# Patient Record
Sex: MALE | Race: WHITE | Employment: OTHER | URBAN - METROPOLITAN AREA
[De-identification: names, ages, dates, MRNs, and addresses within clinical notes are randomized per-mention and may not be internally consistent; named-entity substitution may affect disease eponyms.]

---

## 2017-01-18 ENCOUNTER — OFFICE VISIT (OUTPATIENT)
Dept: SURGERY | Age: 72
End: 2017-01-18

## 2017-01-18 VITALS — OXYGEN SATURATION: 98 % | WEIGHT: 229 LBS | HEIGHT: 73 IN | BODY MASS INDEX: 30.35 KG/M2 | RESPIRATION RATE: 18 BRPM

## 2017-01-18 DIAGNOSIS — K42.9 RECURRENT UMBILICAL HERNIA: Primary | ICD-10-CM

## 2017-01-18 NOTE — PROGRESS NOTES
Progress Note    Patient: Delia Hayden  MRN: [id number]  SSN: [id number]   YOB: 1945  Age: 70 y.o. Sex: male     Chief Complaint   Patient presents with    Umbilical Hernia       HPI    Mr. Julee Swenson is a 77-year-old gentleman who I fixed and ventral hernia on several years ago. He is back today with a recurrence of this hernia and would like it repaired. Is getting bigger over the past 6-8 months and started to become symptomatic. These had no health issues changes and is not on anticoagulation. We discussed in detail the physiology of recurrence as well as laparoscopic repairs. I discussed the risks and benefits with him he is good with that and is anxious to proceed.     Past Medical History   Diagnosis Date    Adenoma of left adrenal gland      2009  1 cm, no change 1/15, 2/16    Asbestosis     Atrial fibrillation      CHA2DS2-VAsc=3(age+, htn+, vasc dx+; estimated yearly stroke risk according to Lip et al. is 3,2%), Hasbled=2(estimated yearly bleeding risk according to pisters et al. is 1,88%); not anticoagulated due to h/o gi bleed    Atrial fibrillation ablation      10/08    Basal cell cancer      Dr Cachorro Seo; he's had >350 lesions removed    Carotid occlusion      left     Cervical radiculopathy      MRI 9/11 showed C3-6 severe foraminal stenosis    Chronic pain     Colon polyp      Dr Jono Griggs 9/15    Coronary artery disease      RCA - 3.0 x 16mm TAXUS 9/04, 3.0 x 16mm TAXUS 12/06    Dyslipidemia     Erectile dysfunction     GERD     GI bleed     Hearing loss      2014  bilateral Dr Ortiz Cutting    Hernia, umbilical     Hypertension     Hypogonadism male     Lumbar radiculopathy      Dr Sharyle Lown;  MRI 9/11 L4-5 disc bulging, annular tear, disc dessication    Myofascial pain dysfunction syndrome      pain clinic     Osteoarthritis      right knee Dr Jessica Lowe Peripheral vascular disease      50-60% R iliac; s/p R iliac stent and L femoral artery stent in past; R OLIVIA 0.76 (1/16)    Plantar fasciitis      bilateral     PPD positive     Prediabetes     Prostate cancer      T1c Holt 7(3+4), 70% in 1 core, GS 7 (3+4) in 4 cores, GS 6 (3+3) in 1 core; psa 5.28, TRUS 18 gm;  Dr Preethi Tenorio; s/p cryoablation 10/16    Recurrent umbilical hernia     Subclinical hypothyroidism      denies    Tinnitus      Dr Farzana Tello Ulcerative colitis     Venous insufficiency      Past Surgical History   Procedure Laterality Date    Hx refractive surgery       OD (hemoplasty to right eye on retina to avoid blindness)    Hx hemorrhoidectomy       1979    Hx tonsillectomy       1955    Pr repair ing hernia,5+y/o,reducibl       2/16  left  Dr. Julito Valentin Pr repair umbilical YEOG,2+O/N,TNMLL       2/16  Dr. Julito Valentin Hx colonoscopy       Dr Preethi Tenorio 9/15 polyps    Hx hernia repair       1970, 1975    Hx orthopaedic       right knee surgery    Hx carotid endarterectomy       1/14  right    Vascular surgery procedure unlist       1/16  R OLIVIA 0.76, L OLIVIA 1.02    Vascular surgery procedure unlist       10/15   left fem art and left iliac stent    Hx cryoablation of the prostate       10/16     Allergies   Allergen Reactions    Altace [Ramipril] Unknown (comments)    Lipitor [Atorvastatin] Other (comments)     Muscle pain    Lisinopril Shortness of Breath and Other (comments)     Turns bright red    Other Medication Other (comments)     Vicryl suture on skin tends to be rejected with poor wound healing does better with monocryl    Procardia [Nifedipine] Other (comments)     Caused afib     Current Outpatient Prescriptions   Medication Sig Dispense Refill    HYDROcodone-acetaminophen (NORCO) 5-325 mg per tablet Take 1 Tab by mouth every four (4) hours as needed for Pain. 180 Tab 0    furosemide (LASIX) 20 mg tablet Take 1 Tab by mouth as needed. One tab as needed For leg swelling 30 Tab 6    potassium chloride (K-DUR, KLOR-CON) 20 mEq tablet Take 1 Tab by mouth as needed.  When you take lasix 30 Tab 6    LORazepam (ATIVAN) 1 mg tablet Take 1 Tab by mouth every eight (8) hours as needed. 50 Tab 0    triamterene-hydrochlorothiazide (MAXZIDE) 37.5-25 mg per tablet TAKE ONE TABLET BY MOUTH DAILY 90 Tab 3    atenolol (TENORMIN) 25 mg tablet Take 1 Tab by mouth two (2) times a day. 180 Tab 3    losartan (COZAAR) 25 mg tablet Take 1 Tab by mouth two (2) times a day. 180 Tab 3    ezetimibe-simvastatin (VYTORIN 10/40) 10-40 mg per tablet Take 1 Tab by mouth nightly. 90 Tab 3    Cholecalciferol, Vitamin D3, 1,000 unit cap Take 1,000 Units by mouth daily.  hyoscyamine SL (LEVSIN/SL) 0.125 mg SL tablet 0.125 mg by SubLINGual route every four (4) hours as needed for Cramping.  VOLTAREN 1 % topical gel as needed.  nitroglycerin (NITROSTAT) 0.4 mg SL tablet 1 Tab by SubLINGual route every five (5) minutes as needed for Chest Pain. 1 Bottle 3    OMEGA-3 FATTY ACIDS/FISH OIL (OMEGA 3 FISH OIL PO) Take 900 mg by mouth daily.  gabapentin (NEURONTIN) 300 mg capsule Take 300 mg by mouth nightly.  BIOFLAV,LEMON/VIT BCOMP&C (LIPOFLAVONOID PO) Take  by mouth daily.  MULTIVITAMIN PO Take  by mouth daily.  pantoprazole (PROTONIX) 40 mg tablet Take 40 mg by mouth daily.  coenzyme q10 200 mg Cap Take  by mouth. Social History     Social History    Marital status:      Spouse name: N/A    Number of children: N/A    Years of education: N/A     Occupational History    Not on file.      Social History Main Topics    Smoking status: Former Smoker     Packs/day: 1.50     Years: 25.00     Types: Cigarettes     Quit date: 1/1/2003    Smokeless tobacco: Never Used    Alcohol use 2.5 oz/week     5 Cans of beer per week      Comment: 5 beers a week    Drug use: No    Sexual activity: Yes     Partners: Female     Birth control/ protection: None     Other Topics Concern    Not on file     Social History Narrative     Family History   Problem Relation Age of Onset    Heart Disease Mother     Hypertension Mother     Diabetes Mother     Arthritis-osteo Mother     Heart Disease Father     Stroke Father     Hypertension Father     Heart Disease Sister     Hypertension Sister          Review of systems:  Patient denies any reflux, emesis, abdominal pain, change in bowel habits, hematochezia, melena, fever, weight loss, fatigue chills, dermatitis, abnormal moles, change in vision, vertigo, epistaxis, dysphagia, hoarseness, chest pain, palpitations, hypertension, edema, cough, shortness of breath, wheezing, hemoptysis, snoring, hematuria, diabetes, thyroid disease, anemia, bruising, history of blood transfusion, dizziness, headache, or fainting. Physical Examination    Well developed well nourished male in no apparent distress  Visit Vitals    Resp 18    Ht 6' 1\" (1.854 m)    Wt 103.9 kg (229 lb)    SpO2 98%    BMI 30.21 kg/m2      Head: normocephalic, atraumatic  Mouth: Clear, no overt lesions, oral mucosa pink and moist  Neck: supple, no masses, no adenopathy or carotid bruits, trachea midline  Resp: clear to auscultation bilaterally, no wheeze, rhonchi or rales, excursions normal and symmetrical  Cardio: Regular rate and rhythm, no murmurs, clicks, gallops or rubs, no edema or varicosities  Abdomen: soft, nontender, nondistended, normoactive bowel sounds, easily reducible 1.5 cm ventral hernia, no hepatosplenomegaly,   Back: Deferred  Extremeties: warm, well-perfused, no tenderness or swelling, normal gait/station  Neuro: sensation and strength grossly intact and symmetrical  Psych: alert and oriented to person, place and time  Breast exam deferred    IMPRESSION  Recurrent ventral hernia    PLAN  No orders of the defined types were placed in this encounter.     Laparoscopic repair  Cee Perez MD

## 2017-01-18 NOTE — MR AVS SNAPSHOT
Visit Information Date & Time Provider Department Dept. Phone Encounter #  
 1/18/2017  3:15 PM Fabio Jules [facility] [phone] [id number] Your Appointments 1/26/2017  8:00 AM  
PROCEDURE with [facility] 1  
[facility] ([facility]) Appt Note: Iliac duplex/Moon [address] [address]  
[phone] [address] [facility] [address]  
  
    
 1/26/2017  9:00 AM  
PROCEDURE with [facility] IMAGING 1  
[facility] ([facility]) Appt Note: CV/Moon [address] [address]  
[phone]  
  
    
 1/26/2017 10:00 AM  
PROCEDURE with [facility] NONIMAGING  
[facility] ([facility]ED[facility]) Appt Note: Leg art/Moon [address] [address]  
[phone] [address] [facility] [address]  
  
    
 2/1/2017  9:00 AM  
Follow Up with Gideon Carter MD  
[facility] ([facility]EDUL[facility]) [address] [place] [address]  
[phone]  
  
   
 [facility] [address]  
  
    
 2/24/2017  9:00 AM  
HOSPITAL DISCHARGE with Terzea Tinsley MD  
[address] [facility] ([address] [place] [address]) Appt Note: po lap ventral hernia; po  
 [address] [facility]  
[phone]  
  
   
 [facility] [address]  
  
    
 3/20/2017  9:10 AM  
Nurse Visit with [facility] ([address] [place] [address]) Appt Note: PSA  
 [address] 
[address] [facility] [address]  
[address] [facility] [address] [place] [address]  
  
    
 6/22/2017  7:55 AM  
LAB with [facility] NURSE VISIT [facility] ([facility]) Appt Note: labs [address]  
 [address], [address] [place] [address]  
[phone] [address], [address]  
  
    
 6/29/2017  8:00 AM  
Office Visit with Emeli Simons MD  
[facility] of [address] [place] [address]) Appt Note: ov [address]  
 [address], [address] [address] [address]  
  
   
 [address], [address] 4/14/2017  9:45 AM  
Any with Coy Talbot MD  
[facility] ([facility]) [address] [place] [address]  
[address] [facility] [address] [place] [address] Upcoming Health Maintenance Date Due  
 GLAUCOMA SCREENING Q2Y 10/15/2011 MEDICARE YEARLY EXAM 6/24/2017 DTaP/Tdap/Td series (2 - Td) 4/3/2023 COLONOSCOPY 9/22/2025 Allergies as of 1/18/2017  Review Complete On: 1/18/2017 By: Saranya Muñoz LPN Severity Noted Reaction Type Reactions Altace [Ramipril]    Unknown (comments) Lipitor [Atorvastatin]    Other (comments) Muscle pain Lisinopril    Shortness of Breath, Other (comments) Turns bright red Other Medication  03/05/2014    Other (comments) Vicryl suture on skin tends to be rejected with poor wound healing does better with monocryl Procardia [Nifedipine]    Other (comments) Caused afib Current Immunizations  Reviewed on 12/22/2015 Name Date Influenza High Dose Vaccine PF 9/16/2016, 10/2/2015 12:30 PM  
 Influenza Vaccine 10/10/2014, 10/4/2013 Influenza Vaccine Split 10/10/2012  2:28 PM, 9/28/2011 Influenza Vaccine Whole 10/8/2010 Pneumococcal Conjugate (PCV-13) 12/15/2014  8:16 AM  
 Pneumococcal Polysaccharide (PPSV-23) 1/24/2014 Pneumococcal Vaccine (Unspecified Type) 1/1/2006 Td 1/1/2006 Tdap 4/3/2013  8:23 PM  
 Zoster Vaccine, Live 1/1/2007 Not reviewed this visit You Were Diagnosed With   
  
 Codes Comments Recurrent umbilical hernia    -  Primary ICD-10-CM: K42.9 ICD-9-CM: 553.1 Vitals Resp Height(growth percentile) Weight(growth percentile) SpO2 BMI Smoking Status 18 6' 1\" (1.854 m) 229 lb (103.9 kg) 98% 30.21 kg/m2 Former Smoker BMI and BSA Data Body Mass Index Body Surface Area  
 30.21 kg/m 2 2.31 m 2 Preferred Pharmacy Pharmacy Name Phone [facility], [address] [phone] Your Updated Medication List  
  
   
This list is accurate as of: 1/18/17  3:34 PM.  Always use your most recent med list.  
  
  
  
  
 atenolol 25 mg tablet Commonly known as:  TENORMIN Take 1 Tab by mouth two (2) times a day. cholecalciferol 1,000 unit Cap Commonly known as:  VITAMIN D3 Take 1,000 Units by mouth daily. coenzyme Q-10 200 mg capsule Commonly known as:  CO Q-10 Take  by mouth.  
  
 ezetimibe-simvastatin 10-40 mg per tablet Commonly known as:  Vytorin 10/40 Take 1 Tab by mouth nightly. furosemide 20 mg tablet Commonly known as:  LASIX Take 1 Tab by mouth as needed. One tab as needed For leg swelling  
  
 gabapentin 300 mg capsule Commonly known as:  NEURONTIN Take 300 mg by mouth nightly. HYDROcodone-acetaminophen 5-325 mg per tablet Commonly known as:  Chris Punt Take 1 Tab by mouth every four (4) hours as needed for Pain.  
  
 hyoscyamine SL 0.125 mg SL tablet Commonly known as:  LEVSIN/SL  
0.125 mg by SubLINGual route every four (4) hours as needed for Cramping. LIPOFLAVONOID PO Take  by mouth daily. LORazepam 1 mg tablet Commonly known as:  ATIVAN Take 1 Tab by mouth every eight (8) hours as needed. losartan 25 mg tablet Commonly known as:  COZAAR Take 1 Tab by mouth two (2) times a day. MULTIVITAMIN PO Take  by mouth daily. nitroglycerin 0.4 mg SL tablet Commonly known as:  NITROSTAT  
1 Tab by SubLINGual route every five (5) minutes as needed for Chest Pain. OMEGA 3 FISH OIL PO Take 900 mg by mouth daily. potassium chloride 20 mEq tablet Commonly known as:  K-DUR, KLOR-CON Take 1 Tab by mouth as needed. When you take lasix PROTONIX 40 mg tablet Generic drug:  pantoprazole Take 40 mg by mouth daily. triamterene-hydroCHLOROthiazide 37.5-25 mg per tablet Commonly known as:  Levie Minors TAKE ONE TABLET BY MOUTH DAILY  
  
 VOLTAREN 1 % Gel Generic drug:  diclofenac  
as needed. To-Do List   
 Around 02/17/2017 Lab:  CBC WITH AUTOMATED DIFF Around 02/17/2017 Lab:  METABOLIC PANEL, BASIC Patient Instructions Call the office at [phone] if you have any questions or concerns. Introducing [facility]! Dear Rafy Mckinnon: Thank you for requesting a [employer] account. Our records indicate that you already have an active [employer] account. You can access your account anytime at [email or web address] Did you know that you can access your hospital and ER discharge instructions at any time in [employer]? You can also review all of your test results from your hospital stay or ER visit. Additional Information If you have questions, please visit the Frequently Asked Questions section of the [employer] website at [email or web address] Remember, [employer] is NOT to be used for urgent needs. For medical emergencies, dial 911. Now available from your iPhone and Android! Please provide this summary of care documentation to your next provider. Your primary care clinician is listed as Nemiah Snellen. If you have any questions after today's visit, please call [phone].

## 2017-01-26 ENCOUNTER — OFFICE VISIT (OUTPATIENT)
Dept: VASCULAR SURGERY | Age: 72
End: 2017-01-26

## 2017-01-26 ENCOUNTER — HOSPITAL ENCOUNTER (OUTPATIENT)
Dept: LAB | Age: 72
Discharge: HOME | End: 2017-01-26
Payer: MEDICARE

## 2017-01-26 ENCOUNTER — HOSPITAL ENCOUNTER (OUTPATIENT)
Dept: LAB | Age: 72
Discharge: HOME | End: 2017-01-26

## 2017-01-26 DIAGNOSIS — I77.9 BILATERAL CAROTID ARTERY DISEASE (HCC): ICD-10-CM

## 2017-01-26 DIAGNOSIS — K42.9 RECURRENT UMBILICAL HERNIA: ICD-10-CM

## 2017-01-26 DIAGNOSIS — Z95.828 HISTORY OF INTRAVASCULAR STENT PLACEMENT: Primary | ICD-10-CM

## 2017-01-26 DIAGNOSIS — Z01.818 PRE-OP EVALUATION: ICD-10-CM

## 2017-01-26 DIAGNOSIS — I73.9 PERIPHERAL VASCULAR DISEASE (HCC): ICD-10-CM

## 2017-01-26 DIAGNOSIS — I70.219 ATHEROSCLEROSIS OF ARTERY OF EXTREMITY WITH INTERMITTENT CLAUDICATION (HCC): ICD-10-CM

## 2017-01-26 DIAGNOSIS — I65.22 LEFT CAROTID ARTERY OCCLUSION: ICD-10-CM

## 2017-01-26 DIAGNOSIS — I73.9 PVD (PERIPHERAL VASCULAR DISEASE) (HCC): ICD-10-CM

## 2017-01-26 LAB
ANION GAP BLD CALC-SCNC: 8 MMOL/L (ref 3–18)
BASOPHILS # BLD AUTO: 0.1 K/UL (ref 0–0.06)
BASOPHILS # BLD: 1 % (ref 0–2)
BUN SERPL-MCNC: 11 MG/DL (ref 7–18)
BUN/CREAT SERPL: 15 (ref 12–20)
CALCIUM SERPL-MCNC: 9.2 MG/DL (ref 8.5–10.1)
CHLORIDE SERPL-SCNC: 97 MMOL/L (ref 100–108)
CO2 SERPL-SCNC: 33 MMOL/L (ref 21–32)
CREAT SERPL-MCNC: 0.75 MG/DL (ref 0.6–1.3)
DIFFERENTIAL METHOD BLD: ABNORMAL
EOSINOPHIL # BLD: 0.2 K/UL (ref 0–0.4)
EOSINOPHIL NFR BLD: 3 % (ref 0–5)
ERYTHROCYTE [DISTWIDTH] IN BLOOD BY AUTOMATED COUNT: 12.5 % (ref 11.6–14.5)
GLUCOSE SERPL-MCNC: 115 MG/DL (ref 74–99)
HCT VFR BLD AUTO: 44 % (ref 36–48)
HGB BLD-MCNC: 15.1 G/DL (ref 13–16)
LYMPHOCYTES # BLD AUTO: 25 % (ref 21–52)
LYMPHOCYTES # BLD: 2.1 K/UL (ref 0.9–3.6)
MCH RBC QN AUTO: 32.3 PG (ref 24–34)
MCHC RBC AUTO-ENTMCNC: 34.3 G/DL (ref 31–37)
MCV RBC AUTO: 94.2 FL (ref 74–97)
MONOCYTES # BLD: 0.6 K/UL (ref 0.05–1.2)
MONOCYTES NFR BLD AUTO: 7 % (ref 3–10)
NEUTS SEG # BLD: 5.7 K/UL (ref 1.8–8)
NEUTS SEG NFR BLD AUTO: 64 % (ref 40–73)
PLATELET # BLD AUTO: 170 K/UL (ref 135–420)
PMV BLD AUTO: 10 FL (ref 9.2–11.8)
POTASSIUM SERPL-SCNC: 3.9 MMOL/L (ref 3.5–5.5)
RBC # BLD AUTO: 4.67 M/UL (ref 4.7–5.5)
SODIUM SERPL-SCNC: 138 MMOL/L (ref 136–145)
WBC # BLD AUTO: 8.7 K/UL (ref 4.6–13.2)

## 2017-01-26 PROCEDURE — 93005 ELECTROCARDIOGRAM TRACING: CPT

## 2017-01-26 RX ORDER — ASPIRIN 81 MG/1
81 TABLET ORAL DAILY
COMMUNITY

## 2017-01-26 NOTE — PROCEDURES
[facility]   *** FINAL REPORT ***    Name: Yasmeen Farfan  MRN: [id number]       Outpatient  : 18 Aug 1945  HIS Order #: [id number]  [address] [facility] Visit #: [id number]  Date: 2017    TYPE OF TEST: Aorto-Iliac Duplex    REASON FOR TEST  Peripheral Arterial Disease    B-Mode:-                 (cm)   1     2     3  Aortic diameter:         AP:                           TV:  Common iliac diameter:   Right:                           Left:    Duplex:-                           PSV  Stenosis                           ----- --------------------  Aorta: (1)                67.0 Normal         (2)                71.0         (3)                70.0    Right common iliac:      106.0 Normal  Right external iliac:    175.0 Normal    Left common iliac:       229.0 > 50% stenosis  Left external iliac:     130.0 Normal    INTERPRETATION/FINDINGS  Duplex images were obtained using 2-D gray scale, color flow and  spectral doppler analysis. Techinically difficult due to overlying bowel gas. 1. Patent right common iliac artery stent without significant  stenosis. 2. Left common iliac artery patent with elevated velocities noted  suggesting moderate stenosis. 3. Bilateral external iliac arteries patent without significant  stenosis. 4. Biphasic signals noted throughout. 5. ABIs suggest moderate arterial insufficiency on the right at rest  and normal perfusion on the left at rest.  The OLIVIA on the right is  0.84 and on the left is 0.99.     ADDITIONAL COMMENTS  SR Ao:  67 cm/sec    Rocky Ao:  71 cm/sec    Infra Ao: 70 cm/sec  RCIA Stent:  Prx 106 cm/sec         Mid 91 cm/sec          Dst 101  cm/sec   RIIA Prx 128 cm/sec      REIA:  Prx 175 cm/sec           Mid 99 cm/sec           Dst 106  cm/sec  LCIA:  Prx 213 cm/sec             Mid 229 cm/sec           Dst 158  cm/sec      IRIS:  Prx 110 cm/sec         Mid 119 cm/sec          Dst 130  cm/sec    I have personally reviewed the data relevant to the interpretation of  this  study. TECHNOLOGIST: Molina Kirby RVT, BS  Signed: 01/26/2017 09:41 AM    PHYSICIAN: Lee Whitney D.O.   Signed: 01/27/2017 09:11 AM

## 2017-01-26 NOTE — PROCEDURES
[facility]   *** FINAL REPORT ***    Name: Bouchra Escalante  MRN: [id number]       Outpatient  : 18 Aug 1945  HIS Order #: [id number]  [address] [facility] Visit #: [id number]  Date: 2017    TYPE OF TEST: Peripheral Arterial Testing    REASON FOR TEST  Peripheral vascular dz NOS    Right Leg  Segmentals: Abnormal                     mmHg  Brachial         134  High thigh  Low thigh  Calf             155  Posterior tibial 113  Dorsalis pedis   102  Peroneal  Metatarsal  Toe pressure      64  Doppler:    Abnormal  Ankle/Brachial: 0.84    Site of occlusive disease:-  femoral, popliteal and tibioperoneal segments and the digits    Left Leg  Segmentals: Normal                     mmHg  Brachial         124  High thigh  Low thigh  Calf             130  Posterior tibial 132  Dorsalis pedis   125  Peroneal  Metatarsal  Toe pressure  Doppler:    Normal  Ankle/Brachial: 0.99    INTERPRETATION/FINDINGS  Physiologic testing was performed using continuous wave doppler and  segmental pressures. 1. Moderate arterial insufficiency on the right at rest due to  fermoral artery disease with mutlilevel involvements. 2. Perfusion within normal limits on the left at rest.  3. The right ankle/brachial index is 0.84 and the left ankle/brachial  index is 0.99.  4. The DBI on the right is 0.48. Left digital wave flatline. .  Essentially no significant changes as compared with previous study. ADDITIONAL COMMENTS    I have personally reviewed the data relevant to the interpretation of  this  study. TECHNOLOGIST: Marianela Kirby RVT, BS  Signed: 2017 09:21 AM    PHYSICIAN: JOSÉ MIGUEL Sauceda   Signed: 2017 09:10 AM

## 2017-01-26 NOTE — PROCEDURES
[facility]   *** FINAL REPORT ***    Name: Marlyn Cooney  MRN: [id number]       Outpatient  : 18 Aug 1945  HIS Order #: [id number]  [address] [facility] Visit #: [id number]  Date: 2017    TYPE OF TEST: Cerebrovascular Duplex    REASON FOR TEST  F/U CEA 6 mo, 12 mo,     Right Carotid:-             Proximal               Mid                 Distal  cm/s  Systolic  Diastolic  Systolic  Diastolic  Systolic  Diastolic  CCA:     35.3      20.0                            97.0      29.0  Bulb:    87.0      19.0  ECA:     87.0       6.0  ICA:     76.0      32.0       64.0      27.0       71.0      29.0  ICA/CCA:  0.8       1.1    ICA Stenosis: <50%    Right Vertebral:-  Finding: Antegrade  Sys:       44.0  Joanie:       18.0    Right Subclavian:    Left Carotid:-            Proximal                Mid                 Distal  cm/s  Systolic  Diastolic  Systolic  Diastolic  Systolic  Diastolic  CCA:     18.0      10.0                            90.0      12.0  Bulb:  ECA:     81.0       6.0  ICA:      0.0       0.0        0.0       0.0  ICA/CCA:  0.0       0.0    ICA Stenosis: Occluded    Left Vertebral:-  Finding: Antegrade  Sys:       35.0  Joanie:       14.0    Left Subclavian:    INTERPRETATION/FINDINGS  Duplex images were obtained using 2-D gray scale, color flow and  spectral doppler analysis. 1. Mild plaquing of the right internal carotid artery in the less than   50% range. 2. Chronic occlusion of the left internal carotid artery, cannot  exclude extremely low flow. 3. No significant stenosis in the external carotid arteries  bilaterally. 4. Antegrade flow in both vertebral arteries. Plaque Morphology:     Heterogeneous plaque in the bulb and right ICA. No significant changes when compared to previous study. ADDITIONAL COMMENTS    I have personally reviewed the data relevant to the interpretation of  this  study. TECHNOLOGIST: Beronica Kirby RVT, BS  Signed: 2017 09:26 AM    PHYSICIAN: Sohail JOSÉ MIGUEL  Signed: 01/27/2017 09:10 AM

## 2017-01-27 LAB
ATRIAL RATE: 67 BPM
CALCULATED P AXIS, ECG09: 39 DEGREES
CALCULATED R AXIS, ECG10: -52 DEGREES
CALCULATED T AXIS, ECG11: 48 DEGREES
DIAGNOSIS, 93000: NORMAL
P-R INTERVAL, ECG05: 178 MS
Q-T INTERVAL, ECG07: 422 MS
QRS DURATION, ECG06: 94 MS
QTC CALCULATION (BEZET), ECG08: 445 MS
VENTRICULAR RATE, ECG03: 67 BPM

## 2017-01-31 NOTE — TELEPHONE ENCOUNTER
Last date seen:12/23/16  Last date filled:12/30/16     report was pulled on 70 y.o. Dineen Boy Collette, No discrepancies were found.

## 2017-02-01 ENCOUNTER — OFFICE VISIT (OUTPATIENT)
Dept: VASCULAR SURGERY | Age: 72
End: 2017-02-01

## 2017-02-01 VITALS
HEART RATE: 60 BPM | DIASTOLIC BLOOD PRESSURE: 70 MMHG | HEIGHT: 72 IN | WEIGHT: 224 LBS | BODY MASS INDEX: 30.34 KG/M2 | RESPIRATION RATE: 20 BRPM | SYSTOLIC BLOOD PRESSURE: 122 MMHG

## 2017-02-01 DIAGNOSIS — I73.9 PERIPHERAL VASCULAR DISEASE (HCC): ICD-10-CM

## 2017-02-01 DIAGNOSIS — I65.22 LEFT CAROTID ARTERY OCCLUSION: ICD-10-CM

## 2017-02-01 DIAGNOSIS — I70.219 ATHEROSCLEROSIS OF ARTERY OF EXTREMITY WITH INTERMITTENT CLAUDICATION (HCC): Primary | ICD-10-CM

## 2017-02-01 RX ORDER — HYDROCODONE BITARTRATE AND ACETAMINOPHEN 5; 325 MG/1; MG/1
1 TABLET ORAL
Qty: 180 TAB | Refills: 0 | Status: SHIPPED | OUTPATIENT
Start: 2017-02-01 | End: 2017-03-13 | Stop reason: SDUPTHER

## 2017-02-01 NOTE — MR AVS SNAPSHOT
Visit Information Date & Time Provider Department Dept. Phone Encounter #  
 2/1/2017  9:00 AM Kelley Petty, [address] [facility] [phone] [id number] Follow-up Instructions Return in about 1 year (around 2/1/2018). Follow-up and Disposition History Your Appointments 2/24/2017  9:00 AM  
HOSPITAL DISCHARGE with MD Sylvie Hahn [facility] ([facility]) Appt Note: po lap ventral hernia; po  
 [address] [place]  
[phone]  
  
   
 [address] [address]  
  
    
 3/20/2017  9:10 AM  
Nurse Visit with [facility] NURSE [facility] ([facility]) Appt Note: PSA  
 [address] 
[address] [place] [address]  
[address] [facility] [address] [facility] [address]  
  
    
 6/22/2017  7:55 AM  
LAB with [place] [facility] NURSE VISIT Internist of [facility] ([facility]) Appt Note: labs 6mos. rd  
 [address], [address] [facility] [address] [place]  
  
   
 [address], [address]  
  
    
 6/29/2017  8:00 AM  
Office Visit with Roshan Harden MD  
Internist of [address] [place] [address] Appt Note: ov 6mos. rd  
 [address], [address] [id number] [address] [place]  
  
   
 [address], [address]  
  
    
 2/19/2018  9:00 AM  
Follow Up with Kelley Petty MD  
[facility] ([facility] SCHEDULING) Appt Note: 1 year fu after studies [address] [facility] [address] [address]  
[phone]  
  
   
 [address] [facility] [address] [address] 4/14/2017  9:45 AM  
Any with Ivis Dewey MD  
[facility] ([facility]) [address] [place] [address]  
[address] [facility] [address] [facility] [address] Upcoming Health Maintenance Date Due  
 GLAUCOMA SCREENING Q2Y 10/15/2011 MEDICARE YEARLY EXAM 6/24/2017 DTaP/Tdap/Td series (2 - Td) 4/3/2023 COLONOSCOPY 9/22/2025 Allergies as of 2/1/2017  Review Complete On: 2/1/2017 By: Gideon Carter MD  
  
 Severity Noted Reaction Type Reactions Altace [Ramipril]    Unknown (comments) Lipitor [Atorvastatin]    Other (comments) Muscle pain Lisinopril    Shortness of Breath, Other (comments) Turns bright red Other Medication  03/05/2014    Other (comments) Vicryl suture on skin tends to be rejected with poor wound healing does better with monocryl Procardia [Nifedipine]    Other (comments) Caused afib Current Immunizations  Reviewed on 12/22/2015 Name Date Influenza High Dose Vaccine PF 9/16/2016, 10/2/2015 12:30 PM  
 Influenza Vaccine 10/10/2014, 10/4/2013 Influenza Vaccine Split 10/10/2012  2:28 PM, 9/28/2011 Influenza Vaccine Whole 10/8/2010 Pneumococcal Conjugate (PCV-13) 12/15/2014  8:16 AM  
 Pneumococcal Polysaccharide (PPSV-23) 1/24/2014 Pneumococcal Vaccine (Unspecified Type) 1/1/2006 Td 1/1/2006 Tdap 4/3/2013  8:23 PM  
 Zoster Vaccine, Live 1/1/2007 Not reviewed this visit You Were Diagnosed With   
  
 Codes Comments Atherosclerosis of artery of extremity with intermittent claudication ([facility]    -  Primary ICD-10-CM: N27.511 ICD-9-CM: 440.21 Left carotid artery occlusion     ICD-10-CM: D80.64 
ICD-9-CM: 433.10 Peripheral vascular disease ([facility]     ICD-10-CM: I73.9 ICD-9-CM: 443. 9 Vitals BP Pulse Resp Height(growth percentile) Weight(growth percentile) BMI  
 122/70 (BP 1 Location: Left arm, BP Patient Position: Sitting) 60 20 6' (1.829 m) 224 lb (101.6 kg) 30.38 kg/m2 Smoking Status Former Smoker Vitals History BMI and BSA Data Body Mass Index Body Surface Area  
 30.38 kg/m 2 2.27 m 2 Preferred Pharmacy Pharmacy Name Phone Jessica Suárez E Kya Neal, [address] [phone] Your Updated Medication List  
  
   
This list is accurate as of: 2/1/17  9:26 AM.  Always use your most recent med list.  
  
  
  
  
 aspirin delayed-release 81 mg tablet Take 81 mg by mouth daily. atenolol 25 mg tablet Commonly known as:  TENORMIN Take 1 Tab by mouth two (2) times a day. cholecalciferol 1,000 unit Cap Commonly known as:  VITAMIN D3 Take 1,000 Units by mouth daily. coenzyme Q-10 200 mg capsule Commonly known as:  CO Q-10 Take  by mouth.  
  
 ezetimibe-simvastatin 10-40 mg per tablet Commonly known as:  Vytorin 10/40 Take 1 Tab by mouth nightly.  
  
 gabapentin 300 mg capsule Commonly known as:  NEURONTIN Take 300 mg by mouth nightly. HYDROcodone-acetaminophen 5-325 mg per tablet Commonly known as:  Shahana Ji Take 1 Tab by mouth every four (4) hours as needed for Pain. LIPOFLAVONOID PO Take  by mouth daily. LORazepam 1 mg tablet Commonly known as:  ATIVAN Take 1 Tab by mouth every eight (8) hours as needed. losartan 25 mg tablet Commonly known as:  COZAAR Take 1 Tab by mouth two (2) times a day. MULTIVITAMIN PO Take  by mouth daily. OMEGA 3 FISH OIL PO Take 900 mg by mouth daily. PROTONIX 40 mg tablet Generic drug:  pantoprazole Take 40 mg by mouth daily. triamterene-hydroCHLOROthiazide 37.5-25 mg per tablet Commonly known as:  Marek Haver TAKE ONE TABLET BY MOUTH DAILY  
  
 VOLTAREN 1 % Gel Generic drug:  diclofenac  
as needed. Follow-up Instructions Return in about 1 year (around 2/1/2018). To-Do List   
 02/01/2018 Imaging:  DUPLEX AORTA ILIAC GRAFT COMPLETE AMB   
  
 02/01/2018 Imaging:  DUPLEX CAROTID BILATERAL AMB   
  
 02/01/2018 Imaging:  LOWER EXT ART PVR MULT LEVEL SEG PRESSURES [facility]! Dear Sherly Ricketts: Thank you for requesting a [employer] account. Our records indicate that you already have an active [employer] account. You can access your account anytime at [email or web address] Did you know that you can access your hospital and ER discharge instructions at any time in [employer]? You can also review all of your test results from your hospital stay or ER visit. Additional Information If you have questions, please visit the Frequently Asked Questions section of the [employer] website at [email or web address] Remember, [employer] is NOT to be used for urgent needs. For medical emergencies, dial 911. Now available from your iPhone and Android! Please provide this summary of care documentation to your next provider. Your primary care clinician is listed as Joshua Braun. If you have any questions after today's visit, please call [phone].

## 2017-02-01 NOTE — PROGRESS NOTES
Sharmaine Marshall    Chief Complaint   Patient presents with    Carotid Artery Stenosis       History and Physical    Sharmaine Marshall is a 70 y.o. male with known carotid artery disease. He also has known arterial claudication currently right greater than left. We have already performed a carotid endarterectomy in the right side and he also has a right common iliac artery stent as well as some atherectomy of the left lower extremity. Overall patient does have some lifestyle limiting claudication of the right lower extremity but currently his major issue is an umbilical hernia. He also has the need for a left cataract surgery. Given the fact that he would require Plavix he has decided to hold off on any procedure for his lower extremity at this current time. He has no rest pain or fevers or chills no TIA or strokelike symptoms.     Past Medical History   Diagnosis Date    Adenoma of left adrenal gland      2009  1 cm, no change 1/15, 2/16    Asbestosis     Atrial fibrillation      CHA2DS2-VAsc=3(age+, htn+, vasc dx+; estimated yearly stroke risk according to Lip et al. is 3,2%), Hasbled=2(estimated yearly bleeding risk according to pisters et al. is 1,88%); not anticoagulated due to h/o gi bleed    Atrial fibrillation ablation      10/08    Basal cell cancer      Dr Bhaskar Bedoya; he's had >350 lesions removed    Carotid occlusion      left     Cervical radiculopathy      MRI 9/11 showed C3-6 severe foraminal stenosis    Chronic pain     Colon polyp      Dr Moser Records 9/15    Coronary artery disease      RCA - 3.0 x 16mm TAXUS 9/04, 3.0 x 16mm TAXUS 12/06    Dyslipidemia     Erectile dysfunction     GERD     GI bleed     Hearing loss      2014  bilateral Dr Amadou Andrews    Hernia, umbilical     Hypertension     Hypogonadism male     Lumbar radiculopathy      Dr Mariela Meeks;  MRI 9/11 L4-5 disc bulging, annular tear, disc dessication    Myofascial pain dysfunction syndrome      pain clinic     Osteoarthritis      right knee Dr Christa Skelton Peripheral vascular disease      50-60% R iliac; s/p R iliac stent and L femoral artery stent in past; R OLIVIA 0.76 (1/16)    Plantar fasciitis      bilateral     PPD positive     Prediabetes     Prostate cancer      T1c Imelda 7(3+4), 70% in 1 core, GS 7 (3+4) in 4 cores, GS 6 (3+3) in 1 core; psa 5.28, TRUS 18 gm;  Dr Rosemary Victoria; s/p cryoablation 10/16    Recurrent umbilical hernia     Subclinical hypothyroidism      denies    Tinnitus      Dr Joselo Allison Ulcerative colitis     Venous insufficiency      Past Surgical History   Procedure Laterality Date    Hx refractive surgery       OD (hemoplasty to right eye on retina to avoid blindness)    Hx hemorrhoidectomy       1979    Hx tonsillectomy       1955    Pr repair ing hernia,5+y/o,reducibl       2/16  left  Dr. Yancy Villagomez Pr repair umbilical ERKO,7+B/R,PQPEW       2/16  Dr. Yancy Villagomez Hx colonoscopy       Dr Rosemary Victoria 9/15 polyps    Hx hernia repair       1970, 1975    Hx orthopaedic       right knee surgery    Hx carotid endarterectomy       1/14  right    Vascular surgery procedure unlist       1/16  R OLIVIA 0.76, L OLIVIA 1.02    Vascular surgery procedure unlist       10/15   left fem art and left iliac stent    Hx cryoablation of the prostate       10/16     Patient Active Problem List   Diagnosis Code    Degeneration of cervical and limbar spine Dr Rasta Ash M50.30    Colitis, ulcerative ([address] K51.90    Colon polyps K63.5    Cervical disc disease M50.90    Peripheral vascular disease ([address] Dr Shannon Alvarez I73.9    Left carotid artery occlusion I65.22    Atherosclerosis of artery of extremity with intermittent claudication ([address] I70.219    Hypovitaminosis D E55.9    Advance directive in chart Z78.9    Prediabetes R73.03    Hypertension I11.9    Dyslipidemia E78.5    Coronary artery disease I25.10    Atrial fibrillation I48.91    Recurrent umbilical hernia K04.6    Prostate carcinoma ([address] s/p cryoablation [id number]     Current Outpatient Prescriptions   Medication Sig Dispense Refill    aspirin delayed-release 81 mg tablet Take 81 mg by mouth daily.  HYDROcodone-acetaminophen (NORCO) 5-325 mg per tablet Take 1 Tab by mouth every four (4) hours as needed for Pain. 180 Tab 0    LORazepam (ATIVAN) 1 mg tablet Take 1 Tab by mouth every eight (8) hours as needed. 50 Tab 0    triamterene-hydrochlorothiazide (MAXZIDE) 37.5-25 mg per tablet TAKE ONE TABLET BY MOUTH DAILY 90 Tab 3    atenolol (TENORMIN) 25 mg tablet Take 1 Tab by mouth two (2) times a day. 180 Tab 3    losartan (COZAAR) 25 mg tablet Take 1 Tab by mouth two (2) times a day. 180 Tab 3    ezetimibe-simvastatin (VYTORIN 10/40) 10-40 mg per tablet Take 1 Tab by mouth nightly. 90 Tab 3    Cholecalciferol, Vitamin D3, 1,000 unit cap Take 1,000 Units by mouth daily.  VOLTAREN 1 % topical gel as needed.  OMEGA-3 FATTY ACIDS/FISH OIL (OMEGA 3 FISH OIL PO) Take 900 mg by mouth daily.  gabapentin (NEURONTIN) 300 mg capsule Take 300 mg by mouth nightly.  BIOFLAV,LEMON/VIT BCOMP&C (LIPOFLAVONOID PO) Take  by mouth daily.  MULTIVITAMIN PO Take  by mouth daily.  pantoprazole (PROTONIX) 40 mg tablet Take 40 mg by mouth daily.  coenzyme q10 200 mg Cap Take  by mouth. Allergies   Allergen Reactions    Altace [Ramipril] Unknown (comments)    Lipitor [Atorvastatin] Other (comments)     Muscle pain    Lisinopril Shortness of Breath and Other (comments)     Turns bright red    Other Medication Other (comments)     Vicryl suture on skin tends to be rejected with poor wound healing does better with monocryl    Procardia [Nifedipine] Other (comments)     Caused afib     Social History     Social History    Marital status:      Spouse name: N/A    Number of children: N/A    Years of education: N/A     Occupational History    Not on file.      Social History Main Topics    Smoking status: Former Smoker Packs/day: 1.50     Years: 25.00     Types: Cigarettes     Quit date: 1/1/2003    Smokeless tobacco: Never Used    Alcohol use 2.5 oz/week     5 Cans of beer per week      Comment: 5 beers a week    Drug use: No    Sexual activity: Yes     Partners: Female     Birth control/ protection: None     Other Topics Concern    Not on file     Social History Narrative      Family History   Problem Relation Age of Onset    Heart Disease Mother     Hypertension Mother     Diabetes Mother     Arthritis-osteo Mother     Heart Disease Father     Stroke Father     Hypertension Father     Heart Disease Sister     Hypertension Sister        Physical Exam:    Visit Vitals    /70 (BP 1 Location: Left arm, BP Patient Position: Sitting)    Pulse 60    Resp 20    Ht 6' (1.829 m)    Wt 224 lb (101.6 kg)    BMI 30.38 kg/m2      General: Well-appearing male in no acute distress  HEENT: EOMI no scleral icterus is noted patient is wearing eyeglasses  Pulmonary: No increased work of breathing is noted  Extremities: Warm and well-perfused bilaterally  Neuro: Cranial nerves II through XII are grossly intact    Impression and Plan:  Jenny Mccray is a 70 y.o. male with known arterial claudication of bilateral lower extremities as well as carotid artery occlusion of the left side and carotid endarterectomy in the right. I have reviewed his ultrasounds in clinic today showing mild to moderate disease on the right lower extremity mild disease in the left lower extremity with a greater than 50% narrowing of what appears to be in the left common iliac artery which was left untreated previously. His right common iliac artery stent is patent without any evidence of stenosis. He already had his prostate procedure. He is still scheduled for his umbilical hernia procedure. He is thinking about getting a left cataract surgery.   I told him that any surgical procedure for his lower extremities would require him Plavix for 3 months. And that if this is not his major issue at this current time then I would highly recommend getting all of those procedures performed first and then we would move forward with an angiogram.  Patient is in agreement and will call me if he gets to that point. Otherwise we will see him on a yearly basis with repeat ultrasounds. We reviewed the plan with the patient and the patient understands. Follow-up Disposition:  Return in about 1 year (around 2/1/2018). Radha Samuel MD    PLEASE NOTE:  This document has been produced using voice recognition software. Unrecognized errors in transcription may be present.

## 2017-02-02 ENCOUNTER — TELEPHONE (OUTPATIENT)
Dept: INTERNAL MEDICINE CLINIC | Age: 72
End: 2017-02-02

## 2017-02-08 ENCOUNTER — ANESTHESIA EVENT (OUTPATIENT)
Dept: SURGERY | Age: 72
End: 2017-02-08
Payer: MEDICARE

## 2017-02-09 ENCOUNTER — ANESTHESIA (OUTPATIENT)
Dept: SURGERY | Age: 72
End: 2017-02-09
Payer: MEDICARE

## 2017-02-09 ENCOUNTER — HOSPITAL ENCOUNTER (OUTPATIENT)
Age: 72
Setting detail: OUTPATIENT SURGERY
Discharge: HOME | End: 2017-02-09
Payer: MEDICARE

## 2017-02-09 VITALS
TEMPERATURE: 96.8 F | WEIGHT: 227 LBS | OXYGEN SATURATION: 93 % | SYSTOLIC BLOOD PRESSURE: 117 MMHG | HEIGHT: 72 IN | BODY MASS INDEX: 30.75 KG/M2 | RESPIRATION RATE: 15 BRPM | HEART RATE: 68 BPM | DIASTOLIC BLOOD PRESSURE: 61 MMHG

## 2017-02-09 DIAGNOSIS — K42.9 RECURRENT UMBILICAL HERNIA: Primary | ICD-10-CM

## 2017-02-09 PROCEDURE — 74011250636 HC RX REV CODE- 250/636

## 2017-02-09 PROCEDURE — 77030026438 HC STYL ET INTUB CARD -A: Performed by: ANESTHESIOLOGY

## 2017-02-09 PROCEDURE — 77030031139 HC SUT VCRL2 J&J -A

## 2017-02-09 PROCEDURE — 77030010030

## 2017-02-09 PROCEDURE — 77030002933 HC SUT MCRYL J&J -A

## 2017-02-09 PROCEDURE — 77030008606 HC TRCR ENDOSC KII AMR -B

## 2017-02-09 PROCEDURE — 77030033639 HC SHR ENDO COAG HARM 36 J&J -E

## 2017-02-09 PROCEDURE — 76060000034 HC ANESTHESIA 1.5 TO 2 HR

## 2017-02-09 PROCEDURE — 77030010351 HC TRCR ENDOSC VSTP COVD -B

## 2017-02-09 PROCEDURE — 74011250637 HC RX REV CODE- 250/637

## 2017-02-09 PROCEDURE — 77030020782 HC GWN BAIR PAWS FLX 3M -B

## 2017-02-09 PROCEDURE — 77030012012 HC AIRWY OP SFT TELE -A: Performed by: ANESTHESIOLOGY

## 2017-02-09 PROCEDURE — 77030035035 HC TRCR ENDOSC VRSPRT V2 COVD -B

## 2017-02-09 PROCEDURE — 77030018836 HC SOL IRR NACL ICUM -A

## 2017-02-09 PROCEDURE — 77030020255 HC SOL INJ LR 1000ML BG

## 2017-02-09 PROCEDURE — 74011000250 HC RX REV CODE- 250

## 2017-02-09 PROCEDURE — 76210000017 HC OR PH I REC 1.5 TO 2 HR

## 2017-02-09 PROCEDURE — 74011250636 HC RX REV CODE- 250/636: Performed by: NURSE ANESTHETIST, CERTIFIED REGISTERED

## 2017-02-09 PROCEDURE — 77030011640 HC PAD GRND REM COVD -A

## 2017-02-09 PROCEDURE — 77030008683 HC TU ET CUF COVD -A: Performed by: ANESTHESIOLOGY

## 2017-02-09 PROCEDURE — 77030018548 HC SUT ETHBND2 J&J -B

## 2017-02-09 PROCEDURE — 74011250636 HC RX REV CODE- 250/636: Performed by: ANESTHESIOLOGY

## 2017-02-09 PROCEDURE — 77030032490 HC SLV COMPR SCD KNE COVD -B

## 2017-02-09 PROCEDURE — 76010000153 HC OR TIME 1.5 TO 2 HR

## 2017-02-09 PROCEDURE — 74011250637 HC RX REV CODE- 250/637: Performed by: NURSE ANESTHETIST, CERTIFIED REGISTERED

## 2017-02-09 PROCEDURE — 77030008756 HC TU IRR SUC STRY -B

## 2017-02-09 PROCEDURE — 76210000026 HC REC RM PH II 1 TO 1.5 HR

## 2017-02-09 PROCEDURE — 77030018706 HC CORD MPLR COVD -A

## 2017-02-09 PROCEDURE — 77030025927 HC STPLR FIX SECURSTRP J&J -F

## 2017-02-09 PROCEDURE — C1781 MESH (IMPLANTABLE): HCPCS

## 2017-02-09 PROCEDURE — 77030018846 HC SOL IRR STRL H20 ICUM -A

## 2017-02-09 DEVICE — MESH HERN DIA8IN CIR W/ PRE ATTCH LO PROF BLLN AND ECHO PS: Type: IMPLANTABLE DEVICE | Site: ABDOMEN | Status: FUNCTIONAL

## 2017-02-09 RX ORDER — MIDAZOLAM HYDROCHLORIDE 1 MG/ML
INJECTION, SOLUTION INTRAMUSCULAR; INTRAVENOUS AS NEEDED
Status: DISCONTINUED | OUTPATIENT
Start: 2017-02-09 | End: 2017-02-09 | Stop reason: HOSPADM

## 2017-02-09 RX ORDER — GLYCOPYRROLATE 0.2 MG/ML
INJECTION INTRAMUSCULAR; INTRAVENOUS AS NEEDED
Status: DISCONTINUED | OUTPATIENT
Start: 2017-02-09 | End: 2017-02-09 | Stop reason: HOSPADM

## 2017-02-09 RX ORDER — BUPIVACAINE HYDROCHLORIDE AND EPINEPHRINE 2.5; 5 MG/ML; UG/ML
INJECTION, SOLUTION EPIDURAL; INFILTRATION; INTRACAUDAL; PERINEURAL AS NEEDED
Status: DISCONTINUED | OUTPATIENT
Start: 2017-02-09 | End: 2017-02-09 | Stop reason: HOSPADM

## 2017-02-09 RX ORDER — HYDROMORPHONE HYDROCHLORIDE 1 MG/ML
INJECTION, SOLUTION INTRAMUSCULAR; INTRAVENOUS; SUBCUTANEOUS AS NEEDED
Status: DISCONTINUED | OUTPATIENT
Start: 2017-02-09 | End: 2017-02-09 | Stop reason: HOSPADM

## 2017-02-09 RX ORDER — SODIUM CHLORIDE 0.9 % (FLUSH) 0.9 %
5-10 SYRINGE (ML) INJECTION EVERY 8 HOURS
Status: DISCONTINUED | OUTPATIENT
Start: 2017-02-09 | End: 2017-02-09 | Stop reason: HOSPADM

## 2017-02-09 RX ORDER — HYDROMORPHONE HYDROCHLORIDE 2 MG/ML
0.5 INJECTION, SOLUTION INTRAMUSCULAR; INTRAVENOUS; SUBCUTANEOUS
Status: DISCONTINUED | OUTPATIENT
Start: 2017-02-09 | End: 2017-02-09 | Stop reason: HOSPADM

## 2017-02-09 RX ORDER — PROPOFOL 10 MG/ML
INJECTION, EMULSION INTRAVENOUS AS NEEDED
Status: DISCONTINUED | OUTPATIENT
Start: 2017-02-09 | End: 2017-02-09 | Stop reason: HOSPADM

## 2017-02-09 RX ORDER — SUCCINYLCHOLINE CHLORIDE 20 MG/ML
INJECTION INTRAMUSCULAR; INTRAVENOUS AS NEEDED
Status: DISCONTINUED | OUTPATIENT
Start: 2017-02-09 | End: 2017-02-09 | Stop reason: HOSPADM

## 2017-02-09 RX ORDER — ROCURONIUM BROMIDE 10 MG/ML
INJECTION, SOLUTION INTRAVENOUS AS NEEDED
Status: DISCONTINUED | OUTPATIENT
Start: 2017-02-09 | End: 2017-02-09 | Stop reason: HOSPADM

## 2017-02-09 RX ORDER — INSULIN LISPRO 100 [IU]/ML
INJECTION, SOLUTION INTRAVENOUS; SUBCUTANEOUS ONCE
Status: DISCONTINUED | OUTPATIENT
Start: 2017-02-09 | End: 2017-02-09 | Stop reason: HOSPADM

## 2017-02-09 RX ORDER — CEFAZOLIN SODIUM 2 G/50ML
2 SOLUTION INTRAVENOUS ONCE
Status: COMPLETED | OUTPATIENT
Start: 2017-02-09 | End: 2017-02-09

## 2017-02-09 RX ORDER — SODIUM CHLORIDE 0.9 % (FLUSH) 0.9 %
5-10 SYRINGE (ML) INJECTION AS NEEDED
Status: DISCONTINUED | OUTPATIENT
Start: 2017-02-09 | End: 2017-02-09 | Stop reason: HOSPADM

## 2017-02-09 RX ORDER — HYDROMORPHONE HYDROCHLORIDE 2 MG/ML
INJECTION, SOLUTION INTRAMUSCULAR; INTRAVENOUS; SUBCUTANEOUS
Status: COMPLETED
Start: 2017-02-09 | End: 2017-02-09

## 2017-02-09 RX ORDER — DEXTROSE 50 % IN WATER (D50W) INTRAVENOUS SYRINGE
25-50 AS NEEDED
Status: DISCONTINUED | OUTPATIENT
Start: 2017-02-09 | End: 2017-02-09 | Stop reason: HOSPADM

## 2017-02-09 RX ORDER — FENTANYL CITRATE 50 UG/ML
INJECTION, SOLUTION INTRAMUSCULAR; INTRAVENOUS AS NEEDED
Status: DISCONTINUED | OUTPATIENT
Start: 2017-02-09 | End: 2017-02-09 | Stop reason: HOSPADM

## 2017-02-09 RX ORDER — LIDOCAINE HYDROCHLORIDE 20 MG/ML
INJECTION, SOLUTION EPIDURAL; INFILTRATION; INTRACAUDAL; PERINEURAL AS NEEDED
Status: DISCONTINUED | OUTPATIENT
Start: 2017-02-09 | End: 2017-02-09 | Stop reason: HOSPADM

## 2017-02-09 RX ORDER — OXYCODONE AND ACETAMINOPHEN 5; 325 MG/1; MG/1
1 TABLET ORAL
Status: COMPLETED | OUTPATIENT
Start: 2017-02-09 | End: 2017-02-09

## 2017-02-09 RX ORDER — MAGNESIUM SULFATE 100 %
4 CRYSTALS MISCELLANEOUS AS NEEDED
Status: DISCONTINUED | OUTPATIENT
Start: 2017-02-09 | End: 2017-02-09 | Stop reason: HOSPADM

## 2017-02-09 RX ORDER — FAMOTIDINE 20 MG/1
20 TABLET, FILM COATED ORAL ONCE
Status: COMPLETED | OUTPATIENT
Start: 2017-02-09 | End: 2017-02-09

## 2017-02-09 RX ORDER — OXYCODONE AND ACETAMINOPHEN 5; 325 MG/1; MG/1
1 TABLET ORAL
Qty: 30 TAB | Refills: 0 | Status: SHIPPED | OUTPATIENT
Start: 2017-02-09 | End: 2017-02-28

## 2017-02-09 RX ORDER — NEOSTIGMINE METHYLSULFATE 5 MG/5 ML
SYRINGE (ML) INTRAVENOUS AS NEEDED
Status: DISCONTINUED | OUTPATIENT
Start: 2017-02-09 | End: 2017-02-09 | Stop reason: HOSPADM

## 2017-02-09 RX ORDER — KETOROLAC TROMETHAMINE 15 MG/ML
15 INJECTION, SOLUTION INTRAMUSCULAR; INTRAVENOUS
Status: COMPLETED | OUTPATIENT
Start: 2017-02-09 | End: 2017-02-09

## 2017-02-09 RX ORDER — ONDANSETRON 2 MG/ML
INJECTION INTRAMUSCULAR; INTRAVENOUS AS NEEDED
Status: DISCONTINUED | OUTPATIENT
Start: 2017-02-09 | End: 2017-02-09 | Stop reason: HOSPADM

## 2017-02-09 RX ORDER — SODIUM CHLORIDE, SODIUM LACTATE, POTASSIUM CHLORIDE, CALCIUM CHLORIDE 600; 310; 30; 20 MG/100ML; MG/100ML; MG/100ML; MG/100ML
75 INJECTION, SOLUTION INTRAVENOUS CONTINUOUS
Status: DISCONTINUED | OUTPATIENT
Start: 2017-02-09 | End: 2017-02-09 | Stop reason: HOSPADM

## 2017-02-09 RX ADMIN — HYDROMORPHONE HYDROCHLORIDE 0.5 MG: 1 INJECTION, SOLUTION INTRAMUSCULAR; INTRAVENOUS; SUBCUTANEOUS at 09:14

## 2017-02-09 RX ADMIN — ROCURONIUM BROMIDE 5 MG: 10 INJECTION, SOLUTION INTRAVENOUS at 08:35

## 2017-02-09 RX ADMIN — FAMOTIDINE 20 MG: 20 TABLET ORAL at 06:31

## 2017-02-09 RX ADMIN — Medication 4 MG: at 08:57

## 2017-02-09 RX ADMIN — FENTANYL CITRATE 50 MCG: 50 INJECTION, SOLUTION INTRAMUSCULAR; INTRAVENOUS at 08:35

## 2017-02-09 RX ADMIN — ROCURONIUM BROMIDE 20 MG: 10 INJECTION, SOLUTION INTRAVENOUS at 08:00

## 2017-02-09 RX ADMIN — KETOROLAC TROMETHAMINE 15 MG: 15 INJECTION, SOLUTION INTRAMUSCULAR; INTRAVENOUS at 12:16

## 2017-02-09 RX ADMIN — FENTANYL CITRATE 50 MCG: 50 INJECTION, SOLUTION INTRAMUSCULAR; INTRAVENOUS at 08:12

## 2017-02-09 RX ADMIN — SODIUM CHLORIDE, SODIUM LACTATE, POTASSIUM CHLORIDE, AND CALCIUM CHLORIDE 75 ML/HR: 600; 310; 30; 20 INJECTION, SOLUTION INTRAVENOUS at 09:46

## 2017-02-09 RX ADMIN — GLYCOPYRROLATE 0.8 MG: 0.2 INJECTION INTRAMUSCULAR; INTRAVENOUS at 08:57

## 2017-02-09 RX ADMIN — HYDROMORPHONE HYDROCHLORIDE 0.5 MG: 2 INJECTION, SOLUTION INTRAMUSCULAR; INTRAVENOUS; SUBCUTANEOUS at 09:51

## 2017-02-09 RX ADMIN — SODIUM CHLORIDE, SODIUM LACTATE, POTASSIUM CHLORIDE, AND CALCIUM CHLORIDE 75 ML/HR: 600; 310; 30; 20 INJECTION, SOLUTION INTRAVENOUS at 06:32

## 2017-02-09 RX ADMIN — HYDROMORPHONE HYDROCHLORIDE 0.5 MG: 2 INJECTION, SOLUTION INTRAMUSCULAR; INTRAVENOUS; SUBCUTANEOUS at 09:36

## 2017-02-09 RX ADMIN — LIDOCAINE HYDROCHLORIDE 40 MG: 20 INJECTION, SOLUTION EPIDURAL; INFILTRATION; INTRACAUDAL; PERINEURAL at 07:47

## 2017-02-09 RX ADMIN — PROPOFOL 170 MG: 10 INJECTION, EMULSION INTRAVENOUS at 07:47

## 2017-02-09 RX ADMIN — HYDROMORPHONE HYDROCHLORIDE 0.5 MG: 2 INJECTION, SOLUTION INTRAMUSCULAR; INTRAVENOUS; SUBCUTANEOUS at 10:10

## 2017-02-09 RX ADMIN — FENTANYL CITRATE 50 MCG: 50 INJECTION, SOLUTION INTRAMUSCULAR; INTRAVENOUS at 07:47

## 2017-02-09 RX ADMIN — OXYCODONE HYDROCHLORIDE AND ACETAMINOPHEN 1 TABLET: 5; 325 TABLET ORAL at 12:16

## 2017-02-09 RX ADMIN — SUCCINYLCHOLINE CHLORIDE 120 MG: 20 INJECTION INTRAMUSCULAR; INTRAVENOUS at 07:47

## 2017-02-09 RX ADMIN — ONDANSETRON 4 MG: 2 INJECTION INTRAMUSCULAR; INTRAVENOUS at 08:54

## 2017-02-09 RX ADMIN — MIDAZOLAM HYDROCHLORIDE 2 MG: 1 INJECTION, SOLUTION INTRAMUSCULAR; INTRAVENOUS at 07:30

## 2017-02-09 RX ADMIN — ROCURONIUM BROMIDE 5 MG: 10 INJECTION, SOLUTION INTRAVENOUS at 07:47

## 2017-02-09 RX ADMIN — HYDROMORPHONE HYDROCHLORIDE 0.5 MG: 1 INJECTION, SOLUTION INTRAMUSCULAR; INTRAVENOUS; SUBCUTANEOUS at 09:22

## 2017-02-09 RX ADMIN — FENTANYL CITRATE 50 MCG: 50 INJECTION, SOLUTION INTRAMUSCULAR; INTRAVENOUS at 09:11

## 2017-02-09 RX ADMIN — CEFAZOLIN SODIUM 2 G: 2 SOLUTION INTRAVENOUS at 07:30

## 2017-02-09 NOTE — DISCHARGE INSTRUCTIONS
Abdominal Hernia Repair: What to Expect at Home  Your Recovery  After surgery to repair your hernia, you are likely to have pain for a few days. You may also feel like you have the flu, and you may have a low fever and feel tired and nauseated. This is common. You should feel better after a few days and will probably feel much better in 7 days. For several weeks you may feel twinges or pulling in the hernia repair when you move. You may have some bruising around the area of your hernia repair. This is normal.  This care sheet gives you a general idea about how long it will take for you to recover. But each person recovers at a different pace. Follow the steps below to get better as quickly as possible. How can you care for yourself at home? Activity  · Rest when you feel tired. Getting enough sleep will help you recover. · Try to walk each day. Start by walking a little more than you did the day before. Bit by bit, increase the amount you walk. Walking boosts blood flow and helps prevent pneumonia and constipation. · If your doctor gives you an abdominal binder to wear, use it as directed. This is an elastic bandage that wraps around your belly and upper hips. It helps support your belly muscles after surgery. · Avoid strenuous activities, such as biking, jogging, weight lifting, or aerobic exercise, until your doctor says it is okay. · Avoid lifting anything that would make you strain. This may include heavy grocery bags and milk containers, a heavy briefcase or backpack, cat litter or dog food bags, a vacuum , or a child. · Ask your doctor when you can drive again. · Most people are able to return to work within 1 to 2 weeks after surgery. But if your job requires that you to do heavy lifting or strenuous activity, you may need to take 4 to 6 weeks off from work. · You may shower 24 to 48 hours after surgery, if your doctor okays it. Pat the cut (incision) dry.  Do not take a bath for the first 2 weeks, or until your doctor tells you it is okay. · Ask your doctor when it is okay for you to have sex. Diet  · You can eat your normal diet. If your stomach is upset, try bland, low-fat foods like plain rice, broiled chicken, toast, and yogurt. · Drink plenty of fluids (unless your doctor tells you not to). · You may notice that your bowel movements are not regular right after your surgery. This is common. Avoid constipation and straining with bowel movements. You may want to take a fiber supplement every day. If you have not had a bowel movement after a couple of days, ask your doctor about taking a mild laxative. Medicines  · Your doctor will tell you if and when you can restart your medicines. He or she will also give you instructions about taking any new medicines. · If you take blood thinners, such as warfarin (Coumadin), clopidogrel (Plavix), or aspirin, be sure to talk to your doctor. He or she will tell you if and when to start taking those medicines again. Make sure that you understand exactly what your doctor wants you to do. · Be safe with medicines. Take pain medicines exactly as directed. ¨ If the doctor gave you a prescription medicine for pain, take it as prescribed. ¨ If you are not taking a prescription pain medicine, ask your doctor if you can take an over-the-counter medicine. · If your doctor prescribed antibiotics, take them as directed. Do not stop taking them just because you feel better. You need to take the full course of antibiotics. · If you think your pain medicine is making you sick to your stomach:  ¨ Take your medicine after meals (unless your doctor has told you not to). ¨ Ask your doctor for a different pain medicine. Incision care  · If you have strips of tape on the cut (incision) the doctor made, leave the tape on for a week or until it falls off. Or follow your doctor's instructions for removing the tape.   · If you have staples closing the cut, you will need to visit your doctor in 1 to 2 weeks to have them removed. · Wash the area daily with warm, soapy water, and pat it dry. Don't use hydrogen peroxide or alcohol, which can slow healing. You may cover the area with a gauze bandage if it weeps or rubs against clothing. Change the bandage every day. Other instructions  · Hold a pillow over your incision when you cough or take deep breaths. This will support your belly and decrease your pain. · Do breathing exercises at home as instructed by your doctor. This will help prevent pneumonia. · If you had laparoscopic surgery, you may also have pain in your left shoulder. The pain usually lasts about a day or two. Follow-up care is a key part of your treatment and safety. Be sure to make and go to all appointments, and call your doctor if you are having problems. It's also a good idea to know your test results and keep a list of the medicines you take. When should you call for help? Call 911 anytime you think you may need emergency care. For example, call if:  · You passed out (lost consciousness). · You have sudden chest pain and shortness of breath, or you cough up blood. · You have severe pain in your belly. Call your doctor now or seek immediate medical care if:  · You are sick to your stomach and cannot keep fluids down. · You have signs of a blood clot, such as:  ¨ Pain in your calf, back of the knee, thigh, or groin. ¨ Redness and swelling in your leg or groin. · You have signs of infection, such as:  ¨ Increased pain, swelling, warmth, or redness. ¨ Red streaks leading from the incision. ¨ Pus draining from the incision. ¨ A fever. · You have trouble passing urine or stool, especially if you have mild pain or swelling in your lower belly. · Bright red blood has soaked through the bandage over your incision. Watch closely for changes in your health, and be sure to contact your doctor if:  · Your swelling is getting worse.   · Your swelling is not going down.  · You still don't have a bowel movement after taking a laxative. Where can you learn more? Go to http://nneka-mindi.info/. Enter B577 in the search box to learn more about \"Abdominal Hernia Repair: What to Expect at Home. \"  Current as of: August 9, 2016  Content Version: 11.1  © 2006-2016 [employer]. Care instructions adapted under license by [employer] (which disclaims liability or warranty for this information). If you have questions about a medical condition or this instruction, always ask your healthcare professional. [place] [address] any warranty or liability for your use of this information. Oxycodone/Acetaminophen (By mouth)   Acetaminophen (e-imna-t-MIN-oh-fen), Oxycodone Hydrochloride (ox-x-TTR-done rudy-droe-KLOR-liz)  Treats moderate to moderately severe pain. This medicine is a narcotic pain reliever. Brand Name(s):Endocet, Percocet, Primlev, Roxicet, Xartemis XR   There may be other brand names for this medicine. When This Medicine Should Not Be Used: This medicine is not right for everyone. Do not use it if you had an allergic reaction to acetaminophen or oxycodone, or if you have serious breathing problems (such as severe asthma, hypercarbia, respiratory depression) or paralytic ileus. How to Use This Medicine:   Capsule, Liquid, Tablet, Long Acting Tablet  · Your doctor will tell you how much medicine to use. Do not use more than directed. · Measure the oral liquid medicine with a marked measuring spoon, oral syringe, or medicine cup. · Swallow the extended-release tablet whole. Do not crush, break, or chew it. Do not lick or wet the tablet before placing it in your mouth. Do not give this medicine through a feeding tube. · This medicine should come with a Medication Guide. Ask your pharmacist for a copy if you do not have one.   · Store the medicine in a closed container at room temperature, away from heat, moisture, and direct light. Ask your pharmacist about the best way to dispose of medicine you do not use. Drugs and Foods to Avoid:   Ask your doctor or pharmacist before using any other medicine, including over-the-counter medicines, vitamins, and herbal products. · Do not use Xartemis XR if you have used an MAO inhibitor in the past 14 days. · Some medicines and foods can affect how this medicine works. Tell your doctor if you are taking any of the following:   ¨ Butorphanol, lamotrigine, nalbuphine, naltrexone, pentazocine, propranolol, zidovudine  ¨ Birth control pills, a diuretic (water pill), muscle relaxants, a phenothiazine medicine (chlorpromazine, perphenazine, promethazine, prochlorperazine, thioridazine)  · Do not drink alcohol while you are using this medicine. Acetaminophen can damage your liver, and alcohol can increase this risk. Do not take acetaminophen without asking your doctor if you have 3 or more drinks of alcohol every day. · Tell your doctor if you are using any medicine that makes you sleepy, such as allergy medicine or other narcotic pain medicine. Warnings While Using This Medicine:   · Tell your doctor if you are pregnant, or if you have kidney disease, liver disease, heart disease, low blood pressure, breathing problems or lung disease, especially if you have asthma, COPD, cor pulmonale, or kyphoscoliosis (curved spine that causes breathing trouble). Tell your doctor if you have urination problems, an underactive thyroid, [place] disease, pancreas or prostate problems, or a stomach disorder. Tell your doctor if you have a history of head injury or brain damage, seizures, or alcohol or drug abuse. Tell your doctor if you are allergic to codeine. · Do not breastfeed while you are using this medicine, unless otherwise directed by your doctor.   · This medicine may cause the following problems:  ¨ Liver problems  ¨ Serious skin reactions  ¨ Low blood pressure  · If you take this medicine for more than a few weeks, do not stop taking it suddenly. Your doctor will need to slowly decrease your dose before you stop it completely. · Get emergency help immediately if you think you may have taken too much of this medicine. Signs of an overdose include shallow breathing, fainting, confusion, nausea, vomiting, pinpoint pupils of the eyes, or pale or blue lips, fingernails, or skin. · This medicine may make you dizzy or drowsy. Do not drive or do anything that could be dangerous until you know how this medicine affects you. · This medicine contains acetaminophen. Read the labels of all other medicines you are using to see if they also contain acetaminophen, or ask your doctor or pharmacist. Xavi Hebert not use more than 4 grams (4,000 milligrams) total of acetaminophen in one day. · This medicine can be habit-forming. Do not use more than your prescribed dose. Call your doctor if you think your medicine is not working. · This medicine may cause constipation, especially with long-term use. Ask your doctor if you should use a laxative to prevent and treat constipation. · Keep all medicine out of the reach of children. Never share your medicine with anyone.   Possible Side Effects While Using This Medicine:   Call your doctor right away if you notice any of these side effects:  · Allergic reaction: Itching or hives, swelling in your face or hands, swelling or tingling in your mouth or throat, chest tightness, trouble breathing  · Dark urine or pale stools, nausea, vomiting, loss of appetite, stomach pain, yellow skin or eyes  · Extreme weakness, shallow breathing, uneven heartbeat, seizures, sweating, or cold or clammy skin  · Lightheadedness, dizziness, or fainting  · Trouble breathing  If you notice these less serious side effects, talk with your doctor:   · Constipation  · Headache  · Mild lightheadedness, sleepiness, or drowsiness  · Mild nausea or vomiting  If you notice other side effects that you think are caused by this medicine, tell your doctor. Call your doctor for medical advice about side effects. You may report side effects to FDA at 6-125-PCO-6444  © 2016 [address] is for End User's use only and may not be sold, redistributed or otherwise used for commercial purposes. The above information is an  only. It is not intended as medical advice for individual conditions or treatments. Talk to your doctor, nurse or pharmacist before following any medical regimen to see if it is safe and effective for you. DISCHARGE SUMMARY from Nurse    The following personal items are in your possession at time of discharge:    Dental Appliances: None  Visual Aid: Glasses     Home Medications: None  Jewelry: None  Clothing: Pants, Shirt, Footwear, Jacket/Coat, Socks, Undergarments  Other Valuables: Eyeglasses   PATIENT INSTRUCTIONS:    After general anesthesia or intravenous sedation, for 24 hours or while taking prescription Narcotics:  · Limit your activities  · Do not drive and operate hazardous machinery  · Do not make important personal or business decisions  · Do  not drink alcoholic beverages  · If you have not urinated within 8 hours after discharge, please contact your surgeon on call. Report the following to your surgeon:  · Excessive pain, swelling, redness or odor of or around the surgical area  · Temperature over 100.5  · Nausea and vomiting lasting longer than 4 hours or if unable to take medications  · Any signs of decreased circulation or nerve impairment to extremity: change in color, persistent  numbness, tingling, coldness or increase pain  · Any questions      *  Please give a list of your current medications to your Primary Care Provider. *  Please update this list whenever your medications are discontinued, doses are      changed, or new medications (including over-the-counter products) are added.     *  Please carry medication information at all times in case of emergency situations. These are general instructions for a healthy lifestyle:    No smoking/ No tobacco products/ Avoid exposure to second hand smoke    Surgeon General's Warning:  Quitting smoking now greatly reduces serious risk to your health. Obesity, smoking, and sedentary lifestyle greatly increases your risk for illness    A healthy diet, regular physical exercise & weight monitoring are important for maintaining a healthy lifestyle    You may be retaining fluid if you have a history of heart failure or if you experience any of the following symptoms:  Weight gain of 3 pounds or more overnight or 5 pounds in a week, increased swelling in our hands or feet or shortness of breath while lying flat in bed. Please call your doctor as soon as you notice any of these symptoms; do not wait until your next office visit. Recognize signs and symptoms of STROKE:    F-face looks uneven    A-arms unable to move or move unevenly    S-speech slurred or non-existent    T-time-call 911 as soon as signs and symptoms begin-DO NOT go       Back to bed or wait to see if you get better-TIME IS BRAIN. Warning Signs of HEART ATTACK     Call 911 if you have these symptoms:   Chest discomfort. Most heart attacks involve discomfort in the center of the chest that lasts more than a few minutes, or that goes away and comes back. It can feel like uncomfortable pressure, squeezing, fullness, or pain.  Discomfort in other areas of the upper body. Symptoms can include pain or discomfort in one or both arms, the back, neck, jaw, or stomach.  Shortness of breath with or without chest discomfort.  Other signs may include breaking out in a cold sweat, nausea, or lightheadedness. Don't wait more than five minutes to call 911 - MINUTES MATTER! Fast action can save your life. Calling 911 is almost always the fastest way to get lifesaving treatment.  Emergency Medical Services staff can begin treatment when they arrive -- up to an hour sooner than if someone gets to the hospital by car. The discharge information has been reviewed with the patient and spouse. The patient and spouse verbalized understanding. Discharge medications reviewed with the patient and spouse and appropriate educational materials and side effects teaching were provided.

## 2017-02-09 NOTE — IP AVS SNAPSHOT
Merlin Laroche 
 
 
 [address] Patient: Moises Salas MRN: [id number] Sherolyn Idol You are allergic to the following Allergen Reactions Altace (Ramipril) Unknown (comments) Lipitor (Atorvastatin) Other (comments) Muscle pain Lisinopril Shortness of Breath Other (comments) Turns bright red Other Medication Other (comments) Vicryl suture on skin tends to be rejected with poor wound healing does better with monocryl Procardia (Nifedipine) Other (comments) Caused afib Recent Documentation Height Weight BMI Smoking Status 1.829 m 103 kg 30.79 kg/m2 Former Smoker Emergency Contacts Name Discharge Info Relation Home Work Mobile Collette,Teresa DISCHARGE CAREGIVER [3] Spouse [3] [phone] About your hospitalization You were admitted on:  February 9, 2017 You last received care in the:  [facility] PACU You were discharged on:  February 9, 2017 Unit phone number:  [phone] Why you were hospitalized Your primary diagnosis was:  Not on File Providers Seen During Your Hospitalizations Provider Role Specialty Primary office phone Garcia Peoples MD Attending Provider Surgery [phone] Your Primary Care Physician (PCP) Primary Care Physician Office Phone Office Fax Patricia Yue [phone] Follow-up Information Follow up With Details Comments Contact Info Katarzyna Gaxiola MD   [address] [place] [address] 
[phone] Garcia Peoples MD  Go to follow up as scheduled [address] [place] [address] Dr [place] [address] 
[phone] Your Appointments Friday February 24, 2017  9:00 AM EST HOSPITAL DISCHARGE with Garcia Peoples MD  
 [address] [facility] (SHERAMN Kim) [address] [facility] [address] [place] [address]  
[phone] Monday March 20, 2017  9:10 AM EDT Nurse Visit with [facility] NURSE [facility] (Janis Lacy) Amaya Moulton [address]  
[phone] Current Discharge Medication List  
  
START taking these medications Dose & Instructions Dispensing Information Comments Morning Noon Evening Bedtime  
 oxyCODONE-acetaminophen 5-325 mg per tablet Commonly known as:  PERCOCET Your next dose is: Today, Tomorrow Other:  _________ Dose:  1 Tab Take 1 Tab by mouth every six (6) hours as needed for Pain. Max Daily Amount: 4 Tabs. Quantity:  30 Tab Refills:  0 CONTINUE these medications which have NOT CHANGED Dose & Instructions Dispensing Information Comments Morning Noon Evening Bedtime  
 aspirin delayed-release 81 mg tablet Your next dose is: Today, Tomorrow Other:  _________ Dose:  81 mg Take 81 mg by mouth daily. Refills:  0  
     
   
   
   
  
 atenolol 25 mg tablet Commonly known as:  TENORMIN Your next dose is: Today, Tomorrow Other:  _________ Dose:  25 mg Take 1 Tab by mouth two (2) times a day. Quantity:  180 Tab Refills:  3 cholecalciferol 1,000 unit Cap Commonly known as:  VITAMIN D3 Your next dose is: Today, Tomorrow Other:  _________ Dose:  1000 Units Take 1,000 Units by mouth daily. Refills:  0  
     
   
   
   
  
 coenzyme Q-10 200 mg capsule Commonly known as:  CO Q-10 Your next dose is: Today, Tomorrow Other:  _________ Take  by mouth. Refills:  0  
     
   
   
   
  
 ezetimibe-simvastatin 10-40 mg per tablet Commonly known as:  Vytorin 10/40 Your next dose is: Today, Tomorrow Other:  _________ Dose:  1 Tab Take 1 Tab by mouth nightly. Quantity:  90 Tab Refills:  3  
     
   
   
   
  
 gabapentin 300 mg capsule Commonly known as:  NEURONTIN Your next dose is: Today, Tomorrow Other:  _________ Dose:  300 mg Take 300 mg by mouth nightly. Refills:  0 HYDROcodone-acetaminophen 5-325 mg per tablet Commonly known as:  Maria Guadalupe Avalos Your next dose is: Today, Tomorrow Other:  _________ Dose:  1 Tab Take 1 Tab by mouth every four (4) hours as needed for Pain. Quantity:  180 Tab Refills:  0 LIPOFLAVONOID PO Your next dose is: Today, Tomorrow Other:  _________ Take  by mouth daily. Refills:  0 LORazepam 1 mg tablet Commonly known as:  ATIVAN Your next dose is: Today, Tomorrow Other:  _________ Dose:  1 mg Take 1 Tab by mouth every eight (8) hours as needed. Quantity:  50 Tab Refills:  0  
     
   
   
   
  
 losartan 25 mg tablet Commonly known as:  COZAAR Your next dose is: Today, Tomorrow Other:  _________ Dose:  25 mg Take 1 Tab by mouth two (2) times a day. Quantity:  180 Tab Refills:  3 MULTIVITAMIN PO Your next dose is: Today, Tomorrow Other:  _________ Take  by mouth daily. Refills:  0 OMEGA 3 FISH OIL PO Your next dose is: Today, Tomorrow Other:  _________ Dose:  900 mg Take 900 mg by mouth daily. Refills:  0 PROTONIX 40 mg tablet Generic drug:  pantoprazole Your next dose is: Today, Tomorrow Other:  _________ Dose:  40 mg Take 40 mg by mouth daily. Refills:  0  
     
   
   
   
  
 triamterene-hydroCHLOROthiazide 37.5-25 mg per tablet Commonly known as:  Christine Do Your next dose is: Today, Tomorrow Other:  _________ TAKE ONE TABLET BY MOUTH DAILY Quantity:  90 Tab Refills:  3 VOLTAREN 1 % Gel Generic drug:  diclofenac Your next dose is: Today, Tomorrow Other:  _________  
   
   
 as needed. Refills:  0 Where to Get Your Medications Information on where to get these meds will be given to you by the nurse or doctor. ! Ask your nurse or doctor about these medications  
  oxyCODONE-acetaminophen 5-325 mg per tablet Discharge Instructions Abdominal Hernia Repair: What to Expect at Home Your Recovery After surgery to repair your hernia, you are likely to have pain for a few days. You may also feel like you have the flu, and you may have a low fever and feel tired and nauseated. This is common. You should feel better after a few days and will probably feel much better in 7 days. For several weeks you may feel twinges or pulling in the hernia repair when you move. You may have some bruising around the area of your hernia repair. This is normal. 
This care sheet gives you a general idea about how long it will take for you to recover. But each person recovers at a different pace. Follow the steps below to get better as quickly as possible. How can you care for yourself at home? Activity · Rest when you feel tired. Getting enough sleep will help you recover. · Try to walk each day. Start by walking a little more than you did the day before. Bit by bit, increase the amount you walk. Walking boosts blood flow and helps prevent pneumonia and constipation. · If your doctor gives you an abdominal binder to wear, use it as directed. This is an elastic bandage that wraps around your belly and upper hips. It helps support your belly muscles after surgery.  
· Avoid strenuous activities, such as biking, jogging, weight lifting, or aerobic exercise, until your doctor says it is okay. · Avoid lifting anything that would make you strain. This may include heavy grocery bags and milk containers, a heavy briefcase or backpack, cat litter or dog food bags, a vacuum , or a child. · Ask your doctor when you can drive again. · Most people are able to return to work within 1 to 2 weeks after surgery. But if your job requires that you to do heavy lifting or strenuous activity, you may need to take 4 to 6 weeks off from work. · You may shower 24 to 48 hours after surgery, if your doctor okays it. Pat the cut (incision) dry. Do not take a bath for the first 2 weeks, or until your doctor tells you it is okay. · Ask your doctor when it is okay for you to have sex. Diet · You can eat your normal diet. If your stomach is upset, try bland, low-fat foods like plain rice, broiled chicken, toast, and yogurt. · Drink plenty of fluids (unless your doctor tells you not to). · You may notice that your bowel movements are not regular right after your surgery. This is common. Avoid constipation and straining with bowel movements. You may want to take a fiber supplement every day. If you have not had a bowel movement after a couple of days, ask your doctor about taking a mild laxative. Medicines · Your doctor will tell you if and when you can restart your medicines. He or she will also give you instructions about taking any new medicines. · If you take blood thinners, such as warfarin (Coumadin), clopidogrel (Plavix), or aspirin, be sure to talk to your doctor. He or she will tell you if and when to start taking those medicines again. Make sure that you understand exactly what your doctor wants you to do. · Be safe with medicines. Take pain medicines exactly as directed. ¨ If the doctor gave you a prescription medicine for pain, take it as prescribed.  
¨ If you are not taking a prescription pain medicine, ask your doctor if you can take an over-the-counter medicine. · If your doctor prescribed antibiotics, take them as directed. Do not stop taking them just because you feel better. You need to take the full course of antibiotics. · If you think your pain medicine is making you sick to your stomach: 
¨ Take your medicine after meals (unless your doctor has told you not to). ¨ Ask your doctor for a different pain medicine. Incision care · If you have strips of tape on the cut (incision) the doctor made, leave the tape on for a week or until it falls off. Or follow your doctor's instructions for removing the tape. · If you have staples closing the cut, you will need to visit your doctor in 1 to 2 weeks to have them removed. · Wash the area daily with warm, soapy water, and pat it dry. Don't use hydrogen peroxide or alcohol, which can slow healing. You may cover the area with a gauze bandage if it weeps or rubs against clothing. Change the bandage every day. Other instructions · Hold a pillow over your incision when you cough or take deep breaths. This will support your belly and decrease your pain. · Do breathing exercises at home as instructed by your doctor. This will help prevent pneumonia. · If you had laparoscopic surgery, you may also have pain in your left shoulder. The pain usually lasts about a day or two. Follow-up care is a key part of your treatment and safety. Be sure to make and go to all appointments, and call your doctor if you are having problems. It's also a good idea to know your test results and keep a list of the medicines you take. When should you call for help? Call 911 anytime you think you may need emergency care. For example, call if: 
· You passed out (lost consciousness). · You have sudden chest pain and shortness of breath, or you cough up blood. · You have severe pain in your belly. Call your doctor now or seek immediate medical care if: · You are sick to your stomach and cannot keep fluids down. · You have signs of a blood clot, such as: 
¨ Pain in your calf, back of the knee, thigh, or groin. ¨ Redness and swelling in your leg or groin. · You have signs of infection, such as: 
¨ Increased pain, swelling, warmth, or redness. ¨ Red streaks leading from the incision. ¨ Pus draining from the incision. ¨ A fever. · You have trouble passing urine or stool, especially if you have mild pain or swelling in your lower belly. · Bright red blood has soaked through the bandage over your incision. Watch closely for changes in your health, and be sure to contact your doctor if: 
· Your swelling is getting worse. · Your swelling is not going down. · You still don't have a bowel movement after taking a laxative. Where can you learn more? Go to http://nneka-mindi.info/. Enter B577 in the search box to learn more about \"Abdominal Hernia Repair: What to Expect at Home. \" Current as of: August 9, 2016 Content Version: 11.1 © 8506-1422 [employer]. Care instructions adapted under license by [employer] (which disclaims liability or warranty for this information). If you have questions about a medical condition or this instruction, always ask your healthcare professional. [address] any warranty or liability for your use of this information. Oxycodone/Acetaminophen (By mouth) Acetaminophen (m-mmko-t-MIN-oh-fen), Oxycodone Hydrochloride (je-e-CXQ-done rudy-droe-KLOR-liz) Treats moderate to moderately severe pain. This medicine is a narcotic pain reliever. Brand Name(s):Endocet, Percocet, Primlev, Roxicet, Xartemis XR There may be other brand names for this medicine. When This Medicine Should Not Be Used: This medicine is not right for everyone.  Do not use it if you had an allergic reaction to acetaminophen or oxycodone, or if you have serious breathing problems (such as severe asthma, hypercarbia, respiratory depression) or paralytic ileus. How to Use This Medicine:  
Capsule, Liquid, Tablet, Long Acting Tablet · Your doctor will tell you how much medicine to use. Do not use more than directed. · Measure the oral liquid medicine with a marked measuring spoon, oral syringe, or medicine cup. · Swallow the extended-release tablet whole. Do not crush, break, or chew it. Do not lick or wet the tablet before placing it in your mouth. Do not give this medicine through a feeding tube. · This medicine should come with a Medication Guide. Ask your pharmacist for a copy if you do not have one. · Store the medicine in a closed container at room temperature, away from heat, moisture, and direct light. Ask your pharmacist about the best way to dispose of medicine you do not use. Drugs and Foods to Avoid: Ask your doctor or pharmacist before using any other medicine, including over-the-counter medicines, vitamins, and herbal products. · Do not use Xartemis XR if you have used an MAO inhibitor in the past 14 days. · Some medicines and foods can affect how this medicine works. Tell your doctor if you are taking any of the following: ¨ Butorphanol, lamotrigine, nalbuphine, naltrexone, pentazocine, propranolol, zidovudine ¨ Birth control pills, a diuretic (water pill), muscle relaxants, a phenothiazine medicine (chlorpromazine, perphenazine, promethazine, prochlorperazine, thioridazine) · Do not drink alcohol while you are using this medicine. Acetaminophen can damage your liver, and alcohol can increase this risk. Do not take acetaminophen without asking your doctor if you have 3 or more drinks of alcohol every day. · Tell your doctor if you are using any medicine that makes you sleepy, such as allergy medicine or other narcotic pain medicine. Warnings While Using This Medicine: · Tell your doctor if you are pregnant, or if you have kidney disease, liver disease, heart disease, low blood pressure, breathing problems or lung disease, especially if you have asthma, COPD, cor pulmonale, or kyphoscoliosis (curved spine that causes breathing trouble). Tell your doctor if you have urination problems, an underactive thyroid, Mead disease, pancreas or prostate problems, or a stomach disorder. Tell your doctor if you have a history of head injury or brain damage, seizures, or alcohol or drug abuse. Tell your doctor if you are allergic to codeine. · Do not breastfeed while you are using this medicine, unless otherwise directed by your doctor. · This medicine may cause the following problems: 
¨ Liver problems ¨ Serious skin reactions ¨ Low blood pressure · If you take this medicine for more than a few weeks, do not stop taking it suddenly. Your doctor will need to slowly decrease your dose before you stop it completely. · Get emergency help immediately if you think you may have taken too much of this medicine. Signs of an overdose include shallow breathing, fainting, confusion, nausea, vomiting, pinpoint pupils of the eyes, or pale or blue lips, fingernails, or skin. · This medicine may make you dizzy or drowsy. Do not drive or do anything that could be dangerous until you know how this medicine affects you. · This medicine contains acetaminophen. Read the labels of all other medicines you are using to see if they also contain acetaminophen, or ask your doctor or pharmacist. Marquis Tiburcio not use more than 4 grams (4,000 milligrams) total of acetaminophen in one day. · This medicine can be habit-forming. Do not use more than your prescribed dose. Call your doctor if you think your medicine is not working. · This medicine may cause constipation, especially with long-term use. Ask your doctor if you should use a laxative to prevent and treat constipation. · Keep all medicine out of the reach of children. Never share your medicine with anyone. Possible Side Effects While Using This Medicine:  
Call your doctor right away if you notice any of these side effects: · Allergic reaction: Itching or hives, swelling in your face or hands, swelling or tingling in your mouth or throat, chest tightness, trouble breathing · Dark urine or pale stools, nausea, vomiting, loss of appetite, stomach pain, yellow skin or eyes · Extreme weakness, shallow breathing, uneven heartbeat, seizures, sweating, or cold or clammy skin · Lightheadedness, dizziness, or fainting · Trouble breathing If you notice these less serious side effects, talk with your doctor: · Constipation · Headache · Mild lightheadedness, sleepiness, or drowsiness · Mild nausea or vomiting If you notice other side effects that you think are caused by this medicine, tell your doctor. Call your doctor for medical advice about side effects. You may report side effects to FDA at 9-423-FDA-6628 © 2016 [address] is for End User's use only and may not be sold, redistributed or otherwise used for commercial purposes. The above information is an  only. It is not intended as medical advice for individual conditions or treatments. Talk to your doctor, nurse or pharmacist before following any medical regimen to see if it is safe and effective for you. DISCHARGE SUMMARY from Nurse The following personal items are in your possession at time of discharge: 
 
Dental Appliances: None Visual Aid: Glasses Home Medications: None Jewelry: None Clothing: Pants, Shirt, Footwear, Jacket/Coat, Socks, Undergarments Other Valuables: Aleida Dione PATIENT INSTRUCTIONS: 
 
 
F-face looks uneven A-arms unable to move or move unevenly S-speech slurred or non-existent T-time-call 911 as soon as signs and symptoms begin-DO NOT go Back to bed or wait to see if you get better-TIME IS BRAIN. Warning Signs of HEART ATTACK Call 911 if you have these symptoms: 
? Chest discomfort. Most heart attacks involve discomfort in the center of the chest that lasts more than a few minutes, or that goes away and comes back. It can feel like uncomfortable pressure, squeezing, fullness, or pain. ? Discomfort in other areas of the upper body. Symptoms can include pain or discomfort in one or both arms, the back, neck, jaw, or stomach. ? Shortness of breath with or without chest discomfort. ? Other signs may include breaking out in a cold sweat, nausea, or lightheadedness. Don't wait more than five minutes to call [address]! Fast action can save your life. Calling 911 is almost always the fastest way to get lifesaving treatment. Emergency Medical Services staff can begin treatment when they arrive  up to an hour sooner than if someone gets to the hospital by car. The discharge information has been reviewed with the patient and spouse. The patient and spouse verbalized understanding. Discharge medications reviewed with the patient and spouse and appropriate educational materials and side effects teaching were provided. Discharge Orders Procedure Order Date Status Priority Quantity Spec Type Associated Dx DIET REGULAR 02/09/17 0907 Normal Routine 1  Recurrent umbilical hernia [[id number]] NURSING-MISCELLANEOUS: discharge after voiding 02/09/17 0907 Normal Routine 1  Recurrent umbilical hernia [[id number]] Questions: Description of Order:  discharge after voiding Introducing [facility]! Dear Jia Parra: Thank you for requesting a [email or web address] account. Our records indicate that you already have an active [email or web address] account. You can access your account anytime at [email or web address] Did you know that you can access your hospital and ER discharge instructions at any time in [email or web address]?   You can also review all of your test results from your hospital stay or ER visit. Additional Information If you have questions, please visit the Frequently Asked Questions section of the [employer] website at [email or web address] Remember, MyChart is NOT to be used for urgent needs. For medical emergencies, dial 911. Now available from your iPhone and Android! General Information Please provide this summary of care documentation to your next provider. Patient Signature:  ____________________________________________________________ Date:  ____________________________________________________________  
  
Jacy Bermudez Provider Signature:  ____________________________________________________________ Date:  ____________________________________________________________

## 2017-02-09 NOTE — ANESTHESIA PREPROCEDURE EVALUATION
Anesthetic History   No history of anesthetic complications            Review of Systems / Medical History  Patient summary reviewed and pertinent labs reviewed    Pulmonary  Within defined limits            Pertinent negatives: No smoker     Neuro/Psych   Within defined limits           Cardiovascular    Hypertension        Dysrhythmias   CAD         GI/Hepatic/Renal     GERD           Endo/Other      Hypothyroidism  Arthritis     Other Findings   Comments: Current Smoker? NO       Elective Surgery? Yes       Abstained from smoking 24 hours prior to anesthesia? N/A    Risk Factors for Postoperative nausea/vomiting:       History of postoperative nausea/vomiting? NO       Female? NO       Motion sickness? NO       Intended opioid administration for postoperative analgesia?   NO           Physical Exam    Airway  Mallampati: II  TM Distance: 4 - 6 cm  Neck ROM: normal range of motion   Mouth opening: Normal     Cardiovascular  Regular rate and rhythm,  S1 and S2 normal,  no murmur, click, rub, or gallop             Dental  No notable dental hx       Pulmonary  Breath sounds clear to auscultation               Abdominal  Abdominal exam normal       Other Findings            Anesthetic Plan    ASA: 3  Anesthesia type: general          Induction: Intravenous  Anesthetic plan and risks discussed with: Patient

## 2017-02-09 NOTE — PROGRESS NOTES
conducted a pre-surgery visit with Ami Cunningham, who is a 70 y.o.,male. The  provided the following Interventions:  Initiated a relationship of care and support. Plan:  Chaplains will continue to follow and will provide pastoral care on an as needed/requested basis.  recommends bedside caregivers page  on duty if patient shows signs of acute spiritual or emotional distress.     [facility] Certified [address] [place] [address]   [phone]

## 2017-02-09 NOTE — PERIOP NOTES
Patient armband removed and shredded  Patient confirmed by two identifiers with discharge instructions prior to being provided to patient and spouse.

## 2017-02-09 NOTE — BRIEF OP NOTE
BRIEF OPERATIVE NOTE    Date of Procedure: 2/9/2017   Preoperative Diagnosis: Recurrent umbilical hernia [I27.5]  Postoperative Diagnosis: Recurrent umbilical hernia [R93.3]    Procedure(s):  LAPAROSCOPIC VENTRAL HERNIA REPAIR WITH MESH  Surgeon(s) and Role:     * Carrie French MD - Primary            Surgical Staff:  Circ-1: Harleen Heller RN  Scrub Tech-1: Loan Cotto  Surg Asst-1: Jhonathan Champion  Event Time In   Incision Start 2131   Incision Close      Anesthesia: General   Estimated Blood Loss: 0  Specimens: * No specimens in log *   Findings: 0   Complications: 0  Implants:   Implant Name Type Inv.  Item Serial No.  Lot No. LRB No. Used Action   MESH VENTRALIGHT ECHO 8IN CIR --  - [id number]   MESH VENTRALIGHT ECHO 8IN CIR --    BARD DAVOL [id number] N/A 1 Implanted

## 2017-02-09 NOTE — H&P (VIEW-ONLY)
Progress Note    Patient: Tashi Cristina  MRN: [id number]  SSN: [id number]   YOB: 1945  Age: 70 y.o. Sex: male     Chief Complaint   Patient presents with    Umbilical Hernia       HPI    Mr. Yogesh Alfaro is a 60-year-old gentleman who I fixed and ventral hernia on several years ago. He is back today with a recurrence of this hernia and would like it repaired. Is getting bigger over the past 6-8 months and started to become symptomatic. These had no health issues changes and is not on anticoagulation. We discussed in detail the physiology of recurrence as well as laparoscopic repairs. I discussed the risks and benefits with him he is good with that and is anxious to proceed.     Past Medical History   Diagnosis Date    Adenoma of left adrenal gland      2009  1 cm, no change 1/15, 2/16    Asbestosis     Atrial fibrillation      CHA2DS2-VAsc=3(age+, htn+, vasc dx+; estimated yearly stroke risk according to Lip et al. is 3,2%), Hasbled=2(estimated yearly bleeding risk according to pisters et al. is 1,88%); not anticoagulated due to h/o gi bleed    Atrial fibrillation ablation      10/08    Basal cell cancer      Dr Oz Byrd; he's had >350 lesions removed    Carotid occlusion      left     Cervical radiculopathy      MRI 9/11 showed C3-6 severe foraminal stenosis    Chronic pain     Colon polyp      Dr Diandra Topete 9/15    Coronary artery disease      RCA - 3.0 x 16mm TAXUS 9/04, 3.0 x 16mm TAXUS 12/06    Dyslipidemia     Erectile dysfunction     GERD     GI bleed     Hearing loss      2014  bilateral Dr Jasso Buggy    Hernia, umbilical     Hypertension     Hypogonadism male     Lumbar radiculopathy      Dr Miya Brian;  MRI 9/11 L4-5 disc bulging, annular tear, disc dessication    Myofascial pain dysfunction syndrome      pain clinic     Osteoarthritis      right knee Dr Kaya Jaramillo Peripheral vascular disease      50-60% R iliac; s/p R iliac stent and L femoral artery stent in past; R OLIVIA 0.76 (1/16)    Plantar fasciitis      bilateral     PPD positive     Prediabetes     Prostate cancer      T1c [place] 7(3+4), 70% in 1 core, GS 7 (3+4) in 4 cores, GS 6 (3+3) in 1 core; psa 5.28, TRUS 18 gm;  Dr José Miguel Johnson; s/p cryoablation 10/16    Recurrent umbilical hernia     Subclinical hypothyroidism      denies    Tinnitus      Dr Denise Nuñez Ulcerative colitis     Venous insufficiency      Past Surgical History   Procedure Laterality Date    Hx refractive surgery       OD (hemoplasty to right eye on retina to avoid blindness)    Hx hemorrhoidectomy       1979    Hx tonsillectomy       1955    Pr repair ing hernia,5+y/o,reducibl       2/16  left  Dr. Hailey Espinosa Pr repair umbilical LFZH,1+M/Y,SLLHE       2/16  Dr. Hailey Espinosa Hx colonoscopy       Dr José Miguel Johnson 9/15 polyps    Hx hernia repair       1970, 1975    Hx orthopaedic       right knee surgery    Hx carotid endarterectomy       1/14  right    Vascular surgery procedure unlist       1/16  R OLIVIA 0.76, L OLIVIA 1.02    Vascular surgery procedure unlist       10/15   left fem art and left iliac stent    Hx cryoablation of the prostate       10/16     Allergies   Allergen Reactions    Altace [Ramipril] Unknown (comments)    Lipitor [Atorvastatin] Other (comments)     Muscle pain    Lisinopril Shortness of Breath and Other (comments)     Turns bright red    Other Medication Other (comments)     Vicryl suture on skin tends to be rejected with poor wound healing does better with monocryl    Procardia [Nifedipine] Other (comments)     Caused afib     Current Outpatient Prescriptions   Medication Sig Dispense Refill    HYDROcodone-acetaminophen (NORCO) 5-325 mg per tablet Take 1 Tab by mouth every four (4) hours as needed for Pain. 180 Tab 0    furosemide (LASIX) 20 mg tablet Take 1 Tab by mouth as needed. One tab as needed For leg swelling 30 Tab 6    potassium chloride (K-DUR, KLOR-CON) 20 mEq tablet Take 1 Tab by mouth as needed.  When you take lasix 30 Tab 6    LORazepam (ATIVAN) 1 mg tablet Take 1 Tab by mouth every eight (8) hours as needed. 50 Tab 0    triamterene-hydrochlorothiazide (MAXZIDE) 37.5-25 mg per tablet TAKE ONE TABLET BY MOUTH DAILY 90 Tab 3    atenolol (TENORMIN) 25 mg tablet Take 1 Tab by mouth two (2) times a day. 180 Tab 3    losartan (COZAAR) 25 mg tablet Take 1 Tab by mouth two (2) times a day. 180 Tab 3    ezetimibe-simvastatin (VYTORIN 10/40) 10-40 mg per tablet Take 1 Tab by mouth nightly. 90 Tab 3    Cholecalciferol, Vitamin D3, 1,000 unit cap Take 1,000 Units by mouth daily.  hyoscyamine SL (LEVSIN/SL) 0.125 mg SL tablet 0.125 mg by SubLINGual route every four (4) hours as needed for Cramping.  VOLTAREN 1 % topical gel as needed.  nitroglycerin (NITROSTAT) 0.4 mg SL tablet 1 Tab by SubLINGual route every five (5) minutes as needed for Chest Pain. 1 Bottle 3    OMEGA-3 FATTY ACIDS/FISH OIL (OMEGA 3 FISH OIL PO) Take 900 mg by mouth daily.  gabapentin (NEURONTIN) 300 mg capsule Take 300 mg by mouth nightly.  BIOFLAV,LEMON/VIT BCOMP&C (LIPOFLAVONOID PO) Take  by mouth daily.  MULTIVITAMIN PO Take  by mouth daily.  pantoprazole (PROTONIX) 40 mg tablet Take 40 mg by mouth daily.  coenzyme q10 200 mg Cap Take  by mouth. Social History     Social History    Marital status:      Spouse name: N/A    Number of children: N/A    Years of education: N/A     Occupational History    Not on file.      Social History Main Topics    Smoking status: Former Smoker     Packs/day: 1.50     Years: 25.00     Types: Cigarettes     Quit date: 1/1/2003    Smokeless tobacco: Never Used    Alcohol use 2.5 oz/week     5 Cans of beer per week      Comment: 5 beers a week    Drug use: No    Sexual activity: Yes     Partners: Female     Birth control/ protection: None     Other Topics Concern    Not on file     Social History Narrative     Family History   Problem Relation Age of Onset    Heart Disease Mother     Hypertension Mother     Diabetes Mother     Arthritis-osteo Mother     Heart Disease Father     Stroke Father     Hypertension Father     Heart Disease Sister     Hypertension Sister          Review of systems:  Patient denies any reflux, emesis, abdominal pain, change in bowel habits, hematochezia, melena, fever, weight loss, fatigue chills, dermatitis, abnormal moles, change in vision, vertigo, epistaxis, dysphagia, hoarseness, chest pain, palpitations, hypertension, edema, cough, shortness of breath, wheezing, hemoptysis, snoring, hematuria, diabetes, thyroid disease, anemia, bruising, history of blood transfusion, dizziness, headache, or fainting. Physical Examination    Well developed well nourished male in no apparent distress  Visit Vitals    Resp 18    Ht 6' 1\" (1.854 m)    Wt 103.9 kg (229 lb)    SpO2 98%    BMI 30.21 kg/m2      Head: normocephalic, atraumatic  Mouth: Clear, no overt lesions, oral mucosa pink and moist  Neck: supple, no masses, no adenopathy or carotid bruits, trachea midline  Resp: clear to auscultation bilaterally, no wheeze, rhonchi or rales, excursions normal and symmetrical  Cardio: Regular rate and rhythm, no murmurs, clicks, gallops or rubs, no edema or varicosities  Abdomen: soft, nontender, nondistended, normoactive bowel sounds, easily reducible 1.5 cm ventral hernia, no hepatosplenomegaly,   Back: Deferred  Extremeties: warm, well-perfused, no tenderness or swelling, normal gait/station  Neuro: sensation and strength grossly intact and symmetrical  Psych: alert and oriented to person, place and time  Breast exam deferred    IMPRESSION  Recurrent ventral hernia    PLAN  No orders of the defined types were placed in this encounter.     Laparoscopic repair  Christiano Trujillo MD

## 2017-02-09 NOTE — PERIOP NOTES
0945  Patient rhythm changed from NSR to [place] rhythm. VSS. Pt a-symptomatic. Dr. Hattie Castillo notified. No new orders. 1003  Patient back in a NSR resting with no distress noted.

## 2017-02-09 NOTE — ANESTHESIA POSTPROCEDURE EVALUATION
Post-Anesthesia Evaluation and Assessment    Patient: Tashi Cristina MRN: [id number]  SSN: [id number]    YOB: 1945  Age: 70 y.o. Sex: male       Cardiovascular Function/Vital Signs  Visit Vitals    /61 (BP Patient Position: At rest)    Pulse 68    Temp 36 °C (96.8 °F)    Resp 15    Ht 6' (1.829 m)    Wt 103 kg (227 lb)    SpO2 93%    BMI 30.79 kg/m2       Patient is status post general anesthesia for Procedure(s):  LAPAROSCOPIC VENTRAL HERNIA REPAIR WITH MESH. Nausea/Vomiting: None    Postoperative hydration reviewed and adequate. Pain:  Pain Scale 1: Numeric (0 - 10) (02/09/17 1114)  Pain Intensity 1: 8 (02/09/17 1114)   Managed    Neurological Status:   Neuro (WDL): Within Defined Limits (02/09/17 0920)   At baseline    Mental Status and Level of Consciousness: Arousable    Pulmonary Status:   O2 Device: Room air (02/09/17 1114)   Adequate oxygenation and airway patent    Complications related to anesthesia: None    Post-anesthesia assessment completed.  No concerns    Signed By: Baylee Ahmadi MD     February 9, 2017

## 2017-02-09 NOTE — INTERVAL H&P NOTE
H&P Update:  Ami Cunningham was seen and examined. History and physical has been reviewed. The patient has been examined.  There have been no significant clinical changes since the completion of the originally dated History and Physical.    Signed By: Christiano Trujillo MD     February 9, 2017 7:16 AM

## 2017-02-10 ENCOUNTER — PATIENT OUTREACH (OUTPATIENT)
Dept: INTERNAL MEDICINE CLINIC | Age: 72
End: 2017-02-10

## 2017-02-10 NOTE — PROGRESS NOTES
NNTOCIP Contact made: within 2 business days post discharge    Patient admitted to [address] [place] [address] on 2/9/2017 to 2/9/2017, for scheduled laparoscopic ventral hernia repair with mesh. Patient verified two patient identifiers. Introduced self, role and reason for call. Patient presenting symptoms:   Recurrent ventral hernia    Patient denies:  SOB  Fever  Chills  Nausea  Vomiting  Bleeding  Swelling  Lightheadedness  Dizziness  Numbness  Tingling    Patient reports:  Pain 7/10 on a scale of 0 to 10, just took 1/2 tab  Patient do not like taking pain medication because he don't like how it makes him feel  Positive for flatus  5 lap sites with dermabond and dry dressing in place    Barriers:   Financial: None identified at this time   Patients comprehension of disease: Patient verbalizes understanding of discharge plan and special follow up. Transportation: Self    Resources:  Spouse    DME:  None    ADL's:  Patient is independent of all ADL's. Plan of care:  1. Take medications as prescribed  2. Attend all follow up appointments    Adherence to previous treatment and likelihood for follow up:  Patient verbalizes understanding of discharge plan and special follow up. Appointments:  2/24/2017 at 0900 with Dr. Ted Melton    Patient verbalizes understanding self-management of medications, and when to seek medical attention from PCP. Patient instructed to bring all medications with them to their next appointment. Patient was given the opportunity to ask questions. Contact information was provided for future reference or further questions.

## 2017-02-11 NOTE — OP NOTES
[address]    Name:  Eric Edwards  MR#:  [id number]  :  1945  Account #:  [de-identified]  Date of Adm:  2017  Date of Surgery:  2017      PREOPERATIVE DIAGNOSIS: Recurrent ventral hernia. POSTOPERATIVE DIAGNOSIS: Recurrent ventral hernia. PROCEDURES PERFORMED: Laparoscopic repair of recurrent  ventral hernia. SURGEON: Scott Almaraz MD    ASSISTANT: .    ESTIMATED BLOOD LOSS: Minimal.    COMPLICATIONS: None. ANESTHESIA: General.    SPECIMENS REMOVED: None. INDICATIONS: The patient is a 66-year-old gentleman who has had an  umbilical hernia fixed in the past and he is here for umbilical and  periumbilical hernia recurrence. He is being repaired laparoscopically. DESCRIPTION OF PROCEDURE: After appropriate antibiotics and  sequential compression stockings, the patient was taken to the  operating room, placed in supine position on the OR table. After  adequate general anesthesia, his abdomen was prepped and draped  to CDC standard. Using an Optiview 12 mm port, access to the  abdomen was obtained in the left upper quadrant by making a small  incision and, using the Optiview and a straight 12 mm laparoscope to  place the port. Pneumoperitoneum then was induced and a  generalized exploration revealed no trauma around the port placement. His hernia was not incarcerated and he had no other obvious  pathologic issues. A second port was then placed. This was a 5 mm  port placed in the left lower quadrant. Two 5 mm ports were placed in  the right lower and right upper quadrants, respectively. He had a  relatively large 5 x 5 defect in the fascia as seen by the laparoscope. At that point, a small vertical incision over this large defect was formed  and the pneumoperitoneum released. The defect was then closed with  interrupted 0 Ethibond suture, closing the periumbilical defect.  He had  reinstallation of his pneumoperitoneum and a 20 x 20 cm round  Ventralight patch was rolled and placed into his abdomen. Using a GraNee  needle, the center of the patch was pulled into the center of the hernia. The inflatable portion of the patch was then inflated holding the patch  to the anterior abdominal wall. From the lower left port site, an Endo  Tacker was used to tack the right side of the patch in place in multiple  layers. The camera and Tacker were withdrawn and replaced through  the right lower port site and the left side of the patch was tacked into  position. Once this was performed, the inflatable portion of the mesh  was withdrawn through the 12 mm port site and the mesh was  inspected. It was noted to be well placed and without wrinkles on the  anterior abdominal wall. The ports were then withdrawn under direct  vision, and the large port site was closed with 0 Vicryl. The skin of all  port sites and the large port site was closed with 4-0 Monocryl. Steri-  Strips and sterile dressings were applied. Marcaine 0.25% with  epinephrine was used around the wound. He tolerated the procedure  well and was taken to recovery in stable condition.         Glen King MD JR / RAMIRO  D:  02/10/2017   18:08  T:  02/10/2017   19:05  Job #:  [id number]

## 2017-02-22 ENCOUNTER — OFFICE VISIT (OUTPATIENT)
Dept: ORTHOPEDIC SURGERY | Facility: CLINIC | Age: 72
End: 2017-02-22

## 2017-02-22 VITALS
DIASTOLIC BLOOD PRESSURE: 66 MMHG | WEIGHT: 227 LBS | TEMPERATURE: 98 F | BODY MASS INDEX: 30.75 KG/M2 | SYSTOLIC BLOOD PRESSURE: 115 MMHG | HEIGHT: 72 IN | HEART RATE: 71 BPM

## 2017-02-22 DIAGNOSIS — M65.332 TRIGGER MIDDLE FINGER OF LEFT HAND: Primary | ICD-10-CM

## 2017-02-22 DIAGNOSIS — G56.21 CUBITAL TUNNEL SYNDROME, RIGHT: ICD-10-CM

## 2017-02-22 RX ORDER — BETAMETHASONE SODIUM PHOSPHATE AND BETAMETHASONE ACETATE 3; 3 MG/ML; MG/ML
4.5 INJECTION, SUSPENSION INTRA-ARTICULAR; INTRALESIONAL; INTRAMUSCULAR; SOFT TISSUE ONCE
Qty: 1 VIAL | Refills: 0
Start: 2017-02-22 | End: 2017-02-22

## 2017-02-22 RX ORDER — BETAMETHASONE SODIUM PHOSPHATE AND BETAMETHASONE ACETATE 3; 3 MG/ML; MG/ML
3 INJECTION, SUSPENSION INTRA-ARTICULAR; INTRALESIONAL; INTRAMUSCULAR; SOFT TISSUE ONCE
Qty: 1 VIAL | Refills: 0
Start: 2017-02-22 | End: 2017-02-22

## 2017-02-22 RX ORDER — LIDOCAINE HYDROCHLORIDE 10 MG/ML
1 INJECTION INFILTRATION; PERINEURAL ONCE
Qty: 0.5 ML | Refills: 0
Start: 2017-02-22 | End: 2017-02-22

## 2017-02-22 RX ORDER — LIDOCAINE HYDROCHLORIDE 10 MG/ML
0.25 INJECTION INFILTRATION; PERINEURAL ONCE
Qty: 0.5 ML | Refills: 0
Start: 2017-02-22 | End: 2017-02-22

## 2017-02-22 NOTE — PROGRESS NOTES
Patient: Pan Raya                MRN: [id number]       SSN: [id number]  YOB: 1945        AGE: 70 y.o. SEX: male  Body mass index is 30.79 kg/(m^2). PCP: Mirian Mccarthy MD  02/22/17      Chief Complaint   Patient presents with    Hand Pain     [id number]       HISTORY OF PRESENT ILLNESS: Pan Raya is a 70 y.o. male who presents to the office with two problems today. One is for a recurrence of his left long finger trigger finger symptoms and requesting a cortisone injection. He is also having numbness and tingling in his right ring and little finger for the last 2-3 months. This problem began after some surgery a few months ago and exacerbated after a hernia surgery recently. Review of Systems   Constitutional: Negative. HENT: Negative. Eyes: Negative. Respiratory: Negative. Cardiovascular: Negative. Gastrointestinal: Negative. Genitourinary: Negative. Musculoskeletal: Positive for myalgias (bilateral hands). Skin: Negative. Neurological: Negative. Endo/Heme/Allergies: Negative. Psychiatric/Behavioral: Negative. Social History     Social History    Marital status:      Spouse name: N/A    Number of children: N/A    Years of education: N/A     Occupational History    Not on file.      Social History Main Topics    Smoking status: Former Smoker     Packs/day: 1.50     Years: 25.00     Types: Cigarettes     Quit date: 1/1/2003    Smokeless tobacco: Never Used    Alcohol use 2.5 oz/week     5 Cans of beer per week      Comment: 5 beers a week    Drug use: No    Sexual activity: Yes     Partners: Female     Birth control/ protection: None     Other Topics Concern    Not on file     Social History Narrative        Past Medical History:   Diagnosis Date    Adenoma of left adrenal gland     2009  1 cm, no change 1/15, 2/16    Asbestosis     Atrial fibrillation     CHA2DS2-VAsc=3(age+, htn+, vasc dx+; estimated yearly stroke risk according to Lip et al. is 3,2%), Hasbled=2(estimated yearly bleeding risk according to pisters et al. is 1,88%); not anticoagulated due to h/o gi bleed    Atrial fibrillation ablation     10/08    Basal cell cancer     Dr Aneudy Sousa; he's had >350 lesions removed    Carotid occlusion     left     Cervical radiculopathy     MRI 9/11 showed C3-6 severe foraminal stenosis    Chronic pain     Colon polyp     Dr Yazmin Paulino 9/15    Coronary artery disease     RCA - 3.0 x 16mm TAXUS 9/04, 3.0 x 16mm TAXUS 12/06    Dyslipidemia     Erectile dysfunction     GERD     GI bleed     Hearing loss     2014  bilateral Dr Earline Vaztt    Hernia, umbilical     Hypertension     Hypogonadism male     Lumbar radiculopathy     Dr Dean Arenas;  MRI 9/11 L4-5 disc bulging, annular tear, disc dessication    Myofascial pain dysfunction syndrome     pain clinic     Osteoarthritis     right knee Dr Rajiv Blair Peripheral vascular disease     50-60% R iliac; s/p R iliac stent and L femoral artery stent in past; R OLIVIA 0.76 (1/16)    Plantar fasciitis     bilateral     PPD positive     Prediabetes     Prostate cancer     T1c [place] 7(3+4), 70% in 1 core, GS 7 (3+4) in 4 cores, GS 6 (3+3) in 1 core; psa 5.28, TRUS 18 gm;  Dr Yazmin Paulino; s/p cryoablation 10/16    Recurrent umbilical hernia     Subclinical hypothyroidism     denies    Tinnitus     Dr Annalisa Herrmann Ulcerative colitis     Venous insufficiency         Allergies   Allergen Reactions    Altace [Ramipril] Unknown (comments)    Lipitor [Atorvastatin] Other (comments)     Muscle pain    Lisinopril Shortness of Breath and Other (comments)     Turns bright red    Other Medication Other (comments)     Vicryl suture on skin tends to be rejected with poor wound healing does better with monocryl    Procardia [Nifedipine] Other (comments)     Caused afib         Current Outpatient Prescriptions   Medication Sig    oxyCODONE-acetaminophen (PERCOCET) 5-325 mg per tablet Take 1 Tab by mouth every six (6) hours as needed for Pain. Max Daily Amount: 4 Tabs.  HYDROcodone-acetaminophen (NORCO) 5-325 mg per tablet Take 1 Tab by mouth every four (4) hours as needed for Pain.  aspirin delayed-release 81 mg tablet Take 81 mg by mouth daily.  LORazepam (ATIVAN) 1 mg tablet Take 1 Tab by mouth every eight (8) hours as needed.  triamterene-hydrochlorothiazide (MAXZIDE) 37.5-25 mg per tablet TAKE ONE TABLET BY MOUTH DAILY    atenolol (TENORMIN) 25 mg tablet Take 1 Tab by mouth two (2) times a day.  losartan (COZAAR) 25 mg tablet Take 1 Tab by mouth two (2) times a day.  ezetimibe-simvastatin (VYTORIN 10/40) 10-40 mg per tablet Take 1 Tab by mouth nightly.  Cholecalciferol, Vitamin D3, 1,000 unit cap Take 1,000 Units by mouth daily.  VOLTAREN 1 % topical gel as needed.  OMEGA-3 FATTY ACIDS/FISH OIL (OMEGA 3 FISH OIL PO) Take 900 mg by mouth daily.  gabapentin (NEURONTIN) 300 mg capsule Take 300 mg by mouth nightly.  BIOFLAV,LEMON/VIT BCOMP&C (LIPOFLAVONOID PO) Take  by mouth daily.  MULTIVITAMIN PO Take  by mouth daily.  pantoprazole (PROTONIX) 40 mg tablet Take 40 mg by mouth daily.  coenzyme q10 200 mg Cap Take  by mouth. No current facility-administered medications for this visit. Physical Exam  Constitutional: he is oriented to person, place, and time and well-developed, well-nourished, and in no distress. No distress. HENT:   Head: Normocephalic and atraumatic. Right Ear: Hearing normal.   Left Ear: Hearing normal.   Nose: Nose normal.   Eyes: Conjunctivae, EOM and lids are normal. Pupils are equal, round, and reactive to light. Neck: Trachea normal.   Pulmonary/Chest: Effort normal and breath sounds normal. No respiratory distress. Abdominal: Soft. Neurological: he is alert and oriented to person, place, and time. Skin: Skin is warm, dry and intact. he is not diaphoretic.    Psychiatric: Affect normal.   Nursing note and vitals reviewed. Ortho Exam   Left long finger tender at the A1 pulley and pain with flexion of the left long finger. The right upper extremity shows numbness of the ring and left finger. Neck ROM was normal and ddi not increase his sx in his right hand. He had increased sx of tingling of finger when tapping in the ulnar nerve in the cubital tunnel. Procedure: After timeout and under sterile conditions, patient's left middle finger was injected with 0.25 cc of Xylocaine and 0.75 cc of Celestone. [address] [place] [address]  OFFICE PROCEDURE PROGRESS NOTE        Chart reviewed for the following:   Jerod Louie MD, have reviewed the History, Physical and updated the Allergic reactions for [address] performed immediately prior to start of procedure:   Jerod Louie MD, have performed the following reviews on [facility] prior to the start of the procedure:            * Patient was identified by name and date of birth   * Agreement on procedure being performed was verified  * Risks and Benefits explained to the patient  * Procedure site verified and marked as necessary  * Patient was positioned for comfort  * Consent was signed and verified     Time: 8:59 AM        Date of procedure: 2/22/2017    Procedure performed by: Andrew Figueroa MD    Provider assisted by: None     Patient assisted by: self    How tolerated by patient: tolerated the procedure well with no complications    Comments: none    Procedure: After timeout and under sterile conditions, patient's right elbow was injected with 1.0 cc of Xylocaine and 0.5 cc of Celestone.     [facility]  OFFICE PROCEDURE PROGRESS NOTE        Chart reviewed for the following:   Jerod Louie MD, have reviewed the History, Physical and updated the Allergic reactions for [address] performed immediately prior to start of procedure:   Beulah Mendez MD, have performed the following reviews on [facility] prior to the start of the procedure:            * Patient was identified by name and date of birth   * Agreement on procedure being performed was verified  * Risks and Benefits explained to the patient  * Procedure site verified and marked as necessary  * Patient was positioned for comfort  * Consent was signed and verified     Time: 8:59 AM        Date of procedure: 2/22/2017    Procedure performed by: Maria Guadalupe Lowry MD    Provider assisted by: None     Patient assisted by: self    How tolerated by patient: tolerated the procedure well with no complications    Comments: none        RADIOGRAPHS:   No new x-rays taken today. IMPRESSION & PLAN: Trigger finger left long finger and cubital tunnel syndrome left elbow. Injection of A1 pulley and left long finger and injection of cubital tunnel right elbow. With no improvement in sx, will proceed with EMG and nerve conduction study. I will see him back prn.       Scribed by Flaca Denson ([facility]) as dictated by Mayank Guzman MD.

## 2017-02-24 ENCOUNTER — OFFICE VISIT (OUTPATIENT)
Dept: SURGERY | Age: 72
End: 2017-02-24

## 2017-02-24 VITALS
HEIGHT: 72 IN | HEART RATE: 70 BPM | DIASTOLIC BLOOD PRESSURE: 68 MMHG | SYSTOLIC BLOOD PRESSURE: 144 MMHG | RESPIRATION RATE: 18 BRPM | OXYGEN SATURATION: 98 % | TEMPERATURE: 96.9 F

## 2017-02-24 DIAGNOSIS — K42.9 RECURRENT UMBILICAL HERNIA: Primary | ICD-10-CM

## 2017-02-24 NOTE — MR AVS SNAPSHOT
Visit Information Date & Time Provider Department Dept. Phone Encounter #  
 2/24/2017  9:00 AM Fabio Shaw [facility] [phone] [id number] Your Appointments 2/24/2017  9:00 AM  
HOSPITAL DISCHARGE with Marina Juárez MD  
[facility] ([address]) Appt Note: po lap ventral hernia; po  
 [address] [place] [phone]  
  
   
 [address] [address]  
  
    
 3/20/2017  9:10 AM  
Nurse Visit with [facility] NURSE [facility] of [facility] ([address]) Appt Note: PSA  
 [address] 
[address] [place] [address]  
1400 [facility] 3b [facility] [address]  
  
    
 6/22/2017  7:55 AM  
LAB with [facility] NURSE VISIT Internist of [facility] ([address]) Appt Note: labs [address] rd  
 [address], [address] [facility] [address] [address]  
  
   
 [address], [address]  
  
    
 6/29/2017  8:00 AM  
Office Visit with Jerrod Velázquez MD  
Internist of [address] [place] [address] 3651 [facility]) Appt Note: ov 6mos. rd  
 [address], [address] [address] [address]  
  
   
 5445 [facility], [address]  
  
    
 2/2/2018  8:00 AM  
PROCEDURE with BSVVS IMAGING 1  
[facility] (SHERMAN SCHEDULING) Appt Note: iliac 1 yr alfredo [address] [address]  
[phone] [address] [address]  
  
    
 2/2/2018  9:00 AM  
PROCEDURE with BSVVS IMAGING 1  
[facility] (SHERMAN SCHEDULING) Appt Note: cv 1 yr alfredo [address]  
[phone]  
  
    
 2/2/2018 10:00 AM  
PROCEDURE with BSVVS NONIMAGING  
[facility] (SHERMAN SCHEDULING) Appt Note: leg art 1 yr alfredo [address] [address]  
[phone] [address] [address]  
  
    
 2/19/2018  9:30 AM  
Follow Up with Binh Londono MD  
[facility] (SHERMAN SCHEDULING) Appt Note: 1 year fu after studies; FAXED OVER INFORMATION FOR MICHI TO CALL PATIENT DUE TO STUDIES WERE NOT IN CC AT TIME OF CHECK OUT; [address]; 1 year fu after studies FAXED OVER INFORMATION  [place] TO CALL PATIENT DUE TO STUDIES WERE NOT IN CC AT TIME OF CHECK  [address]  
 303 [facility] [address] [address]  
[phone]  
  
   
 [address] [facility] [address] [address] 4/14/2017  9:45 AM  
Any with Louise Bustamante MD  
Urology of [facility] Amaya [address] [place] [address]  
[address] [facility] 301 [facility] [address] [place] [address] Upcoming Health Maintenance Date Due  
 GLAUCOMA SCREENING Q2Y 10/15/2011 MEDICARE YEARLY EXAM 6/24/2017 DTaP/Tdap/Td series (2 - Td) 4/3/2023 COLONOSCOPY 9/22/2025 Allergies as of 2/24/2017  Review Complete On: 2/24/2017 By: Soha Mauricio LPN Severity Noted Reaction Type Reactions Altace [Ramipril]    Unknown (comments) Lipitor [Atorvastatin]    Other (comments) Muscle pain Lisinopril    Shortness of Breath, Other (comments) Turns bright red Other Medication  03/05/2014    Other (comments) Vicryl suture on skin tends to be rejected with poor wound healing does better with monocryl Procardia [Nifedipine]    Other (comments) Caused afib Current Immunizations  Reviewed on 12/22/2015 Name Date Influenza High Dose Vaccine PF 9/16/2016, 10/2/2015 12:30 PM  
 Influenza Vaccine 10/10/2014, 10/4/2013 Influenza Vaccine Split 10/10/2012  2:28 PM, 9/28/2011 Influenza Vaccine Whole 10/8/2010  Pneumococcal Conjugate (PCV-13) 12/15/2014  8:16 AM  
 Pneumococcal Polysaccharide (PPSV-23) 1/24/2014 Pneumococcal Vaccine (Unspecified Type) 1/1/2006 Td 1/1/2006 Tdap 4/3/2013  8:23 PM  
 Zoster Vaccine, Live 1/1/2007 Not reviewed this visit Vitals BP  
  
  
  
  
  
 144[address]68 Preferred Pharmacy Pharmacy Name Phone Christiana Suárez [address], [address] [phone] Your Updated Medication List  
  
   
This list is accurate as of: 2/24/17  8:55 AM.  Always use your most recent med list.  
  
  
  
  
 aspirin delayed-release 81 mg tablet Take 81 mg by mouth daily. atenolol 25 mg tablet Commonly known as:  TENORMIN Take 1 Tab by mouth two (2) times a day. cholecalciferol 1,000 unit Cap Commonly known as:  VITAMIN D3 Take 1,000 Units by mouth daily. coenzyme Q-10 200 mg capsule Commonly known as:  CO Q-10 Take  by mouth.  
  
 ezetimibe-simvastatin 10-40 mg per tablet Commonly known as:  Vytorin 10/40 Take 1 Tab by mouth nightly.  
  
 gabapentin 300 mg capsule Commonly known as:  NEURONTIN Take 300 mg by mouth nightly. HYDROcodone-acetaminophen 5-325 mg per tablet Commonly known as:  Rolinda Doles Take 1 Tab by mouth every four (4) hours as needed for Pain. LIPOFLAVONOID PO Take  by mouth daily. LORazepam 1 mg tablet Commonly known as:  ATIVAN Take 1 Tab by mouth every eight (8) hours as needed. losartan 25 mg tablet Commonly known as:  COZAAR Take 1 Tab by mouth two (2) times a day. MULTIVITAMIN PO Take  by mouth daily. OMEGA 3 FISH OIL PO Take 900 mg by mouth daily. oxyCODONE-acetaminophen 5-325 mg per tablet Commonly known as:  PERCOCET Take 1 Tab by mouth every six (6) hours as needed for Pain. Max Daily Amount: 4 Tabs. PROTONIX 40 mg tablet Generic drug:  pantoprazole Take 40 mg by mouth daily. triamterene-hydroCHLOROthiazide 37.5-25 mg per tablet Commonly known as:  Gemma Sheri  
 TAKE ONE TABLET BY MOUTH DAILY  
  
 VOLTAREN 1 % Gel Generic drug:  diclofenac  
as needed. Introducing [facility] [place] [facility]! Dear Wayne Reed: Thank you for requesting a [employer] account. Our records indicate that you already have an active [employer] account. You can access your account anytime at [email or web address] Did you know that you can access your hospital and ER discharge instructions at any time in [employer]? You can also review all of your test results from your hospital stay or ER visit. Additional Information If you have questions, please visit the Frequently Asked Questions section of the [employer] website at [email or web address] Remember, [employer] is NOT to be used for urgent needs. For medical emergencies, dial 911. Now available from your iPhone and Android! Please provide this summary of care documentation to your next provider. Your primary care clinician is listed as Drinda Epley. If you have any questions after today's visit, please call [phone].

## 2017-02-24 NOTE — PROGRESS NOTES
Progress Note    Patient: Yuliya Alcala  MRN: [id number]  SSN: [id number]   YOB: 1945  Age: 70 y.o. Sex: male     Chief Complaint   Patient presents with    Post OP Follow Up       HPI    Mr. Shamar Ahmadi returns after his laparoscopic ventral herniorrhaphy with very lux patch. He looks great, his wounds are well-healed and he is essentially back to baseline.     Past Medical History:   Diagnosis Date    Adenoma of left adrenal gland     2009  1 cm, no change 1/15, 2/16    Asbestosis     Atrial fibrillation     CHA2DS2-VAsc=3(age+, htn+, vasc dx+; estimated yearly stroke risk according to Lip et al. is 3,2%), Hasbled=2(estimated yearly bleeding risk according to pisters et al. is 1,88%); not anticoagulated due to h/o gi bleed    Atrial fibrillation ablation     10/08    Basal cell cancer     Dr Avinash Mckeon; he's had >350 lesions removed    Carotid occlusion     left     Cervical radiculopathy     MRI 9/11 showed C3-6 severe foraminal stenosis    Chronic pain     Colon polyp     Dr Elle Thrasher 9/15    Coronary artery disease     RCA - 3.0 x 16mm TAXUS 9/04, 3.0 x 16mm TAXUS 12/06    Dyslipidemia     Erectile dysfunction     GERD     GI bleed     Hearing loss     2014  bilateral Dr Hsieh Mealing    Hernia, umbilical     Hypertension     Hypogonadism male     Lumbar radiculopathy     Dr Millie Rayo;  MRI 9/11 L4-5 disc bulging, annular tear, disc dessication    Myofascial pain dysfunction syndrome     pain clinic     Osteoarthritis     right knee Dr Jeanmarie Harvey Peripheral vascular disease     50-60% R iliac; s/p R iliac stent and L femoral artery stent in past; R OLIVIA 0.76 (1/16)    Plantar fasciitis     bilateral     PPD positive     Prediabetes     Prostate cancer     T1c Somis 7(3+4), 70% in 1 core, GS 7 (3+4) in 4 cores, GS 6 (3+3) in 1 core; psa 5.28, TRUS 18 gm;  Dr Elle Thrasher; s/p cryoablation 10/16    Recurrent umbilical hernia     Subclinical hypothyroidism     denies    Tinnitus      [place]    Ulcerative colitis     Venous insufficiency      Past Surgical History:   Procedure Laterality Date    HX CAROTID ENDARTERECTOMY      1/14  right    HX COLONOSCOPY      Dr Damaso Lafleur 9/15 polyps    HX CRYOABLATION OF THE PROSTATE      10/16    HX [address]    [facility]    HX ORTHOPAEDIC      right knee surgery    HX REFRACTIVE SURGERY      OD (hemoplasty to right eye on retina to avoid blindness)    HX TONSILLECTOMY      1955    MO LAP, RECURRENT INCISIONAL HERNIA REPAIR,REDUCIBLE N/A 02/09/2017    Dr. Yadiel Mays HERNIA,5+Y/O,REDUCIBL      2/16  left  Dr. Zavaleta Sos OVMI,0+H/G,RKTSF      2/16  Dr. Shannan Winslow      1/16  R OLIVIA 0.76, L OLIVIA 1.02    VASCULAR SURGERY PROCEDURE UNLIST      10/15   left fem art and left iliac stent     Allergies   Allergen Reactions    Altace [Ramipril] Unknown (comments)    Lipitor [Atorvastatin] Other (comments)     Muscle pain    Lisinopril Shortness of Breath and Other (comments)     Turns bright red    Other Medication Other (comments)     Vicryl suture on skin tends to be rejected with poor wound healing does better with monocryl    Procardia [Nifedipine] Other (comments)     Caused afib     Current Outpatient Prescriptions   Medication Sig Dispense Refill    HYDROcodone-acetaminophen (NORCO) 5-325 mg per tablet Take 1 Tab by mouth every four (4) hours as needed for Pain. 180 Tab 0    LORazepam (ATIVAN) 1 mg tablet Take 1 Tab by mouth every eight (8) hours as needed. 50 Tab 0    triamterene-hydrochlorothiazide (MAXZIDE) 37.5-25 mg per tablet TAKE ONE TABLET BY MOUTH DAILY 90 Tab 3    atenolol (TENORMIN) 25 mg tablet Take 1 Tab by mouth two (2) times a day. 180 Tab 3    losartan (COZAAR) 25 mg tablet Take 1 Tab by mouth two (2) times a day. 180 Tab 3    ezetimibe-simvastatin (VYTORIN 10/40) 10-40 mg per tablet Take 1 Tab by mouth nightly.  90 Tab 3    Cholecalciferol, Vitamin D3, 1,000 unit cap Take 1,000 Units by mouth daily.  VOLTAREN 1 % topical gel as needed.  OMEGA-3 FATTY ACIDS/FISH OIL (OMEGA 3 FISH OIL PO) Take 900 mg by mouth daily.  gabapentin (NEURONTIN) 300 mg capsule Take 300 mg by mouth nightly.  BIOFLAV,LEMON/VIT BCOMP&C (LIPOFLAVONOID PO) Take  by mouth daily.  MULTIVITAMIN PO Take  by mouth daily.  pantoprazole (PROTONIX) 40 mg tablet Take 40 mg by mouth daily.  coenzyme q10 200 mg Cap Take  by mouth.  oxyCODONE-acetaminophen (PERCOCET) 5-325 mg per tablet Take 1 Tab by mouth every six (6) hours as needed for Pain. Max Daily Amount: 4 Tabs. 30 Tab 0    aspirin delayed-release 81 mg tablet Take 81 mg by mouth daily. Social History     Social History    Marital status:      Spouse name: N/A    Number of children: N/A    Years of education: N/A     Occupational History    Not on file.      Social History Main Topics    Smoking status: Former Smoker     Packs/day: 1.50     Years: 25.00     Types: Cigarettes     Quit date: 1/1/2003    Smokeless tobacco: Never Used    Alcohol use 2.5 oz/week     5 Cans of beer per week      Comment: 5 beers a week    Drug use: No    Sexual activity: Yes     Partners: Female     Birth control/ protection: None     Other Topics Concern    Not on file     Social History Narrative     Family History   Problem Relation Age of Onset    Heart Disease Mother     Hypertension Mother     Diabetes Mother     Arthritis-osteo Mother     Heart Disease Father     Stroke Father     Hypertension Father     Heart Disease Sister     Hypertension Sister          Review of systems:  Patient denies any reflux, emesis, abdominal pain, change in bowel habits, hematochezia, melena, fever, weight loss, fatigue chills, dermatitis, abnormal moles, change in vision, vertigo, epistaxis, dysphagia, hoarseness, chest pain, palpitations, hypertension, edema, cough, shortness of breath, wheezing, hemoptysis, snoring, hematuria, diabetes, thyroid disease, anemia, bruising, history of blood transfusion, dizziness, headache, or fainting. Physical Examination    Well developed well nourished male in no apparent distress  Visit Vitals    /68    Pulse 70    Temp 96.9 °F (36.1 °C) (Oral)    Resp 18    Ht 6' (1.829 m)    SpO2 98%      Head: normocephalic, atraumatic  Mouth: Clear, no overt lesions, oral mucosa pink and moist  Neck: supple, no masses, no adenopathy or carotid bruits, trachea midline  Resp: clear to auscultation bilaterally, no wheeze, rhonchi or rales, excursions normal and symmetrical  Cardio: Regular rate and rhythm, no murmurs, clicks, gallops or rubs, no edema or varicosities  Abdomen: soft, nontender, nondistended, normoactive bowel sounds, no hernias, no hepatosplenomegaly, healing laparoscopic wounds  Back: Deferred  Extremeties: warm, well-perfused, no tenderness or swelling, normal gait/station  Neuro: sensation and strength grossly intact and symmetrical  Psych: alert and oriented to person, place and time  Breast exam deferred    IMPRESSION  Status post laparoscopic ventral herniorrhaphy doing well, and back to baseline    PLAN  No orders of the defined types were placed in this encounter.     Follow-up as needed  Marcela Rider MD

## 2017-02-24 NOTE — PROGRESS NOTES
1. Have you been to the ER, urgent care clinic since your last visit? Hospitalized since your last visit? No    2. Have you seen or consulted any other health care providers outside of the [address] [place] [address] since your last visit? Include any pap smears or colon screening. No     Patient presents for post op hernia repair 2/8/17.

## 2017-02-27 ENCOUNTER — TELEPHONE (OUTPATIENT)
Dept: INTERNAL MEDICINE CLINIC | Age: 72
End: 2017-02-27

## 2017-02-27 ENCOUNTER — PATIENT OUTREACH (OUTPATIENT)
Dept: INTERNAL MEDICINE CLINIC | Age: 72
End: 2017-02-27

## 2017-02-27 NOTE — TELEPHONE ENCOUNTER
Pt calling asking if RD can see him today. Has had diarrhea since last Tuesday. Takes Imodium and it stops for a day then back the next day. Can you fit him in? Bm full.

## 2017-02-28 ENCOUNTER — OFFICE VISIT (OUTPATIENT)
Dept: INTERNAL MEDICINE CLINIC | Age: 72
End: 2017-02-28

## 2017-02-28 ENCOUNTER — HOSPITAL ENCOUNTER (OUTPATIENT)
Dept: LAB | Age: 72
Discharge: HOME | End: 2017-02-28
Payer: MEDICARE

## 2017-02-28 VITALS
SYSTOLIC BLOOD PRESSURE: 106 MMHG | DIASTOLIC BLOOD PRESSURE: 80 MMHG | BODY MASS INDEX: 29.16 KG/M2 | WEIGHT: 215 LBS | OXYGEN SATURATION: 99 % | TEMPERATURE: 97.7 F | HEART RATE: 71 BPM

## 2017-02-28 DIAGNOSIS — K63.5 COLON POLYPS: ICD-10-CM

## 2017-02-28 DIAGNOSIS — K57.90 DIVERTICULOSIS OF INTESTINE WITHOUT BLEEDING, UNSPECIFIED INTESTINAL TRACT LOCATION: ICD-10-CM

## 2017-02-28 DIAGNOSIS — K51.919 ULCERATIVE COLITIS WITH COMPLICATION, UNSPECIFIED LOCATION (HCC): ICD-10-CM

## 2017-02-28 DIAGNOSIS — K42.9 RECURRENT UMBILICAL HERNIA: ICD-10-CM

## 2017-02-28 DIAGNOSIS — R19.5 LOOSE STOOLS: Primary | ICD-10-CM

## 2017-02-28 DIAGNOSIS — R19.5 LOOSE STOOLS: ICD-10-CM

## 2017-02-28 LAB
ALBUMIN SERPL BCP-MCNC: 4 G/DL (ref 3.4–5)
ALBUMIN/GLOB SERPL: 1.3 {RATIO} (ref 0.8–1.7)
ALP SERPL-CCNC: 83 U/L (ref 45–117)
ALT SERPL-CCNC: 28 U/L (ref 16–61)
ANION GAP BLD CALC-SCNC: 7 MMOL/L (ref 3–18)
AST SERPL W P-5'-P-CCNC: 18 U/L (ref 15–37)
BACTERIA SPEC CULT: NORMAL
BASOPHILS # BLD AUTO: 0 K/UL (ref 0–0.06)
BASOPHILS # BLD: 0 % (ref 0–2)
BILIRUB SERPL-MCNC: 0.7 MG/DL (ref 0.2–1)
BUN SERPL-MCNC: 11 MG/DL (ref 7–18)
BUN/CREAT SERPL: 16 (ref 12–20)
CALCIUM SERPL-MCNC: 9.9 MG/DL (ref 8.5–10.1)
CHLORIDE SERPL-SCNC: 95 MMOL/L (ref 100–108)
CO2 SERPL-SCNC: 32 MMOL/L (ref 21–32)
CREAT SERPL-MCNC: 0.7 MG/DL (ref 0.6–1.3)
DIFFERENTIAL METHOD BLD: NORMAL
EOSINOPHIL # BLD: 0.4 K/UL (ref 0–0.4)
EOSINOPHIL NFR BLD: 4 % (ref 0–5)
ERYTHROCYTE [DISTWIDTH] IN BLOOD BY AUTOMATED COUNT: 12.4 % (ref 11.6–14.5)
GLOBULIN SER CALC-MCNC: 3.2 G/DL (ref 2–4)
GLUCOSE SERPL-MCNC: 92 MG/DL (ref 74–99)
HCT VFR BLD AUTO: 47 % (ref 36–48)
HEMOCCULT STL QL: NEGATIVE
HGB BLD-MCNC: 15.5 G/DL (ref 13–16)
LYMPHOCYTES # BLD AUTO: 23 % (ref 21–52)
LYMPHOCYTES # BLD: 2.3 K/UL (ref 0.9–3.6)
MCH RBC QN AUTO: 31.9 PG (ref 24–34)
MCHC RBC AUTO-ENTMCNC: 33 G/DL (ref 31–37)
MCV RBC AUTO: 96.7 FL (ref 74–97)
MONOCYTES # BLD: 0.5 K/UL (ref 0.05–1.2)
MONOCYTES NFR BLD AUTO: 5 % (ref 3–10)
NEUTS SEG # BLD: 6.6 K/UL (ref 1.8–8)
NEUTS SEG NFR BLD AUTO: 68 % (ref 40–73)
PLATELET # BLD AUTO: 285 K/UL (ref 135–420)
PMV BLD AUTO: 9.4 FL (ref 9.2–11.8)
POTASSIUM SERPL-SCNC: 4.3 MMOL/L (ref 3.5–5.5)
PROT SERPL-MCNC: 7.2 G/DL (ref 6.4–8.2)
RBC # BLD AUTO: 4.86 M/UL (ref 4.7–5.5)
SERVICE CMNT-IMP: NORMAL
SODIUM SERPL-SCNC: 134 MMOL/L (ref 136–145)
WBC # BLD AUTO: 9.9 K/UL (ref 4.6–13.2)
WBC #/AREA STL HPF: NORMAL /HPF

## 2017-02-28 PROCEDURE — 82272 OCCULT BLD FECES 1-3 TESTS: CPT | Performed by: INTERNAL MEDICINE

## 2017-02-28 PROCEDURE — 85025 COMPLETE CBC W/AUTO DIFF WBC: CPT | Performed by: PHYSICIAN ASSISTANT

## 2017-02-28 PROCEDURE — 89055 LEUKOCYTE ASSESSMENT FECAL: CPT | Performed by: INTERNAL MEDICINE

## 2017-02-28 PROCEDURE — 87045 FECES CULTURE AEROBIC BACT: CPT | Performed by: INTERNAL MEDICINE

## 2017-02-28 PROCEDURE — 87493 C DIFF AMPLIFIED PROBE: CPT | Performed by: INTERNAL MEDICINE

## 2017-02-28 PROCEDURE — 80053 COMPREHEN METABOLIC PANEL: CPT | Performed by: PHYSICIAN ASSISTANT

## 2017-02-28 NOTE — MR AVS SNAPSHOT
Visit Information Date & Time Provider Department Dept. Phone Encounter #  
 2/28/2017 10:00 AM Dee Vogel Internist of [address] [id number] Your Appointments 3/20/2017  9:10 AM  
Nurse Visit with [facility] NURSE [facility] [facility] Appt Note: PSA  
 [address] 
[address] [facility] [address]  
1400 [facility] 3b [facility] [address]  
  
    
 6/22/2017  7:55 AM  
LAB with [facility] NURSE VISIT [facility]ist of [facility] [facility] Appt Note: labs [address] rd  
 [address], [address] [facility] [address] [place]  
  
   
 [address], [address]  
  
    
 6/29/2017  8:00 AM  
Office Visit with Dwain Holland MD  
Internist of [address] [place] [address]) Appt Note: ov [address] rd  
 [address] [address] [address] [place]  
  
   
 5445 [facility], [address]  
  
    
 2/2/2018  8:00 AM  
PROCEDURE with BSVVS IMAGING 1  
[facility] (SHERMAN SCHEDULING) Appt Note: iliac 1 yr alfredo [address] [address]  
[phone] [address] 500 [facility] [place] [address]  
  
    
 2/2/2018  9:00 AM  
PROCEDURE with BSVVS IMAGING 1  
[facility] (SHERMAN SCHEDULING) Appt Note: cv 1 yr alfredo [address]  
[phone]  
  
    
 2/2/2018 10:00 AM  
PROCEDURE with BSVVS NONIMAGING  
[facility]in/[facility] Spec[facility] (SHERMAN SCHEDULING) Appt Note: leg art 1 yr alfredo [address] [address]  
[phone] [address] 500 [facility] [place] [address]  
  
    
 2/19/2018  9:30 AM  
Follow Up with Angeles Little MD  
[facility] (SHERMAN SCHEDULING) Appt Note: 1 year fu after studies; FAXED OVER INFORMATION FOR MICHI TO CALL PATIENT DUE TO STUDIES WERE NOT IN CC AT TIME OF CHECK OUT; [address]; 1 year fu after studies FAXED OVER INFORMATION FOR Turner Bee TO CALL PATIENT DUE TO STUDIES WERE NOT IN CC AT TIME OF CHECK  [address]  
 303 [facility] [address] [address]  
[phone]  
  
   
 333 [facility] [address] [address] 4/14/2017  9:45 AM  
Any with Frances Thomason MD  
Urology of [facility] ([address]) [address] [place] [address]  
39 Angela Chin [address] [facility] [address] [place] [address] Upcoming Health Maintenance Date Due  
 GLAUCOMA SCREENING Q2Y 10/15/2011 MEDICARE YEARLY EXAM 6/24/2017 DTaP/Tdap/Td series (2 - Td) 4/3/2023 COLONOSCOPY 9/22/2025 Allergies as of 2/28/2017  Review Complete On: 2/28/2017 By: Ba Del Real LPN Severity Noted Reaction Type Reactions Altace [Ramipril]    Unknown (comments) Lipitor [Atorvastatin]    Other (comments) Muscle pain Lisinopril    Shortness of Breath, Other (comments) Turns bright red Other Medication  03/05/2014    Other (comments) Vicryl suture on skin tends to be rejected with poor wound healing does better with monocryl Procardia [Nifedipine]    Other (comments) Caused afib Current Immunizations  Reviewed on 12/22/2015 Name Date Influenza High Dose Vaccine PF 9/16/2016, 10/2/2015 12:30 PM  
 Influenza Vaccine 10/10/2014, 10/4/2013 Influenza Vaccine Split 10/10/2012  2:28 PM, 9/28/2011 Influenza Vaccine Whole 10/8/2010 Pneumococcal Conjugate (PCV-13) 12/15/2014  8:16 AM  
 Pneumococcal Polysaccharide (PPSV-23) 1/24/2014 Pneumococcal Vaccine (Unspecified Type) 1/1/2006 Td 1/1/2006 Tdap 4/3/2013  8:23 PM  
 Zoster Vaccine, Live 1/1/2007 Not reviewed this visit You Were Diagnosed With   
  
 Codes Comments Diarrhea, unspecified type    -  Primary ICD-10-CM: R19.7 ICD-9-CM: 787.91 Ulcerative colitis with complication, unspecified location [facility])     ICD-10-CM: Q13.840 ICD-9-CM: 556.9 Recurrent umbilical hernia     ZAJ-79-WV: K42.9 ICD-9-CM: 553.1 Vitals BP  
  
  
  
  
  
 106/80 Vitals History BMI and BSA Data Body Mass Index Body Surface Area  
 29.16 kg/m 2 2.23 m 2 Preferred Pharmacy Pharmacy Name Phone Perry Ritchie [address], [facility] [phone] Your Updated Medication List  
  
   
This list is accurate as of: 2/28/17 10:32 AM.  Always use your most recent med list.  
  
  
  
  
 aspirin delayed-release 81 mg tablet Take 81 mg by mouth daily. atenolol 25 mg tablet Commonly known as:  TENORMIN Take 1 Tab by mouth two (2) times a day. cholecalciferol 1,000 unit Cap Commonly known as:  VITAMIN D3 Take 1,000 Units by mouth daily. coenzyme Q-10 200 mg capsule Commonly known as:  CO Q-10 Take  by mouth.  
  
 ezetimibe-simvastatin 10-40 mg per tablet Commonly known as:  Vytorin 10/40 Take 1 Tab by mouth nightly.  
  
 gabapentin 300 mg capsule Commonly known as:  NEURONTIN Take 300 mg by mouth nightly. HYDROcodone-acetaminophen 5-325 mg per tablet Commonly known as:  Kendra Soto Take 1 Tab by mouth every four (4) hours as needed for Pain. LIPOFLAVONOID PO Take  by mouth daily. LORazepam 1 mg tablet Commonly known as:  ATIVAN Take 1 Tab by mouth every eight (8) hours as needed. losartan 25 mg tablet Commonly known as:  COZAAR Take 1 Tab by mouth two (2) times a day. MULTIVITAMIN PO Take  by mouth daily. OMEGA 3 FISH OIL PO Take 900 mg by mouth daily. oxyCODONE-acetaminophen 5-325 mg per tablet Commonly known as:  PERCOCET Take 1 Tab by mouth every six (6) hours as needed for Pain. Max Daily Amount: 4 Tabs. PROTONIX 40 mg tablet Generic drug:  pantoprazole Take 40 mg by mouth daily. triamterene-hydroCHLOROthiazide 37.5-25 mg per tablet Commonly known as:  Anaid Lucero TAKE ONE TABLET BY MOUTH DAILY  
  
 VOLTAREN 1 % Gel Generic drug:  diclofenac  
as needed. To-Do List   
 02/28/2017 Microbiology:  C. DIFFICILE/EPI PCR   
  
 02/28/2017 Lab:  CBC WITH AUTOMATED DIFF   
  
 02/28/2017 Microbiology:  CULTURE, STOOL   
  
 02/28/2017 Lab:  METABOLIC PANEL, COMPREHENSIVE   
  
 02/28/2017 Lab:  OCCULT BLOOD, STOOL   
  
 02/28/2017 Lab:  WBC, STOOL Introducing [facility] SERVICES! Dear Venancio Cooley: Thank you for requesting a [employer] account. Our records indicate that you already have an active [employer] account. You can access your account anytime at [email or web address] Did you know that you can access your hospital and ER discharge instructions at any time in [employer]? You can also review all of your test results from your hospital stay or ER visit. Additional Information If you have questions, please visit the Frequently Asked Questions section of the [employer] website at [email or web address] Remember, [employer] is NOT to be used for urgent needs. For medical emergencies, dial 911. Now available from your iPhone and Android! Please provide this summary of care documentation to your next provider. Your primary care clinician is listed as Kaley Jane. If you have any questions after today's visit, please call [phone].

## 2017-02-28 NOTE — PROGRESS NOTES
HPI/History  Melvia Fabry is a 70 y.o.  male who presents for evaluation. Pt with hx noted below including hx of prostate cancer and GI hx including ulcerative colitis, polyps, diverticulosis (CT 7/2016), and recent umbilical hernia repair ~8/2. After umbilical hernia repair, pt was taking milk of magnesia and prn miralax to avoid straining and constipation. However, ~2/21 he had a few episodes of watery stools and stopped the milk of magnesia and miralax. Almost every day since then he will have a near normal BM in the first part of the day and a loose stool at some point following. Abnormal stools initially watery but now mostly semi-formed. Usually having only 2 BMs a day but max has been ~3 during this time. No dark or bloody stools. No noticeable abdominal pain outside of what is expected from recent procedure. Past UC flares presented with lower abdominal cramping and bloody stools but no apparent similarities with current. He has used imodium on occasion which may be making his first stool of the day more normal but is trying to avoid excessive use since on norco. He denies any other sxs or complaints. He has been followed by Dr. Yazmin Paulino in the past but has seen other providers at his group as well.      Patient Active Problem List   Diagnosis Code    Degeneration of cervical and limbar spine Dr Dean Arenas M50.30    Colitis, ulcerative ([address] K51.90    Colon polyps K63.5    Cervical disc disease M50.90    Peripheral vascular disease ([address] Dr Gold Alt I73.9    Left carotid artery occlusion I65.22    Atherosclerosis of artery of extremity with intermittent claudication ([address] I70.219    Hypovitaminosis D E55.9    Advance directive in chart Z78.9    Prediabetes R73.03    Hypertension I11.9    Dyslipidemia E78.5    Coronary artery disease I25.10    Atrial fibrillation I48.91    Recurrent umbilical hernia B05.4    Prostate carcinoma ([address] s/p cryoablation C61     Past Medical History:   Diagnosis Date    Adenoma of left adrenal gland     2009  1 cm, no change 1/15, 2/16    Asbestosis     Atrial fibrillation     CHA2DS2-VAsc=3(age+, htn+, vasc dx+; estimated yearly stroke risk according to Lip et al. is 3,2%), Hasbled=2(estimated yearly bleeding risk according to pisters et al. is 1,88%); not anticoagulated due to h/o gi bleed    Atrial fibrillation ablation     10/08    Basal cell cancer     Dr Bhaskar Bedoya; he's had >350 lesions removed    Carotid occlusion     left     Cervical radiculopathy     MRI 9/11 showed C3-6 severe foraminal stenosis    Chronic pain     Colon polyp     Dr Moser Records 9/15    Coronary artery disease     RCA - 3.0 x 16mm TAXUS 9/04, 3.0 x 16mm TAXUS 12/06    Dyslipidemia     Erectile dysfunction     GERD     GI bleed     Hearing loss     2014  bilateral Dr Amadou Andrews    Hernia, umbilical     Hypertension     Hypogonadism male     Lumbar radiculopathy     Dr Mariela Meeks;  MRI 9/11 L4-5 disc bulging, annular tear, disc dessication    Myofascial pain dysfunction syndrome     pain clinic     Osteoarthritis     right knee Dr Jose Carlos Conklin Peripheral vascular disease     50-60% R iliac; s/p R iliac stent and L femoral artery stent in past; R OLIVIA 0.76 (1/16)    Plantar fasciitis     bilateral     PPD positive     Prediabetes     Prostate cancer     T1c Imelda 7(3+4), 70% in 1 core, GS 7 (3+4) in 4 cores, GS 6 (3+3) in 1 core; psa 5.28, TRUS 18 gm;  Dr Moser Records; s/p cryoablation 10/16    Recurrent umbilical hernia     Subclinical hypothyroidism     denies    Tinnitus     Dr Gilda Arriaza Ulcerative colitis     Venous insufficiency      Past Surgical History:   Procedure Laterality Date    HX CAROTID ENDARTERECTOMY      1/14  right    HX COLONOSCOPY      Dr Moser Records 9/15 polyps    HX CRYOABLATION OF THE PROSTATE      10/16    HX HEMORRHOIDECTOMY      1979    [facility]    HX ORTHOPAEDIC      right knee surgery    HX REFRACTIVE SURGERY      OD (hemoplasty to right eye on retina to avoid blindness)    HX TONSILLECTOMY      1955    MA LAP, RECURRENT INCISIONAL HERNIA REPAIR,REDUCIBLE N/A 02/09/2017    Dr. Mauri Flower HERNIA,5+Y/O,REDUCIBL      2/16  left  Dr. Sammi Patten IYIS,0+R/Y,BNUVK      2/16  Dr. More Class      1/16  R OLIVIA 0.76, L OLIVIA 1.02    VASCULAR SURGERY PROCEDURE UNLIST      10/15   left fem art and left iliac stent     Social History     Social History    Marital status:      Spouse name: N/A    Number of children: N/A    Years of education: N/A     Occupational History    Not on file. Social History Main Topics    Smoking status: Former Smoker     Packs/day: 1.50     Years: 25.00     Types: Cigarettes     Quit date: 1/1/2003    Smokeless tobacco: Never Used    Alcohol use 2.5 oz/week     5 Cans of beer per week      Comment: 5 beers a week    Drug use: No    Sexual activity: Yes     Partners: Female     Birth control/ protection: None     Other Topics Concern    Not on file     Social History Narrative     Family History   Problem Relation Age of Onset    Heart Disease Mother     Hypertension Mother     Diabetes Mother     Arthritis-osteo Mother     Heart Disease Father     Stroke Father     Hypertension Father     Heart Disease Sister     Hypertension Sister      Current Outpatient Prescriptions   Medication Sig    HYDROcodone-acetaminophen (NORCO) 5-325 mg per tablet Take 1 Tab by mouth every four (4) hours as needed for Pain.  aspirin delayed-release 81 mg tablet Take 81 mg by mouth daily.  LORazepam (ATIVAN) 1 mg tablet Take 1 Tab by mouth every eight (8) hours as needed.  triamterene-hydrochlorothiazide (MAXZIDE) 37.5-25 mg per tablet TAKE ONE TABLET BY MOUTH DAILY    atenolol (TENORMIN) 25 mg tablet Take 1 Tab by mouth two (2) times a day.  losartan (COZAAR) 25 mg tablet Take 1 Tab by mouth two (2) times a day.     ezetimibe-simvastatin (VYTORIN 10/40) 10-40 mg per tablet Take 1 Tab by mouth nightly.  Cholecalciferol, Vitamin D3, 1,000 unit cap Take 1,000 Units by mouth daily.  VOLTAREN 1 % topical gel as needed.  OMEGA-3 FATTY ACIDS/FISH OIL (OMEGA 3 FISH OIL PO) Take 900 mg by mouth daily.  gabapentin (NEURONTIN) 300 mg capsule Take 300 mg by mouth nightly.  BIOFLAV,LEMON/VIT BCOMP&C (LIPOFLAVONOID PO) Take  by mouth daily.  MULTIVITAMIN PO Take  by mouth daily.  pantoprazole (PROTONIX) 40 mg tablet Take 40 mg by mouth daily.  coenzyme q10 200 mg Cap Take  by mouth. No current facility-administered medications for this visit. Allergies   Allergen Reactions    Altace [Ramipril] Unknown (comments)    Lipitor [Atorvastatin] Other (comments)     Muscle pain    Lisinopril Shortness of Breath and Other (comments)     Turns bright red    Other Medication Other (comments)     Vicryl suture on skin tends to be rejected with poor wound healing does better with monocryl    Procardia [Nifedipine] Other (comments)     Caused afib       Review of Systems  Rest of complete ROS negative. Significant findings included in HPI. Physical Examination  Visit Vitals    /80    Pulse 71    Temp 97.7 °F (36.5 °C) (Oral)    Wt 215 lb (97.5 kg)    SpO2 99%    BMI 29.16 kg/m2     Most recent colo reviewed along with recent labs and 7/2016 CT. General - Alert and in no acute distress. Pt appears well, comfortable, and in good spirits. Very pleasant and engaging. Nontoxic. Not anxious, non-diaphoretic. Mental status - Appropriate mood, behavior, speech content, dress, and thought processes. Pulm - No tachypnea, retractions, or cyanosis. Good respiratory effort. Clear to auscultation bilat. Cardiovascular - Normal rate, regular rhythm today. Abdomen - Laparoscopic sites look to be healing well with no concerning findings or signs of infection/cellulitis. Abdomen nondistended.  Normoactive bowel sounds. Soft. No significant tenderness. No grimace, guarding, rigidity, or rebound. No appreciable masses. Rectal deferred. Assessment and Plan  1. Pt with hx of ulcerative colitis, polyps, diverticulosis (CT 7/2016), and recent umbilical hernia repair ~1/9. Reports passing loose to watery stools since ~2/21; no excess frequency or other sxs. Discussed differentials and in light of his hx and recent events will check CBC, CMP, and stool labs (stool rbc, wbc, cx, cdiff). He will continue fluid/electrolyte maintenance and small bland meals. He can use prn imodium sparingly but discussed need to avoid constipation and its associated risk and also since he is taking norco. He will continue avoiding MOM and miralax or similar. He will visit ED if worsening, pain, fevers, or other ominous developments. Further planning pending results and progression but we may be sending back to Dr. Kush Iniguez if warranted. Pt happily agrees with plan. More than 40 min spent during visit with more than 50% discussing above issues, potential causes/contributing factors, eval/tx, record review, plan, and questions. PLEASE NOTE:   This document has been produced using voice recognition software. Unrecognized errors in transcription may be present.     [employer] [address] [place] [address]  [phone]  2/28/2017

## 2017-03-01 ENCOUNTER — TELEPHONE (OUTPATIENT)
Dept: INTERNAL MEDICINE CLINIC | Age: 72
End: 2017-03-01

## 2017-03-01 DIAGNOSIS — E87.1 HYPONATREMIA: Primary | ICD-10-CM

## 2017-03-01 NOTE — TELEPHONE ENCOUNTER
CMP normal aside from sodium being minimally low. CBC normal.    Await stool labs and go from there with further planning pending results and his progression. Let's check BMP due to sodium in about 2 wks.

## 2017-03-03 LAB
BACTERIA SPEC CULT: NORMAL
SERVICE CMNT-IMP: NORMAL

## 2017-03-13 ENCOUNTER — PATIENT OUTREACH (OUTPATIENT)
Dept: INTERNAL MEDICINE CLINIC | Age: 72
End: 2017-03-13

## 2017-03-13 DIAGNOSIS — I11.9 BENIGN HYPERTENSIVE HEART DISEASE WITHOUT HEART FAILURE: ICD-10-CM

## 2017-03-13 RX ORDER — HYDROCODONE BITARTRATE AND ACETAMINOPHEN 5; 325 MG/1; MG/1
1 TABLET ORAL
Qty: 180 TAB | Refills: 0 | Status: SHIPPED | OUTPATIENT
Start: 2017-03-13 | End: 2017-04-19 | Stop reason: SDUPTHER

## 2017-03-13 NOTE — PROGRESS NOTES
Episode closed: no hospitalization or ED admission post 30 days from discharge of 2/9/2017. Patient attending appointment on 2/24/2017.

## 2017-03-13 NOTE — TELEPHONE ENCOUNTER
Last date seen:2/28/17  Last date filled:2/2/17     report was pulled on 70 y.o. Jeanella Canal Collette, No discrepancies were found.

## 2017-03-14 ENCOUNTER — HOSPITAL ENCOUNTER (OUTPATIENT)
Dept: LAB | Age: 72
Discharge: HOME | End: 2017-03-14
Payer: MEDICARE

## 2017-03-14 DIAGNOSIS — R73.03 PREDIABETES: ICD-10-CM

## 2017-03-14 DIAGNOSIS — E78.5 DYSLIPIDEMIA: ICD-10-CM

## 2017-03-14 DIAGNOSIS — E87.1 HYPONATREMIA: ICD-10-CM

## 2017-03-14 LAB
ANION GAP BLD CALC-SCNC: 5 MMOL/L (ref 3–18)
BUN SERPL-MCNC: 11 MG/DL (ref 7–18)
BUN/CREAT SERPL: 15 (ref 12–20)
CALCIUM SERPL-MCNC: 9.2 MG/DL (ref 8.5–10.1)
CHLORIDE SERPL-SCNC: 99 MMOL/L (ref 100–108)
CO2 SERPL-SCNC: 35 MMOL/L (ref 21–32)
CREAT SERPL-MCNC: 0.75 MG/DL (ref 0.6–1.3)
GLUCOSE SERPL-MCNC: 100 MG/DL (ref 74–99)
HBA1C MFR BLD: 5.9 % (ref 4.2–5.6)
POTASSIUM SERPL-SCNC: 4 MMOL/L (ref 3.5–5.5)
SODIUM SERPL-SCNC: 139 MMOL/L (ref 136–145)
T4 FREE SERPL-MCNC: 1.3 NG/DL (ref 0.7–1.5)
TSH SERPL DL<=0.05 MIU/L-ACNC: 0.78 UIU/ML (ref 0.36–3.74)

## 2017-03-14 PROCEDURE — 80048 BASIC METABOLIC PNL TOTAL CA: CPT | Performed by: PHYSICIAN ASSISTANT

## 2017-03-14 PROCEDURE — 84443 ASSAY THYROID STIM HORMONE: CPT | Performed by: INTERNAL MEDICINE

## 2017-03-14 PROCEDURE — 84439 ASSAY OF FREE THYROXINE: CPT | Performed by: INTERNAL MEDICINE

## 2017-03-14 PROCEDURE — 36415 COLL VENOUS BLD VENIPUNCTURE: CPT | Performed by: PHYSICIAN ASSISTANT

## 2017-03-14 PROCEDURE — 83036 HEMOGLOBIN GLYCOSYLATED A1C: CPT | Performed by: INTERNAL MEDICINE

## 2017-03-14 RX ORDER — LOSARTAN POTASSIUM 25 MG/1
TABLET ORAL
Qty: 180 TAB | Refills: 2 | Status: SHIPPED | OUTPATIENT
Start: 2017-03-14 | End: 2020-10-23 | Stop reason: SDUPTHER

## 2017-03-15 NOTE — TELEPHONE ENCOUNTER
Stool labs were all normal/negative (wbc/rbc stool, cx, cdiff). The rechecked BMP on 3/14 was normal; Sodium back wnl.

## 2017-03-20 ENCOUNTER — OFFICE VISIT (OUTPATIENT)
Dept: ORTHOPEDIC SURGERY | Age: 72
End: 2017-03-20

## 2017-03-20 VITALS
HEIGHT: 72 IN | SYSTOLIC BLOOD PRESSURE: 116 MMHG | BODY MASS INDEX: 29.66 KG/M2 | TEMPERATURE: 97.5 F | WEIGHT: 219 LBS | HEART RATE: 71 BPM | RESPIRATION RATE: 15 BRPM | DIASTOLIC BLOOD PRESSURE: 70 MMHG

## 2017-03-20 DIAGNOSIS — M65.332 TRIGGER MIDDLE FINGER OF LEFT HAND: ICD-10-CM

## 2017-03-20 DIAGNOSIS — M17.11 PRIMARY OSTEOARTHRITIS OF RIGHT KNEE: Primary | ICD-10-CM

## 2017-03-20 DIAGNOSIS — G56.21 CUBITAL TUNNEL SYNDROME, RIGHT: ICD-10-CM

## 2017-03-20 RX ORDER — TRIAMCINOLONE ACETONIDE 40 MG/ML
40 INJECTION, SUSPENSION INTRA-ARTICULAR; INTRAMUSCULAR ONCE
Qty: 2 ML | Refills: 0
Start: 2017-03-20 | End: 2017-03-20

## 2017-03-20 RX ORDER — BETAMETHASONE SODIUM PHOSPHATE AND BETAMETHASONE ACETATE 3; 3 MG/ML; MG/ML
6 INJECTION, SUSPENSION INTRA-ARTICULAR; INTRALESIONAL; INTRAMUSCULAR; SOFT TISSUE ONCE
Qty: 1 VIAL | Refills: 0
Start: 2017-03-20 | End: 2017-03-20

## 2017-03-20 RX ORDER — LIDOCAINE HYDROCHLORIDE 10 MG/ML
6 INJECTION INFILTRATION; PERINEURAL ONCE
Qty: 6 ML | Refills: 0
Start: 2017-03-20 | End: 2017-03-20

## 2017-03-20 NOTE — PROGRESS NOTES
Patient: Ulises Encinas                MRN: [id number]       SSN: [id number]  YOB: 1945        AGE: 70 y.o. SEX: male  There is no height or weight on file to calculate BMI. PCP: Main Abbasi MD  03/20/17      No chief complaint on file. HISTORY OF PRESENT ILLNESS: Ulises Encinas is a 70 y.o. male who presents to the office for left trigger finger causes mild discomfort and has no limitations. He states he has a 50% reduction in little and ring finger numbness as well as right knee arthritis. He is requesting a cortisone injection for his right knee which has flared up. Review of Systems   Constitutional: Negative. HENT: Negative. Eyes: Negative. Respiratory: Negative. Cardiovascular: Negative. Gastrointestinal: Negative. Genitourinary: Negative. Musculoskeletal: Positive for joint pain (bilateral hands). Skin: Negative. Neurological: Negative. Endo/Heme/Allergies: Negative. Psychiatric/Behavioral: Negative. Social History     Social History    Marital status:      Spouse name: N/A    Number of children: N/A    Years of education: N/A     Occupational History    Not on file.      Social History Main Topics    Smoking status: Former Smoker     Packs/day: 1.50     Years: 25.00     Types: Cigarettes     Quit date: 1/1/2003    Smokeless tobacco: Never Used    Alcohol use 2.5 oz/week     5 Cans of beer per week      Comment: 5 beers a week    Drug use: No    Sexual activity: Yes     Partners: Female     Birth control/ protection: None     Other Topics Concern    Not on file     Social History Narrative        Past Medical History:   Diagnosis Date    Adenoma of left adrenal gland     2009  1 cm, no change 1/15, 2/16    Asbestosis     Atrial fibrillation     CHA2DS2-VAsc=3(age+, htn+, vasc dx+; estimated yearly stroke risk according to Lip et al. is 3,2%), Hasbled=2(estimated yearly bleeding risk according to conner et al. is 1,88%); not anticoagulated due to h/o gi bleed    Atrial fibrillation ablation     10/08    Basal cell cancer     Dr Aneudy Sousa; he's had >350 lesions removed    Carotid occlusion     left     Cervical radiculopathy     MRI 9/11 showed C3-6 severe foraminal stenosis    Chronic pain     Colon polyp     Dr Yazmin Paulino 9/15    Coronary artery disease     RCA - 3.0 x 16mm TAXUS 9/04, 3.0 x 16mm TAXUS 12/06    Dyslipidemia     Erectile dysfunction     GERD     GI bleed     Hearing loss     2014  bilateral Dr Earline Cerna    Hernia, umbilical     Hypertension     Hypogonadism male     Lumbar radiculopathy     Dr Dean Arenas;  MRI 9/11 L4-5 disc bulging, annular tear, disc dessication    Myofascial pain dysfunction syndrome     pain clinic     Osteoarthritis     right knee Dr Rajiv Blair Peripheral vascular disease     50-60% R iliac; s/p R iliac stent and L femoral artery stent in past; R OLIVIA 0.76 (1/16)    Plantar fasciitis     bilateral     PPD positive     Prediabetes     Prostate cancer     T1c Imelda 7(3+4), 70% in 1 core, GS 7 (3+4) in 4 cores, GS 6 (3+3) in 1 core; psa 5.28, TRUS 18 gm;  Dr Yazmin Paulino; s/p cryoablation 10/16    Recurrent umbilical hernia     Subclinical hypothyroidism     denies    Tinnitus     Dr Annalisa Herrmann Ulcerative colitis     Venous insufficiency         Allergies   Allergen Reactions    Altace [Ramipril] Unknown (comments)    Lipitor [Atorvastatin] Other (comments)     Muscle pain    Lisinopril Shortness of Breath and Other (comments)     Turns bright red    Other Medication Other (comments)     Vicryl suture on skin tends to be rejected with poor wound healing does better with monocryl    Procardia [Nifedipine] Other (comments)     Caused afib         Current Outpatient Prescriptions   Medication Sig    losartan (COZAAR) 25 mg tablet TAKE ONE TABLET BY MOUTH TWICE A DAY    HYDROcodone-acetaminophen (NORCO) 5-325 mg per tablet Take 1 Tab by mouth every four (4) hours as needed for Pain.  aspirin delayed-release 81 mg tablet Take 81 mg by mouth daily.  LORazepam (ATIVAN) 1 mg tablet Take 1 Tab by mouth every eight (8) hours as needed.  triamterene-hydrochlorothiazide (MAXZIDE) 37.5-25 mg per tablet TAKE ONE TABLET BY MOUTH DAILY    atenolol (TENORMIN) 25 mg tablet Take 1 Tab by mouth two (2) times a day.  ezetimibe-simvastatin (VYTORIN 10/40) 10-40 mg per tablet Take 1 Tab by mouth nightly.  Cholecalciferol, Vitamin D3, 1,000 unit cap Take 1,000 Units by mouth daily.  VOLTAREN 1 % topical gel as needed.  OMEGA-3 FATTY ACIDS/FISH OIL (OMEGA 3 FISH OIL PO) Take 900 mg by mouth daily.  gabapentin (NEURONTIN) 300 mg capsule Take 300 mg by mouth nightly.  BIOFLAV,LEMON/VIT BCOMP&C (LIPOFLAVONOID PO) Take  by mouth daily.  MULTIVITAMIN PO Take  by mouth daily.  pantoprazole (PROTONIX) 40 mg tablet Take 40 mg by mouth daily.  coenzyme q10 200 mg Cap Take  by mouth. No current facility-administered medications for this visit. Physical Exam  Constitutional: he is oriented to person, place, and time and well-developed, well-nourished, and in no distress. No distress. HENT:   Head: Normocephalic and atraumatic. Right Ear: Hearing normal.   Left Ear: Hearing normal.   Nose: Nose normal.   Eyes: Conjunctivae, EOM and lids are normal. Pupils are equal, round, and reactive to light. Neck: Trachea normal.   Pulmonary/Chest: Effort normal and breath sounds normal. No respiratory distress. Abdominal: Soft. Neurological: he is alert and oriented to person, place, and time. Skin: Skin is warm, dry and intact. he is not diaphoretic. Psychiatric: Affect normal.   Nursing note and vitals reviewed. Ortho Exam   Left hand shows full ROM of all digits with no swelling or pain. Still has slightly decreased sensation in the little and ring finger to light touch.  Right knee shows soft tissue swelling and as before the ligaments are stable. Procedure: After timeout and under sterile conditions, patient's right knee was injected with 6 cc of Xylocaine, 1 cc of Kenalog and 1 cc of Celestone. [facility] - [address] [place] [address]  OFFICE PROCEDURE PROGRESS NOTE        Chart reviewed for the following:   Michell Ellison MD, have reviewed the History, Physical and updated the Allergic reactions for [address] performed immediately prior to start of procedure:   Michell Ellison MD, have performed the following reviews on [facility] prior to the start of the procedure:            * Patient was identified by name and date of birth   * Agreement on procedure being performed was verified  * Risks and Benefits explained to the patient  * Procedure site verified and marked as necessary  * Patient was positioned for comfort  * Consent was signed and verified     Time: 3:36 PM        Date of procedure: 3/20/2017    Procedure performed by: Guillermo Prince MD    Provider assisted by: None     Patient assisted by: self    How tolerated by patient: tolerated the procedure well with no complications    Comments: none      RADIOGRAPHS:   No new x-rays taken today. IMPRESSION & PLAN: I feel his trigger finger resolved well enough for no further treatment. I would give it more time before giving hi another injection for them. As requested, I injected his right knee. I will see him back prn.       Scribed by Slime Noel ([address]) as dictated by Adam Smith MD.

## 2017-04-14 PROBLEM — N52.9 ED (ERECTILE DYSFUNCTION) OF ORGANIC ORIGIN: Status: ACTIVE | Noted: 2017-04-14

## 2017-04-19 RX ORDER — HYDROCODONE BITARTRATE AND ACETAMINOPHEN 5; 325 MG/1; MG/1
1 TABLET ORAL
Qty: 180 TAB | Refills: 0 | Status: SHIPPED | OUTPATIENT
Start: 2017-04-19 | End: 2017-06-01 | Stop reason: SDUPTHER

## 2017-04-19 NOTE — TELEPHONE ENCOUNTER
Last filled 3/14/2017  Reviewed report generated by the [facility]. Does not demonstrate aberrancies or inconsistencies with regard to the prescribing of controlled medications to this patient by other providers.

## 2017-05-02 ENCOUNTER — OFFICE VISIT (OUTPATIENT)
Dept: ORTHOPEDIC SURGERY | Age: 72
End: 2017-05-02

## 2017-05-02 VITALS
DIASTOLIC BLOOD PRESSURE: 69 MMHG | SYSTOLIC BLOOD PRESSURE: 122 MMHG | HEART RATE: 65 BPM | BODY MASS INDEX: 29.39 KG/M2 | HEIGHT: 72 IN | TEMPERATURE: 97.8 F | WEIGHT: 217 LBS

## 2017-05-02 DIAGNOSIS — M17.11 PRIMARY OSTEOARTHRITIS OF RIGHT KNEE: Primary | ICD-10-CM

## 2017-05-02 DIAGNOSIS — G56.21 CUBITAL TUNNEL SYNDROME, RIGHT: ICD-10-CM

## 2017-05-02 DIAGNOSIS — M25.521 RIGHT ELBOW PAIN: ICD-10-CM

## 2017-05-02 RX ORDER — BETAMETHASONE SODIUM PHOSPHATE AND BETAMETHASONE ACETATE 3; 3 MG/ML; MG/ML
6 INJECTION, SUSPENSION INTRA-ARTICULAR; INTRALESIONAL; INTRAMUSCULAR; SOFT TISSUE ONCE
Qty: 1 VIAL | Refills: 0
Start: 2017-05-02 | End: 2017-05-02 | Stop reason: CLARIF

## 2017-05-02 RX ORDER — TRIAMCINOLONE ACETONIDE 40 MG/ML
40 INJECTION, SUSPENSION INTRA-ARTICULAR; INTRAMUSCULAR ONCE
Qty: 2 ML | Refills: 0
Start: 2017-05-02 | End: 2017-05-02

## 2017-05-02 RX ORDER — LIDOCAINE HYDROCHLORIDE 10 MG/ML
0.5 INJECTION INFILTRATION; PERINEURAL ONCE
Qty: 0.5 ML | Refills: 0
Start: 2017-05-02 | End: 2017-05-02

## 2017-05-02 RX ORDER — LIDOCAINE HYDROCHLORIDE 10 MG/ML
6 INJECTION INFILTRATION; PERINEURAL ONCE
Qty: 6 ML | Refills: 0
Start: 2017-05-02 | End: 2017-05-02

## 2017-05-02 RX ORDER — BETAMETHASONE SODIUM PHOSPHATE AND BETAMETHASONE ACETATE 3; 3 MG/ML; MG/ML
6 INJECTION, SUSPENSION INTRA-ARTICULAR; INTRALESIONAL; INTRAMUSCULAR; SOFT TISSUE ONCE
Qty: 1 VIAL | Refills: 0
Start: 2017-05-02 | End: 2017-05-02

## 2017-05-02 NOTE — PROGRESS NOTES
Patient: Flor Palmer                MRN: [id number]       SSN: [id number]  YOB: 1945        AGE: 70 y.o. SEX: male  There is no height or weight on file to calculate BMI. PCP: Nancy Garcia MD  05/02/17      No chief complaint on file. HISTORY OF PRESENT ILLNESS: Flor Palmer is a 70 y.o. male who presents to the office for numbness in the ring and little finger of the right hand. In proved with the first injection at the cubital tunnel, but the progress has plateau and he still has some numbness in those digits. He has had a flare up of his OA knee pain when he slipped in the shower. He is requesting a cortisone injection in the cubital tunnel and the right knee. Review of Systems   Constitutional: Negative. HENT: Negative. Eyes: Negative. Respiratory: Negative. Cardiovascular: Negative. Gastrointestinal: Negative. Genitourinary: Negative. Musculoskeletal: Positive for joint pain (right shoulder and knee) and myalgias (right hand). Skin: Negative. Neurological: Negative. Endo/Heme/Allergies: Negative. Psychiatric/Behavioral: Negative. Social History     Social History    Marital status:      Spouse name: N/A    Number of children: N/A    Years of education: N/A     Occupational History    Not on file.      Social History Main Topics    Smoking status: Former Smoker     Packs/day: 1.50     Years: 25.00     Types: Cigarettes     Quit date: 1/1/2003    Smokeless tobacco: Never Used    Alcohol use 2.5 oz/week     5 Cans of beer per week      Comment: 5 beers a week    Drug use: No    Sexual activity: Yes     Partners: Female     Birth control/ protection: None     Other Topics Concern    Not on file     Social History Narrative        Past Medical History:   Diagnosis Date    Adenoma of left adrenal gland     2009  1 cm, no change 1/15, 2/16    Asbestosis     Atrial fibrillation CHA2DS2-VAsc=3(age+, htn+, vasc dx+; estimated yearly stroke risk according to Lip et al. is 3,2%), Hasbled=2(estimated yearly bleeding risk according to pisters et al. is 1,88%); not anticoagulated due to h/o gi bleed    Atrial fibrillation ablation     10/08    Basal cell cancer     Dr Israel Lim; he's had >350 lesions removed    Carotid occlusion     left     Cervical radiculopathy     MRI 9/11 showed C3-6 severe foraminal stenosis    Chronic pain     Colon polyp     Dr Ivelisse Kwan 9/15    Coronary artery disease     RCA - 3.0 x 16mm TAXUS 9/04, 3.0 x 16mm TAXUS 12/06    Dyslipidemia     Erectile dysfunction     GERD     GI bleed     Hearing loss     2014  bilateral Dr Hidalgo Hopper    Hernia, umbilical     Hypertension     Hypogonadism male     Lumbar radiculopathy     Dr Ruthie Bates;  MRI 9/11 L4-5 disc bulging, annular tear, disc dessication    Myofascial pain dysfunction syndrome     pain clinic     Osteoarthritis     right knee Dr Vanessa Jarrett Peripheral vascular disease     50-60% R iliac; s/p R iliac stent and L femoral artery stent in past; R OLIVIA 0.76 (1/16)    Plantar fasciitis     bilateral     PPD positive     Prediabetes     Prostate cancer     T1c Imelda 7(3+4), 70% in 1 core, GS 7 (3+4) in 4 cores, GS 6 (3+3) in 1 core; psa 5.28, TRUS 18 gm;  Dr Ivelisse Kwan; s/p cryoablation 10/16    Recurrent umbilical hernia     Subclinical hypothyroidism     denies    Tinnitus     Dr Janis Dutta Ulcerative colitis     Venous insufficiency         Allergies   Allergen Reactions    Altace [Ramipril] Unknown (comments)    Lipitor [Atorvastatin] Other (comments)     Muscle pain    Lisinopril Shortness of Breath and Other (comments)     Turns bright red    Other Medication Other (comments)     Vicryl suture on skin tends to be rejected with poor wound healing does better with monocryl    Procardia [Nifedipine] Other (comments)     Caused afib         Current Outpatient Prescriptions   Medication Sig    HYDROcodone-acetaminophen (NORCO) 5-325 mg per tablet Take 1 Tab by mouth every four (4) hours as needed for Pain.  sildenafil citrate (VIAGRA) 100 mg tablet Take 1 Tab by mouth daily as needed. Indications: Erectile Dysfunction    losartan (COZAAR) 25 mg tablet TAKE ONE TABLET BY MOUTH TWICE A DAY    aspirin delayed-release 81 mg tablet Take 81 mg by mouth daily.  LORazepam (ATIVAN) 1 mg tablet Take 1 Tab by mouth every eight (8) hours as needed.  triamterene-hydrochlorothiazide (MAXZIDE) 37.5-25 mg per tablet TAKE ONE TABLET BY MOUTH DAILY    atenolol (TENORMIN) 25 mg tablet Take 1 Tab by mouth two (2) times a day.  ezetimibe-simvastatin (VYTORIN 10/40) 10-40 mg per tablet Take 1 Tab by mouth nightly.  Cholecalciferol, Vitamin D3, 1,000 unit cap Take 1,000 Units by mouth daily.  VOLTAREN 1 % topical gel as needed.  OMEGA-3 FATTY ACIDS/FISH OIL (OMEGA 3 FISH OIL PO) Take 900 mg by mouth daily.  gabapentin (NEURONTIN) 300 mg capsule Take 300 mg by mouth nightly.  BIOFLAV,LEMON/VIT BCOMP&C (LIPOFLAVONOID PO) Take  by mouth daily.  MULTIVITAMIN PO Take  by mouth daily.  pantoprazole (PROTONIX) 40 mg tablet Take 40 mg by mouth daily.  coenzyme q10 200 mg Cap Take  by mouth. No current facility-administered medications for this visit. Physical Exam  Constitutional: he is oriented to person, place, and time and well-developed, well-nourished, and in no distress. No distress. HENT:   Head: Normocephalic and atraumatic. Right Ear: Hearing normal.   Left Ear: Hearing normal.   Nose: Nose normal.   Eyes: Conjunctivae, EOM and lids are normal. Pupils are equal, round, and reactive to light. Neck: Trachea normal.   Pulmonary/Chest: Effort normal and breath sounds normal. No respiratory distress. Abdominal: Soft. Neurological: he is alert and oriented to person, place, and time. Skin: Skin is warm, dry and intact. he is not diaphoretic.    Psychiatric: Affect normal. Nursing note and vitals reviewed. Ortho Exam   Right little and ring finger showed decrease to sensation with light touch but interosseous muscle function is normal.   His knees shows soft tissues swelling but no effusion or increased warmth. His ligaments are stable. Procedure: After timeout and under sterile conditions, patient's right elbow was injected with 0.5 cc of Xylocaine and 1.0 cc of Celestone. [facility]  OFFICE PROCEDURE PROGRESS NOTE        Chart reviewed for the following:   Yaritza Casiano MD, have reviewed the History, Physical and updated the Allergic reactions for [address] performed immediately prior to start of procedure:   Yaritza Casiano MD, have performed the following reviews on [facility] prior to the start of the procedure:            * Patient was identified by name and date of birth   * Agreement on procedure being performed was verified  * Risks and Benefits explained to the patient  * Procedure site verified and marked as necessary  * Patient was positioned for comfort  * Consent was signed and verified     Time: 3:28 PM        Date of procedure: 5/2/2017    Procedure performed by: Leann Flynn MD    Provider assisted by: None     Patient assisted by: self    How tolerated by patient: tolerated the procedure well with no complications    Comments: none    Procedure: After timeout and under sterile conditions, patient's right knee was injected with 6 cc of Xylocaine, 1 cc of Kenalog and 1 cc of Celestone.     [facility]  OFFICE PROCEDURE PROGRESS NOTE        Chart reviewed for the following:   Yaritza Casiano MD, have reviewed the History, Physical and updated the Allergic reactions for [address] performed immediately prior to start of procedure:   Yaritza Casiano MD, have performed the following reviews on Dory March prior to the start of the procedure:            * Patient was identified by name and date of birth   * Agreement on procedure being performed was verified  * Risks and Benefits explained to the patient  * Procedure site verified and marked as necessary  * Patient was positioned for comfort  * Consent was signed and verified     Time: 3:29 PM        Date of procedure: 5/2/2017    Procedure performed by: Daniel Evans MD    Provider assisted by: None     Patient assisted by: self    How tolerated by patient: tolerated the procedure well with no complications    Comments: none        RADIOGRAPHS:   No new XR's taken today     IMPRESSION & PLAN: I feel it would be worth it to have a second injection in the cubital tunnel to get rid of the remaining numbness. If not, we will proceed with an EMG and nerve conduction study or a ulnar anterior transposition. Written by Castro Salazar, as dictated by Dr. Victoria Saenz, Dr. Daniel Evans, confirm that all documentation is accurate.

## 2017-05-09 RX ORDER — MECLIZINE HYDROCHLORIDE 25 MG/1
25 TABLET ORAL
Qty: 30 TAB | Refills: 1 | Status: SHIPPED | OUTPATIENT
Start: 2017-05-09 | End: 2017-05-19

## 2017-05-09 NOTE — TELEPHONE ENCOUNTER
Pt calling asking for refill of Neclizine 25 mg 1 tablet every 6 hours as needed for inner ear. Says he got back on 11/3/2016 from ER. Saw RD for follow up from er on 11/7/16. Only uses every so often but getting ready to go on vacation and is almost out. Wants to have to take with him.     Juan Carlos Postin on [facility]

## 2017-06-01 RX ORDER — HYDROCODONE BITARTRATE AND ACETAMINOPHEN 5; 325 MG/1; MG/1
1 TABLET ORAL
Qty: 180 TAB | Refills: 0 | Status: SHIPPED | OUTPATIENT
Start: 2017-06-01 | End: 2017-07-12 | Stop reason: SDUPTHER

## 2017-06-21 ENCOUNTER — OFFICE VISIT (OUTPATIENT)
Dept: INTERNAL MEDICINE CLINIC | Age: 72
End: 2017-06-21

## 2017-06-21 VITALS
DIASTOLIC BLOOD PRESSURE: 82 MMHG | HEIGHT: 72 IN | RESPIRATION RATE: 14 BRPM | WEIGHT: 224 LBS | BODY MASS INDEX: 30.34 KG/M2 | SYSTOLIC BLOOD PRESSURE: 140 MMHG | OXYGEN SATURATION: 94 % | TEMPERATURE: 97.6 F | HEART RATE: 64 BPM

## 2017-06-21 DIAGNOSIS — C61 PROSTATE CARCINOMA (HCC): ICD-10-CM

## 2017-06-21 DIAGNOSIS — K42.9 RECURRENT UMBILICAL HERNIA: ICD-10-CM

## 2017-06-21 DIAGNOSIS — I11.9 BENIGN HYPERTENSIVE HEART DISEASE WITHOUT HEART FAILURE: ICD-10-CM

## 2017-06-21 DIAGNOSIS — I48.91 ATRIAL FIBRILLATION, UNSPECIFIED TYPE (HCC): Primary | ICD-10-CM

## 2017-06-21 DIAGNOSIS — E78.5 DYSLIPIDEMIA: ICD-10-CM

## 2017-06-21 DIAGNOSIS — I73.9 PERIPHERAL VASCULAR DISEASE (HCC): ICD-10-CM

## 2017-06-21 DIAGNOSIS — K63.5 POLYP OF COLON, UNSPECIFIED PART OF COLON, UNSPECIFIED TYPE: ICD-10-CM

## 2017-06-21 DIAGNOSIS — F41.9 ANXIETY: ICD-10-CM

## 2017-06-21 DIAGNOSIS — K51.919 ULCERATIVE COLITIS WITH COMPLICATION, UNSPECIFIED LOCATION (HCC): ICD-10-CM

## 2017-06-21 DIAGNOSIS — Z01.818 PREOPERATIVE CLEARANCE: ICD-10-CM

## 2017-06-21 DIAGNOSIS — K21.9 GASTROESOPHAGEAL REFLUX DISEASE, ESOPHAGITIS PRESENCE NOT SPECIFIED: ICD-10-CM

## 2017-06-21 DIAGNOSIS — I65.22 LEFT CAROTID ARTERY OCCLUSION: ICD-10-CM

## 2017-06-21 RX ORDER — LORAZEPAM 1 MG/1
1 TABLET ORAL
Qty: 50 TAB | Refills: 0 | Status: SHIPPED | OUTPATIENT
Start: 2017-06-21 | End: 2018-08-30 | Stop reason: SDUPTHER

## 2017-06-21 NOTE — PROGRESS NOTES
1. Have you been to the ER, urgent care clinic or hospitalized since your last visit? NO.     2. Have you seen or consulted any other health care providers outside of the [address] [place] [address] since your last visit (Include any pap smears or colon screening)?  NO

## 2017-06-21 NOTE — MR AVS SNAPSHOT
Visit Information Date & Time Provider Department Dept. Phone Encounter #  
 6/21/2017  9:00 AM Dee Ferreira Internist of [address] [id number] Your Appointments 6/22/2017  7:55 AM  
LAB with [facility] NURSE VISIT Internist of [facility] (May Shook) Appt Note: labs 6mos. rd  
 [address], [address] [facility] [address] [place]  
  
   
 [address], [facility]  
  
    
 6/29/2017  8:00 AM  
Office Visit with Kiesha Shearer MD  
Internist of [facility] May Shook) Appt Note: ov 6mos. rd  
 [address], [address] [place] [address] [address] [place]  
  
   
 [address], [facility]  
  
    
 10/3/2017  8:50 AM  
Nurse Visit with [facility] NURSE [facility] (May Shook) Appt Note: PSA  
 [address] 
[address] [facility] [address]  
[phone]  
  
   
 [address] [facility] [address] [place] [address]  
  
    
 2/2/2018  8:00 AM  
PROCEDURE with [facility] (May Shook) Appt Note: iliac 1 yr alfredo; .  
 [phone] [facility] [address]  
[phone] [address] [place] [address]  
  
    
 2/2/2018 10:00 AM  
PROCEDURE with [facility] NONIMAGING [facility] (May Shook) Appt Note: leg art 1 yr alfredo; .  
 [address] [phone] [facility] [address]  
[phone] [address] [place] [address]  
  
    
 2/19/2018  9:30 AM  
Follow Up with Max Farr MD  
[address] [facility] May Shook) Appt Note: 1 year fu after studies; FAXED OVER INFORMATION FOR MICHI TO CALL PATIENT DUE TO STUDIES WERE NOT IN CC AT TIME OF CHECK OUT; SCHED ERROR; 1 year fu after studies FAXED OVER INFORMATION FOR [address] PATIENT DUE TO STUDIES WERE NOT IN CC AT TIME OF CHECK  [address]; .  
 [address], [place] [address] [address] [facility]  
[phone] [address] [facility] [address] [place] [address]  
  
    
  
 10/11/2017  8:45 AM  
Any with David Vanessa MD  
[facility] ([address]) Appt Note: 6 mo fu  
 [address] 
[address] [place] [address]  
[address] [facility] [facility] [address] Upcoming Health Maintenance Date Due  
 GLAUCOMA SCREENING Q2Y 10/15/2011 MEDICARE YEARLY EXAM 6/24/2017 INFLUENZA AGE 9 TO ADULT 8/1/2017 DTaP/Tdap/Td series (2 - Td) 4/3/2023 COLONOSCOPY 9/22/2025 Allergies as of 6/21/2017  Review Complete On: 6/21/2017 By: Elfrieda Runner Severity Noted Reaction Type Reactions Altace [Ramipril]    Unknown (comments) Lipitor [Atorvastatin]    Other (comments) Muscle pain Lisinopril    Shortness of Breath, Other (comments) Turns bright red Other Medication  03/05/2014    Other (comments) Vicryl suture on skin tends to be rejected with poor wound healing does better with monocryl Procardia [Nifedipine]    Other (comments) Caused afib Current Immunizations  Reviewed on 12/22/2015 Name Date Influenza High Dose Vaccine PF 9/16/2016, 10/2/2015 12:30 PM  
 Influenza Vaccine 10/10/2014, 10/4/2013 Influenza Vaccine Split 10/10/2012  2:28 PM, 9/28/2011 Influenza Vaccine Whole 10/8/2010 Pneumococcal Conjugate (PCV-13) 12/15/2014  8:16 AM  
 Pneumococcal Polysaccharide (PPSV-23) 1/24/2014 Pneumococcal Vaccine (Unspecified Type) 1/1/2006 Td 1/1/2006 Tdap 4/3/2013  8:23 PM  
 Zoster Vaccine, Live 1/1/2007 Not reviewed this visit You Were Diagnosed With   
  
 Codes Comments Special screening for malignant neoplasm of prostate     ICD-10-CM: Z12.5 ICD-9-CM: V76.44   
 Benign hypertensive heart disease without heart failure     ICD-10-CM: I11.9 ICD-9-CM: 402.10 Hypogonadism male     ICD-10-CM: E29.1 ICD-9-CM: 257.2 Dyslipidemia     ICD-10-CM: E78.5 ICD-9-CM: 272.4 Vitals BP Pulse Temp Resp Height(growth percentile) Weight(growth percentile) 140/82 (BP 1 Location: Right arm, BP Patient Position: Sitting) 64 97.6 °F (36.4 °C) (Oral) 14 6' (1.829 m) 224 lb (101.6 kg) SpO2 BMI Smoking Status 94% 30.38 kg/m2 Former Smoker Vitals History BMI and BSA Data Body Mass Index Body Surface Area  
 30.38 kg/m 2 2.27 m 2 Preferred Pharmacy Pharmacy Name Phone [facility] [address], [address] [phone] Your Updated Medication List  
  
   
This list is accurate as of: 6/21/17  9:08 AM.  Always use your most recent med list.  
  
  
  
  
 aspirin delayed-release 81 mg tablet Take 81 mg by mouth daily. atenolol 25 mg tablet Commonly known as:  TENORMIN Take 1 Tab by mouth two (2) times a day. cholecalciferol 1,000 unit Cap Commonly known as:  VITAMIN D3 Take 1,000 Units by mouth daily. coenzyme Q-10 200 mg capsule Commonly known as:  CO Q-10 Take  by mouth.  
  
 ezetimibe-simvastatin 10-40 mg per tablet Commonly known as:  Vytorin 10/40 Take 1 Tab by mouth nightly.  
  
 gabapentin 300 mg capsule Commonly known as:  NEURONTIN Take 300 mg by mouth nightly. HYDROcodone-acetaminophen 5-325 mg per tablet Commonly known as:  Alisson Dus Take 1 Tab by mouth every four (4) hours as needed for Pain. LORazepam 1 mg tablet Commonly known as:  ATIVAN Take 1 Tab by mouth every eight (8) hours as needed. losartan 25 mg tablet Commonly known as:  COZAAR  
TAKE ONE TABLET BY MOUTH TWICE A DAY MULTIVITAMIN PO Take  by mouth daily. OMEGA 3 FISH OIL PO Take 900 mg by mouth daily. PROTONIX 40 mg tablet Generic drug:  pantoprazole Take 40 mg by mouth daily. sildenafil citrate 100 mg tablet Commonly known as:  VIAGRA Take 1 Tab by mouth daily as needed. Indications: Erectile Dysfunction  
  
 triamterene-hydroCHLOROthiazide 37.5-25 mg per tablet Commonly known as:  Layman Pointer TAKE ONE TABLET BY MOUTH DAILY  
  
 VOLTAREN 1 % Gel Generic drug:  diclofenac  
as needed. Introducing [facility]! Dear Anabella Barbosa: Thank you for requesting a [employer] account. Our records indicate that you already have an active [employer] account. You can access your account anytime at [email or web address] Did you know that you can access your hospital and ER discharge instructions at any time in [employer]? You can also review all of your test results from your hospital stay or ER visit. Additional Information If you have questions, please visit the Frequently Asked Questions section of the [employer] website at [email or web address] Remember, [employer] is NOT to be used for urgent needs. For medical emergencies, dial 911. Now available from your iPhone and Android! Please provide this summary of care documentation to your next provider. Your primary care clinician is listed as Ellis Renteria. If you have any questions after today's visit, please call [phone].

## 2017-06-21 NOTE — PROGRESS NOTES
HPI/History  Maribel Simmons is a 70 y.o.  male who presents for med clearance for left cataract removal with Dr. Earnestine Kan on 6/26. History of Afib status post ablation and has been regular since. Followed by Dr. Jeni Tony. On 81 mg aspirin. No cardiopulmonary sxs. Hypertension treated with losartan, Maxide, and atenolol. Home pressures 120s/60s. Borderline elevated here today. Hyperlipidemia and on Vytorin and fish oils along with aspirin. No adverse effects with medications. History of carotid artery disease and PVD status post procedures. Followed by Dr. Summer Mckeon with last visit this year and with good reports per pt. Meds as noted. History of colitis and colon polyps. Followed by Dr. Kyle Kim. Has been doing well with no flares or issues recently. Denies any bowel changes or evidence of hemorrhage. History of prostate cancer status post cryoablation has been doing well and is followed by Dr. Dulce Maria Garcia with next appointment 10/2017. No issues with reflux with Protonix. Has had no issues since most recent repair of umbilical hernia. Otherwise, patient states he is doing well with no complaints.     Patient Active Problem List   Diagnosis Code    Degeneration of cervical and limbar spine Dr Haleigh Fulton M50.30    Colitis, ulcerative ([address] K51.90    Colon polyps K63.5    Cervical disc disease M50.90    Peripheral vascular disease ([facility] [address] Dr Summer Mckeon I73.9    Left carotid artery occlusion I65.22    Atherosclerosis of artery of extremity with intermittent claudication ([facility] [address] I70.219    Hypovitaminosis D E55.9    Advance directive in chart Z78.9    Prediabetes R73.03    Hypertension I11.9    Dyslipidemia E78.5    Coronary artery disease I25.10    Atrial fibrillation I48.91    Recurrent umbilical hernia L87.5    Prostate carcinoma (HCC) s/p cryoablation Tesla.Closs    ED (erectile dysfunction) of organic origin N52.9     Past Medical History:   Diagnosis Date    Adenoma of left adrenal gland     2009  1 cm, no change 1/15, 2/16    Asbestosis     Atrial fibrillation     CHA2DS2-VAsc=3(age+, htn+, vasc dx+; estimated yearly stroke risk according to Lip et al. is 3,2%), Hasbled=2(estimated yearly bleeding risk according to pisters et al. is 1,88%); not anticoagulated due to h/o gi bleed    Atrial fibrillation ablation     10/08    Basal cell cancer     Dr Judy Savage; he's had >350 lesions removed    Carotid occlusion     left     Cervical radiculopathy     MRI 9/11 showed C3-6 severe foraminal stenosis    Chronic pain     Colon polyp     Dr Yobani Arnold 9/15    Coronary artery disease     RCA - 3.0 x 16mm TAXUS 9/04, 3.0 x 16mm TAXUS 12/06    Dyslipidemia     Erectile dysfunction     GERD     GI bleed     Hearing loss     2014  bilateral Dr Shukla Maciel    Hernia, umbilical     Hypertension     Hypogonadism male     Lumbar radiculopathy     Dr Caroline Guevara;  MRI 9/11 L4-5 disc bulging, annular tear, disc dessication    Myofascial pain dysfunction syndrome     pain clinic     Osteoarthritis     right knee Dr Regina Soto Peripheral vascular disease     50-60% R iliac; s/p R iliac stent and L femoral artery stent in past; R OLIVIA 0.76 (1/16)    Plantar fasciitis     bilateral     PPD positive     Prediabetes     Prostate cancer     T1c Imelda 7(3+4), 70% in 1 core, GS 7 (3+4) in 4 cores, GS 6 (3+3) in 1 core; psa 5.28, TRUS 18 gm;  Dr Yobani Arnold; s/p cryoablation 10/16    Recurrent umbilical hernia     Subclinical hypothyroidism     denies    Tinnitus     Dr Stuart Sarmiento Ulcerative colitis     Venous insufficiency      Past Surgical History:   Procedure Laterality Date    HX CAROTID ENDARTERECTOMY      1/14  right    HX COLONOSCOPY      Dr Yobani Arnold 9/15 polyps    HX CRYOABLATION OF THE PROSTATE      10/16    HX HEMORRHOIDECTOMY      1979    [facility]    HX ORTHOPAEDIC      right knee surgery    HX REFRACTIVE SURGERY      OD (hemoplasty to right eye on retina to avoid blindness)    HX TONSILLECTOMY      1955    AR LAP, RECURRENT INCISIONAL HERNIA REPAIR,REDUCIBLE N/A 02/09/2017    Dr. Eve Harrington HERNIA,5+Y/O,REDUCIBL      2/16  left  Dr. Josesito George BQOH,0+R/H,VNBUQ      2/16  Dr. Sydni Bazan      1/16  R OLIVIA 0.76, L OLIVIA 1.02    VASCULAR SURGERY PROCEDURE UNLIST      10/15   left fem art and left iliac stent     Social History     Social History    Marital status:      Spouse name: N/A    Number of children: N/A    Years of education: N/A     Occupational History    Not on file. Social History Main Topics    Smoking status: Former Smoker     Packs/day: 1.50     Years: 25.00     Types: Cigarettes     Quit date: 1/1/2003    Smokeless tobacco: Never Used    Alcohol use 2.5 oz/week     5 Cans of beer per week      Comment: 5 beers a week    Drug use: No    Sexual activity: Yes     Partners: Female     Birth control/ protection: None     Other Topics Concern    Not on file     Social History Narrative     Family History   Problem Relation Age of Onset    Heart Disease Mother     Hypertension Mother     Diabetes Mother     Arthritis-osteo Mother     Heart Disease Father     Stroke Father     Hypertension Father     Heart Disease Sister     Hypertension Sister      Current Outpatient Prescriptions   Medication Sig    LORazepam (ATIVAN) 1 mg tablet Take 1 Tab by mouth every eight (8) hours as needed.  HYDROcodone-acetaminophen (NORCO) 5-325 mg per tablet Take 1 Tab by mouth every four (4) hours as needed for Pain.  losartan (COZAAR) 25 mg tablet TAKE ONE TABLET BY MOUTH TWICE A DAY    aspirin delayed-release 81 mg tablet Take 81 mg by mouth daily.  triamterene-hydrochlorothiazide (MAXZIDE) 37.5-25 mg per tablet TAKE ONE TABLET BY MOUTH DAILY    atenolol (TENORMIN) 25 mg tablet Take 1 Tab by mouth two (2) times a day.     ezetimibe-simvastatin (VYTORIN 10/40) 10-40 mg per tablet Take 1 Tab by mouth nightly.  Cholecalciferol, Vitamin D3, 1,000 unit cap Take 1,000 Units by mouth daily.  VOLTAREN 1 % topical gel as needed.  OMEGA-3 FATTY ACIDS/FISH OIL (OMEGA 3 FISH OIL PO) Take 900 mg by mouth daily.  gabapentin (NEURONTIN) 300 mg capsule Take 300 mg by mouth nightly.  MULTIVITAMIN PO Take  by mouth daily.  pantoprazole (PROTONIX) 40 mg tablet Take 40 mg by mouth daily.  coenzyme q10 200 mg Cap Take  by mouth.  sildenafil citrate (VIAGRA) 100 mg tablet Take 1 Tab by mouth daily as needed. Indications: Erectile Dysfunction     No current facility-administered medications for this visit. Allergies   Allergen Reactions    Altace [Ramipril] Unknown (comments)    Lipitor [Atorvastatin] Other (comments)     Muscle pain    Lisinopril Shortness of Breath and Other (comments)     Turns bright red    Other Medication Other (comments)     Vicryl suture on skin tends to be rejected with poor wound healing does better with monocryl    Procardia [Nifedipine] Other (comments)     Caused afib       Review of Systems  Aside from those included in HPI, remainder of complete ROS negative. Physical Examination  Visit Vitals    /82 (BP 1 Location: Right arm, BP Patient Position: Sitting)    Pulse 64    Temp 97.6 °F (36.4 °C) (Oral)    Resp 14    Ht 6' (1.829 m)    Wt 224 lb (101.6 kg)    SpO2 94%    BMI 30.38 kg/m2       General - Alert and in no acute distress. Pt appears well, comfortable, and in good spirits. Pleasant, engaging. Nontoxic. Not anxious, non-diaphoretic. Mental status - Appropriate mood, behavior, speech content, dress, and thought processes. Eyes - Pupils equal and reactive, extraocular movements intact. No erythema or discharge. Ears - Auditory canals and TMs unremarkable. Nose - Evidence of past skin cancer removal.  No mucosal erythema. No rhinorrhea. Mouth - Mucous membranes moist.  Oropharynx unremarkable.   Neck - Supple without rigidity. Pulm - No tachypnea, retractions, or cyanosis. Good respiratory effort. Mild rales mostly at right base but otherwise clear with no appreciable wheeze or rhonchi. Cardiovascular - Normal rate, regular rhythm. No appreciable murmurs or gallops today. Abdomen - Numerous healed scars noted. Nondistended. Active bowel sounds. Soft, nontender. No guarding, rigidity, or rebound. Extremities -no peripheral edema. Extremities appear well-perfused. Assessment and Plan  1. Afib - status post ablation and seems to be doing well. Continue follow-up with Dr. Stacey Holcomb. 2. HTN - borderline today but sounds  acceptably controlled otherwise. Continue current and monitor. 3. HLD - continue current and TLC. Monitor. 4. Carotid art dz; PVD - symptomatically stable. Continue follow-up with Dr. Justyna Cannon. 5. Colitis; colon polyp - symptomatically stable. Continue follow-up with Dr. Yvette Marcial. 6. Prostate cancer - status post cryoablation and seems to be doing well. Continue follow-up with Dr. Yvette Marcial. 7. Reflux - controlled with Protonix which he will continue along with preventative measures. 8.  Umbilical hernia - status post repair with no recent issues. Observation. 9.  Medical clearance for cataract removal - patient appears medically stable to undergo procedure as planned. Pt happily agrees with plan. PLEASE NOTE:   This document has been produced using voice recognition software. Unrecognized errors in transcription may be present.     [employer]rena [employer] of [address] [place] [address]  [phone]  6/21/2017

## 2017-06-22 ENCOUNTER — LAB ONLY (OUTPATIENT)
Dept: INTERNAL MEDICINE CLINIC | Age: 72
End: 2017-06-22

## 2017-06-22 ENCOUNTER — HOSPITAL ENCOUNTER (OUTPATIENT)
Dept: LAB | Age: 72
Discharge: HOME | End: 2017-06-22
Payer: MEDICARE

## 2017-06-22 DIAGNOSIS — I11.9 BENIGN HYPERTENSIVE HEART DISEASE WITHOUT HEART FAILURE: ICD-10-CM

## 2017-06-22 DIAGNOSIS — E78.5 DYSLIPIDEMIA: ICD-10-CM

## 2017-06-22 DIAGNOSIS — R73.09 ELEVATED HEMOGLOBIN A1C: ICD-10-CM

## 2017-06-22 DIAGNOSIS — R73.03 PREDIABETES: ICD-10-CM

## 2017-06-22 DIAGNOSIS — R73.03 PREDIABETES: Primary | ICD-10-CM

## 2017-06-22 LAB
ALBUMIN SERPL BCP-MCNC: 3.6 G/DL (ref 3.4–5)
ALBUMIN/GLOB SERPL: 1.3 {RATIO} (ref 0.8–1.7)
ALP SERPL-CCNC: 56 U/L (ref 45–117)
ALT SERPL-CCNC: 34 U/L (ref 16–61)
ANION GAP BLD CALC-SCNC: 7 MMOL/L (ref 3–18)
AST SERPL W P-5'-P-CCNC: 20 U/L (ref 15–37)
BILIRUB SERPL-MCNC: 0.7 MG/DL (ref 0.2–1)
BUN SERPL-MCNC: 9 MG/DL (ref 7–18)
BUN/CREAT SERPL: 12 (ref 12–20)
CALCIUM SERPL-MCNC: 8.9 MG/DL (ref 8.5–10.1)
CHLORIDE SERPL-SCNC: 100 MMOL/L (ref 100–108)
CHOLEST SERPL-MCNC: 135 MG/DL
CO2 SERPL-SCNC: 33 MMOL/L (ref 21–32)
CREAT SERPL-MCNC: 0.74 MG/DL (ref 0.6–1.3)
GLOBULIN SER CALC-MCNC: 2.8 G/DL (ref 2–4)
GLUCOSE SERPL-MCNC: 96 MG/DL (ref 74–99)
HBA1C MFR BLD: 5.7 % (ref 4.2–5.6)
HDLC SERPL-MCNC: 53 MG/DL (ref 40–60)
HDLC SERPL: 2.5 {RATIO} (ref 0–5)
LDLC SERPL CALC-MCNC: 67.8 MG/DL (ref 0–100)
LIPID PROFILE,FLP: NORMAL
POTASSIUM SERPL-SCNC: 4.1 MMOL/L (ref 3.5–5.5)
PROT SERPL-MCNC: 6.4 G/DL (ref 6.4–8.2)
SODIUM SERPL-SCNC: 140 MMOL/L (ref 136–145)
TRIGL SERPL-MCNC: 71 MG/DL (ref ?–150)
VLDLC SERPL CALC-MCNC: 14.2 MG/DL

## 2017-06-22 PROCEDURE — 36415 COLL VENOUS BLD VENIPUNCTURE: CPT | Performed by: INTERNAL MEDICINE

## 2017-06-22 PROCEDURE — 80053 COMPREHEN METABOLIC PANEL: CPT | Performed by: INTERNAL MEDICINE

## 2017-06-22 PROCEDURE — 83036 HEMOGLOBIN GLYCOSYLATED A1C: CPT | Performed by: INTERNAL MEDICINE

## 2017-06-22 PROCEDURE — 80061 LIPID PANEL: CPT | Performed by: INTERNAL MEDICINE

## 2017-06-26 NOTE — PROGRESS NOTES
This is a subsequent Medicare Annual Wellness Exam     I have reviewed the patient's medical history in detail and updated the computerized patient record.      History     Past Medical History   Diagnosis Date    Hypertension     Dyslipidemia     Coronary artery disease      RCA - 3.0 x 16mm TAXUS 9/04, 3.0 x 16mm TAXUS 12/06    Atrial fibrillation ablation      10/08    Peripheral vascular disease      50-60% R iliac; s/p R iliac stent and L femoral artery stent in past; R OLIVIA 0.76 (1/16)    Carotid occlusion      left     Cervical radiculopathy      MRI 9/11 showed C3-6 severe foraminal stenosis    Lumbar radiculopathy      Dr Bernard Harvey;  MRI 9/11 L4-5 disc bulging, annular tear, disc dessication    Chronic pain     Myofascial pain dysfunction syndrome      pain clinic     Erectile dysfunction     Hypogonadism male     GERD     GI bleed     Colon polyp      Dr Marylen Cheney 9/15    Ulcerative colitis     Asbestosis     PPD positive     Subclinical hypothyroidism     Tinnitus      Dr Andrade Amen Hearing loss 2014     bilateral Dr Naima Cisneros    Plantar fasciitis      bilateral     Adenoma of left adrenal gland 2009     1 cm, no change 1/15, 2/16    Prediabetes     Osteoarthritis      right knee Dr Kirstin Escalera Venous insufficiency     Basal cell cancer      Dr López Falling; he's had >350 lesions removed    Prostate cancer ([facility] [address] 6/16     [place] 3+4 Dr Enio Hernadez Atrial fibrillation      cHa2ds2-VAsc=3(estimated yearly stroke risk according to Lip et al. is 3,2%), Hasbled=2(estimated yearly bleeding risk according to pisters et al. is 1,88%)    H/O echocardiogram 11/14     nl lv, ef 60%, no wma    H/O cardiovascular stress test 2/16     thallium neg, ef 65%      Past Surgical History   Procedure Laterality Date    Hx refractive surgery       OD (hemoplasty to right eye on retina to avoid blindness)    Hx hemorrhoidectomy       1979    Hx tonsillectomy       1955    Hx orthopaedic       right knee surgery    Pr repair ing hernia,5+y/o,reducibl Left      2/16  Dr. Marita Peace Pr repair umbilical TZBH,1+P/N,NDZMA       2/16  Dr. Marita Peace Hx carotid endarterectomy  1/14     R CEA    Vascular surgery procedure unlist  1/16     R OLIVIA 0.76, L OLIVIA 1.02    Hx colonoscopy       Dr Azul Carrasco 9/15 polyps    Hx hernia repair       1970, 1975     Current Outpatient Prescriptions   Medication Sig Dispense Refill    HYDROcodone-acetaminophen (NORCO) 5-325 mg per tablet Take 1 Tab by mouth every four (4) hours as needed for Pain. 180 Tab 0    LORazepam (ATIVAN) 1 mg tablet Take 1 Tab by mouth every eight (8) hours as needed. 50 Tab 0    losartan (COZAAR) 25 mg tablet Take 1 Tab by mouth two (2) times a day. 180 Tab 3    ezetimibe-simvastatin (VYTORIN 10/40) 10-40 mg per tablet Take 1 Tab by mouth nightly. 90 Tab 3    clopidogrel (PLAVIX) 75 mg tablet TAKE ONE TABLET BY MOUTH DAILY 31 Tab 5    triamterene-hydrochlorothiazide (MAXZIDE) 37.5-25 mg per tablet Take 1 Tab by mouth daily. 90 Tab 2    sildenafil citrate (VIAGRA) 100 mg tablet Take 1 Tab by mouth daily as needed. 5 Tab 5    Cholecalciferol, Vitamin D3, 1,000 unit cap Take 1,000 Units by mouth daily.  atenolol (TENORMIN) 25 mg tablet Take 1 Tab by mouth two (2) times a day. 180 Tab 3    hyoscyamine SL (LEVSIN/SL) 0.125 mg SL tablet 0.125 mg by SubLINGual route every four (4) hours as needed for Cramping.  VOLTAREN 1 % topical gel as needed.  nitroglycerin (NITROSTAT) 0.4 mg SL tablet 1 Tab by SubLINGual route every five (5) minutes as needed for Chest Pain. 1 Bottle 3    OMEGA-3 FATTY ACIDS/FISH OIL (OMEGA 3 FISH OIL PO) Take 900 mg by mouth daily.  gabapentin (NEURONTIN) 300 mg capsule Take 300 mg by mouth nightly.  BIOFLAV,LEMON/VIT BCOMP&C (LIPOFLAVONOID PO) Take  by mouth daily.  MULTIVITAMIN PO Take  by mouth daily.  pantoprazole (PROTONIX) 40 mg tablet Take 40 mg by mouth daily.  coenzyme q10 200 mg Cap Take  by mouth. Allergies   Allergen Reactions    Altace [Ramipril] Unknown (comments)    Lipitor [Atorvastatin] Other (comments)     Muscle pain    Lisinopril Shortness of Breath and Other (comments)     Turns bright red    Other Medication Other (comments)     Vicryl suture on skin tends to be rejected with poor wound healing does better with monocryl    Procardia [Nifedipine] Other (comments)     Caused afib     Family History   Problem Relation Age of Onset    Heart Disease Mother     Hypertension Mother     Heart Disease Father     Stroke Father     Heart Disease Sister     Hypertension Sister     Diabetes Mother     Hypertension Father     Arthritis-osteo Mother      History   Substance Use Topics    Smoking status: Former Smoker -- 1.50 packs/day for 25 years     Types: Cigarettes     Quit date: 01/01/2003    Smokeless tobacco: Never Used    Alcohol Use: 2.5 oz/week     5 Cans of beer per week      Comment: 5 beers a week       Depression Risk Factor Screening:     PHQ 2 / 9, over the last two weeks 6/23/2016   Little interest or pleasure in doing things Not at all   Feeling down, depressed or hopeless Not at all   Total Score PHQ 2 0     SCREENINGS  Colonoscopy last done 9/15 Dr Kizzy Ludwig  Prostate RENE done  PSA done 6/16 Dr Edna Zhao Screening: On any occasion during the past 3 months, have you had more than 4 drinks containing alcohol? No    Do you average more than 14 drinks per week? No    Functional Ability and Level of Safety:     Hearing Loss   normal-to-mild    Sees ophth    Activities of Daily Living   Self-care. Requires assistance with: no ADLs    Fall Risk     Fall Risk Assessment, last 12 mths 6/23/2016   Able to walk? Yes   Fall in past 12 months? Yes   Fall with injury?  No   Number of falls in past 12 months 1   Fall Risk Score 1     Abuse Screen   Patient is not abused    Review of Systems   A comprehensive review of systems was negative except for that written in the HPI. Physical Examination     No exam data present    Evaluation of Cognitive Function:  Mood/affect:  neutral  Appearance: age appropriate, overweight, well dressed and within normal Limits  Family member/caregiver input: na    Visit Vitals    /78 (BP 1 Location: Right arm, BP Patient Position: Sitting)    Pulse 66    Temp 97.9 °F (36.6 °C) (Oral)    Resp 14    Ht 6' (1.829 m)    Wt 222 lb (100.7 kg)    SpO2 95%    BMI 30.11 kg/m2       Patient Care Team:  Jose Miguel Busby MD as PCP - General (Internal Medicine)  Luis Alberto Orr MD as Physician (Cardiology)  Moniuqe Duran, [address] (Vascular Surgery)  Angella Duggan MD as Physician (Vascular Surgery)  Karli Lee, RN as Ambulatory Care Navigator (Internal Medicine)  Katya Marshall MD as Consulting Provider (Gastroenterology)  Evelyne Merida, LOY as Ambulatory Care Navigator (Internal Medicine)  Abram Davenport, [address] (Physician Assistant)  Nicolle Jones MD (Orthopedic Surgery)    Advice/Referrals/Counseling   Education and counseling provided:  Are appropriate based on today's review and evaluation  End-of-Life planning (with patient's consent)  Colorectal cancer screening tests  Cardiovascular screening blood test  Diabetes screening test    Assessment/Plan       ICD-10-CM ICD-9-CM    1. Routine general medical examination at a health care facility Z00.00 V70.0    2. Screening for alcoholism Z13.89 V79.1    3. Advanced directives, counseling/discussion Z71.89 V65.49 ADVANCE CARE PLANNING FIRST 30 MINS   4. Screening for depression Z13.89 V79.0 DEPRESSION SCREEN ANNUAL   5. Screen for colon cancer Z12.11 V76.51    6. Screening for diabetes mellitus Z13.1 V77.1    7. Screening for ischemic heart disease Z13.6 V81.0    8. Body mass index 30.0-30.9, adult Z68.30 V85.30    9. Ulcerative colitis with complication, unspecified location ([facility] K51.919 556.9    10.  Peripheral vascular disease ([facility] Dr Davin Mckeon I73.9 443.9 11. Atrial fibrillation, unspecified type ([facility] [address] I48.91 427.31    12. Prostate carcinoma [facility]) s/p cryoablation C61 185    13. Degeneration of cervical and limbar spine Dr Caroline Guevara M50.30 722.4 methylPREDNISolone (MEDROL DOSEPACK) 4 mg tablet   14. Cervical disc disease M50.90 722.91 methylPREDNISolone (MEDROL DOSEPACK) 4 mg tablet   15. Hypertension I11.9 402.10 CBC W/O DIFF   16. Dyslipidemia E78.5 272.4    17. Atherosclerosis of native coronary artery of native heart without angina pectoris I25.10 414.01    18. IFG (impaired fasting glucose) R73.01 790.21 HEMOGLOBIN A1C W/O EAG     current treatment plan is effective, no change in therapy  lab results and schedule of future lab studies reviewed with patient  reviewed diet, exercise and weight control  cardiovascular risk and specific lipid/LDL goals reviewed.

## 2017-06-26 NOTE — PROGRESS NOTES
70 y.o. WHITE OR  male who presents for evaluation. He reports no cardiovascular complaints and f/u w Dr Hoff Degree regularly. Pressures runnig in the 120/60 range for the most part. Tries to be active although no set exercise program     The vascular issues are stable and he sees Dr Marvin Randolph. No reports of claudication     Denies any recent gi sx. Remains on ppi therapy and no alarm complaints. The UC has not been flaring     Denies polyuria, polydipsia, nocturia, vision change. Not checking sugars at this time. Continues to see Dr Adeola Dumont for the prostate ca    He's having some neckpain radiating to the shoulder blade and occasionally down the left arm. No weakness, numbness, paresthesias.  Known to have significant c spine disease    Past Medical History:   Diagnosis Date    Adenoma of left adrenal gland     2009  1 cm, no change 1/15, 2/16    Asbestosis     Atrial fibrillation     CHA2DS2-VAsc=3(age+, htn+, vasc dx+; estimated yearly stroke risk according to Lip et al. is 3,2%), Hasbled=2(estimated yearly bleeding risk according to pisters et al. is 1,88%); not anticoagulated due to h/o gi bleed    Atrial fibrillation ablation     10/08    Basal cell cancer     Dr Jan Anne; he's had >350 lesions removed    Carotid occlusion     left     Cervical radiculopathy     MRI 9/11 showed C3-6 severe foraminal stenosis    Chronic pain     Colon polyp     Dr Adeola Dumont 9/15    Coronary artery disease     RCA - 3.0 x 16mm TAXUS 9/04, 3.0 x 16mm TAXUS 12/06    Dyslipidemia     Erectile dysfunction     GERD     GI bleed     Hearing loss     2014  bilateral Dr Doris Durant    Hernia, umbilical     Hypertension     Hypogonadism male     Lumbar radiculopathy     Dr Doris Li;  MRI 9/11 L4-5 disc bulging, annular tear, disc dessication    Myofascial pain dysfunction syndrome     pain clinic     Osteoarthritis     right knee Dr Jaswant Toribio Peripheral vascular disease     50-60% R iliac; s/p R iliac stent and L femoral artery stent in past; R OLIVIA 0.76 (1/16)    Plantar fasciitis     bilateral     PPD positive     Prediabetes     Prostate cancer     T1c Imelda 7(3+4), 70% in 1 core, GS 7 (3+4) in 4 cores, GS 6 (3+3) in 1 core; psa 5.28, TRUS 18 gm;  Dr Marylen Cheney; s/p cryoablation 10/16    Recurrent umbilical hernia     Subclinical hypothyroidism     denies    Tinnitus     Dr Andrade Amen Ulcerative colitis     Venous insufficiency      Past Surgical History:   Procedure Laterality Date    HX CAROTID ENDARTERECTOMY      1/14  right    HX COLONOSCOPY      Dr Marylen Cheney 9/15 polyps    HX CRYOABLATION OF THE PROSTATE      10/16    HX HEMORRHOIDECTOMY      1979    [facility], 1975    HX ORTHOPAEDIC      right knee surgery    HX REFRACTIVE SURGERY      OD (hemoplasty to right eye on retina to avoid blindness)    HX TONSILLECTOMY      1955    MN LAP, RECURRENT INCISIONAL HERNIA REPAIR,REDUCIBLE N/A 02/09/2017    Dr. Bedolla Collvalentín HERNIA,5+Y/O,REDUCIBL      2/16  left  Dr. Lowell Morales FDLO,5+T/Z,SYXOO      2/16  Dr. Linsey Jung      1/16  R OLIVIA 0.76, L OLIVIA 1.02    VASCULAR SURGERY PROCEDURE UNLIST      10/15   left fem art and left iliac stent     Social History     Social History    Marital status:      Spouse name: N/A    Number of children: N/A    Years of education: N/A     Occupational History    Not on file.      Social History Main Topics    Smoking status: Former Smoker     Packs/day: 1.50     Years: 25.00     Types: Cigarettes     Quit date: 1/1/2003    Smokeless tobacco: Never Used    Alcohol use 2.5 oz/week     5 Cans of beer per week      Comment: 5 beers a week    Drug use: No    Sexual activity: Yes     Partners: Female     Birth control/ protection: None     Other Topics Concern    Not on file     Social History Narrative     Current Outpatient Prescriptions   Medication Sig    besifloxacin (BESIVANCE) 0.6 % howard ophthalmic suspension 1 Drop two (2) times a day.  Difluprednate (DUREZOL) 0.05 % ophthalmic emulsion Administer 1 Drop to both eyes four (4) times daily.  nepafenac (ILEVRO) 0.3 % drps Apply 1 Drop to eye.  methylPREDNISolone (MEDROL DOSEPACK) 4 mg tablet Take as directed    LORazepam (ATIVAN) 1 mg tablet Take 1 Tab by mouth every eight (8) hours as needed.  HYDROcodone-acetaminophen (NORCO) 5-325 mg per tablet Take 1 Tab by mouth every four (4) hours as needed for Pain.  sildenafil citrate (VIAGRA) 100 mg tablet Take 1 Tab by mouth daily as needed. Indications: Erectile Dysfunction    losartan (COZAAR) 25 mg tablet TAKE ONE TABLET BY MOUTH TWICE A DAY    aspirin delayed-release 81 mg tablet Take 81 mg by mouth daily.  triamterene-hydrochlorothiazide (MAXZIDE) 37.5-25 mg per tablet TAKE ONE TABLET BY MOUTH DAILY    atenolol (TENORMIN) 25 mg tablet Take 1 Tab by mouth two (2) times a day.  ezetimibe-simvastatin (VYTORIN 10/40) 10-40 mg per tablet Take 1 Tab by mouth nightly.  Cholecalciferol, Vitamin D3, 1,000 unit cap Take 1,000 Units by mouth daily.  VOLTAREN 1 % topical gel as needed.  OMEGA-3 FATTY ACIDS/FISH OIL (OMEGA 3 FISH OIL PO) Take 900 mg by mouth daily.  gabapentin (NEURONTIN) 300 mg capsule Take 300 mg by mouth nightly.  MULTIVITAMIN PO Take  by mouth daily.  pantoprazole (PROTONIX) 40 mg tablet Take 40 mg by mouth daily.  coenzyme q10 200 mg Cap Take  by mouth. No current facility-administered medications for this visit.       Allergies   Allergen Reactions    Altace [Ramipril] Unknown (comments)    Lipitor [Atorvastatin] Other (comments)     Muscle pain    Lisinopril Shortness of Breath and Other (comments)     Turns bright red    Other Medication Other (comments)     Vicryl suture on skin tends to be rejected with poor wound healing does better with monocryl    Procardia [Nifedipine] Other (comments)     Caused afib     LAST MEDICARE WELLNESS EXAM: 6/23/16, 6/29/17    ACP 6/23/16, 6/29/17    REVIEW OF SYSTEMS: colo 9/15 Dr Megan Carlton, sees Dr Jing Dempsey no vision change or eye pain  Oral  no mouth pain, tongue or tooth problems  Ears  no hearing loss, ear pain, fullness, no swallowing problems  Cardiac  no CP, PND, orthopnea, edema, palpitations or syncope  Chest  no breast masses  Resp  no wheezing, chronic coughing, dyspnea  GI  no heartburn, nausea, vomiting, change in bowel habits, bleeding, hemorrhoids  Urinary  no dysuria, hematuria, flank pain, urgency, frequency  Genitals  no genital lesions, discharge, masses, ulceration, warts  Ortho  no swelling, dec ROM, myalgias  Derm  no nail abnormalities, rashes, lesions of note, hair loss  Psych  denies any anxiety or depression symptoms, no hallucinations or violent ideation  Constitutional  no wt loss, night sweats, unexplained fevers  Neuro  no focal weakness, numbness, paresthesias, incoordination, ataxia, involuntary movements  Endo - no polyuria, polydipsia, nocturia, hot flashes    Visit Vitals    /78 (BP 1 Location: Right arm, BP Patient Position: Sitting)    Pulse 66    Temp 97.9 °F (36.6 °C) (Oral)    Resp 14    Ht 6' (1.829 m)    Wt 222 lb (100.7 kg)    SpO2 95%    BMI 30.11 kg/m2      A&O x3  Affect is appropriate. Mood stable  No apparent distress  Anicteric, no JVD, adenopathy or thyromegaly. B TMs look ok exept mild afl on left no erythema  No carotid bruits or radiated murmur  Lungs clear to auscultation, no wheezes or rales  Heart showed regular rhythm. No rubs, gallops, 1-2/6 karli rsb  Abdomen soft nontender, no hepatosplenomegaly or masses. Extremities without edema.   Pulses 0-1 on right, 1-2 on left    LABS  From 12/12 showed gluc 101, cr 0.77, gfr 94,   alt 26,           chol 182, tg 159, hdl 52, ldl-c 98, wbc 7.6, hb 15.4, plt 171, tsh 0.96, psa 3.20  From 7/13 showed                          test 263  From 1/14 showed   gluc 112, cr 0.78, gfr 107, alt 17,           chol 130, tg 129, hdl 37, ldl-c 67, wbc 6.8, hb 15.1, plt 186, tsh 0.64, psa 3.60, vit d 26.8  From 6/14 showed   gluc 101, cr 0.57, gfr>60,     hba1c 5.7, vit d 34.3  From 12/14 showed         hba1c 5.9, chol 141, tg 104, hdl 42, ldl-c 78, wbc 8.5, hb 14.8, plt 147, tsh 0.75, psa 5.60, vit d 31.8, ua neg  From 6/15 showed   gluc 104, cr 0.75, gfr>60,     hba1c 5.9  From 8/15 showed                      tsh 0.44, psa 5.28,         test 215, lh 6.1, prl 11.1, ft4 1.61  From 12/15 showed gluc 95,   cr 0.71, gfr>60,  alt 36, hba1c 5.9, chol 135, tg 105, hdl 44, ldl-c 70,           tsh 0.51,     vit d 29.1   From 1/16 showed   gluc 112, cr 0.72, gfr>60,          wbc 7.8, hb 15.1, plt 163,            ua neg  From 11/16 showed gluc 103, cr 0.70,           wbc 8.6, hb 14.5, plt 189, ck/trop neg  From 12/16 showed gluc 89,   cr 0.70, gfr>60,  alt 31, hba1c 5.9, chol 159, tg 124, hdl 58, ldl-c 76, wbc 9.0, hb 15.1, plt 185,      vit d 27.8  From 3/17 showed   gluc 100, cr 0.75, gfr>60,     hba1c 5.9,               tsh 0.78, psa 0.18, ft4 1.30    Results for orders placed or performed during the hospital encounter of 69/47/47   METABOLIC PANEL, COMPREHENSIVE   Result Value Ref Range    Sodium 140 136 - 145 mmol/L    Potassium 4.1 3.5 - 5.5 mmol/L    Chloride 100 100 - 108 mmol/L    CO2 33 (H) 21 - 32 mmol/L    Anion gap 7 3.0 - 18 mmol/L    Glucose 96 74 - 99 mg/dL    BUN 9 7.0 - 18 MG/DL    Creatinine 0.74 0.6 - 1.3 MG/DL    BUN/Creatinine ratio 12 12 - 20      GFR est AA >60 >60 ml/min/1.73m2    GFR est non-AA >60 >60 ml/min/1.73m2    Calcium 8.9 8.5 - 10.1 MG/DL    Bilirubin, total 0.7 0.2 - 1.0 MG/DL    ALT (SGPT) 34 16 - 61 U/L    AST (SGOT) 20 15 - 37 U/L    Alk.  phosphatase 56 45 - 117 U/L    Protein, total 6.4 6.4 - 8.2 g/dL    Albumin 3.6 3.4 - 5.0 g/dL    Globulin 2.8 2.0 - 4.0 g/dL    A-G Ratio 1.3 0.8 - 1.7     LIPID PANEL   Result Value Ref Range    LIPID PROFILE          Cholesterol, total 135 <200 MG/DL    Triglyceride 71 <150 MG/DL    HDL Cholesterol 53 40 - 60 MG/DL    LDL, calculated 67.8 0 - 100 MG/DL    VLDL, calculated 14.2 MG/DL    CHOL/HDL Ratio 2.5 0 - 5.0     HEMOGLOBIN A1C W/O EAG   Result Value Ref Range    Hemoglobin A1c 5.7 (H) 4.2 - 5.6 %     Patient Active Problem List   Diagnosis Code    Degeneration of cervical and limbar spine Dr Ruthie Bates M50.30    Colitis, ulcerative ([facility] [address] K51.90    Colon polyps K63.5    Cervical disc disease M50.90    [facility] Dr Quentin Easley I73.9    Left carotid artery occlusion I65.22    Atherosclerosis of artery of extremity with intermittent claudication ([facility] I70.219    Hypovitaminosis D E55.9    Advance directive in chart Z78.9    Hypertension I11.9    Dyslipidemia E78.5    Coronary artery disease I25.10    Atrial fibrillation I48.91    Recurrent umbilical hernia S45.6    Prostate carcinoma (HCC) s/p cryoablation C61    ED (erectile dysfunction) of organic origin N52.9    IFG (impaired fasting glucose) R73.01     Assessment and plan:  1. Cardiac. Continue current regimen. F/U Dr Vasile Prescott  2. Vascular. Continue current regimen. F/U Moon  3. Dyslipidemia. At target  4. PreDM. Lifestyle and dietary measures, wt loss would be ideal  5. Hypovit d. Supp  6. Colon polyp. Fiber, colo 2020  7. Prostate ca. Per Dr Ivelisse Kwan  8. Afib. Per Dr Vasile Prescott  9. Ortho. F/U Dr Elena San  10. Spine. F/U Dr Ruthie Bates. 11.  C spine radiculopathy. Medrol dose pack, consider mri if no better, f/u Dr Ruthie Bates also         RTC 12/16    Above conditions discussed at length and patient vocalized understanding.   All questions answered to patient satifaction

## 2017-06-26 NOTE — PATIENT INSTRUCTIONS
Medicare Part B Preventive Services Limitations Recommendation Scheduled   Bone Mass Measurement  (age 72 & older, biennial) Requires diagnosis related to osteoporosis or estrogen deficiency. Biennial benefit unless patient has history of long-term glucocorticoid tx or baseline is needed because initial test was by other method     Cardiovascular Screening Blood Tests (every 5 years)  Total cholesterol, HDL, Triglycerides Order as a panel if possible     Colorectal Cancer Screening  -Fecal occult blood test (annual)  -Flexible sigmoidoscopy (5y)  -Screening colonoscopy (10y)  -Barium Enema      Counseling to Prevent Tobacco Use (up to 8 sessions per year)  - Counseling greater than 3 and up to 10 minutes  - Counseling greater than 10 minutes Patients must be asymptomatic of tobacco-related conditions to receive as preventive service     Diabetes Screening Tests (at least every 3 years, Medicare covers annually or at 6-month intervals for prediabetic patients)    Fasting blood sugar (FBS) or glucose tolerance test (GTT) Patient must be diagnosed with one of the following:  -Hypertension, Dyslipidemia, obesity, previous impaired FBS or GTT  Or any two of the following: overweight, FH of diabetes, age ? 72, history of gestational diabetes, birth of baby weighing more than 9 pounds     Diabetes Self-Management Training (DSMT) (no USPSTF recommendation) Requires referral by treating physician for patient with diabetes or renal disease. 10 hours of initial DSMT session of no less than 30 minutes each in a continuous 12-month period. 2 hours of follow-up DSMT in subsequent years.      Glaucoma Screening (no USPSTF recommendation) Diabetes mellitus, family history, , age 48 or over,  American, age 72 or over     Human Immunodeficiency Virus (HIV) Screening (annually for increased risk patients)  HIV-1 and HIV-2 by EIA, L[place]IS, rapid antibody test, or oral mucosa transudate Patient must be at increased risk for HIV infection per USPSTF guidelines or pregnant. Tests covered annually for patients at increased risk. Pregnant patients may receive up to 3 test during pregnancy. Medical Nutrition Therapy (MNT) (for diabetes or renal disease not recommended schedule) Requires referral by treating physician for patient with diabetes or renal disease. Can be provided in same year as diabetes self-management training (DSMT), and CMS recommends medical nutrition therapy take place after DSMT. Up to 3 hours for initial year and 2 hours in subsequent years. Prostate Cancer Screening (annually up to age 76)  - Digital rectal exam (RENE)  - Prostate specific antigen (PSA) Annually (age 48 or over), RENE not paid separately when covered E/M service is provided on same date     Seasonal Influenza Vaccination (annually)      Pneumococcal Vaccination (once after 72)      Hepatitis B Vaccinations (if medium/high risk) Medium/high risk factors:  End-stage renal disease,  Hemophiliacs who received Factor VIII or IX concentrates, Clients of institutions for the mentally retarded, Persons who live in the same house as a HepB virus carrier, Homosexual men, Illicit injectable drug abusers. Screening Mammography (biennial age 54-69)? Annually (age 36 or over)     Screening Pap Tests and Pelvic Examination (up to age 79 and after 79 if unknown history or abnormal study last 10 years) Every 25 months except high risk     Ultrasound Screening for Abdominal Aortic Aneurysm (AAA) (once) Patient must be referred through IPPE and not have had a screening for abdominal aortic aneurysm before under Medicare.   Limited to patients who meet one of the following criteria:  - Men who are 73-68 years old and have smoked more than 100 cigarettes in their lifetime.  -Anyone with a FH of AAA  -Anyone recommended for screening by USPSTF

## 2017-06-26 NOTE — ACP (ADVANCE CARE PLANNING)
Advance Care Planning    Advance Care Planning (ACP) Provider Note - Comprehensive     Date of ACP Conversation: 06/29/17  Persons included in Conversation:  patient  Length of ACP Conversation in minutes:  16 minutes    Authorized Decision Maker (if patient is incapable of making informed decisions): This person is: wife  Healthcare Agent/Medical Power of  under Advance Directive          General ACP for ALL Patients with Decision Making Capacity:   Importance of advance care planning, including choosing a healthcare agent to communicate patient's healthcare decisions if patient lost the ability to make decisions, such as after a sudden illness or accident  Understanding of the healthcare agent role was assessed and information provided  Exploration of values, goals, and preferences if recovery is not expected, even with continued medical treatment in the event of: Imminent death  Severe, permanent brain injury    Review of Existing Advance Directive:  Does this advance directive still reflect your preferences? Yes (Provide new form/Refer for assistance in updating)    For Serious or Chronic Illness:  Understanding of medical condition    Understanding of CPR, goals and expected outcomes, benefits and burdens discussed.     Interventions Provided:  Recommended communicating the plan and making copies for the healthcare agent, personal physician, and others as appropriate (e.g., health system)  Recommended review of completed ACP document annually or upon change in health status

## 2017-06-29 ENCOUNTER — DOCUMENTATION ONLY (OUTPATIENT)
Dept: INTERNAL MEDICINE CLINIC | Age: 72
End: 2017-06-29

## 2017-06-29 ENCOUNTER — OFFICE VISIT (OUTPATIENT)
Dept: INTERNAL MEDICINE CLINIC | Age: 72
End: 2017-06-29

## 2017-06-29 VITALS
HEIGHT: 72 IN | TEMPERATURE: 97.9 F | HEART RATE: 66 BPM | WEIGHT: 222 LBS | OXYGEN SATURATION: 95 % | DIASTOLIC BLOOD PRESSURE: 78 MMHG | SYSTOLIC BLOOD PRESSURE: 130 MMHG | BODY MASS INDEX: 30.07 KG/M2 | RESPIRATION RATE: 14 BRPM

## 2017-06-29 DIAGNOSIS — M50.90 CERVICAL DISC DISEASE: ICD-10-CM

## 2017-06-29 DIAGNOSIS — K51.919 ULCERATIVE COLITIS WITH COMPLICATION, UNSPECIFIED LOCATION (HCC): ICD-10-CM

## 2017-06-29 DIAGNOSIS — Z12.11 SCREEN FOR COLON CANCER: ICD-10-CM

## 2017-06-29 DIAGNOSIS — Z00.00 ROUTINE GENERAL MEDICAL EXAMINATION AT A HEALTH CARE FACILITY: Primary | ICD-10-CM

## 2017-06-29 DIAGNOSIS — I11.9 BENIGN HYPERTENSIVE HEART DISEASE WITHOUT HEART FAILURE: ICD-10-CM

## 2017-06-29 DIAGNOSIS — Z13.39 SCREENING FOR ALCOHOLISM: ICD-10-CM

## 2017-06-29 DIAGNOSIS — Z13.1 SCREENING FOR DIABETES MELLITUS: ICD-10-CM

## 2017-06-29 DIAGNOSIS — I73.9 PERIPHERAL VASCULAR DISEASE (HCC): ICD-10-CM

## 2017-06-29 DIAGNOSIS — Z13.6 SCREENING FOR ISCHEMIC HEART DISEASE: ICD-10-CM

## 2017-06-29 DIAGNOSIS — C61 PROSTATE CARCINOMA (HCC): ICD-10-CM

## 2017-06-29 DIAGNOSIS — M50.30 DEGENERATION OF CERVICAL INTERVERTEBRAL DISC: ICD-10-CM

## 2017-06-29 DIAGNOSIS — R73.01 IFG (IMPAIRED FASTING GLUCOSE): ICD-10-CM

## 2017-06-29 DIAGNOSIS — I25.10 ATHEROSCLEROSIS OF NATIVE CORONARY ARTERY OF NATIVE HEART WITHOUT ANGINA PECTORIS: ICD-10-CM

## 2017-06-29 DIAGNOSIS — Z71.89 ADVANCED DIRECTIVES, COUNSELING/DISCUSSION: ICD-10-CM

## 2017-06-29 DIAGNOSIS — E78.5 DYSLIPIDEMIA: ICD-10-CM

## 2017-06-29 DIAGNOSIS — I48.91 ATRIAL FIBRILLATION, UNSPECIFIED TYPE (HCC): ICD-10-CM

## 2017-06-29 DIAGNOSIS — Z13.31 SCREENING FOR DEPRESSION: ICD-10-CM

## 2017-06-29 RX ORDER — DIFLUPREDNATE 0.5 MG/ML
1 EMULSION OPHTHALMIC 4 TIMES DAILY
COMMUNITY
End: 2018-04-12

## 2017-06-29 RX ORDER — METHYLPREDNISOLONE 4 MG/1
TABLET ORAL
Qty: 1 DOSE PACK | Refills: 0 | Status: SHIPPED | OUTPATIENT
Start: 2017-06-29 | End: 2017-10-04 | Stop reason: ALTCHOICE

## 2017-06-29 NOTE — MR AVS SNAPSHOT
Visit Information Date & Time Provider Department Dept. Phone Encounter #  
 6/29/2017  8:00 AM Kenney Ruelas MD Internist of [address] [place] [address] [id number] Follow-up Instructions Return in about 6 months (around 12/29/2017). Your Appointments 10/3/2017  8:50 AM  
Nurse Visit with [facility] WB NURSE [facility] of [facility] ([address]) Appt Note: PSA  
 [address] 
[address] [facility] [address]  
[address] [facility] [address] [facility] [address]  
  
    
 1/3/2018  8:35 AM  
LAB with [facility] NURSE VISIT Internist of [facility] ([address]) Appt Note: lab  
 [address], [address] [facility] [address]  
  
   
 [address], [address]  
  
    
 1/9/2018  8:00 AM  
Office Visit with Kenney Ruelas MD  
Internist of [facility] [address] [facility]) Appt Note: 6 months [address], Suite [facility] [address]  
  
   
 [address] [facility], [address]  
  
    
 2/2/2018  8:00 AM  
PROCEDURE with [facility] 1 [facility] ([address]) Appt Note: iliac 1 yr alfredo; .  
 [phone] [facility]  
[phone] [address] [place] [address]  
  
    
 2/2/2018 10:00 AM  
PROCEDURE with [facility] NONIMAGING [facility] ([address]) Appt Note: leg art 1 yr alfredo; .  
 [address] [phone] [facility]  
[phone] [address] [place] [address]  
  
    
 2/19/2018  9:30 AM  
Follow Up with Mame Lima MD  
[address] [place] [address] and [facility] [facility]) Appt Note: 1 year fu after studies; FAXED OVER INFORMATION  [address] TO CALL PATIENT DUE TO STUDIES WERE NOT IN CC AT TIME OF CHECK OUT; [address] [place] [address] ERROR; 1 year fu after studies FAXED OVER INFORMATION FOR MICHI TO CALL PATIENT DUE TO STUDIES WERE NOT IN CC AT TIME OF CHECK  [address]; .  
 [address]  
[phone] [address] [facility] [address] [address] [address] [place] [address]  
  
    
  
 10/11/2017  8:45 AM  
Any with David Vanessa MD  
Urology of [facility] ([address]) Appt Note: 6 mo fu  
 [address] 
[address] [place] [address]  
[address] [facility] [address] [place] [address] Upcoming Health Maintenance Date Due INFLUENZA AGE 9 TO ADULT 8/1/2017 MEDICARE YEARLY EXAM 6/30/2018 GLAUCOMA SCREENING Q2Y 6/25/2019 DTaP/Tdap/Td series (2 - Td) 4/3/2023 COLONOSCOPY 9/22/2025 Allergies as of 6/29/2017  Review Complete On: 6/29/2017 By: Elfrieda Runner Severity Noted Reaction Type Reactions Altace [Ramipril]    Unknown (comments) Lipitor [Atorvastatin]    Other (comments) Muscle pain Lisinopril    Shortness of Breath, Other (comments) Turns bright red Other Medication  03/05/2014    Other (comments) Vicryl suture on skin tends to be rejected with poor wound healing does better with monocryl Procardia [Nifedipine]    Other (comments) Caused afib Current Immunizations  Reviewed on 12/22/2015 Name Date Influenza High Dose Vaccine PF 9/16/2016, 10/2/2015 12:30 PM  
 Influenza Vaccine 10/10/2014, 10/4/2013 Influenza Vaccine Split 10/10/2012  2:28 PM, 9/28/2011 Influenza Vaccine Whole 10/8/2010 Pneumococcal Conjugate (PCV-13) 12/15/2014  8:16 AM  
 Pneumococcal Polysaccharide (PPSV-23) 1/24/2014 Pneumococcal Vaccine (Unspecified Type) 1/1/2006 Td 1/1/2006 Tdap 4/3/2013  8:23 PM  
 Zoster Vaccine, Live 1/1/2007 Not reviewed this visit You Were Diagnosed With   
  
 Codes Comments  Routine general medical examination at a health care facility    - Primary ICD-10-CM: Z00.00 ICD-9-CM: V70.0 Screening for alcoholism     ICD-10-CM: Z13.89 ICD-9-CM: V79.1 Advanced directives, counseling/discussion     ICD-10-CM: Z71.89 ICD-9-CM: V65.49 Screening for depression     ICD-10-CM: Z13.89 ICD-9-CM: V79.0 Screen for colon cancer     ICD-10-CM: Z12.11 ICD-9-CM: V76.51 Screening for diabetes mellitus     ICD-10-CM: Z13.1 ICD-9-CM: V77.1 Screening for ischemic heart disease     ICD-10-CM: Z13.6 ICD-9-CM: V81.0 Body mass index 30.0-30.9, adult     ICD-10-CM: Z68.30 ICD-9-CM: V85.30 Ulcerative colitis with complication, unspecified location [facility])     ICD-10-CM: V51.904 ICD-9-CM: 556.9 Peripheral vascular disease (RUST 75.)     ICD-10-CM: I73.9 ICD-9-CM: 443. 9 Atrial fibrillation, unspecified type (RUST 75.)     ICD-10-CM: I48.91 
ICD-9-CM: 427.31 Prostate carcinoma [facility])     ICD-10-CM: G64 ICD-9-CM: 729 Degeneration of cervical intervertebral disc     ICD-10-CM: M50.30 ICD-9-CM: 722.4 Cervical disc disease     ICD-10-CM: M50.90 ICD-9-CM: 722.91 Benign hypertensive heart disease without heart failure     ICD-10-CM: I11.9 ICD-9-CM: 402.10 Dyslipidemia     ICD-10-CM: E78.5 ICD-9-CM: 272.4 Atherosclerosis of native coronary artery of native heart without angina pectoris     ICD-10-CM: I25.10 ICD-9-CM: 414.01   
 IFG (impaired fasting glucose)     ICD-10-CM: R73.01 
ICD-9-CM: 790.21 Vitals BP Pulse Temp Resp Height(growth percentile) Weight(growth percentile) 130/78 (BP 1 Location: Right arm, BP Patient Position: Sitting) 66 97.9 °F (36.6 °C) (Oral) 14 6' (1.829 m) 222 lb (100.7 kg) SpO2 BMI Smoking Status 95% 30.11 kg/m2 Former Smoker Vitals History BMI and BSA Data Body Mass Index Body Surface Area  
 30.11 kg/m 2 2.26 m 2 Preferred Pharmacy Pharmacy Name Phone [facility] [address] [facility], [address] [place] [address] Road [phone] Your Updated Medication List  
  
   
This list is accurate as of: 6/29/17  8:27 AM.  Always use your most recent med list.  
  
  
  
  
 aspirin delayed-release 81 mg tablet Take 81 mg by mouth daily. atenolol 25 mg tablet Commonly known as:  TENORMIN Take 1 Tab by mouth two (2) times a day. BESIVANCE 0.6 % Drps ophthalmic suspension Generic drug:  besifloxacin 1 Drop two (2) times a day. cholecalciferol 1,000 unit Cap Commonly known as:  VITAMIN D3 Take 1,000 Units by mouth daily. coenzyme Q-10 200 mg capsule Commonly known as:  CO Q-10 Take  by mouth. DUREZOL 0.05 % ophthalmic emulsion Generic drug:  Difluprednate Administer 1 Drop to both eyes four (4) times daily. ezetimibe-simvastatin 10-40 mg per tablet Commonly known as:  Vytorin 10/40 Take 1 Tab by mouth nightly.  
  
 gabapentin 300 mg capsule Commonly known as:  NEURONTIN Take 300 mg by mouth nightly. HYDROcodone-acetaminophen 5-325 mg per tablet Commonly known as:  Simmie Blonder Take 1 Tab by mouth every four (4) hours as needed for Pain. ILEVRO 0.3 % Drps Generic drug:  nepafenac Apply 1 Drop to eye. LORazepam 1 mg tablet Commonly known as:  ATIVAN Take 1 Tab by mouth every eight (8) hours as needed. losartan 25 mg tablet Commonly known as:  COZAAR  
TAKE ONE TABLET BY MOUTH TWICE A DAY  
  
 methylPREDNISolone 4 mg tablet Commonly known as:  Jennifer Holiday Take as directed MULTIVITAMIN PO Take  by mouth daily. OMEGA 3 FISH OIL PO Take 900 mg by mouth daily. PROTONIX 40 mg tablet Generic drug:  pantoprazole Take 40 mg by mouth daily. sildenafil citrate 100 mg tablet Commonly known as:  VIAGRA Take 1 Tab by mouth daily as needed. Indications: Erectile Dysfunction  
  
 triamterene-hydroCHLOROthiazide 37.5-25 mg per tablet Commonly known as:  Jia Donahue TAKE ONE TABLET BY MOUTH DAILY  
  
 VOLTAREN 1 % Gel Generic drug:  diclofenac  
as needed. Prescriptions Sent to Pharmacy Refills  
 methylPREDNISolone (MEDROL DOSEPACK) 4 mg tablet 0 Sig: Take as directed Class: Normal  
 Pharmacy: [facility], [address] [place] [address] Ph #: [phone] We Performed the Following ADVANCE CARE PLANNING FIRST 30 MINS [64136 CPT(R)] Katlyn Burnett [[id number] [facility]] Follow-up Instructions Return in about 6 months (around 12/29/2017). To-Do List   
 12/27/2017 Lab:  CBC W/O DIFF   
  
 12/29/2017 Lab:  HEMOGLOBIN A1C W/O EAG Patient Instructions Medicare Part B Preventive Services Limitations Recommendation Scheduled Bone Mass Measurement 
(age 72 & older, biennial) Requires diagnosis related to osteoporosis or estrogen deficiency. Biennial benefit unless patient has history of long-term glucocorticoid tx or baseline is needed because initial test was by other method Cardiovascular Screening Blood Tests (every 5 years) Total cholesterol, HDL, Triglycerides Order as a panel if possible Colorectal Cancer Screening 
-Fecal occult blood test (annual) -Flexible sigmoidoscopy (5y) 
-Screening colonoscopy (10y) -Barium Enema Counseling to Prevent Tobacco Use (up to 8 sessions per year) - Counseling greater than 3 and up to 10 minutes - Counseling greater than 10 minutes Patients must be asymptomatic of tobacco-related conditions to receive as preventive service Diabetes Screening Tests (at least every 3 years, Medicare covers annually or at 6-month intervals for prediabetic patients) Fasting blood sugar (FBS) or glucose tolerance test (GTT) Patient must be diagnosed with one of the following: 
-Hypertension, Dyslipidemia, obesity, previous impaired FBS or GTT 
Or any two of the following: overweight, FH of diabetes, age ? 72, history of gestational diabetes, birth of baby weighing more than 9 pounds Diabetes Self-Management Training (DSMT) (no USPSTF recommendation) Requires referral by treating physician for patient with diabetes or renal disease. 10 hours of initial DSMT session of no less than 30 minutes each in a continuous 12-month period. 2 hours of follow-up DSMT in subsequent years. Glaucoma Screening (no USPSTF recommendation) Diabetes mellitus, family history, , age 48 or over,  American, age 72 or over Human Immunodeficiency Virus (HIV) Screening (annually for increased risk patients) HIV-1 and HIV-2 by EIA, LOUIS, rapid antibody test, or oral mucosa transudate Patient must be at increased risk for HIV infection per USPSTF guidelines or pregnant. Tests covered annually for patients at increased risk. Pregnant patients may receive up to 3 test during pregnancy. Medical Nutrition Therapy (MNT) (for diabetes or renal disease not recommended schedule) Requires referral by treating physician for patient with diabetes or renal disease. Can be provided in same year as diabetes self-management training (DSMT), and CMS recommends medical nutrition therapy take place after DSMT. Up to 3 hours for initial year and 2 hours in subsequent years. Prostate Cancer Screening (annually up to age 76) - Digital rectal exam (RENE) - Prostate specific antigen (PSA) Annually (age 48 or over), RENE not paid separately when covered E/M service is provided on same date Seasonal Influenza Vaccination (annually) Pneumococcal Vaccination (once after 65) Hepatitis B Vaccinations (if medium/high risk) Medium/high risk factors:  End-stage renal disease, Hemophiliacs who received Factor VIII or IX concentrates, Clients of institutions for the mentally retarded, Persons who live in the same house as a HepB virus carrier, Homosexual men, Illicit injectable drug abusers. Screening Mammography (biennial age 54-69)? Annually (age 36 or over) Screening Pap Tests and Pelvic Examination (up to age 79 and after 79 if unknown history or abnormal study last 10 years) Every 24 months except high risk Ultrasound Screening for Abdominal Aortic Aneurysm (AAA) (once) Patient must be referred through IPPE and not have had a screening for abdominal aortic aneurysm before under Medicare. Limited to patients who meet one of the following criteria: 
- Men who are 73-68 years old and have smoked more than 100 cigarettes in their lifetime. 
-Anyone with a FH of AAA 
-Anyone recommended for screening by USPSTF Introducing [facility] [place] & [facility] SERVICES! Dear Diallo Swenson: Thank you for requesting a [employer] account. Our records indicate that you already have an active [employer] account. You can access your account anytime at [email or web address] Did you know that you can access your hospital and ER discharge instructions at any time in [employer]? You can also review all of your test results from your hospital stay or ER visit. Additional Information If you have questions, please visit the Frequently Asked Questions section of the [employer] website at [email or web address] Remember, [employer] is NOT to be used for urgent needs. For medical emergencies, dial 911. Now available from your iPhone and Android! Please provide this summary of care documentation to your next provider. Your primary care clinician is listed as Kelle Sheth. If you have any questions after today's visit, please call [phone].

## 2017-07-13 RX ORDER — HYDROCODONE BITARTRATE AND ACETAMINOPHEN 5; 325 MG/1; MG/1
1 TABLET ORAL
Qty: 180 TAB | Refills: 0 | Status: SHIPPED | OUTPATIENT
Start: 2017-07-13 | End: 2017-07-14 | Stop reason: SDUPTHER

## 2017-07-14 RX ORDER — HYDROCODONE BITARTRATE AND ACETAMINOPHEN 5; 325 MG/1; MG/1
1 TABLET ORAL
Qty: 180 TAB | Refills: 0 | Status: SHIPPED | OUTPATIENT
Start: 2017-07-14 | End: 2017-08-21 | Stop reason: SDUPTHER

## 2017-07-14 NOTE — PROGRESS NOTES
Pt's pharmacy needs \"for chronic pain\" on script. Reprinted yesterday's script by Dr. Adán Quiñonez with the revision.

## 2017-08-03 ENCOUNTER — OFFICE VISIT (OUTPATIENT)
Dept: ORTHOPEDIC SURGERY | Age: 72
End: 2017-08-03

## 2017-08-03 VITALS
RESPIRATION RATE: 18 BRPM | SYSTOLIC BLOOD PRESSURE: 129 MMHG | WEIGHT: 225 LBS | BODY MASS INDEX: 30.48 KG/M2 | HEART RATE: 63 BPM | DIASTOLIC BLOOD PRESSURE: 71 MMHG | HEIGHT: 72 IN | OXYGEN SATURATION: 96 % | TEMPERATURE: 97.9 F

## 2017-08-03 DIAGNOSIS — M17.11 PRIMARY OSTEOARTHRITIS OF RIGHT KNEE: ICD-10-CM

## 2017-08-03 DIAGNOSIS — M17.10 ARTHRITIS OF KNEE: ICD-10-CM

## 2017-08-03 DIAGNOSIS — M79.642 LEFT HAND PAIN: Primary | ICD-10-CM

## 2017-08-03 DIAGNOSIS — M65.332 TRIGGER MIDDLE FINGER OF LEFT HAND: ICD-10-CM

## 2017-08-03 RX ORDER — TRIAMCINOLONE ACETONIDE 40 MG/ML
40 INJECTION, SUSPENSION INTRA-ARTICULAR; INTRAMUSCULAR ONCE
Qty: 1 ML | Refills: 0
Start: 2017-08-03 | End: 2017-08-03

## 2017-08-03 RX ORDER — DICLOFENAC SODIUM 10 MG/G
2-4 GEL TOPICAL 4 TIMES DAILY
Qty: 100 G | Refills: 5 | Status: SHIPPED | OUTPATIENT
Start: 2017-08-03 | End: 2018-08-07 | Stop reason: SDUPTHER

## 2017-08-03 NOTE — PROGRESS NOTES
HISTORY:  Mr. Julius Olivier returns today for followup regarding his left hand. He is a patient of Dr. Oziel Llamas, longstanding, and has been recently seen under the care of Dr. Cheryl Paniagua with Dr. Manjeet nelson. He returned to their office within the past month requesting an injection to his left hand secondary to long finger triggering, and unfortunately he became a bit frustrated because his last visit was over one year previous at their practice, and he was required to fill upwards of 10 pages of paperwork out to be seen. He declined, and said that \"the heck with you guys, I am going to go back to Dr. Oziel Llamas practice\". He subsequently presents today to discover that Dr. Clarice Enriquez has retired. He has been plagued with longstanding right knee pain and has had knee surgery on December 21, 1964. 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 He was at one time a candidate for a right total knee replacement. However, because of his carotid stenosis, complete on the left, his candidacy has been canceled. Dr. Todd Connors had seen the patient for possible scheduling of a right knee replacement. Mr. Julius Olivier reports that he is doing fair with his right knee. He tolerates the pain, understanding that he would rather be alive than dead since a surgical procedure would involve a drop in his blood pressure, and he was advised by the vascular surgeon that that may lead to a massive stroke, since his left carotid vein is completely occluded. Mr. Julius Olivier has had Cortisone injections to the right knee and various courses of viscosupplementation in the form of Euflexxa. He has done fair with them and wants to continue the process. Today, he would like a Cortisone injection to his left long finger. He does have pending cataract surgery on August 14, 2017 for the left eye. REVIEW OF SYSTEMS:  No chest pain. No shortness of breath. No fevers, chills, or night sweats. No rash or itching. Pain and locking to the left long finger is evident with motion both active and passive with pain over the palmar aspect of the hand which is a 6 to 7 on a 10-point scale today. No nausea or vomiting. No allergies to Betadine. He is not a diabetic. EXAMINATION:  Physical examination today reveals a healthy appearing, well-developed, well-nourished, 45-year-old,  male, atraumatic and normocephalic. He is alert and oriented x 3. He is found sitting comfortably on the table. Examination to the left hand reveals no fracture deformities, lesions, masses or step-offs. He has full extension of the long finger at the metacarpophalangeal joint as well as the IP joint. He lacks full extension at the DIP joint by 10°. He has full flexion of all digits of the left hand with no palm-to-pulp deficit. There is a painful nodule associated 1.5 cm proximal to the long finger left metacarpophalangeal joint. He has noted passive locking with pain reproduced to the point of maximal tenderness over the volar aspect of the left hand just proximal to the metacarpophalangeal joint of the long finger. TREATMENT:  Today, using sterile technique after verbal and written consent was obtained, risks and benefits discussed and all questions were answered to his satisfaction with appropriate time-out performed, 0.5 cc of Marcaine mixed with 0.5 cc of Kenalog at 40 mg/mL injected in the space associated with the A1 pulley at the base of the left hand long finger. There were no complications. The patient tolerated the procedure well. IMPRESSION:   1. Trigger finger, recurrent, left hand long finger. 2. Left hand pain. 3. Decreased range of motion, left hand long finger secondary to triggering. PLAN:  I am currently recommending a low-dose Cortisone injection to the triggering left long finger. We will plan on seeing Mr. Collette back in about three weeks following his cataract surgery for consideration for using [employer] to his right knee for his arthritic condition. All of the patient's questions were answered to their satisfaction. He was given a prescription for Voltaren Gel to use 2 to 4 grams to associated area up to three to four times per day. Dispensing 100 grams x 5 refills.

## 2017-08-03 NOTE — MR AVS SNAPSHOT
Visit Information Date & Time Provider Department Dept. Phone Encounter #  
 8/3/2017  9:15 AM Jamilapo Worthyir, 20 [facility] [phone] Your Appointments 10/3/2017  8:50 AM  
Nurse Visit with [facility] WB NURSE [facility] of [facility] ([address] [facility]) Appt Note: PSA  
 [address] 
[address] [place] [address]  
[address] [facility] [address] [place] [address]  
  
    
 1/3/2018  8:35 AM  
LAB with [facility] NURSE VISIT Internist of [facility] ([address]) Appt Note: lab  
 [address], [address] [facility] [address] [place]  
  
   
 [address], [address]  
  
    
 1/9/2018  8:00 AM  
Office Visit with Merced Sampson MD  
Internist of [address] [place] [address]) Appt Note: 6 months [address], Suite [facility] [address]  
  
   
 [address] [facility], [address]  
  
    
 2/2/2018  8:00 AM  
PROCEDURE with [facility] 1 [facility] ([address] [facility]) Appt Note: iliac 1 yr alfredo; .  
 [address] [place] [address] [facility] [address]  
[phone] [address] [place] [address]  
  
    
 2/2/2018 10:00 AM  
PROCEDURE with [facility] NONIMAGING [facility] ([address] [place] [address]) Appt Note: leg art 1 yr juni; .  
 [address] [place] [address] [address] [facility]  
[phone] [place] [address]  
  
    
 2/19/2018  9:30 AM  
Follow Up with Carolynn Zamora MD  
[address] [place] [address] and [facility]ascular Specialists [address] [place] [address]) Appt Note: 1 year fu after studies; FAXED OVER INFORMATION FOR MICHI TO CALL PATIENT DUE TO STUDIES WERE NOT IN CC AT TIME OF CHECK OUT; SCHED ERROR; 1 year fu after studies FAXED OVER INFORMATION FOR [address] [place] [address] PATIENT DUE TO STUDIES WERE NOT IN CC AT TIME OF CHECK  [address]; .  
 [address] [place] [address] [facility]  
[phone] [address] [facility] Anne-Marie Mckeon [address] [place] [address]  
  
    
  
 10/11/2017  8:45 AM  
Any with Dean Dimas MD  
Urology of [facility] ([facility]) Appt Note: 6 mo fu  
 [address] 
[address] [facility] [address]  
[address] [facility] [address] Upcoming Health Maintenance Date Due INFLUENZA AGE 9 TO ADULT 8/1/2017 MEDICARE YEARLY EXAM 6/30/2018 GLAUCOMA SCREENING Q2Y 6/25/2019 DTaP/Tdap/Td series (2 - Td) 4/3/2023 COLONOSCOPY 9/22/2025 Allergies as of 8/3/2017  Review Complete On: 8/3/2017 By: Grant Martin PA-C Severity Noted Reaction Type Reactions Altace [Ramipril]    Unknown (comments) Lipitor [Atorvastatin]    Other (comments) Muscle pain Lisinopril    Shortness of Breath, Other (comments) Turns bright red Other Medication  03/05/2014    Other (comments) Vicryl suture on skin tends to be rejected with poor wound healing does better with monocryl Procardia [Nifedipine]    Other (comments) Caused afib Current Immunizations  Reviewed on 12/22/2015 Name Date Influenza High Dose Vaccine PF 9/16/2016, 10/2/2015 12:30 PM  
 Influenza Vaccine 10/10/2014, 10/4/2013 Influenza Vaccine Split 10/10/2012  2:28 PM, 9/28/2011 Influenza Vaccine Whole 10/8/2010 Pneumococcal Conjugate (PCV-13) 12/15/2014  8:16 AM  
 Pneumococcal Polysaccharide (PPSV-23) 1/24/2014 Pneumococcal Vaccine (Unspecified Type) 1/1/2006 Td 1/1/2006 Tdap 4/3/2013  8:23 PM  
 Zoster Vaccine, Live 1/1/2007 Not reviewed this visit Vitals BP Pulse Temp Resp Height(growth percentile) Weight(growth percentile) 129/71 63 97.9 °F (36.6 °C) (Oral) 18 6' (1.829 m) 225 lb (102.1 kg) SpO2 BMI Smoking Status 96% 30.52 kg/m2 Former Smoker BMI and BSA Data Body Mass Index Body Surface Area 30.52 kg/m 2 2.28 m 2 Preferred Pharmacy Pharmacy Name Phone [facility] [address], [address] [phone] Your Updated Medication List  
  
   
This list is accurate as of: 8/3/17 10:11 AM.  Always use your most recent med list.  
  
  
  
  
 aspirin delayed-release 81 mg tablet Take 81 mg by mouth daily. atenolol 25 mg tablet Commonly known as:  TENORMIN Take 1 Tab by mouth two (2) times a day. BESIVANCE 0.6 % Drps ophthalmic suspension Generic drug:  besifloxacin 1 Drop two (2) times a day. cholecalciferol 1,000 unit Cap Commonly known as:  VITAMIN D3 Take 1,000 Units by mouth daily. coenzyme Q-10 200 mg capsule Commonly known as:  CO Q-10 Take  by mouth. DUREZOL 0.05 % ophthalmic emulsion Generic drug:  Difluprednate Administer 1 Drop to both eyes four (4) times daily. ezetimibe-simvastatin 10-40 mg per tablet Commonly known as:  Vytorin 10/40 Take 1 Tab by mouth nightly.  
  
 gabapentin 300 mg capsule Commonly known as:  NEURONTIN Take 300 mg by mouth nightly. HYDROcodone-acetaminophen 5-325 mg per tablet Commonly known as:  Wallace Plum Take 1 Tab by mouth every four (4) hours as needed (for chronic pain). ILEVRO 0.3 % Drps Generic drug:  nepafenac Apply 1 Drop to eye. LORazepam 1 mg tablet Commonly known as:  ATIVAN Take 1 Tab by mouth every eight (8) hours as needed. losartan 25 mg tablet Commonly known as:  COZAAR  
TAKE ONE TABLET BY MOUTH TWICE A DAY  
  
 methylPREDNISolone 4 mg tablet Commonly known as:  Henderson Sinks Take as directed MULTIVITAMIN PO Take  by mouth daily. OMEGA 3 FISH OIL PO Take 900 mg by mouth daily. PROTONIX 40 mg tablet Generic drug:  pantoprazole Take 40 mg by mouth daily. sildenafil citrate 100 mg tablet Commonly known as:  VIAGRA Take 1 Tab by mouth daily as needed. Indications: Erectile Dysfunction  
  
 triamterene-hydroCHLOROthiazide 37.5-25 mg per tablet Commonly known as:  Gosia Marus TAKE ONE TABLET BY MOUTH DAILY  
  
 VOLTAREN 1 % Gel Generic drug:  diclofenac  
as needed. Introducing [facility] SERVICES! Dear Henry Tobias: Thank you for requesting a [employer] account. Our records indicate that you already have an active [employer] account. You can access your account anytime at [email or web address] Did you know that you can access your hospital and ER discharge instructions at any time in [employer]? You can also review all of your test results from your hospital stay or ER visit. Additional Information If you have questions, please visit the Frequently Asked Questions section of the [employer] website at [email or web address] Remember, [employer] is NOT to be used for urgent needs. For medical emergencies, dial 911. Now available from your iPhone and Android! Please provide this summary of care documentation to your next provider. Your primary care clinician is listed as Addie Canales. If you have any questions after today's visit, please call [phone].

## 2017-08-21 RX ORDER — HYDROCODONE BITARTRATE AND ACETAMINOPHEN 5; 325 MG/1; MG/1
1 TABLET ORAL
Qty: 180 TAB | Refills: 0 | Status: SHIPPED | OUTPATIENT
Start: 2017-08-21 | End: 2017-10-03 | Stop reason: SDUPTHER

## 2017-08-21 NOTE — TELEPHONE ENCOUNTER
Last office visit 6/29/2017  Last refill 7/14/2017     Patient states to please write on the prescription that it is for chronic pain, or it will not be filled by the pharmacy.

## 2017-08-22 RX ORDER — ATENOLOL 25 MG/1
TABLET ORAL
Qty: 180 TAB | Refills: 2 | Status: SHIPPED | OUTPATIENT
Start: 2017-08-22 | End: 2018-03-08 | Stop reason: SDUPTHER

## 2017-08-30 ENCOUNTER — OFFICE VISIT (OUTPATIENT)
Dept: ORTHOPEDIC SURGERY | Age: 72
End: 2017-08-30

## 2017-08-30 VITALS
HEIGHT: 72 IN | TEMPERATURE: 98.6 F | WEIGHT: 222.2 LBS | SYSTOLIC BLOOD PRESSURE: 130 MMHG | OXYGEN SATURATION: 97 % | BODY MASS INDEX: 30.1 KG/M2 | HEART RATE: 70 BPM | RESPIRATION RATE: 18 BRPM | DIASTOLIC BLOOD PRESSURE: 71 MMHG

## 2017-08-30 DIAGNOSIS — M54.12 CERVICAL RADICULOPATHY: Primary | ICD-10-CM

## 2017-08-30 DIAGNOSIS — M54.16 LUMBAR RADICULOPATHY: ICD-10-CM

## 2017-08-30 DIAGNOSIS — G56.21 ULNAR NEUROPATHY AT ELBOW, RIGHT: ICD-10-CM

## 2017-08-30 RX ORDER — GABAPENTIN 400 MG/1
CAPSULE ORAL
Qty: 90 CAP | Refills: 1 | Status: SHIPPED | OUTPATIENT
Start: 2017-08-30 | End: 2018-04-13

## 2017-08-30 NOTE — PATIENT INSTRUCTIONS
Ulnar Neuropathy (Handlebar Palsy): Exercises  Your Care Instructions  Here are some examples of typical rehabilitation exercises for your condition. Start each exercise slowly. Ease off the exercise if you start to have pain. Your doctor or your physical or occupational therapist will tell you when you can start these exercises and which ones will work best for you. How to do the exercises  Neck rotation    1. Sit in a firm chair, or stand up straight. 2. Keeping your chin level, turn your head to the right, and hold for 15 to 30 seconds. 3. Turn your head to the left, and hold for 15 to 30 seconds. 4. Repeat 2 to 4 times to each side. Shoulder blade squeeze    1. While standing with your arms at your sides, squeeze your shoulder blades together. Do not raise your shoulders as you are squeezing. 2. Hold for 6 seconds. 3. Repeat 8 to 12 times. Neck stretches    1. Look straight ahead, and tip your right ear to your right shoulder. Do not let your left shoulder rise as you tip your head to the right. 2. Hold for 15 to 30 seconds. 3. Tilt your head to the left. Do not let your right shoulder rise as you tip your head to the left. 4. Hold for 15 to 30 seconds. 5. Repeat 2 to 4 times to each side. Elbow flexion and extension    Note: If this exercise causes numbness, tingling, or pain in your hand, ease off of the stretch. You should not have symptoms as you stretch. If you cannot back off enough so that you can do the exercise without symptoms, stop doing the exercise right away. 1. Stand with your arms relaxed at your sides. 2. With your affected arm, gently bend your elbow up toward you as far as possible. 3. Then straighten your arm as much as you can. 4. Repeat 2 to 4 times. Wrist flexor stretch    Note: If this exercise causes numbness, tingling, or pain in your hand, ease off of the stretch. You should not have symptoms as you stretch.  If you cannot back off enough so that you can do the exercise without symptoms, stop doing the exercise right away. 1. Extend your affected arm in front of you with your palm facing away from your body. 2. Bend back your wrist on your affected arm, pointing your hand up toward the ceiling. 3. With your other hand, gently bend your wrist farther until you feel a mild to moderate stretch in your forearm. 4. Hold for at least 15 to 30 seconds. 5. Repeat 2 to 4 times. 6. Repeat steps 1 through 5, but this time extend your affected arm in front of you with your palm facing up. Then bend back your wrist, pointing your hand toward the floor. Wrist flexion and extension    Note: If this exercise causes numbness, tingling, or pain in your hand, ease off of the stretch. You should not have symptoms as you stretch. If you cannot back off enough so that you can do the exercise without symptoms, stop doing the exercise right away. 1. Place your forearm on a table, with your affected hand and wrist extended beyond the table, palm down. 2. Slowly bend your wrist to move your hand upward and allow your hand to close into a fist. Hold for about 6 seconds. 3. Then lower your hand and allow your fingers to relax. Hold this position for about 6 seconds. You should feel a gentle stretch. 4. Repeat 8 to 12 times. Follow-up care is a key part of your treatment and safety. Be sure to make and go to all appointments, and call your doctor if you are having problems. It's also a good idea to know your test results and keep a list of the medicines you take. Where can you learn more? Go to http://nneka-mindi.info/. Enter H867 in the search box to learn more about \"Ulnar Neuropathy (Handlebar Palsy): Exercises. \"  Current as of: March 21, 2017  Content Version: 11.3  © 2496-5664 [employer], [employer]. Care instructions adapted under license by [employer] (which disclaims liability or warranty for this information).  If you have questions about a medical condition or this instruction, always ask your healthcare professional. [place] [address] any warranty or liability for your use of this information.

## 2017-08-30 NOTE — PROGRESS NOTES
[address]  [place], [facility] [address]  Phone: [phone]  Fax: [phone]        Imani Damon  : [birth year]  PCP: Cheryl Rosenthal MD      NEW PATIENT      ASSESSMENT AND PLAN     Diagnoses and all orders for this visit:    1. Cervical radiculopathy  -     [10059] C Spine 2-3V  -     MRI CERV SPINE WO CONT; Future    2. Lumbar radiculopathy  -     [62329] LS Spine 4V  -     MRI LUMB SPINE WO CONT; Future    3. Ulnar neuropathy at elbow, right    Other orders  -     gabapentin (NEURONTIN) 400 mg capsule; 1 tab PO QHS  Indications: pain    1. Advised to avoid any overhead activity. 2. Cervical and Lumbar spine MRI. 3. Increase Gabapentin to 400 mg QHS. 4. Given home exercises for ulnar neuropathy. Follow-up Disposition:  Return for MRI f/u. CHIEF COMPLAINT  Justus Holloway is seen today in consultation as self referral for complaints of neck and low back pain for over 5 years. HISTORY OF PRESENT ILLNESS  Justus Holloway is a 67 y.o. male. Pt denies any specific incident or injury that caused their pain. Pt was consulting with Dr. Geovany Santos for his pain, had PT, has had xray guided caudal injections in the past W/Dr. Lulu Perez that gave him several months of benefit. Since Dr. Lulu Perez  left, and he has been having US guided injections which has not been as effective for him. Pt notes that his neck pain will radiate into his LT shoulder and shoulder blade. He has experienced LUE and chest pain. His back pain radiates into his RT lateral thigh and calf. He does have some LT buttock pain, does not radiate into his leg. His radiating pain is aggravated by standing and walking, tolerance varies per day. He has a Hx of ulnar neuropathy and has had an injection with benefit but he continues to have some paresthesias in his RT hand. He also has CTS, does not routinely wear a brace at night.  Pt also has knee issues which causes him some balance issues. No saddle paresthesias. He is on Gabapentin 300 mg QHS- he believes for neuropathy. He is taking Norco PRN #180 that he receives from Dr. Ananda Elizondo. Denies persistent fevers, chills, weight changes, neurogenic bowel or bladder symptoms. Pt denies recent ED visits or hospitalizations. He has had cervical and lumbar MRIs in 2007 ordered by Dr. Edward Rosa which are the last imaging of his back he has had which showed borderline cervical canal stenosis and mild degenerative findings in the lumbar spine. Previously in PM, did not like being on too much medication, Keeley Cho has been managed through PCP for years    Extensive vascular disease, s/p coronary stenting, LT femoral and RT iliac stenting. Hx of CEA on the RT, LT carotid is 100% blocked. He sees Dr. Em Braxton. PMHx of prostate cancer. His last stent was placed in 2015, has not had an MRI since his placement.      Pain Assessment  8/30/2017   Location of Pain Back   Location Modifiers -   Severity of Pain 4   Quality of Pain Aching   Duration of Pain -   Frequency of Pain Constant   Aggravating Factors -   Limiting Behavior -   Relieving Factors -   Relieving Factors Comment -   Result of Injury No         PAST MEDICAL HISTORY   Past Medical History:   Diagnosis Date    Adenoma of left adrenal gland     2009  1 cm, no change 1/15, 2/16    Asbestosis     Atrial fibrillation     CHA2DS2-VAsc=3(age+, htn+, vasc dx+; estimated yearly stroke risk according to Lip et al. is 3,2%), Hasbled=2(estimated yearly bleeding risk according to pisters et al. is 1,88%); not anticoagulated due to h/o gi bleed    Atrial fibrillation ablation     10/08    Basal cell cancer     Dr Barb Zarate; he's had >350 lesions removed    Carotid occlusion     left     Cervical radiculopathy     MRI 9/11 showed C3-6 severe foraminal stenosis    Chronic pain     Colon polyp     Dr Quintin Castillo 9/15    Coronary artery disease     RCA - 3.0 x 16mm TAXUS 9/04, 3.0 x 16mm TAXUS 12/06    Dyslipidemia     Erectile dysfunction     GERD     GI bleed     Hearing loss     2014  bilateral Dr Luis Falter    Hernia, umbilical     Hypertension     Hypogonadism male     Lumbar radiculopathy     Dr Maria Luisa Wall;  MRI 9/11 L4-5 disc bulging, annular tear, disc dessication    Myofascial pain dysfunction syndrome     pain clinic     Osteoarthritis     right knee Dr Maryanne Merritt Peripheral vascular disease     50-60% R iliac; s/p R iliac stent and L femoral artery stent in past; R OLIVIA 0.76 (1/16)    Plantar fasciitis     bilateral     PPD positive     Prediabetes     Prostate cancer     T1c Carlisle 7(3+4), 70% in 1 core, GS 7 (3+4) in 4 cores, GS 6 (3+3) in 1 core; psa 5.28, TRUS 18 gm;  Dr Stacey Purcell; s/p cryoablation 10/16    Recurrent umbilical hernia     Subclinical hypothyroidism     denies    Tinnitus     Dr Yue Sierra Ulcerative colitis     Venous insufficiency        Past Surgical History:   Procedure Laterality Date    HX CAROTID ENDARTERECTOMY      1/14  right    HX Carmela Gottron HX COLONOSCOPY      Dr Stacey Purcell 9/15 polyps    HX CRYOABLATION OF THE PROSTATE      10/16    HX [address], [address]    HX ORTHOPAEDIC      right knee surgery    HX REFRACTIVE SURGERY      OD (hemoplasty to right eye on retina to avoid blindness)    HX TONSILLECTOMY      1955    [place] LAP, RECURRENT INCISIONAL HERNIA REPAIR,REDUCIBLE N/A 02/09/2017    Dr. Garcia Cons HERNIA,5+Y/O,REDUCIBL      2/16  left  Dr. Iman CARLTON,6+P/O,AJIYU      2/16  Dr. Jina Santos      1/16  R OLIVIA 0.76, L OLIVIA 1.02    VASCULAR SURGERY PROCEDURE UNLIST      10/15   left fem art and left iliac stent       MEDICATIONS      Current Outpatient Prescriptions   Medication Sig Dispense Refill    NITROGLYCERIN (NITROQUICK SL) by SubLINGual route.       atenolol (TENORMIN) 25 mg tablet TAKE ONE TABLET BY MOUTH TWICE A  Tab 2    HYDROcodone-acetaminophen (NORCO) 5-325 mg per tablet Take 1 Tab by mouth every four (4) hours as needed (for chronic pain). 180 Tab 0    diclofenac (VOLTAREN) 1 % gel Apply 2-4 g to affected area four (4) times daily. To affected area (topically) 100 g 5    besifloxacin (BESIVANCE) 0.6 % drps ophthalmic suspension 1 Drop two (2) times a day.  Difluprednate (DUREZOL) 0.05 % ophthalmic emulsion Administer 1 Drop to both eyes four (4) times daily.  nepafenac (ILEVRO) 0.3 % drps Apply 1 Drop to eye.  LORazepam (ATIVAN) 1 mg tablet Take 1 Tab by mouth every eight (8) hours as needed. 50 Tab 0    sildenafil citrate (VIAGRA) 100 mg tablet Take 1 Tab by mouth daily as needed. Indications: Erectile Dysfunction 12 Tab 6    losartan (COZAAR) 25 mg tablet TAKE ONE TABLET BY MOUTH TWICE A  Tab 2    aspirin delayed-release 81 mg tablet Take 81 mg by mouth daily.  triamterene-hydrochlorothiazide (MAXZIDE) 37.5-25 mg per tablet TAKE ONE TABLET BY MOUTH DAILY 90 Tab 3    ezetimibe-simvastatin (VYTORIN 10/40) 10-40 mg per tablet Take 1 Tab by mouth nightly. 90 Tab 3    Cholecalciferol, Vitamin D3, 1,000 unit cap Take 1,000 Units by mouth daily.  VOLTAREN 1 % topical gel as needed.  OMEGA-3 FATTY ACIDS/FISH OIL (OMEGA 3 FISH OIL PO) Take 900 mg by mouth daily.  gabapentin (NEURONTIN) 300 mg capsule Take 300 mg by mouth nightly.  MULTIVITAMIN PO Take  by mouth daily.  pantoprazole (PROTONIX) 40 mg tablet Take 40 mg by mouth daily.  coenzyme q10 200 mg Cap Take  by mouth.       methylPREDNISolone (MEDROL DOSEPACK) 4 mg tablet Take as directed (Patient not taking: Reported on 8/3/2017) 1 Dose Pack 0       ALLERGIES    Allergies   Allergen Reactions    Altace [Ramipril] Unknown (comments)    Lipitor [Atorvastatin] Other (comments)     Muscle pain    Lisinopril Shortness of Breath and Other (comments)     Turns bright red    Other Medication Other (comments)     Vicryl suture on skin tends to be rejected with poor wound healing does better with monocryl    Procardia [Nifedipine] Other (comments)     Caused afib          SOCIAL HISTORY    Social History     Social History    Marital status:      Spouse name: N/A    Number of children: N/A    Years of education: N/A     Occupational History    Not on file. Social History Main Topics    Smoking status: Former Smoker     Packs/day: 1.50     Years: 25.00     Types: Cigarettes     Quit date: 1/1/2003    Smokeless tobacco: Never Used    Alcohol use 2.5 oz/week     5 Cans of beer per week      Comment: 5 beers a week    Drug use: No    Sexual activity: Yes     Partners: Female     Birth control/ protection: None     Other Topics Concern    Not on file     Social History Narrative       FAMILY HISTORY    Family History   Problem Relation Age of Onset    Heart Disease Mother     Hypertension Mother     Diabetes Mother     Arthritis-osteo Mother     Heart Disease Father     Stroke Father     Hypertension Father     Heart Disease Sister     Hypertension Sister          REVIEW OF SYSTEMS  Review of Systems   Constitutional: Negative for chills, fever and weight loss. Respiratory: Negative for shortness of breath. Cardiovascular: Negative for chest pain. Gastrointestinal: Negative for constipation. Negative for fecal incontinence   Genitourinary: Negative for dysuria. Negative for urinary incontinence   Musculoskeletal:        Per HPI   Skin: Negative for rash. Neurological: Positive for tingling. Negative for dizziness, tremors, focal weakness and headaches. Endo/Heme/Allergies: Does not bruise/bleed easily. Psychiatric/Behavioral: The patient does not have insomnia.           PHYSICAL EXAMINATION  Visit Vitals    /71    Pulse 70    Temp 98.6 °F (37 °C) (Oral)    Resp 18    Ht 6' (1.829 m)    Wt 222 lb 3.2 oz (100.8 kg)    SpO2 97%    BMI 30.14 kg/m2 Accompanied by self. Constitutional:  Well developed, well nourished, in no acute distress. Psychiatric: Affect and mood are appropriate. Integumentary: No rashes or abrasions noted on exposed areas. Cardiovascular/Peripheral Vascular: Intact l pulses. No peripheral edema is noted. Lymphatic:  No evidence of lymphedema. No cervical lymphadenopathy. SPINE/MUSCULOSKELETAL EXAM    Cervical spine:  Neck is forward flexed. Normal muscle tone. No focal atrophy is noted. Shoulder ROM intact. Tenderness to palpation splenius capitus. Negative Spurling's sign. Positive Tinel's sign RT wrist and elbow. Negative Cortes's sign. Sensation to light touch decreased left 5th digit. Lumbar spine:  No rash, ecchymosis, or gross obliquity. No fasciculations. No focal atrophy is noted. Tenderness to palpation bilateral SI joints. Mild tenderness trochanteric bursa. No tenderness to palpation at the sciatic notch. Sensation grossly intact to light touch. MOTOR:      Biceps  Triceps Deltoids Wrist Ext Wrist Flex Hand Intrin   Right +4/5 +4/5 +4/5 +4/5 +4/5 4/5   Left +4/5 +4/5 +4/5 +4/5 +4/5 4/5        Hip Flex  Quads Hamstrings Ankle DF EHL Ankle PF   Right +4/5 +4/5 +4/5 +4/5 +4/5 +4/5   Left +4/5 +4/5 +4/5 +4/5 +4/5 +4/5     DTRs are hypoactive biceps, triceps, brachioradialis, patella, and Achilles. Straight Leg raise negative. Hamstring tightness bilaterally. Mild difficulty with tandem gait. Heel walk intact. Toe rise intact    Ambulation without assistive device. FWB. RADIOGRAPHS  Cervical spine xray films reviewed:  1) hyperlordosis   2) C5-6 and C6-7 degenerative changes and anterior osteophytes. Lumbar spine xray films reviewed:  1) Mild rotation of spine  2) facet changes from L4 to the sacrum    Written by Yesi Joel, as dictated by Jose Luis Miranda MD.    I, Dr. Jose Luis Miranda MD, confirm that all documentation is accurate.       Mr. Nadia Lobato may have a reminder for a \"due or due soon\" health maintenance. I have asked that he contact his primary care provider for follow-up on this health maintenance.

## 2017-08-30 NOTE — PROGRESS NOTES
Verbal order entered per Dr. Sofía Rodriguez as documented on blue sheet:   -MRI cervical radic  -MRI lumbar radic  -Gabapentin 400 mg, 1 tab OP QHS, disp 90, 1RF

## 2017-08-30 NOTE — MR AVS SNAPSHOT
Visit Information Date & Time Provider Department Dept. Phone Encounter #  
 8/30/2017 10:00  [address],  [address], [address] and [facility] [phone] [id number] Follow-up Instructions Return for MRI f/u. Routing History Your Appointments 9/1/2017  8:30 AM  
Follow Up with Grant Martin PA-C  
[facility] [place] [address] [address]) Appt Note: 4wk fu  
 [address] [facility], [address]  
[phone]  
  
   
 [address], [address] [address]  
  
    
 10/3/2017  8:50 AM  
Nurse Visit with [facility] NURSE [facility] ([facility]) Appt Note: PSA  
 [address] 
[address] [place] [address]  
[address] [facility] [address] [place] [address]  
  
    
 1/3/2018  8:35 AM  
LAB with [facility] NURSE VISIT Internist of [facility] ([address]) Appt Note: lab  
 [address], Suite [facility] [place] [address] [place]  
  
   
 [address], [address]  
  
    
 1/9/2018  8:00 AM  
Office Visit with Shyanne Barrett MD  
Internist of [address] [place] [address] [address] [facility]) Appt Note: 6 months [address], [address] [place] [address] [place]  
  
   
 [address] [facility], [address]  
  
    
 2/2/2018  8:00 AM  
PROCEDURE with [facility] IMAGING 1 [facility] ([address]) Appt Note: iliac 1 yr juni; .  
 [address] [address] [address] [facility] [address]  
[phone] [address] [place], [place] [id number]  
  
    
 2/2/2018 10:00 AM  
PROCEDURE with [facility] NONIMAGING [facility] ([facility]) Appt Note: leg art 1 yr juni; .  
 [address] [address] [facility] [address]  
[phone]  [address] [place] [address] [address] [place] [address]  
  
    
 2/19/2018  9:30 AM  
 Follow Up with MD Sybil Hua [facility] [address] [place] [address]) Appt Note: 1 year fu after studies; FAXED OVER INFORMATION FOR MICHI TO CALL PATIENT DUE TO STUDIES WERE NOT IN CC AT TIME OF CHECK OUT; [address]; 1 year fu after studies FAXED OVER INFORMATION  [place]pin Naselle TO CALL PATIENT DUE TO STUDIES WERE NOT IN CC AT TIME OF CHECK  [address]; .  
 [address]  
[phone] [address]  
  
    
  
 10/11/2017  8:45 AM  
Any with Dahlia Mayer MD  
[facility] ([address] [facility]) Appt Note: 6 mo fu  
 [address] 
[address] [facility] [address]  
[address] [facility] [address] [facility] [address] Upcoming Health Maintenance Date Due INFLUENZA AGE 9 TO ADULT 8/1/2017 MEDICARE YEARLY EXAM 6/30/2018 GLAUCOMA SCREENING Q2Y 6/25/2019 DTaP/Tdap/Td series (2 - Td) 4/3/2023 COLONOSCOPY 9/22/2025 Allergies as of 8/30/2017  Review Complete On: 8/30/2017 By: Dagoberto Martinez MD  
  
 Severity Noted Reaction Type Reactions Altace [Ramipril]    Unknown (comments) Lipitor [Atorvastatin]    Other (comments) Muscle pain Lisinopril    Shortness of Breath, Other (comments) Turns bright red Other Medication  03/05/2014    Other (comments) Vicryl suture on skin tends to be rejected with poor wound healing does better with monocryl Procardia [Nifedipine]    Other (comments) Caused afib Current Immunizations  Reviewed on 12/22/2015 Name Date Influenza High Dose Vaccine PF 9/16/2016, 10/2/2015 12:30 PM  
 Influenza Vaccine 10/10/2014, 10/4/2013 Influenza Vaccine Split 10/10/2012  2:28 PM, 9/28/2011 Influenza Vaccine Whole 10/8/2010 Pneumococcal Conjugate (PCV-13) 12/15/2014  8:16 AM  
 Pneumococcal Polysaccharide (PPSV-23) 1/24/2014 Pneumococcal Vaccine (Unspecified Type) 1/1/2006 Td 1/1/2006 Tdap 4/3/2013  8:23 PM  
 Zoster Vaccine, Live 1/1/2007 Not reviewed this visit You Were Diagnosed With   
  
 Codes Comments Cervical radiculopathy    -  Primary ICD-10-CM: M54.12 
ICD-9-CM: 723.4 Lumbar radiculopathy     ICD-10-CM: M54.16 
ICD-9-CM: 724.4 Ulnar neuropathy at elbow, right     ICD-10-CM: G56.21 ICD-9-CM: 159. 2 Vitals BP Pulse Temp Resp Height(growth percentile) Weight(growth percentile) 130/71 70 98.6 °F (37 °C) (Oral) 18 6' (1.829 m) 222 lb 3.2 oz (100.8 kg) SpO2 BMI Smoking Status 97% 30.14 kg/m2 Former Smoker BMI and BSA Data Body Mass Index Body Surface Area  
 30.14 kg/m 2 2.26 m 2 Preferred Pharmacy Pharmacy Name Phone Chapis Fu [address] [facility], [address] [place] [address] [phone] Your Updated Medication List  
  
   
This list is accurate as of: 8/30/17 10:39 AM.  Always use your most recent med list.  
  
  
  
  
 aspirin delayed-release 81 mg tablet Take 81 mg by mouth daily. atenolol 25 mg tablet Commonly known as:  TENORMIN  
TAKE ONE TABLET BY MOUTH TWICE A DAY BESIVANCE 0.6 % Drps ophthalmic suspension Generic drug:  besifloxacin 1 Drop two (2) times a day. cholecalciferol 1,000 unit Cap Commonly known as:  VITAMIN D3 Take 1,000 Units by mouth daily. coenzyme Q-10 200 mg capsule Commonly known as:  CO Q-10 Take  by mouth. DUREZOL 0.05 % ophthalmic emulsion Generic drug:  Difluprednate Administer 1 Drop to both eyes four (4) times daily. ezetimibe-simvastatin 10-40 mg per tablet Commonly known as:  Vytorin 10/40 Take 1 Tab by mouth nightly. * gabapentin 300 mg capsule Commonly known as:  NEURONTIN Take 300 mg by mouth nightly. * gabapentin 400 mg capsule Commonly known as:  NEURONTIN  
1 tab PO QHS  Indications: pain HYDROcodone-acetaminophen 5-325 mg per tablet Commonly known as:  Tessa Bryant  
 Take 1 Tab by mouth every four (4) hours as needed (for chronic pain). ILEVRO 0.3 % Drps Generic drug:  nepafenac Apply 1 Drop to eye. LORazepam 1 mg tablet Commonly known as:  ATIVAN Take 1 Tab by mouth every eight (8) hours as needed. losartan 25 mg tablet Commonly known as:  COZAAR  
TAKE ONE TABLET BY MOUTH TWICE A DAY  
  
 methylPREDNISolone 4 mg tablet Commonly known as:  Colan Necessary Take as directed MULTIVITAMIN PO Take  by mouth daily. NITROQUICK SL  
by SubLINGual route. OMEGA 3 FISH OIL PO Take 900 mg by mouth daily. PROTONIX 40 mg tablet Generic drug:  pantoprazole Take 40 mg by mouth daily. sildenafil citrate 100 mg tablet Commonly known as:  VIAGRA Take 1 Tab by mouth daily as needed. Indications: Erectile Dysfunction  
  
 triamterene-hydroCHLOROthiazide 37.5-25 mg per tablet Commonly known as:  Pink Millie TAKE ONE TABLET BY MOUTH DAILY * VOLTAREN 1 % Gel Generic drug:  diclofenac  
as needed. * diclofenac 1 % Gel Commonly known as:  VOLTAREN Apply 2-4 g to affected area four (4) times daily. To affected area (topically) * Notice: This list has 4 medication(s) that are the same as other medications prescribed for you. Read the directions carefully, and ask your doctor or other care provider to review them with you. Prescriptions Sent to Pharmacy Refills  
 gabapentin (NEURONTIN) 400 mg capsule 1 Si tab PO QHS  Indications: pain  
 Class: Normal  
 Pharmacy: [address] Ph #: [phone] We Performed the Following AMB POC XRAY, SPINE, CERVICAL; 2 OR 3 [63476 CPT(R)] AMB POC XRAY, SPINE, LUMBOSACRAL; 4+ [id number] CPT(R)] Follow-up Instructions Return for MRI f/u. To-Do List   
 2017 Imaging:  MRI CERV SPINE WO CONT   
  
 2017 Imaging:  MRI LUMB SPINE WO CONT Patient Instructions Ulnar Neuropathy (Handlebar Palsy): Exercises Your Care Instructions Here are some examples of typical rehabilitation exercises for your condition. Start each exercise slowly. Ease off the exercise if you start to have pain. Your doctor or your physical or occupational therapist will tell you when you can start these exercises and which ones will work best for you. How to do the exercises Neck rotation 1. Sit in a firm chair, or stand up straight. 2. Keeping your chin level, turn your head to the right, and hold for 15 to 30 seconds. 3. Turn your head to the left, and hold for 15 to 30 seconds. 4. Repeat 2 to 4 times to each side. Shoulder blade squeeze 1. While standing with your arms at your sides, squeeze your shoulder blades together. Do not raise your shoulders as you are squeezing. 2. Hold for 6 seconds. 3. Repeat 8 to 12 times. Neck stretches 1. Look straight ahead, and tip your right ear to your right shoulder. Do not let your left shoulder rise as you tip your head to the right. 2. Hold for 15 to 30 seconds. 3. Tilt your head to the left. Do not let your right shoulder rise as you tip your head to the left. 4. Hold for 15 to 30 seconds. 5. Repeat 2 to 4 times to each side. Elbow flexion and extension Note: If this exercise causes numbness, tingling, or pain in your hand, ease off of the stretch. You should not have symptoms as you stretch. If you cannot back off enough so that you can do the exercise without symptoms, stop doing the exercise right away. 1. Stand with your arms relaxed at your sides. 2. With your affected arm, gently bend your elbow up toward you as far as possible. 3. Then straighten your arm as much as you can. 4. Repeat 2 to 4 times. Wrist flexor stretch Note: If this exercise causes numbness, tingling, or pain in your hand, ease off of the stretch. You should not have symptoms as you stretch.  If you cannot back off enough so that you can do the exercise without symptoms, stop doing the exercise right away. 1. Extend your affected arm in front of you with your palm facing away from your body. 2. Bend back your wrist on your affected arm, pointing your hand up toward the ceiling. 3. With your other hand, gently bend your wrist farther until you feel a mild to moderate stretch in your forearm. 4. Hold for at least 15 to 30 seconds. 5. Repeat 2 to 4 times. 6. Repeat steps 1 through 5, but this time extend your affected arm in front of you with your palm facing up. Then bend back your wrist, pointing your hand toward the floor. Wrist flexion and extension Note: If this exercise causes numbness, tingling, or pain in your hand, ease off of the stretch. You should not have symptoms as you stretch. If you cannot back off enough so that you can do the exercise without symptoms, stop doing the exercise right away. 1. Place your forearm on a table, with your affected hand and wrist extended beyond the table, palm down. 2. Slowly bend your wrist to move your hand upward and allow your hand to close into a fist. Hold for about 6 seconds. 3. Then lower your hand and allow your fingers to relax. Hold this position for about 6 seconds. You should feel a gentle stretch. 4. Repeat 8 to 12 times. Follow-up care is a key part of your treatment and safety. Be sure to make and go to all appointments, and call your doctor if you are having problems. It's also a good idea to know your test results and keep a list of the medicines you take. Where can you learn more? Go to http://nneka-mindi.info/. Enter Q635 in the search box to learn more about \"Ulnar Neuropathy (Handlebar Palsy): Exercises. \" Current as of: March 21, 2017 Content Version: 11.3 © 7958-6482 [employer].  Care instructions adapted under license by [employer] (which disclaims liability or warranty for this information). If you have questions about a medical condition or this instruction, always ask your healthcare professional. [address] any warranty or liability for your use of this information. Introducing [facility]! Dear Scar Rosario: Thank you for requesting a JoinTV account. Our records indicate that you already have an active JoinTV account. You can access your account anytime at [email or web address] Did you know that you can access your hospital and ER discharge instructions at any time in [employer]? You can also review all of your test results from your hospital stay or ER visit. Additional Information If you have questions, please visit the Frequently Asked Questions section of the [employer] website at [email or web address] Remember, Join[employer] is NOT to be used for urgent needs. For medical emergencies, dial 911. Now available from your iPhone and Android! Please provide this summary of care documentation to your next provider. Your primary care clinician is listed as Raul Sampson. If you have any questions after today's visit, please call [phone].

## 2017-09-01 ENCOUNTER — OFFICE VISIT (OUTPATIENT)
Dept: ORTHOPEDIC SURGERY | Facility: CLINIC | Age: 72
End: 2017-09-01

## 2017-09-01 VITALS
WEIGHT: 222.2 LBS | OXYGEN SATURATION: 96 % | HEIGHT: 72 IN | DIASTOLIC BLOOD PRESSURE: 75 MMHG | HEART RATE: 64 BPM | SYSTOLIC BLOOD PRESSURE: 143 MMHG | BODY MASS INDEX: 30.1 KG/M2 | TEMPERATURE: 97.3 F | RESPIRATION RATE: 18 BRPM

## 2017-09-01 DIAGNOSIS — M17.11 PRIMARY OSTEOARTHRITIS OF RIGHT KNEE: Primary | ICD-10-CM

## 2017-09-01 NOTE — MR AVS SNAPSHOT
Visit Information Date & Time Provider Department Dept. Phone Encounter #  
 9/1/2017  8:30 AM lGoria Germain, [address] [phone] Your Appointments 10/3/2017  8:50 AM  
Nurse Visit with [facility]ENO NURSE [facility] ([facility]) Appt Note: PSA  
 [address] 
[address] [facility] [address]  
[phone]  
  
   
 [address] [facility] [address] [facility] [address]  
  
    
 10/4/2017  9:15 AM  
DIAG TEST F/U with MD LOVE Gambino [facility] MAST ONE [facility]) Appt Note: MRI F/U [facility] DISC  
 [address] [facility] [address]  
[phone]  
  
   
 [address] [facility],[address]  
  
    
 1/3/2018  8:35 AM  
LAB with [facility] NURSE VISIT Internist of [facility] ([facility]) Appt Note: lab  
 [address], [address] [facility] [address] [place]  
  
   
 [address], [address]  
  
    
 1/9/2018  8:00 AM  
Office Visit with Tino Calixto MD  
Internist of [address] [place] [address]) Appt Note: 6 months [address], [address] [facility] [facility] [address] [place]  
  
   
 5445 [facility], [address]  
  
    
 2/2/2018  8:00 AM  
PROCEDURE with [facility] IMAGING 1 [facility] ([facility]) Appt Note: iliac 1 yr alfredo; .  
 [address] 200 [facility]  
[phone] [address] [place], [place] [address]  
  
    
 2/2/2018 10:00 AM  
PROCEDURE with [facility] NONIMAGING [facility] ([facility]) Appt Note: leg art 1 yr alfredo; .  
 [address] 200 [facility]  
[phone] [address]  
  
    
 2/19/2018  9:30 AM  
Follow Up with Tran Rodriguez MD  
 [facility] ([address] [facility]) Appt Note: 1 year fu after studies; FAXED OVER INFORMATION FOR MICHI TO CALL PATIENT DUE TO STUDIES WERE NOT IN CC AT TIME OF CHECK OUT; [address]; 1 year fu after studies FAXED OVER INFORMATION FOR Turner Ramirez Ione TO CALL PATIENT DUE TO STUDIES WERE NOT IN CC AT TIME OF CHECK  [address]; .  
 [address] [address] 200 [facility]  
[phone] [address]  
  
    
  
 10/11/2017  8:45 AM  
Any with Alejandra Aals MD  
[facility]rology of [facility] ([facility]) Appt Note: 6 mo fu  
 [address] 
[address] [facility] [address]  
[address] [facility] [address] 3b [facility] [address] Upcoming Health Maintenance Date Due INFLUENZA AGE 9 TO ADULT 8/1/2017 MEDICARE YEARLY EXAM 6/30/2018 GLAUCOMA SCREENING Q2Y 6/25/2019 DTaP/Tdap/Td series (2 - Td) 4/3/2023 COLONOSCOPY 9/22/2025 Allergies as of 9/1/2017  Review Complete On: 9/1/2017 By: Patricia Henderson Severity Noted Reaction Type Reactions Altace [Ramipril]    Unknown (comments) Lipitor [Atorvastatin]    Other (comments) Muscle pain Lisinopril    Shortness of Breath, Other (comments) Turns bright red Other Medication  03/05/2014    Other (comments) Vicryl suture on skin tends to be rejected with poor wound healing does better with monocryl Procardia [Nifedipine]    Other (comments) Caused afib Current Immunizations  Reviewed on 12/22/2015 Name Date Influenza High Dose Vaccine PF 9/16/2016, 10/2/2015 12:30 PM  
 Influenza Vaccine 10/10/2014, 10/4/2013 Influenza Vaccine Split 10/10/2012  2:28 PM, 9/28/2011 Influenza Vaccine Whole 10/8/2010 Pneumococcal Conjugate (PCV-13) 12/15/2014  8:16 AM  
 Pneumococcal Polysaccharide (PPSV-23) 1/24/2014 Pneumococcal Vaccine (Unspecified Type) 1/1/2006 Td 1/1/2006 Tdap 4/3/2013  8:23 PM  
 Zoster Vaccine, Live 1/1/2007 Not reviewed this visit You Were Diagnosed With   
  
 Codes Comments Primary osteoarthritis of right knee    -  Primary ICD-10-CM: M17.11 ICD-9-CM: 715.16 Vitals BP Pulse Temp Resp Height(growth percentile) Weight(growth percentile) 143/75 64 97.3 °F (36.3 °C) (Oral) 18 6' (1.829 m) 222 lb 3.2 oz (100.8 kg) SpO2 BMI Smoking Status 96% 30.14 kg/m2 Former Smoker BMI and BSA Data Body Mass Index Body Surface Area  
 30.14 kg/m 2 2.26 m 2 Preferred Pharmacy Pharmacy Name Phone Christen Neal, [address] [place] [address] Road [phone] Your Updated Medication List  
  
   
This list is accurate as of: 9/1/17  8:52 AM.  Always use your most recent med list.  
  
  
  
  
 aspirin delayed-release 81 mg tablet Take 81 mg by mouth daily. atenolol 25 mg tablet Commonly known as:  TENORMIN  
TAKE ONE TABLET BY MOUTH TWICE A DAY BESIVANCE 0.6 % Drps ophthalmic suspension Generic drug:  besifloxacin 1 Drop two (2) times a day. cholecalciferol 1,000 unit Cap Commonly known as:  VITAMIN D3 Take 1,000 Units by mouth daily. coenzyme Q-10 200 mg capsule Commonly known as:  CO Q-10 Take  by mouth. DUREZOL 0.05 % ophthalmic emulsion Generic drug:  Difluprednate Administer 1 Drop to both eyes four (4) times daily. ezetimibe-simvastatin 10-40 mg per tablet Commonly known as:  Vytorin 10/40 Take 1 Tab by mouth nightly. * gabapentin 300 mg capsule Commonly known as:  NEURONTIN Take 300 mg by mouth nightly. * gabapentin 400 mg capsule Commonly known as:  NEURONTIN  
1 tab PO QHS  Indications: pain HYDROcodone-acetaminophen 5-325 mg per tablet Commonly known as:  Rekha Harvinder Take 1 Tab by mouth every four (4) hours as needed (for chronic pain). ILEVRO 0.3 % Drps Generic drug:  nepafenac Apply 1 Drop to eye. LORazepam 1 mg tablet Commonly known as:  ATIVAN Take 1 Tab by mouth every eight (8) hours as needed. losartan 25 mg tablet Commonly known as:  COZAAR  
TAKE ONE TABLET BY MOUTH TWICE A DAY  
  
 methylPREDNISolone 4 mg tablet Commonly known as:  Vernestine Bonds Take as directed MULTIVITAMIN PO Take  by mouth daily. NITROQUICK SL  
by SubLINGual route. OMEGA 3 FISH OIL PO Take 900 mg by mouth daily. PROTONIX 40 mg tablet Generic drug:  pantoprazole Take 40 mg by mouth daily. sildenafil citrate 100 mg tablet Commonly known as:  VIAGRA Take 1 Tab by mouth daily as needed. Indications: Erectile Dysfunction  
  
 triamterene-hydroCHLOROthiazide 37.5-25 mg per tablet Commonly known as:  Lori Pluck TAKE ONE TABLET BY MOUTH DAILY * VOLTAREN 1 % Gel Generic drug:  diclofenac  
as needed. * diclofenac 1 % Gel Commonly known as:  VOLTAREN Apply 2-4 g to affected area four (4) times daily. To affected area (topically) * Notice: This list has 4 medication(s) that are the same as other medications prescribed for you. Read the directions carefully, and ask your doctor or other care provider to review them with you. To-Do List   
 09/02/2017 8:00 AM  
  Appointment with [facility] MRI RM 2 at [address] [facility] ([phone]) GENERAL INSTRUCTIONS  Bring information (ID card) if you have any medically implanted devices. You will be required to lie still while the procedure is being performed. Remove any jewelry (including body piercing, hairpins) prior to MRI. If you have had a creatinine level drawn within the past 30 days, please bring most recent results to your appt. Bring any films, CD's, and reports related to your study with you on the day of your exam.  This only includes studies done outside of [address] [place] [address], [facility], Jake Mora , and [place].   Bring a complete list of all medications you are currently taking to include prescriptions, over-the-counter meds, herbals, vitamins & any dietary supplements. If you were given medications for claustrophobia or anxiety, please arrange to have someone drive you to your appointment. QUESTIONS  Notify the MRI Department if you have any questions concerning your study. Naseem Reeves [phone]  
  
 09/02/2017 9:00 AM  
  Appointment with [facility] MRI RM 2 at [facility] ([phone]) GENERAL INSTRUCTIONS  Bring information (ID card) if you have any medically implanted devices. You will be required to lie still while the procedure is being performed. Remove any jewelry (including body piercing, hairpins) prior to MRI. If you have had a creatinine level drawn within the past 30 days, please bring most recent results to your appt. Bring any films, CD's, and reports related to your study with you on the day of your exam.  This only includes studies done outside of [address] [place] [address], [facility], [place], and [facility]. Bring a complete list of all medications you are currently taking to include prescriptions, over-the-counter meds, herbals, vitamins & any dietary supplements. If you were given medications for claustrophobia or anxiety, please arrange to have someone drive you to your appointment. QUESTIONS  Notify the MRI Department if you have any questions concerning your study. [place] - [phone] [facility] - [address] [facility] - [phone] [facility] SERVICES! Dear Jenny Rothman: Thank you for requesting a uMentioned account. Our records indicate that you already have an active uMentioned account. You can access your account anytime at [email or web address] Did you know that you can access your hospital and ER discharge instructions at any time in [employer]? You can also review all of your test results from your hospital stay or ER visit. Additional Information If you have questions, please visit the Frequently Asked Questions section of the [employer] website at [email or web address] Remember, [employer] is NOT to be used for urgent needs. For medical emergencies, dial 911. Now available from your iPhone and Android! Please provide this summary of care documentation to your next provider. Your primary care clinician is listed as Raul Sampson. If you have any questions after today's visit, please call [phone].

## 2017-09-01 NOTE — PROGRESS NOTES
HISTORY OF PRESENT ILLNESS:  Mr. Froy Phipps returns for initiation of viscosupplementation in the form of GELSYN 3. He previously received a cortisone injection and did well from it. He has severe osteoarthritis of the right knee. He does not wish to proceed with any surgical interventions at this time. REVIEW OF SYSTEMS:  No chest pain or shortness of breath, no fevers, chills, or night sweats. No rash and no itching. No nausea and no vomiting. Pain at rest to the right knee since receiving the cortisone is a 2/10 and with activity, the pain only increases to 4-5/10. Generally, prior to the cortisone injection, his pain with activity increased to upwards of 8-9/10. PHYSICAL EXAM:  He is a healthy-appearing, well-developed, well-nourished, pleasant, 51-year-old,  male, atraumatic, normocephalic, alert and oriented times three sitting on the table comfortably. The right knee reveals no warmth, erythema, edema, ecchymosis, or effusion. He has pain along the medial joint line, worse with varus stressing. Active motion is 105-5° with crepitation noted through ranging. The patella tracks midline. There is no evidence of DVT or calf tenderness. Distal sensation is intact fully to the right lower extremity. IMPRESSION:      1. Right knee pain. 2. Right knee range of motion decreased. 3. Right knee osteoarthritis, severe. PROCEDURE:  Today, using sterile technique, after verbal and written consent obtained and appropriate time out performed, 2 cc of GELSYN, lot number [id number] expiration date April 30, 2019, was administered to the right knee using the anterolateral intra-articular approach. There were no complications. The patient tolerated the procedure well. PLAN:   I am currently recommending initiation of GELSYN 3 viscosupplementation series. The patient is going to return to the office in one week.

## 2017-09-02 ENCOUNTER — HOSPITAL ENCOUNTER (OUTPATIENT)
Age: 72
Discharge: HOME | End: 2017-09-02
Attending: PHYSICAL MEDICINE & REHABILITATION
Payer: MEDICARE

## 2017-09-02 DIAGNOSIS — M54.12 CERVICAL RADICULOPATHY: ICD-10-CM

## 2017-09-02 DIAGNOSIS — M54.16 LUMBAR RADICULOPATHY: ICD-10-CM

## 2017-09-02 PROCEDURE — 72141 MRI NECK SPINE W/O DYE: CPT

## 2017-09-02 PROCEDURE — 72148 MRI LUMBAR SPINE W/O DYE: CPT

## 2017-09-08 ENCOUNTER — TELEPHONE (OUTPATIENT)
Dept: INTERNAL MEDICINE CLINIC | Age: 72
End: 2017-09-08

## 2017-09-08 ENCOUNTER — OFFICE VISIT (OUTPATIENT)
Dept: ORTHOPEDIC SURGERY | Facility: CLINIC | Age: 72
End: 2017-09-08

## 2017-09-08 VITALS
SYSTOLIC BLOOD PRESSURE: 123 MMHG | TEMPERATURE: 96.8 F | WEIGHT: 221.2 LBS | BODY MASS INDEX: 29.96 KG/M2 | RESPIRATION RATE: 18 BRPM | DIASTOLIC BLOOD PRESSURE: 68 MMHG | HEART RATE: 63 BPM | HEIGHT: 72 IN | OXYGEN SATURATION: 97 %

## 2017-09-08 DIAGNOSIS — M25.461 KNEE EFFUSION, RIGHT: ICD-10-CM

## 2017-09-08 DIAGNOSIS — M17.11 PRIMARY OSTEOARTHRITIS OF RIGHT KNEE: Primary | ICD-10-CM

## 2017-09-08 DIAGNOSIS — G89.29 CHRONIC PAIN OF RIGHT KNEE: ICD-10-CM

## 2017-09-08 DIAGNOSIS — M25.561 CHRONIC PAIN OF RIGHT KNEE: ICD-10-CM

## 2017-09-08 RX ORDER — BUPIVACAINE HYDROCHLORIDE 2.5 MG/ML
10 INJECTION, SOLUTION EPIDURAL; INFILTRATION; INTRACAUDAL ONCE
Qty: 10 ML | Refills: 0
Start: 2017-09-08 | End: 2017-09-08

## 2017-09-08 NOTE — TELEPHONE ENCOUNTER
Marlin with [employer] is calling in regards to [facility] 6/29/17. He has a bill of $520. The Dx of  is holding it up. It is a very vague code that is not covered. They are wondering if it may be a mistake or if there is another code we can submit to be able to get it paid for the patient.

## 2017-09-08 NOTE — PATIENT INSTRUCTIONS
Knee Arthritis: Exercises  Your Care Instructions  Here are some examples of exercises for knee arthritis. Start each exercise slowly. Ease off the exercise if you start to have pain. Your doctor or physical therapist will tell you when you can start these exercises and which ones will work best for you. How to do the exercises  Knee flexion with heel slide    1. Lie on your back with your knees bent. 2. Slide your heel back by bending your affected knee as far as you can. Then hook your other foot around your ankle to help pull your heel even farther back. 3. Hold for about 6 seconds, then rest for up to 10 seconds. 4. Repeat 8 to 12 times. 5. Switch legs and repeat steps 1 through 4, even if only one knee is sore. Quad sets    1. Sit with your affected leg straight and supported on the floor or a firm bed. Place a small, rolled-up towel under your knee. Your other leg should be bent, with that foot flat on the floor. 2. Tighten the thigh muscles of your affected leg by pressing the back of your knee down into the towel. 3. Hold for about 6 seconds, then rest for up to 10 seconds. 4. Repeat 8 to 12 times. 5. Switch legs and repeat steps 1 through 4, even if only one knee is sore. Straight-leg raises to the front    1. Lie on your back with your good knee bent so that your foot rests flat on the floor. Your affected leg should be straight. Make sure that your low back has a normal curve. You should be able to slip your hand in between the floor and the small of your back, with your palm touching the floor and your back touching the back of your hand. 2. Tighten the thigh muscles in your affected leg by pressing the back of your knee flat down to the floor. Hold your knee straight. 3. Keeping the thigh muscles tight and your leg straight, lift your affected leg up so that your heel is about 12 inches off the floor. Hold for about 6 seconds, then lower slowly.   4. Relax for up to 10 seconds between repetitions. 5. Repeat 8 to 12 times. 6. Switch legs and repeat steps 1 through 5, even if only one knee is sore. Active knee flexion    1. Lie on your stomach with your knees straight. If your kneecap is uncomfortable, roll up a washcloth and put it under your leg just above your kneecap. 2. Lift the foot of your affected leg by bending the knee so that you bring the foot up toward your buttock. If this motion hurts, try it without bending your knee quite as far. This may help you avoid any painful motion. 3. Slowly move your leg up and down. 4. Repeat 8 to 12 times. 5. Switch legs and repeat steps 1 through 4, even if only one knee is sore. Quadriceps stretch (facedown)    1. Lie flat on your stomach, and rest your face on the floor. 2. Wrap a towel or belt strap around the lower part of your affected leg. Then use the towel or belt strap to slowly pull your heel toward your buttock until you feel a stretch. 3. Hold for about 15 to 30 seconds, then relax your leg against the towel or belt strap. 4. Repeat 2 to 4 times. 5. Switch legs and repeat steps 1 through 4, even if only one knee is sore. Stationary exercise bike    If you do not have a stationary exercise bike at home, you can find one to ride at your local health club or community center. 1. Adjust the height of the bike seat so that your knee is slightly bent when your leg is extended downward. If your knee hurts when the pedal reaches the top, you can raise the seat so that your knee does not bend as much. 2. Start slowly. At first, try to do 5 to 10 minutes of cycling with little to no resistance. Then increase your time and the resistance bit by bit until you can do 20 to 30 minutes without pain. 3. If you start to have pain, rest your knee until your pain gets back to the level that is normal for you. Or cycle for less time or with less effort. Follow-up care is a key part of your treatment and safety.  Be sure to make and go to all appointments, and call your doctor if you are having problems. It's also a good idea to know your test results and keep a list of the medicines you take. Where can you learn more? Go to http://nneka-mindi.info/. Enter C159 in the search box to learn more about \"Knee Arthritis: Exercises. \"  Current as of: March 21, 2017  Content Version: 11.3  © 2006-2017 [employer]. Care instructions adapted under license by [employer] (which disclaims liability or warranty for this information). If you have questions about a medical condition or this instruction, always ask your healthcare professional. [address] any warranty or liability for your use of this information. Hyaluronic Acid (By injection)   Hyaluronic Acid (ldd-ek-byp-ON-ate AS-id)  Treats severe pain in your knee due to osteoarthritis. Brand Name(s): Euflexxa, Gel-One, GelSyn-3, GenVisc 850, Hyalgan, Hymovis, Monovisc, Orthovisc, Supartz FX   There may be other brand names for this medicine. When This Medicine Should Not Be Used: This medicine is not right for everyone. You should not receive it if you had an allergic reaction to hyaluronic acid or if you have a bleeding disorder. How to Use This Medicine:   Injectable  · Your doctor will tell you how many injections you will need. This medicine is injected into your knee joint. · A nurse or other health provider will give you this medicine. Drugs and Foods to Avoid:      Ask your doctor or pharmacist before using any other medicine, including over-the-counter medicines, vitamins, and herbal products. Warnings While Using This Medicine:   · Tell your doctor if you are pregnant or breastfeeding, or if you have any allergies, including to birds, feathers, or eggs. · Rest your knee for 48 hours after you receive an injection. Do not do strenuous, weightbearing activities, such as jogging or tennis.  Avoid activities that keep you standing for longer than 1 hour. Possible Side Effects While Using This Medicine:   Call your doctor right away if you notice any of these side effects:  · Allergic reaction: Itching or hives, swelling in your face or hands, swelling or tingling in your mouth or throat, chest tightness, trouble breathing  If you notice these less serious side effects, talk with your doctor:   · Mild increase in pain or swelling in your knee  · Pain, redness, or swelling where the medicine is injected  If you notice other side effects that you think are caused by this medicine, tell your doctor. Call your doctor for medical advice about side effects. You may report side effects to FDA at 5-346-FDA-7222  © 2017 [facility] Information is for End User's use only and may not be sold, redistributed or otherwise used for commercial purposes. The above information is an  only. It is not intended as medical advice for individual conditions or treatments. Talk to your doctor, nurse or pharmacist before following any medical regimen to see if it is safe and effective for you.

## 2017-09-08 NOTE — PROGRESS NOTES
Patient: Justus Holloway                MRN: [id number]       SSN: [id number]  YOB: 1945        AGE: 67 y.o. SEX: male  Body mass index is 30 kg/(m^2). PCP: Cheryl Rosenthal MD  09/08/17    Chief Complaint   Patient presents with    Knee Pain     Right     HISTORY:  Justus Holloway is a 67 y.o. male who is seen for right knee pain. ICD-10-CM ICD-9-CM    1. Primary osteoarthritis of right knee M17.11 715.16 sodium hyaluronate, viscosup, (GELSYN-3) 16.8 mg/2 mL syrg      DRAIN/INJECT LARGE JOINT/BURSA   2. Chronic pain of right knee M25.561 719.46 sodium hyaluronate, viscosup, (GELSYN-3) 16.8 mg/2 mL syrg    G89.29 338.29 DRAIN/INJECT LARGE JOINT/BURSA   3. Knee effusion, right M25.461 719.06 bupivacaine, PF, (MARCAINE, PF,) 0.25 % (2.5 mg/mL) injection     PROCEDURE:  After timeout and under sterile conditions, right knee aspirated 65 cc of clear fluid. The fluid was discarded. After discussing treatment options, Mr. Collette's right knee was injected with 2 cc of Gelsyn. Chart reviewed for the following:   Ely Estrada MD, have reviewed the History, Physical and updated the All[facility] [address] performed immediately prior to start of procedure:  Ely Estrada MD, have performed the following reviews on Justus Holloway prior to the start of the procedure:            * Patient was identified by name and date of birth   * Agreement on procedure being performed was verified  * Risks and Benefits explained to the patient  * Procedure site verified and marked as necessary  * Patient was positioned for comfort  * Consent was obtained     Time: 10:30 AM     Date of procedure: 9/8/2017    Procedure performed by:  Maury Welch MD    Mr. Rachell Stephenson tolerated the procedure well with no complications. PLAN:  After timeout and under sterile conditions, right knee aspirated 65 cc of light yellow fluid. The fluid was discarded. After discussing treatment options, patient's right knee was injected with 2 cc of Gelsyn. Mr. Froy Phipps will follow up in one week to complete his Gelsyn injection series.       Scribed by Raj Vallejo ([facility]) as dictated by Tello Nj MD

## 2017-09-11 ENCOUNTER — TELEPHONE (OUTPATIENT)
Dept: ORTHOPEDIC SURGERY | Age: 72
End: 2017-09-11

## 2017-09-11 NOTE — TELEPHONE ENCOUNTER
PATIENT WOULD LIKE FOR SOMEONE FROM OUR OFFICE TO CALL HIM BECAUSE HE STATES HE GOT A CALL FROM [employer] SAYING THAT HIS INSURANCE WILL NOT COVER THE [address] [place] [address] INJECTION FOR Friday 9/15/17.     PLEASE ADVISE    [employer] [phone]

## 2017-09-11 NOTE — TELEPHONE ENCOUNTER
He was seen for regular ov and AWV that day  Z71.89 is attached for advance care planning  should be going through medicare    Re pls fix if you can  Looks like I coded everything correctly  Tell pt to hold off on paying anything

## 2017-09-11 NOTE — TELEPHONE ENCOUNTER
Spoke with pt--he already has gotten 2 injections--checked chart--no pre-auth was needed--informed him I will check with Roscoe Martinez in AM also

## 2017-09-15 ENCOUNTER — CLINICAL SUPPORT (OUTPATIENT)
Dept: INTERNAL MEDICINE CLINIC | Age: 72
End: 2017-09-15

## 2017-09-15 DIAGNOSIS — Z23 ENCOUNTER FOR IMMUNIZATION: Primary | ICD-10-CM

## 2017-09-18 ENCOUNTER — OFFICE VISIT (OUTPATIENT)
Dept: ORTHOPEDIC SURGERY | Facility: CLINIC | Age: 72
End: 2017-09-18

## 2017-09-18 VITALS
HEIGHT: 72 IN | SYSTOLIC BLOOD PRESSURE: 147 MMHG | BODY MASS INDEX: 30.4 KG/M2 | TEMPERATURE: 94.2 F | WEIGHT: 224.4 LBS | DIASTOLIC BLOOD PRESSURE: 75 MMHG | HEART RATE: 66 BPM | OXYGEN SATURATION: 98 % | RESPIRATION RATE: 18 BRPM

## 2017-09-18 DIAGNOSIS — M17.11 PRIMARY OSTEOARTHRITIS OF RIGHT KNEE: Primary | ICD-10-CM

## 2017-09-18 NOTE — MR AVS SNAPSHOT
Visit Information Date & Time Provider Department Dept. Phone Encounter #  
 9/18/2017  8:30 AM Rafael Willson, [address] [facility] [phone] [id number] Your Appointments 10/3/2017  8:50 AM  
Nurse Visit with [facility] GENO NURSE [facility] ([facility]) Appt Note: PSA  
 [address] 
[address] [facility] [address]  
[phone]  
  
   
 [address] [facility] [address] [facility] [address]  
  
    
 10/4/2017  9:15 AM  
DIAG TEST F/U with Dagoberto Martinez MD  
[facility]) Appt Note: MRI F/U [facility]  
 [address] [place] [address]  
[phone]  
  
   
 [address] [facility],[address] [facility]  
  
    
 1/3/2018  8:35 AM  
LAB with [facility] NURSE VISIT [facility] ([facility]) Appt Note: lab  
 [address] [facility] [address] [facility] [address]  
  
   
 [address], [address]  
  
    
 1/9/2018  8:00 AM  
Office Visit with Venessa Ely MD  
Internist of [address] [place] [address]) Appt Note: 6 months [address], Suite [facility] [address]  
  
   
 [address] [facility], [address]  
  
    
 2/2/2018  8:00 AM  
PROCEDURE with [facility] ([facility]) Appt Note: iliac 1 yr alfredo; .  
 [address] [facility] [address]  
[phone] [address] [place], [place] [address] [address]  
  
    
 2/2/2018 10:00 AM  
PROCEDURE with [facility] NONIMAGING [facility] ([facility]) Appt Note: leg art 1 yr alfredo; .  
 [address] [facility]  
[phone]  [address]  
  
    
 2/19/2018  9:30 AM  
Follow Up with Besty Oseguera MD  
 [facility] ([facility]) Appt Note: 1 year fu after studies; FAXED OVER INFORMATION FOR MICHI TO CALL PATIENT DUE TO STUDIES WERE NOT IN CC AT TIME OF CHECK OUT; [address]; 1 year fu after studies FAXED OVER INFORMATION  [place] TO CALL PATIENT DUE TO STUDIES WERE NOT IN CC AT TIME OF CHECK  [address]; .  
 [address] [facility] [place] [address] [address] [facility]  
[phone] [address] [facility] [address]  
  
    
  
 10/11/2017  8:45 AM  
Any with Lowell Lloyd MD  
[facility] ([facility]) Appt Note: 6 mo fu  
 [address] 
[address] [facility] [address]  
[address] [facility] [address] [facility] [address] Upcoming Health Maintenance Date Due  
 MEDICARE YEARLY EXAM 6/30/2018 GLAUCOMA SCREENING Q2Y 6/25/2019 DTaP/Tdap/Td series (2 - Td) 4/3/2023 COLONOSCOPY 9/22/2025 Allergies as of 9/18/2017  Review Complete On: 9/18/2017 By: Charmaine García Severity Noted Reaction Type Reactions Altace [Ramipril]    Unknown (comments) Lipitor [Atorvastatin]    Other (comments) Muscle pain Lisinopril    Shortness of Breath, Other (comments) Turns bright red Other Medication  03/05/2014    Other (comments) Vicryl suture on skin tends to be rejected with poor wound healing does better with monocryl Procardia [Nifedipine]    Other (comments) Caused afib Current Immunizations  Reviewed on 9/15/2017 Name Date Influenza High Dose Vaccine PF 9/15/2017 11:09 AM, 9/16/2016, 10/2/2015 12:30 PM  
 Influenza Vaccine 10/10/2014, 10/4/2013 Influenza Vaccine Split 10/10/2012  2:28 PM, 9/28/2011 Influenza Vaccine Whole 10/8/2010 Pneumococcal Conjugate (PCV-13) 12/15/2014  8:16 AM  
 Pneumococcal Polysaccharide (PPSV-23) 1/24/2014 Pneumococcal Vaccine (Unspecified Type) 1/1/2006 Td 1/1/2006  Tdap 4/3/2013  8:23 PM  
 Zoster Vaccine, Live 1/1/2007 Not reviewed this visit You Were Diagnosed With   
  
 Codes Comments Primary osteoarthritis of right knee    -  Primary ICD-10-CM: M17.11 ICD-9-CM: 715.16 Vitals BP Pulse Temp Resp Height(growth percentile) Weight(growth percentile) 147/75 66 (!) 94.2 °F (34.6 °C) (Oral) 18 6' (1.829 m) 224 lb 6.4 oz (101.8 kg) SpO2 BMI Smoking Status 98% 30.43 kg/m2 Former Smoker BMI and BSA Data Body Mass Index Body Surface Area  
 30.43 kg/m 2 2.27 m 2 Preferred Pharmacy Pharmacy Name Phone [facility] [address], [address] [phone] Your Updated Medication List  
  
   
This list is accurate as of: 9/18/17  8:48 AM.  Always use your most recent med list.  
  
  
  
  
 aspirin delayed-release 81 mg tablet Take 81 mg by mouth daily. atenolol 25 mg tablet Commonly known as:  TENORMIN  
TAKE ONE TABLET BY MOUTH TWICE A DAY BESIVANCE 0.6 % Drps ophthalmic suspension Generic drug:  besifloxacin 1 Drop two (2) times a day. cholecalciferol 1,000 unit Cap Commonly known as:  VITAMIN D3 Take 1,000 Units by mouth daily. coenzyme Q-10 200 mg capsule Commonly known as:  CO Q-10 Take  by mouth. DUREZOL 0.05 % ophthalmic emulsion Generic drug:  Difluprednate Administer 1 Drop to both eyes four (4) times daily. ezetimibe-simvastatin 10-40 mg per tablet Commonly known as:  Vytorin 10/40 Take 1 Tab by mouth nightly. * gabapentin 300 mg capsule Commonly known as:  NEURONTIN Take 300 mg by mouth nightly. * gabapentin 400 mg capsule Commonly known as:  NEURONTIN  
1 tab PO QHS  Indications: pain HYDROcodone-acetaminophen 5-325 mg per tablet Commonly known as:  Davin Keyona Take 1 Tab by mouth every four (4) hours as needed (for chronic pain). ILEVRO 0.3 % Drps Generic drug:  nepafenac Apply 1 Drop to eye. LORazepam 1 mg tablet Commonly known as:  ATIVAN Take 1 Tab by mouth every eight (8) hours as needed. losartan 25 mg tablet Commonly known as:  COZAAR  
TAKE ONE TABLET BY MOUTH TWICE A DAY  
  
 methylPREDNISolone 4 mg tablet Commonly known as:  [place] Sample Take as directed MULTIVITAMIN PO Take  by mouth daily. NITROQUICK SL  
by SubLINGual route. OMEGA 3 FISH OIL PO Take 900 mg by mouth daily. PROTONIX 40 mg tablet Generic drug:  pantoprazole Take 40 mg by mouth daily. sildenafil citrate 100 mg tablet Commonly known as:  VIAGRA Take 1 Tab by mouth daily as needed. Indications: Erectile Dysfunction  
  
 triamterene-hydroCHLOROthiazide 37.5-25 mg per tablet Commonly known as:  J Luis Sieve TAKE ONE TABLET BY MOUTH DAILY * VOLTAREN 1 % Gel Generic drug:  diclofenac  
as needed. * diclofenac 1 % Gel Commonly known as:  VOLTAREN Apply 2-4 g to affected area four (4) times daily. To affected area (topically) * Notice: This list has 4 medication(s) that are the same as other medications prescribed for you. Read the directions carefully, and ask your doctor or other care provider to review them with you. Introducing [facility]! Dear Magy Andrew: Thank you for requesting a [employer] account. Our records indicate that you already have an active [employer] account. You can access your account anytime at [email or web address] Did you know that you can access your hospital and ER discharge instructions at any time in [employer]? You can also review all of your test results from your hospital stay or ER visit. Additional Information If you have questions, please visit the Frequently Asked Questions section of the [email or web address] website at [email or web address] Remember, [employer]media is NOT to be used for urgent needs. For medical emergencies, dial 911. Now available from your iPhone and Android! Please provide this summary of care documentation to your next provider. Your primary care clinician is listed as Tesfaye Hunter. If you have any questions after today's visit, please call [phone].

## 2017-09-18 NOTE — PROGRESS NOTES
HISTORY OF PRESENT ILLNESS:  Beronica Curtis returns for continuation/completion of GELSYN 3 to his right knee. He was seen under the care of Dr. Breanna Monae last week and had 60 cc of fluid removed from the right knee, as well as received the second GELSYN injection. Generally, he has done well from his, to date, two injections of GELSYN. He did a lot of work over the last weekend and feels like he may have overdid his knee. He moved several pieces of furniture. Today, he denies any chest pain or shortness of breath. No fevers, chills, or night sweats. His pain to the right knee at rest is 4-5/10 and with activity, it is 6/10. PHYSICAL EXAM:  He is a healthy-appearing, well-developed, well-nourished, pleasant, 55-year-old, obese,  male, atraumatic, normocephalic, alert and oriented times three sitting on the table comfortably. Examination of the right knee reveals a medial anterior portal bruise measuring 1 cm. There is no evidence for infection or skin breakdown. He has a trace effusion. His active motion is 105-5° with crepitation. The patella is tracking midline. There is no calf tenderness or evidence of DVT. Distal sensation is intact fully to the right lower extremity. IMPRESSION:      1. Right knee pain. 2. Right knee osteoarthritis, severe. 3. Decreased range of motion of the right knee secondary to above. PROCEDURE:  Today, using sterile technique, after verbal and written consent obtained and appropriate time out performed, 2 cc of GELSYN 3, lot number [id number], expiration date April 30, 2019, was injected to the right knee using the anterolateral intra-articular approach. There were no complications. The patient tolerated the procedure well. PLAN:   I am currently recommending completion of GELSYN. We will plan on seeing the patient back in six weeks or p.r.n. based on his needs. Today, all his questions were answered to his satisfaction.

## 2017-09-20 NOTE — TELEPHONE ENCOUNTER
Josephine Carpio,    Medicare paid this claim at 100% (like they are supposed to) so Im not sure why [employer] called. Im just sending this email as a reference in case it comes up again. Dr. Bright Frazier coded everything correctly.     Thanks,    [employer] notified of message from the billing office

## 2017-09-21 ENCOUNTER — APPOINTMENT (OUTPATIENT)
Dept: CT IMAGING | Age: 72
End: 2017-09-21
Attending: PHYSICIAN ASSISTANT
Payer: MEDICARE

## 2017-09-21 ENCOUNTER — APPOINTMENT (OUTPATIENT)
Dept: GENERAL RADIOLOGY | Age: 72
End: 2017-09-21
Attending: PHYSICIAN ASSISTANT
Payer: MEDICARE

## 2017-09-21 ENCOUNTER — HOSPITAL ENCOUNTER (EMERGENCY)
Age: 72
Discharge: HOME | End: 2017-09-21
Attending: EMERGENCY MEDICINE
Payer: MEDICARE

## 2017-09-21 VITALS
RESPIRATION RATE: 10 BRPM | OXYGEN SATURATION: 95 % | TEMPERATURE: 98.4 F | HEART RATE: 61 BPM | WEIGHT: 220 LBS | BODY MASS INDEX: 29.8 KG/M2 | DIASTOLIC BLOOD PRESSURE: 67 MMHG | SYSTOLIC BLOOD PRESSURE: 144 MMHG | HEIGHT: 72 IN

## 2017-09-21 DIAGNOSIS — R51.9 NONINTRACTABLE HEADACHE, UNSPECIFIED CHRONICITY PATTERN, UNSPECIFIED HEADACHE TYPE: ICD-10-CM

## 2017-09-21 DIAGNOSIS — M54.2 NECK PAIN: Primary | ICD-10-CM

## 2017-09-21 DIAGNOSIS — M79.602 LEFT ARM PAIN: ICD-10-CM

## 2017-09-21 LAB
ANION GAP SERPL CALC-SCNC: 6 MMOL/L (ref 3–18)
ATRIAL RATE: 65 BPM
BASOPHILS # BLD: 0 K/UL (ref 0–0.06)
BASOPHILS NFR BLD: 0 % (ref 0–2)
BUN SERPL-MCNC: 11 MG/DL (ref 7–18)
BUN/CREAT SERPL: 16 (ref 12–20)
CALCIUM SERPL-MCNC: 9.6 MG/DL (ref 8.5–10.1)
CALCULATED P AXIS, ECG09: 0 DEGREES
CALCULATED R AXIS, ECG10: -64 DEGREES
CALCULATED T AXIS, ECG11: 30 DEGREES
CHLORIDE SERPL-SCNC: 100 MMOL/L (ref 100–108)
CK MB CFR SERPL CALC: 3.4 % (ref 0–4)
CK MB SERPL-MCNC: 5.4 NG/ML (ref 5–25)
CK SERPL-CCNC: 157 U/L (ref 39–308)
CO2 SERPL-SCNC: 34 MMOL/L (ref 21–32)
CREAT SERPL-MCNC: 0.68 MG/DL (ref 0.6–1.3)
DIAGNOSIS, 93000: NORMAL
DIFFERENTIAL METHOD BLD: ABNORMAL
EOSINOPHIL # BLD: 0.2 K/UL (ref 0–0.4)
EOSINOPHIL NFR BLD: 2 % (ref 0–5)
ERYTHROCYTE [DISTWIDTH] IN BLOOD BY AUTOMATED COUNT: 12.3 % (ref 11.6–14.5)
GLUCOSE SERPL-MCNC: 99 MG/DL (ref 74–99)
HCT VFR BLD AUTO: 44.4 % (ref 36–48)
HGB BLD-MCNC: 15.1 G/DL (ref 13–16)
LYMPHOCYTES # BLD: 1.6 K/UL (ref 0.9–3.6)
LYMPHOCYTES NFR BLD: 19 % (ref 21–52)
MAGNESIUM SERPL-MCNC: 2 MG/DL (ref 1.6–2.6)
MCH RBC QN AUTO: 32.1 PG (ref 24–34)
MCHC RBC AUTO-ENTMCNC: 34 G/DL (ref 31–37)
MCV RBC AUTO: 94.3 FL (ref 74–97)
MONOCYTES # BLD: 0.6 K/UL (ref 0.05–1.2)
MONOCYTES NFR BLD: 7 % (ref 3–10)
NEUTS SEG # BLD: 5.9 K/UL (ref 1.8–8)
NEUTS SEG NFR BLD: 72 % (ref 40–73)
P-R INTERVAL, ECG05: 160 MS
PLATELET # BLD AUTO: 154 K/UL (ref 135–420)
PMV BLD AUTO: 9.4 FL (ref 9.2–11.8)
POTASSIUM SERPL-SCNC: 3.7 MMOL/L (ref 3.5–5.5)
Q-T INTERVAL, ECG07: 420 MS
QRS DURATION, ECG06: 84 MS
QTC CALCULATION (BEZET), ECG08: 436 MS
RBC # BLD AUTO: 4.71 M/UL (ref 4.7–5.5)
SODIUM SERPL-SCNC: 140 MMOL/L (ref 136–145)
TROPONIN I SERPL-MCNC: <0.02 NG/ML (ref 0–0.06)
VENTRICULAR RATE, ECG03: 65 BPM
WBC # BLD AUTO: 8.3 K/UL (ref 4.6–13.2)

## 2017-09-21 PROCEDURE — 99283 EMERGENCY DEPT VISIT LOW MDM: CPT

## 2017-09-21 PROCEDURE — 83735 ASSAY OF MAGNESIUM: CPT | Performed by: PHYSICIAN ASSISTANT

## 2017-09-21 PROCEDURE — 93005 ELECTROCARDIOGRAM TRACING: CPT

## 2017-09-21 PROCEDURE — 70450 CT HEAD/BRAIN W/O DYE: CPT

## 2017-09-21 PROCEDURE — 80048 BASIC METABOLIC PNL TOTAL CA: CPT | Performed by: PHYSICIAN ASSISTANT

## 2017-09-21 PROCEDURE — 71020 XR CHEST PA LAT: CPT

## 2017-09-21 PROCEDURE — 85025 COMPLETE CBC W/AUTO DIFF WBC: CPT | Performed by: PHYSICIAN ASSISTANT

## 2017-09-21 PROCEDURE — 82550 ASSAY OF CK (CPK): CPT | Performed by: PHYSICIAN ASSISTANT

## 2017-09-21 NOTE — ED TRIAGE NOTES
Patient states onset of headache and neck pain yesterday. He states spinal stenosis, disc issues and severe arthritis to neck. He states: \"The doctor told me that my neck could be causing my pain\". Patient states that he came today to ER to make sure it wasn't his heart. He states recent cardiology visit in which he states he received good report. C/o left shoulder pain. States left upper chest wall pain. He states: 'I get chest pain from my neck at times\". Patient states recent heavy lifting on Saturday while moving furniture.

## 2017-09-21 NOTE — DISCHARGE INSTRUCTIONS
Neck Pain: Care Instructions  Your Care Instructions  You can have neck pain anywhere from the bottom of your head to the top of your shoulders. It can spread to the upper back or arms. Injuries, painting a ceiling, sleeping with your neck twisted, staying in one position for too long, and many other activities can cause neck pain. Most neck pain gets better with home care. Your doctor may recommend medicine to relieve pain or relax your muscles. He or she may suggest exercise and physical therapy to increase flexibility and relieve stress. You may need to wear a special (cervical) collar to support your neck for a day or two. Follow-up care is a key part of your treatment and safety. Be sure to make and go to all appointments, and call your doctor if you are having problems. It's also a good idea to know your test results and keep a list of the medicines you take. How can you care for yourself at home? · Try using a heating pad on a low or medium setting for 15 to 20 minutes every 2 or 3 hours. Try a warm shower in place of one session with the heating pad. · You can also try an ice pack for 10 to 15 minutes every 2 to 3 hours. Put a thin cloth between the ice and your skin. · Take pain medicines exactly as directed. ¨ If the doctor gave you a prescription medicine for pain, take it as prescribed. ¨ If you are not taking a prescription pain medicine, ask your doctor if you can take an over-the-counter medicine. · If your doctor recommends a cervical collar, wear it exactly as directed. When should you call for help? Call your doctor now or seek immediate medical care if:  · You have new or worsening numbness in your arms, buttocks or legs. · You have new or worsening weakness in your arms or legs. (This could make it hard to stand up.)  · You lose control of your bladder or bowels.   Watch closely for changes in your health, and be sure to contact your doctor if:  · Your neck pain is getting worse.  · You are not getting better after 1 week. · You do not get better as expected. Where can you learn more? Go to http://nneka-mindi.info/. Enter [id number] in the search box to learn more about \"Neck Pain: Care Instructions. \"  Current as of: March 21, 2017  Content Version: 11.3  © 4294-4587 [employer]. Care instructions adapted under license by [employer] (which disclaims liability or warranty for this information). If you have questions about a medical condition or this instruction, always ask your healthcare professional. [place] [address] any warranty or liability for your use of this information. Headache: Care Instructions  Your Care Instructions    Headaches have many possible causes. Most headaches aren't a sign of a more serious problem, and they will get better on their own. Home treatment may help you feel better faster. The doctor has checked you carefully, but problems can develop later. If you notice any problems or new symptoms, get medical treatment right away. Follow-up care is a key part of your treatment and safety. Be sure to make and go to all appointments, and call your doctor if you are having problems. It's also a good idea to know your test results and keep a list of the medicines you take. How can you care for yourself at home? · Do not drive if you have taken a prescription pain medicine. · Rest in a quiet, dark room until your headache is gone. Close your eyes and try to relax or go to sleep. Don't watch TV or read. · Put a cold, moist cloth or cold pack on the painful area for 10 to 20 minutes at a time. Put a thin cloth between the cold pack and your skin. · Use a warm, moist towel or a heating pad set on low to relax tight shoulder and neck muscles. · Have someone gently massage your neck and shoulders. · Take pain medicines exactly as directed.   ¨ If the doctor gave you a prescription medicine for pain, take it as prescribed. ¨ If you are not taking a prescription pain medicine, ask your doctor if you can take an over-the-counter medicine. · Be careful not to take pain medicine more often than the instructions allow, because you may get worse or more frequent headaches when the medicine wears off. · Do not ignore new symptoms that occur with a headache, such as a fever, weakness or numbness, vision changes, or confusion. These may be signs of a more serious problem. To prevent headaches  · Keep a headache diary so you can figure out what triggers your headaches. Avoiding triggers may help you prevent headaches. Record when each headache began, how long it lasted, and what the pain was like (throbbing, aching, stabbing, or dull). Write down any other symptoms you had with the headache, such as nausea, flashing lights or dark spots, or sensitivity to bright light or loud noise. Note if the headache occurred near your period. List anything that might have triggered the headache, such as certain foods (chocolate, cheese, wine) or odors, smoke, bright light, stress, or lack of sleep. · Find healthy ways to deal with stress. Headaches are most common during or right after stressful times. Take time to relax before and after you do something that has caused a headache in the past.  · Try to keep your muscles relaxed by keeping good posture. Check your jaw, face, neck, and shoulder muscles for tension, and try relaxing them. When sitting at a desk, change positions often, and stretch for 30 seconds each hour. · Get plenty of sleep and exercise. · Eat regularly and well. Long periods without food can trigger a headache. · Treat yourself to a massage. Some people find that regular massages are very helpful in relieving tension. · Limit caffeine by not drinking too much coffee, tea, or soda. But don't quit caffeine suddenly, because that can also give you headaches.   · Reduce eyestrain from computers by blinking frequently and looking away from the computer screen every so often. Make sure you have proper eyewear and that your monitor is set up properly, about an arm's length away. · Seek help if you have depression or anxiety. Your headaches may be linked to these conditions. Treatment can both prevent headaches and help with symptoms of anxiety or depression. When should you call for help? Call 911 anytime you think you may need emergency care. For example, call if:  · You have signs of a stroke. These may include:  ¨ Sudden numbness, paralysis, or weakness in your face, arm, or leg, especially on only one side of your body. ¨ Sudden vision changes. ¨ Sudden trouble speaking. ¨ Sudden confusion or trouble understanding simple statements. ¨ Sudden problems with walking or balance. ¨ A sudden, severe headache that is different from past headaches. Call your doctor now or seek immediate medical care if:  · You have a new or worse headache. · Your headache gets much worse. Where can you learn more? Go to http://nneka-mindi.info/. Enter M271 in the search box to learn more about \"Headache: Care Instructions. \"  Current as of: October 14, 2016  Content Version: 11.3  © 1193-6751 [employer]. Care instructions adapted under license by [employer] (which disclaims liability or warranty for this information). If you have questions about a medical condition or this instruction, always ask your healthcare professional. [place] [address] any warranty or liability for your use of this information.

## 2017-09-21 NOTE — ED PROVIDER NOTES
[facility] DE[facility]      67 y.o. male with a PMH as noted including Cervical spinal stenosis and chronic neck pain presents to the ED c/o neck pain, headache, and left arm pain for the past 5 days. Pt notes helping his son move furniture the day or 2 prior to developing symptoms. Notes the pain starts in his left posterior neck, radiates up his left head, and goes down into his left arm and left superior chest.  Says that his doctor typically advises him to come get his heart checked since heart attack symptoms can mimic his neck pain. He does state that current pain feels similar to his prior cervical stenosis pain. He is scheduled to see Dr. Dennise Liang next week and has Vicodin at home that he takes for his chronic pain. He says he typically does not get headaches but has a 100% occlusion of his left carotid artery. Notes current headache has been present for several days now . Denies any fever, chills, numbness, weakness, SOB, or other symptoms at this time. No current facility-administered medications for this encounter. Current Outpatient Prescriptions   Medication Sig    NITROGLYCERIN (NITROQUICK SL) by SubLINGual route.  gabapentin (NEURONTIN) 400 mg capsule 1 tab PO QHS  Indications: pain    atenolol (TENORMIN) 25 mg tablet TAKE ONE TABLET BY MOUTH TWICE A DAY    HYDROcodone-acetaminophen (NORCO) 5-325 mg per tablet Take 1 Tab by mouth every four (4) hours as needed (for chronic pain).  diclofenac (VOLTAREN) 1 % gel Apply 2-4 g to affected area four (4) times daily. To affected area (topically)    besifloxacin (BESIVANCE) 0.6 % drps ophthalmic suspension 1 Drop two (2) times a day.  Difluprednate (DUREZOL) 0.05 % ophthalmic emulsion Administer 1 Drop to both eyes four (4) times daily.  nepafenac (ILEVRO) 0.3 % drps Apply 1 Drop to eye.     methylPREDNISolone (MEDROL DOSEPACK) 4 mg tablet Take as directed (Patient not taking: Reported on 8/3/2017)    LORazepam (ATIVAN) 1 mg tablet Take 1 Tab by mouth every eight (8) hours as needed.  sildenafil citrate (VIAGRA) 100 mg tablet Take 1 Tab by mouth daily as needed. Indications: Erectile Dysfunction    losartan (COZAAR) 25 mg tablet TAKE ONE TABLET BY MOUTH TWICE A DAY    aspirin delayed-release 81 mg tablet Take 81 mg by mouth daily.  triamterene-hydrochlorothiazide (MAXZIDE) 37.5-25 mg per tablet TAKE ONE TABLET BY MOUTH DAILY    ezetimibe-simvastatin (VYTORIN 10/40) 10-40 mg per tablet Take 1 Tab by mouth nightly.  Cholecalciferol, Vitamin D3, 1,000 unit cap Take 1,000 Units by mouth daily.  VOLTAREN 1 % topical gel as needed.  OMEGA-3 FATTY ACIDS/FISH OIL (OMEGA 3 FISH OIL PO) Take 900 mg by mouth daily.  gabapentin (NEURONTIN) 300 mg capsule Take 300 mg by mouth nightly.  MULTIVITAMIN PO Take  by mouth daily.  pantoprazole (PROTONIX) 40 mg tablet Take 40 mg by mouth daily.  coenzyme q10 200 mg Cap Take  by mouth.        Past Medical History:   Diagnosis Date    Adenoma of left adrenal gland     2009  1 cm, no change 1/15, 2/16    Asbestosis     Atrial fibrillation     CHA2DS2-VAsc=3(age+, htn+, vasc dx+; estimated yearly stroke risk according to Lip et al. is 3,2%), Hasbled=2(estimated yearly bleeding risk according to pisters et al. is 1,88%); not anticoagulated due to h/o gi bleed    Atrial fibrillation ablation     10/08    Basal cell cancer     Dr Landeros; he's had >350 lesions removed    Carotid occlusion     left     Cervical radiculopathy     MRI 9/11 showed C3-6 severe foraminal stenosis    Chronic pain     Colon polyp     Dr Brenton Hernandez 9/15    Coronary artery disease     RCA - 3.0 x 16mm TAXUS 9/04, 3.0 x 16mm TAXUS 12/06    Dyslipidemia     Erectile dysfunction     GERD     GI bleed     Hearing loss     2014  bilateral Dr Lau Ajay    Hernia, umbilical     Hypertension     Hypogonadism male     Lumbar radiculopathy     Dr Geovany Santos;  MRI 9/11 L4-5 disc bulging, annular tear, disc dessication    Myofascial pain dysfunction syndrome     pain clinic     Osteoarthritis     right knee Dr Mega Carvajal Peripheral vascular disease     50-60% R iliac; s/p R iliac stent and L femoral artery stent in past; R OLIVIA 0.76 (1/16)    Plantar fasciitis     bilateral     PPD positive     Prediabetes     Prostate cancer     T1c [place] 7(3+4), 70% in 1 core, GS 7 (3+4) in 4 cores, GS 6 (3+3) in 1 core; psa 5.28, TRUS 18 gm;  Dr Arnoldo Fung; s/p cryoablation 10/16    Recurrent umbilical hernia     Subclinical hypothyroidism     denies    Tinnitus     Dr Prince Singh Ulcerative colitis     Venous insufficiency        Past Surgical History:   Procedure Laterality Date    HX CAROTID ENDARTERECTOMY      1/14  right    HX Binta Short HX COLONOSCOPY      Dr Arnoldo Fung 9/15 polyps    HX CRYOABLATION OF THE PROSTATE      10/16    HX [address], 1975    HX ORTHOPAEDIC      right knee surgery    HX REFRACTIVE SURGERY      OD (hemoplasty to right eye on retina to avoid blindness)    HX TONSILLECTOMY      1955    MD LAP, RECURRENT INCISIONAL HERNIA REPAIR,REDUCIBLE N/A 02/09/2017    Dr. Keven Johnson HERNIA,5+Y/O,REDUCIBL      2/16  left  Dr. Jennifer Escalante XWCR,7+B/K,COZYP      2/16  Dr. Marsha Fitch      1/16  R OLIVIA 0.76, L OLIVIA 1.02    VASCULAR SURGERY PROCEDURE UNLIST      10/15   left fem art and left iliac stent       Family History   Problem Relation Age of Onset    Heart Disease Mother     Hypertension Mother     Diabetes Mother     Arthritis-osteo Mother     Heart Disease Father     Stroke Father     Hypertension Father     Heart Disease Sister     Hypertension Sister        Social History     Social History    Marital status:      Spouse name: N/A    Number of children: N/A    Years of education: N/A     Occupational History    Not on file.      Social History Main Topics    Smoking status: Former Smoker     Packs/day: 1.50     Years: 25.00     Types: Cigarettes     Quit date: 1/1/2003    Smokeless tobacco: Never Used    Alcohol use 2.5 oz/week     5 Cans of beer per week      Comment: 5 beers a week    Drug use: No    Sexual activity: Yes     Partners: Female     Birth control/ protection: None     Other Topics Concern    Not on file     Social History Narrative       Allergies   Allergen Reactions    Altace [Ramipril] Unknown (comments)    Lipitor [Atorvastatin] Other (comments)     Muscle pain    Lisinopril Shortness of Breath and Other (comments)     Turns bright red    Other Medication Other (comments)     Vicryl suture on skin tends to be rejected with poor wound healing does better with monocryl    Procardia [Nifedipine] Other (comments)     Caused afib       Patient's primary care provider (as noted in EPIC):  Lex Koenig MD    Constitutional:  Denies malaise, fever, chills. Neck:  + neck pain. Chest:  + left superior chest pain. Respiratory:  Denies cough, wheezing, difficulty breathing, shortness of breath. GI/ABD:  Denies injury, pain, distention, nausea, vomiting, diarrhea. :  Denies injury, pain, dysuria or urgency. Back:  + left superior trapezius pain. Pelvis:  Denies injury or pain. Extremity/MS:  Denies injury or pain. Neuro:  + headache. Denies LOC, dizziness, neurologic symptoms/deficits/paresthesias. Skin: Denies injury, rash, itching or skin changes. All other systems negative as reviewed. Visit Vitals    /67    Pulse 61    Temp 98.4 °F (36.9 °C)    Resp 10    Ht 6' (1.829 m)    Wt 99.8 kg (220 lb)    SpO2 95%    BMI 29.84 kg/m2       PHYSICAL EXAM:    CONSTITUTIONAL:  Alert, in no apparent distress;  well developed;  well nourished. HEAD:  Normocephalic, atraumatic. EYES:  EOMI. Non-icteric sclera. Normal conjunctiva. ENTM:  Nose:  no rhinorrhea.   Throat:  no erythema or exudate, mucous membranes moist.  NECK:   Left lateral neck and adjacent medial trapezius focal mild reproducible tenderness to palpation. No rash, lesions, bruising. Supple. RESPIRATORY:  Chest clear, equal breath sounds, good air movement. Without wheezes, rhonchi or rales. CARDIOVASCULAR:  Regular rate and rhythm. No murmurs, rubs, or gallops. CHEST: No TTP. BACK:  Grossly normal exam.   UPPER EXT: Left shoulder with mild TTP to overlying soft tissue, no bony TTP, full ROM with 5/5 strength and NVI distally. Normal inspection. NEURO:  Moves all four extremities, and grossly normal motor exam.  SKIN:  No rashes;  Normal for age. PSYCH:  Alert and normal affect. DIFFERENTIAL DIAGNOSES/ MEDICAL DECISION MAKING:  Neck myofascial strain, spasm, neuropathy pain to include nerve impingement, cervical spine pathology such as spinal stenosis and/or a combination of the aforementioned. ED COURSE:      68 yo with cardiac hx c/o left neck pain radiating into his left superior trapezius and into his left arm. He also has a left posterior occipital headache. Pt notes wanting to get his heart checked out due to this current left superior chest pain and left upper arm pain. However, he did recently do heavy lifting and states current pain feels typical of his spinal stenosis pain.      Recent Results (from the past 12 hour(s))   EKG, 12 LEAD, INITIAL    Collection Time: 09/21/17 12:41 PM   Result Value Ref Range    Ventricular Rate 65 BPM    Atrial Rate 65 BPM    P-R Interval 160 ms    QRS Duration 84 ms    Q-T Interval 420 ms    QTC Calculation (Bezet) 436 ms    Calculated P Axis 0 degrees    Calculated R Axis -64 degrees    Calculated T Axis 30 degrees    Diagnosis       Normal sinus rhythm  Left anterior fascicular block  Abnormal ECG  When compared with ECG of 26-JAN-2017 14:13,  No significant change was found  Confirmed by Carl Wheatley ([id number]) on 9/21/2017 6:30:32 PM     CBC WITH AUTOMATED DIFF    Collection Time: 09/21/17 12:56 PM   Result Value Ref Range    WBC 8.3 4.6 - 13.2 K/uL    RBC 4.71 4.70 - 5.50 M/uL    HGB 15.1 13.0 - 16.0 g/dL    HCT 44.4 36.0 - 48.0 %    MCV 94.3 74.0 - 97.0 FL    MCH 32.1 24.0 - 34.0 PG    MCHC 34.0 31.0 - 37.0 g/dL    RDW 12.3 11.6 - 14.5 %    PLATELET 019 607 - 749 K/uL    MPV 9.4 9.2 - 11.8 FL    NEUTROPHILS 72 40 - 73 %    LYMPHOCYTES 19 (L) 21 - 52 %    MONOCYTES 7 3 - 10 %    EOSINOPHILS 2 0 - 5 %    BASOPHILS 0 0 - 2 %    ABS. NEUTROPHILS 5.9 1.8 - 8.0 K/UL    ABS. LYMPHOCYTES 1.6 0.9 - 3.6 K/UL    ABS. MONOCYTES 0.6 0.05 - 1.2 K/UL    ABS. EOSINOPHILS 0.2 0.0 - 0.4 K/UL    ABS. BASOPHILS 0.0 0.0 - 0.06 K/UL    DF AUTOMATED     METABOLIC PANEL, BASIC    Collection Time: 09/21/17 12:56 PM   Result Value Ref Range    Sodium 140 136 - 145 mmol/L    Potassium 3.7 3.5 - 5.5 mmol/L    Chloride 100 100 - 108 mmol/L    CO2 34 (H) 21 - 32 mmol/L    Anion gap 6 3.0 - 18 mmol/L    Glucose 99 74 - 99 mg/dL    BUN 11 7.0 - 18 MG/DL    Creatinine 0.68 0.6 - 1.3 MG/DL    BUN/Creatinine ratio 16 12 - 20      GFR est AA >60 >60 ml/min/1.73m2    GFR est non-AA >60 >60 ml/min/1.73m2    Calcium 9.6 8.5 - 10.1 MG/DL   CARDIAC PANEL,(CK, CKMB & TROPONIN)    Collection Time: 09/21/17 12:56 PM   Result Value Ref Range     39 - 308 U/L    CK - MB 5.4 (H) <3.6 ng/ml    CK-MB Index 3.4 0.0 - 4.0 %    Troponin-I, Qt. <0.02 0.00 - 0.06 NG/ML   MAGNESIUM    Collection Time: 09/21/17 12:56 PM   Result Value Ref Range    Magnesium 2.0 1.6 - 2.6 mg/dL      EKG: NSR rate of 65bpm, No STEMI. CXR: NAD    CT head: NAD    IMPRESSION AND MEDICAL DECISION MAKING:  Based upon the patients presentation with noted HPI and PE, along with the work up done in the emergency department, I believe that the patient is having his chronic neck pain likely from cervical stenosis. He has an appt to see Dr. Sofía Rodriguez next week for this. He agrees to take Vicodin at home for pain.  Notes just seeing his cardiologist last month and everything checked out well. Diagnosis:   1. Neck pain    2. Left arm pain    3. Nonintractable headache, unspecified chronicity pattern, unspecified headache type      Disposition: Discharge    Follow-up Information     Follow up With Details Comments [address] [facility] MD Rosalio In 3 days  [address] [facility] [address] [facility]      Travis Esteban MD In 3 days  [address] [facility]      [address] [facility] EMERGENCY DEPT  If symptoms worsen [address] [place] [address]  [phone]          Discharge Medication List as of 9/21/2017  2:50 PM      CONTINUE these medications which have NOT CHANGED    Details   NITROGLYCERIN (NITROQUICK SL) by SubLINGual route., Historical Med      !! gabapentin (NEURONTIN) 400 mg capsule 1 tab PO QHS  Indications: pain, Normal, Disp-90 Cap, R-1      atenolol (TENORMIN) 25 mg tablet TAKE ONE TABLET BY MOUTH TWICE A DAY, Normal, Disp-180 Tab, R-2      HYDROcodone-acetaminophen (NORCO) 5-325 mg per tablet Take 1 Tab by mouth every four (4) hours as needed (for chronic pain). , Print, Disp-180 Tab, R-0      !! diclofenac (VOLTAREN) 1 % gel Apply 2-4 g to affected area four (4) times daily. To affected area (topically), Print, Disp-100 g, R-5      besifloxacin (BESIVANCE) 0.6 % drps ophthalmic suspension 1 Drop two (2) times a day., Historical Med      Difluprednate (DUREZOL) 0.05 % ophthalmic emulsion Administer 1 Drop to both eyes four (4) times daily. , Historical Med      nepafenac (ILEVRO) 0.3 % drps Apply 1 Drop to eye., Historical Med      methylPREDNISolone (MEDROL DOSEPACK) 4 mg tablet Take as directed, Normal, Disp-1 Dose Pack, R-0      LORazepam (ATIVAN) 1 mg tablet Take 1 Tab by mouth every eight (8) hours as needed. , Print, Disp-50 Tab, R-0      sildenafil citrate (VIAGRA) 100 mg tablet Take 1 Tab by mouth daily as needed.  Indications: Erectile Dysfunction, Print, Disp-12 Tab, R-6, NATHALIE losartan (COZAAR) 25 mg tablet TAKE ONE TABLET BY MOUTH TWICE A DAY, Normal, Disp-180 Tab, R-2      aspirin delayed-release 81 mg tablet Take 81 mg by mouth daily. , Historical Med      triamterene-hydrochlorothiazide (MAXZIDE) 37.5-25 mg per tablet TAKE ONE TABLET BY MOUTH DAILY, Normal, Disp-90 Tab, R-3      ezetimibe-simvastatin (VYTORIN 10/40) 10-40 mg per tablet Take 1 Tab by mouth nightly., Mail Order, Disp-90 Tab, R-3      Cholecalciferol, Vitamin D3, 1,000 unit cap Take 1,000 Units by mouth daily. , Historical Med      !! VOLTAREN 1 % topical gel as needed., Historical Med      OMEGA-3 FATTY ACIDS/FISH OIL (OMEGA 3 FISH OIL PO) Take 900 mg by mouth daily. , Historical Med      !! gabapentin (NEURONTIN) 300 mg capsule Take 300 mg by mouth nightly., Historical Med      MULTIVITAMIN PO Take  by mouth daily. , Historical Med      pantoprazole (PROTONIX) 40 mg tablet Take 40 mg by mouth daily. , Historical Med      coenzyme q10 200 mg Cap Take  by mouth., Historical Med       !! - Potential duplicate medications found. Please discuss with provider.         JAYRO Regalado

## 2017-09-22 ENCOUNTER — PATIENT OUTREACH (OUTPATIENT)
Dept: INTERNAL MEDICINE CLINIC | Age: 72
End: 2017-09-22

## 2017-09-22 NOTE — PROGRESS NOTES
Transition of care coordination/ED Admission    Patient admitted to [facility], 9/21/17 to 9/21/17 for c/o neck pain, left arm pain and headache. Presenting symptoms:   Neck pain  Headache  Left shoulder pain     New medications/changes to current medications:  None     ED utilization in past 6 months: 0    Contacted patient for ED follow up. Verified 2 patient identifiers. Introduced self, role and reason for call. Patient reports:  Pain better    Patient denies:  N/V  CP/pressure  SOB  Fever/chills  Weakness  Fatigue  Radiating pain    Patient verbalizes whenever he gets this kind pain he always has to rule out that it's not heart related because neck pain mimicks it. States he has appt with Dr. Cale Sauer on 10/4/17. Encouraged patient to contact office if needed.       Appointment(s):  Dr. Cale Sauer on 10/4/17 at 9:15 AM     Plan:  Attend follow up with ortho

## 2017-10-03 RX ORDER — HYDROCODONE BITARTRATE AND ACETAMINOPHEN 5; 325 MG/1; MG/1
1 TABLET ORAL
Qty: 180 TAB | Refills: 0 | Status: SHIPPED | OUTPATIENT
Start: 2017-10-03 | End: 2017-11-27 | Stop reason: SDUPTHER

## 2017-10-03 RX ORDER — HYDROCODONE BITARTRATE AND ACETAMINOPHEN 5; 325 MG/1; MG/1
1 TABLET ORAL
Qty: 180 TAB | Refills: 0 | Status: CANCELLED | OUTPATIENT
Start: 2017-10-03

## 2017-10-04 ENCOUNTER — OFFICE VISIT (OUTPATIENT)
Dept: ORTHOPEDIC SURGERY | Age: 72
End: 2017-10-04

## 2017-10-04 VITALS
TEMPERATURE: 97.6 F | HEART RATE: 69 BPM | SYSTOLIC BLOOD PRESSURE: 131 MMHG | WEIGHT: 221.6 LBS | OXYGEN SATURATION: 96 % | BODY MASS INDEX: 30.02 KG/M2 | HEIGHT: 72 IN | RESPIRATION RATE: 18 BRPM | DIASTOLIC BLOOD PRESSURE: 70 MMHG

## 2017-10-04 DIAGNOSIS — M54.16 LUMBAR RADICULOPATHY: ICD-10-CM

## 2017-10-04 DIAGNOSIS — M54.12 CERVICAL RADICULOPATHY: Primary | ICD-10-CM

## 2017-10-04 DIAGNOSIS — M50.30 DEGENERATION OF CERVICAL INTERVERTEBRAL DISC: Chronic | ICD-10-CM

## 2017-10-04 PROBLEM — M48.02 CERVICAL SPINAL STENOSIS: Status: ACTIVE | Noted: 2017-10-04

## 2017-10-04 NOTE — MR AVS SNAPSHOT
Visit Information Date & Time Provider Department Dept. Phone Encounter #  
 10/4/2017  9:15 AM Giorgio Lynch MD [facility] [phone] [id number] Follow-up Instructions Return in about 6 weeks (around 11/15/2017). Your Appointments 1/3/2018  8:35 AM  
LAB with [facility] NURSE VISIT Internist of [facility] ([facility]) Appt Note: lab  
 [address], [address] [place] [address] [address]  
  
   
 [address], [address]  
  
    
 1/9/2018  8:00 AM  
Office Visit with Sosa Coronado MD  
Internist of [address] [place] [address]) Appt Note: 6 months [address], Suite [facility] [address]  
  
   
 [address] [facility], [address]  
  
    
 2/2/2018  8:00 AM  
PROCEDURE with [facility] IMAGING 1 [facility] ([facility]) Appt Note: iliac 1 yr alfredo; .  
 [address] [place] [address] [address] [facility]  
[phone] [address] [place] [address]  
  
    
 2/2/2018 10:00 AM  
PROCEDURE with [facility] NONIMAGING [facility] ([facility]) Appt Note: leg art 1 yr alfredo; .  
 [address] [facility]  
[phone] [address] [place] [address]  
  
    
 2/19/2018  9:30 AM  
Follow Up with Dima Larkin MD  
[address] [place] [address] and [facility]) Appt Note: 1 year fu after studies; FAXED OVER INFORMATION FOR MICHI TO CALL PATIENT DUE TO STUDIES WERE NOT IN CC AT TIME OF CHECK OUT; [address] [place] [address]; 1 year fu after studies FAXED OVER INFORMATION  [address] [place] TO CALL PATIENT DUE TO STUDIES WERE NOT IN CC AT TIME OF CHECK  [address] [place] [address]; .  
 [address], [place] [address] [facility]  
[phone] [facility] [address] [facility] 10/10/2017  8:45 AM  
Any with Kae Hendricks MD  
[facility] of [facility] ([facility]) Appt Note: 6 mo fu; Pt aware of appts [address] [place] [address]  
[address] [place] [address] [place] [address] Upcoming Health Maintenance Date Due  
 MEDICARE YEARLY EXAM 2018 GLAUCOMA SCREENING Q2Y 2019 DTaP/Tdap/Td series (2 - Td) 4/3/2023 COLONOSCOPY 2025 Allergies as of 10/4/2017  Review Complete On: 10/4/2017 By: Jennifer Lowery MD  
  
 Severity Noted Reaction Type Reactions Altace [Ramipril]    Unknown (comments) Lipitor [Atorvastatin]    Other (comments) Muscle pain Lisinopril    Shortness of Breath, Other (comments) Turns bright red Other Medication  2014    Other (comments) Vicryl suture on skin tends to be rejected with poor wound healing does better with monocryl Procardia [Nifedipine]    Other (comments) Caused afib Current Immunizations  Reviewed on 9/15/2017 Name Date Influenza High Dose Vaccine PF 9/15/2017 11:09 AM, 2016, 10/2/2015 12:30 PM  
 Influenza Vaccine 10/10/2014, 10/4/2013 Influenza Vaccine Split 10/10/2012  2:28 PM, 2011 Influenza Vaccine Whole 10/8/2010 Pneumococcal Conjugate (PCV-13) 12/15/2014  8:16 AM  
 Pneumococcal Polysaccharide (PPSV-23) 2014 Pneumococcal Vaccine (Unspecified Type) 2006 Td 2006 Tdap 4/3/2013  8:23 PM  
 Zoster Vaccine, Live 2007 Not reviewed this visit You Were Diagnosed With   
  
 Codes Comments Cervical radiculopathy    -  Primary ICD-10-CM: M54.12 
ICD-9-CM: 723.4 Lumbar radiculopathy     ICD-10-CM: M54.16 
ICD-9-CM: 724.4 Degeneration of cervical intervertebral disc     ICD-10-CM: M50.30 ICD-9-CM: 722.4 Vitals BP Pulse Temp Resp Height(growth percentile) Weight(growth percentile) 131/70 69 97.6 °F (36.4 °C) (Oral) 18 6' (1.829 m) 221 lb 9.6 oz (100.5 kg) SpO2 BMI Smoking Status 96% 30.05 kg/m2 Former Smoker BMI and BSA Data Body Mass Index Body Surface Area 30.05 kg/m 2 2.26 m 2 Preferred Pharmacy Pharmacy Name Phone [facility], [address] [place] [address] Road [phone] Your Updated Medication List  
  
   
This list is accurate as of: 10/4/17  9:56 AM.  Always use your most recent med list.  
  
  
  
  
 aspirin delayed-release 81 mg tablet Take 81 mg by mouth daily. atenolol 25 mg tablet Commonly known as:  TENORMIN  
TAKE ONE TABLET BY MOUTH TWICE A DAY BESIVANCE 0.6 % Drps ophthalmic suspension Generic drug:  besifloxacin 1 Drop two (2) times a day. cholecalciferol 1,000 unit Cap Commonly known as:  VITAMIN D3 Take 1,000 Units by mouth daily. coenzyme Q-10 200 mg capsule Commonly known as:  CO Q-10 Take  by mouth. DUREZOL 0.05 % ophthalmic emulsion Generic drug:  Difluprednate Administer 1 Drop to both eyes four (4) times daily. ezetimibe-simvastatin 10-40 mg per tablet Commonly known as:  Vytorin 10/40 Take 1 Tab by mouth nightly. * gabapentin 300 mg capsule Commonly known as:  NEURONTIN Take 300 mg by mouth nightly. * gabapentin 400 mg capsule Commonly known as:  NEURONTIN  
1 tab PO QHS  Indications: pain HYDROcodone-acetaminophen 5-325 mg per tablet Commonly known as:  Anais Adriel Take 1 Tab by mouth every four (4) hours as needed (for chronic pain). ILEVRO 0.3 % Drps Generic drug:  nepafenac Apply 1 Drop to eye. LORazepam 1 mg tablet Commonly known as:  ATIVAN Take 1 Tab by mouth every eight (8) hours as needed. losartan 25 mg tablet Commonly known as:  COZAAR  
TAKE ONE TABLET BY MOUTH TWICE A DAY MULTIVITAMIN PO Take  by mouth daily. NITROQUICK SL  
by SubLINGual route. OMEGA 3 FISH OIL PO Take 900 mg by mouth daily. PROTONIX 40 mg tablet Generic drug:  pantoprazole Take 40 mg by mouth daily. sildenafil citrate 100 mg tablet Commonly known as:  VIAGRA Take 1 Tab by mouth daily as needed. Indications: Erectile Dysfunction  
  
 triamterene-hydroCHLOROthiazide 37.5-25 mg per tablet Commonly known as:  Jeffrijomar Sportsman TAKE ONE TABLET BY MOUTH DAILY * VOLTAREN 1 % Gel Generic drug:  diclofenac  
as needed. * diclofenac 1 % Gel Commonly known as:  VOLTAREN Apply 2-4 g to affected area four (4) times daily. To affected area (topically) * Notice: This list has 4 medication(s) that are the same as other medications prescribed for you. Read the directions carefully, and ask your doctor or other care provider to review them with you. We Performed the Following REFERRAL TO PHYSICAL THERAPY [CUI30 Custom] Comments:  
 Right sciatica, left UE parethesis, trial of HTU Follow-up Instructions Return in about 6 weeks (around 11/15/2017). Referral Information Referral ID Referred By Referred To  
  
 [id number] Melvin Chou Not Available Visits Status Start Date End Date 1 New Request 10/4/17 10/4/18 If your referral has a status of pending review or denied, additional information will be sent to support the outcome of this decision. Patient Instructions Pinched Nerve in the Neck: Care Instructions Your Care Instructions A pinched nerve in the neck happens when a vertebra or disc in the upper part of your spine is damaged. This damage can happen because of an injury. Or it can just happen with age. The changes caused by the damage may put pressure on a nearby nerve root, pinching it. This causes symptoms such as sharp pain in your neck, shoulder, arm, or back. You may also have tingling or numbness. Sometimes it makes your arm weaker.  The symptoms are usually worse when you turn your head or strain your neck. For many people, the symptoms get better over time and finally go away. Early treatment usually includes medicines for pain and swelling. Sometimes physical therapy and special exercises may help. Follow-up care is a key part of your treatment and safety. Be sure to make and go to all appointments, and call your doctor if you are having problems. It's also a good idea to know your test results and keep a list of the medicines you take. How can you care for yourself at home? · Be safe with medicines. Read and follow all instructions on the label. ¨ If the doctor gave you a prescription medicine for pain, take it as prescribed. ¨ If you are not taking a prescription pain medicine, ask your doctor if you can take an over-the-counter medicine. · Try using a heating pad on a low or medium setting for 15 to 20 minutes every 2 or 3 hours. Try a warm shower in place of one session with the heating pad. You can also buy single-use heat wraps that last up to 8 hours. · You can also try an ice pack for 10 to 15 minutes every 2 to 3 hours. There isn't strong evidence that either heat or ice will help. But you can try them to see if they help you. · Don't spend too long in one position. Take short breaks to move around and change positions. · Wear a seat belt and shoulder harness when you are in a car. · Sleep with a pillow under your head and neck that keeps your neck straight. · If you were given a neck brace (cervical collar) to limit neck motion, wear it as instructed for as many days as your doctor tells you to. Do not wear it longer than you were told to. Wearing a brace for too long can lead to neck stiffness and can weaken the neck muscles. · Follow your doctor's instructions for gentle neck-stretching exercises. · Do not smoke. Smoking can slow healing of your discs. If you need help quitting, talk to your doctor about stop-smoking programs and medicines. These can increase your chances of quitting for good. · Avoid strenuous work or exercise until your doctor says it is okay. When should you call for help? Call 911 anytime you think you may need emergency care. For example, call if: 
· You are unable to move an arm or a leg at all. Call your doctor now or seek immediate medical care if: 
· You have new or worse symptoms in your arms, legs, chest, belly, or buttocks. Symptoms may include: ¨ Numbness or tingling. ¨ Weakness. ¨ Pain. · You lose bladder or bowel control. Watch closely for changes in your health, and be sure to contact your doctor if: 
· You are not getting better as expected. Where can you learn more? Go to http://nneka-mindi.info/. Enter Q286 in the search box to learn more about \"Pinched Nerve in the Neck: Care Instructions. \" Current as of: March 21, 2017 Content Version: 11.3 © 5899-1497 [employer]. Care instructions adapted under license by [employer] (which disclaims liability or warranty for this information). If you have questions about a medical condition or this instruction, always ask your healthcare professional. [place] [address] any warranty or liability for your use of this information. Introducing [facility]! Dear Jarrod Branham: Thank you for requesting a VoÃ¶lks account. Our records indicate that you already have an active VoÃ[employer]lks account. You can access your account anytime at [email or web address] Did you know that you can access your hospital and ER discharge instructions at any time in VoÃ[employer]lks? You can also review all of your test results from your hospital stay or ER visit. Additional Information If you have questions, please visit the Frequently Asked Questions section of the VoÃ¶lks website at [email or web address] Remember, VoÃ[employer]lks is NOT to be used for urgent needs.  For medical emergencies, dial 911. Now available from your iPhone and Android! Please provide this summary of care documentation to your next provider. Your primary care clinician is listed as Madisyn Worrell. If you have any questions after today's visit, please call [phone].

## 2017-10-04 NOTE — PATIENT INSTRUCTIONS
Pinched Nerve in the Neck: Care Instructions  Your Care Instructions  A pinched nerve in the neck happens when a vertebra or disc in the upper part of your spine is damaged. This damage can happen because of an injury. Or it can just happen with age. The changes caused by the damage may put pressure on a nearby nerve root, pinching it. This causes symptoms such as sharp pain in your neck, shoulder, arm, or back. You may also have tingling or numbness. Sometimes it makes your arm weaker. The symptoms are usually worse when you turn your head or strain your neck. For many people, the symptoms get better over time and finally go away. Early treatment usually includes medicines for pain and swelling. Sometimes physical therapy and special exercises may help. Follow-up care is a key part of your treatment and safety. Be sure to make and go to all appointments, and call your doctor if you are having problems. It's also a good idea to know your test results and keep a list of the medicines you take. How can you care for yourself at home? · Be safe with medicines. Read and follow all instructions on the label. ¨ If the doctor gave you a prescription medicine for pain, take it as prescribed. ¨ If you are not taking a prescription pain medicine, ask your doctor if you can take an over-the-counter medicine. · Try using a heating pad on a low or medium setting for 15 to 20 minutes every 2 or 3 hours. Try a warm shower in place of one session with the heating pad. You can also buy single-use heat wraps that last up to 8 hours. · You can also try an ice pack for 10 to 15 minutes every 2 to 3 hours. There isn't strong evidence that either heat or ice will help. But you can try them to see if they help you. · Don't spend too long in one position. Take short breaks to move around and change positions. · Wear a seat belt and shoulder harness when you are in a car.   · Sleep with a pillow under your head and neck that keeps your neck straight. · If you were given a neck brace (cervical collar) to limit neck motion, wear it as instructed for as many days as your doctor tells you to. Do not wear it longer than you were told to. Wearing a brace for too long can lead to neck stiffness and can weaken the neck muscles. · Follow your doctor's instructions for gentle neck-stretching exercises. · Do not smoke. Smoking can slow healing of your discs. If you need help quitting, talk to your doctor about stop-smoking programs and medicines. These can increase your chances of quitting for good. · Avoid strenuous work or exercise until your doctor says it is okay. When should you call for help? Call 911 anytime you think you may need emergency care. For example, call if:  · You are unable to move an arm or a leg at all. Call your doctor now or seek immediate medical care if:  · You have new or worse symptoms in your arms, legs, chest, belly, or buttocks. Symptoms may include:  ¨ Numbness or tingling. ¨ Weakness. ¨ Pain. · You lose bladder or bowel control. Watch closely for changes in your health, and be sure to contact your doctor if:  · You are not getting better as expected. Where can you learn more? Go to http://nneka-mindi.info/. Enter U824 in the search box to learn more about \"Pinched Nerve in the Neck: Care Instructions. \"  Current as of: March 21, 2017  Content Version: 11.3  © 2881-3221 [employer]. Care instructions adapted under license by [employer] (which disclaims liability or warranty for this information). If you have questions about a medical condition or this instruction, always ask your healthcare professional. [place] [address] any warranty or liability for your use of this information.

## 2017-10-04 NOTE — PROGRESS NOTES
Verbal order entered per Dr. Chris Bernabe as documented on blue sheet:Referral to PT due to cervical and lumbar spondylosis, right sciatica, left UE paresthesia, trial of HTU

## 2017-10-04 NOTE — PROGRESS NOTES
Isael Dumont [facility] [address].  [address], [address]  [place] [address]  Phone: [phone]  Fax: [phone]        Ammon Cordero  :   PCP: Sangeetha Brock MD    PROGRESS NOTE      ASSESSMENT AND PLAN    Diagnoses and all orders for this visit:    1. Cervical radiculopathy  -     REFERRAL TO PHYSICAL THERAPY    2. Lumbar radiculopathy  -     REFERRAL TO PHYSICAL THERAPY    3. Degeneration of cervical and lumbar spine, relative cervical stenosis    1. Advised to continue HEP. 2. Referral to physical therapy and trial of HTU. 3. Take Gabapentin 400 mg qdinner. If unable to tolerate, will switch to another medication. 4. Pt may call and be set up for RT L5 SNRB. 5. Given information on cervical radiculopathy. Follow-up Disposition:  Return in about 6 weeks (around 11/15/2017). HISTORY OF PRESENT ILLNESS  Delia Hayden is a 67 y.o. male. Pt presents to the office for a f/u visit for neck and back pain. Last visit pt was sent to have a cervical and lumbar spine MRI. Images reviewed with the pt. Last visit his Gabapentin was increased to 400 mg QHS. Has asymmetric cervical L facet hypertrophy and relative stenosis. Lumbar- degenerative changes,     Pt was unable to tolerate his increased dose of Gabapentin as this caused him morning somnolence with dizziness. He has gone back to taking his previous 300 mg QHS dose with some relief. Pt continues to have neck pain. His neck pain radiates into his LUE. 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 Pt states that his pain usually radiates from his neck into his LT shoulder and shoulder blade and when his pain becomes severe, it will shoot into his LUE. He states that the first episode of shooting pain, he went to the ED as he believed he was having a heart attack. He was told that this pain was coming from his neck.   Pt admits to a poor posture due to pain. He has not noticed weakness in his LUE. He has not had physical therapy for his neck. His neck pain varies on his activity level. His pain is flared with yard work and any heavy labor. Had old water over the door traction unit many years ago. His back pain will radiate into his RLE. He reports a short standing and walking tolerance. Pt states that he was moving furniture which has caused his pain to be flared. Pt notes that he has been having flared sciatic pain which he notices more with sitting and driving. Pt denies saddle paresthesias. Pt states he has been using Norco PRN #[id number] with relief through Dr. Wagner Spicer. Denies persistent fevers, chills, weight changes, neurogenic bowel or bladder symptoms. Pt denies recent ED visits or hospitalizations. Had benefit with injections by Dr. Clara Mo under fluouro, but not recent caudals under U/S through Dr. Charley Rincon office. Extensive vascular disease, s/p coronary stenting, LT femoral and RT iliac stenting. Hx of CEA on the RT, LT carotid is 100% blocked. He sees Dr. Evelia Osorio. PMHx of prostate cancer. His last stent was placed in 2015. He has a home traction unit at home that he has not used in awhile. No TMJ. Pain Assessment  10/4/2017   Location of Pain Back;Hand   Location Modifiers Right   Severity of Pain 5   Quality of Pain Aching   Quality of Pain Comment numbness, tingling   Duration of Pain -   Frequency of Pain Constant   Aggravating Factors Walking;Standing;Bending   Limiting Behavior -   Relieving Factors (No Data)   Relieving Factors Comment pain med   Result of Injury -               MRI Results (maximum last 3): Results from [place] encounter on 09/02/17   MRI CERV SPINE WO CONT   Narrative MR CERVICAL SPINE WITHOUT CONTRAST    CPT CODE: 30486    HISTORY: Cervicalgia with symptoms radiating to left shoulder and down the arm. History of arthritis. No reported injury.     COMPARISON: Prior MRI 2007; CT myelogram 2010.    TECHNIQUE: The following sequences were performed through the cervical spine:    1. Sagittal and axial T2 weighted images without fat saturation. 2.  Sagittal T1 weighted images without fat saturation. 3.  Sagittal STIR sequence. FINDINGS:     Mild right rotatory scoliotic curvature. Subtle anterior listhesis and trace  kyphosis at C6-7. Normal vertebral body heights. Unremarkable marrow signal.  Disc desiccation. Mild to moderate posterior disc space narrowing C2-5. Moderate  disc space narrowing in the upper thoracic spine. Patent cervical medullary  junction. Unremarkable cervical and upper thoracic cord morphology. On axial imaging, findings at each level are as follows:    C2/C3: Mild facet arthropathy. Patent canal and foramina. C3/C4: Right-sided uncinate hypertrophy. Bilateral facet arthropathy. Minor  abutment and flattening right anterior cord. No significant spinal stenosis. Severe right foraminal stenosis. C4/C5: More broadly based disc osteophyte complex with eccentric prominence to  the left. Bilateral facet arthropathy with trace facet inflammation. Mild  buckling of ligamentum flavum. Mild concentric spinal stenosis. Severe left and  mild right foraminal stenosis. C5/C6: Subtle anterior listhesis of C5. Pronounced bilateral facet arthropathy. Some buckling of ligamentum flavum. No significant spinal stenosis. Severe left  and moderate right foraminal stenosis. C6/C7: Listhesis with mild disc bulge. Bilateral facet arthropathy left more  than right. No significant spinal stenosis. Severe left and moderate to severe  right foraminal stenosis. C7/T1: Patent canal and foramina. Incidental imaging of the adjacent soft tissues is unremarkable. Impression IMPRESSION:    1. Multilevel degenerative findings of cervical spine.  -Facet arthropathy more severe than disc pathology.   -Multilevel severe foraminal stenoses from facet arthropathy as delineated  above. Similar distribution previously.  -No high-grade spinal stenosis. Thank you for this referral.      MRI LUMB SPINE WO CONT   Narrative MR Lumbar Spine Without Contrast    HISTORY: Low back pain and right leg pain to the level of the foot. Also with  left hip pain. History of arthritis. COMPARISON: MRI October 2007    Technique: Multi-sequence multiplanar T1, T2, STIR imaging with and without fat  saturation obtained centered on the lumbar spine. FINDINGS: Study presumes presence of five lumbar vertebra. Mild right convex and  rotatory lumbar scoliosis. Slight anterior listhesis of L5 on S1. No pars  defect. Normal vertebral body heights. Unremarkable marrow signal. Multilevel  disc desiccation. Moderate disc space narrowing most pronounced at L3-4 followed  by L2-3. Normal conus at L1-2. Axial imaging correlation:    T12-L1: Mild facet arthropathy. Patent canal and foramina. L1-2: Mild facet arthropathy. Patent canal and foramina. L2-3: Bilobed disc osteophyte complex. Bilateral facet arthropathy. Minor  concentric spinal stenosis. Crossing left L3 nerve abutment without impingement. Adequately patent foramina. L3-4: Broad-based disc osteophyte complex. Bilateral facet arthropathy. Minimal  concentric spinal stenosis. Patent foramina. L4-5: Small central annular tear. Bilateral facet arthropathy with trace  inflammation. No significant spinal stenosis. Adequately patent foramina. L5-S1: Mild uncovering of the disc. Bilateral facet arthropathy with low-grade  inflammation. Patent canal. Adequately patent foramina. Incidental imaging of regional soft tissues is unremarkable. Impression IMPRESSION:    1. Multilevel degenerative findings of lumbar spine, with overall mild  progression from 2007. No high-grade spinal or foraminal stenosis at any level.   Some further narrowing of left L2-3 lateral recess with crossing left L3 nerve  contact, but no overt impingement. Details as above.     Thank you for this referral.                  PAST MEDICAL HISTORY   Past Medical History:   Diagnosis Date    Adenoma of left adrenal gland     2009  1 cm, no change 1/15, 2/16    Asbestosis     Atrial fibrillation     CHA2DS2-VAsc=3(age+, htn+, vasc dx+; estimated yearly stroke risk according to Lip et al. is 3,2%), Hasbled=2(estimated yearly bleeding risk according to pisters et al. is 1,88%); not anticoagulated due to h/o gi bleed    Atrial fibrillation ablation     10/08    Basal cell cancer     Dr Lissette Reyes; he's had >350 lesions removed    Carotid occlusion     left     Cervical radiculopathy     MRI 9/11 showed C3-6 severe foraminal stenosis    Chronic pain     Colon polyp     Dr Mitchel Pena 9/15    Coronary artery disease     RCA - 3.0 x 16mm TAXUS 9/04, 3.0 x 16mm TAXUS 12/06    Dyslipidemia     Erectile dysfunction     GERD     GI bleed     Hearing loss     2014  bilateral Dr Romel Gallego    Hernia, umbilical     Hypertension     Hypogonadism male     Lumbar radiculopathy     Dr Osiris Aaron;  MRI 9/11 L4-5 disc bulging, annular tear, disc dessication    Myofascial pain dysfunction syndrome     pain clinic     Osteoarthritis     right knee Dr Muriel Liu Peripheral vascular disease     50-60% R iliac; s/p R iliac stent and L femoral artery stent in past; R OLIVIA 0.76 (1/16)    Plantar fasciitis     bilateral     PPD positive     Prediabetes     Prostate cancer     T1c Imelda 7(3+4), 70% in 1 core, GS 7 (3+4) in 4 cores, GS 6 (3+3) in 1 core; psa 5.28, TRUS 18 gm;  Dr Mitchel Pena; s/p cryoablation 10/16    Recurrent umbilical hernia     Subclinical hypothyroidism     denies    Tinnitus     Dr Bijal Barrow Ulcerative colitis     Venous insufficiency        Past Surgical History:   Procedure Laterality Date    HX CAROTID ENDARTERECTOMY      1/14  right    HX CATARACT REMOVAL      HX COLONOSCOPY      Dr Mitchel Pena 9/15 polyps    HX CRYOABLATION OF THE PROSTATE      10/16    HX HEMORRHOIDECTOMY      1979    HX HERNIA REPAIR      1970, 1975    HX ORTHOPAEDIC      right knee surgery    HX REFRACTIVE SURGERY      OD (hemoplasty to right eye on retina to avoid blindness)    HX TONSILLECTOMY      1955    [place] LAP, RECURRENT INCISIONAL HERNIA REPAIR,REDUCIBLE N/A 02/09/2017    Dr. Funk Borne HERNIA,5+Y/O,REDUCIBL      2/16  left  Dr. Kaylee Redd GNFS,1+E/G,EZRXA      2/16  Dr. Starr Reas      1/16  R OLIVIA 0.76, L OLIVIA 1.02    VASCULAR SURGERY PROCEDURE UNLIST      10/15   left fem art and left iliac stent   . MEDICATIONS      Current Outpatient Prescriptions   Medication Sig Dispense Refill    HYDROcodone-acetaminophen (NORCO) 5-325 mg per tablet Take 1 Tab by mouth every four (4) hours as needed (for chronic pain). 180 Tab 0    NITROGLYCERIN (NITROQUICK SL) by SubLINGual route.  atenolol (TENORMIN) 25 mg tablet TAKE ONE TABLET BY MOUTH TWICE A  Tab 2    diclofenac (VOLTAREN) 1 % gel Apply 2-4 g to affected area four (4) times daily. To affected area (topically) 100 g 5    besifloxacin (BESIVANCE) 0.6 % drps ophthalmic suspension 1 Drop two (2) times a day.  Difluprednate (DUREZOL) 0.05 % ophthalmic emulsion Administer 1 Drop to both eyes four (4) times daily.  nepafenac (ILEVRO) 0.3 % drps Apply 1 Drop to eye.  LORazepam (ATIVAN) 1 mg tablet Take 1 Tab by mouth every eight (8) hours as needed. 50 Tab 0    sildenafil citrate (VIAGRA) 100 mg tablet Take 1 Tab by mouth daily as needed. Indications: Erectile Dysfunction 12 Tab 6    losartan (COZAAR) 25 mg tablet TAKE ONE TABLET BY MOUTH TWICE A  Tab 2    aspirin delayed-release 81 mg tablet Take 81 mg by mouth daily.  triamterene-hydrochlorothiazide (MAXZIDE) 37.5-25 mg per tablet TAKE ONE TABLET BY MOUTH DAILY 90 Tab 3    ezetimibe-simvastatin (VYTORIN 10/40) 10-40 mg per tablet Take 1 Tab by mouth nightly.  90 Tab 3    Cholecalciferol, Vitamin D3, 1,000 unit cap Take 1,000 Units by mouth daily.  VOLTAREN 1 % topical gel as needed.  OMEGA-3 FATTY ACIDS/FISH OIL (OMEGA 3 FISH OIL PO) Take 900 mg by mouth daily.  gabapentin (NEURONTIN) 300 mg capsule Take 300 mg by mouth nightly.  MULTIVITAMIN PO Take  by mouth daily.  pantoprazole (PROTONIX) 40 mg tablet Take 40 mg by mouth daily.  coenzyme q10 200 mg Cap Take  by mouth.  gabapentin (NEURONTIN) 400 mg capsule 1 tab PO QHS  Indications: pain 90 Cap 1        ALLERGIES    Allergies   Allergen Reactions    Altace [Ramipril] Unknown (comments)    Lipitor [Atorvastatin] Other (comments)     Muscle pain    Lisinopril Shortness of Breath and Other (comments)     Turns bright red    Other Medication Other (comments)     Vicryl suture on skin tends to be rejected with poor wound healing does better with monocryl    Procardia [Nifedipine] Other (comments)     Caused afib          SOCIAL HISTORY    Social History     Social History    Marital status:      Spouse name: N/A    Number of children: N/A    Years of education: N/A     Occupational History    Not on file.      Social History Main Topics    Smoking status: Former Smoker     Packs/day: 1.50     Years: 25.00     Types: Cigarettes     Quit date: 1/1/2003    Smokeless tobacco: Never Used    Alcohol use 2.5 oz/week     5 Cans of beer per week      Comment: 5 beers a week    Drug use: No    Sexual activity: Yes     Partners: Female     Birth control/ protection: None     Other Topics Concern    Not on file     Social History Narrative       FAMILY HISTORY    Family History   Problem Relation Age of Onset    Heart Disease Mother     Hypertension Mother     Diabetes Mother     Arthritis-osteo Mother     Heart Disease Father     Stroke Father     Hypertension Father     Heart Disease Sister     Hypertension Sister        REVIEW OF SYSTEMS  Review of Systems   Constitutional: Negative for chills, fever and weight loss. Respiratory: Negative for shortness of breath. Cardiovascular: Negative for chest pain. Gastrointestinal: Negative for constipation. Negative for fecal incontinence   Genitourinary: Negative for dysuria. Negative for urinary incontinence   Musculoskeletal:        Per HPI   Skin: Negative for rash. Neurological: Negative for dizziness, tingling, tremors, focal weakness and headaches. Endo/Heme/Allergies: Does not bruise/bleed easily. Psychiatric/Behavioral: The patient does not have insomnia. PHYSICAL EXAMINATION  Visit Vitals    /70    Pulse 69    Temp 97.6 °F (36.4 °C) (Oral)    Resp 18    Ht 6' (1.829 m)    Wt 221 lb 9.6 oz (100.5 kg)    SpO2 96%    BMI 30.05 kg/m2         Accompanied by self. Constitutional:  Well developed, well nourished, in no acute distress. Psychiatric: Affect and mood are appropriate. Integumentary: No rashes or abrasions noted on exposed areas. Cardiovascular/Peripheral Vascular: Intact l pulses. No peripheral edema is noted. Lymphatic:  No evidence of lymphedema. No cervical lymphadenopathy. SPINE/MUSCULOSKELETAL EXAM    Cervical spine:  Neck is forward flexed. Increased muscle tone bilateral upper trapezii. No focal atrophy is noted. Tenderness to palpation bilateral cervical paraspinals. Tenderness to palpation of LT medial scapular border. Negative Spurling's sign. Negative Tinel's sign. Negative Cortes's sign. Sensation grossly intact to light touch. Lumbar spine:  No rash, ecchymosis, or gross obliquity. No fasciculations. No focal atrophy is noted. Tenderness to palpation RT L5-S1. Tenderness to palpation at the RT sciatic notch. SI joints non-tender. Trochanters non tender. Sensation grossly intact to light touch.       MOTOR:      Biceps  Triceps Deltoids Wrist Ext Wrist Flex Hand Intrin   Right +4/5 +4/5 +4/5 +4/5 +4/5 +4/5   Left +4/5 +4/5 +4/5 +4/5 +4/5 +4/5        Hip Flex  Quads Hamstrings Ankle DF EHL Ankle PF   Right +4/5 +4/5 +4/5 +4/5 +4/5 +4/5   Left +4/5 +4/5 +4/5 +4/5 +4/5 +4/5       Straight Leg raise + RT at 90 degrees. Hamstring tightness on the RT    Ambulation without assistive device. FWB. Written by Elyssa Puri, as dictated by Nai Mishra MD.    I, Dr. Nai Mishra MD, confirm that all documentation is accurate. Mr. Kelle Escobar may have a reminder for a \"due or due soon\" health maintenance. I have asked that he contact his primary care provider for follow-up on this health maintenance.

## 2017-10-11 ENCOUNTER — HOSPITAL ENCOUNTER (OUTPATIENT)
Dept: PHYSICAL THERAPY | Age: 72
Discharge: HOME | End: 2017-10-11
Payer: MEDICARE

## 2017-10-11 PROCEDURE — 97110 THERAPEUTIC EXERCISES: CPT

## 2017-10-11 PROCEDURE — G8978 MOBILITY CURRENT STATUS: HCPCS

## 2017-10-11 PROCEDURE — G8979 MOBILITY GOAL STATUS: HCPCS

## 2017-10-11 PROCEDURE — 97162 PT EVAL MOD COMPLEX 30 MIN: CPT

## 2017-10-11 NOTE — PROGRESS NOTES
PT DAILY TREATMENT NOTE - [facility]     Patient Name: Ulises Encinas  Date:10/11/2017  : 1945  [x]  Patient  Verified  Payor: Reshma Wadsworth / Plan: [place] & [place] / Product Type: Medicare /    In time:1140  Out time:1230  Total Treatment Time (min): 50  Total Timed Codes (min): 25  1:1 Treatment Time ([address] only): 50   Visit #: 1 of 8    Treatment Area: Cervicalgia [M54.2]  Radiculopathy, cervical region [M54.12]  Radiculopathy, lumbar region [M54.16]    SUBJECTIVE  Pain Level (0-10 scale): 4  Any medication changes, allergies to medications, adverse drug reactions, diagnosis change, or new procedure performed?: [x] No    [] Yes (see summary sheet for update)  Subjective functional status/changes:   [] No changes reported  See eval    OBJECTIVE      25 min [x]Eval                  []Re-Eval       25 min Therapeutic Exercise:  [] See flow sheet :   Rationale: increase ROM and increase strength to improve the patients ability to perform daily activities       With   [] TE   [] TA   [] neuro   [] other: Patient Education: [x] Review HEP    [] Progressed/Changed HEP based on:   [] positioning   [] body mechanics   [] transfers   [] heat/ice application    [] other:      Other Objective/Functional Measures:      Pain Level (0-10 scale) post treatment: 4    ASSESSMENT/Changes in Function: see POC    Patient will continue to benefit from skilled PT services to modify and progress therapeutic interventions, address functional mobility deficits, address ROM deficits, address strength deficits, analyze and address soft tissue restrictions, analyze and cue movement patterns and assess and modify postural abnormalities to attain remaining goals. [x]  See Plan of Care  []  See progress note/recertification  []  See Discharge Summary         Progress towards goals / Updated goals:  Short Term Goals:  To be accomplished in 2 weeks:                        1. I and compliant with HEP for self management of symptoms. 2. Transition to land therapy. Long Term Goals: To be accomplished in 4 weeks:                        1. Improve FOTO to 60 to indicate improved function with daily activities. 2. Increase R HS strength to grossly 4+/5 to improve stability for ambulation. 3. Increase B scap strength to grossly 3+/5 to improve overall postural awareness. 4. Report >50% improvement to increase ease with performing yard work.      PLAN  []  Upgrade activities as tolerated     [x]  Continue plan of care  []  Update interventions per flow sheet       []  Discharge due to:_  []  Other:_      Tho Mom, PT 10/11/2017  1:05 PM    Future Appointments  Date Time Provider Mary Amy   10/12/2017 12:45 PM [facility] PT PHYSICAL THERAPY 3 [facility]   10/23/2017 7:30 AM Dayanara Alcantara, PTA [facility]   10/25/2017 10:30 AM Chucho Castellanos [facility]   10/31/2017 7:30 AM Donovan Fernández, PT [facility]   11/2/2017 8:30 AM Dayanara Alcantara PTA [facility]   11/7/2017 8:00 AM Zion Barkley, PTA [facility]   11/9/2017 7:30 AM Dayanara Alcantara, PTA [facility]   11/13/2017 9:15 AM Reeta Arlice [place],  [address]    1/3/2018 8:35 AM [facility] NURSE VISIT [facility] SCHED   1/9/2018 8:00 AM Konstantin Singh MD [facility] SCHED   2/2/2018 8:00 AM [facility] SCHED   2/2/2018 10:00 AM [facility] NONIMAGING [facility] SCHED   2/26/2018 9:15 AM Lexy Clement MD [facility] SCH[facility]   3/30/2018 8:30 AM [facility] NURSE [facility] SCHED   4/12/2018 8:45 AM Dom Thorne MD [facility]

## 2017-10-11 NOTE — PROGRESS NOTES
[facility]  [address] [facility] [address]  [place], [address]  [phone] fax    Plan of Care/ Statement of Necessity for Physical Therapy Services    Patient name: Sarah Kim Start of Care: 10/11/2017   Referral source: Garo Veras MD : 1945    Medical Diagnosis: Cervicalgia [M54.2]  Radiculopathy, cervical region [M54.12]  Radiculopathy, lumbar region [M54.16]   Onset Date:chronic    Treatment Diagnosis: C/S and L/S pain   Prior Hospitalization: see medical history Provider#: [id number]   Medications: Verified on Patient summary List    Comorbidities: heart disease; OA; HTN; hx of prostate CA   Prior Level of Function: Able to perform yard work without pain      The Plan of Care and following information is based on the information from the initial evaluation. Assessment/ key information: 67y.o. year old male presents with CC of C/S and L/S pain. Deficits include: severe R knee DJD with limited TKE. , which affects his gait; decreased B hip mobility and flexibility; poor C/S mobility and strength; poor scapular stability. Patient will benefit from physical therapy to address deficits, and ultimately to return patient to prior level of function. Evaluation Complexity History MEDIUM  Complexity : 1-2 comorbidities / personal factors will impact the outcome/ POC ; Examination HIGH Complexity : 4+ Standardized tests and measures addressing body structure, function, activity limitation and / or participation in recreation  ;Presentation MEDIUM Complexity : Evolving with changing characteristics  ; Clinical Decision Making MEDIUM Complexity : FOTO score of 26-74  Overall Complexity Rating: MEDIUM  Problem List: pain affecting function, decrease ROM, decrease strength, impaired gait/ balance, decrease ADL/ functional abilitiies, decrease activity tolerance and decrease flexibility/ joint mobility   Treatment Plan may include any combination of the following: Therapeutic exercise, Neuromuscular re-education, Physical agent/modality, Manual therapy, Aquatic therapy and Patient education  Patient / Family readiness to learn indicated by: asking questions, trying to perform skills and interest  Persons(s) to be included in education: patient (P)  Barriers to Learning/Limitations: None  Patient Goal (s): ease pain and increase mobility  Patient Self Reported Health Status: fair  Rehabilitation Potential: fair    Short Term Goals: To be accomplished in 2 weeks:   1. I and compliant with HEP for self management of symptoms. 2. Transition to land therapy. Long Term Goals: To be accomplished in 4 weeks:   1. Improve FOTO to 60 to indicate improved function with daily activities. 2. Increase R HS strength to grossly 4+/5 to improve stability for ambulation. 3. Increase B scap strength to grossly 3+/5 to improve overall postural awareness. 4. Report >50% improvement to increase ease with performing yard work. Frequency / Duration: Patient to be seen 2 times per week for 4 weeks. Patient/ Caregiver education and instruction: Diagnosis, prognosis, exercises   [x]  Plan of care has been reviewed with PTA    G-Codes (GP)  Mobility   Current  CK= 40-59%   Goal  CK= 40-59%  The severity rating is based on clinical judgment and the FOTO score. Certification Period: 10/11/17-/12/11/17  Neal Sarkar, PT 10/11/2017 12:57 PM    ________________________________________________________________________    I certify that the above Therapy Services are being furnished while the patient is under my care. I agree with the treatment plan and certify that this therapy is necessary.     [de-identified] Signature:____________________  Date:____________Time: _________    Please sign and return to [facility]  [address]  [place], [address]  [phone] fax

## 2017-10-12 ENCOUNTER — HOSPITAL ENCOUNTER (OUTPATIENT)
Dept: PHYSICAL THERAPY | Age: 72
Discharge: HOME | End: 2017-10-12
Payer: MEDICARE

## 2017-10-12 PROCEDURE — 97113 AQUATIC THERAPY/EXERCISES: CPT

## 2017-10-23 ENCOUNTER — HOSPITAL ENCOUNTER (OUTPATIENT)
Dept: PHYSICAL THERAPY | Age: 72
Discharge: HOME | End: 2017-10-23
Payer: MEDICARE

## 2017-10-23 PROCEDURE — 97113 AQUATIC THERAPY/EXERCISES: CPT

## 2017-10-23 NOTE — PROGRESS NOTES
PT DAILY TREATMENT NOTE - [facility]     Patient Name: Gwen Lacy  Date:10/23/2017  : 1945  [x]  Patient  Verified  Payor: Susan Hazel / Plan: [place] MED[place] & B / Product Type: Medicare /    In time:7:30  Out time:7:58  Total Treatment Time (min): 28  Total Timed Codes (min): 28  1:1 Treatment Time ( [address] only): 28   Visit #: 3 of 8    Treatment Area: Cervicalgia [M54.2]  Radiculopathy, cervical region [M54.12]  Radiculopathy, lumbar region [M54.16]    SUBJECTIVE  Pain Level (0-10 scale): 5/10  Any medication changes, allergies to medications, adverse drug reactions, diagnosis change, or new procedure performed?: [x] No    [] Yes (see summary sheet for update)  Subjective functional status/changes:   [] No changes reported  The neck exercises at home cause some additional pain but it's okay. \"    OBJECTIVE    28 min Therapeutic Exercise:  [x] See flow sheet : Aquatic therapy   Rationale: increase ROM, increase strength and increase proprioception to improve the patients ability to perform ADL's. With   [x] TE   [] TA   [] neuro   [] other: Patient Education: [x] Review HEP    [] Progressed/Changed HEP based on:   [] positioning   [] body mechanics   [] transfers   [] heat/ice application    [] other:      Other Objective/Functional Measures: Cueing to correct mini-squat mechanics. Pain Level (0-10 scale) post treatment: 3/10    ASSESSMENT/Changes in Function: Participates fully in treatment. Pt reported a decrease in pain post-treatment. Patient will continue to benefit from skilled PT services to modify and progress therapeutic interventions, address functional mobility deficits, address ROM deficits, address strength deficits, analyze and cue movement patterns and analyze and modify body mechanics/ergonomics to attain remaining goals.      [x]  See Plan of Care  []  See progress note/recertification  []  See Discharge Summary         Progress towards goals / Updated goals:  Short Term Goals: To be accomplished in 2 weeks:                        5. I and compliant with HEP for self management of symptoms.  - Pt reports HEP compliance. 10/23/2017  2. Transition to land therapy. Long Term Goals: To be accomplished in 4 weeks:                        2. Improve FOTO to 60 to indicate improved function with daily activities. 2. Increase R HS strength to grossly 4+/5 to improve stability for ambulation. 3. Increase B scap strength to grossly 3+/5 to improve overall postural awareness. 4. Report >50% improvement to increase ease with performing yard work.      PLAN  []  Upgrade activities as tolerated     [x]  Continue plan of care  []  Update interventions per flow sheet       []  Discharge due to:_  []  Other:_      Donis Swann, PTA 10/23/2017  7:35 AM    Future Appointments  Date Time Provider Mary Reyes   10/25/2017 10:30 AM Madalyn Sims [place] [address] [facility]   10/31/2017 7:30 AM Eugenia Galvez, PT [facility]   11/2/2017 8:30 AM Donis Swann, PTA [facility]   11/7/2017 8:00 AM Riky Thorne, PTA [facility]   11/9/2017 7:30 AM Donis Swann, PTA [facility]   11/13/2017 9:15 AM Lorri Fernández  [address]    1/3/2018 8:35 AM [facility] NURSE VISIT [facility]   1/9/2018 8:00 AM Lary Vaca MD [facility]   2/2/2018 8:00 AM [facility]   2/2/2018 10:00 AM [facility] NONIMAGING [facility]   2/26/2018 9:15 AM Mercy Swanson MD [facility]   3/30/2018 8:30 AM [facility] NURSE [facility]   4/12/2018 8:45 AM Mary Kahn MD [address] [facility]

## 2017-10-24 ENCOUNTER — PATIENT OUTREACH (OUTPATIENT)
Dept: INTERNAL MEDICINE CLINIC | Age: 72
End: 2017-10-24

## 2017-10-24 NOTE — PROGRESS NOTES
Patient admitted to [facility], 9/21/17 to 9/21/17 for c/o neck pain, left arm pain and headache. Attended follow up appointment with orhto on 10/4/17 for cervical radiculopathy. No hospitalization or ED admission post 30 days from discharge of 9/21/17. Episode closed.

## 2017-10-25 ENCOUNTER — HOSPITAL ENCOUNTER (OUTPATIENT)
Dept: PHYSICAL THERAPY | Age: 72
Discharge: HOME | End: 2017-10-25
Payer: MEDICARE

## 2017-10-25 PROCEDURE — 97113 AQUATIC THERAPY/EXERCISES: CPT

## 2017-10-31 ENCOUNTER — HOSPITAL ENCOUNTER (OUTPATIENT)
Dept: PHYSICAL THERAPY | Age: 72
Discharge: HOME | End: 2017-10-31
Payer: MEDICARE

## 2017-10-31 PROCEDURE — 97112 NEUROMUSCULAR REEDUCATION: CPT

## 2017-10-31 PROCEDURE — 97110 THERAPEUTIC EXERCISES: CPT

## 2017-10-31 NOTE — PROGRESS NOTES
PT DAILY TREATMENT NOTE     Patient Name: Alondra Isaacs  Date:10/31/2017  : 1945  [x]  Patient  Verified  Payor: Abeba Baugh / Plan: [place] MEDICARE PART A & B / Product Type: Medicare /    In time:7:30am  Out time:8:29am  Total Treatment Time (min): 59  1:1 treatment time: 30  Visit #: 5 of 8    Treatment Area: Cervicalgia [M54.2]  Radiculopathy, cervical region [M54.12]  Radiculopathy, lumbar region [M54.16]    SUBJECTIVE  Pain Level (0-10 scale): 3-4  Any medication changes, allergies to medications, adverse drug reactions, diagnosis change, or new procedure performed?: [x] No    [] Yes (see summary sheet for update)  Subjective functional status/changes:   [] No changes reported  Pt reports back pain 2/2 climbing up and down a ladder \"about 25 times\" yesterday. OBJECTIVE  39 min Therapeutic Exercise:  [x] See flow sheet :   Rationale: increase ROM and increase strength to improve the patients ability to improve ease of ADLs. 10 min Neuromuscular Re-education:  [x]  See flow sheet :   Rationale: increase ROM, increase strength and improve coordination  to improve the patients ability to improve ease of ADLs and upright posture. Modality rationale: decrease pain and increase tissue extensibility to improve the patients ability to improve ADL ease.    Min Type Additional Details    [] Estim:  []Unatt       []IFC  []Premod                        []Other:  []w/ice   []w/heat  Position:  Location:    [] Estim: []Att    []TENS instruct  []NMES                    []Other:  []w/US   []w/ice   []w/heat  Position:  Location:    []  Traction: [] Cervical       []Lumbar                       [] Prone          []Supine                       []Intermittent   []Continuous Lbs:  [] before manual  [] after manual    []  Ultrasound: []Continuous   [] Pulsed                           []1MHz   []3MHz Location:  W/cm2:    []  Iontophoresis with dexamethasone         Location: [] Take home patch   [] In clinic   10 []  Ice     [x]  heat  []  Ice massage  []  Laser   []  Anodyne Position: seated  Location: C/S    []  Laser with stim  []  Other: Position:  Location:    []  Vasopneumatic Device Pressure:       [] lo [] med [] hi   Temperature: [] lo [] med [] hi   [] Skin assessment post-treatment:  []intact []redness- no adverse reaction    []redness - adverse reaction:             With   [] TE   [] TA   [] neuro   [] other: Patient Education: [x] Review HEP    [] Progressed/Changed HEP based on:   [] positioning   [] body mechanics   [] transfers   [] heat/ice application    [] other:      Other Objective/Functional Measures:      Pain Level (0-10 scale) post treatment: 2-3    ASSESSMENT/Changes in Function: Continues to demonstrate kyphotic forward head posture. Limited L trunk rotation as compared contralaterally. Requires frequent cues to improve upright posture and facilitate postural awareness. Reports diminished pain post treatment, however, also reports \"tingling\" into his L shoulder blade upon completion of treatment, though he is unable to verbalize any specific provocating event/incident. Patient will continue to benefit from skilled PT services to modify and progress therapeutic interventions, address functional mobility deficits, address ROM deficits, address strength deficits, analyze and address soft tissue restrictions, analyze and cue movement patterns, analyze and modify body mechanics/ergonomics and assess and modify postural abnormalities to attain remaining goals. []  See Plan of Care  []  See progress note/recertification  []  See Discharge Summary         Progress towards goals / Updated goals:  Short Term Goals: To be accomplished in 2 weeks:                        5. I and compliant with HEP for self management of symptoms.  - Pt reports HEP compliance. 10/23/2017  2. Transition to land therapy.  Met 10/31/2017  Long Term Goals: To be accomplished in 4 weeks:                        3. Improve FOTO to 60 to indicate improved function with daily activities. 2. Increase R HS strength to grossly 4+/5 to improve stability for ambulation. 3. Increase B scap strength to grossly 3+/5 to improve overall postural awareness.   4. Report >50% improvement to increase ease with performing yard work.        PLAN  [x]  Upgrade activities as tolerated     [x]  Continue plan of care  []  Update interventions per flow sheet       []  Discharge due to:_  []  Other:_      Juliet Arce, PT, DPT, ATC, CSCS 10/31/2017  7:27 AM    Future Appointments  Date Time Provider Mary Reyes   10/31/2017 7:30 AM Matthieugillian Veronica [facility]   11/2/2017 8:30 AM Pema Robertson [facility]   11/7/2017 8:00 AM Abhishek Casarez [facility]   11/9/2017 7:30 [facility]   11/13/2017 9:15 AM [facility] Fantasma Delacruz  [address] [place] [address]   1/3/2018 8:35 AM [facility] NURSE VISIT [facility]   1/9/2018 8:00 AM Mohini Colorado MD [facility]   2/2/2018 8:00 AM [facility]   2/2/2018 10:00 AM [facility] NONIMAGING [facility] [place] [facility]   2/26/2018 9:15 AM Leonard Hagen MD [facility]   3/30/2018 8:30 AM [facility] NURSE [facility]   4/12/2018 8:45 AM Carlos Vazquez MD [address]

## 2017-11-02 ENCOUNTER — HOSPITAL ENCOUNTER (OUTPATIENT)
Dept: PHYSICAL THERAPY | Age: 72
Discharge: HOME | End: 2017-11-02
Payer: MEDICARE

## 2017-11-02 PROCEDURE — 97110 THERAPEUTIC EXERCISES: CPT

## 2017-11-02 NOTE — PROGRESS NOTES
PT DAILY TREATMENT NOTE - [facility]     Patient Name: Daniela Lee  Date:2017  : 1945  [x]  Patient  Verified  Payor: [place] MEDICARE / Plan: [place] MEDICARE PART A & B / Product Type: Medicare /    In time:8:35  Out time:9:17  Total Treatment Time (min): 42  Total Timed Codes (min): 42  1:1 Treatment Time ([facility] only): 23   Visit #: 6 of 8    Treatment Area: Cervicalgia [M54.2]  Radiculopathy, cervical region [M54.12]  Radiculopathy, lumbar region [M54.16]    SUBJECTIVE  Pain Level (0-10 scale): 5/10  Any medication changes, allergies to medications, adverse drug reactions, diagnosis change, or new procedure performed?: [x] No    [] Yes (see summary sheet for update)  Subjective functional status/changes:   [] No changes reported  \"I've had more numbness/tingling in my last 2 (R) fingers and in the back of my (L) shoulder blade. OBJECTIVE      42 min Therapeutic Exercise:  [x] See flow sheet :   Rationale: increase ROM, increase strength and increase proprioception to improve the patients ability to perform ADL's. With   [x] TE   [] TA   [] neuro   [] other: Patient Education: [x] Review HEP    [] Progressed/Changed HEP based on:   [] positioning   [] body mechanics   [] transfers   [] heat/ice application    [] other:      Other Objective/Functional Measures: Pt reports (L) medial scapular popping while performing t-band rows and ext. Pain Level (0-10 scale) post treatment: 5/10    ASSESSMENT/Changes in Function: No change in symptoms per pt post-treatment. CC C/S tightness, numbness/tingling in (R) 4th and 5th digits and medial (L) scapula region. Patient will continue to benefit from skilled PT services to modify and progress therapeutic interventions, address functional mobility deficits, address ROM deficits, address strength deficits, analyze and cue movement patterns and analyze and modify body mechanics/ergonomics to attain remaining goals.      [x]  See Plan of Care  []  See progress note/recertification  []  See Discharge Summary         Progress towards goals / Updated goals:  Short Term Goals: To be accomplished in 2 weeks:                        8. I and compliant with HEP for self management of symptoms.  - Pt reports HEP compliance. 10/23/2017  2. Transition to land therapy. Met 10/31/2017  Long Term Goals: To be accomplished in 4 weeks:                        4. Improve FOTO to 60 to indicate improved function with daily activities. 2. Increase R HS strength to grossly 4+/5 to improve stability for ambulation. 3. Increase B scap strength to grossly 3+/5 to improve overall postural awareness. 4. Report >50% improvement to increase ease with performing yard work.      PLAN  []  Upgrade activities as tolerated     [x]  Continue plan of care  []  Update interventions per flow sheet       []  Discharge due to:_  []  Other:_      Ori Meng PTA 11/2/2017  8:33 AM    Future Appointments  Date Time Provider Mary Zhangi   11/7/2017 8:00 AM [facility]   11/9/2017 7:30 [facility]   11/13/2017 9:15 AM Fabiana Escobedo  [address]   1/3/2018 8:35 AM [facility] NURSE VISIT [facility]   1/9/2018 8:00 AM aErl Abdalla MD [facility]   2/2/2018 8:00 AM [facility]   2/2/2018 10:00 AM [facility] NONIMAGING [facility]   2/26/2018 9:15 AM Igor Torres MD [facility]   3/30/2018 8:30 AM [facility] NURSE [facility]   4/12/2018 8:45 AM Chely Vasquez MD [facility]

## 2017-11-07 ENCOUNTER — HOSPITAL ENCOUNTER (OUTPATIENT)
Dept: PHYSICAL THERAPY | Age: 72
Discharge: HOME | End: 2017-11-07
Payer: MEDICARE

## 2017-11-07 PROCEDURE — 97110 THERAPEUTIC EXERCISES: CPT

## 2017-11-07 PROCEDURE — 97112 NEUROMUSCULAR REEDUCATION: CPT

## 2017-11-07 NOTE — PROGRESS NOTES
PT DAILY TREATMENT NOTE - [facility]     Patient Name: Neymar Hernandez  Date:2017  : 1945  [x]  Patient  Verified  Payor: [place] MEDICARE / Plan: [place] MEDICARE PART A & B / Product Type: Medicare /    In time:8:00  Out time:8:34  Total Treatment Time (min): 34  Total Timed Codes (min): 34  1:1 Treatment Time ( [address] only): 25   Visit #: 7 of 8    Treatment Area: Cervicalgia [M54.2]  Radiculopathy, cervical region [M54.12]  Radiculopathy, lumbar region [M54.16]    SUBJECTIVE  Pain Level (0-10 scale): 2-3/10  Any medication changes, allergies to medications, adverse drug reactions, diagnosis change, or new procedure performed?: [x] No    [] Yes (see summary sheet for update)  Subjective functional status/changes:   [] No changes reported  Pt reports no new complaints. OBJECTIVE    14 min Therapeutic Exercise:  [x] See flow sheet :   Rationale: increase ROM and increase strength to improve the patients ability to tolerate ADLs. 10 min Neuromuscular Re-education:  [x]  See flow sheet :   Rationale: increase strength, improve coordination and increase proprioception  to improve the patients ability to perform functional activities. With   [] TE   [] TA   [] neuro   [] other: Patient Education: [x] Review HEP    [] Progressed/Changed HEP based on:   [] positioning   [] body mechanics   [] transfers   [] heat/ice application    [] other:      Other Objective/Functional Measures: minimal vc's to perform scapular retraction with prone exercises. Pain Level (0-10 scale) post treatment: 2-3/10    ASSESSMENT/Changes in Function: Pt demonstrates good carry over with vc's for form. Pt has had little change in reported symptoms since IE.      Patient will continue to benefit from skilled PT services to modify and progress therapeutic interventions, address functional mobility deficits, address ROM deficits, address strength deficits, analyze and address soft tissue restrictions, analyze and cue

## 2017-11-09 ENCOUNTER — HOSPITAL ENCOUNTER (OUTPATIENT)
Dept: PHYSICAL THERAPY | Age: 72
Discharge: HOME | End: 2017-11-09
Payer: MEDICARE

## 2017-11-09 PROCEDURE — 97140 MANUAL THERAPY 1/> REGIONS: CPT

## 2017-11-09 PROCEDURE — 97112 NEUROMUSCULAR REEDUCATION: CPT

## 2017-11-09 NOTE — PROGRESS NOTES
PT DAILY TREATMENT NOTE - [facility]     Patient Name: Sarah Kim  Date:2017  : 1945  [x]  Patient  Verified  Payor: VA MEDICARE / Plan: VA MEDICARE PART A & B / Product Type: Medicare /    In time:7:31  Out time:8:02  Total Treatment Time (min): 31  Total Timed Codes (min): 31  1:1 Treatment Time ( [address] only): 31   Visit #: 8 of 8    Treatment Area: Cervicalgia [M54.2]  Radiculopathy, cervical region [M54.12]  Radiculopathy, lumbar region [M54.16]    SUBJECTIVE  Pain Level (0-10 scale): Neck 4/10  Any medication changes, allergies to medications, adverse drug reactions, diagnosis change, or new procedure performed?: [x] No    [] Yes (see summary sheet for update)  Subjective functional status/changes:   [] No changes reported  \"Not too bad right now, I take a Vicodin every morning for the pain and the pain in my knee which is a 7/10 right now. \"    OBJECTIVE     15 min Therapeutic Exercise:  [x] See flow sheet :   Rationale: increase ROM and increase strength to improve the patients ability to perform ADL's.    8 min Neuromuscular Re-education:  [x]  See flow sheet :   Rationale: increase strength and increase proprioception  to improve the patients ability to perform functional activities. 8 min Manual Therapy:  T/S PA and UPA mobs, STM/DTM (L) UT, lev scap and rhomboid. Rationale: decrease pain, increase ROM, increase tissue extensibility and decrease trigger points to improve activity tolerance. With   [x] TE   [] TA   [] neuro   [] other: Patient Education: [x] Review HEP    [] Progressed/Changed HEP based on:   [] positioning   [] body mechanics   [] transfers   [] heat/ice application    [] other:      Other Objective/Functional Measures: Added seated extension with 1/2 foam roll 10x5\". Trial manual to improve thoracic mobility to reduce strain on C/S and L/S.     FOTO C/S 55% and L/S 53%. MMT (R) HS 4+/5. MMT (B) scap 3+/5.     Pt reported a decrease in pain level and improve mobility post treatment. Pt stated \"the neck has it's good days and it's bad days. \" Noted improved (B) shoulder and (R) HS strength since IE. Pain Level (0-10 scale) post treatment: 2-3/10    ASSESSMENT/Changes in Function:    []  See Plan of Care  []  See progress note/recertification  []  See Discharge Summary         Progress towards goals / Updated goals:  Short Term Goals: To be accomplished in 2 weeks:                        3. I and compliant with HEP for self management of symptoms.  - Pt reports HEP compliance. 10/23/2017  2. Transition to land therapy. Met 10/31/2017  Long Term Goals: To be accomplished in 4 weeks:                        8. Improve FOTO to 60 to indicate improved function with daily activities. - Small improvement, FOTO C/S 55% and L/S 53%. 11/9/2017  2. Increase R HS strength to grossly 4+/5 to improve stability for ambulation. - Met, 4+/5. 11/9/2017  3. Increase B scap strength to grossly 3+/5 to improve overall postural awareness. - MMT (B) scap 3+/5. 11/9/2017  4. Report >50% improvement to increase ease with performing yard work. - Not met per patient report. 11/07/17    PLAN  []  Upgrade activities as tolerated     [x]  Continue plan of care  []  Update interventions per flow sheet       []  Discharge due to:_  [x]  Other:_   Recommend continue PT for additional 2x a week for 2-3 weeks. Pt will not be able to return for 2-3 weeks secondary to moving and holidays.     Eder Shook PTA 11/9/2017  7:36 AM    Future Appointments  Date Time Provider Mary Reyes   11/13/2017 9:15 AM Scot Delacruz  [address]   1/3/2018 8:35 AM [facility]URSE [facility]ISIT [facility]   1/9/2018 8:00 AM Tasha Coon MD [address]   2/2/2018 8:00 AM [facility]   2/2/2018 10:00 AM [facility] NONIMAGING [facility] [facility]   2/26/2018 9:15 AM José Miguel Duenas MD [facility]   3/30/2018 8:30 AM [facility] NURSE [place] [place]   4/12/2018 8:45 AM Misty James Diandra Topete, [address] [facility]

## 2017-11-13 ENCOUNTER — OFFICE VISIT (OUTPATIENT)
Dept: ORTHOPEDIC SURGERY | Age: 72
End: 2017-11-13

## 2017-11-13 VITALS
WEIGHT: 223.6 LBS | HEIGHT: 72 IN | RESPIRATION RATE: 18 BRPM | DIASTOLIC BLOOD PRESSURE: 66 MMHG | OXYGEN SATURATION: 95 % | BODY MASS INDEX: 30.28 KG/M2 | HEART RATE: 72 BPM | SYSTOLIC BLOOD PRESSURE: 132 MMHG | TEMPERATURE: 98.2 F

## 2017-11-13 DIAGNOSIS — M50.30 DEGENERATION OF CERVICAL INTERVERTEBRAL DISC: Chronic | ICD-10-CM

## 2017-11-13 DIAGNOSIS — M54.12 CERVICAL RADICULOPATHY: Primary | ICD-10-CM

## 2017-11-13 DIAGNOSIS — G56.21 ULNAR NEURITIS, RIGHT: ICD-10-CM

## 2017-11-13 RX ORDER — GABAPENTIN 300 MG/1
300 CAPSULE ORAL
Qty: 90 CAP | Refills: 1 | Status: SHIPPED | OUTPATIENT
Start: 2017-11-13 | End: 2018-05-14 | Stop reason: SDUPTHER

## 2017-11-13 NOTE — PATIENT INSTRUCTIONS
Ulnar Neuropathy (Handlebar Palsy): Exercises  Your Care Instructions  Here are some examples of typical rehabilitation exercises for your condition. Start each exercise slowly. Ease off the exercise if you start to have pain. Your doctor or your physical or occupational therapist will tell you when you can start these exercises and which ones will work best for you. How to do the exercises  Neck rotation    1. Sit in a firm chair, or stand up straight. 2. Keeping your chin level, turn your head to the right, and hold for 15 to 30 seconds. 3. Turn your head to the left, and hold for 15 to 30 seconds. 4. Repeat 2 to 4 times to each side. Shoulder blade squeeze    1. While standing with your arms at your sides, squeeze your shoulder blades together. Do not raise your shoulders as you are squeezing. 2. Hold for 6 seconds. 3. Repeat 8 to 12 times. Neck stretches    1. Look straight ahead, and tip your right ear to your right shoulder. Do not let your left shoulder rise as you tip your head to the right. 2. Hold for 15 to 30 seconds. 3. Tilt your head to the left. Do not let your right shoulder rise as you tip your head to the left. 4. Hold for 15 to 30 seconds. 5. Repeat 2 to 4 times to each side. Elbow flexion and extension    If this exercise causes numbness, tingling, or pain in your hand, ease off of the stretch. You should not have symptoms as you stretch. If you cannot back off enough so that you can do the exercise without symptoms, stop doing the exercise right away. 1. Stand with your arms relaxed at your sides. 2. With your affected arm, gently bend your elbow up toward you as far as possible. 3. Then straighten your arm as much as you can. 4. Repeat 2 to 4 times. Wrist flexor stretch    If this exercise causes numbness, tingling, or pain in your hand, ease off of the stretch. You should not have symptoms as you stretch.  If you cannot back off enough so that you can do the exercise without symptoms, stop doing the exercise right away. 1. Extend your affected arm in front of you with your palm facing away from your body. 2. Bend back your wrist on your affected arm, pointing your hand up toward the ceiling. 3. With your other hand, gently bend your wrist farther until you feel a mild to moderate stretch in your forearm. 4. Hold for at least 15 to 30 seconds. 5. Repeat 2 to 4 times. 6. Repeat steps 1 through 5, but this time extend your affected arm in front of you with your palm facing up. Then bend back your wrist, pointing your hand toward the floor. Wrist flexion and extension    If this exercise causes numbness, tingling, or pain in your hand, ease off of the stretch. You should not have symptoms as you stretch. If you cannot back off enough so that you can do the exercise without symptoms, stop doing the exercise right away. 1. Place your forearm on a table, with your affected hand and wrist extended beyond the table, palm down. 2. Slowly bend your wrist to move your hand upward and allow your hand to close into a fist. Hold for about 6 seconds. 3. Then lower your hand and allow your fingers to relax. Hold this position for about 6 seconds. You should feel a gentle stretch. 4. Repeat 8 to 12 times. Follow-up care is a key part of your treatment and safety. Be sure to make and go to all appointments, and call your doctor if you are having problems. It's also a good idea to know your test results and keep a list of the medicines you take. Where can you learn more? Go to http://nneka-mindi.info/. Enter Z680 in the search box to learn more about \"Ulnar Neuropathy (Handlebar Palsy): Exercises. \"  Current as of: March 21, 2017  Content Version: 11.4  © 9531-7191 [employer]. Care instructions adapted under license by [employer] (which disclaims liability or warranty for this information).  If you have questions about a medical condition or this instruction, always ask your healthcare professional. [place] [address] any warranty or liability for your use of this information.

## 2017-11-13 NOTE — PROGRESS NOTES
Isael Dumnot [facility] 2.  [address], [address]  [place], [facility] [address]  Phone: [phone]  Fax: [phone]        Madisyn Soni  : [id number]  PCP: Lary Vaca MD    PROGRESS NOTE      ASSESSMENT AND PLAN    Diagnoses and all orders for this visit:    1. Cervical radiculopathy    2. Degeneration of cervical and lumbar spine, relative cervical stenosis    3. Ulnar neuritis, right    Other orders  -     gabapentin (NEURONTIN) 300 mg capsule; Take 1 Cap by mouth nightly. Indications: NEUROPATHIC PAIN    1. Advised to continue HEP. 2. Continue Gabapentin. 3. May use Voltaren gel on neck and scapula. 4. Given exercises for ulnar neuropathy. 5.  Making gains with PT. Will hold off on spinal injections. Follow-up Disposition:  Return in about 3 months (around 2018). HISTORY OF PRESENT ILLNESS  Yuliya Alcala is a 67 y.o. male. Pt presents to the office for a f/u visit for neck and back pain. Last visit pt was referred to PT for cervical and lumbar radiculopathy, his Gabapentin was switched to dinner time. He feels that PT has been helping his low back pain more than his neck pain. He continues to have stiffness and crepitus in his neck, but states that with sitting today in the office, he is not bothered much by his neck pain. He has had some pain and crepitus in his L shoulder blade. He does get some pain into his L shoulder-chronic. Pt reports pain does radiate into his LLE, will radiate into his chest if pain increases. He has had cardiac work-up which was fine. 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 He states that he has some paresthesias in his R 4th and 5th digit, has ulnar neuropathy, requires surgery. No other symptoms in his RUE. Has had injection for CTS, sees Dr. Alejandro Partida for his knee. Was previously seeing Dr. Mike Franco. He has noticed R knee pain.  His pain does radiate into his RLE, intermittent. He has a short standing and walking tolerance. Pt was doing squats in PT which aggravated his knee pain. Pt denies weakness in his BLE. Pt denies saddle paresthesias. Pt states he has been using Gabapentin 300 mg qdinner with some relief. Has Voltaren gel at home. Denies persistent fevers, chills, weight changes, neurogenic bowel or bladder symptoms. Pt denies recent ED visits or hospitalizations. Extensive vascular disease, s/p coronary stenting, LT femoral and RT iliac stenting. Hx of CEA on the RT, LT carotid is 100% blocked. He sees Dr. Jackelin Worrell. PMHx of prostate cancer. His last stent was placed in 2015. He has a home traction unit at home that he has not used in awhile. No TMJ. Had benefit with injections by Dr. Radha Mae under fluouro, but not recent caudals under U/S through Dr. Kris Moulton office.     Pain Assessment  11/13/2017   Location of Pain Neck;Back;Finger   Location Modifiers Left;Right   Severity of Pain 4   Quality of Pain Aching   Quality of Pain Comment numbness, tingling   Duration of Pain -   Frequency of Pain Constant   Aggravating Factors Bending   Aggravating Factors Comment overworking   Limiting Behavior -   Relieving Factors Rest   Relieving Factors Comment pain med   Result of Injury -       PAST MEDICAL HISTORY   Past Medical History:   Diagnosis Date    Adenoma of left adrenal gland     2009  1 cm, no change 1/15, 2/16    Asbestosis     Atrial fibrillation     CHA2DS2-VAsc=3(age+, htn+, vasc dx+; estimated yearly stroke risk according to Lip et al. is 3,2%), Hasbled=2(estimated yearly bleeding risk according to pisters et al. is 1,88%); not anticoagulated due to h/o gi bleed    Atrial fibrillation ablation     10/08    Basal cell cancer     Dr Reji Guzman; he's had >350 lesions removed    Carotid occlusion     left     Cervical radiculopathy     MRI 9/11 showed C3-6 severe foraminal stenosis    Chronic pain     Colon polyp     Dr Ding Kim 9/15    Coronary artery disease     RCA - 3.0 x 16mm TAXUS 9/04, 3.0 x 16mm TAXUS 12/06    Dyslipidemia     Erectile dysfunction     GERD     GI bleed     Hearing loss     2014  bilateral Dr Amadou Andrews    Hernia, umbilical     Hypertension     Hypogonadism male     Lumbar radiculopathy     Dr Mariela Meeks;  MRI 9/11 L4-5 disc bulging, annular tear, disc dessication    Myofascial pain dysfunction syndrome     pain clinic     Osteoarthritis     right knee Dr Jose Carlos Conklin Peripheral vascular disease     50-60% R iliac; s/p R iliac stent and L femoral artery stent in past; R OLIVIA 0.76 (1/16)    Plantar fasciitis     bilateral     PPD positive     Prediabetes     Prostate cancer     T1c [employer] 7(3+4), 70% in 1 core, GS 7 (3+4) in 4 cores, GS 6 (3+3) in 1 core; psa 5.28, TRUS 18 gm;  Dr Moser Records; s/p cryoablation 10/16    Recurrent umbilical hernia     Subclinical hypothyroidism     denies    Tinnitus     Dr Gilda Arriaza Ulcerative colitis     Venous insufficiency        Past Surgical History:   Procedure Laterality Date    HX CAROTID ENDARTERECTOMY      1/14  right    HX Rosellen Rhein HX COLONOSCOPY      Dr Moser Records 9/15 polyps    HX CRYOABLATION OF THE PROSTATE      10/16    HX [address], 1975    HX ORTHOPAEDIC      right knee surgery    HX REFRACTIVE SURGERY      OD (hemoplasty to right eye on retina to avoid blindness)    HX TONSILLECTOMY      1955    MI LAP, RECURRENT INCISIONAL HERNIA REPAIR,REDUCIBLE N/A 02/09/2017    Dr. Kina Anthony HERNIA,5+Y/O,REDUCIBL      2/16  left  Dr. Aris Hare [facility],3+Z/A,KOUTO      2/16  Dr. Jerry Demark      1/16  R OLIVIA 0.76, L OLIVIA 1.02    VASCULAR SURGERY PROCEDURE UNLIST      10/15   left fem art and left iliac stent   .       MEDICATIONS      Current Outpatient Prescriptions   Medication Sig Dispense Refill    gabapentin (NEURONTIN) 300 mg capsule Take 1 Cap by mouth nightly. Indications: NEUROPATHIC PAIN 90 Cap 1    aspirin-calcium carbonate 81 mg-300 mg calcium(777 mg) tab 81 mg.      ACANYA 1.2-2.5 % glwp       HYDROcodone-acetaminophen (NORCO) 5-325 mg per tablet Take 1 Tab by mouth every four (4) hours as needed (for chronic pain). 180 Tab 0    NITROGLYCERIN (NITROQUICK SL) by SubLINGual route.  gabapentin (NEURONTIN) 400 mg capsule 1 tab PO QHS  Indications: pain (Patient taking differently: Take 300 mg by mouth. 1 tab PO QHS  Indications: pain) 90 Cap 1    atenolol (TENORMIN) 25 mg tablet TAKE ONE TABLET BY MOUTH TWICE A  Tab 2    diclofenac (VOLTAREN) 1 % gel Apply 2-4 g to affected area four (4) times daily. To affected area (topically) 100 g 5    besifloxacin (BESIVANCE) 0.6 % drps ophthalmic suspension 1 Drop two (2) times a day.  Difluprednate (DUREZOL) 0.05 % ophthalmic emulsion Administer 1 Drop to both eyes four (4) times daily.  LORazepam (ATIVAN) 1 mg tablet Take 1 Tab by mouth every eight (8) hours as needed. 50 Tab 0    sildenafil citrate (VIAGRA) 100 mg tablet Take 1 Tab by mouth daily as needed. Indications: Erectile Dysfunction 12 Tab 6    losartan (COZAAR) 25 mg tablet TAKE ONE TABLET BY MOUTH TWICE A  Tab 2    aspirin delayed-release 81 mg tablet Take 81 mg by mouth daily.  triamterene-hydrochlorothiazide (MAXZIDE) 37.5-25 mg per tablet TAKE ONE TABLET BY MOUTH DAILY 90 Tab 3    ezetimibe-simvastatin (VYTORIN 10/40) 10-40 mg per tablet Take 1 Tab by mouth nightly. 90 Tab 3    Cholecalciferol, Vitamin D3, 1,000 unit cap Take 1,000 Units by mouth daily.  VOLTAREN 1 % topical gel as needed.  OMEGA-3 FATTY ACIDS/FISH OIL (OMEGA 3 FISH OIL PO) Take 900 mg by mouth daily.  MULTIVITAMIN PO Take  by mouth daily.  pantoprazole (PROTONIX) 40 mg tablet Take 40 mg by mouth daily.  coenzyme q10 200 mg Cap Take  by mouth.       prednisoLONE acetate (PRED FORTE) 1 % ophthalmic suspension  nepafenac (ILEVRO) 0.3 % drps Apply 1 Drop to eye. ALLERGIES    Allergies   Allergen Reactions    Altace [Ramipril] Unknown (comments)    Lipitor [Atorvastatin] Other (comments)     Muscle pain    Lisinopril Shortness of Breath and Other (comments)     Turns bright red    Other Medication Other (comments)     Vicryl suture on skin tends to be rejected with poor wound healing does better with monocryl    Procardia [Nifedipine] Other (comments)     Caused afib          SOCIAL HISTORY    Social History     Social History    Marital status:      Spouse name: N/A    Number of children: N/A    Years of education: N/A     Occupational History    Not on file. Social History Main Topics    Smoking status: Former Smoker     Packs/day: 1.50     Years: 25.00     Types: Cigarettes     Quit date: 1/1/2003    Smokeless tobacco: Never Used    Alcohol use 2.5 oz/week     5 Cans of beer per week      Comment: 5 beers a week    Drug use: No    Sexual activity: Yes     Partners: Female     Birth control/ protection: None     Other Topics Concern    Not on file     Social History Narrative       FAMILY HISTORY    Family History   Problem Relation Age of Onset    Heart Disease Mother     Hypertension Mother     Diabetes Mother     Arthritis-osteo Mother     Heart Disease Father     Stroke Father     Hypertension Father     Heart Disease Sister     Hypertension Sister        REVIEW OF SYSTEMS  Review of Systems   Constitutional: Negative for chills, fever and weight loss. Respiratory: Negative for shortness of breath. Cardiovascular: Negative for chest pain. Gastrointestinal: Negative for constipation. Negative for fecal incontinence   Genitourinary: Negative for dysuria. Negative for urinary incontinence   Musculoskeletal: Positive for joint pain. Per HPI   Skin: Negative for rash. Neurological: Positive for tingling.  Negative for dizziness, tremors, focal weakness and headaches. Endo/Heme/Allergies: Does not bruise/bleed easily. Psychiatric/Behavioral: The patient does not have insomnia. PHYSICAL EXAMINATION  Visit Vitals    /66    Pulse 72    Temp 98.2 °F (36.8 °C) (Oral)    Resp 18    Ht 6' (1.829 m)    Wt 223 lb 9.6 oz (101.4 kg)    SpO2 95%    BMI 30.33 kg/m2         Accompanied by self. Constitutional:  Well developed, well nourished, in no acute distress. Psychiatric: Affect and mood are appropriate. Integumentary: No rashes or abrasions noted on exposed areas. Cardiovascular/Peripheral Vascular: Intact l pulses. No peripheral edema is noted. Lymphatic:  No evidence of lymphedema. No cervical lymphadenopathy. SPINE/MUSCULOSKELETAL EXAM    Cervical spine:  Neck is midline. Normal muscle tone. No focal atrophy is noted. Shoulder ROM intact. Crepitance w/L scapulothoracic motion . No winging. Negative Spurling's sign. Positive Tinel's sign R wrist and elbow. Negative Cortse's sign. Lumbar spine:  No rash, ecchymosis, or gross obliquity. No fasciculations. No focal atrophy is noted. Tenderness to palpation R L3-4. No tenderness to palpation at the sciatic notch. SI joints non-tender. Trochanters non tender. MOTOR:      Biceps  Triceps Deltoids Wrist Ext Wrist Flex Hand Intrin   Right +4/5 +4/5 +4/5 +4/5 +4/5 +4/5   Left +4/5 +4/5 +4/5 +4/5 +4/5 +4/5        Hip Flex  Quads Hamstrings Ankle DF EHL Ankle PF   Right +4/5 +4/5 +4/5 +4/5 +4/5 +4/5   Left +4/5 +4/5 +4/5 +4/5 +4/5 +4/5     DTRs are 1+ biceps, triceps, brachioradialis, patella, and Achilles. Straight Leg raise negative. Ambulation without assistive device. FWB. Written by Katelyn Dias, as dictated by Devon Becerril MD.    I, Dr. Devon Becerril MD, confirm that all documentation is accurate.  April Her may have a reminder for a \"due or due soon\" health maintenance.  I have asked that he contact his primary care provider for follow-up on this health maintenance.

## 2017-11-13 NOTE — MR AVS SNAPSHOT
Visit Information Date & Time Provider Department Dept. Phone Encounter #  
 11/13/2017  9:15 AM Scot Delacruz MD [facility] [phone] [id number] Follow-up Instructions Return in about 3 months (around 2/13/2018). Your Appointments 1/3/2018  8:35 AM  
LAB with [facility] NURSE VISIT [facility] ([facility]) Appt Note: lab  
 [address], [address] [place] [address] [address]  
  
   
 [address], [address]  
  
    
 1/9/2018  8:00 AM  
Office Visit with Dwain Holland MD  
[facility]) Appt Note: 6 months [address], Suite [facility] [address]  
  
   
 [address] [facility], [address]  
  
    
 2/2/2018  8:00 AM  
PROCEDURE with [facility] IMAGING 1 [facility] ([facility]) Appt Note: iliac 1 yr alfredo; .  
 [address] [facility] [address] [facility] [address]  
[phone] [address] [place] [address]  
  
    
 2/2/2018 10:00 AM  
PROCEDURE with [facility] NONIMAGING [facility] ([facility]) Appt Note: leg art 1 yr alfredo; .  
 [address] [facility] 305 [address] [facility] [address]  
[phone] [address] [place] [address]  
  
    
 2/26/2018  9:15 AM  
Follow Up with Angeles Little MD  
[address] [facility]) Appt Note: 1 year fu after studies; FAXED OVER INFORMATION FOR MICHI TO CALL PATIENT DUE TO STUDIES WERE NOT IN CC AT TIME OF CHECK OUT; [address]; 1 year fu after studies FAXED OVER INFORMATION  [address] TO CALL PATIENT DUE TO STUDIES WERE NOT IN CC AT TIME OF CHECK  [address]; .; cld pt to reschedule appt 02/19/2018 due to dr out of the office, no answer left a message for pt to call us to reschedule; Patient called back and confirmed new appt [address] [facility] [address] [facility]  
[phone] [address] [facility] [address] [facility]  
  
    
 3/30/2018  8:30 AM  
Nurse Visit with [facility] NURSE [facility] ([address]) Appt Note: PSA  
 [address] 
[address] [place] [address]  
[address] [place] [address] 4/12/2018  8:45 AM  
Any with Rey Akbar MD  
[facility] ([address]) Appt Note: Return in about 6 months (around 4/10/2018) for follow up, PSA 1 month prior [address] [place] [address]  
[address] [place] [address] Upcoming Health Maintenance Date Due  
 MEDICARE YEARLY EXAM 6/30/2018 GLAUCOMA SCREENING Q2Y 6/25/2019 DTaP/Tdap/Td series (2 - Td) 4/3/2023 COLONOSCOPY 9/22/2025 Allergies as of 11/13/2017  Review Complete On: 11/13/2017 By: Charline Flood Severity Noted Reaction Type Reactions Altace [Ramipril]    Unknown (comments) Lipitor [Atorvastatin]    Other (comments) Muscle pain Lisinopril    Shortness of Breath, Other (comments) Turns bright red Other Medication  03/05/2014    Other (comments) Vicryl suture on skin tends to be rejected with poor wound healing does better with monocryl Procardia [Nifedipine]    Other (comments) Caused afib Current Immunizations  Reviewed on 9/15/2017 Name Date Influenza High Dose Vaccine PF 9/15/2017 11:09 AM, 9/16/2016, 10/2/2015 12:30 PM  
 Influenza Vaccine 10/10/2014, 10/4/2013 Influenza Vaccine Split 10/10/2012  2:28 PM, 9/28/2011 Influenza Vaccine Whole 10/8/2010 Pneumococcal Conjugate (PCV-13) 12/15/2014  8:16 AM  
 Pneumococcal Polysaccharide (PPSV-23) 1/24/2014 Pneumococcal Vaccine (Unspecified Type) 1/1/2006 Td 1/1/2006 Tdap 4/3/2013  8:23 PM  
 Zoster Vaccine, Live 1/1/2007 Not reviewed this visit You Were Diagnosed With   
  
 Codes Comments Cervical radiculopathy    -  Primary ICD-10-CM: M54.12 
ICD-9-CM: 723.4 Degeneration of cervical intervertebral disc     ICD-10-CM: M50.30 ICD-9-CM: 722.4 Ulnar neuritis, right     ICD-10-CM: G56.21 ICD-9-CM: 723.4 Vitals BP Pulse Temp Resp Height(growth percentile) Weight(growth percentile) 132/66 72 98.2 °F (36.8 °C) (Oral) 18 6' (1.829 m) 223 lb 9.6 oz (101.4 kg) SpO2 BMI Smoking Status 95% 30.33 kg/m2 Former Smoker BMI and BSA Data Body Mass Index Body Surface Area  
 30.33 kg/m 2 2.27 m 2 Preferred Pharmacy Pharmacy Name Phone [facility] [address] [phone] Your Updated Medication List  
  
   
This list is accurate as of: 11/13/17 10:34 AM.  Always use your most recent med list.  
  
  
  
  
 ACANYA 1.2-2.5 % Glwp Generic drug:  clindamycin-benzoyl peroxide  
  
 aspirin delayed-release 81 mg tablet Take 81 mg by mouth daily. aspirin-calcium carbonate 81 mg-300 mg calcium(777 mg) Tab 81 mg.  
  
 atenolol 25 mg tablet Commonly known as:  TENORMIN  
TAKE ONE TABLET BY MOUTH TWICE A DAY BESIVANCE 0.6 % Drps ophthalmic suspension Generic drug:  besifloxacin 1 Drop two (2) times a day. cholecalciferol 1,000 unit Cap Commonly known as:  VITAMIN D3 Take 1,000 Units by mouth daily. coenzyme Q-10 200 mg capsule Commonly known as:  CO Q-10 Take  by mouth. DUREZOL 0.05 % ophthalmic emulsion Generic drug:  Difluprednate Administer 1 Drop to both eyes four (4) times daily. ezetimibe-simvastatin 10-40 mg per tablet Commonly known as:  Vytorin 10/40 Take 1 Tab by mouth nightly.  
  
 gabapentin 400 mg capsule Commonly known as:  NEURONTIN  
 1 tab PO QHS  Indications: pain HYDROcodone-acetaminophen 5-325 mg per tablet Commonly known as:  Julaine Kava Take 1 Tab by mouth every four (4) hours as needed (for chronic pain). ILEVRO 0.3 % Drps Generic drug:  nepafenac Apply 1 Drop to eye. LORazepam 1 mg tablet Commonly known as:  ATIVAN Take 1 Tab by mouth every eight (8) hours as needed. losartan 25 mg tablet Commonly known as:  COZAAR  
TAKE ONE TABLET BY MOUTH TWICE A DAY MULTIVITAMIN PO Take  by mouth daily. NITROQUICK SL  
by SubLINGual route. OMEGA 3 FISH OIL PO Take 900 mg by mouth daily. prednisoLONE acetate 1 % ophthalmic suspension Commonly known as:  PRED FORTE  
  
 PROTONIX 40 mg tablet Generic drug:  pantoprazole Take 40 mg by mouth daily. sildenafil citrate 100 mg tablet Commonly known as:  VIAGRA Take 1 Tab by mouth daily as needed. Indications: Erectile Dysfunction  
  
 triamterene-hydroCHLOROthiazide 37.5-25 mg per tablet Commonly known as:  Debby [place] TAKE ONE TABLET BY MOUTH DAILY * VOLTAREN 1 % Gel Generic drug:  diclofenac  
as needed. * diclofenac 1 % Gel Commonly known as:  VOLTAREN Apply 2-4 g to affected area four (4) times daily. To affected area (topically) * Notice: This list has 2 medication(s) that are the same as other medications prescribed for you. Read the directions carefully, and ask your doctor or other care provider to review them with you. Follow-up Instructions Return in about 3 months (around 2/13/2018). Patient Instructions Ulnar Neuropathy (Handlebar Palsy): Exercises Your Care Instructions Here are some examples of typical rehabilitation exercises for your condition. Start each exercise slowly. Ease off the exercise if you start to have pain.  
Your doctor or your physical or occupational therapist will tell you when you can start these exercises and which ones will work best for you. How to do the exercises Neck rotation 1. Sit in a firm chair, or stand up straight. 2. Keeping your chin level, turn your head to the right, and hold for 15 to 30 seconds. 3. Turn your head to the left, and hold for 15 to 30 seconds. 4. Repeat 2 to 4 times to each side. Shoulder blade squeeze 1. While standing with your arms at your sides, squeeze your shoulder blades together. Do not raise your shoulders as you are squeezing. 2. Hold for 6 seconds. 3. Repeat 8 to 12 times. Neck stretches 1. Look straight ahead, and tip your right ear to your right shoulder. Do not let your left shoulder rise as you tip your head to the right. 2. Hold for 15 to 30 seconds. 3. Tilt your head to the left. Do not let your right shoulder rise as you tip your head to the left. 4. Hold for 15 to 30 seconds. 5. Repeat 2 to 4 times to each side. Elbow flexion and extension If this exercise causes numbness, tingling, or pain in your hand, ease off of the stretch. You should not have symptoms as you stretch. If you cannot back off enough so that you can do the exercise without symptoms, stop doing the exercise right away. 1. Stand with your arms relaxed at your sides. 2. With your affected arm, gently bend your elbow up toward you as far as possible. 3. Then straighten your arm as much as you can. 4. Repeat 2 to 4 times. Wrist flexor stretch If this exercise causes numbness, tingling, or pain in your hand, ease off of the stretch. You should not have symptoms as you stretch. If you cannot back off enough so that you can do the exercise without symptoms, stop doing the exercise right away. 1. Extend your affected arm in front of you with your palm facing away from your body. 2. Bend back your wrist on your affected arm, pointing your hand up toward the ceiling.  
3. With your other hand, gently bend your wrist farther until you feel a mild to moderate stretch in your forearm. 4. Hold for at least 15 to 30 seconds. 5. Repeat 2 to 4 times. 6. Repeat steps 1 through 5, but this time extend your affected arm in front of you with your palm facing up. Then bend back your wrist, pointing your hand toward the floor. Wrist flexion and extension If this exercise causes numbness, tingling, or pain in your hand, ease off of the stretch. You should not have symptoms as you stretch. If you cannot back off enough so that you can do the exercise without symptoms, stop doing the exercise right away. 1. Place your forearm on a table, with your affected hand and wrist extended beyond the table, palm down. 2. Slowly bend your wrist to move your hand upward and allow your hand to close into a fist. Hold for about 6 seconds. 3. Then lower your hand and allow your fingers to relax. Hold this position for about 6 seconds. You should feel a gentle stretch. 4. Repeat 8 to 12 times. Follow-up care is a key part of your treatment and safety. Be sure to make and go to all appointments, and call your doctor if you are having problems. It's also a good idea to know your test results and keep a list of the medicines you take. Where can you learn more? Go to http://nneka-mindi.info/. Enter C788 in the search box to learn more about \"Ulnar Neuropathy (Handlebar Palsy): Exercises. \" Current as of: March 21, 2017 Content Version: 11.4 © 6631-7524 [employer]. Care instructions adapted under license by [employer] (which disclaims liability or warranty for this information). If you have questions about a medical condition or this instruction, always ask your healthcare professional. [place] [address] any warranty or liability for your use of this information. Introducing [facility]! Dear Nilam Gonzalez: Thank you for requesting a [employer] account.   Our records indicate that you already have an active [employer] account. You can access your account anytime at [email or web address] Did you know that you can access your hospital and ER discharge instructions at any time in [employer]? You can also review all of your test results from your hospital stay or ER visit. Additional Information If you have questions, please visit the Frequently Asked Questions section of the [employer] website at [email or web address] Remember, [employer] is NOT to be used for urgent needs. For medical emergencies, dial 911. Now available from your iPhone and Android! Please provide this summary of care documentation to your next provider. Your primary care clinician is listed as Eve Hartley. If you have any questions after today's visit, please call [phone].

## 2017-11-24 NOTE — PROGRESS NOTES
[facility]   Katelyn Whitney [address]  [place], [address]  [phone] fax    Physical Therapy Discharge Summary  Patient name: Madelyn Crowell Start of Care: 10/11/17   Referral source: Manav Dutta MD : 1945   Medical/Treatment Diagnosis: Cervicalgia [M54.2]  Radiculopathy, cervical region [M54.12]  Radiculopathy, lumbar region [M54.16] Onset Date:chronic     Prior Hospitalization: see medical history Provider#: [id number]   Medications: Verified on Patient Summary List    Comorbidities: heart disease; OA; HTN; hx of prostate CA   Prior Level of Function: Able to perform yard work without pain      Visits from [place] of Care: 9    Missed Visits: 0  Reporting Period : 10/11/17 to 17      Summary of Care:  Short Term Goals: To be accomplished in 2 weeks:                        7. I and compliant with HEP for self management of symptoms.  - Pt reports HEP compliance. 10/23/2017  2. Transition to land therapy. Met 10/31/2017  Long Term Goals: To be accomplished in 4 weeks:                        7. Improve FOTO to 60 to indicate improved function with daily activities. - Small improvement, FOTO C/S 55% and L/S 53%. 2017  2. Increase R HS strength to grossly 4+/5 to improve stability for ambulation. - Met, 4+/5. 2017  3. Increase B scap strength to grossly 3+/5 to improve overall postural awareness. - MMT (B) scap 3+/5. 2017  4. Report >50% improvement to increase ease with performing yard work. - Not met per patient report. 17  Patient has not returned to PT since 17; unplanned D/C.        ASSESSMENT/RECOMMENDATIONS:  [x]Discontinue therapy: []Patient has reached or is progressing toward set goals      [x]Patient is non-compliant or has abdicated      []Due to lack of appreciable progress towards set Ul. Giancarlo Hand, PT 2017 11:06 AM

## 2017-11-27 RX ORDER — HYDROCODONE BITARTRATE AND ACETAMINOPHEN 5; 325 MG/1; MG/1
1 TABLET ORAL
Qty: 180 TAB | Refills: 0 | Status: SHIPPED | OUTPATIENT
Start: 2017-11-27 | End: 2018-01-08 | Stop reason: SDUPTHER

## 2017-11-27 NOTE — TELEPHONE ENCOUNTER
[facility] date for Norco as 10/04/2017 for a 30 d/s. There appears to be no inconsistencies in regards to the prescribing of this medication. Last Visit: 06/29/2017 with MD Elsa Huertas    Next Appointment: 01/09/2018 with MD Elsa Huertas   Previous Refill Encounters: 10/03/2017 per MD Elsa Huertas #[id number]     Requested Prescriptions     Pending Prescriptions Disp Refills    HYDROcodone-acetaminophen (NORCO) 5-325 mg per tablet 180 Tab 0     Sig: Take 1 Tab by mouth every four (4) hours as needed (for chronic pain).

## 2017-12-01 ENCOUNTER — OFFICE VISIT (OUTPATIENT)
Dept: ORTHOPEDIC SURGERY | Facility: CLINIC | Age: 72
End: 2017-12-01

## 2017-12-01 VITALS
WEIGHT: 217.2 LBS | HEIGHT: 72 IN | SYSTOLIC BLOOD PRESSURE: 115 MMHG | TEMPERATURE: 97.8 F | RESPIRATION RATE: 18 BRPM | HEART RATE: 67 BPM | OXYGEN SATURATION: 97 % | DIASTOLIC BLOOD PRESSURE: 71 MMHG | BODY MASS INDEX: 29.42 KG/M2

## 2017-12-01 DIAGNOSIS — G56.01 CARPAL TUNNEL SYNDROME ON RIGHT: ICD-10-CM

## 2017-12-01 DIAGNOSIS — G89.29 CHRONIC PAIN OF RIGHT KNEE: Primary | ICD-10-CM

## 2017-12-01 DIAGNOSIS — M25.561 CHRONIC PAIN OF RIGHT KNEE: Primary | ICD-10-CM

## 2017-12-01 DIAGNOSIS — G56.21 ULNAR NEUROPATHY AT ELBOW, RIGHT: ICD-10-CM

## 2017-12-01 DIAGNOSIS — G56.21 CUBITAL TUNNEL SYNDROME, RIGHT: ICD-10-CM

## 2017-12-01 RX ORDER — TRIAMCINOLONE ACETONIDE 40 MG/ML
40 INJECTION, SUSPENSION INTRA-ARTICULAR; INTRAMUSCULAR ONCE
Qty: 1 ML | Refills: 0
Start: 2017-12-01 | End: 2017-12-01

## 2017-12-01 NOTE — PROGRESS NOTES
HISTORY OF PRESENT ILLNESS:  Mr. Jeannie Choi returns following for right knee pain. He received a complete course of GELSYN 3 within the past several months and did well following the injections. He had improved right knee pain. Unfortunately, he, for the past two weeks, has been moving and has resulted in increased right knee discomfort. He wishes for a cortisone injection today. REVIEW OF SYSTEMS:  He is not a diabetic. He has no chest pain. No shortness of breath. No fever, chills, or night sweats. No rash or itching. No nausea or vomiting. He is not allergic to Betadine. PHYSICAL EXAM:  He is a 41-year-old, obese,  male, atraumatic, normocephalic, alert and oriented times three, sitting on the table comfortably. Examination to the right knee reveals multiple incisions, well healed. He has pronounced deformity, medial, of the knee. He stands with a varus deformity. There is crepitation through ranging with the patella tracking midline and a range noted at 105-5° today. There is no instability on varus and valgus stressing. Distal sensation is intact fully to the right lower extremity. Capillary refill is brisk and less than 2 seconds to the right lower extremity. There is no evidence of DVT or calf tenderness to the right lower extremity. IMPRESSION:      1. Osteoarthritis of the right knee. 2. Decreased range of motion of the right knee secondary to above. 3. Right knee pain secondary to above. PROCEDURE:  Using sterile technique, after informed verbal and written consent were obtained and time out performed, 2 cc of Kenalog at 40 mg per ml mixed with 8 mL of Marcaine 0.25% was injected to the right knee using the anterolateral, intraarticular approach. There were no complications. The patient tolerated the procedure well. PLAN:   I am currently recommending a low-dose cortisone injection to the right knee.   Mr. Kelle Escobar does have a history of an EMG nerve conduction study of the right upper extremity noting severe carpal tunnel syndrome. He also had symptoms of ulnar neuropathy and Dr. Otilio Wright, in the past, has injected his right elbow. He requests an additional injection today to the right wrist and right elbow. However, noting the cortisone injection to the knee, I asked him respectively if he would put a week between the additional injections. We did discuss the importance of surgical intervention based on his severe condition as measured by EMG nerve conduction study. He said he understood, and because of things that are going on in his life right now, he could not take time to schedule.

## 2017-12-01 NOTE — MR AVS SNAPSHOT
Visit Information Date & Time Provider Department Dept. Phone Encounter #  
 12/1/2017 10:45 AM Maria T Phillips, [address] [facility] - [facility] [phone] Your Appointments 12/7/2017  9:00 AM  
Follow Up with Maria T Phillips PA-C  
[facility] ([facility]) Appt Note: RT [address] [facility], [address] [facility] [address]  
[phone] 2300 [facility]  
  
    
 1/3/2018  8:35 AM  
LAB with [facility] NURSE VISIT Internists of [address] [place] [address] ([facility]) Appt Note: lab  
 [address] [facility], [address] [facility] [address]  
  
   
 [address] [facility]  
  
    
 1/9/2018  8:00 AM  
Office Visit with Peter Casarez MD  
Internists of [address] [place] [address]) Appt Note: 6 months [address], Suite [facility] [address]  
  
   
 [address] [facility]  
  
    
 2/2/2018  8:00 AM  
PROCEDURE with [facility] IMAGING 1 [facility] ([facility]) Appt Note: iliac 1 yr alfredo; .  
 [address] [facility], [place] [address] [address] [facility] [address]  
[phone] [address] [place] [address]  
  
    
 2/2/2018 10:00 AM  
PROCEDURE with [facility] NONIMAGING [facility] ([facility]) Appt Note: leg art 1 yr alfredo; .  
 [address], [place] [address] [facility] [address]  
[phone] [address] [place] [address] [address]  
  
    
 2/12/2018  9:50 AM  
Follow Up with Chikis Martinez NP  
[facility] ([facility]) Appt Note: 3 mo f/u  
 [address] [facility] [address]  
[address] [facility] [address]  
  
    
 2/26/2018  9:15 AM  
 Follow Up with Ladonna Campos MD  
[address] [facility] and [facility]ists [facility]) Appt Note: 1 year fu after studies; FAXED OVER INFORMATION FOR MICHI TO CALL PATIENT DUE TO STUDIES WERE NOT IN CC AT TIME OF CHECK OUT; [address]; 1 year fu after studies FAXED OVER INFORMATION  [place] TO CALL PATIENT DUE TO STUDIES WERE NOT IN CC AT TIME OF CHECK  [address]; .; cld pt to reschedule appt 02/19/2018 due to dr out of the office, no answer left a message for pt to call us to reschedule; Patient called back and confirmed new appt [facility] [place] [phone] [address] [place] [address]  
  
    
 3/30/2018  8:30 AM  
[facility] with [facility] N[facility]SE [facility] ([facility]) Appt Note: PSA  
 [address] 
[address] [place] [address]  
[address] [place] [address] 4/12/2018  8:45 AM  
Any with Po Odell MD  
[facility] ([facility]) Appt Note: Return in about 6 months (around 4/10/2018) for follow up, PSA 1 month prior [address] [place] [address]  
[address] [place] [address] Upcoming Health Maintenance Date Due  
 MEDICARE YEARLY EXAM 6/30/2018 GLAUCOMA SCREENING Q2Y 6/25/2019 DTaP/Tdap/Td series (2 - Td) 4/3/2023 COLONOSCOPY 9/22/2025 Allergies as of 12/1/2017  Review Complete On: 12/1/2017 By: Maria T Phillips PA-C Severity Noted Reaction Type Reactions Altace [Ramipril]    Unknown (comments) Lipitor [Atorvastatin]    Other (comments) Muscle pain Lisinopril    Shortness of Breath, Other (comments) Turns bright red Other Medication  03/05/2014    Other (comments) Vicryl suture on skin tends to be rejected with poor wound healing does better with monocryl Procardia [Nifedipine]    Other (comments) Caused afib Current Immunizations  Reviewed on 9/15/2017 Name Date Influenza High Dose Vaccine PF 9/15/2017 11:09 AM, 9/16/2016, 10/2/2015 12:30 PM  
 Influenza Vaccine 10/10/2014, 10/4/2013 Influenza Vaccine Split 10/10/2012  2:28 PM, 9/28/2011 Influenza Vaccine Whole 10/8/2010 Pneumococcal Conjugate (PCV-13) 12/15/2014  8:16 AM  
 Pneumococcal Polysaccharide (PPSV-23) 1/24/2014 Pneumococcal Vaccine (Unspecified Type) 1/1/2006 Td 1/1/2006 Tdap 4/3/2013  8:23 PM  
 Zoster Vaccine, Live 1/1/2007 Not reviewed this visit You Were Diagnosed With   
  
 Codes Comments Chronic pain of right knee    -  Primary ICD-10-CM: M25.561, G34.50 ICD-9-CM: 719.46, 338.29 Left hand pain     ICD-10-CM: L58.741 ICD-9-CM: 729.5 Trigger middle finger of left hand     ICD-10-CM: S56.084 ICD-9-CM: 727.03 Vitals BP Pulse Temp Resp Height(growth percentile) Weight(growth percentile) 115/71 67 97.8 °F (36.6 °C) (Oral) 18 6' (1.829 m) 217 lb 3.2 oz (98.5 kg) SpO2 BMI Smoking Status 97% 29.46 kg/m2 Former Smoker BMI and BSA Data Body Mass Index Body Surface Area  
 29.46 kg/m 2 2.24 m 2 Preferred Pharmacy Pharmacy Name Phone Bhavin Su[facility]rez [facility], [address] [place] [address] [phone] Your Updated Medication List  
  
   
This list is accurate as of: 12/1/17 11:12 AM.  Always use your most recent med list.  
  
  
  
  
 Go Shine 1.2-2.5 % Glwp Generic drug:  clindamycin-benzoyl peroxide  
  
 aspirin delayed-release 81 mg tablet Take 81 mg by mouth daily. aspirin-calcium carbonate 81 mg-300 mg calcium(777 mg) Tab 81 mg.  
  
 atenolol 25 mg tablet Commonly known as:  TENORMIN  
TAKE ONE TABLET BY MOUTH TWICE A DAY BESIVANCE 0.6 % Drps ophthalmic suspension Generic drug:  besifloxacin 1 Drop two (2) times a day. cholecalciferol 1,000 unit Cap Commonly known as:  VITAMIN D3 Take 1,000 Units by mouth daily. coenzyme Q-10 200 mg capsule Commonly known as:  CO Q-10 Take  by mouth. DUREZOL 0.05 % ophthalmic emulsion Generic drug:  Difluprednate Administer 1 Drop to both eyes four (4) times daily. ezetimibe-simvastatin 10-40 mg per tablet Commonly known as:  Vytorin 10/40 Take 1 Tab by mouth nightly. * gabapentin 400 mg capsule Commonly known as:  NEURONTIN  
1 tab PO QHS  Indications: pain * gabapentin 300 mg capsule Commonly known as:  NEURONTIN Take 1 Cap by mouth nightly. Indications: NEUROPATHIC PAIN  
  
 HYDROcodone-acetaminophen 5-325 mg per tablet Commonly known as:  Dena Parisian Take 1 Tab by mouth every four (4) hours as needed (for chronic pain). ILEVRO 0.3 % Drps Generic drug:  nepafenac Apply 1 Drop to eye. LORazepam 1 mg tablet Commonly known as:  ATIVAN Take 1 Tab by mouth every eight (8) hours as needed. losartan 25 mg tablet Commonly known as:  COZAAR  
TAKE ONE TABLET BY MOUTH TWICE A DAY MULTIVITAMIN PO Take  by mouth daily. NITROQUICK SL  
by SubLINGual route. OMEGA 3 FISH OIL PO Take 900 mg by mouth daily. prednisoLONE acetate 1 % ophthalmic suspension Commonly known as:  PRED FORTE  
  
 PROTONIX 40 mg tablet Generic drug:  pantoprazole Take 40 mg by mouth daily. sildenafil citrate 100 mg tablet Commonly known as:  VIAGRA Take 1 Tab by mouth daily as needed. Indications: Erectile Dysfunction  
  
 triamcinolone acetonide 40 mg/mL injection Commonly known as:  KENALOG  
1 mL by IntraMUSCular route once for 1 dose. triamterene-hydroCHLOROthiazide 37.5-25 mg per tablet Commonly known as:  Diana Granda TAKE ONE TABLET BY MOUTH DAILY * VOLTAREN 1 % Gel Generic drug:  diclofenac  
as needed. * diclofenac 1 % Gel Commonly known as:  VOLTAREN  
 Apply 2-4 g to affected area four (4) times daily. To affected area (topically) * Notice: This list has 4 medication(s) that are the same as other medications prescribed for you. Read the directions carefully, and ask your doctor or other care provider to review them with you. We Performed the Following DRAIN/INJECT LARGE JOINT/BURSA [id number] CPT(R)] TRIAMCINOLONE ACETONIDE INJ [ Lists of hospitals in the [place]] Introducing [facility]! Dear Mary Richard: Thank you for requesting a [employer] account. Our records indicate that you already have an active [employer] account. You can access your account anytime at [email or web address]. [email or web address] Did you know that you can access your hospital and ER discharge instructions at any time in [employer]? You can also review all of your test results from your hospital stay or ER visit. Additional Information If you have questions, please visit the Frequently Asked Questions section of the [employer] website at [email or web address] Remember, [employer] is NOT to be used for urgent needs. For medical emergencies, dial 911. Now available from your iPhone and Android! Please provide this summary of care documentation to your next provider. Your primary care clinician is listed as Andrey Self. If you have any questions after today's visit, please call [phone].

## 2017-12-07 ENCOUNTER — OFFICE VISIT (OUTPATIENT)
Dept: ORTHOPEDIC SURGERY | Age: 72
End: 2017-12-07

## 2017-12-07 VITALS
HEART RATE: 62 BPM | OXYGEN SATURATION: 99 % | DIASTOLIC BLOOD PRESSURE: 73 MMHG | HEIGHT: 72 IN | WEIGHT: 213 LBS | BODY MASS INDEX: 28.85 KG/M2 | TEMPERATURE: 96.4 F | SYSTOLIC BLOOD PRESSURE: 124 MMHG

## 2017-12-07 DIAGNOSIS — G56.01 CARPAL TUNNEL SYNDROME ON RIGHT: Primary | ICD-10-CM

## 2017-12-07 RX ORDER — BETAMETHASONE SODIUM PHOSPHATE AND BETAMETHASONE ACETATE 3; 3 MG/ML; MG/ML
6 INJECTION, SUSPENSION INTRA-ARTICULAR; INTRALESIONAL; INTRAMUSCULAR; SOFT TISSUE ONCE
Qty: 1 ML | Refills: 0
Start: 2017-12-07 | End: 2017-12-07

## 2017-12-07 NOTE — MR AVS SNAPSHOT
Visit Information Date & Time Provider Department Dept. Phone Encounter #  
 12/7/2017  9:00 AM Lei Nageotte, [address] [facility] [phone] [id number] Your Appointments 1/3/2018  8:35 AM  
LAB with [facility] NURSE VISIT [facility] ([facility] Appt Note: lab  
 [address] [place] [address] [address]  
  
   
 [address] [facility]  
  
    
 1/9/2018  8:00 AM  
Office Visit with Mindi Tomas MD  
[facility]) Appt Note: 6 months [address], Suite [facility] [address]  
  
   
 [address] [facility]  
  
    
 2/2/2018  8:00 AM  
PROCEDURE with [facility] IMAGING 1 [facility] ([facility]) Appt Note: iliac 1 yr alfredo; .  
 [address] [address] [address] [facility]  
[phone] [address] [place] [address]  
  
    
 2/2/2018 10:00 AM  
PROCEDURE with [facility] NONIMAGING [facility] ([facility]) Appt Note: leg art 1 yr alfredo; .  
 [address] [address] [address] [facility]  
[phone] [address] [place] [address]  
  
    
 2/12/2018  9:50 AM  
Follow Up with Ele García NP  
[facility] ([facility]) Appt Note: 3 mo f/u  
 [address] [facility] [address]  
[phone]  
  
   
 [address]  
  
    
 2/26/2018  9:15 AM  
Follow Up with Tevin Mcfarlane MD  
[address] [place] [address] and [facility]ascular Specialists [facility]) Appt Note: 1 year fu after studies; FAXED OVER INFORMATION  [address] [place] TO CALL PATIENT DUE TO STUDIES WERE NOT IN CC AT TIME OF CHECK OUT; [facility] ERROR; 1 year fu after studies FAXED OVER INFORMATION  [address] TO CALL PATIENT DUE TO STUDIES WERE NOT IN CC AT TIME OF CHECK  [address]; .; cld pt to reschedule appt 02/19/2018 due to dr out of the office, no answer left a message for pt to call us to reschedule; Patient called back and confirmed new appt [address] [facility] 992 706 [facility]  
[phone] [address]  
  
    
 3/30/2018  8:30 AM  
Nurse Visit with [facility] [place] NURSE [facility] ([facility]) Appt Note: PSA  
 [address] 
[address] [place] [address]  
[address] [place] [address] 4/12/2018  8:45 AM  
Any with Lucian Figueredo MD  
[facility] ([facility]) Appt Note: Return in about 6 months (around 4/10/2018) for follow up, PSA 1 month prior [address] [place] [address]  
[address] [place] [address] Upcoming Health Maintenance Date Due  
 MEDICARE YEARLY EXAM 6/30/2018 GLAUCOMA SCREENING Q2Y 6/25/2019 DTaP/Tdap/Td series (2 - Td) 4/3/2023 COLONOSCOPY 9/22/2025 Allergies as of 12/7/2017  Review Complete On: 12/7/2017 By: Rob Moise PA-C Severity Noted Reaction Type Reactions Altace [Ramipril]    Unknown (comments) Lipitor [Atorvastatin]    Other (comments) Muscle pain Lisinopril    Shortness of Breath, Other (comments) Turns bright red Other Medication  03/05/2014    Other (comments) Vicryl suture on skin tends to be rejected with poor wound healing does better with monocryl Procardia [Nifedipine]    Other (comments) Caused afib Current Immunizations  Reviewed on 9/15/2017 Name Date Influenza High Dose Vaccine PF 9/15/2017 11:09 AM, 9/16/2016, 10/2/2015 12:30 PM  
 Influenza Vaccine 10/10/2014, 10/4/2013 Influenza Vaccine Split 10/10/2012  2:28 PM, 9/28/2011 Influenza Vaccine Whole 10/8/2010 Pneumococcal Conjugate (PCV-13) 12/15/2014  8:16 AM  
 Pneumococcal Polysaccharide (PPSV-23) 1/24/2014 Pneumococcal Vaccine (Unspecified Type) 1/1/2006 Td 1/1/2006 Tdap 4/3/2013  8:23 PM  
 Zoster Vaccine, Live 1/1/2007 Not reviewed this visit You Were Diagnosed With   
  
 Codes Comments Carpal tunnel syndrome on right    -  Primary ICD-10-CM: G56.01 
ICD-9-CM: 354.0 Vitals BP Pulse Temp Height(growth percentile) Weight(growth percentile) SpO2  
 124/73 62 96.4 °F (35.8 °C) 6' (1.829 m) 213 lb (96.6 kg) 99% BMI Smoking Status 28.89 kg/m2 Former Smoker BMI and BSA Data Body Mass Index Body Surface Area  
 28.89 kg/m 2 2.22 m 2 Preferred Pharmacy Pharmacy Name Phone Memo Randolph [address] [facility], [address] [place] [address] [phone] Your Updated Medication List  
  
   
This list is accurate as of: 12/7/17  9:45 AM.  Always use your most recent med list.  
  
  
  
  
 Augie Stands 1.2-2.5 % Glwp Generic drug:  clindamycin-benzoyl peroxide  
  
 aspirin delayed-release 81 mg tablet Take 81 mg by mouth daily. aspirin-calcium carbonate 81 mg-300 mg calcium(777 mg) Tab 81 mg.  
  
 atenolol 25 mg tablet Commonly known as:  TENORMIN  
TAKE ONE TABLET BY MOUTH TWICE A DAY BESIVANCE 0.6 % Drps ophthalmic suspension Generic drug:  besifloxacin 1 Drop two (2) times a day. cholecalciferol 1,000 unit Cap Commonly known as:  VITAMIN D3 Take 1,000 Units by mouth daily. coenzyme Q-10 200 mg capsule Commonly known as:  CO Q-10 Take  by mouth. DUREZOL 0.05 % ophthalmic emulsion Generic drug:  Difluprednate Administer 1 Drop to both eyes four (4) times daily. ezetimibe-simvastatin 10-40 mg per tablet Commonly known as:  Vytorin 10/40 Take 1 Tab by mouth nightly. * gabapentin 400 mg capsule Commonly known as:  NEURONTIN  
1 tab PO QHS  Indications: pain * gabapentin 300 mg capsule Commonly known as:  NEURONTIN Take 1 Cap by mouth nightly. Indications: NEUROPATHIC PAIN  
  
 HYDROcodone-acetaminophen 5-325 mg per tablet Commonly known as:  Rolinda Doles Take 1 Tab by mouth every four (4) hours as needed (for chronic pain). ILEVRO 0.3 % Drps Generic drug:  nepafenac Apply 1 Drop to eye. LORazepam 1 mg tablet Commonly known as:  ATIVAN Take 1 Tab by mouth every eight (8) hours as needed. losartan 25 mg tablet Commonly known as:  COZAAR  
TAKE ONE TABLET BY MOUTH TWICE A DAY MULTIVITAMIN PO Take  by mouth daily. NITROQUICK SL  
by SubLINGual route. OMEGA 3 FISH OIL PO Take 900 mg by mouth daily. prednisoLONE acetate 1 % ophthalmic suspension Commonly known as:  PRED FORTE  
  
 PROTONIX 40 mg tablet Generic drug:  pantoprazole Take 40 mg by mouth daily. sildenafil citrate 100 mg tablet Commonly known as:  VIAGRA Take 1 Tab by mouth daily as needed. Indications: Erectile Dysfunction  
  
 triamterene-hydroCHLOROthiazide 37.5-25 mg per tablet Commonly known as:  Gemma St. Jacob TAKE ONE TABLET BY MOUTH DAILY * VOLTAREN 1 % Gel Generic drug:  diclofenac  
as needed. * diclofenac 1 % Gel Commonly known as:  VOLTAREN Apply 2-4 g to affected area four (4) times daily. To affected area (topically) * Notice: This list has 4 medication(s) that are the same as other medications prescribed for you. Read the directions carefully, and ask your doctor or other care provider to review them with you. Introducing [facility]! Dear Mary Vargass: Thank you for requesting a [employer] account. Our records indicate that you already have an active [employer] account. You can access your account anytime at [email or web address] Did you know that you can access your hospital and ER discharge instructions at any time in [facility]? You can also review all of your test results from your hospital stay or ER visit. Additional Information If you have questions, please visit the Frequently Asked Questions section of the [facility] website at [email or web address] Remember, [facility] is NOT to be used for urgent needs. For medical emergencies, dial 911. Now available from your iPhone and Android! Please provide this summary of care documentation to your next provider. Your primary care clinician is listed as David Left. If you have any questions after today's visit, please call [phone].

## 2017-12-07 NOTE — PROGRESS NOTES
HISTORY OF PRESENT ILLNESS:  Madelyn Crowell presents to the office for treatment, conservative, advanced, to his right upper extremity, i.e. his right wrist.  He has a long-standing history of carpal tunnel syndrome. He previously was a patient of Dr. Amado Merida at [facility]. He became displeased with the  and subsequently transferred his care to our practice. He has received cortisone injections to the carpal tunnel region before. He has considered having a carpal tunnel release but does have a carotid 100% blockage. We talked about, today, the twilight with anesthesia, therefore, minimizing to mitigating his cardiothoracic/ischemia risks versus if the case was done with deep anesthesia. He will consider again the option for a carpal tunnel release. He has just moved his residence. His right hand effectively is week, particularly at the base of the thumb. He has numbness and tingling associated with the median nerve tract of the right hand, worse with the thumb and index finger volar aspects. PHYSICAL EXAM:  There is modest thenar eminence wasting today.  strength is significantly weaker in the right hand when compared to the left. Two point discrimination is noted at 12 mm today of the median nerve track for the right hand. IMPRESSION:      1. Carpal tunnel syndrome. 2. Weakness of the right hand secondary to above. 3. Paresthesia associated with carpal tunnel syndrome of the right hand effecting the median nerve tract. PROCEDURE:  Using sterile technique, after informed verbal and written consent were obtained and time out performed, 0.5 cc of Celestone at 40 mg per ml mixed with 0.5 mL of Marcaine 0.25% was injected to the right carpal tunnel space with no complications. There were no complications. The patient tolerated the procedure well. PLAN:   I am currently recommending initiation of a low-dose cortisone injection.   The patient is going to followup with one of our providers within the next couple of weeks. His upcoming appointment will be scheduled with the first available orthopedic surgeon to address his carpal tunnel syndrome.

## 2017-12-11 ENCOUNTER — OFFICE VISIT (OUTPATIENT)
Dept: ORTHOPEDIC SURGERY | Facility: CLINIC | Age: 72
End: 2017-12-11

## 2017-12-11 VITALS
DIASTOLIC BLOOD PRESSURE: 74 MMHG | BODY MASS INDEX: 28.99 KG/M2 | HEART RATE: 65 BPM | SYSTOLIC BLOOD PRESSURE: 140 MMHG | WEIGHT: 214 LBS | HEIGHT: 72 IN

## 2017-12-11 DIAGNOSIS — G56.01 CARPAL TUNNEL SYNDROME ON RIGHT: Primary | ICD-10-CM

## 2017-12-11 NOTE — PATIENT INSTRUCTIONS
Carpal Tunnel Release: Before Your Surgery  What is carpal tunnel release? Carpal tunnel release is surgery that reduces the pressure on a nerve in the wrist. Your doctor will cut a ligament that presses on the nerve. This lets the nerve pass freely through the tunnel without being squeezed. The surgery can be open or endoscopic. In open surgery, your doctor makes a small cut in the palm of your hand. This cut is called an incision. In endoscopic surgery, your doctor makes one small incision in the wrist, or one small incision in the wrist and one in the palm. Your doctor puts a thin tube with a camera attached (endoscope) into the incision. Surgical tools are put in along with the endoscope. In both types of surgeries, the incisions are closed with stitches. The incisions leave scars that usually fade in time. You may be asleep during the surgery. Or you may be awake and have medicine to numb your hand and arm so you will not feel pain. After surgery, your wrist and hand pain should begin to go away. It usually takes 3 to 4 months to recover and 1 year before your hand strength returns. How much hand strength returns is different for each person. You will go home the same day as the surgery. When you can return to work depends on the type of work you do. Follow-up care is a key part of your treatment and safety. Be sure to make and go to all appointments, and call your doctor if you are having problems. It's also a good idea to know your test results and keep a list of the medicines you take. What happens before surgery? ?Surgery can be stressful. This information will help you understand what you can expect. And it will help you safely prepare for surgery. ? Preparing for surgery  ? · Understand exactly what surgery is planned, along with the risks, benefits, and other options. · Tell your doctors ALL the medicines, vitamins, supplements, and herbal remedies you take.  Some of these can increase the risk of bleeding or interact with anesthesia. ? · If you take blood thinners, such as warfarin (Coumadin), clopidogrel (Plavix), or aspirin, be sure to talk to your doctor. He or she will tell you if you should stop taking these medicines before your surgery. Make sure that you understand exactly what your doctor wants you to do.   ? · Your doctor will tell you which medicines to take or stop before your surgery. You may need to stop taking certain medicines a week or more before surgery. So talk to your doctor as soon as you can.   ? · If you have an advance directive, let your doctor know. It may include a living will and a durable power of  for health care. Bring a copy to the hospital. If you don't have one, you may want to prepare one. It lets your doctor and loved ones know your health care wishes. Doctors advise that everyone prepare these papers before any type of surgery or procedure. What happens on the day of surgery? · Follow the instructions exactly about when to stop eating and drinking. If you don't, your surgery may be canceled. If your doctor told you to take your medicines on the day of surgery, take them with only a sip of water. ? · Take a bath or shower before you come in for your surgery. Do not apply lotions, perfumes, deodorants, or nail polish. ? · Do not shave the surgical site yourself. ? · Take off all jewelry and piercings. And take out contact lenses, if you wear them. ? At the hospital or surgery center   · Bring a picture ID. ? · The area for surgery is often marked to make sure there are no errors. ? · You will be kept comfortable and safe by your anesthesia provider. The anesthesia may make you sleep. Or it may just numb the area being worked on. ? · The surgery will take about 15 to 60 minutes. Going home   · Be sure you have someone to drive you home. Anesthesia and pain medicine make it unsafe for you to drive.    ? · You will be given more specific instructions about recovering from your surgery. They will cover things like diet, wound care, follow-up care, driving, and getting back to your normal routine. When should you call your doctor? · You have questions or concerns. ? · You don't understand how to prepare for your surgery. ? · You become ill before the surgery (such as fever, flu, or a cold). ? · You need to reschedule or have changed your mind about having the surgery. Where can you learn more? Go to http://nneka-mindi.info/. Enter V795 in the search box to learn more about \"Carpal Tunnel Release: Before Your Surgery. \"  Current as of: March 21, 2017  Content Version: 11.4  © 0363-5789 [employer], [employer]. Care instructions adapted under license by [employer] (which disclaims liability or warranty for this information). If you have questions about a medical condition or this instruction, always ask your healthcare professional. [place] [address] any warranty or liability for your use of this information.

## 2017-12-14 ENCOUNTER — OFFICE VISIT (OUTPATIENT)
Dept: INTERNAL MEDICINE CLINIC | Age: 72
End: 2017-12-14

## 2017-12-14 VITALS
HEART RATE: 74 BPM | DIASTOLIC BLOOD PRESSURE: 62 MMHG | RESPIRATION RATE: 14 BRPM | SYSTOLIC BLOOD PRESSURE: 98 MMHG | HEIGHT: 72 IN | TEMPERATURE: 99.4 F | BODY MASS INDEX: 28.85 KG/M2 | WEIGHT: 213 LBS | OXYGEN SATURATION: 97 %

## 2017-12-14 DIAGNOSIS — G56.11 NEUROPATHY, MEDIAN NERVE, RIGHT: Primary | ICD-10-CM

## 2017-12-14 DIAGNOSIS — J40 BRONCHITIS: Primary | ICD-10-CM

## 2017-12-14 DIAGNOSIS — R06.2 WHEEZING: ICD-10-CM

## 2017-12-14 DIAGNOSIS — R05.9 COUGH: ICD-10-CM

## 2017-12-14 RX ORDER — AZITHROMYCIN 250 MG/1
TABLET, FILM COATED ORAL
Qty: 6 TAB | Refills: 0 | Status: SHIPPED | OUTPATIENT
Start: 2017-12-14 | End: 2018-01-09 | Stop reason: ALTCHOICE

## 2017-12-14 RX ORDER — CODEINE PHOSPHATE AND GUAIFENESIN 10; 100 MG/5ML; MG/5ML
5 SOLUTION ORAL
Qty: 120 ML | Refills: 0 | Status: SHIPPED | OUTPATIENT
Start: 2017-12-14 | End: 2018-04-12

## 2017-12-14 NOTE — MR AVS SNAPSHOT
Visit Information Date & Time Provider Department Dept. Phone Encounter #  
 12/14/2017  1:45 PM Katarzyna Gaxiola MD [facility] [phone] [id number] Your Appointments 1/3/2018  8:35 AM  
LAB with [facility] NURSE VISIT [facility] ([address]) Appt Note: lab  
 [address], [address] [facility] [address] [place]  
  
   
 [address] [facility], [address]  
  
    
 1/5/2018  1:00 PM  
POST OP with Neymar Reed MD  
[facility] [address]) Appt Note: S/P RT [facility] [id number]  
 [address] [facility] [address] [facility] [address]  
[phone]  
  
   
 [address] [facility], [address] [place] [address] Road [address]  
  
    
 1/9/2018  8:00 AM  
Office Visit with Katarzyna Gaxiola MD  
[facility] [address]) Appt Note: 6 months [address] [facility], Suite [facility] [address] [place]  
  
   
 [address] [facility], [address]  
  
    
 2/2/2018  8:00 AM  
PROCEDURE with [facility] IMAGING 1 [facility] ([address]) Appt Note: iliac 1 yr alfredo; .  
 [address] [place], [place] [address] [address] [facility]  
[phone] [address] [place] [address]  
  
    
 2/2/2018 10:00 AM  
PROCEDURE with [facility] NONIMAGING [facility] ([address]) Appt Note: leg art 1 yr alfredo; .  
 [address] [place], [place] [address] [address] [facility]  
[phone] [address]  
  
    
 2/12/2018  9:50 AM  
Follow Up with Klever Shannon NP  
[facility] ([address]) Appt Note: 3 mo f/u  
 [address] [facility] [address]  
[address] [facility] [address]  
  
    
 2/26/2018  9:15 AM  
Follow Up with Jose Castro MD  
 [facility] ([facility]) Appt Note: 1 year fu after studies; FAXED OVER INFORMATION FOR MICHI TO CALL PATIENT DUE TO STUDIES WERE NOT IN CC AT TIME OF CHECK OUT; [address]; 1 year fu after studies FAXED OVER INFORMATION  [place] TO CALL PATIENT DUE TO STUDIES WERE NOT IN CC AT TIME OF CHECK  [address]; .; cld pt to reschedule appt 02/19/2018 due to dr out of the office, no answer left a message for pt to call us to reschedule; Patient called back and confirmed new appt [address] [place] [facility] [address] [place] [address]  
[phone] [address]  
  
    
 3/30/2018  8:30 AM  
Nurse Visit with [facility] NURSE [facility] ([facility]) Appt Note: PSA  
 [address] 
[address] [place] [address]  
[address] [place] [address] 4/12/2018  8:45 AM  
Any with Jessenia Estrada MD  
[facility] ([facility]) Appt Note: Return in about 6 months (around 4/10/2018) for follow up, PSA 1 month prior [address] [place] [address]  
[address] [place] [address] Upcoming Health Maintenance Date Due  
 MEDICARE YEARLY EXAM 6/30/2018 GLAUCOMA SCREENING Q2Y 6/25/2019 DTaP/Tdap/Td series (2 - Td) 4/3/2023 COLONOSCOPY 9/22/2025 Allergies as of 12/14/2017  Review Complete On: 12/14/2017 By: Nir Olson Severity Noted Reaction Type Reactions Altace [Ramipril]    Unknown (comments) Lipitor [Atorvastatin]    Other (comments) Muscle pain Lisinopril    Shortness of Breath, Other (comments) Turns bright red Other Medication  03/05/2014    Other (comments) Vicryl suture on skin tends to be rejected with poor wound healing does better with monocryl Procardia [Nifedipine]    Other (comments) Caused afib Current Immunizations  Reviewed on 9/15/2017 Name Date Influenza High Dose Vaccine PF 9/15/2017 11:09 AM, 9/16/2016, 10/2/2015 12:30 PM  
 Influenza Vaccine 10/10/2014, 10/4/2013 Influenza Vaccine Split 10/10/2012  2:28 PM, 9/28/2011 Influenza Vaccine Whole 10/8/2010 Pneumococcal Conjugate (PCV-13) 12/15/2014  8:16 AM  
 Pneumococcal Polysaccharide (PPSV-23) 1/24/2014 Pneumococcal Vaccine (Unspecified Type) 1/1/2006 Td 1/1/2006 Tdap 4/3/2013  8:23 PM  
 Zoster Vaccine, Live 1/1/2007 Not reviewed this visit You Were Diagnosed With   
  
 Codes Comments Bronchitis    -  Primary ICD-10-CM: C26 ICD-9-CM: 896 Cough     ICD-10-CM: R05 ICD-9-CM: 786.2 Wheezing     ICD-10-CM: R06.2 ICD-9-CM: 786.07 Vitals BP Pulse Temp Resp Height(growth percentile) Weight(growth percentile) 98/62 (BP 1 Location: Left arm, BP Patient Position: Sitting) 74 99.4 °F (37.4 °C) (Oral) 14 6' (1.829 m) 213 lb (96.6 kg) SpO2 BMI Smoking Status 97% 28.89 kg/m2 Former Smoker Vitals History BMI and BSA Data Body Mass Index Body Surface Area  
 28.89 kg/m 2 2.22 m 2 Preferred Pharmacy Pharmacy Name Phone [facility] [address] [phone] Your Updated Medication List  
  
   
This list is accurate as of: 12/14/17  2:00 PM.  Always use your most recent med list.  
  
  
  
  
 ACANYA 1.2-2.5 % Glwp Generic drug:  clindamycin-benzoyl peroxide  
  
 aspirin delayed-release 81 mg tablet Take 81 mg by mouth daily. aspirin-calcium carbonate 81 mg-300 mg calcium(777 mg) Tab 81 mg.  
  
 atenolol 25 mg tablet Commonly known as:  TENORMIN  
TAKE ONE TABLET BY MOUTH TWICE A DAY  
  
 azithromycin 250 mg tablet Commonly known as:  Bonita Fine Take 2 tablets today, then take 1 tablet daily BESIVANCE 0.6 % Drps ophthalmic suspension Generic drug:  besifloxacin 1 Drop two (2) times a day. cholecalciferol 1,000 unit Cap Commonly known as:  VITAMIN D3 Take 1,000 Units by mouth daily. coenzyme Q-10 200 mg capsule Commonly known as:  CO Q-10 Take  by mouth. DUREZOL 0.05 % ophthalmic emulsion Generic drug:  Difluprednate Administer 1 Drop to both eyes four (4) times daily. ezetimibe-simvastatin 10-40 mg per tablet Commonly known as:  Vytorin 10/40 Take 1 Tab by mouth nightly. * gabapentin 400 mg capsule Commonly known as:  NEURONTIN  
1 tab PO QHS  Indications: pain * gabapentin 300 mg capsule Commonly known as:  NEURONTIN Take 1 Cap by mouth nightly. Indications: NEUROPATHIC PAIN  
  
 guaiFENesin-codeine 100-10 mg/5 mL solution Commonly known as:  ROBITUSSIN AC Take 5 mL by mouth three (3) times daily as needed for Cough. Max Daily Amount: 15 mL. HYDROcodone-acetaminophen 5-325 mg per tablet Commonly known as:  Jordan Dolphin Take 1 Tab by mouth every four (4) hours as needed (for chronic pain). ILEVRO 0.3 % Drps Generic drug:  nepafenac Apply 1 Drop to eye. LORazepam 1 mg tablet Commonly known as:  ATIVAN Take 1 Tab by mouth every eight (8) hours as needed. losartan 25 mg tablet Commonly known as:  COZAAR  
TAKE ONE TABLET BY MOUTH TWICE A DAY MULTIVITAMIN PO Take  by mouth daily. NITROQUICK SL  
by SubLINGual route. OMEGA 3 FISH OIL PO Take 900 mg by mouth daily. prednisoLONE acetate 1 % ophthalmic suspension Commonly known as:  PRED FORTE  
  
 PROTONIX 40 mg tablet Generic drug:  pantoprazole Take 40 mg by mouth daily. sildenafil citrate 100 mg tablet Commonly known as:  VIAGRA Take 1 Tab by mouth daily as needed. Indications: Erectile Dysfunction  
  
 triamterene-hydroCHLOROthiazide 37.5-25 mg per tablet Commonly known as:  Debborah American TAKE ONE TABLET BY MOUTH DAILY * VOLTAREN 1 % Gel Generic drug:  diclofenac  
as needed. * diclofenac 1 % Gel Commonly known as:  VOLTAREN Apply 2-4 g to affected area four (4) times daily. To affected area (topically) * Notice: This list has 4 medication(s) that are the same as other medications prescribed for you. Read the directions carefully, and ask your doctor or other care provider to review them with you. Prescriptions Printed Refills  
 guaiFENesin-codeine (ROBITUSSIN AC) 100-10 mg/5 mL solution 0 Sig: Take 5 mL by mouth three (3) times daily as needed for Cough. Max Daily Amount: 15 mL. Class: Print Route: Oral  
 azithromycin (ZITHROMAX) 250 mg tablet 0 Sig: Take 2 tablets today, then take 1 tablet daily Class: Print Introducing [facility] SERVICES! Dear Renee Wood: Thank you for requesting a [employer] account. Our records indicate that you already have an active [employer] account. You can access your account anytime at [email or web address] Did you know that you can access your hospital and ER discharge instructions at any time in [employer]? You can also review all of your test results from your hospital stay or ER visit. Additional Information If you have questions, please visit the Frequently Asked Questions section of the [employer] website at [email or web address] Remember, [employer]pe is NOT to be used for urgent needs. For medical emergencies, dial 911. Now available from your iPhone and Android! Please provide this summary of care documentation to your next provider. Your primary care clinician is listed as Madisyn Worrell. If you have any questions after today's visit, please call [phone].

## 2017-12-14 NOTE — PROGRESS NOTES
67 y.o. WHITE OR  male who presents for evaluation. He has had URI symptoms for about a week now. Does not recall his exposures. It started out in the sinuses but now settling down his chest.  He has had mild sore throat although that is better, left greater than right ear pressure without overt pain. Cough is nonproductive, he has rare wheezing episodes. Highest temperature was 99. No GI complaints. He has been using OTC meds with mild relief in symptoms    Current Outpatient Prescriptions on File Prior to Visit   Medication Sig Dispense Refill    HYDROcodone-acetaminophen (NORCO) 5-325 mg per tablet Take 1 Tab by mouth every four (4) hours as needed (for chronic pain). 180 Tab 0    gabapentin (NEURONTIN) 300 mg capsule Take 1 Cap by mouth nightly. Indications: NEUROPATHIC PAIN 90 Cap 1    aspirin-calcium carbonate 81 mg-300 mg calcium(777 mg) tab 81 mg.      ACANYA 1.2-2.5 % glwp       prednisoLONE acetate (PRED FORTE) 1 % ophthalmic suspension       NITROGLYCERIN (NITROQUICK SL) by SubLINGual route.  gabapentin (NEURONTIN) 400 mg capsule 1 tab PO QHS  Indications: pain (Patient taking differently: Take 300 mg by mouth. 1 tab PO QHS  Indications: pain) 90 Cap 1    atenolol (TENORMIN) 25 mg tablet TAKE ONE TABLET BY MOUTH TWICE A  Tab 2    diclofenac (VOLTAREN) 1 % gel Apply 2-4 g to affected area four (4) times daily. To affected area (topically) 100 g 5    besifloxacin (BESIVANCE) 0.6 % drps ophthalmic suspension 1 Drop two (2) times a day.  Difluprednate (DUREZOL) 0.05 % ophthalmic emulsion Administer 1 Drop to both eyes four (4) times daily.  nepafenac (ILEVRO) 0.3 % drps Apply 1 Drop to eye.  LORazepam (ATIVAN) 1 mg tablet Take 1 Tab by mouth every eight (8) hours as needed. 50 Tab 0    sildenafil citrate (VIAGRA) 100 mg tablet Take 1 Tab by mouth daily as needed.  Indications: Erectile Dysfunction 12 Tab 6    losartan (COZAAR) 25 mg tablet TAKE ONE TABLET BY MOUTH TWICE A  Tab 2    aspirin delayed-release 81 mg tablet Take 81 mg by mouth daily.  triamterene-hydrochlorothiazide (MAXZIDE) 37.5-25 mg per tablet TAKE ONE TABLET BY MOUTH DAILY 90 Tab 3    ezetimibe-simvastatin (VYTORIN 10/40) 10-40 mg per tablet Take 1 Tab by mouth nightly. 90 Tab 3    Cholecalciferol, Vitamin D3, 1,000 unit cap Take 1,000 Units by mouth daily.  VOLTAREN 1 % topical gel as needed.  OMEGA-3 FATTY ACIDS/FISH OIL (OMEGA 3 FISH OIL PO) Take 900 mg by mouth daily.  MULTIVITAMIN PO Take  by mouth daily.  pantoprazole (PROTONIX) 40 mg tablet Take 40 mg by mouth daily.  coenzyme q10 200 mg Cap Take  by mouth. No current facility-administered medications on file prior to visit. Current Outpatient Prescriptions   Medication Sig    guaiFENesin-codeine (ROBITUSSIN AC) 100-10 mg/5 mL solution Take 5 mL by mouth three (3) times daily as needed for Cough. Max Daily Amount: 15 mL.  azithromycin (ZITHROMAX) 250 mg tablet Take 2 tablets today, then take 1 tablet daily    HYDROcodone-acetaminophen (NORCO) 5-325 mg per tablet Take 1 Tab by mouth every four (4) hours as needed (for chronic pain).  gabapentin (NEURONTIN) 300 mg capsule Take 1 Cap by mouth nightly. Indications: NEUROPATHIC PAIN    aspirin-calcium carbonate 81 mg-300 mg calcium(777 mg) tab 81 mg.    ACANYA 1.2-2.5 % glwp     prednisoLONE acetate (PRED FORTE) 1 % ophthalmic suspension     NITROGLYCERIN (NITROQUICK SL) by SubLINGual route.  gabapentin (NEURONTIN) 400 mg capsule 1 tab PO QHS  Indications: pain (Patient taking differently: Take 300 mg by mouth. 1 tab PO QHS  Indications: pain)    atenolol (TENORMIN) 25 mg tablet TAKE ONE TABLET BY MOUTH TWICE A DAY    diclofenac (VOLTAREN) 1 % gel Apply 2-4 g to affected area four (4) times daily. To affected area (topically)    besifloxacin (BESIVANCE) 0.6 % drps ophthalmic suspension 1 Drop two (2) times a day.     Difluprednate (DUREZOL) 0.05 % ophthalmic emulsion Administer 1 Drop to both eyes four (4) times daily.  nepafenac (ILEVRO) 0.3 % drps Apply 1 Drop to eye.  LORazepam (ATIVAN) 1 mg tablet Take 1 Tab by mouth every eight (8) hours as needed.  sildenafil citrate (VIAGRA) 100 mg tablet Take 1 Tab by mouth daily as needed. Indications: Erectile Dysfunction    losartan (COZAAR) 25 mg tablet TAKE ONE TABLET BY MOUTH TWICE A DAY    aspirin delayed-release 81 mg tablet Take 81 mg by mouth daily.  triamterene-hydrochlorothiazide (MAXZIDE) 37.5-25 mg per tablet TAKE ONE TABLET BY MOUTH DAILY    ezetimibe-simvastatin (VYTORIN 10/40) 10-40 mg per tablet Take 1 Tab by mouth nightly.  Cholecalciferol, Vitamin D3, 1,000 unit cap Take 1,000 Units by mouth daily.  VOLTAREN 1 % topical gel as needed.  OMEGA-3 FATTY ACIDS/FISH OIL (OMEGA 3 FISH OIL PO) Take 900 mg by mouth daily.  MULTIVITAMIN PO Take  by mouth daily.  pantoprazole (PROTONIX) 40 mg tablet Take 40 mg by mouth daily.  coenzyme q10 200 mg Cap Take  by mouth. No current facility-administered medications for this visit. Allergies   Allergen Reactions    Altace [Ramipril] Unknown (comments)    Lipitor [Atorvastatin] Other (comments)     Muscle pain    Lisinopril Shortness of Breath and Other (comments)     Turns bright red    Other Medication Other (comments)     Vicryl suture on skin tends to be rejected with poor wound healing does better with monocryl    Procardia [Nifedipine] Other (comments)     Caused afib     Visit Vitals    BP 98/62 (BP 1 Location: Left arm, BP Patient Position: Sitting)    Pulse 74    Temp 99.4 °F (37.4 °C) (Oral)    Resp 14    Ht 6' (1.829 m)    Wt 213 lb (96.6 kg)    SpO2 97%    BMI 28.89 kg/m2   tm normal outside mild afl on left, sinuses nontender with mildly boggy mucosa, oropharynx otherwise benign, postnasal drip is noted. Lungs are clear, heart showed irreg rhythm.  abd soft and nt    Assessment and plan:  1.  URI. I gave him Z-Ephraim and Robitussin-AC, he declined albuterol. Continue OTC meds, call if no improvement        Above conditions discussed at length and patient vocalized understanding.   All questions answered to patient satisfaction

## 2017-12-14 NOTE — PROGRESS NOTES
1. Have you been to the ER, urgent care clinic or hospitalized since your last visit? NO.     2. Have you seen or consulted any other health care providers outside of the [address] [place] [address] since your last visit (Include any pap smears or colon screening)?  NO

## 2017-12-17 ENCOUNTER — TELEPHONE (OUTPATIENT)
Dept: INTERNAL MEDICINE CLINIC | Age: 72
End: 2017-12-17

## 2017-12-17 NOTE — TELEPHONE ENCOUNTER
Received call from patient. Reports seen in office on Friday for URI symptoms and given script for Z-Ephraim and Robitussin AC. Reports today that he took three doses of the 5 day Z-ephraim and does not feel it is helping. Concerned since noting some palpitations (skipped beats) today, and would like to stop taking it. States not experiencing any fever, chills or shortness of breath. Cough persists but is not productive. Does not feel that Robitussin AC is helping either. Discussed that may discontinue Z-ephraim and continue to treat symptomatically. Discussed that may take time to improve if viral in etiology. Instructed to call office in morning if still concerned.

## 2017-12-19 ENCOUNTER — HOSPITAL ENCOUNTER (OUTPATIENT)
Dept: LAB | Age: 72
Discharge: HOME | End: 2017-12-19
Payer: MEDICARE

## 2017-12-19 DIAGNOSIS — G56.11 NEUROPATHY, MEDIAN NERVE, RIGHT: ICD-10-CM

## 2017-12-19 LAB
ANION GAP SERPL CALC-SCNC: 4 MMOL/L (ref 3–18)
BASOPHILS # BLD: 0 K/UL (ref 0–0.06)
BASOPHILS NFR BLD: 0 % (ref 0–2)
BUN SERPL-MCNC: 11 MG/DL (ref 7–18)
BUN/CREAT SERPL: 17 (ref 12–20)
CALCIUM SERPL-MCNC: 9 MG/DL (ref 8.5–10.1)
CHLORIDE SERPL-SCNC: 95 MMOL/L (ref 100–108)
CO2 SERPL-SCNC: 37 MMOL/L (ref 21–32)
CREAT SERPL-MCNC: 0.63 MG/DL (ref 0.6–1.3)
DIFFERENTIAL METHOD BLD: ABNORMAL
EOSINOPHIL # BLD: 0.3 K/UL (ref 0–0.4)
EOSINOPHIL NFR BLD: 3 % (ref 0–5)
ERYTHROCYTE [DISTWIDTH] IN BLOOD BY AUTOMATED COUNT: 12.1 % (ref 11.6–14.5)
GLUCOSE SERPL-MCNC: 125 MG/DL (ref 74–99)
HCT VFR BLD AUTO: 43.3 % (ref 36–48)
HGB BLD-MCNC: 14.7 G/DL (ref 13–16)
LYMPHOCYTES # BLD: 2.1 K/UL (ref 0.9–3.6)
LYMPHOCYTES NFR BLD: 23 % (ref 21–52)
MCH RBC QN AUTO: 32.7 PG (ref 24–34)
MCHC RBC AUTO-ENTMCNC: 33.9 G/DL (ref 31–37)
MCV RBC AUTO: 96.2 FL (ref 74–97)
MONOCYTES # BLD: 0.6 K/UL (ref 0.05–1.2)
MONOCYTES NFR BLD: 6 % (ref 3–10)
NEUTS SEG # BLD: 6.2 K/UL (ref 1.8–8)
NEUTS SEG NFR BLD: 68 % (ref 40–73)
PLATELET # BLD AUTO: 190 K/UL (ref 135–420)
PMV BLD AUTO: 9.5 FL (ref 9.2–11.8)
POTASSIUM SERPL-SCNC: 3.7 MMOL/L (ref 3.5–5.5)
RBC # BLD AUTO: 4.5 M/UL (ref 4.7–5.5)
SODIUM SERPL-SCNC: 136 MMOL/L (ref 136–145)
WBC # BLD AUTO: 9.2 K/UL (ref 4.6–13.2)

## 2017-12-19 PROCEDURE — 85025 COMPLETE CBC W/AUTO DIFF WBC: CPT | Performed by: ORTHOPAEDIC SURGERY

## 2017-12-19 PROCEDURE — 80048 BASIC METABOLIC PNL TOTAL CA: CPT | Performed by: ORTHOPAEDIC SURGERY

## 2017-12-19 PROCEDURE — 36415 COLL VENOUS BLD VENIPUNCTURE: CPT | Performed by: ORTHOPAEDIC SURGERY

## 2017-12-27 ENCOUNTER — ANESTHESIA EVENT (OUTPATIENT)
Dept: SURGERY | Age: 72
End: 2017-12-27
Payer: MEDICARE

## 2017-12-28 ENCOUNTER — HOSPITAL ENCOUNTER (OUTPATIENT)
Age: 72
Setting detail: OUTPATIENT SURGERY
Discharge: HOME | End: 2017-12-28
Attending: ORTHOPAEDIC SURGERY | Admitting: ORTHOPAEDIC SURGERY
Payer: MEDICARE

## 2017-12-28 ENCOUNTER — ANESTHESIA (OUTPATIENT)
Dept: SURGERY | Age: 72
End: 2017-12-28
Payer: MEDICARE

## 2017-12-28 VITALS
HEIGHT: 72 IN | OXYGEN SATURATION: 95 % | TEMPERATURE: 96.4 F | SYSTOLIC BLOOD PRESSURE: 127 MMHG | DIASTOLIC BLOOD PRESSURE: 75 MMHG | WEIGHT: 210.2 LBS | BODY MASS INDEX: 28.47 KG/M2 | HEART RATE: 60 BPM | RESPIRATION RATE: 15 BRPM

## 2017-12-28 PROCEDURE — 76210000006 HC OR PH I REC 0.5 TO 1 HR: Performed by: ORTHOPAEDIC SURGERY

## 2017-12-28 PROCEDURE — 77030002916 HC SUT ETHLN J&J -A: Performed by: ORTHOPAEDIC SURGERY

## 2017-12-28 PROCEDURE — 74011250636 HC RX REV CODE- 250/636: Performed by: NURSE ANESTHETIST, CERTIFIED REGISTERED

## 2017-12-28 PROCEDURE — 74011000250 HC RX REV CODE- 250: Performed by: ORTHOPAEDIC SURGERY

## 2017-12-28 PROCEDURE — 77030020782 HC GWN BAIR PAWS FLX 3M -B: Performed by: ORTHOPAEDIC SURGERY

## 2017-12-28 PROCEDURE — 76210000020 HC REC RM PH II FIRST 0.5 HR: Performed by: ORTHOPAEDIC SURGERY

## 2017-12-28 PROCEDURE — 74011000250 HC RX REV CODE- 250

## 2017-12-28 PROCEDURE — 74011250636 HC RX REV CODE- 250/636

## 2017-12-28 PROCEDURE — 74011250636 HC RX REV CODE- 250/636: Performed by: ORTHOPAEDIC SURGERY

## 2017-12-28 PROCEDURE — 74011000250 HC RX REV CODE- 250: Performed by: NURSE ANESTHETIST, CERTIFIED REGISTERED

## 2017-12-28 PROCEDURE — 76060000032 HC ANESTHESIA 0.5 TO 1 HR: Performed by: ORTHOPAEDIC SURGERY

## 2017-12-28 PROCEDURE — 76010000138 HC OR TIME 0.5 TO 1 HR: Performed by: ORTHOPAEDIC SURGERY

## 2017-12-28 PROCEDURE — 77030020754 HC CUF TRNQT 2BLA STRY -B: Performed by: ORTHOPAEDIC SURGERY

## 2017-12-28 RX ORDER — SODIUM CHLORIDE 0.9 % (FLUSH) 0.9 %
5-10 SYRINGE (ML) INJECTION AS NEEDED
Status: DISCONTINUED | OUTPATIENT
Start: 2017-12-28 | End: 2017-12-28 | Stop reason: HOSPADM

## 2017-12-28 RX ORDER — MIDAZOLAM HYDROCHLORIDE 1 MG/ML
INJECTION, SOLUTION INTRAMUSCULAR; INTRAVENOUS AS NEEDED
Status: DISCONTINUED | OUTPATIENT
Start: 2017-12-28 | End: 2017-12-28 | Stop reason: HOSPADM

## 2017-12-28 RX ORDER — BUPIVACAINE HYDROCHLORIDE 5 MG/ML
INJECTION, SOLUTION EPIDURAL; INTRACAUDAL AS NEEDED
Status: DISCONTINUED | OUTPATIENT
Start: 2017-12-28 | End: 2017-12-28 | Stop reason: HOSPADM

## 2017-12-28 RX ORDER — PROPOFOL 10 MG/ML
INJECTION, EMULSION INTRAVENOUS AS NEEDED
Status: DISCONTINUED | OUTPATIENT
Start: 2017-12-28 | End: 2017-12-28 | Stop reason: HOSPADM

## 2017-12-28 RX ORDER — LIDOCAINE HYDROCHLORIDE 20 MG/ML
INJECTION, SOLUTION EPIDURAL; INFILTRATION; INTRACAUDAL; PERINEURAL AS NEEDED
Status: DISCONTINUED | OUTPATIENT
Start: 2017-12-28 | End: 2017-12-28 | Stop reason: HOSPADM

## 2017-12-28 RX ORDER — CEFAZOLIN SODIUM 2 G/50ML
2 SOLUTION INTRAVENOUS ONCE
Status: COMPLETED | OUTPATIENT
Start: 2017-12-28 | End: 2017-12-28

## 2017-12-28 RX ORDER — SODIUM CHLORIDE, SODIUM LACTATE, POTASSIUM CHLORIDE, CALCIUM CHLORIDE 600; 310; 30; 20 MG/100ML; MG/100ML; MG/100ML; MG/100ML
75 INJECTION, SOLUTION INTRAVENOUS CONTINUOUS
Status: DISCONTINUED | OUTPATIENT
Start: 2017-12-28 | End: 2017-12-28 | Stop reason: HOSPADM

## 2017-12-28 RX ORDER — NALOXONE HYDROCHLORIDE 0.4 MG/ML
0.1 INJECTION, SOLUTION INTRAMUSCULAR; INTRAVENOUS; SUBCUTANEOUS AS NEEDED
Status: DISCONTINUED | OUTPATIENT
Start: 2017-12-28 | End: 2017-12-28 | Stop reason: HOSPADM

## 2017-12-28 RX ORDER — FENTANYL CITRATE 50 UG/ML
INJECTION, SOLUTION INTRAMUSCULAR; INTRAVENOUS AS NEEDED
Status: DISCONTINUED | OUTPATIENT
Start: 2017-12-28 | End: 2017-12-28 | Stop reason: HOSPADM

## 2017-12-28 RX ORDER — PROPOFOL 10 MG/ML
INJECTION, EMULSION INTRAVENOUS
Status: DISCONTINUED | OUTPATIENT
Start: 2017-12-28 | End: 2017-12-28 | Stop reason: HOSPADM

## 2017-12-28 RX ORDER — SODIUM CHLORIDE 0.9 % (FLUSH) 0.9 %
5-10 SYRINGE (ML) INJECTION EVERY 8 HOURS
Status: DISCONTINUED | OUTPATIENT
Start: 2017-12-28 | End: 2017-12-28 | Stop reason: HOSPADM

## 2017-12-28 RX ADMIN — LIDOCAINE HYDROCHLORIDE 40 MG: 20 INJECTION, SOLUTION EPIDURAL; INFILTRATION; INTRACAUDAL; PERINEURAL at 07:32

## 2017-12-28 RX ADMIN — PROPOFOL 20 MG: 10 INJECTION, EMULSION INTRAVENOUS at 07:47

## 2017-12-28 RX ADMIN — MIDAZOLAM HYDROCHLORIDE 1 MG: 1 INJECTION, SOLUTION INTRAMUSCULAR; INTRAVENOUS at 07:25

## 2017-12-28 RX ADMIN — SODIUM CHLORIDE, SODIUM LACTATE, POTASSIUM CHLORIDE, AND CALCIUM CHLORIDE: 600; 310; 30; 20 INJECTION, SOLUTION INTRAVENOUS at 07:18

## 2017-12-28 RX ADMIN — MIDAZOLAM HYDROCHLORIDE 1 MG: 1 INJECTION, SOLUTION INTRAMUSCULAR; INTRAVENOUS at 07:28

## 2017-12-28 RX ADMIN — FENTANYL CITRATE 50 MCG: 50 INJECTION, SOLUTION INTRAMUSCULAR; INTRAVENOUS at 07:35

## 2017-12-28 RX ADMIN — FAMOTIDINE 20 MG: 10 INJECTION, SOLUTION INTRAVENOUS at 06:36

## 2017-12-28 RX ADMIN — FENTANYL CITRATE 25 MCG: 50 INJECTION, SOLUTION INTRAMUSCULAR; INTRAVENOUS at 07:46

## 2017-12-28 RX ADMIN — PROPOFOL 50 MCG/KG/MIN: 10 INJECTION, EMULSION INTRAVENOUS at 07:34

## 2017-12-28 RX ADMIN — PROPOFOL 20 MG: 10 INJECTION, EMULSION INTRAVENOUS at 07:36

## 2017-12-28 RX ADMIN — CEFAZOLIN SODIUM 2 G: 2 SOLUTION INTRAVENOUS at 07:25

## 2017-12-28 RX ADMIN — SODIUM CHLORIDE, SODIUM LACTATE, POTASSIUM CHLORIDE, AND CALCIUM CHLORIDE 75 ML/HR: 600; 310; 30; 20 INJECTION, SOLUTION INTRAVENOUS at 06:36

## 2017-12-28 RX ADMIN — FENTANYL CITRATE 25 MCG: 50 INJECTION, SOLUTION INTRAMUSCULAR; INTRAVENOUS at 07:40

## 2017-12-28 NOTE — ANESTHESIA POSTPROCEDURE EVALUATION
Post-Anesthesia Evaluation & Assessment    Visit Vitals    /75 (BP 1 Location: Left arm, BP Patient Position: Sitting)    Pulse 60    Temp 35.8 °C (96.4 °F)    Resp 15    Ht 6' (1.829 m)    Wt 95.3 kg (210 lb 3.2 oz)    SpO2 95%    BMI 28.51 kg/m2       Post-operative hydration adequate. Pain score (VAS): 0 Pain Scale 1: Numeric (0 - 10) (12/28/17 0854)  Pain Intensity 1: 0 (12/28/17 0854)   Managed. Mental status & Level of consciousness: returned to baseline    Neurological status: returned to baseline    Pulmonary status: airway patent, oxygen given as needed. Complications related to anesthesia: none    Patient has met all discharge requirements.     Additional comments:        Randi Acuna MD  December 28, 2017

## 2017-12-28 NOTE — PROGRESS NOTES
conducted a pre-surgery visit with Dane Lemus, who is a 67 y. o.,male. The  provided the following Interventions:  Initiated a relationship of care and support. Plan:  Chaplains will continue to follow and will provide pastoral care on an as needed/requested basis.  recommends bedside caregivers page  on duty if patient shows signs of acute spiritual or emotional distress.     [address] [facility] Certified [facility]   [phone]

## 2017-12-28 NOTE — INTERVAL H&P NOTE
H&P Update:  Sarah Kim was seen and examined. History and physical has been reviewed. The patient has been examined.  There have been no significant clinical changes since the completion of the originally dated History and Physical.    Signed By: [place] MD     December 28, 2017 7:10 AM

## 2017-12-28 NOTE — OP NOTES
[address] [place] [address]   OPERATIVE REPORT    Marialuisa Langley  MR#: [id number]  : 1945  ACCOUNT #: [de-identified]   DATE OF SERVICE: 2017    PREOPERATIVE DIAGNOSIS:  Right carpal tunnel syndrome. POSTOPERATIVE DIAGNOSIS:  Right carpal tunnel syndrome. PROCEDURES PERFORMED:  Right carpal tunnel release, CPT code 61493. SURGEON:  Tee Freedman. Danny Hearn MD    ASSISTANT:  Surgical assistant. ANESTHESIA:  Local with light sedation. IV FLUIDS:  200 mL of LR.    ESTIMATED BLOOD LOSS:  Minimal.    SPECIMENS REMOVED:  None. COMPLICATIONS:  None apparent. INDICATION FOR PROCEDURE The patient is a 77-year-old male with a right-handed carpal tunnel syndrome. He had tried a trial of conservative treatment including bracing and injections, which were unable to resolve his symptoms and therefore elected to undergo the procedures described. DESCRIPTION OF PROCEDURE:  The patient was identified in the preoperative holding area. The right wrist was marked as the operative extremity after confirmation with the patient. After discussing risks and benefits of the procedure, informed consent was confirmed. The patient was then taken to the operating room. He was moved to the OR table. Hand table setup was used. Light sedation was done and he was placed under MAC anesthesia. A well-padded tourniquet was placed about the right arm. The right arm was prepped and draped in normal sterile fashion. A timeout was performed verifying correct patient, procedure, and operative extremity with all in agreement. Ancef was given 2 grams through the IV prior to skin incision and insufflation of the tourniquet. The right arm was elevated and exsanguinated with use of an Esmarch tourniquet at 250 mmHg. Total tourniquet time was 15 minutes. The procedure began prior to insufflation of the tourniquet with an injection of 0.5% plain Marcaine around the surgical site.   An approximately 1-inch incision was then made in line with the fourth ray just distal to the wrist crease extending distally. This was carried down through skin and subcutaneous tissue. The palmar fascia was encountered and was incised in line with the incision. Deep to the palmar fascia, the transverse carpal ligament was identified. Using a 15 blade, a nick was made into the transverse carpal ligament. A S[place] elevator was then used to elevate the transverse carpal ligament between the transverse carpal ligament and the contents of the carpal tunnel deep to the ligament. A combination of scissors and knife was then used to completely release the transverse carpal ligament from proximal to the wrist crease deep up to the level of the distal aspect of the transverse carpal ligament. The median nerve was then inspected. The wound was copiously irrigated. The tourniquet was let down. Bleeders were coagulated with the use of Bovie cautery. The skin was closed with mattress nylon sutures. A sterile dressing was placed over the incision. The drapes were taken down. The fingers were noted to have good capillary refill at the end of the procedure and he was able to move his fingers and thumb at the end of the procedure. The patient was then taken to the PACU in stable condition. POSTOPERATIVE PLAN:  The patient is to be discharged home. He will keep his hand elevated. He was given instructions and already has pain medicine.   I will see him back in the office in 7 days for a wound check and suture removal.      MD AFUA Chopra / RN  D: 12/28/2017 08:34     T: 12/28/2017 11:05  JOB #: [id number]

## 2017-12-28 NOTE — H&P (VIEW-ONLY)
Patient: Ami Cunningham                MRN: [id number]       SSN: [id number]  YOB: 1945        AGE: 67 y.o. SEX: male  Body mass index is 29.02 kg/(m^2). PCP: Roshan Harden MD   12/11/17    CC: Right hand numbness and tingling     HPI: Brian Connors is a 67year old right handed male who presents with several years of right hand numbness, tingling and weakness. He has had several cortisone injections into the right carpal tunnel, most recently earlier this month, which have helped his symptoms some, but he has not had full relief. He was seen by the PA in the office recently due to this complaint as well. He has noted that he is weak in the right hand with  strength. He also has a complaint of ulnar nerve symptoms in the right hand/arm, but these are more intermittent. The carpal tunnel symptoms are daily and wake him up at night. He is interested in discussing surgery today.      Past Medical History:   Diagnosis Date    Adenoma of left adrenal gland     2009  1 cm, no change 1/15, 2/16    Asbestosis     Atrial fibrillation     CHA2DS2-VAsc=3(age+, htn+, vasc dx+; estimated yearly stroke risk according to Lip et al. is 3,2%), Hasbled=2(estimated yearly bleeding risk according to pisters et al. is 1,88%); not anticoagulated due to h/o gi bleed    Atrial fibrillation ablation     10/08    Basal cell cancer     Dr Hale Held; he's had >350 lesions removed    Carotid occlusion     left     Cervical radiculopathy     MRI 9/11 showed C3-6 severe foraminal stenosis    Chronic pain     Colon polyp     Dr Gladys Carlin 9/15    Coronary artery disease     RCA - 3.0 x 16mm TAXUS 9/04, 3.0 x 16mm TAXUS 12/06    Dyslipidemia     Erectile dysfunction     GERD     GI bleed     Hearing loss     2014  bilateral Dr Heather Parham    Hernia, umbilical     Hypertension     Hypogonadism male     Lumbar radiculopathy     Dr Kallie Noriega;  MRI 9/11 L4-5 disc bulging, annular tear, disc dessication  Myofascial pain dysfunction syndrome     pain clinic     Osteoarthritis     right knee Dr Kvng Madrid Peripheral vascular disease     50-60% R iliac; s/p R iliac stent and L femoral artery stent in past; R OLIVIA 0.76 (1/16)    Plantar fasciitis     bilateral     PPD positive     Prediabetes     Prostate cancer     T1c Imelda 7(3+4), 70% in 1 core, GS 7 (3+4) in 4 cores, GS 6 (3+3) in 1 core; psa 5.28, TRUS 18 gm;  Dr Damaso Lafleur; s/p cryoablation 10/16    Recurrent umbilical hernia     Subclinical hypothyroidism     denies    Tinnitus     Dr Chanelle Akins Ulcerative colitis     Venous insufficiency        Past Surgical History:   Procedure Laterality Date    HX CAROTID ENDARTERECTOMY      1/14  right    HX Lovetta Jury HX COLONOSCOPY      Dr Damaso Lafleur 9/15 polyps    HX CRYOABLATION OF THE PROSTATE      10/16    HX [address], 1975    HX ORTHOPAEDIC      right knee surgery    HX REFRACTIVE SURGERY      OD (hemoplasty to right eye on retina to avoid blindness)    HX TONSILLECTOMY      1955    TX LAP, RECURRENT INCISIONAL HERNIA REPAIR,REDUCIBLE N/A 02/09/2017    Dr. Yadiel Mays HERNIA,5+Y/O,REDUCIBL      2/16  left  Dr. Waqar Paredes AHNP,5+G/V,VRLUG      2/16  Dr. Shannan Winslow      1/16  R OLIVIA 0.76, L OLIVIA 1.02    VASCULAR SURGERY PROCEDURE UNLIST      10/15   left fem art and left iliac stent       Family History   Problem Relation Age of Onset    Heart Disease Mother     Hypertension Mother     Diabetes Mother     Arthritis-osteo Mother     Heart Disease Father     Stroke Father     Hypertension Father     Heart Disease Sister     Hypertension Sister        Social History     Social History    Marital status:      Spouse name: N/A    Number of children: N/A    Years of education: N/A     Occupational History    Not on file.      Social History Main Topics    Smoking status: Former Smoker     Packs/day: 1.50     Years: 25.00     Types: Cigarettes     Quit date: 1/1/2003    Smokeless tobacco: Never Used    Alcohol use 2.5 oz/week     5 Cans of beer per week      Comment: 5 beers a week    Drug use: No    Sexual activity: Yes     Partners: Female     Birth control/ protection: None     Other Topics Concern    Not on file     Social History Narrative       Current Outpatient Prescriptions   Medication Sig Dispense Refill    HYDROcodone-acetaminophen (NORCO) 5-325 mg per tablet Take 1 Tab by mouth every four (4) hours as needed (for chronic pain). 180 Tab 0    gabapentin (NEURONTIN) 300 mg capsule Take 1 Cap by mouth nightly. Indications: NEUROPATHIC PAIN 90 Cap 1    aspirin-calcium carbonate 81 mg-300 mg calcium(777 mg) tab 81 mg.      ACANYA 1.2-2.5 % glwp       prednisoLONE acetate (PRED FORTE) 1 % ophthalmic suspension       NITROGLYCERIN (NITROQUICK SL) by SubLINGual route.  gabapentin (NEURONTIN) 400 mg capsule 1 tab PO QHS  Indications: pain (Patient taking differently: Take 300 mg by mouth. 1 tab PO QHS  Indications: pain) 90 Cap 1    atenolol (TENORMIN) 25 mg tablet TAKE ONE TABLET BY MOUTH TWICE A  Tab 2    diclofenac (VOLTAREN) 1 % gel Apply 2-4 g to affected area four (4) times daily. To affected area (topically) 100 g 5    besifloxacin (BESIVANCE) 0.6 % drps ophthalmic suspension 1 Drop two (2) times a day.  Difluprednate (DUREZOL) 0.05 % ophthalmic emulsion Administer 1 Drop to both eyes four (4) times daily.  nepafenac (ILEVRO) 0.3 % drps Apply 1 Drop to eye.  LORazepam (ATIVAN) 1 mg tablet Take 1 Tab by mouth every eight (8) hours as needed. 50 Tab 0    sildenafil citrate (VIAGRA) 100 mg tablet Take 1 Tab by mouth daily as needed. Indications: Erectile Dysfunction 12 Tab 6    losartan (COZAAR) 25 mg tablet TAKE ONE TABLET BY MOUTH TWICE A  Tab 2    aspirin delayed-release 81 mg tablet Take 81 mg by mouth daily.       triamterene-hydrochlorothiazide (MAXZIDE) 37.5-25 mg per tablet TAKE ONE TABLET BY MOUTH DAILY 90 Tab 3    ezetimibe-simvastatin (VYTORIN 10/40) 10-40 mg per tablet Take 1 Tab by mouth nightly. 90 Tab 3    Cholecalciferol, Vitamin D3, 1,000 unit cap Take 1,000 Units by mouth daily.  VOLTAREN 1 % topical gel as needed.  OMEGA-3 FATTY ACIDS/FISH OIL (OMEGA 3 FISH OIL PO) Take 900 mg by mouth daily.  MULTIVITAMIN PO Take  by mouth daily.  pantoprazole (PROTONIX) 40 mg tablet Take 40 mg by mouth daily.  coenzyme q10 200 mg Cap Take  by mouth. Allergies   Allergen Reactions    Altace [Ramipril] Unknown (comments)    Lipitor [Atorvastatin] Other (comments)     Muscle pain    Lisinopril Shortness of Breath and Other (comments)     Turns bright red    Other Medication Other (comments)     Vicryl suture on skin tends to be rejected with poor wound healing does better with monocryl    Procardia [Nifedipine] Other (comments)     Caused afib         REVIEW OF SYSTEMS:      CON: negative for weight loss, fever  EYE: negative for double vision  ENT: negative for hoarseness  RS:   negative for Tb  GI:    negative for blood in stool  :  negative for blood in urine  Other systems reviewed and noted below. PHYSICAL EXAMINATION:  Visit Vitals    /74    Pulse 65    Ht 6' (1.829 m)    Wt 214 lb (97.1 kg)    BMI 29.02 kg/m2       GENERAL: Alert and oriented x3, in no acute distress, well-developed, well-nourished. HEART: Well perfused extremities. EYES: No scleral icterus   NECK: No significant lymphadenopathy   LUNGS: Equal chest rise with inspiration. ABDOMEN: Soft, non-tender, non-distended. MS: Right hand without any scars or lesions. Mild atrophy of the right thenar eminence noted compared to the left.  strength 4+/5. Sensation decreased in the median nerve distribution. +Carpal compression test.  +Pain with percussion of carpal tunnel. Neck with normal ROM. Radiology: No radiographic studies taken today. History of EMG done years ago not available for review today. Impression/Plan: Pranav Silverio is a 67year old right handed male who has right carpal tunnel syndrome that is symptomatic with thenar atrophy. He has undergone cortisone injections without complete resolution. At this point, he has failed conservative management and would like to proceed with a carpal tunnel release. We discussed the risks and benefits of the procedure as well as anesthesia and he wishes to proceed. We will obtain the clearance necessary to proceed with surgery and plan to see him in post operative follow up. All of his questions were answered.          Electronically signed by: Kezia Wing MD

## 2017-12-28 NOTE — IP AVS SNAPSHOT
[address] [facility] 
 
 
 [address] Patient: Jonny Rodriguez MRN: [id number] Greer Torres About your hospitalization You were admitted on:  December 28, 2017 You last received care in the:  [facility] PHASE 2 RECOVERY You were discharged on:  December 28, 2017 Why you were hospitalized Your primary diagnosis was:  Not on File Things You Need To Do (next 8 weeks) Follow up with Lia Saavedra MD  
  
Phone:  [phone] Where:  [address], [facility] 27, [facility] Follow up with Norma Olivier MD in 1 week(s) Phone:  [phone] Where:  [address] [facility] [address] [address] Wednesday Jan 03, 2018 LAB with [facility]OC NURSE VISIT at  8:35 AM  
Where:  [facility] ([address] [place] [address]) Friday Jan 05, 2018 POST OP with Norma Olivier MD at  1:00 PM  
Where:  [email or web address] [facility] [address] [place] [address]) Tuesday Jan 09, 2018 Office Visit with Lia Saavedra MD at  8:00 AM  
Where:  [facility] ([address] [place] [address]) Friday Feb 02, 2018 PROCEDURE with [facility] IMAGING 1 at  8:00 AM  
Where: [facility] ([address] [place] [address]) PROCEDURE with [facility] NONIMAGING at 10:00 AM  
Where: [facility] ([address] [place] [address] Road) Monday Feb 12, 2018 Follow Up with Bright Alvarez NP at  9:50 AM  
Where:  [address] and [facility] ([address] [place] [address] Road) Discharge Orders None A check gregorio indicates which time of day the medication should be taken. My Medications TAKE these medications as instructed Instructions Each Dose to Equal  
 Morning Noon Evening Bedtime ACANYA 1.2-2.5 % Glwp Generic drug:  clindamycin-benzoyl peroxide Your last dose was: Your next dose is:    
   
   
      
   
   
   
  
 aspirin delayed-release 81 mg tablet Your last dose was: Your next dose is: Take 81 mg by mouth daily. 81 mg  
    
   
   
   
  
 aspirin-calcium carbonate 81 mg-300 mg calcium(777 mg) Tab Your last dose was: Your next dose is:    
   
   
 81 mg.  
 81 mg  
    
   
   
   
  
 atenolol 25 mg tablet Commonly known as:  TENORMIN Your last dose was: Your next dose is: TAKE ONE TABLET BY MOUTH TWICE A DAY  
     
   
   
   
  
 azithromycin 250 mg tablet Commonly known as:  Bridger Cartagena Your last dose was: Your next dose is: Take 2 tablets today, then take 1 tablet daily BESIVANCE 0.6 % Drps ophthalmic suspension Generic drug:  besifloxacin Your last dose was: Your next dose is:    
   
   
 1 Drop two (2) times a day. 1 Drop  
    
   
   
   
  
 cholecalciferol 1,000 unit Cap Commonly known as:  VITAMIN D3 Your last dose was: Your next dose is: Take 1,000 Units by mouth daily. 1000 Units  
    
   
   
   
  
 coenzyme Q-10 200 mg capsule Commonly known as:  CO Q-10 Your last dose was: Your next dose is: Take  by mouth. DUREZOL 0.05 % ophthalmic emulsion Generic drug:  Difluprednate Your last dose was: Your next dose is:    
   
   
 Administer 1 Drop to both eyes four (4) times daily. 1 Drop  
    
   
   
   
  
 ezetimibe-simvastatin 10-40 mg per tablet Commonly known as:  Vytorin 10/40 Your last dose was: Your next dose is: Take 1 Tab by mouth nightly. 1 Tab * gabapentin 400 mg capsule Commonly known as:  NEURONTIN Your last dose was: Your next dose is:    
   
   
 1 tab PO QHS  Indications: pain * gabapentin 300 mg capsule Commonly known as:  NEURONTIN Your last dose was: Your next dose is: Take 1 Cap by mouth nightly. Indications: NEUROPATHIC PAIN  
 300 mg  
    
   
   
   
  
 guaiFENesin-codeine 100-10 mg/5 mL solution Commonly known as:  ROBITUSSIN AC Your last dose was: Your next dose is: Take 5 mL by mouth three (3) times daily as needed for Cough. Max Daily Amount: 15 mL. 5 mL HYDROcodone-acetaminophen 5-325 mg per tablet Commonly known as:  Malissa Kalyan Your last dose was: Your next dose is: Take 1 Tab by mouth every four (4) hours as needed (for chronic pain). 1 Tab ILEVRO 0.3 % Drps Generic drug:  nepafenac Your last dose was: Your next dose is:    
   
   
 Apply 1 Drop to eye. 1 Drop LORazepam 1 mg tablet Commonly known as:  ATIVAN Your last dose was: Your next dose is: Take 1 Tab by mouth every eight (8) hours as needed. 1 mg  
    
   
   
   
  
 losartan 25 mg tablet Commonly known as:  COZAAR Your last dose was: Your next dose is: TAKE ONE TABLET BY MOUTH TWICE A DAY MULTIVITAMIN PO Your last dose was: Your next dose is: Take  by mouth daily. NITROQUICK SL Your last dose was: Your next dose is:    
   
   
 by SubLINGual route. OMEGA 3 FISH OIL PO Your last dose was: Your next dose is: Take 900 mg by mouth daily. 900 mg  
    
   
   
   
  
 prednisoLONE acetate 1 % ophthalmic suspension Commonly known as:  PRED FORTE Your last dose was: Your next dose is:    
   
   
      
   
   
   
  
 PROTONIX 40 mg tablet Generic drug:  pantoprazole Your last dose was: Your next dose is: Take 40 mg by mouth daily. 40 mg  
    
   
   
   
  
 sildenafil citrate 100 mg tablet Commonly known as:  VIAGRA Your last dose was: Your next dose is: Take 1 Tab by mouth daily as needed. Indications: Erectile Dysfunction 100 mg  
    
   
   
   
  
 triamterene-hydroCHLOROthiazide 37.5-25 mg per tablet Commonly known as:  Levie Minors Your last dose was: Your next dose is: TAKE ONE TABLET BY MOUTH DAILY * VOLTAREN 1 % Gel Generic drug:  diclofenac Your last dose was: Your next dose is:    
   
   
 as needed. * diclofenac 1 % Gel Commonly known as:  VOLTAREN Your last dose was: Your next dose is:    
   
   
 Apply 2-4 g to affected area four (4) times daily. To affected area (topically) 2-4 g * Notice: This list has 4 medication(s) that are the same as other medications prescribed for you. Read the directions carefully, and ask your doctor or other care provider to review them with you. Discharge Instructions DISCHARGE SUMMARY from Nurse PATIENT INSTRUCTIONS: 
 
After general anesthesia or intravenous sedation, for 24 hours or while taking prescription Narcotics: · Limit your activities · Do not drive and operate hazardous machinery · Do not make important personal or business decisions · Do  not drink alcoholic beverages · If you have not urinated within 8 hours after discharge, please contact your surgeon on call. Report the following to your surgeon: 
· Excessive pain, swelling, redness or odor of or around the surgical area · Temperature over 100.5 · Nausea and vomiting lasting longer than 4 hours or if unable to take medications · Any signs of decreased circulation or nerve impairment to extremity: change in color, persistent  numbness, tingling, coldness or increase pain · Any questions What to do at Home: 
Recommended activity: Activity as tolerated and no driving for today These are general instructions for a healthy lifestyle: No smoking/ No tobacco products/ Avoid exposure to second hand smoke Surgeon General's Warning:  Quitting smoking now greatly reduces serious risk to your health. Obesity, smoking, and sedentary lifestyle greatly increases your risk for illness A healthy diet, regular physical exercise & weight monitoring are important for maintaining a healthy lifestyle You may be retaining fluid if you have a history of heart failure or if you experience any of the following symptoms:  Weight gain of 3 pounds or more overnight or 5 pounds in a week, increased swelling in our hands or feet or shortness of breath while lying flat in bed. Please call your doctor as soon as you notice any of these symptoms; do not wait until your next office visit. Recognize signs and symptoms of STROKE: 
 
F-face looks uneven A-arms unable to move or move unevenly S-speech slurred or non-existent T-time-call 911 as soon as signs and symptoms begin-DO NOT go Back to bed or wait to see if you get better-TIME IS BRAIN. Warning Signs of HEART ATTACK Call 911 if you have these symptoms: 
? Chest discomfort. Most heart attacks involve discomfort in the center of the chest that lasts more than a few minutes, or that goes away and comes back. It can feel like uncomfortable pressure, squeezing, fullness, or pain. ? Discomfort in other areas of the upper body. Symptoms can include pain or discomfort in one or both arms, the back, neck, jaw, or stomach. ? Shortness of breath with or without chest discomfort. ? Other signs may include breaking out in a cold sweat, nausea, or lightheadedness. Don't wait more than five minutes to call [address]! Fast action can save your life.  Calling 911 is almost always the fastest way to get lifesaving treatment. Emergency Medical Services staff can begin treatment when they arrive  up to an hour sooner than if someone gets to the hospital by car. The discharge information has been reviewed with the patient and spouse. The patient and spouse verbalized understanding. Discharge medications reviewed with the patient and spouse and appropriate educational materials and side effects teaching were provided. ___________________________________________________________________________________________________________________________________ ACO Transitions of Care Introducing [address] offers a voluntary care coordination program to provide high quality service and care to [facility] fee-for-service beneficiaries. L[employer] was designed to help you enhance your health and well-being through the following services: ? Transitions of Care  support for individuals who are transitioning from one care setting to another (example: Hospital to home). ? Chronic and Complex Care Coordination  support for individuals and caregivers of those with serious or chronic illnesses or with more than one chronic (ongoing) condition and those who take a number of different medications. If you meet specific medical criteria, a [address] [place] [address] Rd may call you directly to coordinate your care with your primary care physician and your other care providers. For questions about the [facility] programs, please, contact your physicians office. For general questions or additional information about Accountable Care Organizations: 
Please visit www.medicare.gov/acos. html or call 1-800-MEDICARE (8-920.856.3610) TTY users should call 8-126.457.6842. Introducing [facility] [place] [facility]! Dear Renee Wood: Thank you for requesting a MyChart account.   Our records indicate that you already have an active [employer] account. You can access your account anytime at [email or web address] Did you know that you can access your hospital and ER discharge instructions at any time in [employer]own[employer]? You can also review all of your test results from your hospital stay or ER visit. Additional Information If you have questions, please visit the Frequently Asked Questions section of the [employer] website at [email or web address] Remember, [employer] is NOT to be used for urgent needs. For medical emergencies, dial 911. Now available from your iPhone and Android! Providers Seen During Your Hospitalization Provider Specialty Primary office phone Yuri Carmichael MD Orthopedic Surgery [phone] Your Primary Care Physician (PCP) Primary Care Physician Office Phone Office Fax Osmin Youngw [phone] You are allergic to the following Allergen Reactions Altace (Ramipril) Unknown (comments) Lipitor (Atorvastatin) Other (comments) Muscle pain Lisinopril Shortness of Breath Other (comments) Turns bright red Other Medication Other (comments) Vicryl suture on skin tends to be rejected with poor wound healing does better with monocryl Procardia (Nifedipine) Other (comments) Caused afib Recent Documentation Height Weight BMI Smoking Status 1.829 m 95.3 kg 28.51 kg/m2 Former Smoker Emergency Contacts Name Discharge Info Relation Home Work Mobile Collette,Teresa DISCHARGE CAREGIVER [3] Spouse [3] [phone] Patient Belongings  The following personal items are in your possession at time of discharge: 
  Dental Appliances: None  Visual Aid: Glasses (readers will go with wife sagrario )   Hearing Aids/Status: Bilateral  Home Medications: None   Jewelry: None  Clothing: Pants, Shirt, Jacket/Coat, Undergarments, Socks, Footwear    Other Valuables:  (all items sent with wife sagrario ) Please provide this summary of care documentation to your next provider. Signatures-by signing, you are acknowledging that this After Visit Summary has been reviewed with you and you have received a copy. Patient Signature:  ____________________________________________________________ Date:  ____________________________________________________________  
  
[facility] Provider Signature:  ____________________________________________________________ Date:  ____________________________________________________________

## 2017-12-28 NOTE — ANESTHESIA PREPROCEDURE EVALUATION
Anesthetic History               Review of Systems / Medical History  Patient summary reviewed and pertinent labs reviewed    Pulmonary                   Neuro/Psych              Cardiovascular    Hypertension: well controlled        Dysrhythmias : atrial fibrillation  CAD         GI/Hepatic/Renal     GERD: well controlled           Endo/Other      Hypothyroidism: well controlled  Arthritis     Other Findings   Comments:   Risk Factors for Postoperative nausea/vomiting:       History of postoperative nausea/vomiting? NO       Female? NO       Motion sickness? NO       Intended opioid administration for postoperative analgesia? YES      Smoking Abstinence  Current Smoker? NO  Elective Surgery? YES  Seen preoperatively by anesthesiologist or proxy prior to day of surgery? YES  Pt abstained from smoking 24 hours prior to anesthesia?  N/A         Physical Exam    Airway  Mallampati: II  TM Distance: > 6 cm  Neck ROM: normal range of motion   Mouth opening: Normal     Cardiovascular    Rhythm: regular  Rate: normal         Dental  No notable dental hx       Pulmonary  Breath sounds clear to auscultation               Abdominal  GI exam deferred       Other Findings            Anesthetic Plan    ASA: 3  Anesthesia type: MAC          Induction: Intravenous  Anesthetic plan and risks discussed with: Patient

## 2017-12-28 NOTE — DISCHARGE INSTRUCTIONS
DISCHARGE SUMMARY from Nurse    PATIENT INSTRUCTIONS:    After general anesthesia or intravenous sedation, for 24 hours or while taking prescription Narcotics:  · Limit your activities  · Do not drive and operate hazardous machinery  · Do not make important personal or business decisions  · Do  not drink alcoholic beverages  · If you have not urinated within 8 hours after discharge, please contact your surgeon on call. Report the following to your surgeon:  · Excessive pain, swelling, redness or odor of or around the surgical area  · Temperature over 100.5  · Nausea and vomiting lasting longer than 4 hours or if unable to take medications  · Any signs of decreased circulation or nerve impairment to extremity: change in color, persistent  numbness, tingling, coldness or increase pain  · Any questions    What to do at Home:  Recommended activity: Activity as tolerated and no driving for today      These are general instructions for a healthy lifestyle:    No smoking/ No tobacco products/ Avoid exposure to second hand smoke  Surgeon General's Warning:  Quitting smoking now greatly reduces serious risk to your health. Obesity, smoking, and sedentary lifestyle greatly increases your risk for illness    A healthy diet, regular physical exercise & weight monitoring are important for maintaining a healthy lifestyle    You may be retaining fluid if you have a history of heart failure or if you experience any of the following symptoms:  Weight gain of 3 pounds or more overnight or 5 pounds in a week, increased swelling in our hands or feet or shortness of breath while lying flat in bed. Please call your doctor as soon as you notice any of these symptoms; do not wait until your next office visit.     Recognize signs and symptoms of STROKE:    F-face looks uneven    A-arms unable to move or move unevenly    S-speech slurred or non-existent    T-time-call 911 as soon as signs and symptoms begin-DO NOT go       Back to bed or wait to see if you get better-TIME IS BRAIN. Warning Signs of HEART ATTACK     Call 911 if you have these symptoms:   Chest discomfort. Most heart attacks involve discomfort in the center of the chest that lasts more than a few minutes, or that goes away and comes back. It can feel like uncomfortable pressure, squeezing, fullness, or pain.  Discomfort in other areas of the upper body. Symptoms can include pain or discomfort in one or both arms, the back, neck, jaw, or stomach.  Shortness of breath with or without chest discomfort.  Other signs may include breaking out in a cold sweat, nausea, or lightheadedness. Don't wait more than five minutes to call 911 - MINUTES MATTER! Fast action can save your life. Calling 911 is almost always the fastest way to get lifesaving treatment. Emergency Medical Services staff can begin treatment when they arrive -- up to an hour sooner than if someone gets to the hospital by car. The discharge information has been reviewed with the patient and spouse. The patient and spouse verbalized understanding. Discharge medications reviewed with the patient and spouse and appropriate educational materials and side effects teaching were provided.   ___________________________________________________________________________________________________________________________________

## 2017-12-28 NOTE — BRIEF OP NOTE
BRIEF OPERATIVE NOTE    Date of Procedure: 12/28/2017   Preoperative Diagnosis: G56.11  Postoperative Diagnosis: G56.11    Procedure(s):  RIGHT HAND CARPAL TUNNEL RELEASE  Surgeon(s) and Role:     * Kae Davenport MD - Primary         Assistant Staff:       Surgical Staff:  Circ-1: Mara Geiger RN  Scrub Tech-1: Susan Swanson  Surg Asst-1: Jettkelley Velarde  Event Time In   Incision Start 1030   Incision Close 0811     Anesthesia: [place]   Estimated Blood Loss: minimal  Specimens: * No specimens in log *   Findings: See Op Note   Complications: None apparent  Implants: * No implants in log *   Stable, to PACU  Elevate hand/wrist.  D/C to home when meets PACU criteria

## 2017-12-29 ENCOUNTER — PATIENT OUTREACH (OUTPATIENT)
Dept: INTERNAL MEDICINE CLINIC | Age: 72
End: 2017-12-29

## 2017-12-29 ENCOUNTER — TELEPHONE (OUTPATIENT)
Dept: INTERNAL MEDICINE CLINIC | Age: 72
End: 2017-12-29

## 2017-12-29 NOTE — TELEPHONE ENCOUNTER
Pt ret call to [place], he wants to let her know tht his surgery was good and he is doing ok, RD

## 2017-12-29 NOTE — PROGRESS NOTES
Melvia Fabry is a 67 y.o. male   This patient was received as a referral from inpatient admission     Nurse Navigator attempted to contact patient post discharge from [facility]; 12/28/2017 to 12/28/2017 for scheduled right carpal tunnel release. Left message for the patient to contact the office on the answering machine. Patient presenting symptoms:   Right carpal tunnel syndrome     Plan of care:  1. Take medications as prescribed  2. Attend all follow up appointments  3. Pain management  4. Wound care    Appointments:  1/3/2018 at [facility] lab  1/5/2018 at 1300 with Dr. Danny Hearn  1/9/2018 at 0800 with Dr. Kenya Casarez:   Advance directive on file     Goals      Attends follow-up appointments as directed. Attend scheduled follow up with Dr. Ansley Vaca for Patient. Maintain an acceptable pain level       Develop action plan for Self-Management Chronic Disease. Wound care        Understands red flags post discharge.             Monitor for signs and symptoms of infection

## 2018-01-02 ENCOUNTER — OFFICE VISIT (OUTPATIENT)
Dept: ORTHOPEDIC SURGERY | Facility: CLINIC | Age: 73
End: 2018-01-02

## 2018-01-02 ENCOUNTER — PATIENT OUTREACH (OUTPATIENT)
Dept: INTERNAL MEDICINE CLINIC | Age: 73
End: 2018-01-02

## 2018-01-02 VITALS
DIASTOLIC BLOOD PRESSURE: 78 MMHG | WEIGHT: 205 LBS | BODY MASS INDEX: 27.77 KG/M2 | HEART RATE: 64 BPM | HEIGHT: 72 IN | SYSTOLIC BLOOD PRESSURE: 153 MMHG | OXYGEN SATURATION: 98 % | TEMPERATURE: 98.5 F

## 2018-01-02 DIAGNOSIS — G56.01 CARPAL TUNNEL SYNDROME ON RIGHT: Primary | ICD-10-CM

## 2018-01-02 NOTE — PROGRESS NOTES
Nurse Navigator second attempt to contact patient post discharge from [facility]; 12/28/2017 to 12/28/2017 for scheduled right carpal tunnel release. Left message for the patient to contact the office on the answering machine, letter sent.      Patient presenting symptoms:   Right carpal tunnel syndrome      Plan of care:  1. Take medications as prescribed  2. Attend all follow up appointments  3. Pain management  4.  Wound care     Appointments:  1/3/2018 at [facility] lab  1/5/2018 at 1300 with Dr. Woo Workman  1/9/2018 at 0800 with Dr. Shanique Green

## 2018-01-02 NOTE — LETTER
1/2/2018 4:12 PM 
 
Mr. Jeannie Carrion [address] [facility] [address] I am a Nurse Navigator working with your physician's office. Part of my job is to follow up with patients who have been in the emergency department or hospital to see how they are feeling, answer any questions they may have about their visit and also make sure they have a follow-up appointment to see their primary care doctor. I can also provide resources to patients that have other needs. Please call me if you need assistance with affording food, medications, or if you need transportation to physician appointments. I can be reached Monday thru Friday at the telephone number listed above. Thank you for allowing us to participate in your care!  
 
 
 
 
 
Sincerely, 
 
 
Gato Tamayo RN

## 2018-01-02 NOTE — PROGRESS NOTES
Patient: Donavon Kelly                MRN: [id number]       SSN: [id number]  YOB: 1945        AGE: 67 y.o. SEX: male  Body mass index is 27.8 kg/(m^2). PCP: Deb Nam MD  01/02/18    HPI: Carlie Jean-Baptiste returns today 5 days post op for a wound check on his recent right carpal tunnel surgery. He says his carpal tunnel symptoms have improved since the surgery. He noted some skin blanching around the incision over the weekend when he took the dressing off and called the on call line and was advised to come in for a wound check today. No drainage or fevers/chills.     Past Medical History:   Diagnosis Date    Adenoma of left adrenal gland     2009  1 cm, no change 1/15, 2/16    Asbestosis     Atrial fibrillation     CHA2DS2-VAsc=3(age+, htn+, vasc dx+; estimated yearly stroke risk according to Lip et al. is 3,2%), Hasbled=2(estimated yearly bleeding risk according to pisters et al. is 1,88%); not anticoagulated due to h/o gi bleed    Atrial fibrillation ablation     10/08    Basal cell cancer     Dr Bita Huynh; he's had >350 lesions removed    Carotid occlusion     left     Cervical radiculopathy     MRI 9/11 showed C3-6 severe foraminal stenosis    Chronic pain     Colon polyp     Dr Elie Wong 9/15    Coronary artery disease     RCA - 3.0 x 16mm TAXUS 9/04, 3.0 x 16mm TAXUS 12/06    Dyslipidemia     Erectile dysfunction     GERD     GI bleed     Hearing loss     2014  bilateral Dr Shelton David    Hernia, umbilical     Hypertension     Hypogonadism male     Lumbar radiculopathy     Dr So Carlin;  MRI 9/11 L4-5 disc bulging, annular tear, disc dessication    Myofascial pain dysfunction syndrome     pain clinic     Osteoarthritis     right knee Dr Mitchell Batista Peripheral vascular disease     50-60% R iliac; s/p R iliac stent and L femoral artery stent in past; R OLIVIA 0.76 (1/16)    Plantar fasciitis     bilateral     PPD positive     Prediabetes     Prostate cancer     T1c Imelda 7(3+4), 70% in 1 core, GS 7 (3+4) in 4 cores, GS 6 (3+3) in 1 core; psa 5.28, TRUS 18 gm;  Dr Alondra Carvajal; s/p cryoablation 10/16    Recurrent umbilical hernia     Subclinical hypothyroidism     denies    Tinnitus     Dr Nate Booker Ulcerative colitis     Venous insufficiency        Past Surgical History:   Procedure Laterality Date    HX CAROTID ENDARTERECTOMY      1/14  right    HX Moises Adi HX COLONOSCOPY      Dr Alondra Carvajal 9/15 polyps    HX CRYOABLATION OF THE PROSTATE      10/16    HX HEMORRHOIDECTOMY      1979    [facility] Dub [facility], 1975    HX ORTHOPAEDIC      right knee surgery    HX REFRACTIVE SURGERY      OD (hemoplasty to right eye on retina to avoid blindness)    HX TONSILLECTOMY      1955    ID LAP, RECURRENT INCISIONAL HERNIA REPAIR,REDUCIBLE N/A 02/09/2017    Dr. Stanley Smith HERNIA,5+Y/O,REDUCIBL      2/16  left  Dr. Blaine Stone XWUC,5+S/D,LENSR      2/16  Dr. Barkley Labella      1/16  R OLIVIA 0.76, L OLIVIA 1.02    VASCULAR SURGERY PROCEDURE UNLIST      10/15   left fem art and left iliac stent       Family History   Problem Relation Age of Onset    Heart Disease Mother     Hypertension Mother     Diabetes Mother     Arthritis-osteo Mother     Heart Disease Father     Stroke Father     Hypertension Father     Heart Disease Sister     Hypertension Sister        Social History     Social History    Marital status:      Spouse name: N/A    Number of children: N/A    Years of education: N/A     Occupational History    Not on file.      Social History Main Topics    Smoking status: Former Smoker     Packs/day: 1.50     Years: 25.00     Types: Cigarettes     Quit date: 1/1/2003    Smokeless tobacco: Never Used    Alcohol use 2.5 oz/week     5 Cans of beer per week      Comment: 5 beers a week    Drug use: No    Sexual activity: Yes     Partners: Female     Birth control/ protection: None     Other Topics Concern    Not on file     Social History Narrative       Current Outpatient Prescriptions   Medication Sig Dispense Refill    guaiFENesin-codeine (ROBITUSSIN AC) 100-10 mg/5 mL solution Take 5 mL by mouth three (3) times daily as needed for Cough. Max Daily Amount: 15 mL. 120 mL 0    HYDROcodone-acetaminophen (NORCO) 5-325 mg per tablet Take 1 Tab by mouth every four (4) hours as needed (for chronic pain). 180 Tab 0    ACANYA 1.2-2.5 % glwp       prednisoLONE acetate (PRED FORTE) 1 % ophthalmic suspension       NITROGLYCERIN (NITROQUICK SL) by SubLINGual route.  gabapentin (NEURONTIN) 400 mg capsule 1 tab PO QHS  Indications: pain (Patient taking differently: Take 300 mg by mouth. 1 tab PO QHS  Indications: pain) 90 Cap 1    atenolol (TENORMIN) 25 mg tablet TAKE ONE TABLET BY MOUTH TWICE A  Tab 2    diclofenac (VOLTAREN) 1 % gel Apply 2-4 g to affected area four (4) times daily. To affected area (topically) 100 g 5    besifloxacin (BESIVANCE) 0.6 % drps ophthalmic suspension 1 Drop two (2) times a day.  Difluprednate (DUREZOL) 0.05 % ophthalmic emulsion Administer 1 Drop to both eyes four (4) times daily.  nepafenac (ILEVRO) 0.3 % drps Apply 1 Drop to eye.  LORazepam (ATIVAN) 1 mg tablet Take 1 Tab by mouth every eight (8) hours as needed. 50 Tab 0    sildenafil citrate (VIAGRA) 100 mg tablet Take 1 Tab by mouth daily as needed. Indications: Erectile Dysfunction 12 Tab 6    losartan (COZAAR) 25 mg tablet TAKE ONE TABLET BY MOUTH TWICE A  Tab 2    aspirin delayed-release 81 mg tablet Take 81 mg by mouth daily.  triamterene-hydrochlorothiazide (MAXZIDE) 37.5-25 mg per tablet TAKE ONE TABLET BY MOUTH DAILY 90 Tab 3    ezetimibe-simvastatin (VYTORIN 10/40) 10-40 mg per tablet Take 1 Tab by mouth nightly. 90 Tab 3    Cholecalciferol, Vitamin D3, 1,000 unit cap Take 1,000 Units by mouth daily.  VOLTAREN 1 % topical gel as needed.       OMEGA-3 FATTY ACIDS/FISH OIL (OMEGA 3 FISH OIL PO) Take 900 mg by mouth daily.  MULTIVITAMIN PO Take  by mouth daily.  pantoprazole (PROTONIX) 40 mg tablet Take 40 mg by mouth daily.  coenzyme q10 200 mg Cap Take  by mouth.  azithromycin (ZITHROMAX) 250 mg tablet Take 2 tablets today, then take 1 tablet daily 6 Tab 0    gabapentin (NEURONTIN) 300 mg capsule Take 1 Cap by mouth nightly. Indications: NEUROPATHIC PAIN 90 Cap 1    aspirin-calcium carbonate 81 mg-300 mg calcium(777 mg) tab 81 mg. Allergies   Allergen Reactions    Altace [Ramipril] Unknown (comments)    Lipitor [Atorvastatin] Other (comments)     Muscle pain    Lisinopril Shortness of Breath and Other (comments)     Turns bright red    Other Medication Other (comments)     Vicryl suture on skin tends to be rejected with poor wound healing does better with monocryl    Procardia [Nifedipine] Other (comments)     Caused afib       PHYSICAL EXAMINATION:  Visit Vitals    /78    Pulse 64    Temp 98.5 °F (36.9 °C)    Ht 6' (1.829 m)    Wt 205 lb (93 kg)    SpO2 98%    BMI 27.8 kg/m2         MS: Right hand incision well approximated. No erythema, no warmth or drainage. Sutures in place. Triggering of index finger noted on exam.  Sensation intact over median nerve distribution. Cap refill brisk in all fingers and thumb. Impression/Plan:   Post op s/p right carpal tunnel release. Wound looks good today. Keep stitches in for now. Dry dressing changes as needed.   Follow up later this week for stitch removal.        Electronically signed by: Hamida Jiang MD

## 2018-01-03 ENCOUNTER — HOSPITAL ENCOUNTER (OUTPATIENT)
Dept: LAB | Age: 73
Discharge: HOME | End: 2018-01-03
Payer: MEDICARE

## 2018-01-03 ENCOUNTER — LAB ONLY (OUTPATIENT)
Dept: INTERNAL MEDICINE CLINIC | Age: 73
End: 2018-01-03

## 2018-01-03 DIAGNOSIS — E78.5 DYSLIPIDEMIA: ICD-10-CM

## 2018-01-03 DIAGNOSIS — R73.01 IFG (IMPAIRED FASTING GLUCOSE): ICD-10-CM

## 2018-01-03 DIAGNOSIS — E78.5 DYSLIPIDEMIA: Primary | ICD-10-CM

## 2018-01-03 LAB
ALBUMIN SERPL-MCNC: 3.8 G/DL (ref 3.4–5)
ALBUMIN/GLOB SERPL: 1.3 {RATIO} (ref 0.8–1.7)
ALP SERPL-CCNC: 54 U/L (ref 45–117)
ALT SERPL-CCNC: 31 U/L (ref 16–61)
ANION GAP SERPL CALC-SCNC: 7 MMOL/L (ref 3–18)
AST SERPL-CCNC: 21 U/L (ref 15–37)
BILIRUB SERPL-MCNC: 0.7 MG/DL (ref 0.2–1)
BUN SERPL-MCNC: 13 MG/DL (ref 7–18)
BUN/CREAT SERPL: 20 (ref 12–20)
CALCIUM SERPL-MCNC: 9.3 MG/DL (ref 8.5–10.1)
CHLORIDE SERPL-SCNC: 100 MMOL/L (ref 100–108)
CHOLEST SERPL-MCNC: 147 MG/DL
CO2 SERPL-SCNC: 33 MMOL/L (ref 21–32)
CREAT SERPL-MCNC: 0.65 MG/DL (ref 0.6–1.3)
EST. AVERAGE GLUCOSE BLD GHB EST-MCNC: 120 MG/DL
GLOBULIN SER CALC-MCNC: 3 G/DL (ref 2–4)
GLUCOSE SERPL-MCNC: 107 MG/DL (ref 74–99)
HBA1C MFR BLD: 5.8 % (ref 4.2–5.6)
HDLC SERPL-MCNC: 46 MG/DL (ref 40–60)
HDLC SERPL: 3.2 {RATIO} (ref 0–5)
LDLC SERPL CALC-MCNC: 80 MG/DL (ref 0–100)
LIPID PROFILE,FLP: NORMAL
POTASSIUM SERPL-SCNC: 4 MMOL/L (ref 3.5–5.5)
PROT SERPL-MCNC: 6.8 G/DL (ref 6.4–8.2)
SODIUM SERPL-SCNC: 140 MMOL/L (ref 136–145)
TRIGL SERPL-MCNC: 105 MG/DL (ref ?–150)
VLDLC SERPL CALC-MCNC: 21 MG/DL

## 2018-01-03 PROCEDURE — 80061 LIPID PANEL: CPT | Performed by: INTERNAL MEDICINE

## 2018-01-03 PROCEDURE — 36415 COLL VENOUS BLD VENIPUNCTURE: CPT | Performed by: INTERNAL MEDICINE

## 2018-01-03 PROCEDURE — 80053 COMPREHEN METABOLIC PANEL: CPT | Performed by: INTERNAL MEDICINE

## 2018-01-03 PROCEDURE — 83036 HEMOGLOBIN GLYCOSYLATED A1C: CPT | Performed by: INTERNAL MEDICINE

## 2018-01-04 NOTE — PROGRESS NOTES
67 y.o. WHITE OR  male who presents for evaluation. He reports no cardiovascular complaints and f/u w Dr Alivia Morrison regularly. Pressures running in the 120/60 range. He has been very active lately as they moved out to [place], weight is down some.     The vascular issues are stable and he sees Dr Jose Antonio Taveras. He can walk about 25 min although he does have residual claudication in the right leg mainly but he brush through it     Denies any recent gi sx. Remains on ppi therapy and no alarm complaints. The UC has not been flaring     Denies polyuria, polydipsia, nocturia, vision change. Not checking sugars at this time.      Continues to see Dr Pilar Babcock for the prostate ca    He successfully underwent right CTS release by Dr Naima Murdock    Past Medical History:   Diagnosis Date    Adenoma of left adrenal gland     2009  1 cm, no change 1/15, 2/16    Asbestosis     Atrial fibrillation     CHA2DS2-VAsc=3(age+, htn+, vasc dx+; estimated yearly stroke risk according to Lip et al. is 3,2%), Hasbled=2(estimated yearly bleeding risk according to pisters et al. is 1,88%); not anticoagulated due to h/o gi bleed    Atrial fibrillation ablation     10/08    Basal cell cancer     Dr Ritika Barnett; he's had >350 lesions removed    Carotid occlusion     left     Cervical radiculopathy     MRI 9/11 showed C3-6 severe foraminal stenosis    Chronic pain     Colon polyp     Dr Pilar Babcock 9/15    Coronary artery disease     RCA - 3.0 x 16mm TAXUS 9/04, 3.0 x 16mm TAXUS 12/06    Dyslipidemia     Erectile dysfunction     GERD     GI bleed     Hearing loss     2014  bilateral Dr Shelby Hopper    Hernia, umbilical     Hypertension     Hypogonadism male     Lumbar radiculopathy     Dr Nayeli Roque;  MRI 9/11 L4-5 disc bulging, annular tear, disc dessication    Myofascial pain dysfunction syndrome     pain clinic     Osteoarthritis     right knee Dr Marcela Kennedy Peripheral vascular disease     50-60% R iliac; s/p R iliac stent and L femoral artery stent in past; R OLIVIA 0.76 (1/16)    Plantar fasciitis     bilateral     PPD positive     Prediabetes     Prostate cancer     T1c Imelda 7(3+4), 70% in 1 core, GS 7 (3+4) in 4 cores, GS 6 (3+3) in 1 core; psa 5.28, TRUS 18 gm;  Dr Andrea Patino; s/p cryoablation 10/16    Recurrent umbilical hernia     Subclinical hypothyroidism     denies    Tinnitus     Dr Rome Cord Ulcerative colitis     Venous insufficiency      Past Surgical History:   Procedure Laterality Date    HX CAROTID ENDARTERECTOMY      1/14  right    HX Aleisha Borne HX COLONOSCOPY      Dr Andrea Patino 9/15 polyps    HX CRYOABLATION OF THE PROSTATE      10/16    HX [facility], 1975    HX ORTHOPAEDIC      right knee surgery    HX ORTHOPAEDIC  12/2017    Dr Delores Yadav; R CTS release    HX REFRACTIVE SURGERY      OD (hemoplasty to right eye on retina to avoid blindness)    HX TONSILLECTOMY      1955    TX LAP, RECURRENT INCISIONAL HERNIA REPAIR,REDUCIBLE N/A 02/09/2017    Dr. Ram Los HERNIA,5+Y/O,REDUCIBL      2/16  left  Dr. Chloe Koenig PNMJ,1+F/V,VIPNM      2/16  Dr. Jacoby Nelson      1/16  R OLIVIA 0.76, L OLIVIA 1.02    VASCULAR SURGERY PROCEDURE UNLIST      10/15   left fem art and left iliac stent     Social History     Social History    Marital status:      Spouse name: N/A    Number of children: N/A    Years of education: N/A     Occupational History    Not on file.      Social History Main Topics    Smoking status: Former Smoker     Packs/day: 1.50     Years: 25.00     Types: Cigarettes     Quit date: 1/1/2003    Smokeless tobacco: Never Used    Alcohol use 2.5 oz/week     5 Cans of beer per week      Comment: 5 beers a week    Drug use: No    Sexual activity: Yes     Partners: Female     Birth control/ protection: None     Other Topics Concern    Not on file     Social History Narrative     Current Outpatient Prescriptions   Medication Sig    rosuvastatin (CRESTOR) 40 mg tablet Take 1 Tab by mouth nightly.  ezetimibe (ZETIA) 10 mg tablet Take 1 Tab by mouth daily.  HYDROcodone-acetaminophen (NORCO) 5-325 mg per tablet Take 1 Tab by mouth every four (4) hours as needed (for chronic pain).  gabapentin (NEURONTIN) 300 mg capsule Take 1 Cap by mouth nightly. Indications: NEUROPATHIC PAIN    atenolol (TENORMIN) 25 mg tablet TAKE ONE TABLET BY MOUTH TWICE A DAY    diclofenac (VOLTAREN) 1 % gel Apply 2-4 g to affected area four (4) times daily. To affected area (topically)    LORazepam (ATIVAN) 1 mg tablet Take 1 Tab by mouth every eight (8) hours as needed.  losartan (COZAAR) 25 mg tablet TAKE ONE TABLET BY MOUTH TWICE A DAY    aspirin delayed-release 81 mg tablet Take 81 mg by mouth daily.  triamterene-hydrochlorothiazide (MAXZIDE) 37.5-25 mg per tablet TAKE ONE TABLET BY MOUTH DAILY    ezetimibe-simvastatin (VYTORIN 10/40) 10-40 mg per tablet Take 1 Tab by mouth nightly.  Cholecalciferol, Vitamin D3, 1,000 unit cap Take 1,000 Units by mouth daily.  OMEGA-3 FATTY ACIDS/FISH OIL (OMEGA 3 FISH OIL PO) Take 900 mg by mouth daily.  MULTIVITAMIN PO Take  by mouth daily.  pantoprazole (PROTONIX) 40 mg tablet Take 40 mg by mouth daily.  coenzyme q10 200 mg Cap Take  by mouth.  guaiFENesin-codeine (ROBITUSSIN AC) 100-10 mg/5 mL solution Take 5 mL by mouth three (3) times daily as needed for Cough. Max Daily Amount: 15 mL.  aspirin-calcium carbonate 81 mg-300 mg calcium(777 mg) tab 81 mg.    ACANYA 1.2-2.5 % glwp     prednisoLONE acetate (PRED FORTE) 1 % ophthalmic suspension     NITROGLYCERIN (NITROQUICK SL) by SubLINGual route.  gabapentin (NEURONTIN) 400 mg capsule 1 tab PO QHS  Indications: pain (Patient taking differently: Take 300 mg by mouth. 1 tab PO QHS  Indications: pain)    besifloxacin (BESIVANCE) 0.6 % drps ophthalmic suspension 1 Drop two (2) times a day.     Difluprednate (DUREZOL) 0.05 % ophthalmic emulsion Administer 1 Drop to both eyes four (4) times daily.  nepafenac (ILEVRO) 0.3 % drps Apply 1 Drop to eye.  sildenafil citrate (VIAGRA) 100 mg tablet Take 1 Tab by mouth daily as needed. Indications: Erectile Dysfunction     No current facility-administered medications for this visit.       Allergies   Allergen Reactions    Altace [Ramipril] Unknown (comments)    Lipitor [Atorvastatin] Other (comments)     Muscle pain    Lisinopril Shortness of Breath and Other (comments)     Turns bright red    Other Medication Other (comments)     Vicryl suture on skin tends to be rejected with poor wound healing does better with monocryl    Procardia [Nifedipine] Other (comments)     Caused afib     LAST MEDICARE WELLNESS EXAM: 6/23/16, 6/29/17    ACP 6/23/16, 6/29/17    REVIEW OF SYSTEMS: colo 9/15 Dr Joe Hemphill, sees Dr Salazar Hussein no vision change or eye pain  Oral  no mouth pain, tongue or tooth problems  Ears  no hearing loss, ear pain, fullness, no swallowing problems  Cardiac  no CP, PND, orthopnea, edema, palpitations or syncope  Chest  no breast masses  Resp  no wheezing, chronic coughing, dyspnea  GI  no heartburn, nausea, vomiting, change in bowel habits, bleeding, hemorrhoids  Urinary  no dysuria, hematuria, flank pain, urgency, frequency  Genitals  no genital lesions, discharge, masses, ulceration, warts  Ortho  no swelling, dec ROM, myalgias  Derm  no nail abnormalities, rashes, lesions of note, hair loss  Psych  denies any anxiety or depression symptoms, no hallucinations or violent ideation  Constitutional  no wt loss, night sweats, unexplained fevers  Neuro  no focal weakness, numbness, paresthesias, incoordination, ataxia, involuntary movements  Endo - no polyuria, polydipsia, nocturia, hot flashes    Visit Vitals    /80 (BP 1 Location: Right arm, BP Patient Position: Sitting)    Pulse 60    Temp 97.7 °F (36.5 °C) (Oral)    Resp 14    Ht 6' (1.829 m)    Wt 211 lb (95.7 kg)    SpO2 98%    BMI 28.62 kg/m2      A&O x3  Affect is appropriate. Mood stable  No apparent distress  Anicteric, no JVD, adenopathy or thyromegaly. B TMs look ok exept mild afl on left no erythema  No carotid bruits or radiated murmur  Lungs clear to auscultation, no wheezes or rales  Heart showed regular rhythm. No rubs, gallops, 1-2/6 karli rsb  Abdomen soft nontender, no hepatosplenomegaly or masses. Extremities without edema.   Pulses 0-1 on right, 1-2 on left  Right wrist wound looks to be healing well, no signs of infection    LABS  From 12/12 showed gluc 101, cr 0.77, gfr 94,   alt 26,           chol 182, tg 159, hdl 52, ldl-c 98, wbc 7.6, hb 15.4, plt 171, tsh 0.96, psa 3.20  From 7/13 showed                          test 263  From 1/14 showed   gluc 112, cr 0.78, gfr 107, alt 17,           chol 130, tg 129, hdl 37, ldl-c 67, wbc 6.8, hb 15.1, plt 186, tsh 0.64, psa 3.60, vit d 26.8  From 6/14 showed   gluc 101, cr 0.57, gfr>60,     hba1c 5.7, vit d 34.3  From 12/14 showed         hba1c 5.9, chol 141, tg 104, hdl 42, ldl-c 78, wbc 8.5, hb 14.8, plt 147, tsh 0.75, psa 5.60, vit d 31.8, ua neg  From 6/15 showed   gluc 104, cr 0.75, gfr>60,     hba1c 5.9  From 8/15 showed                      tsh 0.44, psa 5.28,         test 215, lh 6.1, prl 11.1, ft4 1.61  From 12/15 showed gluc 95,   cr 0.71, gfr>60,  alt 36, hba1c 5.9, chol 135, tg 105, hdl 44, ldl-c 70,           tsh 0.51,     vit d 29.1   From 1/16 showed   gluc 112, cr 0.72, gfr>60,          wbc 7.8, hb 15.1, plt 163,            ua neg  From 11/16 showed gluc 103, cr 0.70,           wbc 8.6, hb 14.5, plt 189, ck/trop neg  From 12/16 showed gluc 89,   cr 0.70, gfr>60,  alt 31, hba1c 5.9, chol 159, tg 124, hdl 58, ldl-c 76, wbc 9.0, hb 15.1, plt 185,      vit d 27.8  From 3/17 showed   gluc 100, cr 0.75, gfr>60,     hba1c 5.9,               tsh 0.78, psa 0.18, ft4 1.30  From 6/17 showed   gluc 96,   cr 0.74, gfr>60, alt 34,  hba1c 5.7, chol 135, tg 71,   hdl 53, ldl-c 68    Results for orders placed or performed during the hospital encounter of 01/03/18   HEMOGLOBIN A1C WITH EAG   Result Value Ref Range    Hemoglobin A1c 5.8 (H) 4.2 - 5.6 %    Est. average glucose 886 mg/dL   METABOLIC PANEL, COMPREHENSIVE   Result Value Ref Range    Sodium 140 136 - 145 mmol/L    Potassium 4.0 3.5 - 5.5 mmol/L    Chloride 100 100 - 108 mmol/L    CO2 33 (H) 21 - 32 mmol/L    Anion gap 7 3.0 - 18 mmol/L    Glucose 107 (H) 74 - 99 mg/dL    BUN 13 7.0 - 18 MG/DL    Creatinine 0.65 0.6 - 1.3 MG/DL    BUN/Creatinine ratio 20 12 - 20      GFR est AA >60 >60 ml/min/1.73m2    GFR est non-AA >60 >60 ml/min/1.73m2    Calcium 9.3 8.5 - 10.1 MG/DL    Bilirubin, total 0.7 0.2 - 1.0 MG/DL    ALT (SGPT) 31 16 - 61 U/L    AST (SGOT) 21 15 - 37 U/L    Alk.  phosphatase 54 45 - 117 U/L    Protein, total 6.8 6.4 - 8.2 g/dL    Albumin 3.8 3.4 - 5.0 g/dL    Globulin 3.0 2.0 - 4.0 g/dL    A-G Ratio 1.3 0.8 - 1.7     LIPID PANEL   Result Value Ref Range    LIPID PROFILE          Cholesterol, total 147 <200 MG/DL    Triglyceride 105 <150 MG/DL    HDL Cholesterol 46 40 - 60 MG/DL    LDL, calculated 80 0 - 100 MG/DL    VLDL, calculated 21 MG/DL    CHOL/HDL Ratio 3.2 0 - 5.0       Patient Active Problem List   Diagnosis Code    Colitis, ulcerative ([facility]) K51.90    Colon polyps K63.5    Peripheral vascular disease ([facility]) Dr Jennifer Ruiz I73.9    Left carotid artery occlusion I65.22    Atherosclerosis of artery of extremity with intermittent claudication ([facility]) I70.219    Hypovitaminosis D E55.9    Advance directive in chart Z78.9    Hypertension I11.9    Dyslipidemia E78.5    Coronary artery disease I25.10    Atrial fibrillation I48.91    Recurrent umbilical hernia X31.6    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 9) E66.3    History of prostate cancer Z85.46     Assessment and plan:  1. Cardiac. Continue current regimen. F/U Dr Kyleigh Neely  2. Vascular. Continue current regimen. F/U Moon  3. Dyslipidemia. He is interested in getting the ldl lower so will change to crestor+zetia. sfx discussed at length, recheck in 4mos  4. PreDM. Lifestyle and dietary measures, wt loss as he is doing  5. Hypovit d. Supp  6. Colon polyp. Fiber, colo 2020  7. Prostate ca. Per Dr Frankie Chisholm  8. Afib. Per Dr Kyleigh Neely  9. Ortho. F/U Dr Ana Fisher  10. Spine. F/U Dr Lazaro Jones prn        RTC 5/18    Above conditions discussed at length and patient vocalized understanding.   All questions answered to patient satifaction

## 2018-01-05 ENCOUNTER — OFFICE VISIT (OUTPATIENT)
Dept: ORTHOPEDIC SURGERY | Facility: CLINIC | Age: 73
End: 2018-01-05

## 2018-01-05 ENCOUNTER — PATIENT OUTREACH (OUTPATIENT)
Dept: INTERNAL MEDICINE CLINIC | Age: 73
End: 2018-01-05

## 2018-01-05 VITALS
OXYGEN SATURATION: 93 % | BODY MASS INDEX: 27.77 KG/M2 | WEIGHT: 205 LBS | HEIGHT: 72 IN | SYSTOLIC BLOOD PRESSURE: 147 MMHG | DIASTOLIC BLOOD PRESSURE: 83 MMHG | HEART RATE: 70 BPM

## 2018-01-05 DIAGNOSIS — G56.01 CARPAL TUNNEL SYNDROME ON RIGHT: Primary | ICD-10-CM

## 2018-01-05 PROBLEM — I10 HYPERTENSION: Status: ACTIVE | Noted: 2018-01-05

## 2018-01-05 NOTE — PROGRESS NOTES
Patient: Donavon Kelly                MRN: [id number]       SSN: [id number]  YOB: 1945        AGE: 67 y.o. SEX: male  Body mass index is 27.8 kg/(m^2). PCP: Deb Nam MD  01/05/18    HPI: Donavon Kelly returns today for a post op appointment for his recent right carpal tunnel surgery. He is now 8 days out. He has no complaints with his incision. His symptoms are improving.       Past Medical History:   Diagnosis Date    Adenoma of left adrenal gland     2009  1 cm, no change 1/15, 2/16    Asbestosis     Atrial fibrillation     CHA2DS2-VAsc=3(age+, htn+, vasc dx+; estimated yearly stroke risk according to Lip et al. is 3,2%), Hasbled=2(estimated yearly bleeding risk according to pisters et al. is 1,88%); not anticoagulated due to h/o gi bleed    Atrial fibrillation ablation     10/08    Basal cell cancer     Dr Bita Huynh; he's had >350 lesions removed    Carotid occlusion     left     Cervical radiculopathy     MRI 9/11 showed C3-6 severe foraminal stenosis    Chronic pain     Colon polyp     Dr Elie Wong 9/15    Coronary artery disease     RCA - 3.0 x 16mm TAXUS 9/04, 3.0 x 16mm TAXUS 12/06    Dyslipidemia     Erectile dysfunction     GERD     GI bleed     Hearing loss     2014  bilateral Dr Cat Hernandez    Hernia, umbilical     Hypertension     Hypogonadism male     Lumbar radiculopathy     Dr So Carlin;  MRI 9/11 L4-5 disc bulging, annular tear, disc dessication    Myofascial pain dysfunction syndrome     pain clinic     Osteoarthritis     right knee Dr Mitchell Batista Peripheral vascular disease     50-60% R iliac; s/p R iliac stent and L femoral artery stent in past; R OLIVIA 0.76 (1/16)    Plantar fasciitis     bilateral     PPD positive     Prediabetes     Prostate cancer     T1c [place] 7(3+4), 70% in 1 core, GS 7 (3+4) in 4 cores, GS 6 (3+3) in 1 core; psa 5.28, TRUS 18 gm;  Dr Elie Wong; s/p cryoablation 10/16    Recurrent umbilical hernia     Subclinical hypothyroidism     denies    Tinnitus     Dr Daryn Sadler Ulcerative colitis     Venous insufficiency        Past Surgical History:   Procedure Laterality Date    HX CAROTID ENDARTERECTOMY      1/14  right    HX Luellen Gloss HX COLONOSCOPY      Dr Sheron Mooney 9/15 polyps    HX CRYOABLATION OF THE PROSTATE      10/16    HX HEMORRHOIDECTOMY      1979    [facility], 1975    HX ORTHOPAEDIC      right knee surgery    HX REFRACTIVE SURGERY      OD (hemoplasty to right eye on retina to avoid blindness)    HX TONSILLECTOMY      1955    SD LAP, RECURRENT INCISIONAL HERNIA REPAIR,REDUCIBLE N/A 02/09/2017    Dr. Staci Jones HERNIA,5+Y/O,REDUCIBL      2/16  left  Dr. Pepito Mckeon SOSR,5+S/C,AKSYQ      2/16  Dr. Richi Scott      1/16  R OLIVIA 0.76, L OLIVIA 1.02    VASCULAR SURGERY PROCEDURE UNLIST      10/15   left fem art and left iliac stent       Family History   Problem Relation Age of Onset    Heart Disease Mother     Hypertension Mother     Diabetes Mother     Arthritis-osteo Mother     Heart Disease Father     Stroke Father     Hypertension Father     Heart Disease Sister     Hypertension Sister        Social History     Social History    Marital status:      Spouse name: N/A    Number of children: N/A    Years of education: N/A     Occupational History    Not on file.      Social History Main Topics    Smoking status: Former Smoker     Packs/day: 1.50     Years: 25.00     Types: Cigarettes     Quit date: 1/1/2003    Smokeless tobacco: Never Used    Alcohol use 2.5 oz/week     5 Cans of beer per week      Comment: 5 beers a week    Drug use: No    Sexual activity: Yes     Partners: Female     Birth control/ protection: None     Other Topics Concern    Not on file     Social History Narrative       Current Outpatient Prescriptions   Medication Sig Dispense Refill    guaiFENesin-codeine (ROBITUSSIN AC) 100-10 mg/5 mL solution Take 5 mL by mouth three (3) times daily as needed for Cough. Max Daily Amount: 15 mL. 120 mL 0    azithromycin (ZITHROMAX) 250 mg tablet Take 2 tablets today, then take 1 tablet daily 6 Tab 0    HYDROcodone-acetaminophen (NORCO) 5-325 mg per tablet Take 1 Tab by mouth every four (4) hours as needed (for chronic pain). 180 Tab 0    gabapentin (NEURONTIN) 300 mg capsule Take 1 Cap by mouth nightly. Indications: NEUROPATHIC PAIN 90 Cap 1    aspirin-calcium carbonate 81 mg-300 mg calcium(777 mg) tab 81 mg.      ACANYA 1.2-2.5 % glwp       prednisoLONE acetate (PRED FORTE) 1 % ophthalmic suspension       NITROGLYCERIN (NITROQUICK SL) by SubLINGual route.  gabapentin (NEURONTIN) 400 mg capsule 1 tab PO QHS  Indications: pain (Patient taking differently: Take 300 mg by mouth. 1 tab PO QHS  Indications: pain) 90 Cap 1    atenolol (TENORMIN) 25 mg tablet TAKE ONE TABLET BY MOUTH TWICE A  Tab 2    diclofenac (VOLTAREN) 1 % gel Apply 2-4 g to affected area four (4) times daily. To affected area (topically) 100 g 5    besifloxacin (BESIVANCE) 0.6 % drps ophthalmic suspension 1 Drop two (2) times a day.  Difluprednate (DUREZOL) 0.05 % ophthalmic emulsion Administer 1 Drop to both eyes four (4) times daily.  nepafenac (ILEVRO) 0.3 % drps Apply 1 Drop to eye.  LORazepam (ATIVAN) 1 mg tablet Take 1 Tab by mouth every eight (8) hours as needed. 50 Tab 0    sildenafil citrate (VIAGRA) 100 mg tablet Take 1 Tab by mouth daily as needed. Indications: Erectile Dysfunction 12 Tab 6    losartan (COZAAR) 25 mg tablet TAKE ONE TABLET BY MOUTH TWICE A  Tab 2    aspirin delayed-release 81 mg tablet Take 81 mg by mouth daily.  triamterene-hydrochlorothiazide (MAXZIDE) 37.5-25 mg per tablet TAKE ONE TABLET BY MOUTH DAILY 90 Tab 3    ezetimibe-simvastatin (VYTORIN 10/40) 10-40 mg per tablet Take 1 Tab by mouth nightly.  90 Tab 3    Cholecalciferol, Vitamin D3, 1,000 unit cap Take 1,000 Units by mouth daily.  VOLTAREN 1 % topical gel as needed.  OMEGA-3 FATTY ACIDS/FISH OIL (OMEGA 3 FISH OIL PO) Take 900 mg by mouth daily.  MULTIVITAMIN PO Take  by mouth daily.  pantoprazole (PROTONIX) 40 mg tablet Take 40 mg by mouth daily.  coenzyme q10 200 mg Cap Take  by mouth. Allergies   Allergen Reactions    Altace [Ramipril] Unknown (comments)    Lipitor [Atorvastatin] Other (comments)     Muscle pain    Lisinopril Shortness of Breath and Other (comments)     Turns bright red    Other Medication Other (comments)     Vicryl suture on skin tends to be rejected with poor wound healing does better with monocryl    Procardia [Nifedipine] Other (comments)     Caused afib         REVIEW OF SYSTEMS:      Review of systems unchanged from previous visit except as noted below. PHYSICAL EXAMINATION:  Visit Vitals    /83    Pulse 70    Ht 6' (1.829 m)    Wt 205 lb (93 kg)    SpO2 93%    BMI 27.8 kg/m2     Body mass index is 27.8 kg/(m^2). MS: Right hand incision healing. Skin edges well approximated. Sutures in place. SILT Medial nerve distribution. Triggering of index finger improved from last visit. Cap refill WNL of all fingers and thumb. Radiology: None today    Impression/Plan:   1. Post op R Carpal tunnel release: stitches removed, steri strips placed. Incision care discussed. Light, ADL use of the right hand permitted. Discussed scar treatment. Follow up in 3 weeks.          Electronically signed by: Yohannes Browning MD

## 2018-01-08 DIAGNOSIS — M54.10 RADICULOPATHY, UNSPECIFIED SPINAL REGION: Primary | ICD-10-CM

## 2018-01-08 NOTE — TELEPHONE ENCOUNTER
[facility] date for Norco as 11/28/2017 for a 30 d/s. There appears to be no inconsistencies in regards to the prescribing of this medication. Last Visit: 12/14/2017 with MD Alvarez Baldwin    Next Appointment: 01/09/2018 with MD Alvarez Baldwin   Previous Refill Encounters: 11/27/2017 per MD Alvarez Baldwin #[id number]     Requested Prescriptions     Pending Prescriptions Disp Refills    HYDROcodone-acetaminophen (NORCO) 5-325 mg per tablet 180 Tab 0     Sig: Take 1 Tab by mouth every four (4) hours as needed (for chronic pain).

## 2018-01-09 ENCOUNTER — OFFICE VISIT (OUTPATIENT)
Dept: INTERNAL MEDICINE CLINIC | Age: 73
End: 2018-01-09

## 2018-01-09 VITALS
OXYGEN SATURATION: 98 % | RESPIRATION RATE: 14 BRPM | HEART RATE: 60 BPM | HEIGHT: 72 IN | WEIGHT: 211 LBS | SYSTOLIC BLOOD PRESSURE: 132 MMHG | BODY MASS INDEX: 28.58 KG/M2 | TEMPERATURE: 97.7 F | DIASTOLIC BLOOD PRESSURE: 80 MMHG

## 2018-01-09 DIAGNOSIS — G56.21 ULNAR NEURITIS, RIGHT: ICD-10-CM

## 2018-01-09 DIAGNOSIS — Z85.46 HISTORY OF PROSTATE CANCER: ICD-10-CM

## 2018-01-09 DIAGNOSIS — M48.02 CERVICAL SPINAL STENOSIS: ICD-10-CM

## 2018-01-09 DIAGNOSIS — R73.01 IFG (IMPAIRED FASTING GLUCOSE): ICD-10-CM

## 2018-01-09 DIAGNOSIS — I11.9 BENIGN HYPERTENSIVE HEART DISEASE WITHOUT HEART FAILURE: ICD-10-CM

## 2018-01-09 DIAGNOSIS — I48.91 ATRIAL FIBRILLATION, UNSPECIFIED TYPE (HCC): ICD-10-CM

## 2018-01-09 DIAGNOSIS — E78.5 DYSLIPIDEMIA: Primary | ICD-10-CM

## 2018-01-09 DIAGNOSIS — I73.9 PERIPHERAL VASCULAR DISEASE (HCC): ICD-10-CM

## 2018-01-09 PROBLEM — E66.3 OVERWEIGHT (BMI 25.0-29.9): Status: ACTIVE | Noted: 2018-01-09

## 2018-01-09 RX ORDER — HYDROCODONE BITARTRATE AND ACETAMINOPHEN 5; 325 MG/1; MG/1
1 TABLET ORAL
Qty: 180 TAB | Refills: 0 | Status: SHIPPED | OUTPATIENT
Start: 2018-01-09 | End: 2018-02-19 | Stop reason: SDUPTHER

## 2018-01-09 RX ORDER — EZETIMIBE 10 MG/1
10 TABLET ORAL DAILY
Qty: 90 TAB | Refills: 3 | Status: SHIPPED | OUTPATIENT
Start: 2018-01-09 | End: 2018-02-08 | Stop reason: ALTCHOICE

## 2018-01-09 RX ORDER — ROSUVASTATIN CALCIUM 40 MG/1
40 TABLET, COATED ORAL
Qty: 90 TAB | Refills: 3 | Status: SHIPPED | OUTPATIENT
Start: 2018-01-09 | End: 2018-02-08 | Stop reason: ALTCHOICE

## 2018-01-09 NOTE — MR AVS SNAPSHOT
Visit Information Date & Time Provider Department Dept. Phone Encounter #  
 1/9/2018  8:00 AM Bard German MD [facility] [address] [place] [address] [phone] Follow-up Instructions Return in about 4 months (around 5/9/2018). Your Appointments 1/26/2018  8:30 AM  
POST OP with Marika Ross MD  
[facility] [place] ([address] [place] [address]) Appt Note: po fu rt hand [address] [place] [facility] [address]  
  
    
 2/2/2018  8:00 AM  
PROCEDURE with [facility] IMAGING 1 [facility] ([address] [place] [address]) Appt Note: iliac 1 yr alfredo; .  
 [address] [place] [address] 884 [facility]  
[phone] [address] [place] [address]  
  
    
 2/2/2018 10:00 AM  
PROCEDURE with [facility] NONIMAGING [facility] ([address] [place] [address]) Appt Note: leg art 1 yr alfredo; .  
 [address] [place] [address] 706 [facility]  
[phone] [address] [place] [address]  
  
    
 2/12/2018  9:50 AM  
Follow Up with German Gregory NP  
[facility] ([address] [place] [address]) Appt Note: 3 mo f/u  
 [address] [facility] [address]  
[phone]  
  
   
 [address] [facility]  
  
    
 2/26/2018  9:15 AM  
Follow Up with Junior De La Cruz MD  
[address] [place] [address] [facility] [address] [place] [address]) Appt Note: 1 year fu after studies; FAXED OVER INFORMATION FOR MICHI TO CALL PATIENT DUE TO STUDIES WERE NOT IN CC AT TIME OF CHECK OUT; [address]; 1 year fu after studies FAXED OVER INFORMATION  Oglethorpegrzegorz Bee TO CALL PATIENT DUE TO STUDIES WERE NOT IN CC AT TIME OF CHECK  [address]; .; cld pt to reschedule appt 02/19/2018 due to dr out of the office, no answer left a message for pt to call us to reschedule; Patient called back and confirmed new appt [address] [facility] 8[address] 200 [facility]  
[phone] 23[address] [facility] 47 [facility]  
  
    
 3/30/2018  8:30 AM  
Nurse Visit with [facility]SE [facility] ([address] [place] [address]) Appt Note: PSA  
 [address] 
[address] [facility] [address]  
14[address] [facility] 3[address] [facility] [address]  
  
    
 5/8/2018  8:00 AM  
LAB with [facility] NURSE VISIT [facility] ([address] [place] [address]) Appt Note: lab  
 [address], [address] [id number] [address]  
  
   
 [address], [address] 5/15/2018  8:40 AM  
Office Visit with Justina Jean MD  
[facility] [address]) Appt Note: 4 month follow up  
 [address], [address] [address] [address]  
  
   
 54[address] [facility], [address]  
  
    
  
 4/12/2018  8:45 AM  
Any with Rae Robles MD  
[facility] ([address] [place] [address]) Appt Note: Return in about 6 months (around 4/10/2018) for follow up, PSA 1 month prior [address] [facility] [address]  
[address] [facility] [address] [place] [address] [facility] [address] Upcoming Health Maintenance Date Due  
 MEDICARE YEARLY EXAM 6/30/2018 GLAUCOMA SCREENING Q2Y 6/25/2019 DTaP/Tdap/Td series (2 - Td) 4/3/2023 COLONOSCOPY 9/22/2025 Allergies as of 1/9/2018  Review Complete On: 1/9/2018 By: Justina Jean MD  
  
 Severity Noted Reaction Type Reactions Altace [Ramipril]    Unknown (comments) Lipitor [Atorvastatin]    Other (comments) Muscle pain Lisinopril    Shortness of Breath, Other (comments) Turns bright red Other Medication  03/05/2014    Other (comments) Vicryl suture on skin tends to be rejected with poor wound healing does better with monocryl Procardia [Nifedipine]    Other (comments) Caused afib Current Immunizations  Reviewed on 9/15/2017 Name Date Influenza High Dose Vaccine PF 9/15/2017 11:09 AM, 9/16/2016, 10/2/2015 12:30 PM  
 Influenza Vaccine 10/10/2014, 10/4/2013 Influenza Vaccine Split 10/10/2012  2:28 PM, 9/28/2011 Influenza Vaccine Whole 10/8/2010 Pneumococcal Conjugate (PCV-13) 12/15/2014  8:16 AM  
 Pneumococcal Polysaccharide (PPSV-23) 1/24/2014 Pneumococcal Vaccine (Unspecified Type) 1/1/2006 Td 1/1/2006 Tdap 4/3/2013  8:23 PM  
 Zoster Vaccine, Live 1/1/2007 Not reviewed this visit You Were Diagnosed With   
  
 Codes Comments Dyslipidemia    -  Primary ICD-10-CM: E78.5 ICD-9-CM: 272.4 History of prostate cancer     ICD-10-CM: Z85.46 
ICD-9-CM: V10.46 Atrial fibrillation, unspecified type ([facility].)     ICD-10-CM: I48.91 
ICD-9-CM: 427.31 Benign hypertensive heart disease without heart failure     ICD-10-CM: I11.9 ICD-9-CM: 402.10 IFG (impaired fasting glucose)     ICD-10-CM: R73.01 
ICD-9-CM: 790.21 Cervical spinal stenosis     ICD-10-CM: M48.02 
ICD-9-CM: 723.0 Ulnar neuritis, right     ICD-10-CM: G56.21 ICD-9-CM: 723.4 Peripheral vascular disease ([facility] 75.)     ICD-10-CM: I73.9 ICD-9-CM: 443. 9 Vitals BP Pulse Temp Resp Height(growth percentile) Weight(growth percentile) 132/80 (BP 1 Location: Right arm, BP Patient Position: Sitting) 60 97.7 °F (36.5 °C) (Oral) 14 6' (1.829 m) 211 lb (95.7 kg) SpO2 BMI Smoking Status 98% 28.62 kg/m2 Former Smoker Vitals History BMI and BSA Data Body Mass Index Body Surface Area  
 28.62 kg/m 2 2.2 m 2 Preferred Pharmacy Pharmacy Name Phone  [address] [phone] Your Updated Medication List  
  
   
 This list is accurate as of: 1/9/18  8:32 AM.  Always use your most recent med list.  
  
  
  
  
 ACANYA 1.2-2.5 % Glwp Generic drug:  clindamycin-benzoyl peroxide  
  
 aspirin delayed-release 81 mg tablet Take 81 mg by mouth daily. aspirin-calcium carbonate 81 mg-300 mg calcium(777 mg) Tab 81 mg.  
  
 atenolol 25 mg tablet Commonly known as:  TENORMIN  
TAKE ONE TABLET BY MOUTH TWICE A DAY BESIVANCE 0.6 % Drps ophthalmic suspension Generic drug:  besifloxacin 1 Drop two (2) times a day. cholecalciferol 1,000 unit Cap Commonly known as:  VITAMIN D3 Take 1,000 Units by mouth daily. coenzyme Q-10 200 mg capsule Commonly known as:  CO Q-10 Take  by mouth. diclofenac 1 % Gel Commonly known as:  VOLTAREN Apply 2-4 g to affected area four (4) times daily. To affected area (topically) DUREZOL 0.05 % ophthalmic emulsion Generic drug:  Difluprednate Administer 1 Drop to both eyes four (4) times daily. ezetimibe 10 mg tablet Commonly known as:  Eliza Priscila Take 1 Tab by mouth daily. ezetimibe-simvastatin 10-40 mg per tablet Commonly known as:  Vytorin 10/40 Take 1 Tab by mouth nightly. * gabapentin 400 mg capsule Commonly known as:  NEURONTIN  
1 tab PO QHS  Indications: pain * gabapentin 300 mg capsule Commonly known as:  NEURONTIN Take 1 Cap by mouth nightly. Indications: NEUROPATHIC PAIN  
  
 guaiFENesin-codeine 100-10 mg/5 mL solution Commonly known as:  ROBITUSSIN AC Take 5 mL by mouth three (3) times daily as needed for Cough. Max Daily Amount: 15 mL. HYDROcodone-acetaminophen 5-325 mg per tablet Commonly known as:  Kina Polo Take 1 Tab by mouth every four (4) hours as needed (for chronic pain). ILEVRO 0.3 % Drps Generic drug:  nepafenac Apply 1 Drop to eye. LORazepam 1 mg tablet Commonly known as:  ATIVAN Take 1 Tab by mouth every eight (8) hours as needed. losartan 25 mg tablet Commonly known as:  COZAAR  
TAKE ONE TABLET BY MOUTH TWICE A DAY MULTIVITAMIN PO Take  by mouth daily. NITROQUICK SL  
by SubLINGual route. OMEGA 3 FISH OIL PO Take 900 mg by mouth daily. prednisoLONE acetate 1 % ophthalmic suspension Commonly known as:  PRED FORTE  
  
 PROTONIX 40 mg tablet Generic drug:  pantoprazole Take 40 mg by mouth daily. rosuvastatin 40 mg tablet Commonly known as:  CRESTOR Take 1 Tab by mouth nightly. sildenafil citrate 100 mg tablet Commonly known as:  VIAGRA Take 1 Tab by mouth daily as needed. Indications: Erectile Dysfunction  
  
 triamterene-hydroCHLOROthiazide 37.5-25 mg per tablet Commonly known as:  Claudean Churn TAKE ONE TABLET BY MOUTH DAILY * Notice: This list has 2 medication(s) that are the same as other medications prescribed for you. Read the directions carefully, and ask your doctor or other care provider to review them with you. Prescriptions Sent to Pharmacy Refills  
 rosuvastatin (CRESTOR) 40 mg tablet 3 Sig: Take 1 Tab by mouth nightly. Class: Normal  
 Pharmacy: [facility] [address] Ph #: [phone] Route: Oral  
 ezetimibe (ZETIA) 10 mg tablet 3 Sig: Take 1 Tab by mouth daily. Class: Normal  
 Pharmacy: [facility] [address] Ph #: [phone] Route: Oral  
  
Follow-up Instructions Return in about 4 months (around 5/9/2018). To-Do List   
 07/09/2018 Lab:  LIPID PANEL   
  
 07/09/2018 Lab:  METABOLIC PANEL, COMPREHENSIVE   
  
 07/11/2018 Lab:  HEMOGLOBIN A1C W/O EAG Introducing [facility]! Dear Penny Parra: Thank you for requesting a Ph[employer] account. Our records indicate that you already have an active Phrees[employer] account. You can access your account anytime at [email or web address] Did you know that you can access your hospital and ER discharge instructions at any time in [facility]? You can also review all of your test results from your hospital stay or ER visit. Additional Information If you have questions, please visit the Frequently Asked Questions section of the [facility] website at [email or web address] Remember, [facility] is NOT to be used for urgent needs. For medical emergencies, dial 911. Now available from your iPhone and Android! Please provide this summary of care documentation to your next provider. Your primary care clinician is listed as Doris Munguia. If you have any questions after today's visit, please call [phone].

## 2018-01-09 NOTE — PROGRESS NOTES
1. Have you been to the ER, urgent care clinic or hospitalized since your last visit? YES. [facility] 12/28/17    2. Have you seen or consulted any other health care providers outside of the [address] [place] [address] since your last visit (Include any pap smears or colon screening)?  NO

## 2018-01-10 DIAGNOSIS — E78.5 DYSLIPIDEMIA: ICD-10-CM

## 2018-01-11 ENCOUNTER — TELEPHONE (OUTPATIENT)
Dept: INTERNAL MEDICINE CLINIC | Age: 73
End: 2018-01-11

## 2018-01-11 NOTE — TELEPHONE ENCOUNTER
[facility]- allergy alert   PT says he Might be allergic to statins per [facility]    #[phone]    Ref#  [id number]

## 2018-01-15 NOTE — TELEPHONE ENCOUNTER
Spoke with pharmacist at [facility], they will proceed with fulfilling and ship it out to the patient, per the pt, he can tolerate this

## 2018-01-26 ENCOUNTER — OFFICE VISIT (OUTPATIENT)
Dept: ORTHOPEDIC SURGERY | Age: 73
End: 2018-01-26

## 2018-01-26 VITALS
SYSTOLIC BLOOD PRESSURE: 123 MMHG | WEIGHT: 212.4 LBS | RESPIRATION RATE: 16 BRPM | DIASTOLIC BLOOD PRESSURE: 76 MMHG | BODY MASS INDEX: 28.77 KG/M2 | HEART RATE: 64 BPM | HEIGHT: 72 IN

## 2018-01-26 DIAGNOSIS — M65.321 TRIGGER FINGER, RIGHT INDEX FINGER: ICD-10-CM

## 2018-01-26 DIAGNOSIS — G56.01 CARPAL TUNNEL SYNDROME ON RIGHT: Primary | ICD-10-CM

## 2018-01-26 RX ORDER — BETAMETHASONE SODIUM PHOSPHATE AND BETAMETHASONE ACETATE 3; 3 MG/ML; MG/ML
6 INJECTION, SUSPENSION INTRA-ARTICULAR; INTRALESIONAL; INTRAMUSCULAR; SOFT TISSUE ONCE
Qty: 1 ML | Refills: 0
Start: 2018-01-26 | End: 2018-01-26

## 2018-01-26 NOTE — PATIENT INSTRUCTIONS
Trigger Finger: Care Instructions  Your Care Instructions  A trigger finger is a finger stuck in a bent position. The bent finger usually straightens out on its own. A trigger finger can be painful, but it normally is not a serious problem. Trigger fingers seem to occur more in some groups of people. These include people who have diabetes or arthritis or who have injured their hands in the past. This problem also occurs in musicians and people who  tools often. Rest, exercises, and other things you can do at home may help your trigger finger relax so that it can bend as it should. You may get a corticosteroid shot. This can reduce swelling and pain. Your doctor may put a splint on your finger. This will give your finger some rest and avoid irritating the joint. You may need surgery if the finger keeps locking in a bent position. Follow-up care is a key part of your treatment and safety. Be sure to make and go to all appointments, and call your doctor if you are having problems. It's also a good idea to know your test results and keep a list of the medicines you take. How can you care for yourself at home? · If your doctor put a splint on your finger, wear the splint as directed. Do not remove it until your doctor says you can. · You may need to change your activities to avoid movements that irritate the finger. · If your finger is swollen, put ice or a cold pack on your finger for 10 to 20 minutes at a time. Try to do this every 1 to 2 hours for the next 3 days (when you are awake) or until the swelling goes down. Put a thin cloth between the ice and your skin. · Prop up your hand on a pillow when you ice it or anytime you sit or lie down during the next 3 days. Try to keep it above the level of your heart. This will help reduce swelling. · Take your medicines exactly as prescribed. Call your doctor if you think you are having a problem with your medicine.   · Ask your doctor if you can take an over-the-counter pain medicine, such as acetaminophen (Tylenol), ibuprofen (Advil, Motrin), or naproxen (Aleve). Be safe with medicines. Read and follow all instructions on the label. · If your doctor recommends exercises, do them as directed. When should you call for help? Call your doctor now or seek immediate medical care if:  ? · Your finger locks in a bent position and will not straighten. ? Watch closely for changes in your health, and be sure to contact your doctor if:  ? · You do not get better as expected. Where can you learn more? Go to http://nneka-mindi.info/. Enter M826 in the search box to learn more about \"Trigger Finger: Care Instructions. \"  Current as of: March 21, 2017  Content Version: 11.4  © 3478-3440 [employer]. Care instructions adapted under license by [employer] (which disclaims liability or warranty for this information). If you have questions about a medical condition or this instruction, always ask your healthcare professional. [address] any warranty or liability for your use of this information.

## 2018-01-26 NOTE — PROGRESS NOTES
Patient: Sonia Llamas                MRN: [id number]       SSN: [id number]  YOB: 1945        AGE: 67 y.o. SEX: male  Body mass index is 28.81 kg/(m^2). PCP: Katie Perry MD  01/26/18    HPI: Sonia Llamas returns today for a post op appointment for his recent right carpal tunnel release. His carpal tunnel surgery is healing well. He notes improvement in his symptoms. He does occasionally have some scar pain, but his nerve symptoms have mostly resolved. He has noted some increasing of his triggering in his right index finger and is interested in a shot today to that.      Past Medical History:   Diagnosis Date    Adenoma of left adrenal gland     2009  1 cm, no change 1/15, 2/16    Asbestosis     Atrial fibrillation     CHA2DS2-VAsc=3(age+, htn+, vasc dx+; estimated yearly stroke risk according to Lip et al. is 3,2%), Hasbled=2(estimated yearly bleeding risk according to pisters et al. is 1,88%); not anticoagulated due to h/o gi bleed    Atrial fibrillation ablation     10/08    Basal cell cancer     Dr Albertina Petersen; he's had >350 lesions removed    Carotid occlusion     left     Cervical radiculopathy     MRI 9/11 showed C3-6 severe foraminal stenosis    Chronic pain     Colon polyp     Dr Lomeli Listen 9/15    Coronary artery disease     RCA - 3.0 x 16mm TAXUS 9/04, 3.0 x 16mm TAXUS 12/06    Dyslipidemia     Erectile dysfunction     GERD     GI bleed     Hearing loss     2014  bilateral Dr Wilson Jackson    Hernia, umbilical     Hypertension     Hypogonadism male     Lumbar radiculopathy     Dr Arturo Kwon;  MRI 9/11 L4-5 disc bulging, annular tear, disc dessication    Myofascial pain dysfunction syndrome     pain clinic     Osteoarthritis     right knee Dr Genetta Ormond Peripheral vascular disease     50-60% R iliac; s/p R iliac stent and L femoral artery stent in past; R OLIVIA 0.76 (1/16)    Plantar fasciitis     bilateral     PPD positive     Prediabetes     Prostate cancer     T1c Imelda 7(3+4), 70% in 1 core, GS 7 (3+4) in 4 cores, GS 6 (3+3) in 1 core; psa 5.28, TRUS 18 gm;  Dr Sheron Mooney; s/p cryoablation 10/16    Recurrent umbilical hernia     Subclinical hypothyroidism     denies    Tinnitus     Dr Daryn Sadler Ulcerative colitis     Venous insufficiency        Past Surgical History:   Procedure Laterality Date    HX CAROTID ENDARTERECTOMY      1/14  right    HX Luellen Gloss HX COLONOSCOPY      Dr Sheron Mooney 9/15 polyps    HX CRYOABLATION OF THE PROSTATE      10/16    HX HEMORRHOIDECTOMY      1979    [address] HX ORTHOPAEDIC      right knee surgery    HX ORTHOPAEDIC  12/2017    Dr Hodan Moise; R CTS release    HX REFRACTIVE SURGERY      OD (hemoplasty to right eye on retina to avoid blindness)    HX TONSILLECTOMY      1955    MS LAP, RECURRENT INCISIONAL HERNIA REPAIR,REDUCIBLE N/A 02/09/2017    Dr. Staci Jones HERNIA,5+Y/O,REDUCIBL      2/16  left  Dr. Pepito Mckeon KSCF,0+N/K,KKCEI      2/16  Dr. Richi Scott      1/16  R OLIVIA 0.76, L OLIVIA 1.02    VASCULAR SURGERY PROCEDURE UNLIST      10/15   left fem art and left iliac stent       Family History   Problem Relation Age of Onset    Heart Disease Mother     Hypertension Mother     Diabetes Mother     Arthritis-osteo Mother     Heart Disease Father     Stroke Father     Hypertension Father     Heart Disease Sister     Hypertension Sister        Social History     Social History    Marital status:      Spouse name: N/A    Number of children: N/A    Years of education: N/A     Occupational History    Not on file.      Social History Main Topics    Smoking status: Former Smoker     Packs/day: 1.50     Years: 25.00     Types: Cigarettes     Quit date: 1/1/2003    Smokeless tobacco: Never Used    Alcohol use 2.5 oz/week     5 Cans of beer per week      Comment: 5 beers a week    Drug use: No    Sexual activity: Yes     Partners: Female     Birth control/ protection: None     Other Topics Concern    Not on file     Social History Narrative       Current Outpatient Prescriptions   Medication Sig Dispense Refill    betamethasone (CELESTONE SOLUSPAN) 6 mg/mL injection 1 mL by IntraLESional route once for 1 dose. 1 mL 0    HYDROcodone-acetaminophen (NORCO) 5-325 mg per tablet Take 1 Tab by mouth every four (4) hours as needed (for chronic pain). 180 Tab 0    rosuvastatin (CRESTOR) 40 mg tablet Take 1 Tab by mouth nightly. 90 Tab 3    ezetimibe (ZETIA) 10 mg tablet Take 1 Tab by mouth daily. 90 Tab 3    gabapentin (NEURONTIN) 300 mg capsule Take 1 Cap by mouth nightly. Indications: NEUROPATHIC PAIN 90 Cap 1    atenolol (TENORMIN) 25 mg tablet TAKE ONE TABLET BY MOUTH TWICE A  Tab 2    diclofenac (VOLTAREN) 1 % gel Apply 2-4 g to affected area four (4) times daily. To affected area (topically) 100 g 5    losartan (COZAAR) 25 mg tablet TAKE ONE TABLET BY MOUTH TWICE A  Tab 2    aspirin delayed-release 81 mg tablet Take 81 mg by mouth daily.  triamterene-hydrochlorothiazide (MAXZIDE) 37.5-25 mg per tablet TAKE ONE TABLET BY MOUTH DAILY 90 Tab 3    Cholecalciferol, Vitamin D3, 1,000 unit cap Take 1,000 Units by mouth daily.  OMEGA-3 FATTY ACIDS/FISH OIL (OMEGA 3 FISH OIL PO) Take 900 mg by mouth daily.  MULTIVITAMIN PO Take  by mouth daily.  pantoprazole (PROTONIX) 40 mg tablet Take 40 mg by mouth daily.  coenzyme q10 200 mg Cap Take  by mouth.  guaiFENesin-codeine (ROBITUSSIN AC) 100-10 mg/5 mL solution Take 5 mL by mouth three (3) times daily as needed for Cough. Max Daily Amount: 15 mL. 120 mL 0    aspirin-calcium carbonate 81 mg-300 mg calcium(777 mg) tab 81 mg.      ACANYA 1.2-2.5 % glwp       prednisoLONE acetate (PRED FORTE) 1 % ophthalmic suspension       NITROGLYCERIN (NITROQUICK SL) by SubLINGual route.       gabapentin (NEURONTIN) 400 mg capsule 1 tab PO QHS  Indications: pain (Patient not taking: Reported on 1/26/2018) 90 Cap 1    besifloxacin (BESIVANCE) 0.6 % drps ophthalmic suspension 1 Drop two (2) times a day.  Difluprednate (DUREZOL) 0.05 % ophthalmic emulsion Administer 1 Drop to both eyes four (4) times daily.  nepafenac (ILEVRO) 0.3 % drps Apply 1 Drop to eye.  LORazepam (ATIVAN) 1 mg tablet Take 1 Tab by mouth every eight (8) hours as needed. 50 Tab 0    sildenafil citrate (VIAGRA) 100 mg tablet Take 1 Tab by mouth daily as needed. Indications: Erectile Dysfunction 12 Tab 6    ezetimibe-simvastatin (VYTORIN 10/40) 10-40 mg per tablet Take 1 Tab by mouth nightly. (Patient not taking: Reported on 1/26/2018) 90 Tab 3       Allergies   Allergen Reactions    Altace [Ramipril] Unknown (comments)    Lipitor [Atorvastatin] Other (comments)     Muscle pain    Lisinopril Shortness of Breath and Other (comments)     Turns bright red    Other Medication Other (comments)     Vicryl suture on skin tends to be rejected with poor wound healing does better with monocryl    Procardia [Nifedipine] Other (comments)     Caused afib         REVIEW OF SYSTEMS:      Review of systems unchanged from previous visit except as noted below. PHYSICAL EXAMINATION:  Visit Vitals    /76    Pulse 64    Resp 16    Ht 6' (1.829 m)    Wt 212 lb 6.4 oz (96.3 kg)    BMI 28.81 kg/m2     Body mass index is 28.81 kg/(m^2). MS: Healing incision at the base of the palm. Mild TTP over incision. Sensation intact in median nerve. Thenar eminence musculature with full strength. Triggering of the right index finger noted with palpable nodule at base of index finger. Radiology: None today    Impression/Plan:   1. Carpal tunnel post op: He is healing well. He can return to all activities as tolerated at this point. Continue scar massage.    2. Right index finger triggering: Discussed treatment options today and he wished to proceed with an injection into the trigger finger which was given under sterile conditions. He tolerated it well.     Follow up PRN     [facility]  OFFICE PROCEDURE PROGRESS NOTE        Chart reviewed for the following:   Gonzales Alvarez MD, have reviewed the History, Physical and updated the Allergic reactions for [address] performed immediately prior to start of procedure:   Gonzales Alvarez MD, have performed the following reviews on [facility] prior to the start of the procedure:            * Patient was identified by name and date of birth   * Agreement on procedure being performed was verified  * Risks and Benefits explained to the patient  * Procedure site verified and marked as necessary  * Patient was positioned for comfort  * Consent was signed and verified     Time: 08:45      Date of procedure: 1/26/2018    Procedure performed by:  Janiya Daniel MD    Provider assisted by: None None    Patient assisted by: self    How tolerated by patient: tolerated the procedure well with no complications    Post Procedural Pain Scale: 0 - No Hurt    Comments: none                  Electronically signed by: Janiya Daniel MD

## 2018-01-29 ENCOUNTER — PATIENT OUTREACH (OUTPATIENT)
Dept: INTERNAL MEDICINE CLINIC | Age: 73
End: 2018-01-29

## 2018-02-02 ENCOUNTER — OFFICE VISIT (OUTPATIENT)
Dept: VASCULAR SURGERY | Age: 73
End: 2018-02-02

## 2018-02-02 ENCOUNTER — APPOINTMENT (OUTPATIENT)
Dept: VASCULAR SURGERY | Age: 73
End: 2018-02-02

## 2018-02-02 DIAGNOSIS — Z95.828 S/P INSERTION OF ILIAC ARTERY STENT: ICD-10-CM

## 2018-02-02 DIAGNOSIS — I70.219 ATHEROSCLEROSIS OF ARTERY OF EXTREMITY WITH INTERMITTENT CLAUDICATION (HCC): ICD-10-CM

## 2018-02-02 DIAGNOSIS — I65.22 LEFT CAROTID ARTERY OCCLUSION: ICD-10-CM

## 2018-02-02 DIAGNOSIS — I73.9 PERIPHERAL VASCULAR DISEASE (HCC): ICD-10-CM

## 2018-02-02 DIAGNOSIS — I73.9 PVD (PERIPHERAL VASCULAR DISEASE) (HCC): Primary | ICD-10-CM

## 2018-02-02 NOTE — PROCEDURES
[facility]   *** FINAL REPORT ***    Name: Adiel Rush  MRN: [id number]       Outpatient  : 18 Aug 1945  HIS Order #: [id number]  [id number] [facility] Visit #: [id number]  Date: 2018    TYPE OF TEST: Peripheral Arterial Testing    REASON FOR TEST  Peripheral vascular dz NOS    Right Leg  Segmentals: Abnormal                     mmHg  Brachial         126  High thigh  Low thigh  Calf             127  Posterior tibial 105  Dorsalis pedis    91  Peroneal  Metatarsal  Toe pressure      66  Doppler:    Abnormal  Ankle/Brachial: 0.83    Site of occlusive disease:-  femoral, popliteal and tibioperoneal segments and the digits    Left Leg  Segmentals: Abnormal                     mmHg  Brachial         122  High thigh  Low thigh  Calf             136  Posterior tibial  95  Dorsalis pedis   115  Peroneal  Metatarsal  Toe pressure      89  Doppler:    Normal  Ankle/Brachial: 0.91    INTERPRETATION/FINDINGS  Physiologic testing was performed using continuous wave doppler and  segmental pressures. 1. Moderate arterial insufficiency on the right at rest due to femoral   artery disease with multilevel involvement. 2. Mild arterial insufficiency on the left at rest, level  undetermined. 3. The right ankle/brachial index is 0.83 and the left ankle/brachial  index is 0.91.  4. The DBI on the right is 0.52 and on the left is 0.71  No significant changes when compared with the previous study. ADDITIONAL COMMENTS    I have personally reviewed the data relevant to the interpretation of  this  study. TECHNOLOGIST: Dony Kirby RVT, YASMINE  Signed: 2018 09:51 AM    PHYSICIAN: Etelvina Baker MD  Signed: 2018 10:33 AM

## 2018-02-02 NOTE — PROCEDURES
[facility]   *** FINAL REPORT ***    Name: Niurka Poster  MRN: [id number]       Outpatient  : 18 Aug 1945  HIS Order #: [id number]  [id number] [facility] Visit #: [id number]  Date: 2018    TYPE OF TEST: Aorto-Iliac Duplex    REASON FOR TEST  Peripheral Arterial Disease, Iliac stent    B-Mode:-                 (cm)   1     2     3  Aortic diameter:         AP:                           TV:  Common iliac diameter:   Right:                           Left:    Duplex:-                           PSV  Stenosis                           ----- --------------------  Aorta: (1)                     Normal         (2)         (3)                74.0    Right common iliac:      112.0  Right external iliac:    213.0    Left common iliac:       214.0  Left external iliac:     142.0    INTERPRETATION/FINDINGS  Duplex images were obtained using 2-D gray scale, color flow and  spectral doppler analysis. Techinically difficult due to overlying  bowel gas. 1. Right common iliac artery patent without significant stenosis. Stent not clearly visible. 2. Left common iliac artery patent with elevated velocities noted in  the proximal segment suggesting moderate stenosis. 3. Bilateral external iliac arteries patent without significant  stenosis. Elevated velocities noted in the proximal segment of the  right external iliac artery. 4. ABIs suggest moderate arterieal insufficiency on the right at rest  and mild arterial insufficiency on the left at res. Essentially no significant changes as compared with previous study. ADDITIONAL COMMENTS  Term Ao: 74 cm/sec  RCIA:  Prx 101 cm/sec   Mid 112 cm/sec   Dst 99 cm/sec       REIA:  Prx 213 cm/sec    Mid 97 cm/sec    Dst 122 cm/sec  LCIA:  Prx 214 cm/sec     Mid 183 cm/sec     Dst 157 cm/sec      IRIS:  110 cm/sec   Mid 101 cm/sec     Dst 142 cm/sec    I have personally reviewed the data relevant to the interpretation of  this  study. TECHNOLOGIST: Marvin Kirby RVT, BS  Signed: 02/02/2018 10:01 AM    PHYSICIAN: Tami Daly MD  Signed: 02/02/2018 10:32 AM

## 2018-02-02 NOTE — PROCEDURES
[facility]   *** FINAL REPORT ***    Name: Niurka Poster  MRN: [id number]       Outpatient  : 18 Aug 1945  HIS Order #: [id number]  [id number] [facility] Visit #: [id number]  Date: 2018    TYPE OF TEST: Cerebrovascular Duplex    REASON FOR TEST  Carotid disease, Follow up CEA    Right Carotid:-             Proximal               Mid                 Distal  cm/s  Systolic  Diastolic  Systolic  Diastolic  Systolic  Diastolic  CCA:     84.0      26.0                            86.0      25.0  Bulb:    89.0      24.0  ECA:    116.0      11.0  ICA:     82.0      27.0       89.0      34.0       86.0      38.0  ICA/CCA:  0.9       1.3    ICA Stenosis: <50%    Right Vertebral:-  Finding: Antegrade  Sys:       49.0  Joanie:       19.0    Right Subclavian:    Left Carotid:-            Proximal                Mid                 Distal  cm/s  Systolic  Diastolic  Systolic  Diastolic  Systolic  Diastolic  CCA:     04.0      12.0                            56.0      10.0  Bulb:  ECA:     75.0       9.0  ICA:      0.0       0.0        0.0       0.0  ICA/CCA:  0.0       0.0    ICA Stenosis: Occluded    Left Vertebral:-  Finding: Antegrade  Sys:       40.0  Joanie:       16.0    Left Subclavian:    INTERPRETATION/FINDINGS  Duplex images were obtained using 2-D gray scale, color flow and  spectral doppler analysis. 1. Patent right carotid artery endarterectomy with mild plaquing of  the right internal carotid artery in the less than 50% range. 2. Chronic occlusion of the left internal carotid artery, cannot  exclude extremely low flow. 3. No significant stenosis in the external carotid arteries  bilaterally. 4. Antegrade flow in both vertebral arteries. Plaque Morphology:    Heterogeneous plaque in the bulb and right ICA. No significant changes when compared to previous study. ADDITIONAL COMMENTS    I have personally reviewed the data relevant to the interpretation of  this  study. TECHNOLOGIST: Marvin Kirby RVT, YASMINE  Signed: 02/02/2018 10:09 AM    PHYSICIAN: Sydney Raygoza MD  Signed: 02/02/2018 10:33 AM

## 2018-02-05 ENCOUNTER — TELEPHONE (OUTPATIENT)
Dept: INTERNAL MEDICINE CLINIC | Age: 73
End: 2018-02-05

## 2018-02-05 NOTE — TELEPHONE ENCOUNTER
Pt would like to be switched back on the vytorin, he said the crestor is giving him side effects tht are too  bothersome for  him, pls send vytorin to [address] [facility],

## 2018-02-08 RX ORDER — EZETIMIBE AND SIMVASTATIN 10; 40 MG/1; MG/1
1 TABLET ORAL
Qty: 90 TAB | Refills: 3 | Status: SHIPPED | OUTPATIENT
Start: 2018-02-08 | End: 2018-02-08 | Stop reason: SDUPTHER

## 2018-02-08 RX ORDER — EZETIMIBE AND SIMVASTATIN 10; 40 MG/1; MG/1
1 TABLET ORAL
Qty: 90 TAB | Refills: 3 | Status: SHIPPED | OUTPATIENT
Start: 2018-02-08 | End: 2019-02-18 | Stop reason: SDUPTHER

## 2018-02-12 ENCOUNTER — OFFICE VISIT (OUTPATIENT)
Dept: ORTHOPEDIC SURGERY | Age: 73
End: 2018-02-12

## 2018-02-12 VITALS
DIASTOLIC BLOOD PRESSURE: 75 MMHG | SYSTOLIC BLOOD PRESSURE: 150 MMHG | OXYGEN SATURATION: 98 % | BODY MASS INDEX: 29.26 KG/M2 | RESPIRATION RATE: 16 BRPM | HEIGHT: 72 IN | TEMPERATURE: 97.9 F | HEART RATE: 65 BPM | WEIGHT: 216 LBS

## 2018-02-12 DIAGNOSIS — M47.816 FACET HYPERTROPHY OF LUMBAR REGION: ICD-10-CM

## 2018-02-12 DIAGNOSIS — M50.30 DEGENERATION OF CERVICAL INTERVERTEBRAL DISC: Primary | Chronic | ICD-10-CM

## 2018-02-12 DIAGNOSIS — M48.02 CERVICAL SPINAL STENOSIS: ICD-10-CM

## 2018-02-12 RX ORDER — EZETIMIBE 10 MG/1
TABLET ORAL
COMMUNITY
Start: 2018-01-09 | End: 2018-04-12

## 2018-02-12 RX ORDER — ROSUVASTATIN CALCIUM 40 MG/1
TABLET, COATED ORAL
COMMUNITY
Start: 2018-01-15 | End: 2018-04-13

## 2018-02-12 NOTE — MR AVS SNAPSHOT
[address] [place] [address] [address] [place] [address] 
[phone] Patient: Bill Mueller MRN: [id number] Bob Escobar Visit Information Date & Time Provider Department Dept. Phone Encounter #  
 2/12/2018  9:50 AM Jackelyn Holland NP [facility] [phone] [id number] Follow-up Instructions Return in about 3 months (around 5/12/2018) for with [address] [place] [address]. Follow-up and Disposition History Your Appointments 2/26/2018  9:15 AM  
Follow Up with Roni Bennett MD  
[address] [facility]) Appt Note: 1 year fu after studies; FAXED OVER INFORMATION FOR MICHI TO CALL PATIENT DUE TO STUDIES WERE NOT IN CC AT TIME OF CHECK OUT; [address]; 1 year fu after studies FAXED OVER INFORMATION  Stanley Shiner TO CALL PATIENT DUE TO STUDIES WERE NOT IN CC AT TIME OF CHECK  [address]; .; cld pt to reschedule appt 02/19/2018 due to dr out of the office, no answer left a message for pt to call us to reschedule; Patient called back and confirmed new appt [address] [facility] [address] [facility]  
[phone] [address]  
  
    
 3/30/2018  8:30 AM  
Nurse Visit with [facility] NURSE [facility] ([facility]) Appt Note: PSA  
 [address] 
[address] [facility] [address]  
[address] [facility] [address] [facility] [address]  
  
    
 5/8/2018  8:00 AM  
LAB with [facility] NURSE VISIT [facility] [address] [place] [address] ([facility]) Appt Note: lab  
 [address], [address] [address] [address]iner  
  
   
 [address], [address] 5/15/2018  8:40 AM  
Office Visit with Terence Hernandez MD  
[facility] [address] [place] [address]) Appt Note: 4 month follow up  
 [address] [facility], [address] [address] [address] [address] [address] [facility], [address]  
  
    
  
 4/12/2018  8:45 AM  
Any with Charly Stephenson MD  
[facility] ([facility]) Appt Note: Return in about 6 months (around 4/10/2018) for follow up, PSA 1 month prior [address] [place] [address]  
[address] Angela [address] [place] [address] Upcoming Health Maintenance Date Due  
 MEDICARE YEARLY EXAM 6/30/2018 GLAUCOMA SCREENING Q2Y 6/25/2019 DTaP/Tdap/Td series (2 - Td) 4/3/2023 COLONOSCOPY 9/22/2025 Allergies as of 2/12/2018  Review Complete On: 1/26/2018 By: Aurora Lutz MD  
  
 Severity Noted Reaction Type Reactions Altace [Ramipril]    Unknown (comments) Lipitor [Atorvastatin]    Other (comments) Muscle pain Lisinopril    Shortness of Breath, Other (comments) Turns bright red Other Medication  03/05/2014    Other (comments) Vicryl suture on skin tends to be rejected with poor wound healing does better with monocryl Procardia [Nifedipine]    Other (comments) Caused afib Rosuvastatin  02/12/2018    Other (comments) Muscle Pain Current Immunizations  Reviewed on 9/15/2017 Name Date Influenza High Dose Vaccine PF 9/15/2017 11:09 AM, 9/16/2016, 10/2/2015 12:30 PM  
 Influenza Vaccine 10/10/2014, 10/4/2013 Influenza Vaccine Split 10/10/2012  2:28 PM, 9/28/2011 Influenza Vaccine Whole 10/8/2010 Pneumococcal Conjugate (PCV-13) 12/15/2014  8:16 AM  
 Pneumococcal Polysaccharide (PPSV-23) 1/24/2014 Pneumococcal Vaccine (Unspecified Type) 1/1/2006 Td 1/1/2006 Tdap 4/3/2013  8:23 PM  
 Zoster Vaccine, Live 1/1/2007 Not reviewed this visit You Were Diagnosed With   
  
 Codes Comments Degeneration of cervical intervertebral disc    -  Primary ICD-10-CM: M50.30 ICD-9-CM: 722.4 Facet hypertrophy of lumbar region     ICD-10-CM: M47.896 ICD-9-CM: 724.8 Cervical spinal stenosis     ICD-10-CM: M48.02 
ICD-9-CM: 723.0 Vitals BP Pulse Temp Resp Height(growth percentile) Weight(growth percentile) 150/75 65 97.9 °F (36.6 °C) (Oral) 16 6' (1.829 m) 216 lb (98 kg) SpO2 BMI Smoking Status 98% 29.29 kg/m2 Former Smoker Vitals History BMI and BSA Data Body Mass Index Body Surface Area  
 29.29 kg/m 2 2.23 m 2 Preferred Pharmacy Pharmacy Name Phone  [address] [phone] Your Updated Medication List  
  
   
This list is accurate as of: 2/12/18 10:40 AM.  Always use your most recent med list.  
  
  
  
  
 ACANYA 1.2-2.5 % Glwp Generic drug:  clindamycin-benzoyl peroxide  
  
 aspirin delayed-release 81 mg tablet Take 81 mg by mouth daily. aspirin-calcium carbonate 81 mg-300 mg calcium(777 mg) Tab 81 mg.  
  
 atenolol 25 mg tablet Commonly known as:  TENORMIN  
TAKE ONE TABLET BY MOUTH TWICE A DAY BESIVANCE 0.6 % Drps ophthalmic suspension Generic drug:  besifloxacin 1 Drop two (2) times a day. cholecalciferol 1,000 unit Cap Commonly known as:  VITAMIN D3 Take 1,000 Units by mouth daily. coenzyme Q-10 200 mg capsule Commonly known as:  CO Q-10 Take  by mouth. diclofenac 1 % Gel Commonly known as:  VOLTAREN Apply 2-4 g to affected area four (4) times daily. To affected area (topically) DUREZOL 0.05 % ophthalmic emulsion Generic drug:  Difluprednate Administer 1 Drop to both eyes four (4) times daily. ezetimibe 10 mg tablet Commonly known as:  ZETIA  
  
 ezetimibe-simvastatin 10-40 mg per tablet Commonly known as:  Vytorin 10/40 Take 1 Tab by mouth nightly. * gabapentin 400 mg capsule Commonly known as:  NEURONTIN  
1 tab PO QHS  Indications: pain * gabapentin 300 mg capsule Commonly known as:  NEURONTIN  
 Take 1 Cap by mouth nightly. Indications: NEUROPATHIC PAIN  
  
 guaiFENesin-codeine 100-10 mg/5 mL solution Commonly known as:  ROBITUSSIN AC Take 5 mL by mouth three (3) times daily as needed for Cough. Max Daily Amount: 15 mL. HYDROcodone-acetaminophen 5-325 mg per tablet Commonly known as:  [place] Take 1 Tab by mouth every four (4) hours as needed (for chronic pain). ILEVRO 0.3 % Drps Generic drug:  nepafenac Apply 1 Drop to eye. LORazepam 1 mg tablet Commonly known as:  ATIVAN Take 1 Tab by mouth every eight (8) hours as needed. losartan 25 mg tablet Commonly known as:  COZAAR  
TAKE ONE TABLET BY MOUTH TWICE A DAY MULTIVITAMIN PO Take  by mouth daily. NITROQUICK SL  
by SubLINGual route. OMEGA 3 FISH OIL PO Take 900 mg by mouth daily. prednisoLONE acetate 1 % ophthalmic suspension Commonly known as:  PRED FORTE  
  
 PROTONIX 40 mg tablet Generic drug:  pantoprazole Take 40 mg by mouth daily. rosuvastatin 40 mg tablet Commonly known as:  CRESTOR  
  
 sildenafil citrate 100 mg tablet Commonly known as:  VIAGRA Take 1 Tab by mouth daily as needed. Indications: Erectile Dysfunction  
  
 triamterene-hydroCHLOROthiazide 37.5-25 mg per tablet Commonly known as:  Hodges Minus TAKE ONE TABLET BY MOUTH DAILY * Notice: This list has 2 medication(s) that are the same as other medications prescribed for you. Read the directions carefully, and ask your doctor or other care provider to review them with you. We Performed the Following REFERRAL TO PHYSICAL THERAPY [I31 Custom] Comments:  
 Neck and back pain Evaluate and treat Follow-up Instructions Return in about 3 months (around 5/12/2018) for with [address] [place] [address]. Referral Information Referral ID Referred By Referred To  
  
 [id number] Anel TEJADA Not Available Visits Status Start Date End Date 1 New Request 2/12/18 2/12/19 If your referral has a status of pending review or denied, additional information will be sent to support the outcome of this decision. Introducing [facility] SERVICES! Dear Gin Rushing: Thank you for requesting a [employer] account. Our records indicate that you already have an active [employer] account. You can access your account anytime at [email or web address] Did you know that you can access your hospital and ER discharge instructions at any time in [employer]? You can also review all of your test results from your hospital stay or ER visit. Additional Information If you have questions, please visit the Frequently Asked Questions section of the [employer] website at [email or web address] Remember, [employer] is NOT to be used for urgent needs. For medical emergencies, dial 911. Now available from your iPhone and Android! Please provide this summary of care documentation to your next provider. Your primary care clinician is listed as Gila Gonzalez. If you have any questions after today's visit, please call [phone].

## 2018-02-12 NOTE — PROGRESS NOTES
Chief complaint/History of Present Illness:  Chief Complaint   Patient presents with    Neck Pain     3 month follow up    Arm Pain     left arm    Back Pain     lower back     HPI  Vivi Llamas is a  67 y.o.  male      HISTORY OF PRESENT ILLNESS:  The patient comes in today for follow up of his chronic neck and back pain. He gets pain in his neck that radiates to underneath his left shoulder blade at times and sometimes, if it is really bad, it will go into his left arm and left chest.  He has had a cardiac workup and has not had any cardiac issues. He also has back pain that radiates occasionally into the right lower extremity but mostly, it is his back that hurts. He has right knee pain from an old football injury back in college. He rates his pain level today as a 3/10. He is on Gabapentin 300 mg at nighttime and Voltaren gel that he uses on various joints through [employer] He gets Underhill through Dr. Adriana Koo. He recently moved in November to the [facility] at [employer]. She did a lot of lifting and stuff that flared up his neck and back but has gotten back down to his normal level now. He was in physical therapy, which really did help, but he had to stop it when he was moving. He would like to restart that. He is retired. He is a nonsmoker. He denies fever and bowel or bladder dysfunction. He did undergo right carpal tunnel surgery release in December by Dr. Sandrita Richardson and is doing well from that. PHYSICAL EXAM:  Mr. Lien Penny is a 60-year-old male. He is alert and oriented. He has a normal mood and affect. He has a full weightbearing, non-antalgic gait. He has a fairly normal tandem gait. He has 4/5 strength of the bilateral upper and lower extremities, negative Cortess, and negative straight leg raise.        ASSESSENT/PLAN:  This is a patient with cervical stenosis, cervical and lumbar facet arthropathy, and degenerative disc disease, cervical and lumbar. We are going to reorder his physical therapy for his neck and his back. He does not need a refill of his Gabapentin at this time. We will see him back in three months or sooner if needed following his therapy.        Review of systems:    Past Medical History:   Diagnosis Date    Adenoma of left adrenal gland     2009  1 cm, no change 1/15, 2/16    Asbestosis     Atrial fibrillation     CHA2DS2-VAsc=3(age+, htn+, vasc dx+; estimated yearly stroke risk according to Lip et al. is 3,2%), Hasbled=2(estimated yearly bleeding risk according to pisters et al. is 1,88%); not anticoagulated due to h/o gi bleed    Atrial fibrillation ablation     10/08    Basal cell cancer     Dr Jerry Ray; he's had >350 lesions removed    Carotid occlusion     left     Cervical radiculopathy     MRI 9/11 showed C3-6 severe foraminal stenosis    Chronic pain     Colon polyp     Dr Vanessa Cole 9/15    Coronary artery disease     RCA - 3.0 x 16mm TAXUS 9/04, 3.0 x 16mm TAXUS 12/06    Dyslipidemia     Erectile dysfunction     GERD     GI bleed     Hearing loss     2014  bilateral Dr John Livings    Hernia, umbilical     Hypertension     Hypogonadism male     Lumbar radiculopathy     Dr Lytle Bosworth;  MRI 9/11 L4-5 disc bulging, annular tear, disc dessication    Myofascial pain dysfunction syndrome     pain clinic     Osteoarthritis     right knee Dr Land Roles Peripheral vascular disease     50-60% R iliac; s/p R iliac stent and L femoral artery stent in past; R OLIVIA 0.76 (1/16)    Plantar fasciitis     bilateral     PPD positive     Prediabetes     Prostate cancer     T1c Imelda 7(3+4), 70% in 1 core, GS 7 (3+4) in 4 cores, GS 6 (3+3) in 1 core; psa 5.28, TRUS 18 gm;  Dr Vanessa Cole; s/p cryoablation 10/16    Recurrent umbilical hernia     Subclinical hypothyroidism     denies    Tinnitus     Dr Radha Flores Ulcerative colitis     Venous insufficiency      Past Surgical History:   Procedure Laterality Date    HX CAROTID ENDARTERECTOMY      1/14  right    HX CATARACT REMOVAL      HX COLONOSCOPY      Dr Brent Sands 9/15 polyps    HX CRYOABLATION OF THE PROSTATE      10/16    HX HEMORRHOIDECTOMY      1979    [facility]    HX ORTHOPAEDIC      right knee surgery    HX ORTHOPAEDIC  12/2017    Dr Venkat Roper; R CTS release    HX REFRACTIVE SURGERY      OD (hemoplasty to right eye on retina to avoid blindness)    HX TONSILLECTOMY      1955    ND LAP, RECURRENT INCISIONAL HERNIA REPAIR,REDUCIBLE N/A 02/09/2017    Dr. Lindsey Hawk HERNIA,5+Y/O,REDUCIBL      2/16  left  Dr. Crowley Robert XWQU,7+O/Z,MPFBY      2/16  Dr. Chauncey Cueva      1/16  R OLIVIA 0.76, L OLIVIA 1.02    VASCULAR SURGERY PROCEDURE UNLIST      10/15   left fem art and left iliac stent     Social History     Social History    Marital status:      Spouse name: N/A    Number of children: N/A    Years of education: N/A     Occupational History    Not on file. Social History Main Topics    Smoking status: Former Smoker     Packs/day: 1.50     Years: 25.00     Types: Cigarettes     Quit date: 1/1/2003    Smokeless tobacco: Never Used    Alcohol use 2.5 oz/week     5 Cans of beer per week      Comment: 5 beers a week    Drug use: No    Sexual activity: Yes     Partners: Female     Birth control/ protection: None     Other Topics Concern    Not on file     Social History Narrative     Family History   Problem Relation Age of Onset    Heart Disease Mother     Hypertension Mother     Diabetes Mother     Arthritis-osteo Mother     Heart Disease Father     Stroke Father     Hypertension Father     Heart Disease Sister     Hypertension Sister        Physical Exam:  Visit Vitals    /75    Pulse 65    Temp 97.9 °F (36.6 °C) (Oral)    Resp 16    Ht 6' (1.829 m)    Wt 216 lb (98 kg)    SpO2 98%    BMI 29.29 kg/m2     Pain Scale: 3/10       has been . reviewed and is appropriate          Diagnoses and all orders for this visit:    1. Degeneration of cervical and lumbar spine, relative cervical stenosis  -     REFERRAL TO PHYSICAL THERAPY    2. Facet hypertrophy of lumbar region  -     REFERRAL TO PHYSICAL THERAPY    3. Cervical spinal stenosis  -     REFERRAL TO PHYSICAL THERAPY            Follow-up Disposition:  Return in about 3 months (around 2018) for with Malcolm Jordan.         We have informed Kathi Apgar to notify us for immediate appointment if he has any worsening neurogical symptoms or if an emergency situation presents, then call [phone]

## 2018-02-19 DIAGNOSIS — M54.10 RADICULOPATHY, UNSPECIFIED SPINAL REGION: ICD-10-CM

## 2018-02-19 NOTE — TELEPHONE ENCOUNTER
[facility]  reports the last fill date for Norco as 01/09/2018 for a 30 d/s. There appears to be no inconsistencies in regards to the prescribing of this medication. Last Visit: 01/09/2018 with MD Ruma Ocasio    Next Appointment: RTC 05/18  Previous Refill Encounters: 01/09/2018 per MD Ruma Ocasio #[id number]     Requested Prescriptions     Pending Prescriptions Disp Refills    HYDROcodone-acetaminophen (NORCO) 5-325 mg per tablet 180 Tab 0     Sig: Take 1 Tab by mouth every four (4) hours as needed (for chronic pain).

## 2018-02-20 ENCOUNTER — HOSPITAL ENCOUNTER (OUTPATIENT)
Dept: PHYSICAL THERAPY | Age: 73
Discharge: HOME | End: 2018-02-20
Payer: MEDICARE

## 2018-02-20 PROCEDURE — G8979 MOBILITY GOAL STATUS: HCPCS

## 2018-02-20 PROCEDURE — 97162 PT EVAL MOD COMPLEX 30 MIN: CPT

## 2018-02-20 PROCEDURE — 97110 THERAPEUTIC EXERCISES: CPT

## 2018-02-20 PROCEDURE — 97530 THERAPEUTIC ACTIVITIES: CPT

## 2018-02-20 PROCEDURE — G8978 MOBILITY CURRENT STATUS: HCPCS

## 2018-02-20 RX ORDER — HYDROCODONE BITARTRATE AND ACETAMINOPHEN 5; 325 MG/1; MG/1
1 TABLET ORAL
Qty: 180 TAB | Refills: 0 | Status: SHIPPED | OUTPATIENT
Start: 2018-02-20 | End: 2018-03-26 | Stop reason: SDUPTHER

## 2018-02-20 NOTE — PROGRESS NOTES
PT DAILY TREATMENT NOTE - [facility]     Patient Name: Sharon Sylvester  Date:2018  : 1945  [x]  Patient  Verified  Payor: [facility] MEDICARE / Plan: VA MEDICARE PART A & B / Product Type: Medicare /    In time:1000  Out time:1100  Total Treatment Time (min): 60  Total Timed Codes (min): 25  1:1 Treatment Time ( only): 60   Visit #: 1 of 12    Treatment Area: Low back pain [M54.5]  Cervicalgia [M54.2]    Physical Therapy Evaluation Cervical Spine    SUBJECTIVE    Any medication changes, allergies to medications, adverse drug reactions, diagnosis change, or new procedure performed?: [x] No    [] Yes (see summary sheet for update)    Subjective functional status/changes:     PLOF: Chronic LBP and Neck Pain, Ambulation tolerance (30 minutes), (I) Functional ADLs, Retired, RHD  Current Functional Status: Ambulation tolerance (20 minutes - secondary to LE claudication), Limitations in sleep quality  Work Hx: Retired  Living Situation: Lives with wife, 1-story home  Comorbidities: Arthritis, Back Pain, Prostate Cancer (remission), Skin Cancer (Active - Basal cell), Stent placement x2, R Carotid Artery Cleaning, L Femoral Artery Stent, R Iliac Arterial Stent, Peripheral Vascular Disease, GI Disease, HTN, R Knee Surgery (1964(  Medications: Norco (every 6 hours), Voltaren Gel (PRN)    Pain Intensity (0-10, VAS): Current 8, Worst 2, Best 5    Patient Goals: \"Increas my range of motion and be more active without hurting so bad\"    OBJECTIVE  BP: 138/76 mmHg  Posture:   Increased thoracic kyphsos  Shoulder/Scapular Screen: Reproduction of L scapular pain with L shoulder abduction and functional ER    Active Movements: [] N/A   [] Too acute   [] Other:  ROM AROM Comments   Forward flexion 60    Extension 24 L cervical pain 2-3/10   SB right 30 L cervical \"pull\"    SB left  38 L cervical \"pull\"   Rotation right 52 R cervical \"pull\"   Rotation left 52 L cervical pain 2-3/10)     Thoracic Spine: Limited thoracic extension 75%     Neurological Screen (myotome/dermatome/reflexes): [] WNL   Light Touch Sensation: Hyposensitivity R C7-C8 dermatome   Deep Tendon Reflexes (C5-C7): 0+ L/R Biceps, 0+ L/R Brachioradialis, 0+ L/R Triceps   Myotomal Strength (C4-T1): Normal symmetrical strength C4-T1    Other: (-) L/R Cortes, (-) L/R Clonus    Upper Limb Tension Tests: [] N/A       Median: (+) L (+ differentiation maneuver)     LUMBAR OBJECTIVE EXAMINATION    Lumbar Posture: Normal lumbar lordosis, Symmetrical positioning of soft tissue, Anterior weightshift with posterior shoulder positioning    Functional Testin.  SLS: L 16 sec / R 7 sec (+ Trendelenberg sign)    Active Movements: [] N/A   [] Too acute   [] Other:  ROM % AROM Comments   Forward flexion 40-60 50% limited Dec reversal lumbar lordosis, Lumbar pain 4/10, R LE pain 3/10   Extension 20-30 50% limited Localized R lumbar pain    SB right 20-30 50% limited    SB left 20-30 50% limited    Rotation right 5-10 50% limited    Rotation left 5-10 60% limited    R Knee Extension AROM: 0 - 16 - 112    Neuro Screen [] WNL  Dermatome: Hyposensitivity reported L3-L5  Myotome: Symmetrical and normal bilaterally L2-L5  Reflexes: 1+ L/R Patellar, 0+ L/R Achilles    Seated Hip Screen: (+) R KERI, (+) FADDIR    Dural Mobility:  SLR Supine: (+) R (>45 degrees)    Special Tests       Hip: Cher Sidles:  [x] R    [] L    [x] +    [] -     Scour:  [x] R    [] L    [x] +    [] -     FADDIR: [x] R    [] L    [x] +    [] -     OBJECTIVE    35 min [x]Eval                  []Re-Eval     15 min Therapeutic Exercise:  [x] See flow sheet : Patient educated in completion of prescribed HEP and provided with written HEP instructions, Patient educated regarding diagnosis and PT PoC   Rationale: increase ROM and increase strength to improve the patients ability to improve ease with household ADLs    10 min Therapeutic Activity:  [x]  See flow sheet : Patient educated regarding modifications to make with completion of household and self-care ADLs in order to improve activity tolerance and independence and reduce pain   Rationale: increase ROM and increase strength  to improve the patients ability to improve ease with functional ADLs     With   [] TE   [] TA   [] neuro   [] other: Patient Education: [x] Review HEP    [] Progressed/Changed HEP based on:   [] positioning   [] body mechanics   [] transfers   [] heat/ice application    [] other:       Pain Level (0-10 scale) post treatment: Cervical 4-5/10, Lumbar 3/10    ASSESSMENT/Changes in Function: Patient presents with a chronic history of L cervical and R lumbar pain with patient reporting gradual worsening of symptoms over the last few years. Patient as well presents with a complicated medical history with patient with subjective report of the following: L carotid artery 100% blocked, R Carotid artery endarterectomy, L Femoral artery stent, R iliac artery stent, cardiac stent x2, prostate cancer (remission), skin cancer (active -basal cell) and chronic R knee pain (surgical reconstruction 1964). Patient denies the following red flag findings: (-) cardiac signs/symptoms, (-) unrelenting night pain, (-) 5D's and 3 N's, and (-) constitutional s/s. Patient demonstrates a normal neurological findings with patient without any (+) UMN signs. Patient demonstrates /78 mmHg. Due to past medical history caution taken with completion of objective examination with caution to subsequently be taken during follow up treatment session. With completion of cervical AROM patient demonstrates poor upper thoracic mobility with patient demonstrating reproduction of discomfort with all AROM except for cervical flexion. Patient noted with a (+) L ULTT, median nerve bias. With respect to the lumbar spine patient demonstrates generalized non-specific limitations in AROM with (+) R hip screen and (+) R supine SLR.  Emphasis of treatment to be placed on improving cervical and lumbar AROM within a tolerable range, ensuring consideration of comorbidities, and reducing adverse peripheral neurodynamic tension in order to improve patient activity tolerance and reduce functional limitations. Patient will continue to benefit from skilled PT services to modify and progress therapeutic interventions, address functional mobility deficits, address ROM deficits, address strength deficits, analyze and address soft tissue restrictions, analyze and cue movement patterns, analyze and modify body mechanics/ergonomics, assess and modify postural abnormalities and address imbalance/dizziness to attain remaining goals. [x]  See Plan of Care  []  See progress note/recertification  []  See Discharge Summary         Progress towards goals / Updated goals:    Short Term Goals: To be accomplished in 3 weeks:  1. Patient will demonstrate full compliance with prescribed HEP. Eval: HEP provided  2. Patient will demonstrate a 25% improvement in lumbar flexion AROM in order to improve ease with household ADLs. Eval: Lumbar Flexion 50% limited, Lumbar pain 4/10, R LE pain 3/10  3. Patient will demonstrate a 10 degree improvement in cervical extension AROM to improve ease with overhead ADLs. Eval: Cervical Extension AROM = 24 deg, L cervical pain 3/10    Long Term Goals: To be accomplished in 6 weeks:  1. Patient will demonstrate a significant functional improvement as demonstrated by a score of >/= 60 on lumbar FOTO and >/=60 on neck FOTO. Eval: Neck FOTO = 53, Lumbar FOTO = 53  2. Patient will demonstrate a (-) L UE ULTT, median nerve bias, and (-) R SLR in order to improve ease with activity tolerance. Eval: (+) L UE ULTT, median nerve bias, (+) R SLR  3. Patient will demonstrate L/R SLS >/=25 seconds in order to improve ease with ambulation on uneven ground.   Eval: R SLS = 7 sec    PLAN  [x]  Upgrade activities as tolerated     []  Continue plan of care  []  Update interventions per flow sheet       [] Discharge due to:_  []  Other:_      Denise Redd, PT 2/20/2018  8:39 AM    Future Appointments  Date Time Provider Mary Amy   2/20/2018 10:00 AM Cain Holbrook [facility]   2/26/2018 9:15 AM Marvin Dias MD [place]    3/30/2018 8:30 AM [facility] NURSE [facility] SCHED   4/12/2018 8:45 AM Pamela Ruiz MD [facility]   5/8/2018 8:00 AM [facility] NURSE VISIT [facility] SCHED   5/14/2018 10:10 AM Wilmar Hobson NP [facility] SCHED   5/15/2018 8:40 AM Briana Baldwin MD [facility]

## 2018-02-20 NOTE — PROGRESS NOTES
[facility]              [address]        [place], [address] [place]   [phone] fax    Plan of Care/ Statement of Necessity for Physical Therapy Services    Patient name: Angelica Berry Start of Care: 2018   Referral source: Diego Delgado MD : 1945    Medical Diagnosis: Low back pain [M54.5]  Cervicalgia [M54.2]   Onset Date:Chronic, Worsening over the last year    Treatment Diagnosis: Cervical and Lumbar Hypomobility   Prior Hospitalization: see medical history Provider#: [id number]   Medications: Verified on Patient summary List    Comorbidities: Arthritis, Back Pain, Prostate Cancer (remission), Skin Cancer (Active - Basal cell), Stent placement x2, R Carotid Artery Cleaning, L Femoral Artery Stent, R Iliac Arterial Stent, Peripheral Vascular Disease, GI Disease, HTN, R Knee Surgery ()   Prior Level of Function: Chronic LBP and Neck Pain, Ambulation tolerance (30 minutes), (I) Functional ADLs, Retired, RHD  Current Functional Status: Ambulation tolerance (20 minutes - secondary to LE claudication), Limitations in sleep quality      The Plan of Care and following information is based on the information from the initial evaluation. Assessment:    Patient presents with a chronic history of L cervical and R lumbar pain with patient reporting gradual worsening of symptoms over the last few years. Patient as well presents with a complicated medical history with patient with subjective report of the following: L carotid artery 100% blocked, R Carotid artery endarterectomy, L Femoral artery stent, R iliac artery stent, cardiac stent x2, prostate cancer (remission), skin cancer (active -basal cell) and chronic R knee pain (surgical reconstruction ). Patient denies the following red flag findings: (-) cardiac signs/symptoms, (-) unrelenting night pain, (-) 5D's and 3 N's, and (-) constitutional s/s.  Patient demonstrates a normal neurological findings with patient without any (+) UMN signs. Patient demonstrates /78 mmHg. Due to past medical history caution taken with completion of objective examination with caution to subsequently be taken during follow up treatment session. With completion of cervical AROM patient demonstrates poor upper thoracic mobility with patient demonstrating reproduction of discomfort with all AROM except for cervical flexion. Patient noted with a (+) L ULTT, median nerve bias. With respect to the lumbar spine patient demonstrates generalized non-specific limitations in AROM with (+) R hip screen and (+) R supine SLR. Emphasis of treatment to be placed on improving cervical and lumbar AROM within a tolerable range, ensuring consideration of comorbidities, and reducing adverse peripheral neurodynamic tension in order to improve patient activity tolerance and reduce functional limitations.     Patient will continue to benefit from skilled PT services to modify and progress therapeutic interventions, address functional mobility deficits, address ROM deficits, address strength deficits, analyze and address soft tissue restrictions, analyze and cue movement patterns, analyze and modify body mechanics/ergonomics, assess and modify postural abnormalities and address imbalance/dizziness to attain remaining goals.      Key Information:    BP: 138/76 mmHg  Posture:   Increased thoracic kyphsos  Shoulder/Scapular Screen: Reproduction of L scapular pain with L shoulder abduction and functional ER     Active Movements: [] N/A   [] Too acute   [] Other:  ROM AROM Comments   Forward flexion 60     Extension 24 L cervical pain 2-3/10   SB right 30 L cervical \"pull\"    SB left  38 L cervical \"pull\"   Rotation right 52 R cervical \"pull\"   Rotation left 52 L cervical pain 2-3/10)      Thoracic Spine: Limited thoracic extension 75%      Neurological Screen (myotome/dermatome/reflexes): [] WNL                        Light Touch Sensation: Hyposensitivity R C7-C8 dermatome                        Deep Tendon Reflexes (C5-C7): 0+ L/R Biceps, 0+ L/R Brachioradialis, 0+ L/R Triceps                        Myotomal Strength (C4-T1): Normal symmetrical strength C4-T1                         Other: (-) L/R Cortes, (-) L/R Clonus     Upper Limb Tension Tests: [] N/A       Median: (+) L (+ differentiation maneuver)                           LUMBAR OBJECTIVE EXAMINATION     Lumbar Posture: Normal lumbar lordosis, Symmetrical positioning of soft tissue, Anterior weightshift with posterior shoulder positioning     Functional Testin.  SLS: L 16 sec / R 7 sec (+ Trendelenberg sign)     Active Movements: [] N/A   [] Too acute   [] Other:  ROM % AROM Comments   Forward flexion 40-60 50% limited Dec reversal lumbar lordosis, Lumbar pain 4/10, R LE pain 3/10   Extension 20-30 50% limited Localized R lumbar pain    SB right 20-30 50% limited     SB left 20-30 50% limited     Rotation right 5-10 50% limited     Rotation left 5-10 60% limited     R Knee Extension AROM: 0 - 16 - 112     Neuro Screen [] WNL  Dermatome: Hyposensitivity reported L3-L5  Myotome: Symmetrical and normal bilaterally L2-L5  Reflexes: 1+ L/R Patellar, 0+ L/R Achilles     Seated Hip Screen: (+) R KERI, (+) JOSE     Dural Mobility:  SLR Supine: (+) R (>45 degrees)     Special Tests       Hip:                      [place] Durán:                                  [x] R    [] L    [x] +    [] -                                     Scour:                                  [x] R    [] L    [x] +    [] -                                     FADDIR:                              [x] R    [] L    [x] +    [] -      Evaluation Complexity History MEDIUM  Complexity : 1-2 comorbidities / personal factors will impact the outcome/ POC ; Examination MEDIUM Complexity : 3 Standardized tests and measures addressing body structure, function, activity limitation and / or participation in recreation  ;Presentation MEDIUM Complexity : Evolving with changing characteristics  ; Clinical Decision Making MEDIUM Complexity : FOTO score of 26-74  Overall Complexity Rating: MEDIUM  Problem List: pain affecting function, decrease ROM, decrease strength, impaired gait/ balance, decrease ADL/ functional abilitiies, decrease activity tolerance, decrease flexibility/ joint mobility and decrease transfer abilities   Treatment Plan may include any combination of the following: Therapeutic exercise, Therapeutic activities, Neuromuscular re-education, Physical agent/modality, Gait/balance training, Manual therapy, Patient education, Self Care training, Functional mobility training, Home safety training and Stair training  Patient / Family readiness to learn indicated by: asking questions, trying to perform skills and interest  Persons(s) to be included in education: patient (P)  Barriers to Learning/Limitations: None  Patient Goal (s): \"Increase my range of motion and be more active without hurting so bad\"  Patient Self Reported Health Status: fair  Rehabilitation Potential: good    Short Term Goals: To be accomplished in 3 weeks:  1. Patient will demonstrate full compliance with prescribed HEP. 2. Patient will demonstrate a 25% improvement in lumbar flexion AROM in order to improve ease with household ADLs. 3. Patient will demonstrate a 10 degree improvement in cervical extension AROM to improve ease with overhead ADLs. Long Term Goals: To be accomplished in 6 weeks:  1. Patient will demonstrate a significant functional improvement as demonstrated by a score of >/= 60 on lumbar FOTO and >/=60 on neck FOTO. 2. Patient will demonstrate a (-) L UE ULTT, median nerve bias, and (-) R SLR in order to improve ease with activity tolerance. 3. Patient will demonstrate L/R SLS >/=25 seconds in order to improve ease with ambulation on uneven ground. Frequency / Duration: Patient to be seen 2 times per week for 6 weeks.     Patient/ Caregiver education and instruction: Diagnosis, prognosis, self care, activity modification and exercises   [x]  Plan of care has been reviewed with PTA    G-Codes (GP)  Mobility   Current  CK= 40-59%   Goal  CK= 40-59%    The severity rating is based on clinical judgment and the FOTO score. Certification Period: 2/20/2018 - 4/21/2018  Nguyen Russell, PT 2/20/2018 8:41 AM  ________________________________________________________________________    I certify that the above Therapy Services are being furnished while the patient is under my care. I agree with the treatment plan and certify that this therapy is necessary.     [de-identified] Signature:____________________  Date:____________Time: _________    Please sign and return to [facility]              [address] [place], [address] [place]   [phone] fax

## 2018-02-23 ENCOUNTER — HOSPITAL ENCOUNTER (OUTPATIENT)
Dept: PHYSICAL THERAPY | Age: 73
Discharge: HOME | End: 2018-02-23
Payer: MEDICARE

## 2018-02-23 PROCEDURE — 97140 MANUAL THERAPY 1/> REGIONS: CPT

## 2018-02-23 PROCEDURE — 97112 NEUROMUSCULAR REEDUCATION: CPT

## 2018-02-23 NOTE — PROGRESS NOTES
PT DAILY TREATMENT NOTE - [facility]     Patient Name: Katrinka Rubinstein  Date:2018  : 1945  [x]  Patient  Verified  Payor: Almas Mins / Plan: [place] / Product Type: Medicare /    In time:0800  Out VLRN:[id number]  Total Treatment Time (min): 43  Total Timed Codes (min): 33  1:1 Treatment Time ( [address] only): 25   Visit #: 2 of 12    Treatment Area: Low back pain [M54.5]  Cervicalgia [M54.2]    SUBJECTIVE  Pain Level (0-10 scale): L UE 7/10  Any medication changes, allergies to medications, adverse drug reactions, diagnosis change, or new procedure performed?: [x] No    [] Yes (see summary sheet for update)  Subjective functional status/changes:   [] No changes reported  Patient reports increase in L UE symptoms with reaching into high cabinet with L UE this AM.    OBJECTIVE    Modality rationale: decrease pain and increase tissue extensibility to improve the patients ability to improve ease with sleep   Min Type Additional Details   10 []  Ice     [x]  heat  []  Ice massage Position: Reclined  Location: Cervical, Post-Tx     10 min Therapeutic Exercise:  [x] See flow sheet : Emphasis placed on improving available shoulder, cervical, lumbar and hip AROM and strength of the shoulder and hip musculature   Rationale: increase ROM and increase strength to improve the patients ability to improve ease with self-care ADLs    15 min Neuromuscular Re-education:  [x]  See flow sheet : Emphasis placed on improving activation and recruitment of the deep cervical musculature and cervical and pelvic proprioceptive awareness   Rationale: increase ROM, increase strength and increase proprioception  to improve the patients ability to improve ease with checking blindspots while driving    8 min Manual Therapy:    Supine, L Glenohumeral Inferior Grade III Mobilization (OPP)  Supine, L Glenohumeral AP Grade II/III Mobilization (inc deg IR)   Rationale: decrease pain, increase ROM and increase tissue extensibility to improve ease with overhead functional lifting. With   [] TE   [] TA   [] neuro   [] other: Patient Education: [x] Review HEP    [] Progressed/Changed HEP based on:   [] positioning   [] body mechanics   [] transfers   [] heat/ice application    [] other:      Other Objective/Functional Measures: BP: 150/84     Pain Level (0-10 scale) post treatment: 4-5    ASSESSMENT/Changes in Function: Patient with improved available L shoulder flexion and abduction AROM demonstrated with reduced shoulder and cervical discomfort reported after addressing L glenohumeral capsular limitations. Non-painful L glenohumeral crepitus reported with scapulothoracic strengthening without progression with repetition. Patient will continue to benefit from skilled PT services to modify and progress therapeutic interventions, address functional mobility deficits, address ROM deficits, address strength deficits, analyze and address soft tissue restrictions, analyze and cue movement patterns and analyze and modify body mechanics/ergonomics to attain remaining goals. []  See Plan of Care  []  See progress note/recertification  []  See Discharge Summary         Progress towards goals / Updated goals:    Short Term Goals: To be accomplished in 3 weeks:  1. Patient will demonstrate full compliance with prescribed HEP. Eval: HEP provided  Current: Met, HEP performance reported as prescribed, 2/23/2018  2. Patient will demonstrate a 25% improvement in lumbar flexion AROM in order to improve ease with household ADLs. Eval: Lumbar Flexion 50% limited, Lumbar pain 4/10, R LE pain 3/10  3. Patient will demonstrate a 10 degree improvement in cervical extension AROM to improve ease with overhead ADLs. Eval: Cervical Extension AROM = 24 deg, L cervical pain 3/10     Long Term Goals: To be accomplished in 6 weeks:  1.  Patient will demonstrate a significant functional improvement as demonstrated by a score of >/= 60 on lumbar FOTO and >/=60 on neck FOTO. Eval: Neck FOTO = 53, Lumbar FOTO = 53  2. Patient will demonstrate a (-) L UE ULTT, median nerve bias, and (-) R SLR in order to improve ease with activity tolerance. Eval: (+) L UE ULTT, median nerve bias, (+) R SLR  3. Patient will demonstrate L/R SLS >/=25 seconds in order to improve ease with ambulation on uneven ground.   Eval: R SLS = 7 sec    PLAN  [x]  Upgrade activities as tolerated     [x]  Continue plan of care  []  Update interventions per flow sheet       []  Discharge due to:_  []  Other:_      Silverio Melendez PT 2/23/2018  6:38 AM    Future Appointments  Date Time Provider Mary Reyes   2/23/2018 8:00 AM Silverio Melendez PT [facility]   2/26/2018 9:15 AM Jeramy Sherman MD [place] [id number]   2/27/2018 9:00 AM Kin Escobedo, PTA [facility]   3/2/2018 7:30 AM Kin Escobedo, PTA [facility]   3/6/2018 8:00 AM Kin Escobedo, PTA [facility]   3/9/2018 7:30 AM Kin Escobedo, PTA [facility]   3/14/2018 8:00 AM [facility] PT [place] [address] [facility]   3/20/2018 8:00 AM Kin Escobedo, PTA [facility]   3/23/2018 7:30 AM Silverio Melendez PT [facility]   3/27/2018 8:30 AM Silverio Melendez PT [facility]   3/29/2018 8:30 AM Silverio Melendez PT [facility]   3/30/2018 8:30 AM [place] NURSE [facility] SCHED   4/12/2018 8:45 AM Nadene Barthel, MD [id number] [facility]   5/8/2018 8:00 AM [facility] NURSE VISIT [facility] SCHED   5/14/2018 10:10 AM Claudina Osler, NP [facility] SCHED   5/15/2018 8:40 AM Sneha Singleton MD [facility]

## 2018-02-26 ENCOUNTER — OFFICE VISIT (OUTPATIENT)
Dept: VASCULAR SURGERY | Age: 73
End: 2018-02-26

## 2018-02-26 VITALS
HEART RATE: 65 BPM | DIASTOLIC BLOOD PRESSURE: 72 MMHG | BODY MASS INDEX: 29.26 KG/M2 | HEIGHT: 72 IN | SYSTOLIC BLOOD PRESSURE: 124 MMHG | RESPIRATION RATE: 20 BRPM | WEIGHT: 216 LBS

## 2018-02-26 DIAGNOSIS — I65.22 LEFT CAROTID ARTERY OCCLUSION: Primary | ICD-10-CM

## 2018-02-26 DIAGNOSIS — I73.9 PERIPHERAL VASCULAR DISEASE (HCC): ICD-10-CM

## 2018-02-26 DIAGNOSIS — I70.219 ATHEROSCLEROSIS OF ARTERY OF EXTREMITY WITH INTERMITTENT CLAUDICATION (HCC): ICD-10-CM

## 2018-02-26 NOTE — PROGRESS NOTES
Martina Malagon    Chief Complaint   Patient presents with    Leg Pain       History and Physical    Martina Malagon is a 67 y.o. male who is status post left carotid endarterectomy. He also has known peripheral arterial disease with a right common iliac artery stent and previous left lower extremity atherectomy. Overall he seems to be doing quite well. He is able to continue to work at home up to 8-10 hours a day with minimal claudication. His right leg is worse than his left. But he is not having any lifestyle limiting claudication. No TIA or strokelike symptoms. No fevers or chills.     Past Medical History:   Diagnosis Date    Adenoma of left adrenal gland     2009  1 cm, no change 1/15, 2/16    Asbestosis     Atrial fibrillation     CHA2DS2-VAsc=3(age+, htn+, vasc dx+; estimated yearly stroke risk according to Lip et al. is 3,2%), Hasbled=2(estimated yearly bleeding risk according to pisters et al. is 1,88%); not anticoagulated due to h/o gi bleed    Atrial fibrillation ablation     10/08    Basal cell cancer     Dr Oscar House; he's had >350 lesions removed    Carotid occlusion     left     Cervical radiculopathy     MRI 9/11 showed C3-6 severe foraminal stenosis    Chronic pain     Colon polyp     Dr Chucky Pickering 9/15    Coronary artery disease     RCA - 3.0 x 16mm TAXUS 9/04, 3.0 x 16mm TAXUS 12/06    Dyslipidemia     Erectile dysfunction     GERD     GI bleed     Hearing loss     2014  bilateral Dr aSrah Caro    Hernia, umbilical     Hypertension     Hypogonadism male     Lumbar radiculopathy     Dr Michael Talley;  MRI 9/11 L4-5 disc bulging, annular tear, disc dessication    Myofascial pain dysfunction syndrome     pain clinic     Osteoarthritis     right knee Dr Mariajose Toscano Peripheral vascular disease     50-60% R iliac; s/p R iliac stent and L femoral artery stent in past; R OLIVIA 0.76 (1/16)    Plantar fasciitis     bilateral     PPD positive     Prediabetes     Prostate cancer T1c Imelda 7(3+4), 70% in 1 core, GS 7 (3+4) in 4 cores, GS 6 (3+3) in 1 core; psa 5.28, TRUS 18 gm;  Dr Vanessa Cole; s/p cryoablation 10/16    Recurrent umbilical hernia     Subclinical hypothyroidism     denies    Tinnitus     Dr Radha Flores Ulcerative colitis     Venous insufficiency      Past Surgical History:   Procedure Laterality Date    HX CAROTID ENDARTERECTOMY      1/14  right    HX Ali Sers HX COLONOSCOPY      Dr Vanessa Cole 9/15 polyps    HX CRYOABLATION OF THE PROSTATE      10/16    HX HEMORRHOIDECTOMY      1979    [facility]    HX ORTHOPAEDIC      right knee surgery    HX ORTHOPAEDIC  12/2017    Dr Brooklyn Avery; R CTS release    HX REFRACTIVE SURGERY      OD (hemoplasty to right eye on retina to avoid blindness)    HX TONSILLECTOMY      1955    LA LAP, RECURRENT INCISIONAL HERNIA REPAIR,REDUCIBLE N/A 02/09/2017    Dr. Kaitlin Fragoso HERNIA,5+Y/O,REDUCIBL      2/16  left  Dr. Vel Marshall BUSJ,0+E/O,NDCZU      2/16  Dr. Robles Orn      1/16  R OLIVIA 0.76, L OLIVIA 1.02    VASCULAR SURGERY PROCEDURE UNLIST      10/15   left fem art and left iliac stent     Patient Active Problem List   Diagnosis Code    Colitis, ulcerative ([address] K51.90    Colon polyps K63.5    Peripheral vascular disease ([facility] [address] Dr Saad Anderson I73.9    Left carotid artery occlusion I65.22    Atherosclerosis of artery of extremity with intermittent claudication ([facility] [address] I70.219    Hypovitaminosis D E55.9    Advance directive in chart Z78.9    Hypertension I11.9    Dyslipidemia E78.5    Coronary artery disease I25.10    Atrial fibrillation I48.91    Recurrent umbilical hernia G05.4    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 9) E66.3    History of prostate cancer Z85.46    Facet hypertrophy of lumbar region M47.896     Current Outpatient Prescriptions   Medication Sig Dispense Refill    HYDROcodone-acetaminophen (NORCO) 5-325 mg per tablet Take 1 Tab by mouth every four (4) hours as needed (for chronic pain). 180 Tab 0    ezetimibe-simvastatin (VYTORIN 10/40) 10-40 mg per tablet Take 1 Tab by mouth nightly. 90 Tab 3    gabapentin (NEURONTIN) 300 mg capsule Take 1 Cap by mouth nightly. Indications: NEUROPATHIC PAIN 90 Cap 1    aspirin-calcium carbonate 81 mg-300 mg calcium(777 mg) tab 81 mg.      NITROGLYCERIN (NITROQUICK SL) by SubLINGual route.  atenolol (TENORMIN) 25 mg tablet TAKE ONE TABLET BY MOUTH TWICE A  Tab 2    diclofenac (VOLTAREN) 1 % gel Apply 2-4 g to affected area four (4) times daily. To affected area (topically) 100 g 5    LORazepam (ATIVAN) 1 mg tablet Take 1 Tab by mouth every eight (8) hours as needed. 50 Tab 0    losartan (COZAAR) 25 mg tablet TAKE ONE TABLET BY MOUTH TWICE A  Tab 2    aspirin delayed-release 81 mg tablet Take 81 mg by mouth daily.  triamterene-hydrochlorothiazide (MAXZIDE) 37.5-25 mg per tablet TAKE ONE TABLET BY MOUTH DAILY 90 Tab 3    Cholecalciferol, Vitamin D3, 1,000 unit cap Take 1,000 Units by mouth daily.  OMEGA-3 FATTY ACIDS/FISH OIL (OMEGA 3 FISH OIL PO) Take 900 mg by mouth daily.  MULTIVITAMIN PO Take  by mouth daily.  pantoprazole (PROTONIX) 40 mg tablet Take 40 mg by mouth daily.  coenzyme q10 200 mg Cap Take  by mouth.  rosuvastatin (CRESTOR) 40 mg tablet       ezetimibe (ZETIA) 10 mg tablet       guaiFENesin-codeine (ROBITUSSIN AC) 100-10 mg/5 mL solution Take 5 mL by mouth three (3) times daily as needed for Cough.  Max Daily Amount: 15 mL. 120 mL 0    ACANYA 1.2-2.5 % glwp       prednisoLONE acetate (PRED FORTE) 1 % ophthalmic suspension       gabapentin (NEURONTIN) 400 mg capsule 1 tab PO QHS  Indications: pain (Patient not taking: Reported on 1/26/2018) 90 Cap 1    besifloxacin (BESIVANCE) 0.6 % drps ophthalmic suspension 1 Drop two (2) times a day.  Difluprednate (DUREZOL) 0.05 % ophthalmic emulsion Administer 1 Drop to both eyes four (4) times daily.  nepafenac (ILEVRO) 0.3 % drps Apply 1 Drop to eye.  sildenafil citrate (VIAGRA) 100 mg tablet Take 1 Tab by mouth daily as needed. Indications: Erectile Dysfunction 12 Tab 6     Allergies   Allergen Reactions    Altace [Ramipril] Unknown (comments)    Lipitor [Atorvastatin] Other (comments)     Muscle pain    Lisinopril Shortness of Breath and Other (comments)     Turns bright red    Other Medication Other (comments)     Vicryl suture on skin tends to be rejected with poor wound healing does better with monocryl    Procardia [Nifedipine] Other (comments)     Caused afib    Rosuvastatin Other (comments)     Muscle Pain       Social History     Social History    Marital status:      Spouse name: N/A    Number of children: N/A    Years of education: N/A     Occupational History    Not on file.      Social History Main Topics    Smoking status: Former Smoker     Packs/day: 1.50     Years: 25.00     Types: Cigarettes     Quit date: 1/1/2003    Smokeless tobacco: Never Used    Alcohol use 2.5 oz/week     5 Cans of beer per week      Comment: 5 beers a week    Drug use: No    Sexual activity: Yes     Partners: Female     Birth control/ protection: None     Other Topics Concern    Not on file     Social History Narrative      Family History   Problem Relation Age of Onset    Heart Disease Mother     Hypertension Mother     Diabetes Mother     Arthritis-osteo Mother     Heart Disease Father     Stroke Father     Hypertension Father     Heart Disease Sister     Hypertension Sister        Physical Exam:    Visit Vitals    /72 (BP 1 Location: Left arm, BP Patient Position: Sitting)    Pulse 65    Resp 20    Ht 6' (1.829 m)    Wt 216 lb (98 kg)    BMI 29.29 kg/m2      General: Well-appearing male in no acute distress  HEENT: EOMI no scleral icterus is noted patient is wearing eyeglasses  Pulmonary: No increased work of breathing is noted  Extremities: Warm and well-perfused bilaterally  Neuro: Cranial nerves II through XII grossly intact strength is 5 out of 5 in upper and lower extremities bilaterally    Impression and Plan:  Jaylin Preston is a 67 y.o. male who is status post carotid endarterectomy the right side he also has a right common iliac artery stent and left lower extremity atherectomy in the past.  Currently he has no lifestyle limiting claudication we will continue to follow this with ultrasounds he continues to have moderate disease in the right mild on the left based on ultrasounds. He has known occlusion of the left internal carotid artery but his right side is completely open. Overall he seems to be doing quite well. I will have him come back and see me in another year with repeat ultrasounds. He does note that if he does have lifestyle limiting claudication and give us a phone call we would move forward with angiography. We reviewed the plan with the patient and the patient understands. We also gave the patient appropriate instructions on their disease process and when to call back. Follow-up Disposition:  Return in about 1 year (around 2/26/2019). Angelica Song MD    PLEASE NOTE:  This document has been produced using voice recognition software. Unrecognized errors in transcription may be present.

## 2018-02-26 NOTE — PROGRESS NOTES
1. Have you been to the ER, urgent care clinic since your last visit? Yes Feb Urgent care skin torn off toe   Hospitalized since your last visit? No    2. Have you seen or consulted any other health care providers outside of the [address] [place] [address] since your last visit? Include any pap smears or colon screening.  Orthopedics

## 2018-02-27 ENCOUNTER — HOSPITAL ENCOUNTER (OUTPATIENT)
Dept: PHYSICAL THERAPY | Age: 73
Discharge: HOME | End: 2018-02-27
Payer: MEDICARE

## 2018-02-27 PROCEDURE — 97110 THERAPEUTIC EXERCISES: CPT

## 2018-02-27 NOTE — PROGRESS NOTES
PT DAILY TREATMENT NOTE - [facility]     Patient Name: Martina Malagon  Date:2018  : 1945  [x]  Patient  Verified  Payor: [employer] Offer / Plan: [place] MEDICARE PART A & B / Product Type: Medicare /    In time:8:57  Out time:9:38  Total Treatment Time (min): 41  Total Timed Codes (min): 31  1:1 Treatment Time ( [address] only): 31   Visit #: 3 of 12    Treatment Area: Low back pain [M54.5]  Cervicalgia [M54.2]    SUBJECTIVE  Pain Level (0-10 scale): neck 4; back 2  Any medication changes, allergies to medications, adverse drug reactions, diagnosis change, or new procedure performed?: [x] No    [] Yes (see summary sheet for update)  Subjective functional status/changes:   [] No changes reported  Pt reports he worked a lot in his garage this weekend and he feels he may have over done it. OBJECTIVE    Modality rationale: decrease pain to improve the patients ability to decrease difficulty while performing tasks.     Min Type Additional Details    [] Estim:  []Unatt       []IFC  []Premod                        []Other:  []w/ice   []w/heat  Position:  Location:    [] Estim: []Att    []TENS instruct  []NMES                    []Other:  []w/US   []w/ice   []w/heat  Position:  Location:    []  Traction: [] Cervical       []Lumbar                       [] Prone          []Supine                       []Intermittent   []Continuous Lbs:  [] before manual  [] after manual    []  Ultrasound: []Continuous   [] Pulsed                           []1MHz   []3MHz W/cm2:  Location:    []  Iontophoresis with dexamethasone         Location: [] Take home patch   [] In clinic   10 []  Ice     [x]  heat  []  Ice massage  []  Laser   []  Anodyne Position:sitting  Location:neck and back     []  Laser with stim  []  Other:  Position:  Location:    []  Vasopneumatic Device Pressure:       [] lo [] med [] hi   Temperature: [] lo [] med [] hi   [] Skin assessment post-treatment:  []intact []redness- no adverse reaction    []redness  adverse reaction:       31 min Therapeutic Exercise:  [x] See flow sheet :   Rationale: increase ROM and increase strength to improve the patients ability to increase ease with ADLs. With   [] TE   [] TA   [] neuro   [] other: Patient Education: [x] Review HEP    [] Progressed/Changed HEP based on:   [] positioning   [] body mechanics   [] transfers   [] heat/ice application    [] other:      Other Objective/Functional Measures: BP: 118/60. Lumbar flexion WNL without pain, only a pulling feeling. Pain Level (0-10 scale) post treatment: 3    ASSESSMENT/Changes in Function: Continue to progress pt as tolerated. Patient will continue to benefit from skilled PT services to modify and progress therapeutic interventions, address functional mobility deficits, address ROM deficits, address strength deficits and analyze and address soft tissue restrictions to attain remaining goals. []  See Plan of Care  []  See progress note/recertification  []  See Discharge Summary         Progress towards goals / Updated goals:  Short Term Goals: To be accomplished in 3 weeks:  1. Patient will demonstrate full compliance with prescribed HEP. Eval: HEP provided  Current: Met, HEP performance reported as prescribed, 2/23/2018  2. Patient will demonstrate a 25% improvement in lumbar flexion AROM in order to improve ease with household ADLs. Eval: Lumbar Flexion 50% limited, Lumbar pain 4/10, R LE pain 3/10  Current: Goal met: Lumbar flexion WNL without pain, only a pulling feeling. 2/27/18  3. Patient will demonstrate a 10 degree improvement in cervical extension AROM to improve ease with overhead ADLs. Eval: Cervical Extension AROM = 24 deg, L cervical pain 3/10      Long Term Goals: To be accomplished in 6 weeks:  1. Patient will demonstrate a significant functional improvement as demonstrated by a score of >/= 60 on lumbar FOTO and >/=60 on neck FOTO. Eval: Neck FOTO = 53, Lumbar FOTO = 53  2.  Patient will demonstrate a (-) L UE ULTT, median nerve bias, and (-) R SLR in order to improve ease with activity tolerance. Eval: (+) L UE ULTT, median nerve bias, (+) R SLR  3. Patient will demonstrate L/R SLS >/=25 seconds in order to improve ease with ambulation on uneven ground.   Eval: R SLS = 7 sec       PLAN  []  Upgrade activities as tolerated     [x]  Continue plan of care  []  Update interventions per flow sheet       []  Discharge due to:_  []  Other:_      Moises Longo PTA 2/27/2018  9:05 AM    Future Appointments  Date Time Provider Mary Amy   3/2/2018 7:30 AM Moises Longo PTA [facility]   3/6/2018 8:00 AM Moises Longo PTA [facility]   3/9/2018 7:30 AM Moises Longo PTA [facility]   3/14/2018 8:00 AM [facility] PT [facility]FFOLK 1 [facility]   3/20/2018 8:00 AM Moises Longo PTA [facility]   3/22/2018 8:30 AM Radha Cooney, PT [facility]   3/27/2018 8:30 AM Marcelle Nava PT [facility]   3/29/2018 8:30 AM Marcelle Nava PT [facility]   3/30/2018 8:30 AM [facility] [address]   4/12/2018 8:45 AM Evelyne Novak MD [facility] [facility]   5/8/2018 8:00 AM IO NURSE VISIT [facility] SCHED   5/14/2018 10:10 AM GILBERTO Baca [facility] SCHED   5/15/2018 8:40 AM Justino Hernandez MD [facility] SCHED   2/27/2019 8:00 AM [facility]   2/27/2019 9:00 AM [facility]   2/27/2019 10:00 AM [facility] NONIMAGING [facility]   3/4/2019 9:00 AM Magnolia Rankin MD [place] [id number]

## 2018-03-02 ENCOUNTER — HOSPITAL ENCOUNTER (OUTPATIENT)
Dept: PHYSICAL THERAPY | Age: 73
Discharge: HOME | End: 2018-03-02
Payer: MEDICARE

## 2018-03-02 PROCEDURE — 97140 MANUAL THERAPY 1/> REGIONS: CPT

## 2018-03-02 PROCEDURE — 97110 THERAPEUTIC EXERCISES: CPT

## 2018-03-02 NOTE — PROGRESS NOTES
PT DAILY TREATMENT NOTE - [facility]     Patient Name: Yonatan Rain  Date:3/2/2018  : 1945  [x]  Patient  Verified  Payor: [place] MEDICARE / Plan: [place] MEDICARE PART A & B / Product Type: Medicare /    In time:7:25  Out time:8:08  Total Treatment Time (min): 43  Total Timed Codes (min): 33  1:1 Treatment Time ( [address] only): 33   Visit #: 4 of 12    Treatment Area: Low back pain [M54.5]  Cervicalgia [M54.2]    SUBJECTIVE  Pain Level (0-10 scale): neck 4; back 3  Any medication changes, allergies to medications, adverse drug reactions, diagnosis change, or new procedure performed?: [x] No    [] Yes (see summary sheet for update)  Subjective functional status/changes:   [] No changes reported  Pt reports that his neck is bothering him more this morning. Pt reports he feels he slept on his neck wrong. OBJECTIVE    Modality rationale: decrease pain to improve the patients ability to decrease difficulty while performing tasks.     Min Type Additional Details    [] Estim:  []Unatt       []IFC  []Premod                        []Other:  []w/ice   []w/heat  Position:  Location:    [] Estim: []Att    []TENS instruct  []NMES                    []Other:  []w/US   []w/ice   []w/heat  Position:  Location:    []  Traction: [] Cervical       []Lumbar                       [] Prone          []Supine                       []Intermittent   []Continuous Lbs:  [] before manual  [] after manual    []  Ultrasound: []Continuous   [] Pulsed                           []1MHz   []3MHz W/cm2:  Location:    []  Iontophoresis with dexamethasone         Location: [] Take home patch   [] In clinic   10 []  Ice     [x]  heat  []  Ice massage  []  Laser   []  Anodyne Position: sitting  Location:neck/shoulders    []  Laser with stim  []  Other:  Position:  Location:    []  Vasopneumatic Device Pressure:       [] lo [] med [] hi   Temperature: [] lo [] med [] hi   [] Skin assessment post-treatment:  []intact []redness- no adverse reaction    []redness  adverse reaction:     25 min Therapeutic Exercise:  [x] See flow sheet :   Rationale: increase ROM and increase strength to improve the patients ability to increase tolerance to activities. 8 min Manual Therapy:  STM/TPR B cervical paraspinals, B UT, SOR. Rationale: decrease pain, increase ROM, increase tissue extensibility and decrease trigger points to increase ease with ADLs. With   [] TE   [] TA   [] neuro   [] other: Patient Education: [x] Review HEP    [] Progressed/Changed HEP based on:   [] positioning   [] body mechanics   [] transfers   [] heat/ice application    [] other:      Other Objective/Functional Measures: /88     Pain Level (0-10 scale) post treatment: neck 3; back 2    ASSESSMENT/Changes in Function: Pt presented with trigger point in L UT/LS. Patient will continue to benefit from skilled PT services to modify and progress therapeutic interventions, address functional mobility deficits, address ROM deficits, address strength deficits and analyze and address soft tissue restrictions to attain remaining goals. []  See Plan of Care  []  See progress note/recertification  []  See Discharge Summary         Progress towards goals / Updated goals:  Short Term Goals: To be accomplished in 3 weeks:  1. Patient will demonstrate full compliance with prescribed HEP. Eval: HEP provided  Current: Met, HEP performance reported as prescribed, 2/23/2018  2. Patient will demonstrate a 25% improvement in lumbar flexion AROM in order to improve ease with household ADLs. Eval: Lumbar Flexion 50% limited, Lumbar pain 4/10, R LE pain 3/10  Current: Goal met: Lumbar flexion WNL without pain, only a pulling feeling. 2/27/18  3. Patient will demonstrate a 10 degree improvement in cervical extension AROM to improve ease with overhead ADLs. Eval: Cervical Extension AROM = 24 deg, L cervical pain 3/10      Long Term Goals: To be accomplished in 6 weeks:  1. Patient will demonstrate a significant functional improvement as demonstrated by a score of >/= 60 on lumbar FOTO and >/=60 on neck FOTO. Eval: Neck FOTO = 53, Lumbar FOTO = 53  2. Patient will demonstrate a (-) L UE ULTT, median nerve bias, and (-) R SLR in order to improve ease with activity tolerance. Eval: (+) L UE ULTT, median nerve bias, (+) R SLR  3. Patient will demonstrate L/R SLS >/=25 seconds in order to improve ease with ambulation on uneven ground.   Eval: R SLS = 7 sec    PLAN  []  Upgrade activities as tolerated     [x]  Continue plan of care  []  Update interventions per flow sheet       []  Discharge due to:_  []  Other:_      Radha Alexander PTA 3/2/2018  7:39 AM    Future Appointments  Date Time Provider Mary Amy   3/6/2018 8:00 AM Radha Alexander PTA [facility]   3/9/2018 9:00 AM Blenda Schaumann, PT [facility]   3/14/2018 8:00 AM [facility] PT [facility]FF[facility]K 1 [facility]   3/20/2018 8:00 AM Radha Alexander PTA [facility]   3/22/2018 8:30 AM Ellie Gusman PT [facility]   3/27/2018 8:30 AM Jil Jordan PT [facility]   3/29/2018 8:30 AM Jil Jordan PT [facility]   3/30/2018 8:30 AM [facility] NURSE [facility] SCHED   4/12/2018 8:45 AM Gretel Delong MD [facility]   5/8/2018 8:00 AM [facility] NURSE VISIT [facility] SCHED   5/14/2018 10:10 AM Chelsie Mooney NP [facility] SCHED   5/15/2018 8:40 AM Marisela Ponce MD [facility] SCHED   2/27/2019 8:00 AM [facility]   2/27/2019 9:00 AM [facility]   2/27/2019 10:00 AM [facility]IMAGING [facility]   3/4/2019 9:00 AM Dottie Rollins MD [place] [id number]

## 2018-03-06 ENCOUNTER — HOSPITAL ENCOUNTER (OUTPATIENT)
Dept: PHYSICAL THERAPY | Age: 73
Discharge: HOME | End: 2018-03-06
Payer: MEDICARE

## 2018-03-06 PROCEDURE — 97140 MANUAL THERAPY 1/> REGIONS: CPT

## 2018-03-06 PROCEDURE — 97110 THERAPEUTIC EXERCISES: CPT

## 2018-03-06 NOTE — PROGRESS NOTES
PT DAILY TREATMENT NOTE - [facility]     Patient Name: Minnie Cole  Date:3/6/2018  : 1945  [x]  Patient  Verified  Payor: [place] MEDICARE / Plan: [place] MEDICARE PART A & B / Product Type: Medicare /    In time:8:00  Out time:8:56  Total Treatment Time (min): 56  Total Timed Codes (min): 46  1:1 Treatment Time ( [address] only): 55   Visit #: 5 of 12    Treatment Area: Low back pain [M54.5]  Cervicalgia [M54.2]    SUBJECTIVE  Pain Level (0-10 scale): neck 5; back 2  Any medication changes, allergies to medications, adverse drug reactions, diagnosis change, or new procedure performed?: [x] No    [] Yes (see summary sheet for update)  Subjective functional status/changes:   [] No changes reported  Pt reports that he has not noticed that his back is getting better. OBJECTIVE    Modality rationale: decrease pain to improve the patients ability to decrease difficulty while performing tasks.    Min Type Additional Details    [] Estim:  []Unatt       []IFC  []Premod                        []Other:  []w/ice   []w/heat  Position:  Location:    [] Estim: []Att    []TENS instruct  []NMES                    []Other:  []w/US   []w/ice   []w/heat  Position:  Location:    []  Traction: [] Cervical       []Lumbar                       [] Prone          []Supine                       []Intermittent   []Continuous Lbs:  [] before manual  [] after manual    []  Ultrasound: []Continuous   [] Pulsed                           []1MHz   []3MHz W/cm2:  Location:    []  Iontophoresis with dexamethasone         Location: [] Take home patch   [] In clinic   10 []  Ice     [x]  heat  []  Ice massage  []  Laser   []  Anodyne Position:sitting  Location:neck     []  Laser with stim  []  Other:  Position:  Location:    []  Vasopneumatic Device Pressure:       [] lo [] med [] hi   Temperature: [] lo [] med [] hi   [] Skin assessment post-treatment:  []intact []redness- no adverse reaction    []redness  adverse reaction:     38 min Therapeutic Exercise:  [x] See flow sheet :   Rationale: increase ROM and increase strength to improve the patients ability to increase tolerance to activities.         8 min Manual Therapy:  STM/TPR L UT, L LS, parascapular muscles. Rationale: decrease pain, increase ROM, increase tissue extensibility and decrease trigger points to increase ease with ADLs.                     With   [] TE   [] TA   [] neuro   [] other: Patient Education: [x] Review HEP    [] Progressed/Changed HEP based on:   [] positioning   [] body mechanics   [] transfers   [] heat/ice application    [] other:      Other Objective/Functional Measures: Cervical AROM ext 35 deg. Pain Level (0-10 scale) post treatment: neck 4    ASSESSMENT/Changes in Function: Pt's pain along medial/superior boarder of scapula. Added more exercises to promote proper posture. Patient will continue to benefit from skilled PT services to modify and progress therapeutic interventions, address functional mobility deficits, address ROM deficits, address strength deficits and analyze and address soft tissue restrictions to attain remaining goals. []  See Plan of Care  []  See progress note/recertification  []  See Discharge Summary         Progress towards goals / Updated goals:  Short Term Goals: To be accomplished in 3 weeks:  1. Patient will demonstrate full compliance with prescribed HEP. Eval: HEP provided  Current: Met, HEP performance reported as prescribed, 2/23/2018  2. Patient will demonstrate a 25% improvement in lumbar flexion AROM in order to improve ease with household ADLs. Eval: Lumbar Flexion 50% limited, Lumbar pain 4/10, R LE pain 3/10  Current: Goal met: Lumbar flexion WNL without pain, only a pulling feeling. 2/27/18  3. Patient will demonstrate a 10 degree improvement in cervical extension AROM to improve ease with overhead ADLs.   Eval: Cervical Extension AROM = 24 deg, L cervical pain 3/10  Current: Goal met: Cervical AROM ext 35 deg. 3/6/18      Long Term Goals: To be accomplished in 6 weeks:  1. Patient will demonstrate a significant functional improvement as demonstrated by a score of >/= 60 on lumbar FOTO and >/=60 on neck FOTO. Eval: Neck FOTO = 53, Lumbar FOTO = 53  2. Patient will demonstrate a (-) L UE ULTT, median nerve bias, and (-) R SLR in order to improve ease with activity tolerance. Eval: (+) L UE ULTT, median nerve bias, (+) R SLR  3. Patient will demonstrate L/R SLS >/=25 seconds in order to improve ease with ambulation on uneven ground.   Eval: R SLS = 7 sec    PLAN  []  Upgrade activities as tolerated     [x]  Continue plan of care  []  Update interventions per flow sheet       []  Discharge due to:_  []  Other:_      Patti Weathers PTA 3/6/2018  8:14 AM    Future Appointments  Date Time Provider Mary Bullockisti   3/9/2018 9:00 AM Yaritza Ramos PT [facility]   3/14/2018 8:00 AM [facility]   3/20/2018 8:00 AM Patti Weathers PTA [facility]   3/22/2018 8:30 AM Leo Arenas PT [facility]   3/27/2018 8:30 AM Denise Redd PT [facility]   3/29/2018 8:30 AM Denise Redd PT [facility]   3/30/2018 8:30 AM [facility] [id number]   4/12/2018 8:45 AM MD Niles Sterling   5/8/2018 8:00 AM [facility] NURSE VISIT [facility]   5/14/2018 10:10 AM Wilmar Hobson NP [facility]   5/15/2018 8:40 AM Briana Baldwin MD [facility] SCHED   2/27/2019 8:00 AM [facility]   2/27/2019 9:00 AM [facility]   2/27/2019 10:00 AM [facility]AGING [facility] SCHED   3/4/2019 9:00 AM Marvin Dias MD [place] [id number]

## 2018-03-08 RX ORDER — ATENOLOL 25 MG/1
TABLET ORAL
Qty: 180 TAB | Refills: 1 | Status: SHIPPED | OUTPATIENT
Start: 2018-03-08 | End: 2018-08-30 | Stop reason: SDUPTHER

## 2018-03-09 ENCOUNTER — HOSPITAL ENCOUNTER (OUTPATIENT)
Dept: PHYSICAL THERAPY | Age: 73
Discharge: HOME | End: 2018-03-09
Payer: MEDICARE

## 2018-03-09 PROCEDURE — 97110 THERAPEUTIC EXERCISES: CPT

## 2018-03-09 PROCEDURE — 97112 NEUROMUSCULAR REEDUCATION: CPT

## 2018-03-09 NOTE — PROGRESS NOTES
PT DAILY TREATMENT NOTE - [facility]     Patient Name: Jeanine Barney  Date:3/9/2018  : 1945  [x]  Patient  Verified  Payor: VA MEDICARE / Plan: VA MEDICARE PART A & B / Product Type: Medicare /    In time:8:58  Out time:9:50  Total Treatment Time (min): 52  Total Timed Codes (min): 42  1:1 Treatment Time ( only): 42   Visit #: 6 of 12    Treatment Area: Low back pain [M54.5]  Cervicalgia [M54.2]    SUBJECTIVE  Pain Level (0-10 scale): 2-3  Any medication changes, allergies to medications, adverse drug reactions, diagnosis change, or new procedure performed?: [x] No    [] Yes (see summary sheet for update)  Subjective functional status/changes:   [] No changes reported  Pt reports his neck and back are feeling good today. OBJECTIVE    Modality rationale: decrease pain to improve the patients ability to decrease difficulty while performing tasks.     Min Type Additional Details    [] Estim:  []Unatt       []IFC  []Premod                        []Other:  []w/ice   []w/heat  Position:  Location:    [] Estim: []Att    []TENS instruct  []NMES                    []Other:  []w/US   []w/ice   []w/heat  Position:  Location:    []  Traction: [] Cervical       []Lumbar                       [] Prone          []Supine                       []Intermittent   []Continuous Lbs:  [] before manual  [] after manual    []  Ultrasound: []Continuous   [] Pulsed                           []1MHz   []3MHz W/cm2:  Location:    []  Iontophoresis with dexamethasone         Location: [] Take home patch   [] In clinic   10 []  Ice     [x]  heat  []  Ice massage  []  Laser   []  Anodyne Position: sitting  Location:neck     []  Laser with stim  []  Other:  Position:  Location:    []  Vasopneumatic Device Pressure:       [] lo [] med [] hi   Temperature: [] lo [] med [] hi   [] Skin assessment post-treatment:  []intact []redness- no adverse reaction    []redness  adverse reaction:         32 min Therapeutic Exercise:  [x] See flow sheet :   Rationale: increase ROM and increase strength to improve the patients ability to increase tolerance to activities. 10 min Neuromuscular Re-education:  [x]  See flow sheet :scapular stabilization exercises. Rationale: increase ROM, increase strength, improve coordination and increase proprioception  to improve the patients ability to increase ease with ADLs. With   [] TE   [] TA   [] neuro   [] other: Patient Education: [x] Review HEP    [] Progressed/Changed HEP based on:   [] positioning   [] body mechanics   [] transfers   [] heat/ice application    [] other:      Other Objective/Functional Measures: BP: 137/68 Neck FOTO= 61, lumbar FOTO = 60      Pain Level (0-10 scale) post treatment: 2    ASSESSMENT/Changes in Function: Pt continues to sit with forward head and rounded shoulders. Cues throughout to correct posture, with pt uninterested in correcting. Patient will continue to benefit from skilled PT services to modify and progress therapeutic interventions, address functional mobility deficits, address ROM deficits, address strength deficits and analyze and address soft tissue restrictions to attain remaining goals. []  See Plan of Care  []  See progress note/recertification  []  See Discharge Summary         Progress towards goals / Updated goals:  Short Term Goals: To be accomplished in 3 weeks:  1. Patient will demonstrate full compliance with prescribed HEP. Eval: HEP provided  Current: Met, HEP performance reported as prescribed, 2/23/2018  2. Patient will demonstrate a 25% improvement in lumbar flexion AROM in order to improve ease with household ADLs. Eval: Lumbar Flexion 50% limited, Lumbar pain 4/10, R LE pain 3/10  Current: Goal met: Lumbar flexion WNL without pain, only a pulling feeling. 2/27/18  3. Patient will demonstrate a 10 degree improvement in cervical extension AROM to improve ease with overhead ADLs.   Eval: Cervical Extension AROM = 24 deg, L cervical pain 3/10  Current: Goal met: Cervical AROM ext 35 deg. 3/6/18      Long Term Goals: To be accomplished in 6 weeks:  1. Patient will demonstrate a significant functional improvement as demonstrated by a score of >/= 60 on lumbar FOTO and >/=60 on neck FOTO. Eval: Neck FOTO = 53, Lumbar FOTO = 53  Current: Goal met: Neck FOTO= 61, lumbar FOTO = 60 3/9/18  2. Patient will demonstrate a (-) L UE ULTT, median nerve bias, and (-) R SLR in order to improve ease with activity tolerance. Eval: (+) L UE ULTT, median nerve bias, (+) R SLR  3. Patient will demonstrate L/R SLS >/=25 seconds in order to improve ease with ambulation on uneven ground.   Eval: R SLS = 7 sec       PLAN  []  Upgrade activities as tolerated     [x]  Continue plan of care  []  Update interventions per flow sheet       []  Discharge due to:_  []  Other:_      Marce Snell PTA 3/9/2018  9:04 AM    Future Appointments  Date Time Provider Mary Reyes   3/14/2018 8:00 AM [facility]   3/20/2018 8:00 AM Marce Snell PTA [facility]   3/22/2018 8:30 AM Eric Edwards PT [facility]   3/27/2018 8:30 AM Devika Tello PT [facility]   3/29/2018 8:30 AM Devika Tello PT [facility]   3/30/2018 8:30 AM [facility] NURSE [facility] SCHED   4/12/2018 8:45 AM MD Niles Simpson   5/8/2018 8:00 AM [facility] NURSE VISIT [facility] SCHED   5/14/2018 10:10 AM Evangelina Whittington NP [facility] SCHED   5/15/2018 8:40 AM Talisha Downey MD [facility] SCHED   2/27/2019 8:00 AM [facility]   2/27/2019 9:00 AM [facility]   2/27/2019 10:00 AM [facility] SCHED   3/4/2019 9:00 AM MD Jayjay Calloway 51

## 2018-03-14 ENCOUNTER — HOSPITAL ENCOUNTER (OUTPATIENT)
Dept: PHYSICAL THERAPY | Age: 73
Discharge: HOME | End: 2018-03-14
Payer: MEDICARE

## 2018-03-14 PROCEDURE — 97112 NEUROMUSCULAR REEDUCATION: CPT

## 2018-03-14 PROCEDURE — 97110 THERAPEUTIC EXERCISES: CPT

## 2018-03-14 NOTE — PROGRESS NOTES
PT DAILY TREATMENT NOTE - [facility]     Patient Name: Sandra Mulling Collette  Date:3/14/2018  : 1945  [x]  Patient  Verified  Payor: VA MEDICARE / Plan: VA MEDICARE PART A & B / Product Type: Medicare /    In time:8:00  Out time:8:50  Total Treatment Time (min): 50  Total Timed Codes (min): 40  1:1 Treatment Time ( only): 40   Visit #: 7 of 12    Treatment Area: Low back pain [M54.5]  Cervicalgia [M54.2]    SUBJECTIVE  Pain Level (0-10 scale): N 4-5/10, B 2-310  Any medication changes, allergies to medications, adverse drug reactions, diagnosis change, or new procedure performed?: [x] No    [] Yes (see summary sheet for update)  Subjective functional status/changes:   [] No changes reported  Pt reports unpacking some boxes and shoveling his driveway yesterday. Pt believes that his pain is increased today due to increased activity yesterday. OBJECTIVE    Modality rationale: decrease pain and increase tissue extensibility to improve the patients ability to perform ADLs without difficulty.    Min Type Additional Details    [] Estim:  []Unatt       []IFC  []Premod                        []Other:  []w/ice   []w/heat  Position:  Location:    [] Estim: []Att    []TENS instruct  []NMES                    []Other:  []w/US   []w/ice   []w/heat  Position:  Location:    []  Traction: [] Cervical       []Lumbar                       [] Prone          []Supine                       []Intermittent   []Continuous Lbs:  [] before manual  [] after manual    []  Ultrasound: []Continuous   [] Pulsed                           []1MHz   []3MHz W/cm2:  Location:    []  Iontophoresis with dexamethasone         Location: [] Take home patch   [] In clinic   10 []  Ice     [x]  heat  []  Ice massage  []  Laser   []  Anodyne Position:reclined  Location:left shoulder    []  Laser with stim  []  Other:  Position:  Location:    []  Vasopneumatic Device Pressure:       [] lo [] med [] hi   Temperature: [] lo [] med [] hi   [x] Skin assessment post-treatment:  [x]intact [x]redness- no adverse reaction    []redness  adverse reaction:     25 min Therapeutic Exercise:  [] See flow sheet :   Rationale: increase ROM and increase strength to improve the patients ability to perform ADls without difficulty. 15 min Neuromuscular Re-education:  []  See flow sheet :   Rationale: increase ROM and increase strength  to improve the patients ability to perform ADLs without difficulty. With   [] TE   [] TA   [] neuro   [] other: Patient Education: [x] Review HEP    [] Progressed/Changed HEP based on:   [] positioning   [] body mechanics   [] transfers   [] heat/ice application    [] other:      Other Objective/Functional Measures: /79, R SLS= 10 sec. Pain Level (0-10 scale) post treatment: 3-4/10    ASSESSMENT/Changes in Function: Continued with current ex. Per flow sheet. No increased pain or difficulty was reported during or after therapy. Patient will continue to benefit from skilled PT services to modify and progress therapeutic interventions, address functional mobility deficits, address ROM deficits, address strength deficits, analyze and address soft tissue restrictions and analyze and cue movement patterns to attain remaining goals. []  See Plan of Care  []  See progress note/recertification  []  See Discharge Summary         Progress towards goals / Updated goals:    Short Term Goals: To be accomplished in 3 weeks:  1. Patient will demonstrate full compliance with prescribed HEP. Eval: HEP provided  Current: Met, HEP performance reported as prescribed, 2/23/2018  2. Patient will demonstrate a 25% improvement in lumbar flexion AROM in order to improve ease with household ADLs. Eval: Lumbar Flexion 50% limited, Lumbar pain 4/10, R LE pain 3/10  Current: Goal met: Lumbar flexion WNL without pain, only a pulling feeling. 2/27/18  3.  Patient will demonstrate a 10 degree improvement in cervical extension AROM to improve ease with overhead ADLs. Eval: Cervical Extension AROM = 24 deg, L cervical pain 3/10  Current: Goal met: Cervical AROM ext 35 deg. 3/6/18      Long Term Goals: To be accomplished in 6 weeks:  1. Patient will demonstrate a significant functional improvement as demonstrated by a score of >/= 60 on lumbar FOTO and >/=60 on neck FOTO. Eval: Neck FOTO = 53, Lumbar FOTO = 53  Current: Goal met: Neck FOTO= 61, lumbar FOTO = 60 3/9/18  2. Patient will demonstrate a (-) L UE ULTT, median nerve bias, and (-) R SLR in order to improve ease with activity tolerance. Eval: (+) L UE ULTT, median nerve bias, (+) R SLR  3. Patient will demonstrate L/R SLS >/=25 seconds in order to improve ease with ambulation on uneven ground. Eval: R SLS = 7 sec  Current: NOT MET. R SLS= 10 sec.  3/14/18     PLAN  [x]  Upgrade activities as tolerated     [x]  Continue plan of care  []  Update interventions per flow sheet       []  Discharge due to:_  []  Other:_      Juan Blancas PTA 3/14/2018  8:06 AM    Future Appointments  Date Time Provider Mary Amy   3/20/2018 8:00 AM Willa Liang PTA [facility]   3/22/2018 8:30 AM Bhavin Boland PT [facility]   3/27/2018 8:30 AM [facility] Allen [facility]   3/29/2018 8:30 AM Brennon Allen PT [facility]   3/30/2018 8:30 AM [facility] NURSE [facility]   4/12/2018 8:45 AM MD Niles Butcher   5/8/2018 8:00 AM [facility] NURSE VISIT [facility]   5/14/2018 10:10 AM Dorinda Ibrahim NP [facility] SCHED   5/15/2018 8:40 AM Luiz Lovell MD [facility] SCHED   2/27/2019 8:00 AM [facility]   2/27/2019 9:00 AM [facility]   2/27/2019 10:00 AM [facility] [place] SCHED   3/4/2019 9:00 AM Alma Felipe MD [place] [id number]

## 2018-03-20 ENCOUNTER — HOSPITAL ENCOUNTER (OUTPATIENT)
Dept: PHYSICAL THERAPY | Age: 73
Discharge: HOME | End: 2018-03-20
Payer: MEDICARE

## 2018-03-20 PROCEDURE — 97140 MANUAL THERAPY 1/> REGIONS: CPT

## 2018-03-20 PROCEDURE — G8979 MOBILITY GOAL STATUS: HCPCS

## 2018-03-20 PROCEDURE — 97110 THERAPEUTIC EXERCISES: CPT

## 2018-03-20 PROCEDURE — G8978 MOBILITY CURRENT STATUS: HCPCS

## 2018-03-20 NOTE — PROGRESS NOTES
PT DAILY TREATMENT NOTE - [facility]     Patient Name: Bill Mueller  Date:3/20/2018  : 1945  [x]  Patient  Verified  Payor: VA MEDICARE / Plan: VA MEDICARE PART A & B / Product Type: Medicare /    In time:7:58  Out time:8:45  Total Treatment Time (min): 47  Total Timed Codes (min): 37  1:1 Treatment Time ( [address] only): 37   Visit #: 8 of 12    Treatment Area: Low back pain [M54.5]  Cervicalgia [M54.2]    SUBJECTIVE  Pain Level (0-10 scale): 5  Any medication changes, allergies to medications, adverse drug reactions, diagnosis change, or new procedure performed?: [x] No    [] Yes (see summary sheet for update)  Subjective functional status/changes:   [] No changes reported  Pt reports that after last session he was having a lot of pain that was running down his left arm into his chest.     OBJECTIVE    Modality rationale: decrease pain to improve the patients ability to decrease difficulty while performing tasks.     Min Type Additional Details    [] Estim:  []Unatt       []IFC  []Premod                        []Other:  []w/ice   []w/heat  Position:  Location:    [] Estim: []Att    []TENS instruct  []NMES                    []Other:  []w/US   []w/ice   []w/heat  Position:  Location:    []  Traction: [] Cervical       []Lumbar                       [] Prone          []Supine                       []Intermittent   []Continuous Lbs:  [] before manual  [] after manual    []  Ultrasound: []Continuous   [] Pulsed                           []1MHz   []3MHz W/cm2:  Location:    []  Iontophoresis with dexamethasone         Location: [] Take home patch   [] In clinic   10 []  Ice     [x]  heat  []  Ice massage  []  Laser   []  Anodyne Position:sitting  Location:shoulders    []  Laser with stim  []  Other:  Position:  Location:    []  Vasopneumatic Device Pressure:       [] lo [] med [] hi   Temperature: [] lo [] med [] hi   [] Skin assessment post-treatment:  []intact []redness- no adverse reaction    []redness  adverse reaction:       27 min Therapeutic Exercise:  [x] See flow sheet :   Rationale: increase ROM and increase strength to improve the patients ability to increase tolerance to activities.         10 min Neuromuscular Re-education:  [x]  See flow sheet :scapular stabilization exercises. Rationale: increase ROM, increase strength, improve coordination and increase proprioception  to improve the patients ability to increase ease with ADLs.               With   [] TE   [] TA   [] neuro   [] other: Patient Education: [x] Review HEP    [] Progressed/Changed HEP based on:   [] positioning   [] body mechanics   [] transfers   [] heat/ice application    [] other:      Other Objective/Functional Measures: see goals. Pain Level (0-10 scale) post treatment: 0    ASSESSMENT/Changes in Function: Pt has progressed toward LTGs. Pt has increase cervical and lumbar AROM without pain. Pt is (-) R SLR. Pt continues to report of popping with cervical extension and with L shoulder retraction. Patient will continue to benefit from skilled PT services to modify and progress therapeutic interventions, address functional mobility deficits, address ROM deficits, address strength deficits and analyze and address soft tissue restrictions to attain remaining goals. []  See Plan of Care  [x]  See progress note/recertification  []  See Discharge Summary         Progress towards goals / Updated goals:  Short Term Goals: To be accomplished in 3 weeks:  1. Patient will demonstrate full compliance with prescribed HEP. Eval: HEP provided  Current: Met, HEP performance reported as prescribed, 2/23/2018  2. Patient will demonstrate a 25% improvement in lumbar flexion AROM in order to improve ease with household ADLs. Eval: Lumbar Flexion 50% limited, Lumbar pain 4/10, R LE pain 3/10  Current: Goal met: Lumbar flexion WNL without pain, only a pulling feeling. 2/27/18  3.  Patient will demonstrate a 10 degree improvement in cervical extension AROM to improve ease with overhead ADLs. Eval: Cervical Extension AROM = 24 deg, L cervical pain 3/10  Current: Goal met: Cervical AROM ext 35 deg. 3/6/18      Long Term Goals: To be accomplished in 6 weeks:  1. Patient will demonstrate a significant functional improvement as demonstrated by a score of >/= 60 on lumbar FOTO and >/=60 on neck FOTO. Eval: Neck FOTO = 53, Lumbar FOTO = 53  Current: Goal met: Neck FOTO= 61, lumbar FOTO = 60 3/9/18  2. Patient will demonstrate a (-) L UE ULTT, median nerve bias, and (-) R SLR in order to improve ease with activity tolerance. Eval: (+) L UE ULTT, median nerve bias, (+) R SLR   Current: Not met: (+) L UE LYTT median nerve bias, (-) R SLR 3/20/18  3. Patient will demonstrate L/R SLS >/=25 seconds in order to improve ease with ambulation on uneven ground. Eval: R SLS = 7 sec  Current: NOT MET. R SLS= 10 sec.  3/14/18    PLAN  []  Upgrade activities as tolerated     [x]  Continue plan of care  []  Update interventions per flow sheet       []  Discharge due to:_  []  Other:_      Jeanne Hernandez PTA 3/20/2018  8:05 AM    Future Appointments  Date Time Provider Mary Reyes   3/22/2018 8:30 AM Slime Hagen, PT [facility]   3/27/2018 8:30 AM Federica Brar PT [facility]   3/29/2018 8:30 AM Federica Brar PT [facility]   3/30/2018 8:30 AM [facility] NURSE [facility] SCHED   4/12/2018 8:45 AM Charanjit Andino MD [facility]   5/8/2018 8:00 AM [facility] NURSE VISIT [facility] SCHED   5/14/2018 10:10 AM Ann Doss NP [facility] SCHED   5/15/2018 8:40 AM Whitney Mccauley MD [facility] SCHED   2/27/2019 8:00 AM [facility]ED   2/27/2019 9:00 AM [facility] SCHED   2/27/2019 10:00 AM [facility]AG[facility] SCHED   3/4/2019 9:00 AM [place] [id number]

## 2018-03-20 NOTE — PROGRESS NOTES
[facility]              [address]        [place], [address] [place]   [phone] fax    Continued Plan of Care/ Re-certification for Physical Therapy Services    Patient name: Velasquez Current Start of Care: 2018   Referral source: Tung Gill MD : 1945   Medical/Treatment Diagnosis: Low back pain [M54.5]  Cervicalgia [M54.2] Onset Date:Chronic, Worsening over the last year     Prior Hospitalization: see medical history Provider#: [id number]   Medications: Verified on Patient Summary List    Comorbidities: Arthritis, Back Pain, Prostate Cancer (remission), Skin Cancer (Active - Basal cell), Stent placement x2, R Carotid Artery Cleaning, L Femoral Artery Stent, R Iliac Arterial Stent, Peripheral Vascular Disease, GI Disease, HTN, R Knee Surgery ()   Prior Level of Function: Chronic LBP and Neck Pain, Ambulation tolerance (30 minutes), (I) Functional ADLs, Retired, RHD  Current Functional Status: Ambulation tolerance (20 minutes - secondary to LE claudication), Limitations in sleep quality  Visits from Start of Care: 8    Missed Visits: 0    The Plan of Care and following information is based on the patient's current status:  Short Term Goals: To be accomplished in 3 weeks:  1. Patient will demonstrate full compliance with prescribed HEP. Eval: HEP provided  At PN: Met, HEP performance reported as prescribed  2. Patient will demonstrate a 25% improvement in lumbar flexion AROM in order to improve ease with household ADLs. Eval: Lumbar Flexion 50% limited, Lumbar pain 4/10, R LE pain 3/10  At PN: Goal met: Lumbar flexion WNL without pain, only a pulling feeling  3. Patient will demonstrate a 10 degree improvement in cervical extension AROM to improve ease with overhead ADLs. Eval: Cervical Extension AROM = 24 deg, L cervical pain 3/10  At PN: Goal met: Cervical AROM ext 35 deg.      Long Term Goals: To be accomplished in 6 weeks:  1. Patient will demonstrate a significant functional improvement as demonstrated by a score of >/= 60 on lumbar FOTO and >/=60 on neck FOTO. Eval: Neck FOTO = 53, Lumbar FOTO = 53  At PN: Goal met: Neck FOTO= 61, lumbar FOTO = 60  2. Patient will demonstrate a (-) L UE ULTT, median nerve bias, and (-) R SLR in order to improve ease with activity tolerance. Eval: (+) L UE ULTT, median nerve bias, (+) R SLR   At PN: Not met: (+) L UE LYTT median nerve bias, (-) R S  3. Patient will demonstrate L/R SLS >/=25 seconds in order to improve ease with ambulation on uneven ground. Eval: R SLS = 7 sec  At PN: NOT MET. R SLS= 10 sec    Key functional changes: See above goals. Problems/ barriers to goal attainment: None     Problem List: pain affecting function, decrease ROM, decrease strength, decrease ADL/ functional abilitiies, decrease activity tolerance and decrease flexibility/ joint mobility    Treatment Plan: Therapeutic exercise, Therapeutic activities, Neuromuscular re-education, Physical agent/modality, Manual therapy, Patient education, Self Care training and Functional mobility training     Goals for this certification period: Continue with unmet goals above. Frequency / Duration: Patient to be seen 2 times per week for 5 weeks:    Assessment / Recommendations:    Pt has progressed toward LTGs. Pt has increased cervical and lumbar AROM without pain. Pt demonstrates a (-) R SLR. Pt continues to report popping with cervical extension and with L shoulder retraction. Patient would continue to benefit from continuation of PT services with an overall significant improvement demonstrated thus far.     Patient will continue to benefit from skilled PT services to modify and progress therapeutic interventions, address functional mobility deficits, address ROM deficits, address strength deficits and analyze and address soft tissue restrictions to attain remaining goals.     G-Codes (GP)  Mobility  [id number] Current  CK= 40-59%   Goal  CK= 40-59%    The severity rating is based on clinical judgment and the FOTO score. Certification Period: 3/20/2018 - 5/19/2018    Hermann Matthews, PT 3/20/2018 1:36 PM    ________________________________________________________________________  I certify that the above Therapy Services are being furnished while the patient is under my care. I agree with the treatment plan and certify that this therapy is necessary. [] I have read the above and request that my patient continue as recommended.   [] I have read the above report and request that my patient continue therapy with the following changes/special instructions: _______________________________________  [] I have read the above report and request that my patient be discharged from therapy    Physician's Signature:_______________________________Date:___________Time:__________    Please sign and return to [facility] [address]        [place], [address] [place]   [phone] fax

## 2018-03-22 ENCOUNTER — HOSPITAL ENCOUNTER (OUTPATIENT)
Dept: PHYSICAL THERAPY | Age: 73
Discharge: HOME | End: 2018-03-22
Payer: MEDICARE

## 2018-03-22 PROCEDURE — 97140 MANUAL THERAPY 1/> REGIONS: CPT | Performed by: PHYSICAL THERAPIST

## 2018-03-22 PROCEDURE — 97110 THERAPEUTIC EXERCISES: CPT | Performed by: PHYSICAL THERAPIST

## 2018-03-22 PROCEDURE — 97112 NEUROMUSCULAR REEDUCATION: CPT | Performed by: PHYSICAL THERAPIST

## 2018-03-22 NOTE — PROGRESS NOTES
PT DAILY TREATMENT NOTE - [facility]     Patient Name: Viiv Llamas  Date:3/22/2018  : 1945  [x]  Patient  Verified  Payor: Teena Graves / Plan: [place] & [place] / Product Type: Medicare /    In time:826  Out time:919  Total Treatment Time (min): 53  Total Timed Codes (min): 43  1:1 Treatment Time ( only): 44   Visit #: 1 of 10    Treatment Area: Low back pain [M54.5]  Cervicalgia [M54.2]    SUBJECTIVE  Pain Level (0-10 scale): 5-6/10  Any medication changes, allergies to medications, adverse drug reactions, diagnosis change, or new procedure performed?: [x] No    [] Yes (see summary sheet for update)  Subjective functional status/changes:   [] No changes reported  Pt reports he gets a popping in the back of the shoulder blade that causes radiating pain up the side of his head    OBJECTIVE    Modality rationale: decrease pain and increase tissue extensibility to improve the patients ability to improve mobility and activity tolerance    Min Type Additional Details    [] Estim:  []Unatt       []IFC  []Premod                        []Other:  []w/ice   []w/heat  Position:  Location:    [] Estim: []Att    []TENS instruct  []NMES                    []Other:  []w/US   []w/ice   []w/heat  Position:  Location:    []  Traction: [] Cervical       []Lumbar                       [] Prone          []Supine                       []Intermittent   []Continuous Lbs:  [] before manual  [] after manual    []  Ultrasound: []Continuous   [] Pulsed                           []1MHz   []3MHz W/cm2:  Location:    []  Iontophoresis with dexamethasone         Location: [] Take home patch   [] In clinic   10 []  Ice     [x]  heat  []  Ice massage  []  Laser   []  Anodyne Position: semi reclined  Location: neck    []  Laser with stim  []  Other:  Position:  Location:    []  Vasopneumatic Device Pressure:       [] lo [] med [] hi   Temperature: [] lo [] med [] hi   [] Skin assessment post-treatment:  []intact []redness- no adverse reaction    []redness  adverse reaction:      min []Eval                  []Re-Eval       23 min Therapeutic Exercise:  [x] See flow sheet : increased per flow sheet   Rationale: increase ROM, increase strength and improve coordination to improve the patients ability to decrease pain and improve activity tolerance      min Therapeutic Activity:  []  See flow sheet :   Rationale:   to improve the patients ability to      10 min Neuromuscular Re-education:  [x]  See flow sheet : scapular stabilization exercises and balance ex's per flow sheet    Rationale: increase strength, improve coordination, improve balance and increase proprioception  to improve the patients ability to decrease pain and improve activity tolerance     10 min Manual Therapy:  TPR/DTM to left thoracic longissmus, rhomboids, and subscap in s/l   Rationale: decrease pain, increase ROM, increase tissue extensibility, decrease trigger points and increase postural awareness to improve activity tolerance and decrease radicular pain     min Gait Training:  ___ feet with ___ device on level surfaces with ___ level of assist   Rationale: With   [] TE   [] TA   [] neuro   [] other: Patient Education: [x] Review HEP    [] Progressed/Changed HEP based on:   [] positioning   [] body mechanics   [] transfers   [] heat/ice application    [] other:      Other Objective/Functional Measures:      Pain Level (0-10 scale) post treatment: 3-4/10    ASSESSMENT/Changes in Function: Noted large TrP in the left longissimus that reproduced the radicular pain to the side of his head and tightness in the subscapularis that decreased with manual. Continue to address to assist in decreasing radicular pain. Instructed in using a tennis ball to address TrP at home.      Patient will continue to benefit from skilled PT services to modify and progress therapeutic interventions, address functional mobility deficits, address ROM deficits, address strength deficits, analyze and address soft tissue restrictions, analyze and cue movement patterns, analyze and modify body mechanics/ergonomics, assess and modify postural abnormalities and instruct in home and community integration to attain remaining goals. []  See Plan of Care  []  See progress note/recertification  []  See Discharge Summary         Progress towards goals / Updated goals:  Short Term Goals: To be accomplished in 3 weeks:  1. Patient will demonstrate full compliance with prescribed HEP. Eval: HEP provided  Current: Met, HEP performance reported as prescribed, 2/23/2018  2. Patient will demonstrate a 25% improvement in lumbar flexion AROM in order to improve ease with household ADLs. Eval: Lumbar Flexion 50% limited, Lumbar pain 4/10, R LE pain 3/10  Current: Goal met: Lumbar flexion WNL without pain, only a pulling feeling. 2/27/18  3. Patient will demonstrate a 10 degree improvement in cervical extension AROM to improve ease with overhead ADLs. Eval: Cervical Extension AROM = 24 deg, L cervical pain 3/10  Current: Goal met: Cervical AROM ext 35 deg. 3/6/18    Long Term Goals: To be accomplished in 6 weeks:  1. Patient will demonstrate a significant functional improvement as demonstrated by a score of >/= 60 on lumbar FOTO and >/=60 on neck FOTO. Eval: Neck FOTO = 53, Lumbar FOTO = 53  Current: Goal met: Neck FOTO= 61, lumbar FOTO = 60 3/9/18  2. Patient will demonstrate a (-) L UE ULTT, median nerve bias, and (-) R SLR in order to improve ease with activity tolerance. Eval: (+) L UE ULTT, median nerve bias, (+) R SLR   Current: Not met: (+) L UE LYTT median nerve bias, (-) R SLR 3/20/18  3. Patient will demonstrate L/R SLS >/=25 seconds in order to improve ease with ambulation on uneven ground. Eval: R SLS = 7 sec  Current: NOT MET. R SLS= 10 sec.  3/14/18    PLAN  [x]  Upgrade activities as tolerated     [x]  Continue plan of care  []  Update interventions per flow sheet       [] Discharge due to:_  []  Other:_      Joselyn Kim, PT 3/22/2018  8:50 AM    Future Appointments  Date Time Provider Mary Reyes   3/27/2018 8:30 AM Jesus Headley, PT [facility]   3/29/2018 8:30 AM Jesus Headley, PT [facility]   3/30/2018 8:30 AM [facility] NURSE [facility] SCHED   4/12/2018 8:45 AM Janelle Mcqueen MD [address] [facility]   5/8/2018 8:00 AM IO NURSE VISIT [facility] SCHED   5/14/2018 10:10 AM Moises Landis NP [facility] [place] SCHED   5/15/2018 8:40 AM Isaac Dickens MD [facility] SCHED   2/27/2019 8:00 AM [facility] [place] SCHED   2/27/2019 9:00 AM [facility] [place] SCHED   2/27/2019 10:00 AM [facility] NONIMAGING [facility] [place] SCHED   3/4/2019 9:00 AM Gracie Thomas MD [place] [id number]

## 2018-03-23 ENCOUNTER — APPOINTMENT (OUTPATIENT)
Dept: PHYSICAL THERAPY | Age: 73
End: 2018-03-23
Payer: MEDICARE

## 2018-03-26 DIAGNOSIS — M54.10 RADICULOPATHY, UNSPECIFIED SPINAL REGION: ICD-10-CM

## 2018-03-26 RX ORDER — NALOXONE HYDROCHLORIDE 4 MG/.1ML
SPRAY NASAL
Qty: 1 EACH | Refills: 1 | Status: SHIPPED | OUTPATIENT
Start: 2018-03-26 | End: 2018-04-12

## 2018-03-26 RX ORDER — HYDROCODONE BITARTRATE AND ACETAMINOPHEN 5; 325 MG/1; MG/1
1 TABLET ORAL
Qty: 180 TAB | Refills: 0 | Status: SHIPPED | OUTPATIENT
Start: 2018-03-26 | End: 2018-05-10 | Stop reason: SDUPTHER

## 2018-03-26 NOTE — TELEPHONE ENCOUNTER
[facility] Norco as 02/20/2018 for a 30 d/s. There appears to be no inconsistencies in regards to the prescribing of this medication. Last Visit: 01/09/2018 with MD Viktor Murphy    . Next Appointment: 05/15/2018 with MD Viktor Guardian   Previous Refill Encounters: 02/20/2018 per MD Viktor Guardian #[id number]     Requested Prescriptions     Pending Prescriptions Disp Refills    HYDROcodone-acetaminophen (NORCO) 5-325 mg per tablet 180 Tab 0     Sig: Take 1 Tab by mouth every four (4) hours as needed (for chronic pain).

## 2018-03-27 ENCOUNTER — HOSPITAL ENCOUNTER (OUTPATIENT)
Dept: PHYSICAL THERAPY | Age: 73
Discharge: HOME | End: 2018-03-27
Payer: MEDICARE

## 2018-03-27 PROCEDURE — 97112 NEUROMUSCULAR REEDUCATION: CPT

## 2018-03-27 PROCEDURE — 97140 MANUAL THERAPY 1/> REGIONS: CPT

## 2018-03-27 PROCEDURE — 97110 THERAPEUTIC EXERCISES: CPT

## 2018-03-27 NOTE — PROGRESS NOTES
PT DAILY TREATMENT NOTE - [facility]     Patient Name: Devona Budge Collette  Date:3/27/2018  : 1945  [x]  Patient  Verified  Payor: Paul Payor / Plan: [place] MEDICARE PART A & B / Product Type: Medicare /    In MBQV:1533  Out time:0920  Total Treatment Time (min): 54  Total Timed Codes (min): 44  1:1 Treatment Time ( [address] only): 38   Visit #: 2 of 10    Treatment Area: Low back pain [M54.5]  Cervicalgia [M54.2]    SUBJECTIVE  Pain Level (0-10 scale): 6  Any medication changes, allergies to medications, adverse drug reactions, diagnosis change, or new procedure performed?: [x] No    [] Yes (see summary sheet for update)  Subjective functional status/changes:   [] No changes reported  Patient reports increase in left cervical pain most prominently with left UE AROM.     OBJECTIVE    Modality rationale: decrease pain and increase tissue extensibility to improve the patients ability to improve ease with sleep   Min Type Additional Details   10 []  Ice     [x]  heat  []  Ice massage Position: Reclined  Location: Cervical, Post-Tx     14 min Therapeutic Exercise:  [x] See flow sheet : Emphasis placed on improving available shoulder, cervical, lumbar and hip AROM and strength of the shoulder and hip musculature   Rationale: increase ROM and increase strength to improve the patients ability to improve ease with self-care ADLs     20 min Neuromuscular Re-education:  [x]  See flow sheet : Emphasis placed on improving activation and recruitment of the deep cervical musculature and cervical and pelvic proprioceptive awareness   Rationale: increase ROM, increase strength and increase proprioception  to improve the patients ability to improve ease with checking blindspots while driving     10 min Manual Therapy:    Supine, L Glenohumeral Inferior Grade III Mobilization (inc deg abduction)  Supine, L Median Nerve Manual Glide   Rationale: decrease pain, increase ROM and increase tissue extensibility to improve ease with overhead functional lifting. With   [] TE   [] TA   [] neuro   [] other: Patient Education: [x] Review HEP    [] Progressed/Changed HEP based on:   [] positioning   [] body mechanics   [] transfers   [] heat/ice application    [] other:      Other Objective/Functional Measures: /86 mmHg     Pain Level (0-10 scale) post treatment: 4    ASSESSMENT/Changes in Function: Patient noted with a continued (+) left ULTT, median nerve bias, with patient subjectively with reproduction of left cervical discomfort with end-range left shoulder PROM thus continuation of glenohumeral inferior mobilizations. Greater emphasis placed on treatment of the cervical and left shoulder girdle secondary to patient's primary complaints. Patient will continue to benefit from skilled PT services to modify and progress therapeutic interventions, address functional mobility deficits, address ROM deficits, address strength deficits, analyze and address soft tissue restrictions, analyze and cue movement patterns, analyze and modify body mechanics/ergonomics and assess and modify postural abnormalities to attain remaining goals. []  See Plan of Care  []  See progress note/recertification  []  See Discharge Summary         Progress towards goals / Updated goals:    Short Term Goals: To be accomplished in 3 weeks:  1. Patient will demonstrate full compliance with prescribed HEP. At PN: Met, HEP performance reported as prescribed, 2/23/2018  2. Patient will demonstrate a 25% improvement in lumbar flexion AROM in order to improve ease with household ADLs. At PN: Goal met: Lumbar flexion WNL without pain, only a pulling feeling. 2/27/18  3. Patient will demonstrate a 10 degree improvement in cervical extension AROM to improve ease with overhead ADLs. At PN: Goal met: Cervical AROM ext 35 deg. 3/6/18     Long Term Goals: To be accomplished in 6 weeks:  1.  Patient will demonstrate a significant functional improvement as demonstrated by a score of >/= 60 on lumbar FOTO and >/=60 on neck FOTO. At PN: Goal met: Neck FOTO= 61, lumbar FOTO = 60 3/9/18  2. Patient will demonstrate a (-) L UE ULTT, median nerve bias, and (-) R SLR in order to improve ease with activity tolerance. At PN: Not met: (+) L UE ULTT median nerve bias, (-) R SLR 3/20/18  Current: Remains, (+) L UE ULTT Median Nerve Bias, (-) R SLR, 3/27/2018  3. Patient will demonstrate L/R SLS >/=25 seconds in order to improve ease with ambulation on uneven ground. At PN: NOT MET. R SLS= 10 sec.  3/14/18    PLAN  [x]  Upgrade activities as tolerated     [x]  Continue plan of care  []  Update interventions per flow sheet       []  Discharge due to:_  []  Other:_      Jose F Running, PT 3/27/2018  8:05 AM    Future Appointments  Date Time Provider Mary Amy   3/27/2018 8:30 AM Josef  Running, PT [facility]   3/29/2018 8:30 AM Jose F Running, PT [facility]   3/30/2018 8:30 AM Rajiv Berry   [facility]2018 [facility]   5/8/2018 8:00 AM [facility] NURSE VISIT [facility] SCHED   5/14/2018 10:10 AM Khanh Valentin NP [facility] [place] SCHED   5/15/2018 8:40 AM Lacie Loja MD [facility] [place] SCHED   2/27/2019 8:00 AM [facility] [place] SCHED   2/27/2019 9:00 AM [facility] [place] SCHED   2/27/2019 10:00 AM [facility] NONIMAGING [facility] [place] SCHED   3/4/2019 9:00 AM [place] [id number]

## 2018-03-29 ENCOUNTER — HOSPITAL ENCOUNTER (OUTPATIENT)
Dept: PHYSICAL THERAPY | Age: 73
Discharge: HOME | End: 2018-03-29
Payer: MEDICARE

## 2018-03-29 PROCEDURE — 97112 NEUROMUSCULAR REEDUCATION: CPT

## 2018-03-29 PROCEDURE — 97140 MANUAL THERAPY 1/> REGIONS: CPT

## 2018-04-10 ENCOUNTER — HOSPITAL ENCOUNTER (OUTPATIENT)
Dept: PHYSICAL THERAPY | Age: 73
Discharge: HOME | End: 2018-04-10
Payer: MEDICARE

## 2018-04-10 PROCEDURE — 97140 MANUAL THERAPY 1/> REGIONS: CPT | Performed by: PHYSICAL THERAPIST

## 2018-04-10 PROCEDURE — 97110 THERAPEUTIC EXERCISES: CPT | Performed by: PHYSICAL THERAPIST

## 2018-04-10 PROCEDURE — 97112 NEUROMUSCULAR REEDUCATION: CPT | Performed by: PHYSICAL THERAPIST

## 2018-04-10 NOTE — PROGRESS NOTES
PT DAILY TREATMENT NOTE - [facility]     Patient Name: Denise Leech Collette  Date:4/10/2018  : 1945  [x]  Patient  Verified  Payor: Cyndee Watkins / Plan: [place] MEDICARE PART A & B / Product Type: Medicare /    In time:928  Out time:1011  Total Treatment Time (min): 43  Total Timed Codes (min): 38  1:1 Treatment Time ( [address] only): 38   Visit #: 4 of 10    Treatment Area: Low back pain [M54.5]  Cervicalgia [M54.2]    SUBJECTIVE  Pain Level (0-10 scale): 4/10  Any medication changes, allergies to medications, adverse drug reactions, diagnosis change, or new procedure performed?: [x] No    [] Yes (see summary sheet for update)  Subjective functional status/changes:   [] No changes reported  Pt reports he drove a lot last weekend and had some back discomfort but mostly has knee pain now and fluid on the knee, reports he was having pain into B hips when walking 2-3 blocks to the point he had to stop and rest for 45 seconds before continuing.      OBJECTIVE    Modality rationale: decrease pain and increase tissue extensibility to improve the patients ability to improve mobility and activity tolerance    Min Type Additional Details    [] Estim:  []Unatt       []IFC  []Premod                        []Other:  []w/ice   []w/heat  Position:  Location:    [] Estim: []Att    []TENS instruct  []NMES                    []Other:  []w/US   []w/ice   []w/heat  Position:  Location:    []  Traction: [] Cervical       []Lumbar                       [] Prone          []Supine                       []Intermittent   []Continuous Lbs:  [] before manual  [] after manual    []  Ultrasound: []Continuous   [] Pulsed                           []1MHz   []3MHz W/cm2:  Location:    []  Iontophoresis with dexamethasone         Location: [] Take home patch   [] In clinic   5 (w/ FOTO) []  Ice     [x]  heat  []  Ice massage  []  Laser   []  Anodyne Position: semi reclined  Location: neck    []  Laser with stim  []  Other:  Position:  Location: []  Vasopneumatic Device Pressure:       [] lo [] med [] hi   Temperature: [] lo [] med [] hi   [] Skin assessment post-treatment:  []intact []redness- no adverse reaction    []redness  adverse reaction:      min []Eval                  []Re-Eval       12 min Therapeutic Exercise:  [] See flow sheet :   Rationale: increase ROM, increase strength and improve coordination to improve the patients ability to improve activity tolerance and mobility      min Therapeutic Activity:  []  See flow sheet :   Rationale:   to improve the patients ability to      18 min Neuromuscular Re-education:  [x]  See flow sheet : deep cervical and scapular activation ex's    Rationale: increase strength, improve coordination, improve balance and increase proprioception  to improve the patients ability to improve activity tolerance and stability    8 min Manual Therapy:  Left STJ mobs, TPR to left rhomboids, UT and thoracic paraspinals    Rationale: decrease pain, increase ROM, increase tissue extensibility, decrease trigger points and increase postural awareness to improve activity tolerance and headaches     min Gait Training:  ___ feet with ___ device on level surfaces with ___ level of assist   Rationale: With   [] TE   [] TA   [] neuro   [] other: Patient Education: [x] Review HEP    [] Progressed/Changed HEP based on:   [] positioning   [] body mechanics   [] transfers   [] heat/ice application    [] other:      Other Objective/Functional Measures:      Pain Level (0-10 scale) post treatment: 2-3/10    ASSESSMENT/Changes in Function: Pt w/ limitations 2' right knee pain and edema causing antalgic gait pattern and pain into the right lower back. Pt encouraged to contact doctor about his knee if the edema persists or increases.      Patient will continue to benefit from skilled PT services to modify and progress therapeutic interventions, address functional mobility deficits, address ROM deficits, address strength deficits, analyze and address soft tissue restrictions, analyze and cue movement patterns, analyze and modify body mechanics/ergonomics, assess and modify postural abnormalities, address imbalance/dizziness and instruct in home and community integration to attain remaining goals. []  See Plan of Care  []  See progress note/recertification  []  See Discharge Summary         Progress towards goals / Updated goals:  Short Term Goals: To be accomplished in 3 weeks:  1. Patient will demonstrate full compliance with prescribed HEP. At PN: Met, HEP performance reported as prescribed, 2/23/2018  2. Patient will demonstrate a 25% improvement in lumbar flexion AROM in order to improve ease with household ADLs. At PN: Goal met: Lumbar flexion WNL without pain, only a pulling feeling. 2/27/18  3. Patient will demonstrate a 10 degree improvement in cervical extension AROM to improve ease with overhead ADLs. At PN: Goal met: Cervical AROM ext 35 deg. 3/6/18      Long Term Goals: To be accomplished in 6 weeks:  1. Patient will demonstrate a significant functional improvement as demonstrated by a score of >/= 60 on lumbar FOTO and >/=60 on neck FOTO. At PN: partially regressed: Neck FOTO remains = 61, lumbar Regressed = 51 4/10/18  2. Patient will demonstrate a (-) L UE ULTT, median nerve bias, and (-) R SLR in order to improve ease with activity tolerance. At PN: Not met: (+) L UE ULTT median nerve bias, (-) R SLR 3/20/18  Current: Remains, (+) L UE ULTT Median Nerve Bias, (+) R SLR, 3/27/2018  3. Patient will demonstrate L/R SLS >/=25 seconds in order to improve ease with ambulation on uneven ground. At PN: NOT MET. R SLS= 10 sec.  3/14/18    PLAN  [x]  Upgrade activities as tolerated     [x]  Continue plan of care  []  Update interventions per flow sheet       []  Discharge due to:_  []  Other:_      Debby Braga, PT 4/10/2018  9:53 AM    Future Appointments  Date Time Provider Mary Reyes   4/12/2018 8:45 AM Johanne Fuelling Brenton Hernandez, [address]   4/13/2018 9:00 AM [facility] [place] [facility]   4/17/2018 9:00 AM Gurvinder Sic, PT [facility]   4/20/2018 9:00 AM Gurvinder Sic, PT [facility]   4/24/2018 9:00 AM Jann Gaitan, PTA [facility]   4/26/2018 11:30 AM Vini Rudd, PT [facility]   5/1/2018 8:30 AM Jann Gaitan, PTA [facility]   5/8/2018 8:00 AM [facility] NURSE VISIT [facility] SCHED   5/14/2018 10:10 AM Doris Pacheco NP [facility] SCHED   5/15/2018 8:40 AM Cheryl Rosenthal MD [address]   2/27/2019 8:00 AM [facility] IM[facility]ED   2/27/2019 9:00 AM [facility]   2/27/2019 10:00 AM [facility] NONIMAGING [facility] SCHED   3/4/2019 9:00 AM [facility]

## 2018-04-13 ENCOUNTER — OFFICE VISIT (OUTPATIENT)
Dept: ORTHOPEDIC SURGERY | Facility: CLINIC | Age: 73
End: 2018-04-13

## 2018-04-13 ENCOUNTER — APPOINTMENT (OUTPATIENT)
Dept: PHYSICAL THERAPY | Age: 73
End: 2018-04-13
Payer: MEDICARE

## 2018-04-13 VITALS
SYSTOLIC BLOOD PRESSURE: 145 MMHG | OXYGEN SATURATION: 97 % | HEIGHT: 72 IN | WEIGHT: 216 LBS | TEMPERATURE: 96.4 F | BODY MASS INDEX: 29.26 KG/M2 | RESPIRATION RATE: 16 BRPM | DIASTOLIC BLOOD PRESSURE: 82 MMHG | HEART RATE: 60 BPM

## 2018-04-13 DIAGNOSIS — G89.29 CHRONIC PAIN OF RIGHT KNEE: Primary | ICD-10-CM

## 2018-04-13 DIAGNOSIS — M25.561 CHRONIC PAIN OF RIGHT KNEE: Primary | ICD-10-CM

## 2018-04-13 RX ORDER — LIDOCAINE HYDROCHLORIDE 20 MG/ML
1 INJECTION, SOLUTION EPIDURAL; INFILTRATION; INTRACAUDAL; PERINEURAL ONCE
Qty: 1 ML | Refills: 0
Start: 2018-04-13 | End: 2018-04-13

## 2018-04-13 RX ORDER — METHYLPREDNISOLONE ACETATE 40 MG/ML
40 INJECTION, SUSPENSION INTRA-ARTICULAR; INTRALESIONAL; INTRAMUSCULAR; SOFT TISSUE ONCE
Qty: 1 VIAL | Refills: 0
Start: 2018-04-13 | End: 2018-04-13

## 2018-04-13 NOTE — PROGRESS NOTES
HISTORY:  Yesi Hoff returns following for right knee pain. He has a history of end stage osteoarthritis of the right knee, and his condition is deemed nonoperative unfortunately secondary to chronic, 100% occlusion of his left carotid artery. He has undergone carotid artery endarterectomy on the right side under the care of Dr. Danya Ruff. He was at one time scheduled for a right knee replacement under the care of Dr. Obed Cabrera but the case was canceled on the date of surgery while the patient was being prepped for the surgical procedure. Apparently there was not a favorable recommendation for the surgery by vascular specialists secondary to the occlusion of the left carotid. The patient has persisted with multiple pains likely associated with his right knee end-stage osteoarthritis. Of recent, he has developed bilateral hip pain. He is seeing Dr. Jasper Gagnon for back pain. He is receiving also physical therapy for back symptoms. The limitations to his activities are measured almost entirely based on his severe osteoarthritis of the right knee. Today, he wishes an injection of steroid and possible aspiration secondary to his accumulation of fluid and pain. PHYSICAL EXAMINATION: The patient is a healthy-appearing, well-developed, well-nourished, pleasant, 75-year-old, obese,  male atraumatic, normocephalic, alert and oriented x 3, sitting on the table comfortably. He lies supine with no difficulty. With right leg on a bump at 30?, there is evident 2+ effusion. He has severe valgus deformity noted when he straightens. His motion is limited at barely 95? - 10? today. No calf tenderness or evidence of DVT. Distal sensation intact fully, right lower extremity. RADIOGRAPHS: X-rays reveal severe end-stage tricompartmental osteoarthritis on three-view of the right knee. IMPRESSION:   1. Right knee pain. 2. End-stage osteoarthritis of severe nature of the right knee.    3. Decreased range of motion of the right knee secondary to above. 4. Occlusion near 100% of the left carotid artery. PROCEDURE: Today, using sterile technique, after verbal and written consent were obtained and appropriate timeout performed, 3 cc of 1% Lidocaine were used to anesthetize the right knee using the superolateral intraarticular approach. To follow, 81 cc of straw-colored, blood-tinted aspirate removed. To follow, 1 cc of Depo Medrol at 40 mg/ml mixed with 10 ml of Marcaine 0.25% re-instilled. The patient tolerated the procedure well. PLAN: I am currently recommending aspiration injection today. He may also benefit from viscosupplementation in the form of Euflexxa. We are going to put an order in for that today.

## 2018-04-17 ENCOUNTER — HOSPITAL ENCOUNTER (OUTPATIENT)
Dept: PHYSICAL THERAPY | Age: 73
Discharge: HOME | End: 2018-04-17
Payer: MEDICARE

## 2018-04-17 PROCEDURE — G8979 MOBILITY GOAL STATUS: HCPCS

## 2018-04-17 PROCEDURE — 97112 NEUROMUSCULAR REEDUCATION: CPT

## 2018-04-17 PROCEDURE — G8980 MOBILITY D/C STATUS: HCPCS

## 2018-04-17 PROCEDURE — 97110 THERAPEUTIC EXERCISES: CPT

## 2018-04-17 NOTE — PROGRESS NOTES
PT DAILY TREATMENT NOTE - [facility]     Patient Name: Garnette Blumenthal Collette  Date:2018  : 1945  [x]  Patient  Verified  Payor: Edinson Parrish / Plan: [place] MEDICARE PART A & B / Product Type: Medicare /    In time:8:52  Out time:9:44  Total Treatment Time (min): 52  Total Timed Codes (min): 42  1:1 Treatment Time ( only): 42   Visit #: 5 of 10    Treatment Area: Low back pain [M54.5]  Cervicalgia [M54.2]    SUBJECTIVE  Pain Level (0-10 scale): 5  Any medication changes, allergies to medications, adverse drug reactions, diagnosis change, or new procedure performed?: [x] No    [] Yes (see summary sheet for update)  Subjective functional status/changes:   [] No changes reported  Pt reports that he feels that his back bothers him because he limps from his right knee pain. OBJECTIVE    Modality rationale: decrease pain to improve the patients ability to decrease difficulty while performing tasks.     Min Type Additional Details    [] Estim:  []Unatt       []IFC  []Premod                        []Other:  []w/ice   []w/heat  Position:  Location:    [] Estim: []Att    []TENS instruct  []NMES                    []Other:  []w/US   []w/ice   []w/heat  Position:  Location:    []  Traction: [] Cervical       []Lumbar                       [] Prone          []Supine                       []Intermittent   []Continuous Lbs:  [] before manual  [] after manual    []  Ultrasound: []Continuous   [] Pulsed                           []1MHz   []3MHz W/cm2:  Location:    []  Iontophoresis with dexamethasone         Location: [] Take home patch   [] In clinic   10 []  Ice     [x]  heat  []  Ice massage  []  Laser   []  Anodyne Position:sitting  Location:neck     []  Laser with stim  []  Other:  Position:  Location:    []  Vasopneumatic Device Pressure:       [] lo [] med [] hi   Temperature: [] lo [] med [] hi   [] Skin assessment post-treatment:  []intact []redness- no adverse reaction    []redness  adverse reaction:    32 min Therapeutic Exercise:  [x] See flow sheet :   Rationale: increase ROM and increase strength to improve the patients ability to increase tolerance to activities.         10 min Neuromuscular Re-education:  [x]  See flow sheet :scapular stabilization exercises. Rationale: increase ROM, increase strength, improve coordination and increase proprioception  to improve the patients ability to increase ease with ADLs.           With   [] TE   [] TA   [] neuro   [] other: Patient Education: [x] Review HEP    [] Progressed/Changed HEP based on:   [] positioning   [] body mechanics   [] transfers   [] heat/ice application    [] other:      Other Objective/Functional Measures: see goals     Pain Level (0-10 scale) post treatment: 2    ASSESSMENT/Changes in Function: Pt has met some LTGs. Pt is presented with negative median nerve in left UE and negative for right SLR. Pt continues to remain limited with SLS on right LE. Pt reports that he feels his back pain is coming from limping when he walks due to right knee pain. []  See Plan of Care  []  See progress note/recertification  [x]  See Discharge Summary         Progress towards goals / Updated goals:  Short Term Goals: To be accomplished in 3 weeks:  1. Patient will demonstrate full compliance with prescribed HEP. At PN: Met, HEP performance reported as prescribed, 2/23/2018  2. Patient will demonstrate a 25% improvement in lumbar flexion AROM in order to improve ease with household ADLs. At PN: Goal met: Lumbar flexion WNL without pain, only a pulling feeling. 2/27/18  3. Patient will demonstrate a 10 degree improvement in cervical extension AROM to improve ease with overhead ADLs. At PN: Goal met: Cervical AROM ext 35 deg. 3/6/18      Long Term Goals: To be accomplished in 6 weeks:  1. Patient will demonstrate a significant functional improvement as demonstrated by a score of >/= 60 on lumbar FOTO and >/=60 on neck FOTO.   At PN: partially regressed: Neck FOTO remains = 61, lumbar Regressed = 51 4/10/18  2. Patient will demonstrate a (-) L UE ULTT, median nerve bias, and (-) R SLR in order to improve ease with activity tolerance. At PN: Not met: (+) L UE ULTT median nerve bias, (-) R SLR 3/20/18  Current: Goal met:, (-) L UE ULTT Median Nerve Bias, (-) R SLR, 4/17/2018  3. Patient will demonstrate L/R SLS >/=25 seconds in order to improve ease with ambulation on uneven ground. At PN: NOT MET. R SLS= 10 sec. 3/14/18  Current: Not met: L SLS 30 sec, R SLS 18 sec 4/17/18     PLAN  []  Upgrade activities as tolerated     []  Continue plan of care  []  Update interventions per flow sheet       [x]  Discharge due to:_Pt met most LTGs. []  Other:_      Cathy Jan, PTA 4/17/2018  8:56 AM    Future Appointments  Date Time Provider Mary Reyes   4/17/2018 9:00 AM Cathy Jan, PTA [facility]   4/18/2018 8:30 AM Tessa Saravia PT [facility]   4/20/2018 8:30 AM Rc Cantu PA-C [facility] SCHED   4/24/2018 9:00 AM Cathy Jan, PTA [facility]   4/26/2018 11:30 AM Romel Amaral, PT [facility]   5/1/2018 8:30 AM Cathy Jan, PTA [facility]   5/8/2018 8:00 AM [facility]   5/14/2018 10:10 AM Patricia Jeans, NP [facility] SCHED   5/15/2018 8:40 AM Swati Knight MD [facility]   10/4/2018 8:30 AM [facility] NURSE [facility] SCHED   10/11/2018 8:30 AM Jodeane Apley, MD [place]   2/27/2019 8:00 AM [facility]   2/27/2019 9:00 AM [facility]   2/27/2019 10:00 AM [facility] NONIMAGING [facility] SCHED   3/4/2019 9:00 AM Yaritza Schmitz MD [facility] [address]         .

## 2018-04-17 NOTE — PROGRESS NOTES
[facility]              [address]        [place], [address] [place]   [phone] fax      Discharge Summary  Patient name: Sarah Deal Start of Care: 2018   Referral source: Maritza Baker MD : 1945   Medical/Treatment Diagnosis: Low back pain [M54.5]  Cervicalgia [M54.2] Onset Date:Chronic, Worsening over the last year     Prior Hospitalization: see medical history Provider#: [id number]   Medications: Verified on Patient Summary List    Comorbidities: Arthritis, Back Pain, Prostate Cancer (remission), Skin Cancer (Active - Basal cell), Stent placement x2, R Carotid Artery Cleaning, L Femoral Artery Stent, R Iliac Arterial Stent, Peripheral Vascular Disease, GI Disease, HTN, R Knee Surgery ()   Prior Level of Function: Chronic LBP and Neck Pain, Ambulation tolerance (30 minutes), (I) Functional ADLs, Retired, RHD  Visits from Start of Care: 13    Missed Visits: 0  Reporting Period : 3/20/2018 to 2018      Summary of Care:    Short Term Goals: To be accomplished in 3 weeks:  1. Patient will demonstrate full compliance with prescribed HEP. At PN: Met, HEP performance reported as prescribed  2. Patient will demonstrate a 25% improvement in lumbar flexion AROM in order to improve ease with household ADLs. At PN: Goal met: Lumbar flexion WNL without pain, only a pulling feeling  3. Patient will demonstrate a 10 degree improvement in cervical extension AROM to improve ease with overhead ADLs. At PN: Goal met: Cervical AROM ext 35 deg      Long Term Goals: To be accomplished in 6 weeks:  1. Patient will demonstrate a significant functional improvement as demonstrated by a score of >/= 60 on lumbar FOTO and >/=60 on neck FOTO. At PN: Partially regressed: Neck FOTO remains = 61, lumbar Regressed = 51   2. Patient will demonstrate a (-) L UE ULTT, median nerve bias, and (-) R SLR in order to improve ease with activity tolerance.   At PN: Not met: (+) L UE ULTT median nerve bias, (-) R SLR   At DC: Goal met:, (-) L UE ULTT Median Nerve Bias, (-) R SLR  3. Patient will demonstrate L/R SLS >/=25 seconds in order to improve ease with ambulation on uneven ground. At PN: NOT MET. R SLS= 10 sec  At DC: Not met: L SLS 30 sec, R SLS 18 sec      G-Codes (GP)  Mobility    Goal  CK= 40-59%  D/C  CK= 40-59%    The severity rating is based on clinical judgment and the FOTO Score score. ASSESSMENT/RECOMMENDATIONS:    Pt has met some LTGs. Pt is presented with negative median nerve in left UE and negative for right SLR. Pt continues to remain limited with SLS on right LE. Pt reports that he feels his back pain is coming from limping when he walks due to right knee pain with patient with scheduled evaluation for right knee pain. At this time patient to be discharged with continuation of HEP as prescribed.     [x]Discontinue therapy: [x]Patient has reached or is progressing toward set goals      []Patient is non-compliant or has abdicated      []Due to lack of appreciable progress towards set [address] Milan Rashid, PT 4/17/2018 10:36 AM

## 2018-04-18 ENCOUNTER — HOSPITAL ENCOUNTER (OUTPATIENT)
Dept: PHYSICAL THERAPY | Age: 73
Discharge: HOME | End: 2018-04-18
Payer: MEDICARE

## 2018-04-18 PROCEDURE — 97112 NEUROMUSCULAR REEDUCATION: CPT | Performed by: PHYSICAL THERAPIST

## 2018-04-18 PROCEDURE — G8979 MOBILITY GOAL STATUS: HCPCS | Performed by: PHYSICAL THERAPIST

## 2018-04-18 PROCEDURE — G8978 MOBILITY CURRENT STATUS: HCPCS | Performed by: PHYSICAL THERAPIST

## 2018-04-18 PROCEDURE — 97162 PT EVAL MOD COMPLEX 30 MIN: CPT | Performed by: PHYSICAL THERAPIST

## 2018-04-18 PROCEDURE — 97110 THERAPEUTIC EXERCISES: CPT | Performed by: PHYSICAL THERAPIST

## 2018-04-18 NOTE — PROGRESS NOTES
[facility]              [address]        [place], [address] [place]   [phone] fax    Plan of Care/ Statement of Necessity for Physical Therapy Services    Patient name: Anahi Florian Start of Care: 2018   Referral source: Presley Jung MD : 1945    Medical Diagnosis: Chronic pain of right knee [M25.561, G89.29]   Onset [facility]:    Treatment Diagnosis: right knee pain    Prior Hospitalization: see medical history Provider#: [id number]   Medications: Verified on Patient summary List    Comorbidities: Arthritis, Back Pain, Prostate Cancer (remission), Skin Cancer (Active - Basal cell), Stent placement x2, R Carotid Artery Cleaning, L Femoral Artery Stent, R Iliac Arterial Stent, Peripheral Vascular Disease, GI Disease, kidney/bladder/prostate/urinatiopn problems, visual impairment, HTN, R Knee Surgery ()   Prior Level of Function: Chronic LBP and Neck Pain, Ambulation tolerance (30 minutes), (I) Functional ADLs, Retired      [employer] of Care and following information is based on the information from the initial evaluation. Assessment/ key information: Pt is a 68 y/o male w/ c/o increased pain in the right knee resulting from a knee injury during high school football in  w/ surgery to \"put it back together\" in early . Reports minimal PT following surgery and a decent recovery w/ issues on and off over the years. Reports multiple occurences of having fluid drained off the knee, cortisone injections, and gel injections all allowing max of 4 month relief of pain. 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 Reports pain in the knee increases w/ walking, exercise, and when fluid accumulates on the knee. Reports pain is alleviated w/ rest, voltarin gel, and heat.  Reports he is now having increased right hip pain w/ walking 1-2 blocks at a time and occasional right ankle pain 2' abnormality of gait. Pt lives w/ wife is can help if necessary but pt reports he rarely requires help. Pt presents w/ mildly antalgic gait and no AD. AROM right knee 9-120 deg. Good patellar mobility, significant lateral tracking noted w/ QS, good quad set right. MMT right hip flex 4+/5, abd 4+/5, ext -4/5, knee flex 4/5, ext 4+/5, ankle DF 5/5; left hip flex 4/5, abd -4/5, ext 4/5, knee flex 4+/5, ext 4+/5, ankle DF 5/5. SLS left w/ noted trendelenburg and hod for 8 seconds w/o HHA, right for 7 seconds w/o HHA. Noted left wt shift w/ attempt at minisquat and increased pain reported. Pt will benefit from skilled PT to address the deficits and progress with pt goals. Evaluation Complexity History MEDIUM  Complexity : 1-2 comorbidities / personal factors will impact the outcome/ POC ; Examination MEDIUM Complexity : 3 Standardized tests and measures addressing body structure, function, activity limitation and / or participation in recreation  ;Presentation MEDIUM Complexity : Evolving with changing characteristics  ; Clinical Decision Making MEDIUM Complexity : FOTO score of 26-74  Overall Complexity Rating: MEDIUM  Problem List: pain affecting function, decrease ROM, decrease strength, impaired gait/ balance, decrease ADL/ functional abilitiies, decrease activity tolerance and decrease flexibility/ joint mobility   Treatment Plan may include any combination of the following: Therapeutic exercise, Therapeutic activities, Neuromuscular re-education, Physical agent/modality, Gait/balance training, Manual therapy, Patient education, Functional mobility training and Stair training  Patient / Family readiness to learn indicated by: asking questions, trying to perform skills and interest  Persons(s) to be included in education: patient (P)  Barriers to Learning/Limitations: None  Patient Goal (s): less pain and better motion  Patient Self Reported Health Status: fair  Rehabilitation Potential: good    Short Term Goals: To be accomplished in 1 weeks:  1. Pt will be independent and complaint w/ HEP to progress gains in PT. Long Term Goals: To be accomplished in 8 weeks:  1. Pt will improve FOTO to > or = to 59 to demo improved function. 2. Pt will increase MMT left hip abd to > or = to 4+/5 for improved stability during walking. 3. Pt will increase MMT right hip ext to > or = to 4+/5 for ease w/ stair navigation. 4. Pt will report > or = to 60% improvement in symptoms for ease w/ decreased pain w/ ADLs. Frequency / Duration: Patient to be seen 2 times per week for 8 weeks. Patient/ Caregiver education and instruction: Diagnosis, prognosis, activity modification and exercises   [x]  Plan of care has been reviewed with PTA    G-Codes (GP)  Mobility   Current  CK= 40-59%   Goal  CK= 40-59%    The severity rating is based on clinical judgment and the FOTO score. Certification Period: 4/18/18 - 6/16/18  Obie Van, PT 4/18/2018 9:26 AM  ________________________________________________________________________    I certify that the above Therapy Services are being furnished while the patient is under my care. I agree with the treatment plan and certify that this therapy is necessary.     [address] [facility] Signature:____________________  Date:____________Time: _________    Please sign and return to [facility]              [address] [facility], [address] [place]   [phone] fax

## 2018-04-18 NOTE — PROGRESS NOTES
PT DAILY TREATMENT NOTE - [facility]     Patient Name: Nehal Tucker  Date:2018  : 1945  [x]  Patient  Verified  Payor: Deshawn Carrillo / Plan: [place] & [place] / Product Type: Medicare /    In time:830  Out time:908   Total Treatment Time (min): 38  Total Timed Codes (min): 23  1:1 Treatment Time ( [address] only): 38   Visit #: 1 of 12    Treatment Area: Chronic pain of right knee [M25.561, G89.29]    SUBJECTIVE  Pain Level (0-10 scale): 3-4/10  Any medication changes, allergies to medications, adverse drug reactions, diagnosis change, or new procedure performed?: [x] No    [] Yes (see summary sheet for update)  Subjective functional status/changes:   [] No changes reported  HPI: Pt c/o increased pain in the right knee resulting from a knee injury during high school football in  w/ surgery to \"put it back together\" in early . Reports minimal PT following surgery and a decent recovery w/ issues on and off over the years. Reports multiple occurences of having fluid drained off the knee, cortisone injections, and gel injections all allowing max of 4 month relief of pain. 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 Reports pain in the knee increases w/ walking, exercise, and when fluid accumulates on the knee. Reports pain is alleviated w/ rest, voltarin gel, and heat. Reports he is now having increased right hip pain w/ walking 1-2 blocks at a time and occasional right ankle pain 2' abnormality of gait. Pt lives w/ wife is can help if necessary but pt reports he rarely requires help.       OBJECTIVE    Modality rationale:  to improve the patients ability to    Min Type Additional Details    [] Estim:  []Unatt       []IFC  []Premod                        []Other:  []w/ice   []w/heat  Position:  Location:    [] Estim: []Att    []TENS instruct  []NMES                    []Other:  []w/US   []w/ice   []w/heat  Position:  Location:    [] Traction: [] Cervical       []Lumbar                       [] Prone          []Supine                       []Intermittent   []Continuous Lbs:  [] before manual  [] after manual    []  Ultrasound: []Continuous   [] Pulsed                           []1MHz   []3MHz W/cm2:  Location:    []  Iontophoresis with dexamethasone         Location: [] Take home patch   [] In clinic    []  Ice     []  heat  []  Ice massage  []  Laser   []  Anodyne Position:  Location:    []  Laser with stim  []  Other:  Position:  Location:    []  Vasopneumatic Device Pressure:       [] lo [] med [] hi   Temperature: [] lo [] med [] hi   [] Skin assessment post-treatment:  []intact []redness- no adverse reaction    []redness  adverse reaction:     15 min [x]Eval                  []Re-Eval       13 min Therapeutic Exercise:  [] See flow sheet : instructed in and demo'd HEP, educated in goals for PT and expectations, reviewed mechanics   Rationale: increase ROM, increase strength, improve coordination and increase proprioception to improve the patients ability to decrease pain and improve activity tolerance      min Therapeutic Activity:  []  See flow sheet :   Rationale:   to improve the patients ability to      10 min Neuromuscular Re-education:  []  See flow sheet :   Rationale: increase ROM, increase strength, improve coordination and increase proprioception  to improve the patients ability to improve activity tolerance and gait     min Manual Therapy:     Rationale:  to      min Gait Training:  ___ feet with ___ device on level surfaces with ___ level of assist   Rationale: With   [] TE   [] TA   [] neuro   [] other: Patient Education: [x] Review HEP    [] Progressed/Changed HEP based on:   [] positioning   [] body mechanics   [] transfers   [] heat/ice application    [] other:      Other Objective/Functional Measures: Pt presents w/ mildly antalgic gait and no AD. AROM right knee 9-120 deg.  Good patellar mobility, significant lateral tracking noted w/ QS, good quad set right. MMT right hip flex 4+/5, abd 4+/5, ext -4/5, knee flex 4/5, ext 4+/5, ankle DF 5/5; left hip flex 4/5, abd -4/5, ext 4/5, knee flex 4+/5, ext 4+/5, ankle DF 5/5. SLS left w/ noted trendelenburg and hod for 8 seconds w/o HHA, right for 7 seconds w/o HHA. Noted left wt shift w/ attempt at minisquat and increased pain reported. Pain Level (0-10 scale) post treatment: 2/10    ASSESSMENT/Changes in Function: Pt w/ relief reported after taping, continue taping as beneficial to improve pain and allow more tolerance to completion of ex's. Focus on improving left hip abd strength to improve stability in walking. Patient will continue to benefit from skilled PT services to modify and progress therapeutic interventions, address functional mobility deficits, address ROM deficits, address strength deficits, analyze and address soft tissue restrictions, analyze and cue movement patterns, address imbalance/dizziness and instruct in home and community integration to attain remaining goals. [x]  See Plan of Care  []  See progress note/recertification  []  See Discharge Summary         Progress towards goals / Updated goals:  Short Term Goals: To be accomplished in 1 weeks:  1. Pt will be independent and complaint w/ HEP to progress gains in PT. At eval:  Initiated HEP  Long Term Goals: To be accomplished in 8 weeks:  1. Pt will improve FOTO to > or = to 59 to demo improved function. At eval: FOTO = 49  2. Pt will increase MMT left hip abd to > or = to 4+/5 for improved stability during walking. At eval: MMT left hip abd = -4/5  3. Pt will increase MMT right hip ext to > or = to 4+/5 for ease w/ stair navigation. At eval: MMT right hip ext = -4/5  4. Pt will report > or = to 60% improvement in symptoms for ease w/ decreased pain w/ ADLs.    At eval: 0%    PLAN  []  Upgrade activities as tolerated     []  Continue plan of care  []  Update interventions per flow sheet       []  Discharge due to:_  [x]  Other: 2x/8 weeks      Katlin Cazares, PT 4/18/2018  8:59 AM    Future Appointments  Date Time Provider Mary Amy   4/20/2018 8:30 AM Connor Gamez PA-C [facility]   4/24/2018 9:00 AM Nic Gustavo, PTA [facility]   4/26/2018 11:30 AM Claritza Lord, PT [facility]   5/1/2018 8:30 AM Nic Gustavo, PTA [facility]   5/8/2018 8:00 AM [facility] NURSE VISIT [facility]ED   5/14/2018 10:10 AM Rima Fuentes NP [facility] SCHED   5/15/2018 8:40 AM Tami Arce MD [facility] SCHED   10/4/2018 8:30 AM [facility] NURSE [facility]   10/11/2018 8:30 AM MD Janay De Guzmanlm   2/27/2019 8:00 AM [facility] [place] [facility]   2/27/2019 9:00 AM [facility] [place] [facility]   2/27/2019 10:00 AM [facility] NONIMAGING [facility] [place] SCHED   3/4/2019 9:00 AM [place] [id number]

## 2018-04-20 ENCOUNTER — OFFICE VISIT (OUTPATIENT)
Dept: ORTHOPEDIC SURGERY | Facility: CLINIC | Age: 73
End: 2018-04-20

## 2018-04-20 ENCOUNTER — APPOINTMENT (OUTPATIENT)
Dept: PHYSICAL THERAPY | Age: 73
End: 2018-04-20
Payer: MEDICARE

## 2018-04-20 VITALS
HEIGHT: 72 IN | SYSTOLIC BLOOD PRESSURE: 121 MMHG | HEART RATE: 64 BPM | DIASTOLIC BLOOD PRESSURE: 69 MMHG | TEMPERATURE: 97.7 F | BODY MASS INDEX: 29.07 KG/M2 | RESPIRATION RATE: 18 BRPM | WEIGHT: 214.6 LBS | OXYGEN SATURATION: 97 %

## 2018-04-20 DIAGNOSIS — G89.29 CHRONIC PAIN OF RIGHT KNEE: Primary | ICD-10-CM

## 2018-04-20 DIAGNOSIS — M25.561 CHRONIC PAIN OF RIGHT KNEE: Primary | ICD-10-CM

## 2018-04-20 DIAGNOSIS — M17.11 PRIMARY OSTEOARTHRITIS OF RIGHT KNEE: ICD-10-CM

## 2018-04-20 DIAGNOSIS — M70.61 TROCHANTERIC BURSITIS OF RIGHT HIP: ICD-10-CM

## 2018-04-20 RX ORDER — TRIAMCINOLONE ACETONIDE 40 MG/ML
40 INJECTION, SUSPENSION INTRA-ARTICULAR; INTRAMUSCULAR ONCE
Qty: 1 ML | Refills: 0
Start: 2018-04-20 | End: 2018-04-20

## 2018-04-20 NOTE — PROGRESS NOTES
HISTORY:  Esa Bethea returns following for his right knee. He was seen last and underwent an aspiration and injection of his right knee. Since then, he has done very well with his pain. He is continuing physical therapy with attention to range of motion and stretching with some conditioning of his right lower leg musculature to include quad, hamstrings, abductors and adductors. We did discuss last visit about involving vascular surgery for recommendations based on femoral nerve block and light anesthesia and the possibility of replacing his knee. We are going to proceed with that discussion. Today, he is here to begin Gelsyn 3 to the right knee. In addition, he is here for treatment for trochanteric bursitis of the right hip. We discussed using Cortisone following last visit, but at the time we were focused on the right knee. He is still having pain associated with the lateral greater trochanteric region of the right hip. This is likely mechanical initiated since he has such severe end-stage arthritis of the right knee and his gait is severely antalgic with a limp to the right. PHYSICAL EXAMINATION: On examination today, there is point tenderness over the greater trochanteric region of the right hip, which radiates 6 cm distal from the point of maximal tenderness. His right internal and external rotation generally are at 12? and 15? without much discomfort associated into the groin. However, there is reproduced pain to the proximal lateral hip in the area of the point tenderness associated with the greater trochanteric bursal region. Distal sensation intact fully, right lower extremity. Capillary refill is brisk and less than 2 seconds to the right lower extremity. IMPRESSION:   1. Greater trochanteric bursitis of the right hip. 2. Right hip pain.      PROCEDURE: Today, using sterile technique, after verbal and written consent were obtained and appropriate timeout performed, 2 cc of Gelsyn 3, Lot #[id number], Exp. 03-, administered into the right knee using the anterolateral intraarticular approach. There were no complications. Patient tolerated the procedure well. Today, using sterile technique, after verbal and written consent were obtained, 1 cc of Kenalog 40 mg/ml mixed with 8 ml of Marcaine 0.25% injected in the area of the right hip greater trochanteric bursa. There were no complications. Patient tolerated the procedure well. PLAN: The patient would benefit from a low-dose Cortisone injection to the right hip point of maximal tenderness. We are going to plan on seeing him back in one week for continuation of Gelsyn 3 to the right knee. Again, I am going to speak to Dr. Michelle Loving about regional block and risks for total knee replacement, knowing he is beyond end-stage arthritis of the right knee and thereby he is experiencing other problems, such as low back and now hip pain on the right.

## 2018-04-24 ENCOUNTER — APPOINTMENT (OUTPATIENT)
Dept: PHYSICAL THERAPY | Age: 73
End: 2018-04-24
Payer: MEDICARE

## 2018-04-24 ENCOUNTER — HOSPITAL ENCOUNTER (OUTPATIENT)
Dept: PHYSICAL THERAPY | Age: 73
Discharge: HOME | End: 2018-04-24
Payer: MEDICARE

## 2018-04-24 PROCEDURE — 97112 NEUROMUSCULAR REEDUCATION: CPT

## 2018-04-24 PROCEDURE — 97110 THERAPEUTIC EXERCISES: CPT

## 2018-04-24 NOTE — PROGRESS NOTES
PT DAILY TREATMENT NOTE - [facility]     Patient Name: Niesha Watts  Date:2018  : 1945  [x]  Patient  Verified  Payor: Lawrence Michael / Plan: [place] MEDICARE PART A & B / Product Type: Medicare /    In time:8:47  Out time:9:22  Total Treatment Time (min): 35  Total Timed Codes (min): 35  1:1 Treatment Time ([facility] only): 35   Visit #: 2 of 12    Treatment Area: Chronic pain of right knee [M25.561, G89.29]    SUBJECTIVE  Pain Level (0-10 scale): 2  Any medication changes, allergies to medications, adverse drug reactions, diagnosis change, or new procedure performed?: [x] No    [] Yes (see summary sheet for update)  Subjective functional status/changes:   [] No changes reported  Pt reports that one of his home exercises bothered his knee.      OBJECTIVE        Min Type Additional Details    [] Estim:  []Unatt       []IFC  []Premod                        []Other:  []w/ice   []w/heat  Position:  Location:    [] Estim: []Att    []TENS instruct  []NMES                    []Other:  []w/US   []w/ice   []w/heat  Position:  Location:    []  Traction: [] Cervical       []Lumbar                       [] Prone          []Supine                       []Intermittent   []Continuous Lbs:  [] before manual  [] after manual    []  Ultrasound: []Continuous   [] Pulsed                           []1MHz   []3MHz W/cm2:  Location:    []  Iontophoresis with dexamethasone         Location: [] Take home patch   [] In clinic    []  Ice     []  heat  []  Ice massage  []  Laser   []  Anodyne Position:  Location:    []  Laser with stim  []  Other:  Position:  Location:    []  Vasopneumatic Device Pressure:       [] lo [] med [] hi   Temperature: [] lo [] med [] hi   [] Skin assessment post-treatment:  []intact []redness- no adverse reaction    []redness  adverse reaction:       20 min Therapeutic Exercise:  [x] See flow sheet :   Rationale: increase ROM and increase strength to improve the patients ability to increase tolerance to activities. 15 min Neuromuscular Re-education:  [x]  See flow sheet :Carreno taping for lateral tracking, K-tape for VMO facilitation, quad activation     Rationale: increase ROM and increase strength  to improve the patients ability to increase ease with ADLs. With   [] TE   [] TA   [] neuro   [] other: Patient Education: [x] Review HEP    [] Progressed/Changed HEP based on:   [] positioning   [] body mechanics   [] transfers   [] heat/ice application    [] other:      Other Objective/Functional Measures:  Pt reports that he has been doing his HEP. Pain Level (0-10 scale) post treatment: 3-4    ASSESSMENT/Changes in Function: Pt reports increase in pain along medial joint line throughout session and with HEP of hip Add. Modified SAQ with ball to with bolster due to pt report of stabbing pain. Patient will continue to benefit from skilled PT services to modify and progress therapeutic interventions, address functional mobility deficits, address ROM deficits, address strength deficits and analyze and address soft tissue restrictions to attain remaining goals. []  See Plan of Care  []  See progress note/recertification  []  See Discharge Summary         Progress towards goals / Updated goals:  Short Term Goals: To be accomplished in 1 weeks:  1. Pt will be independent and complaint w/ HEP to progress gains in PT. At eval:  Initiated HEP  Current: Goal met: Pt reports that he has been doing his HEP. 4/24/18  Long Term Goals: To be accomplished in 8 weeks:  1. Pt will improve FOTO to > or = to 59 to demo improved function. At eval: FOTO = 49  2. Pt will increase MMT left hip abd to > or = to 4+/5 for improved stability during walking. At eval: MMT left hip abd = -4/5  3. Pt will increase MMT right hip ext to > or = to 4+/5 for ease w/ stair navigation. At eval: MMT right hip ext = -4/5  4. Pt will report > or = to 60% improvement in symptoms for ease w/ decreased pain w/ ADLs. At eval: 0%    PLAN  []  Upgrade activities as tolerated     [x]  Continue plan of care  []  Update interventions per flow sheet       []  Discharge due to:_  []  Other:_      Lisbet Paulino, EVANGELINA 4/24/2018  8:50 AM    Future Appointments  Date Time Provider Mary Amy   4/24/2018 9:00 AM Lisbet Paulino PTA [facility]   4/26/2018 11:30 AM Tyler Jones, PT [facility]   4/27/2018 10:15 AM Laura Ortiz PA-C [facility] SCHED   5/1/2018 8:30 AM Lisbet Paulino, EVANGELINA [facility]   5/3/2018 9:00 AM Giovana Lovell, PT [facility]   5/4/2018 8:30 AM Laura Ortiz PA-C [facility]   5/8/2018 8:00 AM [facility] NURSE VISIT [facility]ED   5/9/2018 8:00 AM Joseluis Rashid, PT [facility]   5/11/2018 8:30 AM Dioni Daniels [facility]   5/14/2018 10:10 AM Marianela Pérez, GILBERTO [id number]    5/15/2018 8:40 AM Belinda Saldana MD [facility] SCHED   10/4/2018 8:30 AM [facility] NURSE [facility]   10/11/2018 8:30 AM [facility]   2/27/2019 8:00 AM [facility] [place] [facility]   2/27/2019 9:00 AM [facility] [facility]   2/27/2019 10:00 AM [facility] NONIMAGING [facility] [facility]   3/4/2019 9:00 AM MD Jayjay Atkins

## 2018-04-26 ENCOUNTER — APPOINTMENT (OUTPATIENT)
Dept: PHYSICAL THERAPY | Age: 73
End: 2018-04-26
Payer: MEDICARE

## 2018-04-26 ENCOUNTER — HOSPITAL ENCOUNTER (OUTPATIENT)
Dept: PHYSICAL THERAPY | Age: 73
Discharge: HOME | End: 2018-04-26
Payer: MEDICARE

## 2018-04-26 PROCEDURE — 97140 MANUAL THERAPY 1/> REGIONS: CPT

## 2018-04-26 PROCEDURE — 97110 THERAPEUTIC EXERCISES: CPT

## 2018-04-26 NOTE — PROGRESS NOTES
PT DAILY TREATMENT NOTE - [facility]     Patient Name: Jolynn [facility]  Date:2018  : 1945  [x]  Patient  Verified  Payor: Aris Alfaro / Plan: [place] MEDICARE PART A & B / Product Type: Medicare /    In time:1130  Out time:1200  Total Treatment Time (min): 30  Total Timed Codes (min): 30  1:1 Treatment Time ( [address] only): 25   Visit #: 3 of 12    Treatment Area: Chronic pain of right knee [M25.561, G89.29]    SUBJECTIVE  Pain Level (0-10 scale): Knee 1-2/10, Back 4-5/10  Any medication changes, allergies to medications, adverse drug reactions, diagnosis change, or new procedure performed?: [x] No    [] Yes (see summary sheet for update)  Subjective functional status/changes:   [] No changes reported  Patient reports acute flare up of right LE radicular pain from lumbar spine after last treatment session with patient reporting radicular pain along the right lateral thigh and lower leg with reproduction with lumbar flexion and right rotation.     OBJECTIVE    12 min Therapeutic Exercise:  [x] See flow sheet : Emphasis placed on improving available knee AROM and improving strength of the LE and quadriceps musculature   Rationale: increase ROM and increase strength to improve the patients ability to improve ease with community ambulation    10 min Neuromuscular Re-education:  [x]  See flow sheet : Emphasis placed on improving LE proprioceptive and kinesthetic awareness   Rationale: increase ROM, increase strength, improve balance and increase proprioception  to improve the patients ability to improve ease with household ADLs    8 min Manual Therapy:    Prone, L5 Grade II-III CPA Mobilization  Prone, Right LR Grade II-III CPA Mobilizatiojn   Rationale: decrease pain, increase ROM and increase tissue extensibility to improve ease with          With   [] TE   [] TA   [] neuro   [] other: Patient Education: [x] Review HEP    [] Progressed/Changed HEP based on:   [] positioning   [] body mechanics   [] transfers   [] heat/ice application    [] other:      Pain Level (0-10 scale) post treatment: 0 (tight)    ASSESSMENT/Changes in Function: Manual utilized in order to address lumbar radicular symptoms secondary to effect on tolerance to completion of within clinic treatment for right knee pain. Patient noted with reproduction of symptoms with combined lumbar flexion/right rotation with relief with L5 CPA and right UPA. Secondary to symptom aggravation modified within clinic treatment with patient questioning contribution of utilization of shuttle press to symptom aggravation. Patient will continue to benefit from skilled PT services to modify and progress therapeutic interventions, address functional mobility deficits, address ROM deficits, address strength deficits, analyze and address soft tissue restrictions, analyze and cue movement patterns and analyze and modify body mechanics/ergonomics to attain remaining goals. []  See Plan of Care  []  See progress note/recertification  []  See Discharge Summary         Progress towards goals / Updated goals:    Short Term Goals: To be accomplished in 1 weeks:  1. Pt will be independent and complaint w/ HEP to progress gains in PT. At eval:  Initiated HEP  Current: Goal met: Pt reports that he has been doing his HEP. 4/24/18  Long Term Goals: To be accomplished in 8 weeks:  1. Pt will improve FOTO to > or = to 59 to demo improved function. At eval: FOTO = 49  2. Pt will increase MMT left hip abd to > or = to 4+/5 for improved stability during walking. At eval: MMT left hip abd = -4/5  3. Pt will increase MMT right hip ext to > or = to 4+/5 for ease w/ stair navigation. At eval: MMT right hip ext = -4/5  4. Pt will report > or = to 60% improvement in symptoms for ease w/ decreased pain w/ ADLs.    At eval: 0%     PLAN  [x]  Upgrade activities as tolerated     [x]  Continue plan of care  []  Update interventions per flow sheet       []  Discharge due to:_  [] Other:_      Romel Amaral, PT 4/26/2018  9:37 AM    Future Appointments  Date Time Provider Mary Reyes   4/26/2018 11:30 AM Romel Amaral, PT [facility]   4/27/2018 10:15 AM Rc Cantu PA-C [facility] [place] SCHED   5/1/2018 8:30 AM Cathy Jan, PTA [facility]   5/3/2018 9:00 AM Oliver Frederick, PT [facility]   5/4/2018 8:30 AM Rc Cantu PA-C [facility] SCHED   5/8/2018 8:00 AM [facility] NURSE VISIT [facility] SCHED   5/9/2018 8:00 AM Tessa Saravia, PT [facility]   5/11/2018 8:30 AM Karley Crowder [facility]   5/14/2018 10:10 AM Patricia Jeans, NP [facility] SCHED   5/15/2018 8:40 AM Swati Knight MD [facility] SCHED   10/4/2018 8:30 AM [facility] NURSE [facility] SCHED   10/11/2018 8:30 AM Jodeane Apley, MD [facility] SCHED   2/27/2019 8:00 AM [facility] [place] SCH[facility]   2/27/2019 9:00 AM [facility] [place] SCHED   2/27/2019 10:00 AM [facility] NONIMAGING [facility] [place] SCHED   3/4/2019 9:00 AM MD Jayjay Morgan

## 2018-04-27 ENCOUNTER — OFFICE VISIT (OUTPATIENT)
Dept: ORTHOPEDIC SURGERY | Facility: CLINIC | Age: 73
End: 2018-04-27

## 2018-04-27 VITALS
WEIGHT: 217.2 LBS | SYSTOLIC BLOOD PRESSURE: 159 MMHG | OXYGEN SATURATION: 99 % | BODY MASS INDEX: 29.42 KG/M2 | HEART RATE: 65 BPM | DIASTOLIC BLOOD PRESSURE: 86 MMHG | RESPIRATION RATE: 16 BRPM | HEIGHT: 72 IN | TEMPERATURE: 95.5 F

## 2018-04-27 DIAGNOSIS — M17.11 PRIMARY OSTEOARTHRITIS OF RIGHT KNEE: ICD-10-CM

## 2018-04-27 DIAGNOSIS — M25.561 CHRONIC PAIN OF RIGHT KNEE: Primary | ICD-10-CM

## 2018-04-27 DIAGNOSIS — G89.29 CHRONIC PAIN OF RIGHT KNEE: Primary | ICD-10-CM

## 2018-04-27 RX ORDER — HYALURONATE SODIUM 10 MG/ML
2 SYRINGE (ML) INTRAARTICULAR ONCE
Qty: 2 ML | Refills: 0
Start: 2018-04-27 | End: 2018-04-27

## 2018-04-27 NOTE — PROGRESS NOTES
HISTORY:  Vanesa Tena returns following for his right knee and continuation of Euflexxa. He was seen last and underwent an aspiration and injection of his right knee. Since then, he has done very well with his pain. He is continuing physical therapy with attention to range of motion and stretching with some conditioning of his right lower leg musculature to include quad, hamstrings, abductors and adductors. We did discuss last visit about involving vascular surgery for recommendations based on femoral nerve block and light anesthesia and the possibility of replacing his knee. We are going to proceed with that discussion. Today, he is here to begin euflexxato the right knee. PROCEDURE: Today, using sterile technique, after verbal and written consent were obtained and appropriate timeout performed, 2 cc of Euflexxa lot # [id number], 4-14-19 administered into the right knee using the anterolateral intraarticular approach. There were no complications. Patient tolerated the procedure well. PLAN: The patient would benefit from a low-dose Cortisone injection to the right hip point of maximal tenderness. We are going to plan on seeing him back in one week for continuation of euflexxa the right knee.  homer.

## 2018-05-01 ENCOUNTER — HOSPITAL ENCOUNTER (OUTPATIENT)
Dept: PHYSICAL THERAPY | Age: 73
Discharge: HOME | End: 2018-05-01
Payer: MEDICARE

## 2018-05-01 ENCOUNTER — APPOINTMENT (OUTPATIENT)
Dept: PHYSICAL THERAPY | Age: 73
End: 2018-05-01
Payer: MEDICARE

## 2018-05-01 PROCEDURE — 97112 NEUROMUSCULAR REEDUCATION: CPT

## 2018-05-01 PROCEDURE — 97110 THERAPEUTIC EXERCISES: CPT

## 2018-05-01 NOTE — PROGRESS NOTES
PT DAILY TREATMENT NOTE - [facility]     Patient Name: Yesi Hoff  Date:2018  : 1945  [x]  Patient  Verified  Payor: [facility] MED[facility] / Plan: [place] MEDICARE PART A & B / Product Type: Medicare /    In time:8:18  Out time:8:50  Total Treatment Time (min): 32  Total Timed Codes (min): 32  1:1 Treatment Time ( only): 32   Visit #: 4 of 12    Treatment Area: Chronic pain of right knee [M25.561, G89.29]    SUBJECTIVE  Pain Level (0-10 scale): knee 2  Any medication changes, allergies to medications, adverse drug reactions, diagnosis change, or new procedure performed?: [x] No    [] Yes (see summary sheet for update)  Subjective functional status/changes:   [] No changes reported  Pt reports that he is able to go up small steps with right leg first.     OBJECTIVE        Min Type Additional Details    [] Estim:  []Unatt       []IFC  []Premod                        []Other:  []w/ice   []w/heat  Position:  Location:    [] Estim: []Att    []TENS instruct  []NMES                    []Other:  []w/US   []w/ice   []w/heat  Position:  Location:    []  Traction: [] Cervical       []Lumbar                       [] Prone          []Supine                       []Intermittent   []Continuous Lbs:  [] before manual  [] after manual    []  Ultrasound: []Continuous   [] Pulsed                           []1MHz   []3MHz W/cm2:  Location:    []  Iontophoresis with dexamethasone         Location: [] Take home patch   [] In clinic    []  Ice     []  heat  []  Ice massage  []  Laser   []  Anodyne Position:  Location:    []  Laser with stim  []  Other:  Position:  Location:    []  Vasopneumatic Device Pressure:       [] lo [] med [] hi   Temperature: [] lo [] med [] hi   [] Skin assessment post-treatment:  []intact []redness- no adverse reaction           17 min Therapeutic Exercise:  [x] See flow sheet :   Rationale: increase ROM and increase strength to improve the patients ability to increase tolerance to activities.        15 min Neuromuscular Re-education:  [x]  See flow sheet :Carreno taping for lateral tracking, K-tape for VMO facilitation, quad activation     Rationale: increase ROM and increase strength  to improve the patients ability to increase ease with ADLs. With   [] TE   [] TA   [] neuro   [] other: Patient Education: [x] Review HEP    [] Progressed/Changed HEP based on:   [] positioning   [] body mechanics   [] transfers   [] heat/ice application    [] other:      Other Objective/Functional Measures: MMT left hip abd = 4-/5. Pain Level (0-10 scale) post treatment: 0    ASSESSMENT/Changes in Function: Pt has increase pain with SAQ with ball. Patient will continue to benefit from skilled PT services to modify and progress therapeutic interventions, address functional mobility deficits, address ROM deficits, address strength deficits and analyze and address soft tissue restrictions to attain remaining goals. []  See Plan of Care  []  See progress note/recertification  []  See Discharge Summary         Progress towards goals / Updated goals:     Short Term Goals: To be accomplished in 1 weeks:  1. Pt will be independent and complaint w/ HEP to progress gains in PT. At eval:  Initiated HEP  Current: Goal met: Pt reports that he has been doing his HEP. 4/24/18  Long Term Goals: To be accomplished in 8 weeks:  1. Pt will improve FOTO to > or = to 59 to demo improved function. At eval: FOTO = 49  2. Pt will increase MMT left hip abd to > or = to 4+/5 for improved stability during walking. At eval: MMT left hip abd = -4/5  Current: Remains; MMT left hip abd = 4-/5. 5/1/18  3. Pt will increase MMT right hip ext to > or = to 4+/5 for ease w/ stair navigation. At eval: MMT right hip ext = -4/5  4. Pt will report > or = to 60% improvement in symptoms for ease w/ decreased pain w/ ADLs.    At eval: 0%       PLAN  []  Upgrade activities as tolerated     [x]  Continue plan of care  []  Update interventions per flow sheet       []  Discharge due to:_  []  Other:_      Johana Kenny, PTA 5/1/2018  8:34 AM    Future Appointments  Date Time Provider Mary Zhangi   5/3/2018 9:00 AM Idalia Spurling, PT [facility]   5/4/2018 8:30 AM Grant Martin PA-C [facility] SCHED   5/8/2018 8:00 AM [facility] NURSE VISIT [address]   5/9/2018 8:00 AM Reuben Eisenmenger, BONNY [facility]   5/11/2018 8:30 AM Kemar Loen [facility]   5/14/2018 10:10 AM Shellie Douglas,  [address]   5/15/2018 8:40 AM Shyanne Barrett MD [facility] SCHED   10/4/2018 8:30 AM [facility] NURSE [facility] SCHED   10/11/2018 8:30 AM Dean Dimas MD [facility]   2/27/2019 8:00 AM [facility] SCHED   2/27/2019 9:00 AM [facility] SCHED   2/27/2019 10:00 AM [facility] NONIMAGING [facility] SCHED   3/4/2019 9:00 AM Marcella Schmidt MD [place] [id number]

## 2018-05-03 ENCOUNTER — APPOINTMENT (OUTPATIENT)
Dept: PHYSICAL THERAPY | Age: 73
End: 2018-05-03
Payer: MEDICARE

## 2018-05-04 ENCOUNTER — OFFICE VISIT (OUTPATIENT)
Dept: ORTHOPEDIC SURGERY | Facility: CLINIC | Age: 73
End: 2018-05-04

## 2018-05-04 VITALS
TEMPERATURE: 97.8 F | RESPIRATION RATE: 18 BRPM | DIASTOLIC BLOOD PRESSURE: 70 MMHG | SYSTOLIC BLOOD PRESSURE: 134 MMHG | OXYGEN SATURATION: 98 % | HEART RATE: 65 BPM | HEIGHT: 72 IN | BODY MASS INDEX: 29.28 KG/M2 | WEIGHT: 216.2 LBS

## 2018-05-04 DIAGNOSIS — M17.11 PRIMARY OSTEOARTHRITIS OF RIGHT KNEE: Primary | ICD-10-CM

## 2018-05-04 DIAGNOSIS — M25.512 LEFT SHOULDER PAIN, UNSPECIFIED CHRONICITY: ICD-10-CM

## 2018-05-04 RX ORDER — HYALURONATE SODIUM 10 MG/ML
2 SYRINGE (ML) INTRAARTICULAR ONCE
Qty: 2 ML | Refills: 0
Start: 2018-05-04 | End: 2018-05-04

## 2018-05-04 NOTE — PROGRESS NOTES
HISTORY:  Justus Holloway returns following for his right knee and completion of Euflexxa. He was seen last and underwent an aspiration and injection of his right knee. Since then, he has done very well with his pain. He is continuing physical therapy with attention to range of motion and stretching with some conditioning of his right lower leg musculature to include quad, hamstrings, abductors and adductors. We did discuss last visit about involving vascular surgery for recommendations based on femoral nerve block and light anesthesia and the possibility of replacing his knee. We are going to proceed with that discussion. He had a painful give way event a couple of days ago. PROCEDURE: Today, using sterile technique, after verbal and written consent were obtained and appropriate timeout performed, 2 cc of Euflexxa lot # [id number] exp, 4-14-19 administered into the right knee using the anterolateral intraarticular approach. There were no complications. Patient tolerated the procedure well. PLAN: Complete Euflexxa, X ray right Left shoulder. ADDENDUM:  Regarding his left shoulder, he has had persistent grinding and popping with some decreased range of motion over the past several months. He has intermittent pain with the grinding sensation. He has no history of injury to the shoulder. PHYSICAL EXAM:  The patient, on examination today, has active forward flexion against resistance at 140° with glenohumeral abduction actively at 90°. He has crepitation through his glenohumeral abduction associated at the distal [facility] joint. There is no pain in either plane of motion. Passive external rotation essentially is 30° without pain and no crepitation. Internal rotation is somewhat limited to only about L1 when compared to the right, which is easily T10. Distal sensation is intact fully to the left upper extremity.       RADIOGRAPHS:  x-rays today reveal subtle glenohumeral osteoarthritis with some joint space narrowing of the [facility] joint as well. There is also some spurring in the [facility] joint noted. IMPRESSION:      1. Left shoulder degenerative osteoarthritis. 2. Left shoulder pain, intermittent. 3. Left shoulder range of motion, essentially within normal limits, however, the is concerned the degenerative change is may compromise his left shoulder use in the future. PLAN:   At this point, I indicated that nothing would be certainly aggressive for his left shoulder popping and cracking with grinding and intermittent pain other than an over-the-counter NSAID and consideration for physical therapy for range of motion and stretching. He declined that today. If in the future he develops a bursitic type situation or appears to become an impingement syndrome impending, then a cortisone shot may be warranted. Other than that, he is going to follow with Cassandra Hall M.D., for vascular clearance considering right total knee replacement. He was recommended to go to a single post cane 24/7 or essentially when he is up and about due to his event where he fell a couple of days ago when his right knee gave way.

## 2018-05-08 ENCOUNTER — APPOINTMENT (OUTPATIENT)
Dept: INTERNAL MEDICINE CLINIC | Age: 73
End: 2018-05-08

## 2018-05-08 ENCOUNTER — HOSPITAL ENCOUNTER (OUTPATIENT)
Dept: LAB | Age: 73
Discharge: HOME | End: 2018-05-08
Payer: MEDICARE

## 2018-05-08 DIAGNOSIS — R73.01 IFG (IMPAIRED FASTING GLUCOSE): ICD-10-CM

## 2018-05-08 DIAGNOSIS — E78.5 DYSLIPIDEMIA: ICD-10-CM

## 2018-05-08 LAB
ALBUMIN SERPL-MCNC: 3.8 G/DL (ref 3.4–5)
ALBUMIN/GLOB SERPL: 1.4 {RATIO} (ref 0.8–1.7)
ALP SERPL-CCNC: 55 U/L (ref 45–117)
ALT SERPL-CCNC: 38 U/L (ref 16–61)
ANION GAP SERPL CALC-SCNC: 3 MMOL/L (ref 3–18)
AST SERPL-CCNC: 21 U/L (ref 15–37)
BILIRUB SERPL-MCNC: 0.7 MG/DL (ref 0.2–1)
BUN SERPL-MCNC: 13 MG/DL (ref 7–18)
BUN/CREAT SERPL: 18 (ref 12–20)
CALCIUM SERPL-MCNC: 9 MG/DL (ref 8.5–10.1)
CHLORIDE SERPL-SCNC: 100 MMOL/L (ref 100–108)
CHOLEST SERPL-MCNC: 152 MG/DL
CO2 SERPL-SCNC: 34 MMOL/L (ref 21–32)
CREAT SERPL-MCNC: 0.73 MG/DL (ref 0.6–1.3)
GLOBULIN SER CALC-MCNC: 2.7 G/DL (ref 2–4)
GLUCOSE SERPL-MCNC: 100 MG/DL (ref 74–99)
HBA1C MFR BLD: 5.9 % (ref 4.2–5.6)
HDLC SERPL-MCNC: 66 MG/DL (ref 40–60)
HDLC SERPL: 2.3 {RATIO} (ref 0–5)
LDLC SERPL CALC-MCNC: 74.2 MG/DL (ref 0–100)
LIPID PROFILE,FLP: ABNORMAL
POTASSIUM SERPL-SCNC: 4.3 MMOL/L (ref 3.5–5.5)
PROT SERPL-MCNC: 6.5 G/DL (ref 6.4–8.2)
SODIUM SERPL-SCNC: 137 MMOL/L (ref 136–145)
TRIGL SERPL-MCNC: 59 MG/DL (ref ?–150)
VLDLC SERPL CALC-MCNC: 11.8 MG/DL

## 2018-05-08 PROCEDURE — 80061 LIPID PANEL: CPT | Performed by: INTERNAL MEDICINE

## 2018-05-08 PROCEDURE — 36415 COLL VENOUS BLD VENIPUNCTURE: CPT | Performed by: INTERNAL MEDICINE

## 2018-05-08 PROCEDURE — 80053 COMPREHEN METABOLIC PANEL: CPT | Performed by: INTERNAL MEDICINE

## 2018-05-08 PROCEDURE — 83036 HEMOGLOBIN GLYCOSYLATED A1C: CPT | Performed by: INTERNAL MEDICINE

## 2018-05-09 ENCOUNTER — APPOINTMENT (OUTPATIENT)
Dept: PHYSICAL THERAPY | Age: 73
End: 2018-05-09
Payer: MEDICARE

## 2018-05-10 DIAGNOSIS — M54.10 RADICULOPATHY, UNSPECIFIED SPINAL REGION: ICD-10-CM

## 2018-05-10 DIAGNOSIS — M50.30 DEGENERATION OF CERVICAL INTERVERTEBRAL DISC: Primary | Chronic | ICD-10-CM

## 2018-05-10 NOTE — TELEPHONE ENCOUNTER
[facility] last [facility] date for Norco as 03/27/2018. There appears to be no inconsistencies in regards to the prescribing of this medication. Last Visit: 01/09/2018 with MD Christal Joseph    Next Appointment: 05/15/2018 with MD Christal Joseph   Previous Refill Encounters: 03/26/2018 per MD Christal Joseph #[id number]    Requested Prescriptions     Pending Prescriptions Disp Refills    HYDROcodone-acetaminophen (NORCO) 5-325 mg per tablet 180 Tab 0     Sig: Take 1 Tab by mouth every four (4) hours as needed (for chronic pain).

## 2018-05-11 ENCOUNTER — HOSPITAL ENCOUNTER (OUTPATIENT)
Dept: LAB | Age: 73
Discharge: HOME | End: 2018-05-11
Payer: MEDICARE

## 2018-05-11 ENCOUNTER — APPOINTMENT (OUTPATIENT)
Dept: PHYSICAL THERAPY | Age: 73
End: 2018-05-11
Payer: MEDICARE

## 2018-05-11 ENCOUNTER — TELEPHONE (OUTPATIENT)
Dept: INTERNAL MEDICINE CLINIC | Age: 73
End: 2018-05-11

## 2018-05-11 DIAGNOSIS — Z79.899 CONTROLLED SUBSTANCE AGREEMENT SIGNED: Primary | ICD-10-CM

## 2018-05-11 PROCEDURE — 80361 OPIATES 1 OR MORE: CPT | Performed by: INTERNAL MEDICINE

## 2018-05-11 PROCEDURE — 80307 DRUG TEST PRSMV CHEM ANLYZR: CPT | Performed by: INTERNAL MEDICINE

## 2018-05-11 RX ORDER — NALOXONE HYDROCHLORIDE 4 MG/.1ML
SPRAY NASAL
Qty: 1 EACH | Refills: 1 | Status: SHIPPED | OUTPATIENT
Start: 2018-05-11 | End: 2019-06-27

## 2018-05-11 RX ORDER — HYDROCODONE BITARTRATE AND ACETAMINOPHEN 5; 325 MG/1; MG/1
1 TABLET ORAL
Qty: 180 TAB | Refills: 0 | Status: SHIPPED | OUTPATIENT
Start: 2018-05-11 | End: 2018-06-28 | Stop reason: SDUPTHER

## 2018-05-11 NOTE — PROGRESS NOTES
67 y.o. WHITE OR  male who presents for evaluation. He reports no cardiovascular complaints and f/u w Dr Gopi Raymond regularly. Pressures are controlled. He apparently had echo and NST which came back ok earlier this spring     The vascular issues are stable and he sees Dr Martínez Haynes. He has f/u and will be reporting maybe more claudication sx lately although still able to walk >25min    He could not tolerated crestor either so back on vytorin     Denies any recent gi sx. Remains on ppi therapy and no alarm complaints. The UC has not been flaring     Denies polyuria, polydipsia, nocturia, vision change. Not checking sugars at this time. Not much progress w attempts at wt loss.   We discussed his diet, he eats breakfast, usually a biscuit, 3x/week around 9A, sometimes minimal lunch, then healthy supper at 6P, occasional popcorn later at night    Vitals 5/15/2018 5/14/2018 5/4/2018 4/27/2018 4/20/2018   Weight 214 lb 216 lb 216 lb 3.2 oz 217 lb 3.2 oz 214 lb 9.6 oz     Continues to see Dr Quintin Castillo for the prostate ca    LAST [address]: 6/23/16, 6/29/17    ACP 6/23/16, 6/29/17    IF 5/18    Past Medical History:   Diagnosis Date    Adenoma of left adrenal gland     2009  1 cm, no change 1/15, 2/16    Asbestosis     Atrial fibrillation     CHA2DS2-VAsc=3(age+, htn+, vasc dx+; estimated yearly stroke risk according to Lip et al. is 3,2%), Hasbled=2(estimated yearly bleeding risk according to pisters et al. is 1,88%); not anticoagulated due to h/o gi bleed    Atrial fibrillation ablation     10/08    Basal cell cancer     Dr Barb Zarate; he's had >350 lesions removed    Carotid occlusion     left     Cervical radiculopathy     MRI 9/11 showed C3-6 severe foraminal stenosis    Chronic pain     Colon polyp     Dr Quintin Castillo 9/15    Coronary artery disease     RCA - 3.0 x 16mm TAXUS 9/04, 3.0 x 16mm TAXUS 12/06    Dyslipidemia     Erectile dysfunction     GERD     GI bleed     Hearing loss     2014 bilateral Dr Jo-Ann Hagan    Hernia, umbilical     Hypertension     Hypogonadism male     Lumbar radiculopathy     Dr Connie Cano;  MRI 9/11 L4-5 disc bulging, annular tear, disc dessication    Myofascial pain dysfunction syndrome     pain clinic     Osteoarthritis     right knee Dr Kim Kang Overweight (BMI 25.0-29. 9)     IF 5/18    Peripheral vascular disease     50-60% R iliac; s/p R iliac stent and L femoral artery stent in past; R OLIVIA 0.76 (1/16)    Plantar fasciitis     bilateral     PPD positive     Prediabetes     Prostate cancer     T1c Imelda 7(3+4), 70% in 1 core, GS 7 (3+4) in 4 cores, GS 6 (3+3) in 1 core; psa 5.28, TRUS 18 gm;  Dr Mana Muñoz; s/p cryoablation 10/16    Recurrent umbilical hernia     Subclinical hypothyroidism     denies    Tinnitus     Dr Kaya Cotto Ulcerative colitis     Venous insufficiency      Past Surgical History:   Procedure Laterality Date    HAND/FINGER SURGERY UNLISTED  2017    HX CAROTID ENDARTERECTOMY      1/14  right    HX CATARACT REMOVAL      HX COLONOSCOPY      Dr Mana Muñoz 9/15 polyps    HX CRYOABLATION OF THE PROSTATE      10/16    HX HEMORRHOIDECTOMY      1979    [facility]    HX ORTHOPAEDIC      right knee surgery    HX ORTHOPAEDIC  12/2017    Dr Ellis Deluca; R CTS release    HX REFRACTIVE SURGERY      OD (hemoplasty to right eye on retina to avoid blindness)    HX TONSILLECTOMY      1955    DE LAP, RECURRENT INCISIONAL HERNIA REPAIR,REDUCIBLE N/A 02/09/2017    Dr. Nara Ren HERNIA,5+Y/O,REDUCIBL      2/16  left  Dr. Nat TAVAREZ,6+J/S,VRJXY      2/16  Dr. Tyler Finch      1/16  R OLIVIA 0.76, L OLIVIA 1.02    VASCULAR SURGERY PROCEDURE UNLIST      10/15   left fem art and left iliac stent     Social History     Social History    Marital status:      Spouse name: N/A    Number of children: N/A    Years of education: N/A     Occupational History    Not on file. Social History Main Topics    Smoking status: Former Smoker     Packs/day: 1.50     Years: 25.00     Types: Cigarettes     Quit date: 1/1/2003    Smokeless tobacco: Never Used    Alcohol use Yes      Comment: RARELY    Drug use: No    Sexual activity: Yes     Partners: Female     Birth control/ protection: None     Other Topics Concern    Not on file     Social History Narrative     Current Outpatient Prescriptions   Medication Sig    gabapentin (NEURONTIN) 300 mg capsule 1 cap every day to bid  Indications: NEUROPATHIC PAIN    HYDROcodone-acetaminophen (NORCO) 5-325 mg per tablet Take 1 Tab by mouth every four (4) hours as needed (for chronic pain).  atenolol (TENORMIN) 25 mg tablet TAKE ONE TABLET BY MOUTH TWICE A DAY    ezetimibe-simvastatin (VYTORIN 10/40) 10-40 mg per tablet Take 1 Tab by mouth nightly.  diclofenac (VOLTAREN) 1 % gel Apply 2-4 g to affected area four (4) times daily. To affected area (topically)    losartan (COZAAR) 25 mg tablet TAKE ONE TABLET BY MOUTH TWICE A DAY    aspirin delayed-release 81 mg tablet Take 81 mg by mouth daily.  triamterene-hydrochlorothiazide (MAXZIDE) 37.5-25 mg per tablet TAKE ONE TABLET BY MOUTH DAILY    Cholecalciferol, Vitamin D3, 1,000 unit cap Take 1,000 Units by mouth daily.  OMEGA-3 FATTY ACIDS/FISH OIL (OMEGA 3 FISH OIL PO) Take 900 mg by mouth daily.  MULTIVITAMIN PO Take  by mouth daily.  pantoprazole (PROTONIX) 40 mg tablet Take 40 mg by mouth daily.  coenzyme q10 200 mg Cap Take  by mouth.  methylPREDNISolone (MEDROL DOSEPACK) 4 mg tablet Per dose pack instructions    naloxone (NARCAN) 4 mg/actuation nasal spray Use 1 spray intranasally into 1 nostril. Use a new Narcan nasal spray for subsequent doses and administer into alternating nostrils. May repeat every 2 to 3 minutes as needed.  polyethylene glycol (MIRALAX) 17 gram packet 17 g.     LORazepam (ATIVAN) 1 mg tablet Take 1 Tab by mouth every eight (8) hours as needed. No current facility-administered medications for this visit. Allergies   Allergen Reactions    Altace [Ramipril] Unknown (comments)    Lipitor [Atorvastatin] Other (comments)     Muscle pain    Lisinopril Shortness of Breath and Other (comments)     Turns bright red    Other Medication Other (comments)     Vicryl suture on skin tends to be rejected with poor wound healing does better with monocryl    Procardia [Nifedipine] Other (comments)     Caused afib    Rosuvastatin Other (comments)     Muscle Pain       REVIEW OF SYSTEMS: colo 9/15 Dr Jennifer Huertas, sees Dr Letha Small no vision change or eye pain  Oral  no mouth pain, tongue or tooth problems  Ears  no hearing loss, ear pain, fullness, no swallowing problems  Cardiac  no CP, PND, orthopnea, edema, palpitations or syncope  Chest  no breast masses  Resp  no wheezing, chronic coughing, dyspnea  GI  no heartburn, nausea, vomiting, change in bowel habits, bleeding, hemorrhoids  Urinary  no dysuria, hematuria, flank pain, urgency, frequency  Genitals  no genital lesions, discharge, masses, ulceration, warts  Ortho  no swelling, dec ROM, myalgias  Derm  no nail abnormalities, rashes, lesions of note, hair loss  Psych  denies any anxiety or depression symptoms, no hallucinations or violent ideation  Constitutional  no wt loss, night sweats, unexplained fevers  Neuro  no focal weakness, numbness, paresthesias, incoordination, ataxia, involuntary movements  Endo - no polyuria, polydipsia, nocturia, hot flashes    Visit Vitals    /80 (BP 1 Location: Right arm, BP Patient Position: Sitting)    Pulse 64    Temp 98.9 °F (37.2 °C) (Oral)    Resp 18    Ht 6' (1.829 m)    Wt 214 lb (97.1 kg)    SpO2 99%    BMI 29.02 kg/m2      A&O x3  Affect is appropriate. Mood stable  No apparent distress  Anicteric, no JVD, adenopathy or thyromegaly.    B TMs look ok exept mild afl on left no erythema  No carotid bruits or radiated murmur  Lungs clear to auscultation, no wheezes or rales  Heart showed regular rhythm. No rubs, gallops, 1-2/6 karli rsb  Abdomen soft nontender, no hepatosplenomegaly or masses. Extremities without edema.   Pulses 0-1 on right, 1-2 on left    LABS  From 12/12 showed gluc 101, cr 0.77, gfr 94,   alt 26,           chol 182, tg 159, hdl 52, ldl-c 98, wbc 7.6, hb 15.4, plt 171, tsh 0.96, psa 3.20  From 7/13 showed                          test 263  From 1/14 showed   gluc 112, cr 0.78, gfr 107, alt 17,           chol 130, tg 129, hdl 37, ldl-c 67, wbc 6.8, hb 15.1, plt 186, tsh 0.64, psa 3.60, vit d 26.8  From 6/14 showed   gluc 101, cr 0.57, gfr>60,     hba1c 5.7, vit d 34.3  From 12/14 showed         hba1c 5.9, chol 141, tg 104, hdl 42, ldl-c 78, wbc 8.5, hb 14.8, plt 147, tsh 0.75, psa 5.60, vit d 31.8, ua neg  From 6/15 showed   gluc 104, cr 0.75, gfr>60,     hba1c 5.9  From 8/15 showed                      tsh 0.44, psa 5.28,         test 215, lh 6.1, prl 11.1, ft4 1.61  From 12/15 showed gluc 95,   cr 0.71, gfr>60,  alt 36, hba1c 5.9, chol 135, tg 105, hdl 44, ldl-c 70,           tsh 0.51,     vit d 29.1   From 1/16 showed   gluc 112, cr 0.72, gfr>60,          wbc 7.8, hb 15.1, plt 163,            ua neg  From 11/16 showed gluc 103, cr 0.70,           wbc 8.6, hb 14.5, plt 189, ck/trop neg  From 12/16 showed gluc 89,   cr 0.70, gfr>60,  alt 31, hba1c 5.9, chol 159, tg 124, hdl 58, ldl-c 76, wbc 9.0, hb 15.1, plt 185,      vit d 27.8  From 3/17 showed   gluc 100, cr 0.75, gfr>60,     hba1c 5.9,               tsh 0.78, psa 0.18, ft4 1.30  From 6/17 showed   gluc 96,   cr 0.74, gfr>60, alt 34,  hba1c 5.7, chol 135, tg 71,   hdl 53, ldl-c 68  From 1/18 showed   gluc 107, cr 0.65, gfr>60, alt 31,  hba1c 5.8, chol 147, tg 105, hdl 46, ldl-c 80  From 5/18 showed   gluc 100, cr 0.73, gfr>60, alt 38,  hba1c 5.9, chol 152, tg 59,   hdl 66, ldl-c 74    Patient Active Problem List   Diagnosis Code    Colitis, ulcerative ([facility] K51.90    Colon polyps K63.5    Peripheral vascular disease ([facility] Dr Martínez Haynes I73.9    Left carotid artery occlusion I65.22    Atherosclerosis of artery of extremity with intermittent claudication (HCC) I70.219    Hypovitaminosis D E55.9    Advance directive in chart Z78.9    Hypertension I11.9    Dyslipidemia E78.5    Coronary artery disease I25.10    Atrial fibrillation I48.91    Recurrent umbilical hernia J84.7    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 9) E66.3    History of prostate cancer Z85.46    Facet hypertrophy of lumbar region M47.896     Assessment and plan:  1. Cardiac. Continue current regimen. F/U Dr Gopi Raymond  2. Vascular. Continue current regimen. F/U Moon  3. Dyslipidemia. Continue current regimen. 4. PreDM. Lifestyle and dietary measures, wt loss as he is doing  5. Hypovit d. Supplement  6. Colon polyp. Fiber, colo 2020  7. Prostate ca. Per Dr Quintin Castillo  8. Afib. Per Dr Gopi Raymond  9. Ortho. F/U Dr Danay Kuo  10. Spine. F/U Dr Hugh Starkey prn  11. Overweight. Lifestyle and dietary measures. Portion control reiterated. Intermittent fasting discussed at length. Explained the concepts in detail. Went over possible physiologic changes that could occur and how that would possibly impact his situation in a positive way. Discussed 16:8 program in particular. We also went over the differences between hunger and gaurang hypoglycemia. Look up low insulin index foods. He will do some more research and consider implementing in the near future, standard handout given        RTC 11/18    Above conditions discussed at length and patient vocalized understanding.   All questions answered to patient satisfaction

## 2018-05-14 ENCOUNTER — OFFICE VISIT (OUTPATIENT)
Dept: ORTHOPEDIC SURGERY | Age: 73
End: 2018-05-14

## 2018-05-14 VITALS
HEIGHT: 72 IN | OXYGEN SATURATION: 99 % | DIASTOLIC BLOOD PRESSURE: 80 MMHG | HEART RATE: 67 BPM | WEIGHT: 216 LBS | BODY MASS INDEX: 29.26 KG/M2 | SYSTOLIC BLOOD PRESSURE: 151 MMHG

## 2018-05-14 DIAGNOSIS — M50.30 DEGENERATION OF CERVICAL INTERVERTEBRAL DISC: Chronic | ICD-10-CM

## 2018-05-14 DIAGNOSIS — M48.02 CERVICAL SPINAL STENOSIS: Primary | ICD-10-CM

## 2018-05-14 RX ORDER — METHYLPREDNISOLONE 4 MG/1
TABLET ORAL
Qty: 1 DOSE PACK | Refills: 0 | Status: SHIPPED | OUTPATIENT
Start: 2018-05-14 | End: 2018-08-23 | Stop reason: ALTCHOICE

## 2018-05-14 RX ORDER — GABAPENTIN 300 MG/1
CAPSULE ORAL
Qty: 180 CAP | Refills: 1 | Status: SHIPPED | OUTPATIENT
Start: 2018-05-14 | End: 2018-11-13 | Stop reason: SDUPTHER

## 2018-05-14 NOTE — PROGRESS NOTES
Chief complaint/History of Present Illness:  Chief Complaint   Patient presents with    Neck Pain     HPI  Justus Holloway is a  67 y.o.  male      HISTORY OF PRESENT ILLNESS:  The patient comes in today for follow up of his chronic neck and back pain. He states over the weekend he was doing a lot of heavy lifting and had increased neck pain that radiates into his left subscapular area and can go into his arm down to his hand. He also reports the physical therapy we put him in did help his neck and back. However, he was in physical therapy for his right knee through Mr. Migue Palomo, and that aggravated his back pain and caused him to have right leg pain. However, that did resolve on its own. His main issue today is his neck pain with radiating left subscapular pain. He rates his pain today as a 3/10. He takes Neurontin 300 mg at bedtime. He takes Norco through his PCP. He does have Voltaren Gel through Migue Palomo. He states he had a cardiac workup recently last week, so with the stress test and his being cleared cardiac-wise, the left arm pain is not really attributable to his heart. He denies fever and bowel and bladder dysfunction. He is a nonsmoker. He is retired. PHYSICAL EXAM:  Mr. Rachell Stephenson is a 66-year-old male. He is alert and oriented. He has a normal mood and affect. He has a full weightbearing, non-antalgic gait, normal tandem gait. He has 5/5 strength of the bilateral upper extremities and negative Cortess. He has a full weightbearing, non-antalgic gait. He uses no assistive device. ASSESSENT/PLAN:  This is a patient with cervical stenosis, cervical and lumbar facet arthropathy, and degenerative disc disease. His back is doing okay currently, but he is having increased neck and radiating left subscapular and some left arm pain. I am going to increase his Neurontin to one to two times a day of the 300 mg.   If it makes him too sleepy, he can take just 600 mg at bedtime. I did offer him a Medrol Dosepak. He really does not like to take steroids, as he has many other issues including colitis but would like to have a prescription on hand if his neck or back gets so bad that he cannot manage it. So, I have printed him a prescription for that, and he will only use it when absolutely necessary. He is also going to try using the Voltaren Gel on his neck and subscapular area. We will see him back in six months or sooner if needed.         Review of systems:    Past Medical History:   Diagnosis Date    Adenoma of left adrenal gland     2009  1 cm, no change 1/15, 2/16    Asbestosis     Atrial fibrillation     CHA2DS2-VAsc=3(age+, htn+, vasc dx+; estimated yearly stroke risk according to Lip et al. is 3,2%), Hasbled=2(estimated yearly bleeding risk according to pisters et al. is 1,88%); not anticoagulated due to h/o gi bleed    Atrial fibrillation ablation     10/08    Basal cell cancer     Dr Sandra Lee; he's had >350 lesions removed    Carotid occlusion     left     Cervical radiculopathy     MRI 9/11 showed C3-6 severe foraminal stenosis    Chronic pain     Colon polyp     Dr Sukhi Elias 9/15    Coronary artery disease     RCA - 3.0 x 16mm TAXUS 9/04, 3.0 x 16mm TAXUS 12/06    Dyslipidemia     Erectile dysfunction     GERD     GI bleed     Hearing loss     2014  bilateral Dr Spence Racquel    Hernia, umbilical     Hypertension     Hypogonadism male     Lumbar radiculopathy     Dr Marcia Salcido;  MRI 9/11 L4-5 disc bulging, annular tear, disc dessication    Myofascial pain dysfunction syndrome     pain clinic     Osteoarthritis     right knee Dr Damaris Caban Peripheral vascular disease     50-60% R iliac; s/p R iliac stent and L femoral artery stent in past; R OLIVIA 0.76 (1/16)    Plantar fasciitis     bilateral     PPD positive     Prediabetes     Prostate cancer     T1c Cooper 7(3+4), 70% in 1 core, GS 7 (3+4) in 4 cores, GS 6 (3+3) in 1 core; psa 5.28, TRUS 18 gm;   Jimmy; s/p cryoablation 10/16    Recurrent umbilical hernia     Subclinical hypothyroidism     denies    Tinnitus     Dr Edison Frey Ulcerative colitis     Venous insufficiency      Past Surgical History:   Procedure Laterality Date    HAND/FINGER SURGERY UNLISTED  2017    HX CAROTID ENDARTERECTOMY      1/14  right    HX Wendelin Dixons HX COLONOSCOPY      Dr Candido Reilly 9/15 polyps    HX CRYOABLATION OF THE PROSTATE      10/16    HX HEMORRHOIDECTOMY      1979    Emilee Dub 37, 1975    HX ORTHOPAEDIC      right knee surgery    HX ORTHOPAEDIC  12/2017    Dr Buffy Kwan; R CTS release    HX REFRACTIVE SURGERY      OD (hemoplasty to right eye on retina to avoid blindness)    HX TONSILLECTOMY      1955    GA LAP, RECURRENT INCISIONAL HERNIA REPAIR,REDUCIBLE N/A 02/09/2017    Dr. Latanya Ferrerter HERNIA,5+Y/O,REDUCIBL      2/16  left  Dr. Irving Russell SXSY,2+D/U,OMWMG      2/16  Dr. Jackie Metcalf      1/16  R OLIVIA 0.76, L OLIVIA 1.02    VASCULAR SURGERY PROCEDURE UNLIST      10/15   left fem art and left iliac stent     Social History     Social History    Marital status:      Spouse name: N/A    Number of children: N/A    Years of education: N/A     Occupational History    Not on file.      Social History Main Topics    Smoking status: Former Smoker     Packs/day: 1.50     Years: 25.00     Types: Cigarettes     Quit date: 1/1/2003    Smokeless tobacco: Never Used    Alcohol use Yes      Comment: RARELY    Drug use: No    Sexual activity: Yes     Partners: Female     Birth control/ protection: None     Other Topics Concern    Not on file     Social History Narrative     Family History   Problem Relation Age of Onset    Heart Disease Mother     Hypertension Mother     Diabetes Mother    Shabbir Tripathi Mother     Heart Disease Father     Stroke Father     Hypertension Father     Heart Disease Sister     Hypertension Sister Physical Exam:  Visit Vitals    /80    Pulse 67    Ht 6' (1.829 m)    Wt 216 lb (98 kg)    SpO2 99%    BMI 29.29 kg/m2     Pain Scale: 3/10          has been . reviewed and is appropriate        Diagnoses and all orders for this visit:    1. Cervical spinal stenosis    2. Degeneration of cervical and lumbar spine, relative cervical stenosis    Other orders  -     gabapentin (NEURONTIN) 300 mg capsule; 1 cap every day to bid  Indications: NEUROPATHIC PAIN  -     methylPREDNISolone (MEDROL DOSEPACK) 4 mg tablet; Per dose pack instructions            Follow-up Disposition:  Return in about 6 months (around 11/14/2018) for with Fanta.         We have informed Anahi Florian to notify us for immediate appointment if he has any worsening neurogical symptoms or if an emergency situation presents, then call 911

## 2018-05-14 NOTE — MR AVS SNAPSHOT
[address] [facility] [address] [place] [id number] 
[phone] Patient: Fraser Buerger MRN: [id number] Swati Villarreal Visit Information Date & Time Provider Department Dept. Phone Encounter #  
 5/14/2018 10:10 AM Lourdes Smart NP [facility] [phone] [id number] Follow-up Instructions Return in about 6 months (around 11/14/2018) for with Fanta. Follow-up and Disposition History Your Appointments 5/15/2018  8:40 AM  
Office Visit with Chris Leahy MD  
[facility] [address] [facility]) Appt Note: 4 month follow up  
 [address] [facility], [address] [address] [address]  
  
   
 [address], [address]  
  
    
 10/4/2018  8:30 AM  
Nurse Visit with Chris Spence [facility] ([address]) Appt Note: PSA  
 [address] [place] [address]  
  
    
 2/27/2019  8:00 AM  
PROCEDURE with [facility] 1 [facility] ([address]) Appt Note: RT JP Stent/ 1 year C Moon [address] [facility] [address] [facility] [address]  
[phone] [address] [place] [address],[address]  
  
    
 2/27/2019  9:00 AM  
PROCEDURE with [facility] 1 [facility] ([address]) Appt Note: CV 1 year M Moon [address] [facility] [address] [facility] [address]  
[phone]  
  
    
 2/27/2019 10:00 AM  
PROCEDURE with [facility] NONIMAGING [facility] ([address]) Appt Note: Leg Art 1 year M Moon [address] [facility] [address] [facility] [address]  
[phone]  [address]  
  
    
 3/4/2019  9:00 AM  
 Follow Up with Carol Ann Peres MD  
[address] [facility]) Appt Note: 1 year fup with studies [address] [facility] [address] [facility] [address]  
[phone] [address] [facility] [address] [facility]  
  
    
  
 6/6/2018 11:00 AM  
Any with Yani Ryan MD  
[facility] ([facility]) Appt Note: injection teaching trimix [address] [facility] [address]  
  
    
 10/11/2018  8:30 AM  
Any with Yani Ryan MD  
[facility] ([facility]) Appt Note: EP- 6 month follow up with PSA prior. [address] [facility] [address] [facility] [address]  
[phone] Upcoming Health Maintenance Date Due  
 MEDICARE YEARLY EXAM 6/30/2018 Influenza Age 5 to Adult 8/1/2018 GLAUCOMA SCREENING Q2Y 6/25/2019 DTaP/Tdap/Td series (2 - Td) 4/3/2023 COLONOSCOPY 9/22/2025 Allergies as of 5/14/2018  Review Complete On: 5/14/2018 By: Munira Del Toro NP Severity Noted Reaction Type Reactions Altace [Ramipril]    Unknown (comments) Lipitor [Atorvastatin]    Other (comments) Muscle pain Lisinopril    Shortness of Breath, Other (comments) Turns bright red Other Medication  03/05/2014    Other (comments) Vicryl suture on skin tends to be rejected with poor wound healing does better with monocryl Procardia [Nifedipine]    Other (comments) Caused afib Rosuvastatin  02/12/2018    Other (comments) Muscle Pain Current Immunizations  Reviewed on 9/15/2017 Name Date Influenza High Dose Vaccine PF 9/15/2017 11:09 AM, 9/16/2016, 10/2/2015 12:30 PM  
 Influenza Vaccine 10/10/2014, 10/4/2013 Influenza Vaccine Split 10/10/2012  2:28 PM, 9/28/2011 Influenza Vaccine Whole 10/8/2010  Pneumococcal Conjugate (PCV-13) 12/15/2014  8:16 AM  
 Pneumococcal Polysaccharide (PPSV-23) 1/24/2014 Pneumococcal Vaccine (Unspecified Type) 1/1/2006 Td 1/1/2006 Tdap 4/3/2013  8:23 PM  
 Zoster Vaccine, Live 1/1/2007 Not reviewed this visit You Were Diagnosed With   
  
 Codes Comments Cervical spinal stenosis    -  Primary ICD-10-CM: M48.02 
ICD-9-CM: 723.0 Degeneration of cervical intervertebral disc     ICD-10-CM: M50.30 ICD-9-CM: 722.4 Vitals BP Pulse Height(growth percentile) Weight(growth percentile) SpO2 BMI  
 151/80 67 6' (1.829 m) 216 lb (98 kg) 99% 29.29 kg/m2 Smoking Status Former Smoker BMI and BSA Data Body Mass Index Body Surface Area  
 29.29 kg/m 2 2.23 m 2 Preferred Pharmacy Pharmacy Name Phone [facility] [phone] Your Updated Medication List  
  
   
This list is accurate as of 5/14/18 10:39 AM.  Always use your most recent med list.  
  
  
  
  
 aspirin delayed-release 81 mg tablet Take 81 mg by mouth daily. atenolol 25 mg tablet Commonly known as:  TENORMIN  
TAKE ONE TABLET BY MOUTH TWICE A DAY cholecalciferol 1,000 unit Cap Commonly known as:  VITAMIN D3 Take 1,000 Units by mouth daily. coenzyme Q-10 200 mg capsule Commonly known as:  CO Q-10 Take  by mouth. diclofenac 1 % Gel Commonly known as:  VOLTAREN Apply 2-4 g to affected area four (4) times daily. To affected area (topically)  
  
 ezetimibe-simvastatin 10-40 mg per tablet Commonly known as:  Vytorin 10/40 Take 1 Tab by mouth nightly.  
  
 gabapentin 300 mg capsule Commonly known as:  NEURONTIN  
1 cap every day to bid  Indications: NEUROPATHIC PAIN  
  
 HYDROcodone-acetaminophen 5-325 mg per tablet Commonly known as:  Marvelyn Code Take 1 Tab by mouth every four (4) hours as needed (for chronic pain). LORazepam 1 mg tablet Commonly known as:  ATIVAN  
 Take 1 Tab by mouth every eight (8) hours as needed. losartan 25 mg tablet Commonly known as:  COZAAR  
TAKE ONE TABLET BY MOUTH TWICE A DAY  
  
 methylPREDNISolone 4 mg tablet Commonly known as:  Shaina Pimple Per dose pack instructions MULTIVITAMIN PO Take  by mouth daily. naloxone 4 mg/actuation nasal spray Commonly known as:  [employer] Use 1 spray intranasally into 1 nostril. Use a new Narcan nasal spray for subsequent doses and administer into alternating nostrils. May repeat every 2 to 3 minutes as needed. OMEGA 3 FISH OIL PO Take 900 mg by mouth daily. polyethylene glycol 17 gram packet Commonly known as:  MIRALAX  
17 g. PROTONIX 40 mg tablet Generic drug:  pantoprazole Take 40 mg by mouth daily. triamterene-hydroCHLOROthiazide 37.5-25 mg per tablet Commonly known as:  Gino Sera TAKE ONE TABLET BY MOUTH DAILY Prescriptions Printed Refills  
 methylPREDNISolone (MEDROL DOSEPACK) 4 mg tablet 0 Sig: Per dose pack instructions Class: Print Prescriptions Sent to Pharmacy Refills  
 gabapentin (NEURONTIN) 300 mg capsule 1 Si cap every day to bid  Indications: NEUROPATHIC PAIN Class: Normal  
 Pharmacy: [facility] #: [phone] Follow-up Instructions Return in about 6 months (around 2018) for with Fanta. Patient Instructions Neck: Exercises Your Care Instructions Here are some examples of typical rehabilitation exercises for your condition. Start each exercise slowly. Ease off the exercise if you start to have pain. Your doctor or physical therapist will tell you when you can start these exercises and which ones will work best for you. How to do the exercises Neck stretch 1.  This stretch works best if you keep your shoulder down as you lean away from it. To help you remember to do this, start by relaxing your shoulders and lightly holding on to your thighs or your chair. 2. Tilt your head toward your shoulder and hold for 15 to 30 seconds. Let the weight of your head stretch your muscles. 3. If you would like a little added stretch, use your hand to gently and steadily pull your head toward your shoulder. For example, keeping your right shoulder down, lean your head to the left. 4. Repeat 2 to 4 times toward each shoulder. Diagonal neck stretch 1. Turn your head slightly toward the direction you will be stretching, and tilt your head diagonally toward your chest and hold for 15 to 30 seconds. 2. If you would like a little added stretch, use your hand to gently and steadily pull your head forward on the diagonal. 
3. Repeat 2 to 4 times toward each side. Dorsal glide stretch The dorsal glide stretches the back of the neck. If you feel pain, do not glide so far back. Some people find this exercise easier to do while lying on their backs with an ice pack on the neck. 1. Sit or stand tall and look straight ahead. 2. Slowly tuck your chin as you glide your head backward over your body 3. Hold for a count of 6, and then relax for up to 10 seconds. 4. Repeat 8 to 12 times. Chest and shoulder stretch 1. Sit or stand tall and glide your head backward as in the dorsal glide stretch. 2. Raise both arms so that your hands are next to your ears. 3. Take a deep breath, and as you breathe out, lower your elbows down and behind your back. You will feel your shoulder blades slide down and together, and at the same time you will feel a stretch across your chest and the front of your shoulders. 4. Hold for about 6 seconds, and then relax for up to 10 seconds. 5. Repeat 8 to 12 times. Strengthening: Hands on head 1. Move your head backward, forward, and side to side against gentle pressure from your hands, holding each position for about 6 seconds. 2. Repeat 8 to 12 times. Follow-up care is a key part of your treatment and safety. Be sure to make and go to all appointments, and call your doctor if you are having problems. It's also a good idea to know your test results and keep a list of the medicines you take. Where can you learn more? Go to http://nneka-mindi.info/. Enter P975 in the search box to learn more about \"Neck: Exercises. \" Current as of: March 21, 2017 Content Version: 11.4 © 3468-0557 [employer]. Care instructions adapted under license by [employer] (which disclaims liability or warranty for this information). If you have questions about a medical condition or this instruction, always ask your healthcare professional. [address] any warranty or liability for your use of this information. Patient Instructions History Introducing [facility]! Dear Greg Barker: Thank you for requesting a [employer] account. Our records indicate that you already have an active [employer] account. You can access your account anytime at [email or web address] Did you know that you can access your hospital and ER discharge instructions at any time in [employer]? You can also review all of your test results from your hospital stay or ER visit. Additional Information If you have questions, please visit the Frequently Asked Questions section of the [employer] website at [email or web address] Remember, Pe[employer] is NOT to be used for urgent needs. For medical emergencies, dial 911. Now available from your iPhone and Android! Please provide this summary of care documentation to your next provider. Your primary care clinician is listed as Mendel Asai. If you have any questions after today's visit, please call [phone].

## 2018-05-14 NOTE — PATIENT INSTRUCTIONS

## 2018-05-15 ENCOUNTER — OFFICE VISIT (OUTPATIENT)
Dept: INTERNAL MEDICINE CLINIC | Age: 73
End: 2018-05-15

## 2018-05-15 VITALS
TEMPERATURE: 98.9 F | DIASTOLIC BLOOD PRESSURE: 80 MMHG | OXYGEN SATURATION: 99 % | HEIGHT: 72 IN | SYSTOLIC BLOOD PRESSURE: 134 MMHG | RESPIRATION RATE: 18 BRPM | WEIGHT: 214 LBS | BODY MASS INDEX: 28.99 KG/M2 | HEART RATE: 64 BPM

## 2018-05-15 DIAGNOSIS — E78.5 DYSLIPIDEMIA: ICD-10-CM

## 2018-05-15 DIAGNOSIS — I65.22 LEFT CAROTID ARTERY OCCLUSION: ICD-10-CM

## 2018-05-15 DIAGNOSIS — I25.10 ATHEROSCLEROSIS OF NATIVE CORONARY ARTERY OF NATIVE HEART WITHOUT ANGINA PECTORIS: ICD-10-CM

## 2018-05-15 DIAGNOSIS — K63.5 POLYP OF COLON, UNSPECIFIED PART OF COLON, UNSPECIFIED TYPE: ICD-10-CM

## 2018-05-15 DIAGNOSIS — I48.91 ATRIAL FIBRILLATION, UNSPECIFIED TYPE (HCC): ICD-10-CM

## 2018-05-15 DIAGNOSIS — I73.9 PERIPHERAL VASCULAR DISEASE (HCC): ICD-10-CM

## 2018-05-15 DIAGNOSIS — R73.01 IFG (IMPAIRED FASTING GLUCOSE): ICD-10-CM

## 2018-05-15 DIAGNOSIS — I11.9 BENIGN HYPERTENSIVE HEART DISEASE WITHOUT HEART FAILURE: ICD-10-CM

## 2018-05-15 DIAGNOSIS — I70.219 ATHEROSCLEROSIS OF ARTERY OF EXTREMITY WITH INTERMITTENT CLAUDICATION (HCC): ICD-10-CM

## 2018-05-15 DIAGNOSIS — K51.919 ULCERATIVE COLITIS WITH COMPLICATION, UNSPECIFIED LOCATION (HCC): Primary | ICD-10-CM

## 2018-05-15 DIAGNOSIS — E55.9 HYPOVITAMINOSIS D: ICD-10-CM

## 2018-05-15 DIAGNOSIS — E66.3 OVERWEIGHT (BMI 25.0-29.9): ICD-10-CM

## 2018-05-15 PROBLEM — M50.30 DEGENERATION OF CERVICAL INTERVERTEBRAL DISC: Status: ACTIVE | Noted: 2017-10-04

## 2018-05-15 NOTE — MR AVS SNAPSHOT
[address] [facility] [address] 
 
 
 [address] [facility], [facility] [address] [facility] [address] 
[phone] Patient: Daisy Morel MRN: [id number] [facility] Visit Information Date & Time Provider Department Dept. Phone Encounter #  
 5/15/2018  8:40 AM Tino Calixto MD [facility] [phone] [id number] Your Appointments 10/4/2018  8:30 AM  
Nurse Visit with Niels Gaines [facility] ([facility]) Appt Note: PSA  
 [address] [id number] [address] [facility]  
  
    
 11/13/2018  7:55 AM  
LAB with [facility] NURSE VISIT [facility] ([facility]) Appt Note: lab  
 [address] [facility], [address] [facility] [address] [address] [place]  
  
   
 [address] [facility] Mate[address] Dash, [address]  
  
    
 11/13/2018  8:30 AM  
Follow Up with Isi Barrow NP  
[facility] ([facility]) Appt Note: 6 Mo F/U  
 [address]  
  
    
 11/20/2018  1:00 PM  
PHYSICAL with Tino Calixto MD  
[facility]) Appt Note:  6 months [address] [facility], [facility] [address]  
  
   
 [address] [facility] [address], [address]  
  
    
 2/27/2019  8:00 AM  
PROCEDURE with [facility] 1 [facility] ([facility]) Appt Note: RT JP Stent/ 1 year C Moon [address] [facility] 5[address] [facility] [address]  
[phone] [address] [place] [address]  
  
    
 2/27/2019  9:00 AM  
PROCEDURE with [facility] IMAG[facility] 1 [facility] ([facility]) Appt Note: CV 1 year JOSEFA Moon [address] [facility] [address]leg[address] [facility] [address]  
[phone]  
  
    
 2/27/2019 10:00 AM  
PROCEDURE with [facility] NONIMATIMMY [facility] ([address] [place] [address]) Appt Note: Leg Art 1 year M Moon [address] [facility] [address] 664 [address] [facility] [address]  
[phone] [address] [facility] [address] 47 [facility]  
  
    
 3/4/2019  9:00 AM  
Follow Up with Murphy Mcmullen MD  
[address] [facility] [address] [place] [address]) Appt Note: 1 year fup with studies [address] [facility] [address] 145 [address] [facility] [address]  
[phone] [address] [facility] [address] 47 [facility]  
  
    
  
 6/6/2018 11:00 AM  
Any with Good Herrmann MD  
[facility] ([address] [place] [address]) Appt Note: injection teaching trimix 7[address] [facility] [address] [facility]  
  
    
 10/11/2018  8:30 AM  
Any with Good Herrmann MD  
[facility] ([address] [place] [address]) Appt Note: EP- 6 month follow up with PSA prior. 7[address] [facility] [address] [facility] [address]  
[phone] Upcoming Health Maintenance Date Due  
 MEDICARE YEARLY EXAM 6/30/2018 Influenza Age 5 to Adult 8/1/2018 GLAUCOMA SCREENING Q2Y 6/25/2019 DTaP/Tdap/Td series (2 - Td) 4/3/2023 COLONOSCOPY 9/22/2025 Allergies as of 5/15/2018  Review Complete On: 5/15/2018 By: Gopal Dorsey MD  
  
 Severity Noted Reaction Type Reactions Altace [Ramipril]    Unknown (comments) Lipitor [Atorvastatin]    Other (comments) Muscle pain Lisinopril    Shortness of Breath, Other (comments) Turns bright red Other Medication  03/05/2014    Other (comments) Vicryl suture on skin tends to be rejected with poor wound healing does better with monocryl Procardia [Nifedipine]    Other (comments) Caused afib Rosuvastatin  02/12/2018    Other (comments) Muscle Pain Current Immunizations  Reviewed on 9/15/2017 Name Date Influenza High Dose Vaccine PF 9/15/2017 11:09 AM, 9/16/2016, 10/2/2015 12:30 PM  
 Influenza Vaccine 10/10/2014, 10/4/2013 Influenza Vaccine Split 10/10/2012  2:28 PM, 9/28/2011 Influenza Vaccine Whole 10/8/2010 Pneumococcal Conjugate (PCV-13) 12/15/2014  8:16 AM  
 Pneumococcal Polysaccharide (PPSV-23) 1/24/2014 Pneumococcal Vaccine (Unspecified Type) 1/1/2006 Td 1/1/2006 Tdap 4/3/2013  8:23 PM  
 Zoster Vaccine, Live 1/1/2007 Not reviewed this visit Vitals BP Pulse Temp Resp Height(growth percentile) Weight(growth percentile) 134/80 (BP 1 Location: Right arm, BP Patient Position: Sitting) 64 98.9 °F (37.2 °C) (Oral) 18 6' (1.829 m) 214 lb (97.1 kg) SpO2 BMI Smoking Status 99% 29.02 kg/m2 Former Smoker Vitals History BMI and BSA Data Body Mass Index Body Surface Area  
 29.02 kg/m 2 2.22 m 2 Preferred Pharmacy Pharmacy Name Phone [facility] [address] [facility] [address], [address] [place] [address] [phone] Your Updated Medication List  
  
   
This list is accurate as of 5/15/18  9:20 AM.  Always use your most recent med list.  
  
  
  
  
 aspirin delayed-release 81 mg tablet Take 81 mg by mouth daily. atenolol 25 mg tablet Commonly known as:  TENORMIN  
TAKE ONE TABLET BY MOUTH TWICE A DAY cholecalciferol 1,000 unit Cap Commonly known as:  VITAMIN D3 Take 1,000 Units by mouth daily. coenzyme Q-10 200 mg capsule Commonly known as:  CO Q-10 Take  by mouth. diclofenac 1 % Gel Commonly known as:  VOLTAREN Apply 2-4 g to affected area four (4) times daily. To affected area (topically)  
  
 ezetimibe-simvastatin 10-40 mg per tablet Commonly known as:  Vytorin 10/40 Take 1 Tab by mouth nightly.  
  
 gabapentin 300 mg capsule Commonly known as:  NEURONTIN  
 1 cap every day to bid  Indications: NEUROPATHIC PAIN  
  
 HYDROcodone-acetaminophen 5-325 mg per tablet Commonly known as:  Kayli Fore Take 1 Tab by mouth every four (4) hours as needed (for chronic pain). LORazepam 1 mg tablet Commonly known as:  ATIVAN Take 1 Tab by mouth every eight (8) hours as needed. losartan 25 mg tablet Commonly known as:  COZAAR  
TAKE ONE TABLET BY MOUTH TWICE A DAY  
  
 methylPREDNISolone 4 mg tablet Commonly known as:  Juanpablo Banister Per dose pack instructions MULTIVITAMIN PO Take  by mouth daily. naloxone 4 mg/actuation nasal spray Commonly known as:  [employer] Use 1 spray intranasally into 1 nostril. Use a new Narcan nasal spray for subsequent doses and administer into alternating nostrils. May repeat every 2 to 3 minutes as needed. OMEGA 3 FISH OIL PO Take 900 mg by mouth daily. polyethylene glycol 17 gram packet Commonly known as:  MIRALAX  
17 g. PROTONIX 40 mg tablet Generic drug:  pantoprazole Take 40 mg by mouth daily. triamterene-hydroCHLOROthiazide 37.5-25 mg per tablet Commonly known as:  Shey Noss TAKE ONE TABLET BY MOUTH DAILY Introducing [facility]! Dear Bernice Celestin: Thank you for requesting a Expa account. Our records indicate that you already have an active Expa account. You can access your account anytime at [email or web address] Did you know that you can access your hospital and ER discharge instructions at any time in Expa? You can also review all of your test results from your hospital stay or ER visit. Additional Information If you have questions, please visit the Frequently Asked Questions section of the Expa website at [email or web address]/. Remember, Expa is NOT to be used for urgent needs. For medical emergencies, dial 911. Now available from your iPhone and Android! Please provide this summary of care documentation to your next provider. Your primary care clinician is listed as Laurie Ma. If you have any questions after today's visit, please call [phone].

## 2018-05-15 NOTE — PROGRESS NOTES
Chief Complaint   Patient presents with    Cholesterol Problem     4 month follow up with lab results. 1. Have you been to the ER, urgent care clinic or hospitalized since your last visit? NO.     2. Have you seen or consulted any other health care providers outside of the [address] [place] [address] since your last visit (Include any pap smears or colon screening)? YES, Dr. Karan Knox, cardiologist, last seen April 2018.

## 2018-05-18 LAB
AMPHETAMINES UR QL SCN: NEGATIVE NG/ML
BARBITURATES UR QL SCN: NEGATIVE NG/ML
BENZODIAZ UR QL SCN: NEGATIVE NG/ML
BZE UR QL SCN: NEGATIVE NG/ML
CANNABINOIDS UR QL SCN: NEGATIVE NG/ML
CODEINE, 737848: NEGATIVE
CREAT UR-MCNC: 68.1 MG/DL (ref 20–300)
FENTANYL+NORFENTANYL UR QL SCN: NEGATIVE PG/ML
HYDROCODONE CONFIRM, 737855: 787 NG/ML
HYDROCODONE, 737854: POSITIVE
HYDROMORPHONE, 737852: NEGATIVE
MEPERIDINE UR QL: NEGATIVE NG/ML
METHADONE UR QL SCN: NEGATIVE NG/ML
MORPHINE, 737850: NEGATIVE
OPIATES UR QL SCN: NORMAL NG/ML
OPIATES, 737847: POSITIVE NG/ML
OXYCODONE+OXYMORPHONE UR QL SCN: NEGATIVE NG/ML
PCP UR QL: NEGATIVE NG/ML
PH UR: 6.9 [PH] (ref 4.5–8.9)
PLEASE NOTE:, 733157: NORMAL
PROPOXYPH UR QL SCN: NEGATIVE NG/ML
SP GR UR: 1.02
TRAMADOL UR QL SCN: NEGATIVE NG/ML

## 2018-06-28 DIAGNOSIS — M54.10 RADICULOPATHY, UNSPECIFIED SPINAL REGION: ICD-10-CM

## 2018-06-28 RX ORDER — HYDROCODONE BITARTRATE AND ACETAMINOPHEN 5; 325 MG/1; MG/1
1 TABLET ORAL
Qty: 180 TAB | Refills: 0 | Status: SHIPPED | OUTPATIENT
Start: 2018-06-28 | End: 2018-08-14 | Stop reason: SDUPTHER

## 2018-06-28 NOTE — TELEPHONE ENCOUNTER
Last fill 5/11/18  Last ov 5/15/18    Reviewed report generated by the [email or web address]. Does not demonstrate aberrancies or inconsistencies with regard to the prescribing of controlled medications to this patient by other providers.

## 2018-07-05 NOTE — PROGRESS NOTES
[facility]              [address] [facility]        [place], [address] [place]   [phone] fax    Discharge Summary  Patient name: Sarah Deal Start of Care: 18   Referral source: Von Heredia MD : 1945   Medical/Treatment Diagnosis: Chronic pain of right knee [M25.561, G89.29] Onset [id number]     Prior Hospitalization: see medical history Provider#: [id number]   Medications: Verified on Patient Summary List    Comorbidities: Arthritis, Back Pain, Prostate Cancer (remission), Skin Cancer (Active - Basal cell), Stent placement x2, R Carotid Artery Cleaning, L Femoral Artery Stent, R Iliac Arterial Stent, Peripheral Vascular Disease, GI Disease, kidney/bladder/prostate/urinatiopn problems, visual impairment, HTN, R Knee Surgery ()   Prior Level of Function: Chronic LBP and Neck Pain, Ambulation tolerance (30 minutes), (I) Functional ADLs, Retired  Visits from [place] of Care: 4    Missed Visits: 0  Reporting Period : 18 to 18    Summary of Care:  Pt was reporting improved pain and function with PT though he attended just 3 follow up sessions. Pt then called and requested DC due to acute elevated knee pain.  Pt is (I) w/ HEP at this time, DC per pt request.       ASSESSMENT/RECOMMENDATIONS:  [x]Discontinue therapy: []Patient has reached or is progressing toward set goals      [x]Patient is non-compliant or has abdicated      []Due to lack of appreciable progress towards set Rajiv Wynne PT 2018 9:04 AM

## 2018-07-11 ENCOUNTER — OFFICE VISIT (OUTPATIENT)
Dept: ORTHOPEDIC SURGERY | Age: 73
End: 2018-07-11

## 2018-07-11 VITALS
SYSTOLIC BLOOD PRESSURE: 124 MMHG | BODY MASS INDEX: 29.93 KG/M2 | OXYGEN SATURATION: 96 % | RESPIRATION RATE: 16 BRPM | DIASTOLIC BLOOD PRESSURE: 63 MMHG | HEIGHT: 72 IN | WEIGHT: 221 LBS | HEART RATE: 69 BPM

## 2018-07-11 DIAGNOSIS — M65.311 TRIGGER FINGER OF RIGHT THUMB: ICD-10-CM

## 2018-07-11 DIAGNOSIS — M65.321 TRIGGER FINGER, RIGHT INDEX FINGER: Primary | ICD-10-CM

## 2018-07-11 NOTE — PROGRESS NOTES
Patient: Sasha Colin                MRN: [id number]       SSN: [id number]  YOB: 1945        AGE: 67 y.o. SEX: male  Body mass index is 29.97 kg/(m^2). PCP: Niesha Ray MD  07/11/18    Chief Complaint: No changes from previous visit complaint. HISTORY OF PRESENT ILLNESS:  Debbie Donis returns to the office today for a right hand complaint. He has some instances recently where he has noted his thumb and index finger getting stuck in a certain position with his thumb flexed at the IP joint, and his index finger flexed at the MCP joint. He is having a little bit of pain in his palm as well. He has to forcibly extend the fingers. He denies any numbness or tingling. This is similar to the complaint he had back in January when he had an injection in his index finger for a trigger finger.            Past Medical History:   Diagnosis Date    Adenoma of left adrenal gland     2009  1 cm, no change 1/15, 2/16    Asbestosis     Atrial fibrillation     CHA2DS2-VAsc=3(age+, htn+, vasc dx+; estimated yearly stroke risk according to Lip et al. is 3,2%), Hasbled=2(estimated yearly bleeding risk according to pisters et al. is 1,88%); not anticoagulated due to h/o gi bleed    Atrial fibrillation ablation     10/08    Basal cell cancer     Dr Deirdre Trujillo; he's had >350 lesions removed    Carotid occlusion     left     Cervical radiculopathy     MRI 9/11 showed C3-6 severe foraminal stenosis    Chronic pain     Colon polyp     Dr Hillary Rinne 9/15    Coronary artery disease     RCA - 3.0 x 16mm TAXUS 9/04, 3.0 x 16mm TAXUS 12/06    Dyslipidemia     Erectile dysfunction     GERD     GI bleed     Hearing loss     2014  bilateral Dr Dunn Croft    Hernia, umbilical     Hypertension     Hypogonadism male     Lumbar radiculopathy     Dr Reanna Brown;  MRI 9/11 L4-5 disc bulging, annular tear, disc dessication    Myofascial pain dysfunction syndrome     pain clinic     Osteoarthritis     right knee Dr Torri Lazcano Overweight (BMI 25.0-29. 9)     IF 5/18    Peripheral vascular disease     50-60% R iliac; s/p R iliac stent and L femoral artery stent in past; R OLIVIA 0.76 (1/16)    Plantar fasciitis     bilateral     PPD positive     Prediabetes     Prostate cancer     T1c Imelda 7(3+4), 70% in 1 core, GS 7 (3+4) in 4 cores, GS 6 (3+3) in 1 core; psa 5.28, TRUS 18 gm;  Dr Elena Mcdowell; s/p cryoablation 10/16    Recurrent umbilical hernia     Subclinical hypothyroidism     denies    Tinnitus     Dr May Nixon Ulcerative colitis     Venous insufficiency        Family History   Problem Relation Age of Onset    Heart Disease Mother     Hypertension Mother     Diabetes Mother     Arthritis-osteo Mother     Heart Disease Father     Stroke Father     Hypertension Father     Heart Disease Sister     Hypertension Sister        Current Outpatient Prescriptions   Medication Sig Dispense Refill    HYDROcodone-acetaminophen (NORCO) 5-325 mg per tablet Take 1 Tab by mouth every four (4) hours as needed (for chronic pain). 180 Tab 0    gabapentin (NEURONTIN) 300 mg capsule 1 cap every day to bid  Indications: NEUROPATHIC PAIN 180 Cap 1    polyethylene glycol (MIRALAX) 17 gram packet 17 g.  atenolol (TENORMIN) 25 mg tablet TAKE ONE TABLET BY MOUTH TWICE A  Tab 1    ezetimibe-simvastatin (VYTORIN 10/40) 10-40 mg per tablet Take 1 Tab by mouth nightly. 90 Tab 3    diclofenac (VOLTAREN) 1 % gel Apply 2-4 g to affected area four (4) times daily. To affected area (topically) 100 g 5    LORazepam (ATIVAN) 1 mg tablet Take 1 Tab by mouth every eight (8) hours as needed. 50 Tab 0    losartan (COZAAR) 25 mg tablet TAKE ONE TABLET BY MOUTH TWICE A  Tab 2    aspirin delayed-release 81 mg tablet Take 81 mg by mouth daily.       triamterene-hydrochlorothiazide (MAXZIDE) 37.5-25 mg per tablet TAKE ONE TABLET BY MOUTH DAILY 90 Tab 3    Cholecalciferol, Vitamin D3, 1,000 unit cap Take 1,000 Units by mouth daily.  OMEGA-3 FATTY ACIDS/FISH OIL (OMEGA 3 FISH OIL PO) Take 900 mg by mouth daily.  MULTIVITAMIN PO Take  by mouth daily.  pantoprazole (PROTONIX) 40 mg tablet Take 40 mg by mouth daily.  coenzyme q10 200 mg Cap Take  by mouth.  methylPREDNISolone (MEDROL DOSEPACK) 4 mg tablet Per dose pack instructions 1 Dose Pack 0    naloxone (NARCAN) 4 mg/actuation nasal spray Use 1 spray intranasally into 1 nostril. Use a new Narcan nasal spray for subsequent doses and administer into alternating nostrils. May repeat every 2 to 3 minutes as needed.  1 Each 1       Allergies   Allergen Reactions    Altace [Ramipril] Unknown (comments)    Lipitor [Atorvastatin] Other (comments)     Muscle pain    Lisinopril Shortness of Breath and Other (comments)     Turns bright red    Other Medication Other (comments)     Vicryl suture on skin tends to be rejected with poor wound healing does better with monocryl    Procardia [Nifedipine] Other (comments)     Caused afib    Rosuvastatin Other (comments)     Muscle Pain         Past Surgical History:   Procedure Laterality Date    CARDIAC SURG PROCEDURE UNLIST  04/2018    [address] [facility] Dr Jey Serna; echo nl lv, ef 60%, dilated RV, biatrial enlargement, trace AI    CARDIAC SURG PROCEDURE UNLIST  04/2018    [address] [facility] [address] Jey Serna; NST neg, ef 60%    HAND/FINGER SURGERY UNLISTED  2017    HX CAROTID ENDARTERECTOMY      1/14  right    [place] HX COLONOSCOPY      Dr Austin Dumont 9/15 polyps    HX CRYOABLATION OF THE PROSTATE      10/16    HX [address], [address]    HX ORTHOPAEDIC      right knee surgery    HX ORTHOPAEDIC  12/2017    Dr Olman Arenas; R CTS release    HX REFRACTIVE SURGERY      OD (hemoplasty to right eye on retina to avoid blindness)    HX TONSILLECTOMY      1955    NE LAP, RECURRENT INCISIONAL HERNIA REPAIR,REDUCIBLE N/A 02/09/2017    Dr. Funk Borne HERNIA,5+Y/O,REDUCIBL      2/16  left   [place]    REPAIR UMBILICAL NWHP,3+V/J,ZLXXQ      2/16  Dr. Sandhya Monsivais      1/16  R OLIVIA 0.76, L OLIVIA 1.02    VASCULAR SURGERY PROCEDURE UNLIST      10/15   left fem art and left iliac stent       Social History     Social History    Marital status:      Spouse name: N/A    Number of children: N/A    Years of education: N/A     Occupational History    Not on file. Social History Main Topics    Smoking status: Former Smoker     Packs/day: 1.50     Years: 25.00     Types: Cigarettes     Quit date: 1/1/2003    Smokeless tobacco: Never Used    Alcohol use Yes      Comment: RARELY    Drug use: No    Sexual activity: Yes     Partners: Female     Birth control/ protection: None     Other Topics Concern    Not on file     Social History Narrative       REVIEW OF SYSTEMS:      No changes from previous review of systems unless noted. PHYSICAL EXAMINATION:  Visit Vitals    /63    Pulse 69    Resp 16    Ht 6' (1.829 m)    Wt 221 lb (100.2 kg)    SpO2 96%    BMI 29.97 kg/m2     Body mass index is 29.97 kg/(m^2). GENERAL: Alert and oriented x3, in no acute distress. HEENT: Normocephalic, atraumatic. RESP: Non labored breathing. SKIN: No rashes or lesions noted. PHYSICAL EXAMINATION:   Physical exam of the right hand reveals tenderness to palpation at the base of the index finger over the metacarpal head, as well as at the base of the thumb over the metacarpal head. He has palpable swelling there with flexion and extension of the finger and the thumb. He is neurovascularly intact distally. ASSESSMENT/PLAN:  Mary Richard is a 79-year-old male with trigger thumb and trigger index finger. I recommended injections into both A1 sheaths today. After discussing the risks and benefits and obtaining informed consent, both A1 pullies were injected with Lidocaine and steroid. He tolerated this well. Aftercare was discussed. He will follow up as needed.      [facility]  OFFICE PROCEDURE PROGRESS NOTE        Chart reviewed for the following:   Kerry Ferrer MD, have reviewed the History, Physical and updated the Allergic reactions for [address] performed immediately prior to start of procedure:   Kerry Ferrer MD, have performed the following reviews on [facility] prior to the start of the procedure:            * Patient was identified by name and date of birth   * Agreement on procedure being performed was verified  * Risks and Benefits explained to the patient  * Procedure site verified and marked as necessary  * Patient was positioned for comfort  * Consent was signed and verified     Time: 0845      Date of procedure: 7/11/2018    Procedure performed by:  Louise Waggoner MD    Provider assisted by: Jovan NAVARRO    Patient assisted by: self    How tolerated by patient: tolerated the procedure well with no complications    Post Procedural Pain Scale: 0 - No Hurt    Comments: none        Electronically signed by: Louise Waggoner MD

## 2018-07-13 RX ORDER — BETAMETHASONE SODIUM PHOSPHATE AND BETAMETHASONE ACETATE 3; 3 MG/ML; MG/ML
6 INJECTION, SUSPENSION INTRA-ARTICULAR; INTRALESIONAL; INTRAMUSCULAR; SOFT TISSUE ONCE
Qty: 1 ML | Refills: 0
Start: 2018-07-13 | End: 2018-07-13

## 2018-08-07 RX ORDER — DICLOFENAC SODIUM 10 MG/G
2-4 GEL TOPICAL 4 TIMES DAILY
Qty: 100 G | Refills: 5 | Status: SHIPPED | OUTPATIENT
Start: 2018-08-07 | End: 2019-04-11 | Stop reason: SDUPTHER

## 2018-08-07 NOTE — TELEPHONE ENCOUNTER
Pt informed [facility] [address]--he wants pharmacy changed to [facility] Zhao on [address] to  to fix

## 2018-08-07 NOTE — TELEPHONE ENCOUNTER
Last Visit: 05/04/2018 with PA [address]    Next Appointment: none   Previous Refill Encounters: 08/03/2017 per JAYRO Jackman 100g with 5 refills     Requested Prescriptions     Pending Prescriptions Disp Refills    diclofenac (VOLTAREN) 1 % gel 100 g 5     Sig: Apply 2-4 g to affected area four (4) times daily.

## 2018-08-14 DIAGNOSIS — M54.10 RADICULOPATHY, UNSPECIFIED SPINAL REGION: ICD-10-CM

## 2018-08-14 RX ORDER — HYDROCODONE BITARTRATE AND ACETAMINOPHEN 5; 325 MG/1; MG/1
1 TABLET ORAL
Qty: 180 TAB | Refills: 0 | Status: SHIPPED | OUTPATIENT
Start: 2018-08-14 | End: 2018-09-27 | Stop reason: SDUPTHER

## 2018-08-14 NOTE — TELEPHONE ENCOUNTER
[facility] for Codorus as [facility]/29/2018. There appears to be no inconsistencies in regards to the prescribing of this medication. Last Visit: 05/15/2018 with MD Maikel Lizama    Next Appointment: 11/20/2018 with MD Maikel Lizama   Previous Refill Encounters: 06/28/2018 per MD Maikel Lizama #[id number]     Requested Prescriptions     Pending Prescriptions Disp Refills    HYDROcodone-acetaminophen (NORCO) 5-325 mg per tablet 180 Tab 0     Sig: Take 1 Tab by mouth every four (4) hours as needed (for chronic pain).

## 2018-08-15 ENCOUNTER — HOSPITAL ENCOUNTER (OUTPATIENT)
Dept: LAB | Age: 73
Discharge: HOME | End: 2018-08-15
Payer: MEDICARE

## 2018-08-15 ENCOUNTER — TELEPHONE (OUTPATIENT)
Dept: INTERNAL MEDICINE CLINIC | Age: 73
End: 2018-08-15

## 2018-08-15 DIAGNOSIS — Z02.89 PAIN MANAGEMENT CONTRACT SIGNED: Primary | ICD-10-CM

## 2018-08-15 PROCEDURE — 80361 OPIATES 1 OR MORE: CPT | Performed by: INTERNAL MEDICINE

## 2018-08-15 PROCEDURE — 80307 DRUG TEST PRSMV CHEM ANLYZR: CPT | Performed by: INTERNAL MEDICINE

## 2018-08-22 ENCOUNTER — TELEPHONE (OUTPATIENT)
Dept: INTERNAL MEDICINE CLINIC | Age: 73
End: 2018-08-22

## 2018-08-22 NOTE — TELEPHONE ENCOUNTER
CUT LEG 08/14- went to urgent care 08/16- given antibiotics(keflex) twice a day- went back 08/19- got a antibiotic shot-and was told to take Keflex 4 times a day for 10 days- plus clean wound 3 times a day and use an ointment they prescribed- he feels like its not right- he would like to be sen but no available appt- Please advise

## 2018-08-23 ENCOUNTER — OFFICE VISIT (OUTPATIENT)
Dept: INTERNAL MEDICINE CLINIC | Age: 73
End: 2018-08-23

## 2018-08-23 VITALS
DIASTOLIC BLOOD PRESSURE: 74 MMHG | HEART RATE: 64 BPM | OXYGEN SATURATION: 96 % | RESPIRATION RATE: 14 BRPM | WEIGHT: 223 LBS | SYSTOLIC BLOOD PRESSURE: 140 MMHG | HEIGHT: 72 IN | TEMPERATURE: 97.8 F | BODY MASS INDEX: 30.2 KG/M2

## 2018-08-23 DIAGNOSIS — S81.801A LEG WOUND, RIGHT, INITIAL ENCOUNTER: Primary | ICD-10-CM

## 2018-08-23 LAB
AMPHETAMINES UR QL SCN: NEGATIVE NG/ML
BARBITURATES UR QL SCN: NEGATIVE NG/ML
BENZODIAZ UR QL SCN: NEGATIVE NG/ML
BZE UR QL SCN: NEGATIVE NG/ML
CANNABINOIDS UR QL SCN: NEGATIVE NG/ML
CODEINE, 737848: NEGATIVE
CREAT UR-MCNC: 64.1 MG/DL (ref 20–300)
FENTANYL+NORFENTANYL UR QL SCN: NEGATIVE PG/ML
HYDROCODONE CONFIRM, 737855: 531 NG/ML
HYDROCODONE, 737854: POSITIVE
HYDROMORPHONE, 737852: NEGATIVE
MEPERIDINE UR QL: NEGATIVE NG/ML
METHADONE UR QL SCN: NEGATIVE NG/ML
MORPHINE, 737850: NEGATIVE
OPIATES UR QL SCN: NORMAL NG/ML
OPIATES, 737847: POSITIVE NG/ML
OXYCODONE+OXYMORPHONE UR QL SCN: NEGATIVE NG/ML
PCP UR QL: NEGATIVE NG/ML
PH UR: 6.8 [PH] (ref 4.5–8.9)
PLEASE NOTE:, 733157: NORMAL
PROPOXYPH UR QL SCN: NEGATIVE NG/ML
SP GR UR: 1.02
TRAMADOL UR QL SCN: NEGATIVE NG/ML

## 2018-08-23 RX ORDER — MUPIROCIN 20 MG/G
OINTMENT TOPICAL 3 TIMES DAILY
COMMUNITY
End: 2019-02-25

## 2018-08-23 RX ORDER — CEPHALEXIN 500 MG/1
500 CAPSULE ORAL 4 TIMES DAILY
COMMUNITY
End: 2018-10-12 | Stop reason: ALTCHOICE

## 2018-08-23 NOTE — PROGRESS NOTES
Neymar Hernandez is a 68 y.o.  male and presents with    Chief Complaint   Patient presents with    Wound Check     Patient has wound on lower right leg below knee. Patient went to  [facility] and was given antibotic. Patient reports burning and blood. Subjective:  HPI   Mr. Krista Thakur presents today regarding a right leg wound that he obtained on 8/14/18 when he was parking his truck and was in a tight spot and ended up cutting the leg on the bottom of the door. The cut is to the right posterior knee. He went to urgent care 08/16 and was given keflex twice a day. He then returned to urgent care 08/19 as with redness noted down the leg and was given a antibiotic shot? and was told to take Keflex 4 times a day for 10 days and to plus clean wound 3 times a day with half and half water and hydrogen peroxide x 3 times a day and use an Mupirocin ointment daily. Tdap 4/3/2013, hx of PAD. ROS   Review of Systems   Constitutional: Negative. Respiratory: Negative. Cardiovascular: Negative. Gastrointestinal: Negative. Allergies   Allergen Reactions    Altace [Ramipril] Unknown (comments)    Lipitor [Atorvastatin] Other (comments)     Muscle pain    Lisinopril Shortness of Breath and Other (comments)     Turns bright red    Other Medication Other (comments)     Vicryl suture on skin tends to be rejected with poor wound healing does better with monocryl    Procardia [Nifedipine] Other (comments)     Caused afib    Rosuvastatin Other (comments)     Muscle Pain         Current Outpatient Prescriptions   Medication Sig Dispense Refill    mupirocin (BACTROBAN) 2 % ointment Apply  to affected area three (3) times daily.  cephALEXin (KEFLEX) 500 mg capsule Take 500 mg by mouth four (4) times daily.  HYDROcodone-acetaminophen (NORCO) 5-325 mg per tablet Take 1 Tab by mouth every four (4) hours as needed (for chronic pain).  180 Tab 0    diclofenac (VOLTAREN) 1 % gel Apply 2-4 g to affected area four (4) times daily. 100 g 5    gabapentin (NEURONTIN) 300 mg capsule 1 cap every day to bid  Indications: NEUROPATHIC PAIN 180 Cap 1    naloxone (NARCAN) 4 mg/actuation nasal spray Use 1 spray intranasally into 1 nostril. Use a new Narcan nasal spray for subsequent doses and administer into alternating nostrils. May repeat every 2 to 3 minutes as needed. 1 Each 1    polyethylene glycol (MIRALAX) 17 gram packet 17 g.  atenolol (TENORMIN) 25 mg tablet TAKE ONE TABLET BY MOUTH TWICE A  Tab 1    ezetimibe-simvastatin (VYTORIN 10/40) 10-40 mg per tablet Take 1 Tab by mouth nightly. 90 Tab 3    LORazepam (ATIVAN) 1 mg tablet Take 1 Tab by mouth every eight (8) hours as needed. 50 Tab 0    losartan (COZAAR) 25 mg tablet TAKE ONE TABLET BY MOUTH TWICE A  Tab 2    aspirin delayed-release 81 mg tablet Take 81 mg by mouth daily.  triamterene-hydrochlorothiazide (MAXZIDE) 37.5-25 mg per tablet TAKE ONE TABLET BY MOUTH DAILY 90 Tab 3    Cholecalciferol, Vitamin D3, 1,000 unit cap Take 1,000 Units by mouth daily.  OMEGA-3 FATTY ACIDS/FISH OIL (OMEGA 3 FISH OIL PO) Take 900 mg by mouth daily.  MULTIVITAMIN PO Take  by mouth daily.  pantoprazole (PROTONIX) 40 mg tablet Take 40 mg by mouth daily.  coenzyme q10 200 mg Cap Take  by mouth. Social History     Social History    Marital status:      Spouse name: N/A    Number of children: N/A    Years of education: N/A     Occupational History    Not on file.      Social History Main Topics    Smoking status: Former Smoker     Packs/day: 1.50     Years: 25.00     Types: Cigarettes     Quit date: 1/1/2003    Smokeless tobacco: Never Used    Alcohol use Yes      Comment: RARELY    Drug use: No    Sexual activity: Yes     Partners: Female     Birth control/ protection: None     Other Topics Concern    Not on file     Social History Narrative       Past Medical History: Diagnosis Date    Adenoma of left adrenal gland     2009  1 cm, no change 1/15, 2/16    Asbestosis     Atrial fibrillation     CHA2DS2-VAsc=3(age+, htn+, vasc dx+; estimated yearly stroke risk according to Lip et al. is 3,2%), Hasbled=2(estimated yearly bleeding risk according to pisters et al. is 1,88%); not anticoagulated due to h/o gi bleed    Atrial fibrillation ablation     10/08    Basal cell cancer     Dr Stephanie Kim; he's had >350 lesions removed    Carotid occlusion     left     Cervical radiculopathy     MRI 9/11 showed C3-6 severe foraminal stenosis    Chronic pain     Colon polyp     Dr Jd Solorio 9/15    Coronary artery disease     RCA - 3.0 x 16mm TAXUS 9/04, 3.0 x 16mm TAXUS 12/06    Dyslipidemia     Erectile dysfunction     GERD     GI bleed     Hearing loss     2014  bilateral Dr Loja Likes    Hernia, umbilical     Hypertension     Hypogonadism male     Lumbar radiculopathy     Dr Yanely Medrano;  MRI 9/11 L4-5 disc bulging, annular tear, disc dessication    Myofascial pain dysfunction syndrome     pain clinic     Osteoarthritis     right knee Dr Christie Hurt Overweight (BMI 25.0-29. 9)     IF 5/18    Peripheral vascular disease     50-60% R iliac; s/p R iliac stent and L femoral artery stent in past; R OLIVIA 0.76 (1/16)    Plantar fasciitis     bilateral     PPD positive     Prediabetes     Prostate cancer     T1c Imelda 7(3+4), 70% in 1 core, GS 7 (3+4) in 4 cores, GS 6 (3+3) in 1 core; psa 5.28, TRUS 18 gm;  Dr Jd Solorio; s/p cryoablation 10/16    Recurrent umbilical hernia     Subclinical hypothyroidism     denies    Tinnitus     Dr Leatha Saravia Ulcerative colitis     Venous insufficiency        Past Surgical History:   Procedure Laterality Date    CARDIAC SURG PROCEDURE UNLIST  04/2018    [facility] Dr Tiffanie Jewell; echo nl lv, ef 60%, dilated RV, biatrial enlargement, trace AI    CARDIAC SURG PROCEDURE UNLIST  04/2018    [facility] Dr Tiffanie Jewell; NST neg, ef 60%    HAND/FINGER SURGERY UNLISTED  2017    HX CAROTID ENDARTERECTOMY      1/14  right    HX CATARACT REMOVAL      HX COLONOSCOPY      Dr Daisy Martin 9/15 polyps    HX CRYOABLATION OF THE PROSTATE      10/16    HX HEMORRHOIDECTOMY      1979    HX [address] [address], [address]    HX ORTHOPAEDIC      right knee surgery    HX ORTHOPAEDIC  12/2017    Dr Prosper Hicks; R CTS release    HX REFRACTIVE SURGERY      OD (hemoplasty to right eye on retina to avoid blindness)    HX TONSILLECTOMY      1955    NV LAP, RECURRENT INCISIONAL HERNIA REPAIR,REDUCIBLE N/A 02/09/2017    Dr. Amin [place] HERNIA,5+Y/O,REDUCIBL      2/16  left  Dr. Cl Thakkar JQPZ,3+U/G,WTGBS      2/16  Dr. Hernandez Arredondo      1/16  R OLIVIA 0.76, L OLIVIA 1.02    VASCULAR SURGERY PROCEDURE UNLIST      10/15   left fem art and left iliac stent       Family History   Problem Relation Age of Onset    Heart Disease Mother     Hypertension Mother     Diabetes Mother     Arthritis-osteo Mother     Heart Disease Father     Stroke Father     Hypertension Father     Heart Disease Sister     Hypertension Sister        Objective:  Vitals:    08/23/18 0804   BP: 140/74   Pulse: 64   Resp: 14   Temp: 97.8 °F (36.6 °C)   TempSrc: Oral   SpO2: 96%   Weight: 223 lb (101.2 kg)   Height: 6' (1.829 m)   PainSc:   0 - No pain       LABS   Results for orders placed or performed during the hospital encounter of 08/15/18   PAIN MGMT PANEL W/REFL, UR   Result Value Ref Range    Amphetamine Screen, urine NEGATIVE  Zlmiqt=5821 ng/mL    Barbiturates Screen, urine NEGATIVE  Dfbspy=356 ng/mL    Benzodiazepines Screen, urine NEGATIVE  Vtuwqf=749 ng/mL    Cannabinoid Screen, urine NEGATIVE  Cutoff=20 ng/mL    Cocaine Metab.  Screen, urine NEGATIVE  Kqghbl=657 ng/mL    Opiate Screen, urine PENDING ng/mL    Oxycodone/Oxymorphone, urine NEGATIVE  [place]=781 ng/mL    Phencyclidine Screen, urine NEGATIVE  Cutoff=25 ng/mL    Methadone Screen, urine NEGATIVE  Lkkpjf=885 ng/mL Propoxyphene Screen, urine NEGATIVE  Zkptod=986 ng/mL    Meperidine Screen, urine NEGATIVE  Ldlsjz=797 ng/mL    Fentanyl Screen, urine NEGATIVE  Ugijlh=9194 pg/mL    Tramadol Screen, urine NEGATIVE  Wdrksm=404 ng/mL    Creatinine, urine 64.1 20.0 - 300.0 mg/dL    Specific Gravity 1.020      pH, urine 6.8 4.5 - 8.9      Please Note Comment         TESTS  none    PE  Physical Exam   Constitutional: He is oriented to person, place, and time. He appears well-developed and well-nourished. No distress. HENT:   Head: Normocephalic and atraumatic. Cardiovascular: Normal rate, regular rhythm and intact distal pulses. Murmur heard. Pulmonary/Chest: Effort normal and breath sounds normal. No respiratory distress. He has no wheezes. He has no rales. He exhibits no tenderness. Abdominal: Soft. Bowel sounds are normal. He exhibits no distension. There is no tenderness. Neurological: He is alert and oriented to person, place, and time. Skin: Skin is warm and dry. He is not diaphoretic. There is erythema. Psychiatric: He has a normal mood and affect. His behavior is normal. Judgment and thought content normal.   Vitals reviewed. Assessment/Plan:    1. Right leg wound- initial- Seems to be improving on current regimen of Keflex QID, mupirocin ointment. Will continue for now with patient instructions given to monitor site for infectious processes and to report. Will follow up on Monday if noted without continued improvement will change antibiotics. Records requested from Urgent Care. Lab review: no lab studies available for review at time of visit    Today's Visit:   Diagnoses and all orders for this visit:    1. Leg wound, right, initial encounter    Health Maintenance: Deferred to PCP. Tdap up to date. I have discussed the diagnosis with the patient and the intended plan as seen in the above orders. The patient has received an after-visit summary and questions were answered concerning future plans. I have discussed medication side effects and warnings with the patient as well. I have reviewed the plan of care with the patient, accepted their input and they are in agreement with the treatment goals. Follow-up Disposition: Not on File   More than 1/2 of this 15 minute visit was spent in counseling and coordination of care, as described above.     JOHN Jeff  Internist of [address], [facility] [address]  Phone: [phone]  Fax: [phone]

## 2018-08-23 NOTE — PROGRESS NOTES
1. Have you been to the ER, urgent care clinic or hospitalized since your last visit? YES.     2. Have you seen or consulted any other health care providers outside of the [facility] since your last visit (Include any pap smears or colon screening)? NO      Do you have an Advanced Directive?  YES

## 2018-08-23 NOTE — MR AVS SNAPSHOT
[address] [facility] 
 
 
 [address] [facility], [facility] [address] [place] [address] 
[phone] Patient: Rowena Adams MRN: [id number] Isaac Del Visit Information Date & Time Provider Department Dept. Phone Encounter #  
 8/23/2018  8:00 AM Tom Barnett NP [facility] of [address] [place] [address] [phone] [id number] Your Appointments 8/27/2018 10:15 AM  
Office Visit with Tom Barnett NP [facility] of [address] [place] [address] ([address] [place] [address]) Appt Note: ov per sl  
 [address] [facility], [facility] [address] [place]  
  
   
 [address]  
  
    
 10/4/2018  8:30 AM  
Nurse Visit with Christianne Razo [facility] ([address] [place] [address]) Appt Note: PSA  
 [address] [place] [address] [address]  
  
   
 [address]  
  
    
 11/13/2018  7:55 AM  
LAB with [facility] NURSE VISIT [facility] of [address] [place] [address] ([address] [place] [address]) Appt Note: lab  
 [address] [place] [place]  
  
   
 [address]  
  
    
 11/13/2018  8:30 AM  
Follow Up with Esteban Carreon NP  
[facility] ([address] [place] [address]) Appt Note: 6 Mo F/U  
 [address] [facility] [address]  
250 [facility] 23[address] [facility] [address] [facility]  
  
    
 11/20/2018  1:00 PM  
PHYSICAL with Sosa Coronado MD  
[facility]ists of [address] [place] [address]) Appt Note:  6 months [address] [facility], [facility] [address] [place]  
  
   
 [address]  
  
    
 2/27/2019  8:00 AM  
PROCEDURE with [facility] 1 [facility] ([address] [place] [address]) Appt Note: RT JP Stent/ 1 year TERRELL Moon [address] [facility] [address] [facility] [address]  
[phone] 26[address] [facility]  
  
    
 2/27/2019  9:00 AM  
PROCEDURE with [facility] 1 [facility] ([address] [place] [address]) Appt Note: CV 1 year M Moon 23[address] [facility] [address] [facility] [address]  
[phone]  
  
    
 2/27/2019 10:00 AM  
PROCEDURE with [facility] NONIMAGING [facility] ([address] [place] [address]) Appt Note: Leg Art 1 year M Moon [address] [facility] [address] [facility] [address]  
[phone] 2300 [facility] [address] [facility]  
  
    
 3/4/2019  9:00 AM  
Follow Up with MD [facility] [address] [place] [address]) Appt Note: 1 year fup with studies 23[address] [facility] [address] [facility] [address]  
[phone] 2300 [facility] [address] [facility] [address] Upcoming Health Maintenance Date Due  
 MEDICARE YEARLY EXAM 6/30/2018 Influenza Age 5 to Adult 8/1/2018 GLAUCOMA SCREENING Q2Y 6/25/2019 DTaP/Tdap/Td series (2 - Td) 4/3/2023 COLONOSCOPY 9/22/2025 Allergies as of 8/23/2018  Review Complete On: 8/23/2018 By: Arnulfo Patel Severity Noted Reaction Type Reactions Altace [Ramipril]    Unknown (comments) Lipitor [Atorvastatin]    Other (comments) Muscle pain Lisinopril    Shortness of Breath, Other (comments) Turns bright red Other Medication  03/05/2014    Other (comments) Vicryl suture on skin tends to be rejected with poor wound healing does better with monocryl Procardia [Nifedipine]    Other (comments) Caused afib Rosuvastatin  02/12/2018    Other (comments) Muscle Pain Current Immunizations  Reviewed on 9/15/2017 Name Date Influenza High Dose Vaccine PF 9/15/2017 11:09 AM, 9/16/2016, 10/2/2015 12:30 PM  
 Influenza Vaccine 10/10/2014, 10/4/2013 Influenza Vaccine Split 10/10/2012  2:28 PM, 9/28/2011 Influenza Vaccine Whole 10/8/2010 Pneumococcal Conjugate (PCV-13) 12/15/2014  8:16 AM  
 Pneumococcal Polysaccharide (PPSV-23) 1/24/2014 Pneumococcal Vaccine (Unspecified Type) 1/1/2006 Td 1/1/2006 Tdap 4/3/2013  8:23 PM  
 Zoster Vaccine, Live 1/1/2007 Not reviewed this visit Vitals BP Pulse Temp Resp Height(growth percentile) Weight(growth percentile) 140/74 (BP 1 Location: Left arm, BP Patient Position: Sitting) 64 97.8 °F (36.6 °C) (Oral) 14 6' (1.829 m) 223 lb (101.2 kg) SpO2 BMI Smoking Status 96% 30.24 kg/m2 Former Smoker Vitals History BMI and BSA Data Body Mass Index Body Surface Area  
 30.24 kg/m 2 2.27 m 2 Preferred Pharmacy Pharmacy Name Phone [facility] Gonzalez [address] [facility], [address] [place] [address] [phone] Your Updated Medication List  
  
   
This list is accurate as of 8/23/18  9:00 AM.  Always use your most recent med list.  
  
  
  
  
 aspirin delayed-release 81 mg tablet Take 81 mg by mouth daily. atenolol 25 mg tablet Commonly known as:  TENORMIN  
TAKE ONE TABLET BY MOUTH TWICE A DAY cholecalciferol 1,000 unit Cap Commonly known as:  VITAMIN D3 Take 1,000 Units by mouth daily. coenzyme Q-10 200 mg capsule Commonly known as:  CO Q-10 Take  by mouth. diclofenac 1 % Gel Commonly known as:  VOLTAREN Apply 2-4 g to affected area four (4) times daily. ezetimibe-simvastatin 10-40 mg per tablet Commonly known as:  Vytorin 10/40 Take 1 Tab by mouth nightly.  
  
 gabapentin 300 mg capsule Commonly known as:  NEURONTIN  
1 cap every day to bid  Indications: NEUROPATHIC PAIN  
  
 HYDROcodone-acetaminophen 5-325 mg per tablet Commonly known as:  [employer] Cuff Take 1 Tab by mouth every four (4) hours as needed (for chronic pain). KEFLEX 500 mg capsule Generic drug:  cephALEXin Take 500 mg by mouth four (4) times daily. LORazepam 1 mg tablet Commonly known as:  ATIVAN Take 1 Tab by mouth every eight (8) hours as needed. losartan 25 mg tablet Commonly known as:  COZAAR  
TAKE ONE TABLET BY MOUTH TWICE A DAY MULTIVITAMIN PO Take  by mouth daily. mupirocin 2 % ointment Commonly known as:  [employer] Apply  to affected area three (3) times daily. naloxone 4 mg/actuation nasal spray Commonly known as:  [employer] Use 1 spray intranasally into 1 nostril. Use a new Narcan nasal spray for subsequent doses and administer into alternating nostrils. May repeat every 2 to 3 minutes as needed. OMEGA 3 FISH OIL PO Take 900 mg by mouth daily. polyethylene glycol 17 gram packet Commonly known as:  MIRALAX  
17 g. PROTONIX 40 mg tablet Generic drug:  pantoprazole Take 40 mg by mouth daily. triamterene-hydroCHLOROthiazide 37.5-25 mg per tablet Commonly known as:  Pierre Mad TAKE ONE TABLET BY MOUTH DAILY Introducing [facility]! Dear Chary Decree: Thank you for requesting a [employer] account. Our records indicate that you already have an active [employer] account. You can access your account anytime at [email or web address] Did you know that you can access your hospital and ER discharge instructions at any time in [employer]? You can also review all of your test results from your hospital stay or ER visit. Additional Information If you have questions, please visit the Frequently Asked Questions section of the [employer] website at [email or web address] Remember, [employer] is NOT to be used for urgent needs. For medical emergencies, dial 911. Now available from your iPhone and Android! Please provide this summary of care documentation to your next provider. Your primary care clinician is listed as Blake Higgins  If you have any questions after today's visit, please call [phone].

## 2018-08-27 ENCOUNTER — OFFICE VISIT (OUTPATIENT)
Dept: INTERNAL MEDICINE CLINIC | Age: 73
End: 2018-08-27

## 2018-08-27 VITALS
HEART RATE: 64 BPM | SYSTOLIC BLOOD PRESSURE: 130 MMHG | TEMPERATURE: 97.8 F | RESPIRATION RATE: 14 BRPM | DIASTOLIC BLOOD PRESSURE: 72 MMHG | WEIGHT: 223.6 LBS | HEIGHT: 72 IN | BODY MASS INDEX: 30.28 KG/M2 | OXYGEN SATURATION: 96 %

## 2018-08-27 DIAGNOSIS — S81.801D WOUND OF RIGHT LOWER EXTREMITY, SUBSEQUENT ENCOUNTER: ICD-10-CM

## 2018-08-27 DIAGNOSIS — Z51.89 ENCOUNTER FOR WOUND RE-CHECK: Primary | ICD-10-CM

## 2018-08-27 NOTE — PROGRESS NOTES
1. Have you been to the ER, urgent care clinic or hospitalized since your last visit? NO.     2. Have you seen or consulted any other health care providers outside of the [address] [place] [address] since your last visit (Include any pap smears or colon screening)? NO      Do you have an Advanced Directive?  YES

## 2018-08-27 NOTE — MR AVS SNAPSHOT
Noris Petties 
 
 
 [address] [facility], [place], [facility] 200 [facility] 
[phone] Patient: James Abraham MRN: [id number] Angelina Rojo Visit Information Date & Time Provider Department Dept. Phone Encounter #  
 8/27/2018 10:15 AM Jose Smith NP [facility] [address] [place] [address] [phone] Your Appointments 8/30/2018 11:00 AM  
Office Visit with Jose Smith NP [facility] [address] [place] [address] ([facility]) Appt Note: ov per sl  
 [address] [facility], [facility] [address]  
  
   
 [address], [address]  
  
    
 10/4/2018  8:30 AM  
Nurse Visit with Inocencia Yusuf [facility] ([facility]) Appt Note: PSA  
 [address] [facility] [address]  
  
   
 [address] [facility] [address]  
  
    
 11/13/2018  7:55 AM  
LAB with [facility] [place] NURSE VISIT [facility] [address] [place] [address] ([facility]) Appt Note: lab  
 [address] [facility], [place], [address] [facility] [address]  
  
   
 [address] [facility], [address]  
  
    
 11/13/2018  8:30 AM  
Follow Up with Rashad Way NP  
[facility] ([facility]) Appt Note: 6 Mo F/U  
 [address] [facility] [address]  
250 [facility] [address] [facility],[address]  
  
    
 11/20/2018  1:00 PM  
PHYSICAL with Chauncey Gaytan MD  
[facility] [address] [place] [address]) Appt Note:  6 months [address] [facility], [place], [facility] [address]  
  
   
 [address] [facility], [address]  
  
    
 2/27/2019  8:00 AM  
PROCEDURE with [facility] 1 [facility] ([facility]) Appt Note: RT JP Stent/ 1 year TERRELL Moon [address] [facility]  
[phone] [address] [facility]  
  
    
 2/27/2019  9:00 AM  
PROCEDURE with [facility] 1 [facility] ([place] [facility]) Appt Note: CV 1 year M Moon [address] [facility]  
[phone]  
  
    
 2/27/2019 10:00 AM  
PROCEDURE with [facility] NONIMAGING [facility] ([place] [facility]) Appt Note: Leg Art 1 year M Moon [address] [facility]  
[phone] [address] [facility] [address] [facility]  
  
    
 3/4/2019  9:00 AM  
Follow Up with Saloni Villagomez MD  
[address] [place] [address] [facility] [place]) Appt Note: 1 year fup with studies [address] [facility]  
[phone] [address] [facility] [address] [facility]  
  
    
  
 10/8/2018  2:00 PM  
Any with Reji Talbot MD  
[facility] [place]) Appt Note: EP- 6 month follow up with PSA prior. ; letter sent in the mail to reschedule appt [place] [address]  
[phone]  
  
   
 [place] [id number] Upcoming Health Maintenance Date Due  
 MEDICARE YEARLY EXAM 6/30/2018 Influenza Age 5 to Adult 8/1/2018 GLAUCOMA SCREENING Q2Y 6/25/2019 DTaP/Tdap/Td series (2 - Td) 4/3/2023 COLONOSCOPY 9/22/2025 Allergies as of 8/27/2018  Review Complete On: 8/27/2018 By: Earnest Salinas Severity Noted Reaction Type Reactions Altace [Ramipril]    Unknown (comments) Lipitor [Atorvastatin]    Other (comments) Muscle pain Lisinopril    Shortness of Breath, Other (comments) Turns bright red Other Medication  03/05/2014    Other (comments)  Vicryl suture on skin tends to be rejected with poor wound healing does better with monocryl Procardia [Nifedipine]    Other (comments) Caused afib Rosuvastatin  02/12/2018    Other (comments) Muscle Pain Current Immunizations  Reviewed on 9/15/2017 Name Date Influenza High Dose Vaccine PF 9/15/2017 11:09 AM, 9/16/2016, 10/2/2015 12:30 PM  
 Influenza Vaccine 10/10/2014, 10/4/2013 Influenza Vaccine Split 10/10/2012  2:28 PM, 9/28/2011 Influenza Vaccine Whole 10/8/2010 Pneumococcal Conjugate (PCV-13) 12/15/2014  8:16 AM  
 Pneumococcal Polysaccharide (PPSV-23) 1/24/2014 Pneumococcal Vaccine (Unspecified Type) 1/1/2006 Td 1/1/2006 Tdap 4/3/2013  8:23 PM  
 Zoster Vaccine, Live 1/1/2007 Not reviewed this visit Vitals BP Pulse Temp Resp Height(growth percentile) Weight(growth percentile) 130/72 (BP 1 Location: Left arm, BP Patient Position: Sitting) 64 97.8 °F (36.6 °C) (Oral) 14 6' (1.829 m) 223 lb 9.6 oz (101.4 kg) SpO2 BMI Smoking Status 96% 30.33 kg/m2 Former Smoker Vitals History BMI and BSA Data Body Mass Index Body Surface Area  
 30.33 kg/m 2 2.27 m 2 Preferred Pharmacy Pharmacy Name Phone [facility], [address] [place] [address] [phone] Your Updated Medication List  
  
   
This list is accurate as of 8/27/18 10:33 AM.  Always use your most recent med list.  
  
  
  
  
 aspirin delayed-release 81 mg tablet Take 81 mg by mouth daily. atenolol 25 mg tablet Commonly known as:  TENORMIN  
TAKE ONE TABLET BY MOUTH TWICE A DAY cholecalciferol 1,000 unit Cap Commonly known as:  VITAMIN D3 Take 1,000 Units by mouth daily. coenzyme Q-10 200 mg capsule Commonly known as:  CO Q-10 Take  by mouth. diclofenac 1 % Gel Commonly known as:  VOLTAREN Apply 2-4 g to affected area four (4) times daily. ezetimibe-simvastatin 10-40 mg per tablet Commonly known as:  Vytorin 10/40 Take 1 Tab by mouth nightly. gabapentin 300 mg capsule Commonly known as:  NEURONTIN  
1 cap every day to bid  Indications: NEUROPATHIC PAIN  
  
 HYDROcodone-acetaminophen 5-325 mg per tablet Commonly known as:  Dena Parisian Take 1 Tab by mouth every four (4) hours as needed (for chronic pain). KEFLEX 500 mg capsule Generic drug:  cephALEXin Take 500 mg by mouth four (4) times daily. LORazepam 1 mg tablet Commonly known as:  ATIVAN Take 1 Tab by mouth every eight (8) hours as needed. losartan 25 mg tablet Commonly known as:  COZAAR  
TAKE ONE TABLET BY MOUTH TWICE A DAY MULTIVITAMIN PO Take  by mouth daily. mupirocin 2 % ointment Commonly known as:  [employer] Apply  to affected area three (3) times daily. naloxone 4 mg/actuation nasal spray Commonly known as:  [employer] Use 1 spray intranasally into 1 nostril. Use a new Narcan nasal spray for subsequent doses and administer into alternating nostrils. May repeat every 2 to 3 minutes as needed. OMEGA 3 FISH OIL PO Take 900 mg by mouth daily. polyethylene glycol 17 gram packet Commonly known as:  MIRALAX  
17 g. PROTONIX 40 mg tablet Generic drug:  pantoprazole Take 40 mg by mouth daily. triamterene-hydroCHLOROthiazide 37.5-25 mg per tablet Commonly known as:  Diana Granda TAKE ONE TABLET BY MOUTH DAILY Introducing [facility]! Dear Debbie Donis: Thank you for requesting a [employer] account. Our records indicate that you already have an active [employer]IC[employer] account. You can access your account anytime at [email or web address] Did you know that you can access your hospital and ER discharge instructions at any time in [employer]? You can also review all of your test results from your hospital stay or ER visit. Additional Information If you have questions, please visit the Frequently Asked Questions section of the [employer] website at [email or web address] Remember, [employer] is NOT to be used for urgent needs. For medical emergencies, dial 911. Now available from your iPhone and Android! Please provide this summary of care documentation to your next provider. Your primary care clinician is listed as Andrey Self. If you have any questions after today's visit, please call [phone].

## 2018-08-27 NOTE — PROGRESS NOTES
Selwyn Arshad is a 68 y.o.  male and presents with    Chief Complaint   Patient presents with    Wound Check     Patient here for wound check. Patient reports that it does look a little better however  on the front of right leg. Patient reports little drainage. Subjective:  HPI   Mr. Dann Bass presents today for wound check. He reports still with some tenderness surrounding the site. Over the last two days with minimal clear drainage. He has about 2 days left on the Keflex. He denies fever, chills, but does state last two days with intermittent diarrhea, patient reports most likely related to cook outs he attended. 8/24/18: Mr. Dann Bass presents regarding a right leg wound that he obtained on 8/14/18 when he was parking his truck and was in a tight spot and ended up cutting the leg on the bottom of the door. The cut is to the right posterior knee. He went to urgent care 08/16 and was given keflex twice a day. He then returned to urgent care 08/19 as with redness noted down the leg and was given a antibiotic shot? and was told to take Keflex 4 times a day for 10 days and to clean wound 3 times a day with half and half water and hydrogen peroxide and use an Mupirocin ointment daily. Tdap 4/3/2013, hx of PAD. Additional Concerns: none     ROS   Review of Systems   Constitutional: Negative. Respiratory: Negative. Cardiovascular: Negative. Gastrointestinal: Positive for diarrhea. Skin: Negative. Neurological: Negative.         Allergies   Allergen Reactions    Altace [Ramipril] Unknown (comments)    Lipitor [Atorvastatin] Other (comments)     Muscle pain    Lisinopril Shortness of Breath and Other (comments)     Turns bright red    Other Medication Other (comments)     Vicryl suture on skin tends to be rejected with poor wound healing does better with monocryl    Procardia [Nifedipine] Other (comments)     Caused afib    Rosuvastatin Other (comments) Muscle Pain         Current Outpatient Prescriptions   Medication Sig Dispense Refill    mupirocin (BACTROBAN) 2 % ointment Apply  to affected area three (3) times daily.  cephALEXin (KEFLEX) 500 mg capsule Take 500 mg by mouth four (4) times daily.  HYDROcodone-acetaminophen (NORCO) 5-325 mg per tablet Take 1 Tab by mouth every four (4) hours as needed (for chronic pain). 180 Tab 0    diclofenac (VOLTAREN) 1 % gel Apply 2-4 g to affected area four (4) times daily. 100 g 5    gabapentin (NEURONTIN) 300 mg capsule 1 cap every day to bid  Indications: NEUROPATHIC PAIN 180 Cap 1    naloxone (NARCAN) 4 mg/actuation nasal spray Use 1 spray intranasally into 1 nostril. Use a new Narcan nasal spray for subsequent doses and administer into alternating nostrils. May repeat every 2 to 3 minutes as needed. 1 Each 1    polyethylene glycol (MIRALAX) 17 gram packet 17 g.  atenolol (TENORMIN) 25 mg tablet TAKE ONE TABLET BY MOUTH TWICE A  Tab 1    ezetimibe-simvastatin (VYTORIN 10/40) 10-40 mg per tablet Take 1 Tab by mouth nightly. 90 Tab 3    LORazepam (ATIVAN) 1 mg tablet Take 1 Tab by mouth every eight (8) hours as needed. 50 Tab 0    losartan (COZAAR) 25 mg tablet TAKE ONE TABLET BY MOUTH TWICE A  Tab 2    aspirin delayed-release 81 mg tablet Take 81 mg by mouth daily.  triamterene-hydrochlorothiazide (MAXZIDE) 37.5-25 mg per tablet TAKE ONE TABLET BY MOUTH DAILY 90 Tab 3    Cholecalciferol, Vitamin D3, 1,000 unit cap Take 1,000 Units by mouth daily.  OMEGA-3 FATTY ACIDS/FISH OIL (OMEGA 3 FISH OIL PO) Take 900 mg by mouth daily.  MULTIVITAMIN PO Take  by mouth daily.  pantoprazole (PROTONIX) 40 mg tablet Take 40 mg by mouth daily.  coenzyme q10 200 mg Cap Take  by mouth. Social History     Social History    Marital status:      Spouse name: N/A    Number of children: N/A    Years of education: N/A     Occupational History    Not on file. Social History Main Topics    Smoking status: Former Smoker     Packs/day: 1.50     Years: 25.00     Types: Cigarettes     Quit date: 1/1/2003    Smokeless tobacco: Never Used    Alcohol use Yes      Comment: RARELY    Drug use: No    Sexual activity: Yes     Partners: Female     Birth control/ protection: None     Other Topics Concern    Not on file     Social History Narrative       Past Medical History:   Diagnosis Date    Adenoma of left adrenal gland     2009  1 cm, no change 1/15, 2/16    Asbestosis     Atrial fibrillation     CHA2DS2-VAsc=3(age+, htn+, vasc dx+; estimated yearly stroke risk according to Lip et al. is 3,2%), Hasbled=2(estimated yearly bleeding risk according to pisters et al. is 1,88%); not anticoagulated due to h/o gi bleed    Atrial fibrillation ablation     10/08    Basal cell cancer     Dr Amando Lanza; he's had >350 lesions removed    Carotid occlusion     left     Cervical radiculopathy     MRI 9/11 showed C3-6 severe foraminal stenosis    Chronic pain     Colon polyp     Dr Lia Agee 9/15    Coronary artery disease     RCA - 3.0 x 16mm TAXUS 9/04, 3.0 x 16mm TAXUS 12/06    Dyslipidemia     Erectile dysfunction     GERD     GI bleed     Hearing loss     2014  bilateral  Zahira Foots    Hernia, umbilical     Hypertension     Hypogonadism male     Lumbar radiculopathy     Dr Elana Morales;  MRI 9/11 L4-5 disc bulging, annular tear, disc dessication    Myofascial pain dysfunction syndrome     pain clinic     Osteoarthritis     right knee Dr López Pancoast Overweight (BMI 25.0-29. 9)     IF 5/18    Peripheral vascular disease     50-60% R iliac; s/p R iliac stent and L femoral artery stent in past; R OLIVIA 0.76 (1/16)    Plantar fasciitis     bilateral     PPD positive     Prediabetes     Prostate cancer     T1c [place] 7(3+4), 70% in 1 core, GS 7 (3+4) in 4 cores, GS 6 (3+3) in 1 core; psa 5.28, TRUS 18 gm;  Dr Lia Agee; s/p cryoablation 10/16    Recurrent umbilical hernia     Subclinical hypothyroidism     denies    Tinnitus     Dr Karine Grande Ulcerative colitis     Venous insufficiency        Past Surgical History:   Procedure Laterality Date    CARDIAC SURG PROCEDURE UNLIST  04/2018    [facility] Dr Luis Felipe Birch; echo nl lv, ef 60%, dilated RV, biatrial enlargement, trace AI    CARDIAC SURG PROCEDURE UNLIST  04/2018    [facility] Dr Luis Felipe Birch; NST neg, ef 60%    HAND/FINGER SURGERY UNLISTED  2017    HX CAROTID ENDARTERECTOMY      1/14  right    Ingrid Sang HX COLONOSCOPY      Dr Laurel Casper 9/15 polyps    HX CRYOABLATION OF THE PROSTATE      10/16    HX HEMORRHOIDECTOMY      1979    [facility], 1975    HX ORTHOPAEDIC      right knee surgery    HX ORTHOPAEDIC  12/2017    Dr Nichole Meek; R CTS release    HX REFRACTIVE SURGERY      OD (hemoplasty to right eye on retina to avoid blindness)    HX TONSILLECTOMY      1955    VT LAP, RECURRENT INCISIONAL HERNIA REPAIR,REDUCIBLE N/A 02/09/2017    Dr. Odilon Bey HERNIA,5+Y/O,REDUCIBL      2/16  left  Dr. Rubia Chavez JMND,1+X/M,YHUPW      2/16  Dr. Carlene Maradiaga      1/16  R OLIVIA 0.76, L OLIVIA 1.02    VASCULAR SURGERY PROCEDURE UNLIST      10/15   left fem art and left iliac stent       Family History   Problem Relation Age of Onset    Heart Disease Mother     Hypertension Mother     Diabetes Mother     Arthritis-osteo Mother     Heart Disease Father     Stroke Father     Hypertension Father     Heart Disease Sister     Hypertension Sister        Objective:  Vitals:    08/27/18 1002   BP: 130/72   Pulse: 64   Resp: 14   Temp: 97.8 °F (36.6 °C)   TempSrc: Oral   SpO2: 96%   Weight: 223 lb 9.6 oz (101.4 kg)   Height: 6' (1.829 m)   PainSc:   2   PainLoc: Leg       LABS   Results for orders placed or performed during the hospital encounter of 08/15/18   PAIN MGMT PANEL W/REFL, UR   Result Value Ref Range    Amphetamine Screen, urine NEGATIVE  Tfaofa=3781 ng/mL Barbiturates Screen, urine NEGATIVE  Ihqlzz=121 ng/mL    Benzodiazepines Screen, urine NEGATIVE  Ihmbpx=382 ng/mL    Cannabinoid Screen, urine NEGATIVE  Cutoff=20 ng/mL    Cocaine Metab. Screen, urine NEGATIVE  Nnwibp=452 ng/mL    Opiate Screen, urine See Final Results Quyjzr=259 ng/mL    Oxycodone/Oxymorphone, urine NEGATIVE  Wedmuz=511 ng/mL    Phencyclidine Screen, urine NEGATIVE  Cutoff=25 ng/mL    Methadone Screen, urine NEGATIVE  Gevhts=700 ng/mL    Propoxyphene Screen, urine NEGATIVE  Saesqr=708 ng/mL    Meperidine Screen, urine NEGATIVE  Icilpv=398 ng/mL    Fentanyl Screen, urine NEGATIVE  Mqupga=5129 pg/mL    Tramadol Screen, urine NEGATIVE  Gbzmsy=464 ng/mL    Creatinine, urine 64.1 20.0 - 300.0 mg/dL    Specific Gravity 1.020      pH, urine 6.8 4.5 - 8.9      Please Note Comment     OPIATES, CONFIRM   Result Value Ref Range    Opiates Positive (A) Vwggab=759 ng/mL    Codeine NEGATIVE  Niloxg=498      Morphine NEGATIVE  Aappda=149      Hydromorphone NEGATIVE  Pvkpor=183      Hydrocodone Positive (A)      Hydrocodone confirm 531 Gblxze=941 ng/mL       TESTS  none    PE  Physical Exam   Constitutional: He is oriented to person, place, and time. Musculoskeletal: He exhibits no edema, tenderness or deformity. Neurological: He is alert and oriented to person, place, and time. Skin: Skin is warm and dry. Ecchymosis and laceration noted. No petechiae and no rash noted. Rash is not pustular. There is erythema. No pallor. Wound bed with crusting, with some erythema surrounding wound bed, erythema seems to have improved since last visit to superior portion of wound but still with some purple/red/shiny discoloration with mild tenderness on palpation at 5-6 oclock. Without drainage, odor, heat. Psychiatric: He has a normal mood and affect. His behavior is normal. Judgment and thought content normal.     Assessment/Plan:    1.  Right Lower extremity wound- Seems to be improving however still with some tenderness/erythema to the posterior portion of the wound at 5-6 O'clock. He is about to finish course of Keflex and is very concerned about changing antibiotics. He has no constitutional symptoms today but reported x 2 days with some intermittent diarrhea. He does have complaints of diarrhea x 2 days but minimal and he thinks related to choice of foods as was eating a cookouts over the weekend. Will let him finish course of antibiotics (Keflex), continue Mupirocin and leave site open to air when at home relaxing/monitor, recheck Thursday if no change in the redness/tenderness will place on antibiotic with streptococcus and pseudomonas coverage due to nature of wound. He is with hx PAD and UC. Lab review: no lab studies available for review at time of visit    Today's Visit:   Diagnoses and all orders for this visit:    1. Encounter for wound re-check    2. Wound of right lower extremity, subsequent encounter    Health Maintenance: Deferred to PCP. I have discussed the diagnosis with the patient and the intended plan as seen in the above orders. The patient has received an after-visit summary and questions were answered concerning future plans. I have discussed medication side effects and warnings with the patient as well. I have reviewed the plan of care with the patient, accepted their input and they are in agreement with the treatment goals. Follow-up Disposition: Not on File   More than 1/2 of this 15 minute visit was spent in counseling and coordination of care, as described above.     JOHN Rowley  [facility] of [address], [facility] [address]  Phone: [phone]  Fax: [phone]

## 2018-08-30 ENCOUNTER — OFFICE VISIT (OUTPATIENT)
Dept: INTERNAL MEDICINE CLINIC | Age: 73
End: 2018-08-30

## 2018-08-30 ENCOUNTER — TELEPHONE (OUTPATIENT)
Dept: INTERNAL MEDICINE CLINIC | Age: 73
End: 2018-08-30

## 2018-08-30 VITALS
RESPIRATION RATE: 15 BRPM | HEIGHT: 72 IN | HEART RATE: 67 BPM | OXYGEN SATURATION: 96 % | WEIGHT: 224.6 LBS | BODY MASS INDEX: 30.42 KG/M2 | TEMPERATURE: 97.6 F | DIASTOLIC BLOOD PRESSURE: 68 MMHG | SYSTOLIC BLOOD PRESSURE: 126 MMHG

## 2018-08-30 DIAGNOSIS — I11.9 BENIGN HYPERTENSIVE HEART DISEASE WITHOUT HEART FAILURE: Primary | ICD-10-CM

## 2018-08-30 DIAGNOSIS — S81.801D LEG WOUND, RIGHT, SUBSEQUENT ENCOUNTER: ICD-10-CM

## 2018-08-30 DIAGNOSIS — F41.9 ANXIETY: ICD-10-CM

## 2018-08-30 RX ORDER — ATENOLOL 25 MG/1
TABLET ORAL
Qty: 180 TAB | Refills: 1 | Status: SHIPPED | OUTPATIENT
Start: 2018-08-30 | End: 2019-01-12 | Stop reason: SDUPTHER

## 2018-08-30 RX ORDER — LORAZEPAM 1 MG/1
1 TABLET ORAL
Qty: 50 TAB | Refills: 0 | OUTPATIENT
Start: 2018-08-30 | End: 2019-02-25 | Stop reason: SDUPTHER

## 2018-08-30 NOTE — PROGRESS NOTES
Cm Peterson is a 68 y.o.  male and presents with    Chief Complaint   Patient presents with    Wound Check     Patient here for follow up on wound check. Patient stated that it looks a little better and not too tender anymore as long as no pressure. Patient reports no more drainage. Subjective:  HPI  Mr. Mey Sandoval presents today for wound check. He reports still with some tenderness surrounding the site on when presses on the area. He denies fever, chills, increase in redness, swelling, drainage, heat.  He has one day left on Keflex script. No complaints of diarrhea. 8/24/18: Mr. Mey Sandoval presents regarding a right leg wound that he obtained on 8/14/18 when he was parking his truck and was in a tight spot and ended up cutting the leg on the bottom of the door. The cut is to the right posterior knee. He went to urgent care 08/16 and was given keflex twice a day. He then returned to urgent care 08/19 as with redness noted down the leg and was given a antibiotic shot? and was told to take Keflex 4 times a day for 10 days and to clean wound 3 times a day with half and half water and hydrogen peroxide and use an Mupirocin ointment daily. Tdap 4/3/2013, hx of PAD.      Additional Concerns: He would like a refill on Atenolol and Ativan. ROS   Review of Systems   Constitutional: Negative. HENT: Negative. Eyes: Negative. Respiratory: Negative. Cardiovascular: Negative. Skin: Negative.         Allergies   Allergen Reactions    Altace [Ramipril] Unknown (comments)    Lipitor [Atorvastatin] Other (comments)     Muscle pain    Lisinopril Shortness of Breath and Other (comments)     Turns bright red    Other Medication Other (comments)     Vicryl suture on skin tends to be rejected with poor wound healing does better with monocryl    Procardia [Nifedipine] Other (comments)     Caused afib    Rosuvastatin Other (comments)     Muscle Pain         Current Outpatient Prescriptions   Medication Sig Dispense Refill    atenolol (TENORMIN) 25 mg tablet TAKE ONE TABLET BY MOUTH TWICE A  Tab 1    LORazepam (ATIVAN) 1 mg tablet Take 1 Tab by mouth every eight (8) hours as needed. 50 Tab 0    mupirocin (BACTROBAN) 2 % ointment Apply  to affected area three (3) times daily.  cephALEXin (KEFLEX) 500 mg capsule Take 500 mg by mouth four (4) times daily.  HYDROcodone-acetaminophen (NORCO) 5-325 mg per tablet Take 1 Tab by mouth every four (4) hours as needed (for chronic pain). 180 Tab 0    diclofenac (VOLTAREN) 1 % gel Apply 2-4 g to affected area four (4) times daily. 100 g 5    gabapentin (NEURONTIN) 300 mg capsule 1 cap every day to bid  Indications: NEUROPATHIC PAIN 180 Cap 1    naloxone (NARCAN) 4 mg/actuation nasal spray Use 1 spray intranasally into 1 nostril. Use a new Narcan nasal spray for subsequent doses and administer into alternating nostrils. May repeat every 2 to 3 minutes as needed. 1 Each 1    polyethylene glycol (MIRALAX) 17 gram packet 17 g.      ezetimibe-simvastatin (VYTORIN 10/40) 10-40 mg per tablet Take 1 Tab by mouth nightly. 90 Tab 3    losartan (COZAAR) 25 mg tablet TAKE ONE TABLET BY MOUTH TWICE A  Tab 2    aspirin delayed-release 81 mg tablet Take 81 mg by mouth daily.  triamterene-hydrochlorothiazide (MAXZIDE) 37.5-25 mg per tablet TAKE ONE TABLET BY MOUTH DAILY 90 Tab 3    Cholecalciferol, Vitamin D3, 1,000 unit cap Take 1,000 Units by mouth daily.  OMEGA-3 FATTY ACIDS/FISH OIL (OMEGA 3 FISH OIL PO) Take 900 mg by mouth daily.  MULTIVITAMIN PO Take  by mouth daily.  pantoprazole (PROTONIX) 40 mg tablet Take 40 mg by mouth daily.  coenzyme q10 200 mg Cap Take  by mouth. Social History     Social History    Marital status:      Spouse name: N/A    Number of children: N/A    Years of education: N/A     Occupational History    Not on file.      Social History Main Topics    Smoking status: Former Smoker     Packs/day: 1.50     Years: 25.00     Types: Cigarettes     Quit date: 1/1/2003    Smokeless tobacco: Never Used    Alcohol use Yes      Comment: RARELY    Drug use: No    Sexual activity: Yes     Partners: Female     Birth control/ protection: None     Other Topics Concern    Not on file     Social History Narrative       Past Medical History:   Diagnosis Date    Adenoma of left adrenal gland     2009  1 cm, no change 1/15, 2/16    Asbestosis     Atrial fibrillation     CHA2DS2-VAsc=3(age+, htn+, vasc dx+; estimated yearly stroke risk according to Lip et al. is 3,2%), Hasbled=2(estimated yearly bleeding risk according to pisters et al. is 1,88%); not anticoagulated due to h/o gi bleed    Atrial fibrillation ablation     10/08    Basal cell cancer     Dr Mushtaq Bell; he's had >350 lesions removed    Carotid occlusion     left     Cervical radiculopathy     MRI 9/11 showed C3-6 severe foraminal stenosis    Chronic pain     Colon polyp     Dr Sergey Gallagher 9/15    Coronary artery disease     RCA - 3.0 x 16mm TAXUS 9/04, 3.0 x 16mm TAXUS 12/06    Dyslipidemia     Erectile dysfunction     GERD     GI bleed     Hearing loss     2014  bilateral Dr Glen Montiel    Hernia, umbilical     Hypertension     Hypogonadism male     Lumbar radiculopathy     Dr Guru Crystal;  MRI 9/11 L4-5 disc bulging, annular tear, disc dessication    Myofascial pain dysfunction syndrome     pain clinic     Osteoarthritis     right knee Dr Po Carvalho Overweight (BMI 25.0-29. 9)     IF 5/18    Peripheral vascular disease     50-60% R iliac; s/p R iliac stent and L femoral artery stent in past; R OLIVIA 0.76 (1/16)    Plantar fasciitis     bilateral     PPD positive     Prediabetes     Prostate cancer     T1c Imelda 7(3+4), 70% in 1 core, GS 7 (3+4) in 4 cores, GS 6 (3+3) in 1 core; psa 5.28, TRUS 18 gm;  Dr Sergey Gallagher; s/p cryoablation 10/16    Recurrent umbilical hernia     Subclinical hypothyroidism     denies    Tinnitus     Dr Gunjan Zendejas Ulcerative colitis     Venous insufficiency        Past Surgical History:   Procedure Laterality Date    CARDIAC SURG PROCEDURE UNLIST  04/2018    [facility] Dr Jey Serna; echo nl lv, ef 60%, dilated RV, biatrial enlargement, trace AI    CARDIAC SURG PROCEDURE UNLIST  04/2018    [facility] Dr Jey Serna; NST neg, ef 60%    HAND/FINGER SURGERY UNLISTED  2017    HX CAROTID ENDARTERECTOMY      1/14  right    Shelva Area HX COLONOSCOPY      Dr Austin Dumont 9/15 polyps    HX CRYOABLATION OF THE PROSTATE      10/16    HX HEMORRHOIDECTOMY      1979    Emilee Dub 37, 1975    HX ORTHOPAEDIC      right knee surgery    HX ORTHOPAEDIC  12/2017    Dr Olman Arenas; R CTS release    HX REFRACTIVE SURGERY      OD (hemoplasty to right eye on retina to avoid blindness)    HX TONSILLECTOMY      1955    AR LAP, RECURRENT INCISIONAL HERNIA REPAIR,REDUCIBLE N/A 02/09/2017    Dr. Funk Borne HERNIA,5+Y/O,REDUCIBL      2/16  left  Dr. Kaylee Redd YZWX,0+V/K,PUWOO      2/16  Dr. Ro Reese      1/16  R OLIVIA 0.76, L OLIVIA 1.02    VASCULAR SURGERY PROCEDURE UNLIST      10/15   left fem art and left iliac stent       Family History   Problem Relation Age of Onset    Heart Disease Mother     Hypertension Mother     Diabetes Mother     Arthritis-osteo Mother     Heart Disease Father     Stroke Father     Hypertension Father     Heart Disease Sister     Hypertension Sister        Objective:  Vitals:    08/30/18 1103   BP: 126/68   Pulse: 67   Resp: 15   Temp: 97.6 °F (36.4 °C)   TempSrc: Oral   SpO2: 96%   Weight: 224 lb 9.6 oz (101.9 kg)   Height: 6' (1.829 m)   PainSc:   0 - No pain       LABS   Results for orders placed or performed during the hospital encounter of 08/15/18   PAIN MGMT PANEL W/REFL, UR   Result Value Ref Range    Amphetamine Screen, urine NEGATIVE  Cnzhhl=6211 ng/mL    Barbiturates Screen, urine NEGATIVE  Cdlbxm=775 ng/mL Benzodiazepines Screen, urine NEGATIVE  Spvida=196 ng/mL    Cannabinoid Screen, urine NEGATIVE  Cutoff=20 ng/mL    Cocaine Metab. Screen, urine NEGATIVE  Mrwfit=366 ng/mL    Opiate Screen, urine See Final Results Afqrfe=854 ng/mL    Oxycodone/Oxymorphone, urine NEGATIVE  Urmngw=379 ng/mL    Phencyclidine Screen, urine NEGATIVE  Cutoff=25 ng/mL    Methadone Screen, urine NEGATIVE  Fscdpt=682 ng/mL    Propoxyphene Screen, urine NEGATIVE  Oacwji=595 ng/mL    Meperidine Screen, urine NEGATIVE  Azqyhu=131 ng/mL    Fentanyl Screen, urine NEGATIVE  Gjoheo=6360 pg/mL    Tramadol Screen, urine NEGATIVE  Dvueet=467 ng/mL    Creatinine, urine 64.1 20.0 - 300.0 mg/dL    Specific Gravity 1.020      pH, urine 6.8 4.5 - 8.9      Please Note Comment     OPIATES, CONFIRM   Result Value Ref Range    Opiates Positive (A) Acwfyv=127 ng/mL    Codeine NEGATIVE  Fvbjxe=530      Morphine NEGATIVE  Jzhnmd=164      Hydromorphone NEGATIVE  Mugjuh=344      Hydrocodone Positive (A)      Hydrocodone confirm 531 Mohtsu=518 ng/mL       TESTS  none    PE  Physical Exam   Constitutional: He is oriented to person, place, and time. He appears well-developed and well-nourished. No distress. HENT:   Head: Normocephalic and atraumatic. Musculoskeletal: He exhibits no edema or tenderness. Neurological: He is alert and oriented to person, place, and time. Skin: Skin is warm and dry. No rash noted. He is not diaphoretic. No erythema. No pallor. Right leg wound is CDI with crusting to entire wound bed. No drainage, heat, mild erythema surrounding the margins of wound bed but improving since last visit. Psychiatric: He has a normal mood and affect. His behavior is normal. Judgment and thought content normal.   Vitals reviewed. Assessment/Plan:    1. Right leg wound check- subsequent visit- Seems to be improving. Finish course of Keflex, can continue Mupirocin ointment and report if symptoms occur/worsening/change in wound.      Lab review: no lab studies available for review at time of visit    Today's Visit:   Diagnoses and all orders for this visit:    1. Hypertension  -     atenolol (TENORMIN) 25 mg tablet; TAKE ONE TABLET BY MOUTH TWICE A DAY    2. Anxiety  -     LORazepam (ATIVAN) 1 mg tablet; Take 1 Tab by mouth every eight (8) hours as needed. 3. Leg wound, right, subsequent encounter      Health Maintenance: Deferred to PCP. I have discussed the diagnosis with the patient and the intended plan as seen in the above orders. The patient has received an after-visit summary and questions were answered concerning future plans. I have discussed medication side effects and warnings with the patient as well. I have reviewed the plan of care with the patient, accepted their input and they are in agreement with the treatment goals. Follow-up Disposition: Not on File   More than 1/2 of this 15 minute visit was spent in counseling and coordination of care, as described above.     JOHN Hussein  Internist of [address], [facility].  Phone: [phone]  Fax: [phone]

## 2018-08-30 NOTE — PROGRESS NOTES
1. Have you been to the ER, urgent care clinic or hospitalized since your last visit? NO.     2. Have you seen or consulted any other health care providers outside of the [address] [place] [address] since your last visit (Include any pap smears or colon screening)? NO      Do you have an Advanced Directive?  YES

## 2018-08-30 NOTE — MR AVS SNAPSHOT
[address] [facility] [address] 
 
 
 [address], [facility] [address] [facility] [address] 
[phone] Patient: Kailey Delay MRN: [id number] Chon Devine Visit Information Date & Time Provider Department Dept. Phone Encounter #  
 8/30/2018 11:00 AM Kaden Faustin NP [facility] [phone] Your Appointments 10/4/2018  8:30 AM  
Nurse Visit with Valerie Delong [facility] ([address] [place] [address]) Appt Note: PSA  
 [address] [facility] [address]  
  
   
 2057 [facility]  
  
    
 11/13/2018  7:55 AM  
LAB with [facility] NURSE VISIT [facility] ([address] [place] [address]) Appt Note: lab  
 [address] [address] [address] [place]  
  
   
 5445 [facility]  
  
    
 11/13/2018  8:30 AM  
Follow Up with Lucas Baxter NP  
[facility] ([address] [place] [address]) Appt Note: 6 Mo F/U  
 [address] [facility] [address]  
250 [facility] [address] [place] [address] [address] [place] [address]  
  
    
 11/20/2018  1:00 PM  
PHYSICAL with Shandra Alfonso MD  
[facility] [address] [place] [address]) Appt Note:  6 months [address] [facility], [facility] [address] [place]  
  
   
 5445 [facility]  
  
    
 2/27/2019  8:00 AM  
PROCEDURE with [facility] 1 [facility] ([address] [place] [address]) Appt Note: RT JP Stent/ 1 year C Red 2300 [facility] Laniezita Faustin [address] 200 [facility] [address]  
[phone] [address] [place] [address]  
  
    
 2/27/2019  9:00 AM  
PROCEDURE with [facility] 1 [facility] ([address] [place] [address]) Appt Note: CV 1 year M Red 1212 [facility] [place] [address] 200 [facility] [address]  
[phone]  
  
    
 2/27/2019 10:00 AM  
PROCEDURE with [facility] NONIMAGING [facility] ([facility]) Appt Note: Leg Art 1 year M Moon 1212 [facility] [place] [address] 200 [facility] [address]  
[phone] [address] [facility] [address] [place]  
  
    
 3/4/2019  9:00 AM  
Follow Up with Brooklyn Wolfe MD  
600 [facility]) Appt Note: 1 year fup with studies 1212 [facility] [place] [address] 200 [facility] [address]  
[phone] [facility] [place] [address] [facility]  
  
    
  
 10/8/2018  2:00 PM  
Any with Eduardo Cotto MD  
[facility]) Appt Note: EP- 6 month follow up with PSA prior. ; letter sent in the mail to reschedule appt [address] [address]  
[phone]  
  
   
 [place] [id number] Upcoming Health Maintenance Date Due  
 MEDICARE YEARLY EXAM 6/30/2018 Influenza Age 5 to Adult 8/1/2018 GLAUCOMA SCREENING Q2Y 6/25/2019 DTaP/Tdap/Td series (2 - Td) 4/3/2023 COLONOSCOPY 9/22/2025 Allergies as of 8/30/2018  Review Complete On: 8/30/2018 By: Perico Mask Severity Noted Reaction Type Reactions Altace [Ramipril]    Unknown (comments) Lipitor [Atorvastatin]    Other (comments) Muscle pain Lisinopril    Shortness of Breath, Other (comments) Turns bright red Other Medication  03/05/2014    Other (comments) Vicryl suture on skin tends to be rejected with poor wound healing does better with monocryl Procardia [Nifedipine]    Other (comments) Caused afib Rosuvastatin  02/12/2018    Other (comments) Muscle Pain Current Immunizations  Reviewed on 9/15/2017 Name Date  Influenza High Dose Vaccine PF 9/15/2017 11:09 AM, 9/16/2016, 10/2/2015 12:30 PM  
 Influenza Vaccine 10/10/2014, 10/4/2013 Influenza Vaccine Split 10/10/2012  2:28 PM, 9/28/2011 Influenza Vaccine Whole 10/8/2010 Pneumococcal Conjugate (PCV-13) 12/15/2014  8:16 AM  
 Pneumococcal Polysaccharide (PPSV-23) 1/24/2014 Pneumococcal Vaccine (Unspecified Type) 1/1/2006 Td 1/1/2006 Tdap 4/3/2013  8:23 PM  
 Zoster Vaccine, Live 1/1/2007 Not reviewed this visit You Were Diagnosed With   
  
 Codes Comments Benign hypertensive heart disease without heart failure    -  Primary ICD-10-CM: I11.9 ICD-9-CM: 402.10 Anxiety     ICD-10-CM: F41.9 ICD-9-CM: 300.00 Leg wound, right, subsequent encounter     ICD-10-CM: S81.801D ICD-9-CM: V58.89, 894.0 Vitals BP Pulse Temp Resp Height(growth percentile) Weight(growth percentile) 126/68 (BP 1 Location: Left arm, BP Patient Position: Sitting) 67 97.6 °F (36.4 °C) (Oral) 15 6' (1.829 m) 224 lb 9.6 oz (101.9 kg) SpO2 BMI Smoking Status 96% 30.46 kg/m2 Former Smoker Vitals History BMI and BSA Data Body Mass Index Body Surface Area  
 30.46 kg/m 2 2.28 m 2 Preferred Pharmacy Pharmacy Name Phone [facility] Sharmaine Conde, [address] [place] [address] [phone] Your Updated Medication List  
  
   
This list is accurate as of 8/30/18 11:27 AM.  Always use your most recent med list.  
  
  
  
  
 aspirin delayed-release 81 mg tablet Take 81 mg by mouth daily. atenolol 25 mg tablet Commonly known as:  TENORMIN  
TAKE ONE TABLET BY MOUTH TWICE A DAY cholecalciferol 1,000 unit Cap Commonly known as:  VITAMIN D3 Take 1,000 Units by mouth daily. coenzyme Q-10 200 mg capsule Commonly known as:  CO Q-10 Take  by mouth. diclofenac 1 % Gel Commonly known as:  VOLTAREN Apply 2-4 g to affected area four (4) times daily. ezetimibe-simvastatin 10-40 mg per tablet Commonly known as:  Vytorin 10/40 Take 1 Tab by mouth nightly.  
  
 gabapentin 300 mg capsule Commonly known as:  NEURONTIN  
1 cap every day to bid  Indications: NEUROPATHIC PAIN  
  
 HYDROcodone-acetaminophen 5-325 mg per tablet Commonly known as:  Ghazala President Take 1 Tab by mouth every four (4) hours as needed (for chronic pain). KEFLEX 500 mg capsule Generic drug:  cephALEXin Take 500 mg by mouth four (4) times daily. LORazepam 1 mg tablet Commonly known as:  ATIVAN Take 1 Tab by mouth every eight (8) hours as needed. losartan 25 mg tablet Commonly known as:  COZAAR  
TAKE ONE TABLET BY MOUTH TWICE A DAY MULTIVITAMIN PO Take  by mouth daily. mupirocin 2 % ointment Commonly known as:  [employer] Apply  to affected area three (3) times daily. naloxone 4 mg/actuation nasal spray Commonly known as:  [employer] Use 1 spray intranasally into 1 nostril. Use a new Narcan nasal spray for subsequent doses and administer into alternating nostrils. May repeat every 2 to 3 minutes as needed. OMEGA 3 FISH OIL PO Take 900 mg by mouth daily. polyethylene glycol 17 gram packet Commonly known as:  MIRALAX  
17 g. PROTONIX 40 mg tablet Generic drug:  pantoprazole Take 40 mg by mouth daily. triamterene-hydroCHLOROthiazide 37.5-25 mg per tablet Commonly known as:  Arville Rho TAKE ONE TABLET BY MOUTH DAILY Prescriptions Sent to Pharmacy Refills  
 atenolol (TENORMIN) 25 mg tablet 1 Sig: TAKE ONE TABLET BY MOUTH TWICE A DAY Class: Normal  
 Pharmacy: [id number] #: [phone] [facility]! Dear Giorgi Early: Thank you for requesting a Vysr account. Our records indicate that you already have an active Vysr account. You can access your account anytime at [email or web address] Did you know that you can access your hospital and ER discharge instructions at any time in [email or web address]? You can also review all of your test results from your hospital stay or ER visit. Additional Information If you have questions, please visit the Frequently Asked Questions section of the [email or web address] website at [email or web address] Remember, [email or web address] is NOT to be used for urgent needs. For medical emergencies, dial 911. Now available from your iPhone and Android! Please provide this summary of care documentation to your next provider. Your primary care clinician is listed as Drinda Epley. If you have any questions after today's visit, please call [phone].

## 2018-09-10 ENCOUNTER — TELEPHONE (OUTPATIENT)
Dept: INTERNAL MEDICINE CLINIC | Age: 73
End: 2018-09-10

## 2018-09-10 NOTE — TELEPHONE ENCOUNTER
Pt calling, says he had a drug test done and he is getting eob saying it is going to be over 200.00 was this one sent to [facility]?

## 2018-09-12 NOTE — TELEPHONE ENCOUNTER
Called pt and advised per billing, needs to wait for a bill to see if it is covered. Also advised he can call his insurance company and see why they are not covering. Naty Bryan he has already called them and they are working on it. He will call us back if he actually receives a bill.

## 2018-09-27 DIAGNOSIS — M54.10 RADICULOPATHY, UNSPECIFIED SPINAL REGION: ICD-10-CM

## 2018-09-27 RX ORDER — HYDROCODONE BITARTRATE AND ACETAMINOPHEN 5; 325 MG/1; MG/1
1 TABLET ORAL
Qty: 180 TAB | Refills: 0 | Status: SHIPPED | OUTPATIENT
Start: 2018-09-27 | End: 2018-11-09 | Stop reason: SDUPTHER

## 2018-09-27 NOTE — TELEPHONE ENCOUNTER
[facility] Norco [facility]15/2018. There appears to be no inconsistencies in regards to the prescribing of this medication. Last Visit: 08/30/2018 with GILBERTO Jones    Next Appointment: 11/20/2018 with MD Satish Nguyen   Previous Refill Encounters: 08/14/2018 per MD Satish Nguyen #[id number]     Requested Prescriptions     Pending Prescriptions Disp Refills    HYDROcodone-acetaminophen (NORCO) 5-325 mg per tablet 180 Tab 0     Sig: Take 1 Tab by mouth every four (4) hours as needed (for chronic pain).

## 2018-10-03 ENCOUNTER — OFFICE VISIT (OUTPATIENT)
Dept: ORTHOPEDIC SURGERY | Age: 73
End: 2018-10-03

## 2018-10-03 VITALS
HEART RATE: 72 BPM | DIASTOLIC BLOOD PRESSURE: 79 MMHG | WEIGHT: 222 LBS | BODY MASS INDEX: 30.11 KG/M2 | OXYGEN SATURATION: 96 % | SYSTOLIC BLOOD PRESSURE: 141 MMHG

## 2018-10-03 DIAGNOSIS — M65.321 TRIGGER FINGER, RIGHT INDEX FINGER: Primary | ICD-10-CM

## 2018-10-03 RX ORDER — BETAMETHASONE SODIUM PHOSPHATE AND BETAMETHASONE ACETATE 3; 3 MG/ML; MG/ML
6 INJECTION, SUSPENSION INTRA-ARTICULAR; INTRALESIONAL; INTRAMUSCULAR; SOFT TISSUE ONCE
Qty: 1 ML | Refills: 0
Start: 2018-10-03 | End: 2018-10-03

## 2018-10-03 NOTE — PROGRESS NOTES
Patient: Lou Rojas                MRN: [id number]       SSN: [id number] YOB: 1945        AGE: 68 y.o. SEX: male Body mass index is 30.11 kg/(m^2). PCP: Julieta Farfan MD 
10/03/18 Chief Complaint: Right index trigger finger HISTORY OF PRESENT ILLNESS:  Daylin Peace is a 77-year-old male who returns to the office today for his right hand. I gave him an injection for his index trigger finger several months ago. He was doing very well. Unfortunately, he did some work this past weekend with setting up and taking down some large tents, and he started to have triggering and pain over his A1 pulley of his index finger again of the right hand. He is here today for repeat treatment options. Past Medical History:  
Diagnosis Date  Adenoma of left adrenal gland 2009  1 cm, no change 1/15, 2/16  Asbestosis  Atrial fibrillation CHA2DS2-VAsc=3(age+, htn+, vasc dx+; estimated yearly stroke risk according to Lip et al. is 3,2%), Hasbled=2(estimated yearly bleeding risk according to pisters et al. is 1,88%); not anticoagulated due to h/o gi bleed  Atrial fibrillation ablation 10/08  Basal cell cancer Dr Larissa Aparicio; he's had >350 lesions removed  Carotid occlusion   
 left  Cervical radiculopathy MRI 9/11 showed C3-6 severe foraminal stenosis  Chronic pain  Colon polyp Dr Pinzon Pro 9/15  Coronary artery disease RCA - 3.0 x 16mm TAXUS 9/04, 3.0 x 16mm TAXUS 12/06  Dyslipidemia  Erectile dysfunction  GERD   
 GI bleed  Hearing loss 2014  bilateral Dr Geronimo Norjeronimo  Hernia, umbilical   
 Hypertension  Hypogonadism male  Lumbar radiculopathy Dr Bacilio Kay;  MRI 9/11 L4-5 disc bulging, annular tear, disc dessication  Myofascial pain dysfunction syndrome   
 pain clinic  Osteoarthritis   
 right knee Dr Terrazas Numbers  Overweight (BMI 25.0-29. 9) IF 5/18  Peripheral vascular disease 50-60% R iliac; s/p R iliac stent and L femoral artery stent in past; R OLIVIA 0.76 (1/16)  Plantar fasciitis   
 bilateral   
 PPD positive  Prediabetes  Prostate cancer T1c Imelda 7(3+4), 70% in 1 core, GS 7 (3+4) in 4 cores, GS 6 (3+3) in 1 core; psa 5.28, TRUS 18 gm;  Dr Aldair Medina; s/p cryoablation 10/16  Recurrent umbilical hernia  Subclinical hypothyroidism   
 denies  Tinnitus Dr Anil Trammell  Ulcerative colitis  Venous insufficiency Family History Problem Relation Age of Onset  Heart Disease Mother  Hypertension Mother  Diabetes Mother [address] [facility] Arthritis-osteo Mother  Heart Disease Father  Stroke Father  Hypertension Father  Heart Disease Sister  Hypertension Sister Current Outpatient Prescriptions Medication Sig Dispense Refill  
 HYDROcodone-acetaminophen (NORCO) 5-325 mg per tablet Take 1 Tab by mouth every four (4) hours as needed (for chronic pain). 180 Tab 0  
 atenolol (TENORMIN) 25 mg tablet TAKE ONE TABLET BY MOUTH TWICE A  Tab 1  
 LORazepam (ATIVAN) 1 mg tablet Take 1 Tab by mouth every eight (8) hours as needed. 50 Tab 0  
 mupirocin (BACTROBAN) 2 % ointment Apply  to affected area three (3) times daily.  cephALEXin (KEFLEX) 500 mg capsule Take 500 mg by mouth four (4) times daily.  diclofenac (VOLTAREN) 1 % gel Apply 2-4 g to affected area four (4) times daily. 100 g 5  
 gabapentin (NEURONTIN) 300 mg capsule 1 cap every day to bid  Indications: NEUROPATHIC PAIN 180 Cap 1  
 naloxone (NARCAN) 4 mg/actuation nasal spray Use 1 spray intranasally into 1 nostril. Use a new Narcan nasal spray for subsequent doses and administer into alternating nostrils. May repeat every 2 to 3 minutes as needed. 1 Each 1  
 polyethylene glycol (MIRALAX) 17 gram packet 17 g.    
 ezetimibe-simvastatin (VYTORIN 10/40) 10-40 mg per tablet Take 1 Tab by mouth nightly.  90 Tab 3  
  losartan (COZAAR) 25 mg tablet TAKE ONE TABLET BY MOUTH TWICE A  Tab 2  
 aspirin delayed-release 81 mg tablet Take 81 mg by mouth daily.  triamterene-hydrochlorothiazide (MAXZIDE) 37.5-25 mg per tablet TAKE ONE TABLET BY MOUTH DAILY 90 Tab 3  Cholecalciferol, Vitamin D3, 1,000 unit cap Take 1,000 Units by mouth daily.  OMEGA-3 FATTY ACIDS/FISH OIL (OMEGA 3 FISH OIL PO) Take 900 mg by mouth daily.  MULTIVITAMIN PO Take  by mouth daily.  pantoprazole (PROTONIX) 40 mg tablet Take 40 mg by mouth daily.  coenzyme q10 200 mg Cap Take  by mouth. Allergies Allergen Reactions  Altace [Ramipril] Unknown (comments)  Lipitor [Atorvastatin] Other (comments) Muscle pain  Lisinopril Shortness of Breath and Other (comments) Turns bright red  Other Medication Other (comments) Vicryl suture on skin tends to be rejected with poor wound healing does better with monocryl  Procardia [Nifedipine] Other (comments) Caused afib  Rosuvastatin Other (comments) Muscle Pain Past Surgical History:  
Procedure Laterality Date  CARDIAC SURG PROCEDURE UNLIST  04/2018 [address] [facility] Dr Sindy Goldberg; echo nl lv, ef 60%, dilated RV, biatrial enlargement, trace AI  
 CARDIAC SURG PROCEDURE UNLIST  04/2018 [address] [facility] Dr Sindy Goldberg; NST neg, ef 60%  HAND/FINGER SURGERY UNLISTED  2017  HX CAROTID ENDARTERECTOMY 1/14  right  HX CATARACT REMOVAL    
 HX COLONOSCOPY Dr Dorinda Sanz 9/15 polyps  HX CRYOABLATION OF THE PROSTATE    
 10/16 [facility] [address]  HX ORTHOPAEDIC    
 right knee surgery  HX ORTHOPAEDIC  12/2017 Dr David Lai; R CTS release  HX REFRACTIVE SURGERY    
 OD (hemoplasty to right eye on retina to avoid blindness) [address]  WI LAP, RECURRENT INCISIONAL HERNIA REPAIR,REDUCIBLE N/A 02/09/2017 Dr. Soliz [place]  REPAIR ING HERNIA,5+Y/O,REDUCIBL 2/16  left  Dr. Silvano Okeefe  REPAIR UMBILICAL AZCG,5+V/G,ECSZG    
 2/16  Dr. Silvano Okeefe  VASCULAR SURGERY PROCEDURE UNLIST    
 1/16  R OLIVIA 0.76, L OLIVIA 1.02  
 VASCULAR SURGERY PROCEDURE UNLIST    
 10/15   left fem art and left iliac stent Social History Social History  Marital status:  Spouse name: N/A  
 Number of children: N/A  
 Years of education: N/A Occupational History  Not on file. Social History Main Topics  Smoking status: Former Smoker Packs/day: 1.50 Years: 25.00 Types: Cigarettes Quit date: 1/1/2003  Smokeless tobacco: Never Used  Alcohol use Yes Comment: RARELY  Drug use: No  
 Sexual activity: Yes  
  Partners: Female Birth control/ protection: None Other Topics Concern  Not on file Social History Narrative REVIEW OF SYSTEMS:   
 
No changes from previous review of systems unless noted. PHYSICAL EXAMINATION: 
Visit Vitals  /79  Pulse 72  Wt 222 lb (100.7 kg)  SpO2 96%  BMI 30.11 kg/m2 Body mass index is 30.11 kg/(m^2). GENERAL: Alert and oriented x3, in no acute distress. HEENT: Normocephalic, atraumatic. RESP: Non labored breathing. SKIN: No rashes or lesions noted. Physical exam of the right hand reveals tenderness to palpation over the A1 pulley. He has limited flexion with popping over the A1 pulley with full flexion at the MCP joint. He has pain with flexion and extension. He is neurovascularly intact distally. There is palpable enlargement over the A1 pulley as well. ASSESSMENT/PLAN:  Rob Russell is a 72-year-old male with a right index trigger finger. He has responded well to injections in the past.  He would like to try another one. Therefore, after discussing the risks and benefits of the procedure and obtaining informed consent, the trigger finger was injected with Lidocaine and steroid. He tolerated this well. He will follow up as needed. [address] [facility] 
OFFICE PROCEDURE PROGRESS NOTE Chart reviewed for the following: 
 Paulo Gil MD, have reviewed the History, Physical and updated the Allergic reactions for [facility] TIME OUT performed immediately prior to start of procedure: 
 Paulo Gil MD, have performed the following reviews on [facility] prior to the start of the procedure: 
         
* Patient was identified by name and date of birth * Agreement on procedure being performed was verified * Risks and Benefits explained to the patient * Procedure site verified and marked as necessary * Patient was positioned for comfort * Consent was signed and verified Time: 3371 Date of procedure: 10/3/2018 Procedure performed by:  Donny Ace MD 
 
Provider assisted by: Luis Tello LPN Patient assisted by: self How tolerated by patient: tolerated the procedure well with no complications Post Procedural Pain Scale: 0 - No Hurt Comments: none Electronically signed by: Donny Ace MD

## 2018-10-12 ENCOUNTER — OFFICE VISIT (OUTPATIENT)
Dept: INTERNAL MEDICINE CLINIC | Age: 73
End: 2018-10-12

## 2018-10-12 VITALS
SYSTOLIC BLOOD PRESSURE: 130 MMHG | DIASTOLIC BLOOD PRESSURE: 76 MMHG | TEMPERATURE: 98 F | BODY MASS INDEX: 30.15 KG/M2 | OXYGEN SATURATION: 97 % | HEART RATE: 63 BPM | HEIGHT: 72 IN | WEIGHT: 222.6 LBS | RESPIRATION RATE: 14 BRPM

## 2018-10-12 DIAGNOSIS — R06.02 SHORTNESS OF BREATH: ICD-10-CM

## 2018-10-12 DIAGNOSIS — J06.9 VIRAL UPPER RESPIRATORY TRACT INFECTION: ICD-10-CM

## 2018-10-12 DIAGNOSIS — Z23 ENCOUNTER FOR IMMUNIZATION: ICD-10-CM

## 2018-10-12 DIAGNOSIS — R06.2 WHEEZING: ICD-10-CM

## 2018-10-12 DIAGNOSIS — M25.512 LEFT ANTERIOR SHOULDER PAIN: Primary | ICD-10-CM

## 2018-10-12 RX ORDER — ALBUTEROL SULFATE 90 UG/1
2 AEROSOL, METERED RESPIRATORY (INHALATION)
Qty: 1 INHALER | Refills: 0 | Status: SHIPPED | OUTPATIENT
Start: 2018-10-12 | End: 2019-02-25

## 2018-10-12 NOTE — PATIENT INSTRUCTIONS
Vaccine Information Statement    Influenza (Flu) Vaccine (Inactivated or Recombinant): What you need to know    Many Vaccine Information Statements are available in Turkmen and other languages. See www.immunize.org/vis  Hojas de Información Sobre Vacunas están disponibles en Español y en muchos otros idiomas. Visite www.immunize.org/vis    1. Why get vaccinated? Influenza (flu) is a contagious disease that spreads around the [place] every year, usually between October and May. Flu is caused by influenza viruses, and is spread mainly by coughing, sneezing, and close contact. Anyone can get flu. Flu strikes suddenly and can last several days. Symptoms vary by age, but can include:   fever/chills   sore throat   muscle aches   fatigue   cough   headache    runny or stuffy nose    Flu can also lead to pneumonia and blood infections, and cause diarrhea and seizures in children. If you have a medical condition, such as heart or lung disease, flu can make it worse. Flu is more dangerous for some people. Infants and young children, people 72years of age and older, pregnant women, and people with certain health conditions or a weakened immune system are at greatest risk. Each year thousands of people in the [facility] die from flu, and many more are hospitalized. Flu vaccine can:   keep you from getting flu,   make flu less severe if you do get it, and   keep you from spreading flu to your family and other people. 2. Inactivated and recombinant flu vaccines    A dose of flu vaccine is recommended every flu season. Children 6 months through 6years of age may need two doses during the same flu season. Everyone else needs only one dose each flu season.        Some inactivated flu vaccines contain a very small amount of a mercury-based preservative called thimerosal. Studies have not shown thimerosal in vaccines to be harmful, but flu vaccines that do not contain thimerosal are available. There is no live flu virus in flu shots. They cannot cause the flu. There are many flu viruses, and they are always changing. Each year a new flu vaccine is made to protect against three or four viruses that are likely to cause disease in the upcoming flu season. But even when the vaccine doesnt exactly match these viruses, it may still provide some protection    Flu vaccine cannot prevent:   flu that is caused by a virus not covered by the vaccine, or   illnesses that look like flu but are not. It takes about 2 weeks for protection to develop after vaccination, and protection lasts through the flu season. 3. Some people should not get this vaccine    Tell the person who is giving you the vaccine:     If you have any severe, life-threatening allergies. If you ever had a life-threatening allergic reaction after a dose of flu vaccine, or have a severe allergy to any part of this vaccine, you may be advised not to get vaccinated. Most, but not all, types of flu vaccine contain a small amount of egg protein.  If you ever had Guillain-Barré Syndrome (also called GBS). Some people with a history of GBS should not get this vaccine. This should be discussed with your doctor.  If you are not feeling well. It is usually okay to get flu vaccine when you have a mild illness, but you might be asked to come back when you feel better. 4. Risks of a vaccine reaction    With any medicine, including vaccines, there is a chance of reactions. These are usually mild and go away on their own, but serious reactions are also possible. Most people who get a flu shot do not have any problems with it.      Minor problems following a flu shot include:    soreness, redness, or swelling where the shot was given     hoarseness   sore, red or itchy eyes   cough   fever   aches   headache   itching   fatigue  If these problems occur, they usually begin soon after the shot and last 1 or 2 days. More serious problems following a flu shot can include the following:     There may be a small increased risk of Guillain-Barré Syndrome (GBS) after inactivated flu vaccine. This risk has been estimated at 1 or 2 additional cases per million people vaccinated. This is much lower than the risk of severe complications from flu, which can be prevented by flu vaccine.  Young children who get the flu shot along with pneumococcal vaccine (PCV13) and/or DTaP vaccine at the same time might be slightly more likely to have a seizure caused by fever. Ask your doctor for more information. Tell your doctor if a child who is getting flu vaccine has ever had a seizure. Problems that could happen after any injected vaccine:      People sometimes faint after a medical procedure, including vaccination. Sitting or lying down for about 15 minutes can help prevent fainting, and injuries caused by a fall. Tell your doctor if you feel dizzy, or have vision changes or ringing in the ears.  Some people get severe pain in the shoulder and have difficulty moving the arm where a shot was given. This happens very rarely.  Any medication can cause a severe allergic reaction. Such reactions from a vaccine are very rare, estimated at about 1 in a million doses, and would happen within a few minutes to a few hours after the vaccination. As with any medicine, there is a very remote chance of a vaccine causing a serious injury or death. The safety of vaccines is always being monitored. For more information, visit: www.cdc.gov/vaccinesafety/    5. What if there is a serious reaction? What should I look for?  Look for anything that concerns you, such as signs of a severe allergic reaction, very high fever, or unusual behavior.     Signs of a severe allergic reaction can include hives, swelling of the face and throat, difficulty breathing, a fast heartbeat, dizziness, and weakness - usually within a few minutes to a few hours after the vaccination. What should I do?  If you think it is a severe allergic reaction or other emergency that cant wait, call 9-1-1 and get the person to the nearest hospital. Otherwise, call your doctor.  Reactions should be reported to the Vaccine Adverse Event Reporting System (VAERS). Your doctor should file this report, or you can do it yourself through  the VAERS web site at www.vaers. [facility].gov, or by calling 2-296.912.1453. VAERS does not give medical advice. 6. The National Vaccine Injury Compensation Program    The [facility] Vaccine Injury Compensation Program (VICP) is a federal program that was created to compensate people who may have been injured by certain vaccines. Persons who believe they may have been injured by a vaccine can learn about the program and about filing a claim by calling 3-594.273.4535 or visiting the [employer] website at www.[facility].gov/vaccinecompensation. There is a time limit to file a claim for compensation. 7. How can I learn more?  Ask your healthcare provider. He or she can give you the vaccine package insert or suggest other sources of information.  Call your local or state health department.  Contact the Centers for Disease Control and Prevention (CDC):  - Call 6-678.554.4278 (1-800-CDC-INFO) or  - Visit CDCs website at www.cdc.gov/flu    Vaccine Information Statement   Inactivated Influenza Vaccine   8/7/2015  42 SHAGUFTA Galvan [id number]    Department of Health and Human Services  Centers for Disease Control and Prevention    Office Use Only

## 2018-10-12 NOTE — PROGRESS NOTES
Reji Gayle is a 68 y.o. male who presents for routine immunizations- High Dose Influenza- Left deltoid   He denies any symptoms , reactions or allergies that would exclude them from being immunized today. Risks and adverse reactions were discussed and the VIS was given to them. All questions were addressed. He was observed for 15 min post injection. There were no reactions observed.     Leesa Skaggs LPN

## 2018-10-12 NOTE — PROGRESS NOTES
Reshma Wyatt is a 68 y.o.  male and presents with    Chief Complaint   Patient presents with    Cold Symptoms     chest congestion, difficulty taking a deep breath, patient reports feeling hoarse x 3 days       Subjective:  HPI  Mr. Ashley Vizcarra presents today with complaints of three days worth of nasal and chest congestion, nonproductive cough, \"feels like something is there but I can't get it up\", reports difficulty taking a deep breath that causes shortness of breath, and mild fatigue. He denies fever, chills, gi symptoms, headaches, dizziness, chest pain, palpitations, sob at rest or with exertion. Taking Bendaryl intermittently that seems to help with breathing issues. He also reports with left shoulder pain that he states started Saturday after returning from a trip to [place] [address]. He drove there and back. The pain is a dull ache that is intermittent, worse with movement and palpation. He reports crepitation. Voltaren gel and biofreeze with some relief. He denies redness, edema, decreased ROM. He is able to reach and pull and reports has been reaching for heavier boxes for his wife the last couple of days. He has a cardiac history and sees , seen 6 months ago with full cardiac workup reported as unremarkable. Additional Concerns: none     ROS   Review of Systems   Constitutional: Positive for malaise/fatigue. Negative for chills, diaphoresis and fever. HENT: Positive for congestion. Negative for sinus pain and sore throat. Ear fullness bilateral, dry scratchy throat   Respiratory: Positive for cough and shortness of breath. Negative for hemoptysis, sputum production and wheezing. Reports denies Sob with activity, reports just difficult to get a deep breath in   Cardiovascular: Negative for chest pain, palpitations, orthopnea and leg swelling.         Shoulder pain to the left, dull achy, he can reproduce the pain with facing the head to the floor and it pushing on the area. He reports if lifts the head up and extends the pain in the shoulder and neck region resolves. Gastrointestinal: Negative. Genitourinary: Negative. Musculoskeletal: Positive for joint pain and neck pain. Negative for falls and myalgias. Skin: Negative. Neurological: Negative for dizziness, tingling, sensory change, speech change, weakness and headaches. Endo/Heme/Allergies: Positive for environmental allergies. Allergies   Allergen Reactions    Altace [Ramipril] Unknown (comments)    Lipitor [Atorvastatin] Other (comments)     Muscle pain    Lisinopril Shortness of Breath and Other (comments)     Turns bright red    Other Medication Other (comments)     Vicryl suture on skin tends to be rejected with poor wound healing does better with monocryl    Procardia [Nifedipine] Other (comments)     Caused afib    Rosuvastatin Other (comments)     Muscle Pain         Current Outpatient Prescriptions   Medication Sig Dispense Refill    albuterol (PROVENTIL HFA, VENTOLIN HFA, PROAIR HFA) 90 mcg/actuation inhaler Take 2 Puffs by inhalation every six (6) hours as needed for Wheezing. Indications: BRONCHOSPASM PREVENTION 1 Inhaler 0    inhalational spacing device 1 Each by Does Not Apply route as needed. 1 Device 0    HYDROcodone-acetaminophen (NORCO) 5-325 mg per tablet Take 1 Tab by mouth every four (4) hours as needed (for chronic pain). 180 Tab 0    atenolol (TENORMIN) 25 mg tablet TAKE ONE TABLET BY MOUTH TWICE A  Tab 1    LORazepam (ATIVAN) 1 mg tablet Take 1 Tab by mouth every eight (8) hours as needed. 50 Tab 0    diclofenac (VOLTAREN) 1 % gel Apply 2-4 g to affected area four (4) times daily. 100 g 5    gabapentin (NEURONTIN) 300 mg capsule 1 cap every day to bid  Indications: NEUROPATHIC PAIN 180 Cap 1    naloxone (NARCAN) 4 mg/actuation nasal spray Use 1 spray intranasally into 1 nostril.  Use a new Narcan nasal spray for subsequent doses and administer into alternating nostrils. May repeat every 2 to 3 minutes as needed. 1 Each 1    polyethylene glycol (MIRALAX) 17 gram packet 17 g.      ezetimibe-simvastatin (VYTORIN 10/40) 10-40 mg per tablet Take 1 Tab by mouth nightly. 90 Tab 3    losartan (COZAAR) 25 mg tablet TAKE ONE TABLET BY MOUTH TWICE A  Tab 2    aspirin delayed-release 81 mg tablet Take 81 mg by mouth daily.  triamterene-hydrochlorothiazide (MAXZIDE) 37.5-25 mg per tablet TAKE ONE TABLET BY MOUTH DAILY 90 Tab 3    Cholecalciferol, Vitamin D3, 1,000 unit cap Take 1,000 Units by mouth daily.  OMEGA-3 FATTY ACIDS/FISH OIL (OMEGA 3 FISH OIL PO) Take 900 mg by mouth daily.  MULTIVITAMIN PO Take  by mouth daily.  pantoprazole (PROTONIX) 40 mg tablet Take 40 mg by mouth daily.  coenzyme q10 200 mg Cap Take  by mouth.  NAFTIN 2 % gel       mupirocin (BACTROBAN) 2 % ointment Apply  to affected area three (3) times daily. Social History     Social History    Marital status:      Spouse name: N/A    Number of children: N/A    Years of education: N/A     Occupational History    Not on file.      Social History Main Topics    Smoking status: Former Smoker     Packs/day: 1.50     Years: 25.00     Types: Cigarettes     Quit date: 1/1/2003    Smokeless tobacco: Never Used    Alcohol use Yes      Comment: RARELY    Drug use: No    Sexual activity: Yes     Partners: Female     Birth control/ protection: None     Other Topics Concern    Not on file     Social History Narrative       Past Medical History:   Diagnosis Date    Adenoma of left adrenal gland     2009  1 cm, no change 1/15, 2/16    Asbestosis     Atrial fibrillation     CHA2DS2-VAsc=3(age+, htn+, vasc dx+; estimated yearly stroke risk according to Lip et al. is 3,2%), Hasbled=2(estimated yearly bleeding risk according to pisters et al. is 1,88%); not anticoagulated due to h/o gi bleed    Atrial fibrillation ablation     10/08    Basal cell cancer     Dr Delgado List; he's had >350 lesions removed    Carotid occlusion     left     Cervical radiculopathy     MRI 9/11 showed C3-6 severe foraminal stenosis    Chronic pain     Colon polyp     Dr Andrez Khan 9/15    Coronary artery disease     RCA - 3.0 x 16mm TAXUS 9/04, 3.0 x 16mm TAXUS 12/06    Dyslipidemia     Erectile dysfunction     GERD     GI bleed     Hearing loss     2014  bilateral Dr Ellen Kirby    Hernia, umbilical     Hypertension     Hypogonadism male     Lumbar radiculopathy     Dr Joseph Chacon;  MRI 9/11 L4-5 disc bulging, annular tear, disc dessication    Myofascial pain dysfunction syndrome     pain clinic     Osteoarthritis     right knee Dr Ag Zamora Overweight (BMI 25.0-29. 9)     IF 5/18    Peripheral vascular disease     50-60% R iliac; s/p R iliac stent and L femoral artery stent in past; R OLIVIA 0.76 (1/16)    Plantar fasciitis     bilateral     PPD positive     Prediabetes     Prostate cancer     T1c Imelda 7(3+4), 70% in 1 core, GS 7 (3+4) in 4 cores, GS 6 (3+3) in 1 core; psa 5.28, TRUS 18 gm;  Dr Andrez Khan; s/p cryoablation 10/16    Recurrent umbilical hernia     Subclinical hypothyroidism     denies    Tinnitus     Dr Zac Gauthierogrcurtis Ulcerative colitis     Venous insufficiency        Past Surgical History:   Procedure Laterality Date    CARDIAC SURG PROCEDURE UNLIST  04/2018    [facility] Dr Sebastian Arenas; echo nl lv, ef 60%, dilated RV, biatrial enlargement, trace AI    CARDIAC SURG PROCEDURE UNLIST  04/2018    [facility] Dr Sebastian Arenas; NST neg, ef 60%    HAND/FINGER SURGERY UNLISTED  2017    HX CAROTID ENDARTERECTOMY      1/14  right    HX CATARACT REMOVAL      HX COLONOSCOPY      Dr Andrez Khan 9/15 polyps    HX CRYOABLATION OF THE PROSTATE      10/16    HX HEMORRHOIDECTOMY      1979    [address], 1975    HX ORTHOPAEDIC      right knee surgery    HX ORTHOPAEDIC  12/2017    Dr Agatha Armenta; R CTS release    HX REFRACTIVE SURGERY      OD (hemoplasty to right eye on retina to avoid blindness)    HX TONSILLECTOMY      1955    OH LAP, RECURRENT INCISIONAL HERNIA REPAIR,REDUCIBLE N/A 02/09/2017    Dr. Vernetta Goldberg HERNIA,5+Y/O,REDUCIBL      2/16  left  Dr. Carlene Nunes TVWB,5+G/F,LCACT      2/16  Dr. Jade Strickland      1/16  R OLIVIA 0.76, L OLIVIA 1.02    VASCULAR SURGERY PROCEDURE UNLIST      10/15   left fem art and left iliac stent       Family History   Problem Relation Age of Onset    Heart Disease Mother     Hypertension Mother     Diabetes Mother     Arthritis-osteo Mother     Heart Disease Father     Stroke Father     Hypertension Father     Heart Disease Sister     Hypertension Sister        Objective:  Vitals:    10/12/18 0829   BP: 130/76   Pulse: 63   Resp: 14   Temp: 98 °F (36.7 °C)   TempSrc: Oral   SpO2: 97%   Weight: 222 lb 9.6 oz (101 kg)   Height: 6' (1.829 m)   PainSc:   4   PainLoc: Generalized       LABS   Results for orders placed or performed in visit on 10/08/18   AMB POC URINALYSIS DIP STICK AUTO W/O MICRO   Result Value Ref Range    Color (UA POC) Yellow     Clarity (UA POC) Clear     Glucose (UA POC) Negative Negative    Bilirubin (UA POC) Negative Negative    Ketones (UA POC) Negative Negative    Specific gravity (UA POC) 1.015 1.001 - 1.035    Blood (UA POC) 1+ Negative    pH (UA POC) 7.0 4.6 - 8.0    Protein (UA POC) Negative Negative    Urobilinogen (UA POC) 0.2 mg/dL 0.2 - 1    Nitrites (UA POC) Negative Negative    Leukocyte esterase (UA POC) Negative Negative   AMB POC PVR, NEFTLAY,POST-VOID RES,US,NON-IMAGING   Result Value Ref Range    PVR 4 cc       TESTS  none    PE  Physical Exam   Constitutional: He is oriented to person, place, and time. He appears well-developed and well-nourished. No distress. HENT:   Head: Normocephalic and atraumatic. Eyes: Pupils are equal, round, and reactive to light. Neck: Normal range of motion.    Cardiovascular: Normal rate, regular rhythm, normal heart sounds and intact distal pulses. Pulmonary/Chest: Effort normal and breath sounds normal. No respiratory distress. He has no wheezes. He has no rales. He exhibits no tenderness. Abdominal: Soft. Bowel sounds are normal.   Musculoskeletal: Normal range of motion. Lymphadenopathy:     He has no cervical adenopathy. Neurological: He is alert and oriented to person, place, and time. Skin: Skin is warm and dry. He is not diaphoretic. Psychiatric: He has a normal mood and affect. His behavior is normal. Judgment and thought content normal.   Vitals reviewed. Assessment/Plan:    1. URI- symptomatic treatment. Report if worsening. Albuterol PRN- wheezing/cough, Claritin. 2. Left shoulder pain with radiation up the back of the neck to head- Intermittent, no swelling, redness, edema, decrease in ROM to neck or left shoulder. EKG no change from prior. He has history of cervical and lumbar radiculopathy. Pain is reproducible with movement, palpation and worsened after PROM performed. Will continue conservative treatment with Voltaren gel, Biofreeze, heat, and ROM exercises at home. If worsens he will see orthopedics for further assessment. Lab review: no lab studies available for review at time of visit    Today's Visit:   Diagnoses and all orders for this visit:    1. Left anterior shoulder pain  -     AMB POC EKG ROUTINE W/ 12 LEADS, INTER & REP    2. Viral upper respiratory tract infection  -     albuterol (PROVENTIL HFA, VENTOLIN HFA, PROAIR HFA) 90 mcg/actuation inhaler; Take 2 Puffs by inhalation every six (6) hours as needed for Wheezing. Indications: BRONCHOSPASM PREVENTION  -     inhalational spacing device; 1 Each by Does Not Apply route as needed. 3. Wheezing  -     albuterol (PROVENTIL HFA, VENTOLIN HFA, PROAIR HFA) 90 mcg/actuation inhaler; Take 2 Puffs by inhalation every six (6) hours as needed for Wheezing.  Indications: BRONCHOSPASM PREVENTION  -     inhalational spacing device; 1 Each by Does Not Apply route as needed. 4. Shortness of breath  -     AMB POC EKG ROUTINE W/ 12 LEADS, INTER & REP  -     albuterol (PROVENTIL HFA, VENTOLIN HFA, PROAIR HFA) 90 mcg/actuation inhaler; Take 2 Puffs by inhalation every six (6) hours as needed for Wheezing. Indications: BRONCHOSPASM PREVENTION  -     inhalational spacing device; 1 Each by Does Not Apply route as needed. 5. Encounter for immunization  -     Influenza Vaccine Inactivated (IIV)(FLUAD), Subunit, Adjuvanted, IM, (39450)  -     Administration fee () for Medicare insured patients      Health Maintenance: Influenza vaccine today. I have discussed the diagnosis with the patient and the intended plan as seen in the above orders. The patient has received an after-visit summary and questions were answered concerning future plans. I have discussed medication side effects and warnings with the patient as well. I have reviewed the plan of care with the patient, accepted their input and they are in agreement with the treatment goals. Follow-up Disposition: Not on File   More than 1/2 of this 15 minute visit was spent in counseling and coordination of care, as described above.     JOHN Gamez  Internist of [address], [facility] [address] [facility] [address]  Phone: [phone]  Fax: [phone]

## 2018-10-12 NOTE — PROGRESS NOTES
1. Have you been to the ER, urgent care clinic or hospitalized since your last visit? NO.     2. Have you seen or consulted any other health care providers outside of the [address] [place] [address] since your last visit (Include any pap smears or colon screening)?  NO

## 2018-10-12 NOTE — MR AVS SNAPSHOT
[address] [facility] [address] 
 
 
 [address], [facility] [address] [facility] [address] 
[phone] Patient: Lou Rojas MRN: [id number] Nasreen Ram Visit Information Date & Time Provider Department Dept. Phone Encounter #  
 10/12/2018  8:30 AM Kamron Choi NP [facility] [address] [place] [address] [phone] [id number] Your Appointments 11/13/2018  7:55 AM  
LAB with [facility] NURSE VISIT [facility] [address] [place] [address] ([facility]) Appt Note: lab  
 [address], [address] [place] [address] [address]  
  
   
 54[address] [facility], [address]  
  
    
 11/13/2018  8:30 AM  
Follow Up with Genevieve Sim NP  
[facility] ([facility]) Appt Note: 6 Mo F/U  
 [address] [place] [id number] [place] [address] 100 83 [facility]  
  
    
 11/20/2018  1:00 PM  
PHYSICAL with Julieta Farfan MD  
[facility] [address] [place] [address]) Appt Note:  6 months [address], Suite [facility] [address]  
  
   
 [address] [facility], [address]  
  
    
 2/27/2019  8:00 AM  
PROCEDURE with [facility] 1 [facility] ([facility]) Appt Note: RT JP Stent/ 1 year C Moon 1212 [facility] 404 200 [facility] [address]  
[phone] 2630 [facility] 1M07  
  
    
 2/27/2019  9:00 AM  
PROCEDURE with [facility] 1 [facility] ([facility]) Appt Note: CV 1 year M Moon 1212 [facility] 399 200 [facility] [address]  
[phone]  
  
    
 2/27/2019 10:00 AM  
PROCEDURE with [facility] NONIMAGING [facility] ([facility]) Appt Note: Leg Art 1 year M Red 1212 [place] [id number] [address] [address] [phone] 2300 [facility] Radha Clarke [address]  
  
    
 3/4/2019  9:00 AM  
Follow Up with Eric Luna MD  
600 [facility]) Appt Note: 1 year fup with studies 2300 [facility] Radha Clarke 108 706 [facility]  
[phone] 2300 [facility] Radha Clarke [address]  
  
    
 4/1/2019  8:30 AM  
Nurse Visit with Jonathan Mercado [facility] ([facility]) 20[address] [facility] [address] [facility] 1097 [facility]  
  
   
 [address] [facility] [address]  
  
    
  
 4/8/2019 10:15 AM  
Any with Krys Jordan MD  
[facility] [address] [place] [address]  
[phone]  
  
   
 [place] [address] Upcoming Health Maintenance Date Due Shingrix Vaccine Age 50> (1 of 2) 8/18/1995 MEDICARE YEARLY EXAM 6/30/2018 GLAUCOMA SCREENING Q2Y 6/25/2019 DTaP/Tdap/Td series (2 - Td) 4/3/2023 COLONOSCOPY 9/22/2025 Allergies as of 10/12/2018  Review Complete On: 10/12/2018 By: Leesa Skaggs LPN Severity Noted Reaction Type Reactions Altace [Ramipril]    Unknown (comments) Lipitor [Atorvastatin]    Other (comments) Muscle pain Lisinopril    Shortness of Breath, Other (comments) Turns bright red Other Medication  03/05/2014    Other (comments) Vicryl suture on skin tends to be rejected with poor wound healing does better with monocryl Procardia [Nifedipine]    Other (comments) Caused afib Rosuvastatin  02/12/2018    Other (comments) Muscle Pain Current Immunizations  Reviewed on 9/15/2017 Name Date Influenza High Dose Vaccine PF 9/15/2017 11:09 AM, 9/16/2016, 10/2/2015 12:30 PM  
 Influenza Vaccine 10/10/2014, 10/4/2013  Influenza Vaccine (Tri) Adjuvanted 10/12/2018  9:10 AM  
 Influenza Vaccine Split 10/10/2012  2:28 PM, 9/28/2011 Influenza Vaccine Whole 10/8/2010 Pneumococcal Conjugate (PCV-13) 12/15/2014  8:16 AM  
 Pneumococcal Polysaccharide (PPSV-23) 1/24/2014 Pneumococcal Vaccine (Unspecified Type) 1/1/2006 Td 1/1/2006 Tdap 4/3/2013  8:23 PM  
 Zoster Vaccine, Live 1/1/2007 Not reviewed this visit You Were Diagnosed With   
  
 Codes Comments Left anterior shoulder pain    -  Primary ICD-10-CM: M25.512 ICD-9-CM: 719.41 Viral upper respiratory tract infection     ICD-10-CM: J06.9 ICD-9-CM: 465.9 Wheezing     ICD-10-CM: R06.2 ICD-9-CM: 786.07 Shortness of breath     ICD-10-CM: R06.02 
ICD-9-CM: 786.05 Encounter for immunization     ICD-10-CM: G21 ICD-9-CM: V03.89 Vitals BP Pulse Temp Resp Height(growth percentile) Weight(growth percentile) 130/76 (BP 1 Location: Left arm, BP Patient Position: Sitting) 63 98 °F (36.7 °C) (Oral) 14 6' (1.829 m) 222 lb 9.6 oz (101 kg) SpO2 BMI Smoking Status 97% 30.19 kg/m2 Former Smoker Vitals History BMI and BSA Data Body Mass Index Body Surface Area  
 30.19 kg/m 2 2.27 m 2 Preferred Pharmacy Pharmacy Name Phone [facility] [place] [phone] Your Updated Medication List  
  
   
This list is accurate as of 10/12/18  9:35 AM.  Always use your most recent med list.  
  
  
  
  
 albuterol 90 mcg/actuation inhaler Commonly known as:  PROVENTIL HFA, VENTOLIN HFA, PROAIR HFA Take 2 Puffs by inhalation every six (6) hours as needed for Wheezing. Indications: BRONCHOSPASM PREVENTION  
  
 aspirin delayed-release 81 mg tablet Take 81 mg by mouth daily. atenolol 25 mg tablet Commonly known as:  TENORMIN  
TAKE ONE TABLET BY MOUTH TWICE A DAY cholecalciferol 1,000 unit Cap Commonly known as:  VITAMIN D3 Take 1,000 Units by mouth daily. coenzyme Q-10 200 mg capsule Commonly known as:  CO Q-10 Take  by mouth. diclofenac 1 % Gel Commonly known as:  VOLTAREN Apply 2-4 g to affected area four (4) times daily. ezetimibe-simvastatin 10-40 mg per tablet Commonly known as:  Vytorin 10/40 Take 1 Tab by mouth nightly.  
  
 gabapentin 300 mg capsule Commonly known as:  NEURONTIN  
1 cap every day to bid  Indications: NEUROPATHIC PAIN  
  
 HYDROcodone-acetaminophen 5-325 mg per tablet Commonly known as:  [place] Take 1 Tab by mouth every four (4) hours as needed (for chronic pain). inhalational spacing device 1 Each by Does Not Apply route as needed. LORazepam 1 mg tablet Commonly known as:  ATIVAN Take 1 Tab by mouth every eight (8) hours as needed. losartan 25 mg tablet Commonly known as:  COZAAR  
TAKE ONE TABLET BY MOUTH TWICE A DAY MULTIVITAMIN PO Take  by mouth daily. mupirocin 2 % ointment Commonly known as:  [employer] Apply  to affected area three (3) times daily. NAFTIN 2 % Gel Generic drug:  naftifine  
  
 naloxone 4 mg/actuation nasal spray Commonly known as:  [employer] Use 1 spray intranasally into 1 nostril. Use a new Narcan nasal spray for subsequent doses and administer into alternating nostrils. May repeat every 2 to 3 minutes as needed. OMEGA 3 FISH OIL PO Take 900 mg by mouth daily. polyethylene glycol 17 gram packet Commonly known as:  MIRALAX  
17 g. PROTONIX 40 mg tablet Generic drug:  pantoprazole Take 40 mg by mouth daily. triamterene-hydroCHLOROthiazide 37.5-25 mg per tablet Commonly known as:  [place] TAKE ONE TABLET BY MOUTH DAILY Prescriptions Sent to Pharmacy Refills  
 albuterol (PROVENTIL HFA, VENTOLIN HFA, PROAIR HFA) 90 mcg/actuation inhaler 0 Sig: Take 2 Puffs by inhalation every six (6) hours as needed for Wheezing. Indications: BRONCHOSPASM PREVENTION  Class: Normal  
 Pharmacy: Anisa Skaggs, [facility] [address] Ph #: [phone] Route: Inhalation  
 inhalational spacing device 0 Si Each by Does Not Apply route as needed. Class: Normal  
 Pharmacy: Anisa Skaggs, [facility] Ph #: [phone] Route: Does Not Apply We Performed the Following ADMIN INFLUENZA VIRUS VAC [ HCPCS] AMB POC EKG ROUTINE  LEADS, INTER & REP [92660 CPT(R)] INFLUENZA VACCINE INACTIVATED (IIV), SUBUNIT, ADJUVANTED, IM [id number] CPT(R)] Patient Instructions Vaccine Information Statement Influenza (Flu) Vaccine (Inactivated or Recombinant): What you need to know Many Vaccine Information Statements are available in U[place]bek and other languages. See www.immunize.org/vis Hojas de Información Sobre Vacunas están disponibles en Español y en muchos otros idiomas. Visite www.immunize.org/vis 1. Why get vaccinated? Influenza (flu) is a contagious disease that spreads around the [place] every year, usually between October and May. Flu is caused by influenza viruses, and is spread mainly by coughing, sneezing, and close contact. Anyone can get flu. Flu strikes suddenly and can last several days. Symptoms vary by age, but can include: 
 fever/chills  sore throat  muscle aches  fatigue  cough  headache  runny or stuffy nose Flu can also lead to pneumonia and blood infections, and cause diarrhea and seizures in children. If you have a medical condition, such as heart or lung disease, flu can make it worse. Flu is more dangerous for some people. Infants and young children, people 72years of age and older, pregnant women, and people with certain health conditions or a weakened immune system are at greatest risk. Each year thousands of people in the [facility] die from flu, and many more are hospitalized. Flu vaccine can:  keep you from getting flu, 
 make flu less severe if you do get it, and 
 keep you from spreading flu to your family and other people. 2. Inactivated and recombinant flu vaccines A dose of flu vaccine is recommended every flu season. Children 6 months through 6years of age may need two doses during the same flu season. Everyone else needs only one dose each flu season. Some inactivated flu vaccines contain a very small amount of a mercury-based preservative called thimerosal. Studies have not shown thimerosal in vaccines to be harmful, but flu vaccines that do not contain thimerosal are available. There is no live flu virus in flu shots. They cannot cause the flu. There are many flu viruses, and they are always changing. Each year a new flu vaccine is made to protect against three or four viruses that are likely to cause disease in the upcoming flu season. But even when the vaccine doesnt exactly match these viruses, it may still provide some protection Flu vaccine cannot prevent: 
 flu that is caused by a virus not covered by the vaccine, or 
 illnesses that look like flu but are not. It takes about 2 weeks for protection to develop after vaccination, and protection lasts through the flu season. 3. Some people should not get this vaccine Tell the person who is giving you the vaccine:  If you have any severe, life-threatening allergies. If you ever had a life-threatening allergic reaction after a dose of flu vaccine, or have a severe allergy to any part of this vaccine, you may be advised not to get vaccinated. Most, but not all, types of flu vaccine contain a small amount of egg protein.  If you ever had Guillain-Barré Syndrome (also called GBS). Some people with a history of GBS should not get this vaccine. This should be discussed with your doctor.  If you are not feeling well. It is usually okay to get flu vaccine when you have a mild illness, but you might be asked to come back when you feel better. 4. Risks of a vaccine reaction With any medicine, including vaccines, there is a chance of reactions. These are usually mild and go away on their own, but serious reactions are also possible. Most people who get a flu shot do not have any problems with it. Minor problems following a flu shot include:  
 soreness, redness, or swelling where the shot was given  hoarseness  sore, red or itchy eyes  cough  fever  aches  headache  itching  fatigue If these problems occur, they usually begin soon after the shot and last 1 or 2 days. More serious problems following a flu shot can include the following:  There may be a small increased risk of Guillain-Barré Syndrome (GBS) after inactivated flu vaccine. This risk has been estimated at 1 or 2 additional cases per million people vaccinated. This is much lower than the risk of severe complications from flu, which can be prevented by flu vaccine.  Young children who get the flu shot along with pneumococcal vaccine (PCV13) and/or DTaP vaccine at the same time might be slightly more likely to have a seizure caused by fever. Ask your doctor for more information. Tell your doctor if a child who is getting flu vaccine has ever had a seizure. Problems that could happen after any injected vaccine:  People sometimes faint after a medical procedure, including vaccination. Sitting or lying down for about 15 minutes can help prevent fainting, and injuries caused by a fall. Tell your doctor if you feel dizzy, or have vision changes or ringing in the ears.  Some people get severe pain in the shoulder and have difficulty moving the arm where a shot was given. This happens very rarely.  Any medication can cause a severe allergic reaction.  Such reactions from a vaccine are very rare, estimated at about 1 in a million doses, and would happen within a few minutes to a few hours after the vaccination. As with any medicine, there is a very remote chance of a vaccine causing a serious injury or death. The safety of vaccines is always being monitored. For more information, visit: www.cdc.gov/vaccinesafety/ 
 
 
The [facility] Vaccine Injury Compensation Program (VICP) is a federal program that was created to compensate people who may have been injured by certain vaccines. Persons who believe they may have been injured by a vaccine can learn about the program and about filing a claim by calling 4-169.529.5048 or visiting the [employer] website at www.[facility].gov/vaccinecompensation. There is a time limit to file a claim for compensation. 7. How can I learn more?  Ask your healthcare provider. He or she can give you the vaccine package insert or suggest other sources of information.  Call your local or state health department.  Contact the Centers for Disease Control and Prevention (CDC): 
- Call 3-327.873.4646 (1-800-CDC-INFO) or 
- Visit CDCs website at www.cdc.gov/flu Vaccine Information Statement Inactivated Influenza Vaccine 8/7/2015 
42 SHAGUFTA Singh [id number] Department of Health and [employer] Centers for Disease Control and Prevention Office Use Only Introducing [facility]! Dear Lucy Anand: Thank you for requesting a [employer] account. Our records indicate that you already have an active [employer] account. You can access your account anytime at [email or web address] Did you know that you can access your hospital and ER discharge instructions at any time in [employer]? You can also review all of your test results from your hospital stay or ER visit. Additional Information If you have questions, please visit the Frequently Asked Questions section of the [employer] website at [email or web address] Remember, [employer] is NOT to be used for urgent needs. For medical emergencies, dial 911. Now available from your iPhone and Android! Please provide this summary of care documentation to your next provider. Your primary care clinician is listed as Carlos Oleary. If you have any questions after today's visit, please call [phone].

## 2018-10-17 ENCOUNTER — TELEPHONE (OUTPATIENT)
Dept: ORTHOPEDIC SURGERY | Facility: CLINIC | Age: 73
End: 2018-10-17

## 2018-10-17 DIAGNOSIS — M17.11 PRIMARY OSTEOARTHRITIS OF RIGHT KNEE: Primary | ICD-10-CM

## 2018-10-17 NOTE — TELEPHONE ENCOUNTER
Patient called for Dr. Dudley Morillo  and said he would like to get the Euflexxa injections again for his right knee. That the last time he received the Euflexxa injections was on 05/04/2018. Patient would like the newer Euflexxa injections ordered and to let him know when it will be okay to schedule the appointments for the injections. Patient tel. [phone].

## 2018-10-17 NOTE — TELEPHONE ENCOUNTER
Order for Euflexxa placed. Patient notified that he must wait until after 11/4 to get the next series. Patient thanked writer for the call.

## 2018-11-07 ENCOUNTER — OFFICE VISIT (OUTPATIENT)
Dept: ORTHOPEDIC SURGERY | Age: 73
End: 2018-11-07

## 2018-11-07 VITALS
DIASTOLIC BLOOD PRESSURE: 77 MMHG | HEIGHT: 72 IN | HEART RATE: 65 BPM | WEIGHT: 226 LBS | RESPIRATION RATE: 16 BRPM | TEMPERATURE: 97.2 F | BODY MASS INDEX: 30.61 KG/M2 | SYSTOLIC BLOOD PRESSURE: 133 MMHG | OXYGEN SATURATION: 98 %

## 2018-11-07 DIAGNOSIS — M25.552 BILATERAL HIP PAIN: ICD-10-CM

## 2018-11-07 DIAGNOSIS — M17.11 PRIMARY OSTEOARTHRITIS OF RIGHT KNEE: Primary | ICD-10-CM

## 2018-11-07 DIAGNOSIS — M25.859 HIP IMPINGEMENT SYNDROME, UNSPECIFIED LATERALITY: ICD-10-CM

## 2018-11-07 DIAGNOSIS — M25.552 LEFT HIP PAIN: ICD-10-CM

## 2018-11-07 DIAGNOSIS — M16.11 PRIMARY OSTEOARTHRITIS OF RIGHT HIP: ICD-10-CM

## 2018-11-07 DIAGNOSIS — M25.461 EFFUSION OF BURSA OF RIGHT KNEE: ICD-10-CM

## 2018-11-07 DIAGNOSIS — I73.9 VASCULAR CLAUDICATION (HCC): ICD-10-CM

## 2018-11-07 DIAGNOSIS — M25.551 BILATERAL HIP PAIN: ICD-10-CM

## 2018-11-07 DIAGNOSIS — M16.12 PRIMARY OSTEOARTHRITIS OF LEFT HIP: ICD-10-CM

## 2018-11-07 RX ORDER — HYALURONATE SODIUM 10 MG/ML
2 SYRINGE (ML) INTRAARTICULAR ONCE
Qty: 2 ML | Refills: 0
Start: 2018-11-07 | End: 2018-11-07

## 2018-11-07 RX ORDER — BUPIVACAINE HYDROCHLORIDE 2.5 MG/ML
4 INJECTION, SOLUTION EPIDURAL; INFILTRATION; INTRACAUDAL ONCE
Qty: 4 ML | Refills: 0
Start: 2018-11-07 | End: 2018-11-07

## 2018-11-07 RX ORDER — BETAMETHASONE SODIUM PHOSPHATE AND BETAMETHASONE ACETATE 3; 3 MG/ML; MG/ML
6 INJECTION, SUSPENSION INTRA-ARTICULAR; INTRALESIONAL; INTRAMUSCULAR; SOFT TISSUE ONCE
Qty: 0.5 ML | Refills: 0
Start: 2018-11-07 | End: 2018-11-07

## 2018-11-07 NOTE — PROGRESS NOTES
Patient: Elvis Stover                MRN: [id number]       SSN: [id number] YOB: 1945        AGE: 68 y.o. SEX: male PCP: Rock Jac MD 
11/07/18 Chief Complaint Patient presents with  
 Hip Pain  
  sukhjinder hip pain  Knee Pain  
  right knee pain HISTORY:  Elvis Stover is a 68 y.o. male who is seen for right knee and L>R bilateral hip pain. He reports pain in his hips after walking more than one block. He has been experiencing increasing knee pain for the past few years. He reports no h/o injury. He notes pain with standing and walking. He has pain ascending/descending stairs. He reports increasing difficulty with everyday activities. He reports his knee pain will wake him up at night. He reports that he sleeps with a pillow between his legs for some relief. He reports he is bothered mostly by his left hip pain. He reports hip and knee startup pain and pain increased use. He reports relief from previous cortisone and visco supplementation injections by Dr. Maxx Bradshaw in the past. He reports that he would like to be cleared of his circulation problems before thinking about a knee replacement. He reports some right leg claudication with increased walking. He reports that he takes a diuretic that causes him to urinate frequently. He was previously seen by Dr. Maxx Bradshaw and VERO Holloway . He has a stent in his right femoral artery. He reports a blockage in his left [facility]. Pain Assessment  11/7/2018 Location of Pain Knee Location Modifiers Right Severity of Pain 4 Quality of Pain Aching;Dull; Hildegard Azul Quality of Pain Comment - Duration of Pain Persistent Frequency of Pain Constant Date Pain First Started - Aggravating Factors Stretching;Walking;Standing Aggravating Factors Comment - Limiting Behavior Yes Relieving Factors Elevation;Heat;Ice;Rest  
Relieving Factors Comment - Result of Injury No  
 Work-Related Injury - Type of Injury - Type of Injury Comment -  
 
Occupation, etc:  Mr. Heather Wright he lives with his wife in [place]. He has 3 sons not in the area. He retired 15 years ago as a  for the [employer] 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 He reports that he lost 20 pounds by exercising more recently while moving. He reports that he cut his right leg a few months ago. His wound took 2 months to heal. He enjoys doing water exercises. Current weight is 226 pounds. He is 6' tall. Lab Results Component Value Date/Time Hemoglobin A1c 5.9 (H) 05/08/2018 07:50 AM  
 
Weight Metrics 11/7/2018 10/12/2018 10/8/2018 10/3/2018 8/30/2018 8/27/2018 8/23/2018 Weight 226 lb 222 lb 9.6 oz 216 lb 222 lb 224 lb 9.6 oz 223 lb 9.6 oz 223 lb BMI 30.65 kg/m2 30.19 kg/m2 29.29 kg/m2 30.11 kg/m2 30.46 kg/m2 30.33 kg/m2 30.24 kg/m2 Patient Active Problem List  
Diagnosis Code  Colitis, ulcerative ([facility] [address] K51.90  Colon polyps K63.5  Peripheral vascular disease (HCC) Dr Katherin Ramires I73.9  Left carotid artery occlusion I65.22  
 Atherosclerosis of artery of extremity with intermittent claudication (HCC) I70.219  Hypovitaminosis D E55.9  Advance directive in chart Z78.9  Hypertension I11.9  Dyslipidemia E78.5  Coronary artery disease I25.10  Atrial fibrillation I48.91  
 Recurrent umbilical hernia Y38.1  ED (erectile dysfunction) of organic origin N52.9  
 IFG (impaired fasting glucose) R73.01  
 Cervical radiculopathy M54.12  
 Lumbar radiculopathy M54.16  
 Cervical spinal stenosis M48.02  
 Degeneration of cervical and lumbar spine, relative cervical stenosis M50.30  Ulnar neuritis, right G56.21  
 Overweight (BMI 25.0-29. 9) E66.3  History of prostate cancer Z85.46  
 Facet hypertrophy of lumbar region M47.896 REVIEW OF SYSTEMS: All Below are Negative except: See HPI 
 Constitutional: negative for fever, chills, and weight loss. Cardiovascular: negative for chest pain, claudication, leg swelling, SOB, DUONG Gastrointestinal: Negative for pain, N/V/C/D, Blood in stool or urine, dysuria,  hematuria, incontinence, pelvic pain. Musculoskeletal: See HPI Neurological: Negative for dizziness and weakness. Negative for headaches, Visual changes, confusion, seizures Phychiatric/Behavioral: Negative for depression, memory loss, substance  abuse. Extremities: Negative for hair changes, rash, or skin lesion changes. Hematologic: Negative for bleeding problems, bruising, pallor or swollen lymph  nodes Peripheral Vascular: No calf pain, no circulation deficits. Social History Socioeconomic History  Marital status:  Spouse name: Not on file  Number of children: Not on file  Years of education: Not on file  Highest education level: Not on file Social Needs  Financial resource strain: Not on file  Food insecurity - worry: Not on file  Food insecurity - inability: Not on file  Transportation needs - medical: Not on file  Transportation needs - non-medical: Not on file Occupational History  Not on file Tobacco Use  Smoking status: Former Smoker Packs/day: 1.50 Years: 25.00 Pack years: 37.50 Types: Cigarettes Last attempt to quit: 1/1/2003 Years since quitting: 15.8  Smokeless tobacco: Never Used Substance and Sexual Activity  Alcohol use: Yes Comment: RARELY  Drug use: No  
 Sexual activity: Yes  
  Partners: Female Birth control/protection: None Other Topics Concern  Not on file Social History Narrative  Not on file Allergies Allergen Reactions  Altace [Ramipril] Unknown (comments)  Lipitor [Atorvastatin] Other (comments) Muscle pain  Lisinopril Shortness of Breath and Other (comments) Turns bright red  Other Medication Other (comments) Vicryl suture on skin tends to be rejected with poor wound healing does better with monocryl  Procardia [Nifedipine] Other (comments) Caused afib  Rosuvastatin Other (comments) Muscle Pain Current Outpatient Medications Medication Sig  
 sodium hyaluronate (SUPARTZ FX/HYALGAN/GENIVSC) 10 mg/mL syrg injection 2 mL by Intra artICUlar route once for 1 dose.  betamethasone (CELESTONE SOLUSPAN) 6 mg/mL injection 1 mL by Intra artICUlar route once for 1 dose.  bupivacaine, PF, (MARCAINE, PF,) 0.25 % (2.5 mg/mL) injection 4 mL by Intra artICUlar route once for 1 dose.  albuterol (PROVENTIL HFA, VENTOLIN HFA, PROAIR HFA) 90 mcg/actuation inhaler Take 2 Puffs by inhalation every six (6) hours as needed for Wheezing. Indications: BRONCHOSPASM PREVENTION  inhalational spacing device 1 Each by Does Not Apply route as needed.  NAFTIN 2 % gel  HYDROcodone-acetaminophen (NORCO) 5-325 mg per tablet Take 1 Tab by mouth every four (4) hours as needed (for chronic pain).  atenolol (TENORMIN) 25 mg tablet TAKE ONE TABLET BY MOUTH TWICE A DAY  LORazepam (ATIVAN) 1 mg tablet Take 1 Tab by mouth every eight (8) hours as needed.  mupirocin (BACTROBAN) 2 % ointment Apply  to affected area three (3) times daily.  diclofenac (VOLTAREN) 1 % gel Apply 2-4 g to affected area four (4) times daily.  gabapentin (NEURONTIN) 300 mg capsule 1 cap every day to bid  Indications: NEUROPATHIC PAIN  
 naloxone (NARCAN) 4 mg/actuation nasal spray Use 1 spray intranasally into 1 nostril. Use a new Narcan nasal spray for subsequent doses and administer into alternating nostrils. May repeat every 2 to 3 minutes as needed.  polyethylene glycol (MIRALAX) 17 gram packet 17 g.  
 ezetimibe-simvastatin (VYTORIN 10/40) 10-40 mg per tablet Take 1 Tab by mouth nightly.   
 losartan (COZAAR) 25 mg tablet TAKE ONE TABLET BY MOUTH TWICE A DAY  
  aspirin delayed-release 81 mg tablet Take 81 mg by mouth daily.  triamterene-hydrochlorothiazide (MAXZIDE) 37.5-25 mg per tablet TAKE ONE TABLET BY MOUTH DAILY  Cholecalciferol, Vitamin D3, 1,000 unit cap Take 1,000 Units by mouth daily.  OMEGA-3 FATTY ACIDS/FISH OIL (OMEGA 3 FISH OIL PO) Take 900 mg by mouth daily.  MULTIVITAMIN PO Take  by mouth daily.  pantoprazole (PROTONIX) 40 mg tablet Take 40 mg by mouth daily.  coenzyme q10 200 mg Cap Take  by mouth. No current facility-administered medications for this visit. PHYSICAL EXAMINATION: 
Visit Vitals /77 Pulse 65 Temp 97.2 °F (36.2 °C) (Oral) Resp 16 Ht 6' (1.829 m) Wt 226 lb (102.5 kg) SpO2 98% BMI 30.65 kg/m² ORTHO EXAMINATION: 
Examination Right knee Left knee Skin Intact Intact Range of motion 120-0 120-0 Effusion +3 - Medial joint line tenderness + - Lateral joint line tenderness - - Popliteal tenderness - -  
Osteophytes palpable + - Kylahs - - Patella crepitus + - Anterior drawer - - Lateral laxity - - Medial laxity - - Varus deformity + -  
Valgus deformity - - Pretibial edema - - Calf tenderness - - Examination Right hip Left hip Skin Intact Intact External Rotation ROM 20 20 Internal Rotation ROM 10 10 Trochanteric tenderness + + Hip flexion contracture - - Antalgic gait - - Trendelenberg sign - - Lumbar tenderness - - Straight leg raise - - Calf tenderness - - Neurovascular Intact Intact PROCEDURE:  After timeout and under sterile conditions, right knee aspirated 60 cc of light yellow fluid. The fluid was discarded. After discussing treatment options, patient's right knee was injected with 2 cc of Euflexxa and patient's left hip was injected with 4 cc Marcaine and 1/2 cc Celestone.  
 
Chart reviewed for the following: 
 Hayder Carlton MD, have reviewed the History, Physical and updated the Allergic reactions for [facility] TIME OUT performed immediately prior to start of procedure: 
Thompson Lesch, MD, have performed the following reviews on [facility] prior to the start of the procedure: 
         
* Patient was identified by name and date of birth * Agreement on procedure being performed was verified * Risks and Benefits explained to the patient * Procedure site verified and marked as necessary * Patient was positioned for comfort * Consent was obtained Time: 9:09 AM  
 
Date of procedure: 11/7/2018 Procedure performed by:  Tuan Sweeney MD 
 
Mr. Amie Beckford tolerated the procedure well with no complications. RADIOGRAPHS: 
XR KLAUS HIP 11/7/18 SAWYER IMPRESSION:  AP pelvis and two views - No fractures, moderate joint space narrowing, acetabular osteophytes present. Tonnis grade 2. Femoral acetabular impingement syndrome XR RIGHT KNEE 4/13/18 SAWYER IMPRESSION:  Three views - No fractures, no effusion, severe end stage with lateral subluxation joint space narrowing, + osteophytes present. IKDC Grade C. calcification of arteries IMPRESSION:   
  ICD-10-CM ICD-9-CM 1. Primary osteoarthritis of right knee M17.11 715.16 DC DRAIN/INJECT LARGE JOINT/BURSA EUFLEXXA INJECTION PER DOSE  
   sodium hyaluronate (SUPARTZ FX/HYALGAN/GENIVSC) 10 mg/mL syrg injection 2. Bilateral hip pain M25.551 719.45 AMB POC X-RAY HIPS, BILAT, 2+ VIEWS  
 M25.552    
3. Hip impingement syndrome, unspecified laterality M25.859 726.5 4. Vascular claudication (HCC) I73.9 443.9 5. Primary osteoarthritis of left hip M16.12 715.15 betamethasone (CELESTONE SOLUSPAN) 6 mg/mL injection BETAMETHASONE ACETATE & SODIUM PHOSPHATE INJECTION 3 MG EA.  
   DRAIN/INJECT LARGE JOINT/BURSA  
   bupivacaine, PF, (MARCAINE, PF,) 0.25 % (2.5 mg/mL) injection 6. Left hip pain M25.552 719.45 betamethasone (CELESTONE SOLUSPAN) 6 mg/mL injection BETAMETHASONE ACETATE & SODIUM PHOSPHATE INJECTION 3 MG EA.  
   DRAIN/INJECT LARGE JOINT/BURSA  
   bupivacaine, PF, (MARCAINE, PF,) 0.25 % (2.5 mg/mL) injection 7. Primary osteoarthritis of right hip M16.11 715.15   
8. Effusion of bursa of right knee M25.461 719.06 ND DRAIN/INJECT LARGE JOINT/BURSA EUFLEXXA INJECTION PER DOSE  
   sodium hyaluronate (SUPARTZ FX/HYALGAN/GENIVSC) 10 mg/mL syrg injection PLAN:   After timeout and under sterile conditions, right knee aspirated 60 cc of light yellow fluid. The fluid was discarded. After discussing treatment options, patient's right knee was injected with 2 cc of Euflexxa and patient's left hip was injected with 4 cc Marcaine and 1/2 cc Celestone. He will follow up in one week for Euflexxa #2.    
 
Scribed by Giovana Mccarthy MD [address]) as dictated by Giovana Mccarthy MD

## 2018-11-09 ENCOUNTER — TELEPHONE (OUTPATIENT)
Dept: INTERNAL MEDICINE CLINIC | Age: 73
End: 2018-11-09

## 2018-11-09 DIAGNOSIS — M54.10 RADICULOPATHY, UNSPECIFIED SPINAL REGION: ICD-10-CM

## 2018-11-09 RX ORDER — HYDROCODONE BITARTRATE AND ACETAMINOPHEN 5; 325 MG/1; MG/1
1 TABLET ORAL
Qty: 180 TAB | Refills: 0 | Status: SHIPPED | OUTPATIENT
Start: 2018-11-09 | End: 2018-12-26 | Stop reason: SDUPTHER

## 2018-11-09 NOTE — TELEPHONE ENCOUNTER
[facility]  reports the last [facility] date for [facility] as 09/28/2018. There appears to be no inconsistencies in regards to the prescribing of this medication. Last Visit: 10/12/2018 with GILBERTO Jones   Next Appointment: 11/20/2018 with MD Jw Kiser  Previous Refill Encounters: 09/27/2018 per MD Jw Kiser #[id number]    Requested Prescriptions     Pending Prescriptions Disp Refills    HYDROcodone-acetaminophen (NORCO) 5-325 mg per tablet 180 Tab 0     Sig: Take 1 Tab by mouth every four (4) hours as needed (for chronic pain).

## 2018-11-13 ENCOUNTER — HOSPITAL ENCOUNTER (OUTPATIENT)
Dept: LAB | Age: 73
Discharge: HOME | End: 2018-11-13
Payer: MEDICARE

## 2018-11-13 ENCOUNTER — OFFICE VISIT (OUTPATIENT)
Dept: ORTHOPEDIC SURGERY | Age: 73
End: 2018-11-13

## 2018-11-13 VITALS
DIASTOLIC BLOOD PRESSURE: 75 MMHG | WEIGHT: 222 LBS | OXYGEN SATURATION: 100 % | RESPIRATION RATE: 16 BRPM | SYSTOLIC BLOOD PRESSURE: 141 MMHG | BODY MASS INDEX: 30.07 KG/M2 | HEIGHT: 72 IN | HEART RATE: 68 BPM | TEMPERATURE: 97.5 F

## 2018-11-13 DIAGNOSIS — M48.02 CERVICAL SPINAL STENOSIS: Primary | ICD-10-CM

## 2018-11-13 DIAGNOSIS — I11.9 BENIGN HYPERTENSIVE HEART DISEASE WITHOUT HEART FAILURE: ICD-10-CM

## 2018-11-13 DIAGNOSIS — R73.01 IFG (IMPAIRED FASTING GLUCOSE): ICD-10-CM

## 2018-11-13 DIAGNOSIS — M50.30 DEGENERATION OF CERVICAL INTERVERTEBRAL DISC: ICD-10-CM

## 2018-11-13 DIAGNOSIS — M46.1 SACROILIITIS (HCC): ICD-10-CM

## 2018-11-13 DIAGNOSIS — E55.9 HYPOVITAMINOSIS D: ICD-10-CM

## 2018-11-13 LAB
25(OH)D3 SERPL-MCNC: 36.6 NG/ML (ref 30–100)
CHOLEST SERPL-MCNC: 146 MG/DL
ERYTHROCYTE [DISTWIDTH] IN BLOOD BY AUTOMATED COUNT: 12.5 % (ref 11.6–14.5)
HBA1C MFR BLD: 6.1 % (ref 4.2–5.6)
HCT VFR BLD AUTO: 45.7 % (ref 36–48)
HDLC SERPL-MCNC: 51 MG/DL (ref 40–60)
HDLC SERPL: 2.9 {RATIO} (ref 0–5)
HGB BLD-MCNC: 15.4 G/DL (ref 13–16)
LDLC SERPL CALC-MCNC: 65.2 MG/DL (ref 0–100)
LIPID PROFILE,FLP: NORMAL
MCH RBC QN AUTO: 32 PG (ref 24–34)
MCHC RBC AUTO-ENTMCNC: 33.7 G/DL (ref 31–37)
MCV RBC AUTO: 95 FL (ref 74–97)
PLATELET # BLD AUTO: 164 K/UL (ref 135–420)
PMV BLD AUTO: 9.6 FL (ref 9.2–11.8)
RBC # BLD AUTO: 4.81 M/UL (ref 4.7–5.5)
TRIGL SERPL-MCNC: 149 MG/DL (ref ?–150)
VLDLC SERPL CALC-MCNC: 29.8 MG/DL
WBC # BLD AUTO: 7.9 K/UL (ref 4.6–13.2)

## 2018-11-13 PROCEDURE — 36415 COLL VENOUS BLD VENIPUNCTURE: CPT

## 2018-11-13 PROCEDURE — 82306 VITAMIN D 25 HYDROXY: CPT

## 2018-11-13 PROCEDURE — 80061 LIPID PANEL: CPT

## 2018-11-13 PROCEDURE — 85027 COMPLETE CBC AUTOMATED: CPT

## 2018-11-13 PROCEDURE — 83036 HEMOGLOBIN GLYCOSYLATED A1C: CPT

## 2018-11-13 RX ORDER — GABAPENTIN 300 MG/1
CAPSULE ORAL
Qty: 180 CAP | Refills: 1 | Status: SHIPPED | OUTPATIENT
Start: 2018-11-13 | End: 2019-10-29 | Stop reason: SDUPTHER

## 2018-11-13 RX ORDER — DICLOFENAC EPOLAMINE 0.01 G/1
1 PATCH TOPICAL EVERY 12 HOURS
Qty: 60 PATCH | Refills: 2 | Status: SHIPPED | OUTPATIENT
Start: 2018-11-13 | End: 2019-03-04

## 2018-11-13 NOTE — PROGRESS NOTES
Chief complaint/History of Present Illness: 
No chief complaint on file. RAF Greenwood is a  68 y.o.  male HISTORY OF PRESENT ILLNESS:  The patient comes in today for followup of his chronic neck and back pain. He has cervical stenosis, facet arthropathy, degenerative disc disease. He states he is doing okay. He does get neck pain that can radiate to the inside edge of his left shoulder blade. He gets back pain more centrally, spreads out to the bilateral buttocks. Sometimes that wakes him up at night. This pain is increased with walking. Last visit we increased his gabapentin 300 mg to 1 to 2 a day. He states he never really did increase it. He is still only taking 1 at night. It does make him a little bit sleepy. He uses Voltaren gel on his joints. It helps a little bit. He has started doing his home exercise program again. He has recently seen  and had Euflexxa injection in the right knee and an injection in the left hip. That was 11/07/2018. He is scheduled to return to him tomorrow. He is hoping to get the right hip injected. He does need a knee replacement on the right knee. However, due to vascular issues, he is unable to have it. He sees Dr. Hector Alvarez for his vascular issues. He takes Norco 5 mg/325 mg about q.6 to q.8 h. through his PCP. He is retired. He is a nonsmoker. He denies fever, bowel or bladder dysfunction. PHYSICAL EXAMINATION:  Mr. Slime Kim is a 77-year-old male. He is alert and oriented. Normal mood and affect. He has a full weightbearing nonantalgic gait. Normal tandem gait. He has 5/5 strength bilateral upper extremities. Negative Angel. He has 4/5 strength of his bilateral lower extremities. Negative straight leg raise. He is tender to palpation on his left SI joint.  
 
ASSESSMENT/PLAN:  This patient with cervical stenosis, cervical and lumbar facet arthropathy, degenerative disc disease, I think he has some left-sided sacroiliitis or SI joint dysfunction. We talked about the Lidoderm patch. He may get that over the counter and try that on his SI joint. I also gave him a prescription for a Flector patch. If his insurance pays enough for it, he may try that on his SI area also. He is going to try perhaps taking 2 of the 300 mg Neurontin at nighttime versus taking it b.i.d. We have refilled that for him. We will see him back in 6 months, sooner if needed. Review of systems: 
 
Past Medical History:  
Diagnosis Date  Adenoma of left adrenal gland 2009  1 cm, no change 1/15, 2/16  Asbestosis  Atrial fibrillation CHA2DS2-VAsc=3(age+, htn+, vasc dx+; estimated yearly stroke risk according to Lip et al. is 3,2%), Hasbled=2(estimated yearly bleeding risk according to pisters et al. is 1,88%); not anticoagulated due to h/o gi bleed  Atrial fibrillation ablation 10/08  Basal cell cancer Dr Doug Aldridge; he's had >350 lesions removed  Carotid occlusion   
 left  Cervical radiculopathy MRI 9/11 showed C3-6 severe foraminal stenosis  Chronic pain  Colon polyp Dr Leonarda Peace 9/15  Coronary artery disease RCA - 3.0 x 16mm TAXUS 9/04, 3.0 x 16mm TAXUS 12/06  Dyslipidemia  Erectile dysfunction  GERD   
 GI bleed  Hearing loss 2014  bilateral Dr Bonds Maffucci  Hernia, umbilical   
 Hypertension  Hypogonadism male  Lumbar radiculopathy Dr Zulma Harkins;  MRI 9/11 L4-5 disc bulging, annular tear, disc dessication  Myofascial pain dysfunction syndrome   
 pain clinic  Osteoarthritis   
 right knee Dr Francine Torres  Overweight (BMI 25.0-29. 9) IF 5/18  Peripheral vascular disease 50-60% R iliac; s/p R iliac stent and L femoral artery stent in past; R OLIVIA 0.76 (1/16)  Plantar fasciitis   
 bilateral   
 PPD positive  Prediabetes  Prostate cancer  T1c Esopus 7(3+4), 70% in 1 core, GS 7 (3+4) in 4 cores, GS 6 (3+3) in 1 core; psa 5.28, TRUS 18 gm;  Dr Nikhil Allen; s/p cryoablation 10/16  Recurrent umbilical hernia  Subclinical hypothyroidism   
 denies  Tinnitus Dr Harkins Catching  Ulcerative colitis  Venous insufficiency Past Surgical History:  
Procedure Laterality Date  CARDIAC SURG PROCEDURE UNLIST  04/2018 [address] Gely Pepe; echo nl lv, ef 60%, dilated RV, biatrial enlargement, trace AI  
 CARDIAC SURG PROCEDURE UNLIST  04/2018 [address] Gely Pepe; NST neg, ef 60%  HAND/FINGER SURGERY UNLISTED  2017  HX CAROTID ENDARTERECTOMY 1/14  right  HX CATARACT REMOVAL    
 HX COLONOSCOPY Dr Nikhil Allen 9/15 polyps  HX CRYOABLATION OF THE PROSTATE    
 10/16 [facility] [address]  HX ORTHOPAEDIC    
 right knee surgery  HX ORTHOPAEDIC  12/2017 Dr Daria Mccoy; R CTS release  HX REFRACTIVE SURGERY    
 OD (hemoplasty to right eye on retina to avoid blindness) [address]  CO LAP, RECURRENT INCISIONAL HERNIA REPAIR,REDUCIBLE N/A 02/09/2017 Dr. Jasmina Forrester  REPAIR ING HERNIA,5+Y/O,REDUCIBL    
 2/16  left  Dr. Jasmina Forrester  REPAIR UMBILICAL WXEQ,0+B/E,RTRIW    
 2/16  Dr. Jasmina Forrester  VASCULAR SURGERY PROCEDURE UNLIST    
 1/16  R OLIVIA 0.76, L OLIVIA 1.02  
 VASCULAR SURGERY PROCEDURE UNLIST    
 10/15   left fem art and left iliac stent Social History Socioeconomic History  Marital status:  Spouse name: Not on file  Number of children: Not on file  Years of education: Not on file  Highest education level: Not on file Social Needs  Financial resource strain: Not on file  Food insecurity - worry: Not on file  Food insecurity - inability: Not on file  Transportation needs - medical: Not on file  Transportation needs - non-medical: Not on file Occupational History  Not on file Tobacco Use  Smoking status: Former Smoker Packs/day: 1.50 Years: 25.00 Pack years: 37.50 Types: Cigarettes Last attempt to quit: 1/1/2003 Years since quitting: 15.8  Smokeless tobacco: Never Used Substance and Sexual Activity  Alcohol use: Yes Comment: RARELY  Drug use: No  
 Sexual activity: Yes  
  Partners: Female Birth control/protection: None Other Topics Concern  Not on file Social History Narrative  Not on file Family History Problem Relation Age of Onset  Heart Disease Mother  Hypertension Mother  Diabetes Mother Ase.Dudley Arthritis-osteo Mother  Heart Disease Father  Stroke Father  Hypertension Father  Heart Disease Sister  Hypertension Sister Physical Exam: 
Visit Vitals /75 Pulse 68 Temp 97.5 °F (36.4 °C) (Oral) Resp 16 Ht 6' (1.829 m) Wt 222 lb (100.7 kg) SpO2 100% BMI 30.11 kg/m² Pain Scale: 4/10  has been . reviewed and is appropriate Diagnoses and all orders for this visit: 1. Cervical spinal stenosis 2. Degeneration of cervical and lumbar spine, relative cervical stenosis 3. Sacroiliitis ([address] Other orders 
-     diclofenac (FLECTOR) 1.3 % pt12; 1 Patch by TransDERmal route every twelve (12) hours every twelve (12) hours. -     gabapentin (NEURONTIN) 300 mg capsule; 1 cap every day to bid Follow-up Disposition: 
Return in about 6 months (around 5/13/2019) for with NP. We have informed Raul Gibson to notify us for immediate appointment if he has any worsening neurogical symptoms or if an emergency situation presents, then call 911

## 2018-11-14 ENCOUNTER — OFFICE VISIT (OUTPATIENT)
Dept: ORTHOPEDIC SURGERY | Age: 73
End: 2018-11-14

## 2018-11-14 VITALS
OXYGEN SATURATION: 98 % | RESPIRATION RATE: 16 BRPM | DIASTOLIC BLOOD PRESSURE: 73 MMHG | WEIGHT: 223.2 LBS | HEART RATE: 62 BPM | BODY MASS INDEX: 30.23 KG/M2 | HEIGHT: 72 IN | SYSTOLIC BLOOD PRESSURE: 133 MMHG | TEMPERATURE: 97.2 F

## 2018-11-14 DIAGNOSIS — M16.11 PRIMARY OSTEOARTHRITIS OF RIGHT HIP: ICD-10-CM

## 2018-11-14 DIAGNOSIS — M17.11 PRIMARY OSTEOARTHRITIS OF RIGHT KNEE: Primary | ICD-10-CM

## 2018-11-14 DIAGNOSIS — M70.61 TROCHANTERIC BURSITIS, RIGHT HIP: ICD-10-CM

## 2018-11-14 RX ORDER — BETAMETHASONE SODIUM PHOSPHATE AND BETAMETHASONE ACETATE 3; 3 MG/ML; MG/ML
3 INJECTION, SUSPENSION INTRA-ARTICULAR; INTRALESIONAL; INTRAMUSCULAR; SOFT TISSUE ONCE
Qty: 0.5 ML | Refills: 0
Start: 2018-11-14 | End: 2018-11-14

## 2018-11-14 RX ORDER — HYALURONATE SODIUM 10 MG/ML
2 SYRINGE (ML) INTRAARTICULAR ONCE
Qty: 2 ML | Refills: 0
Start: 2018-11-14 | End: 2018-11-14

## 2018-11-14 NOTE — PROGRESS NOTES
Patient: Khris Walters                MRN: [id number]       SSN: [id number] YOB: 1945        AGE: 68 y.o. SEX: male PCP: Earnest Miramontes MD 
11/14/18 CC: RIGHT KNEE EFFUSION AND RIGHT HIP PAIN 
 
HISTORY:  Khris Walters is a 68 y.o. male who is seen for right knee swelling and R hip pain. He reports pain in his right hip after walking more than one block. He reports he is bothered mostly by his right hip pain. He reports hip and knee startup pain and pain increased use. He reports relief from left hip injection last OV. He notes hip pain with standing and walking. He reports relief from previous cortisone and visco supplementation injections by Dr. Angel Abbasi in the past.  
 
 He has been experiencing increasing knee pain for the past few years. He reports no h/o injury. He notes pain with standing and walking. He has pain ascending/descending stairs. He reports increasing difficulty with everyday activities. He reports his knee pain will wake him up at night. He reports that he sleeps with a pillow between his legs for some relief. He reports that he would like to be cleared of his circulation problems before thinking about a knee replacement. He reports some right leg claudication with increased walking. He reports that he takes a diuretic that causes him to urinate frequently. He was previously seen by Dr. Angel Abbasi and VERO Holloway . He has a stent in his right femoral artery. He reports a blockage in his left [facility]. Pain Assessment  11/14/2018 Location of Pain Knee Location Modifiers Right Severity of Pain 3 Quality of Pain Aching Quality of Pain Comment - Duration of Pain Persistent Frequency of Pain Constant Date Pain First Started - Aggravating Factors Standing;Walking Aggravating Factors Comment - Limiting Behavior -  
Relieving Factors Rest;Elevation;Heat;Ice Relieving Factors Comment - Result of Injury No  
 Work-Related Injury - Type of Injury - Type of Injury Comment -  
 
Occupation, etc:  Mr. Artie Lanza he lives with his wife in [facility]. He has 3 sons not in the area. He retired 15 years ago as a  for the [employer] 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 He reports that he lost 20 pounds by exercising more recently while moving. He reports that he cut his right leg a few months ago. His wound took 2 months to heal. He enjoys doing water exercises. Current weight is 226 pounds. He is 6' tall. Lab Results Component Value Date/Time Hemoglobin A1c 6.1 (H) 11/13/2018 07:50 AM  
 
Weight Metrics 11/14/2018 11/13/2018 11/7/2018 10/12/2018 10/8/2018 10/3/2018 8/30/2018 Weight 223 lb 3.2 oz 222 lb 226 lb 222 lb 9.6 oz 216 lb 222 lb 224 lb 9.6 oz BMI 30.27 kg/m2 30.11 kg/m2 30.65 kg/m2 30.19 kg/m2 29.29 kg/m2 30.11 kg/m2 30.46 kg/m2 Patient Active Problem List  
Diagnosis Code  Colitis, ulcerative ([facility] [address] K51.90  Colon polyps K63.5  Peripheral vascular disease (HCC) Dr Abel Paredes I73.9  Left carotid artery occlusion I65.22  
 Atherosclerosis of artery of extremity with intermittent claudication (HCC) I70.219  Hypovitaminosis D E55.9  Advance directive in chart Z78.9  Hypertension I11.9  Dyslipidemia E78.5  Coronary artery disease I25.10  Atrial fibrillation I48.91  
 Recurrent umbilical hernia D60.8  ED (erectile dysfunction) of organic origin N52.9  
 IFG (impaired fasting glucose) R73.01  
 Cervical radiculopathy M54.12  
 Lumbar radiculopathy M54.16  
 Cervical spinal stenosis M48.02  
 Degeneration of cervical and lumbar spine, relative cervical stenosis M50.30  Ulnar neuritis, right G56.21  
 Overweight (BMI 25.0-29. 9) E66.3  History of prostate cancer Z85.46  
 Facet hypertrophy of lumbar region M47.896 REVIEW OF SYSTEMS: All Below are Negative except: See HPI 
 Constitutional: negative for fever, chills, and weight loss. Cardiovascular: negative for chest pain, claudication, leg swelling, SOB, DUONG Gastrointestinal: Negative for pain, N/V/C/D, Blood in stool or urine, dysuria,  hematuria, incontinence, pelvic pain. Musculoskeletal: See HPI Neurological: Negative for dizziness and weakness. Negative for headaches, Visual changes, confusion, seizures Phychiatric/Behavioral: Negative for depression, memory loss, substance  abuse. Extremities: Negative for hair changes, rash, or skin lesion changes. Hematologic: Negative for bleeding problems, bruising, pallor or swollen lymph  nodes Peripheral Vascular: No calf pain, no circulation deficits. Social History Socioeconomic History  Marital status:  Spouse name: Not on file  Number of children: Not on file  Years of education: Not on file  Highest education level: Not on file Social Needs  Financial resource strain: Not on file  Food insecurity - worry: Not on file  Food insecurity - inability: Not on file  Transportation needs - medical: Not on file  Transportation needs - non-medical: Not on file Occupational History  Not on file Tobacco Use  Smoking status: Former Smoker Packs/day: 1.50 Years: 25.00 Pack years: 37.50 Types: Cigarettes Last attempt to quit: 1/1/2003 Years since quitting: 15.8  Smokeless tobacco: Never Used Substance and Sexual Activity  Alcohol use: Yes Comment: RARELY  Drug use: No  
 Sexual activity: Yes  
  Partners: Female Birth control/protection: None Other Topics Concern  Not on file Social History Narrative  Not on file Allergies Allergen Reactions  Altace [Ramipril] Unknown (comments)  Lipitor [Atorvastatin] Other (comments) Muscle pain  Lisinopril Shortness of Breath and Other (comments) Turns bright red  Other Medication Other (comments) Vicryl suture on skin tends to be rejected with poor wound healing does better with monocryl  Procardia [Nifedipine] Other (comments) Caused afib  Rosuvastatin Other (comments) Muscle Pain Current Outpatient Medications Medication Sig  
 sodium hyaluronate (SUPARTZ FX/HYALGAN/GENIVSC) 10 mg/mL syrg injection 2 mL by Intra artICUlar route once for 1 dose.  betamethasone (CELESTONE SOLUSPAN) 6 mg/mL injection 0.5 mL by Intra artICUlar route once for 1 dose.  diclofenac (FLECTOR) 1.3 % pt12 1 Patch by TransDERmal route every twelve (12) hours every twelve (12) hours.  gabapentin (NEURONTIN) 300 mg capsule 1 cap every day to bid  HYDROcodone-acetaminophen (NORCO) 5-325 mg per tablet Take 1 Tab by mouth every four (4) hours as needed (for chronic pain).  albuterol (PROVENTIL HFA, VENTOLIN HFA, PROAIR HFA) 90 mcg/actuation inhaler Take 2 Puffs by inhalation every six (6) hours as needed for Wheezing. Indications: BRONCHOSPASM PREVENTION  inhalational spacing device 1 Each by Does Not Apply route as needed.  NAFTIN 2 % gel  atenolol (TENORMIN) 25 mg tablet TAKE ONE TABLET BY MOUTH TWICE A DAY  LORazepam (ATIVAN) 1 mg tablet Take 1 Tab by mouth every eight (8) hours as needed.  mupirocin (BACTROBAN) 2 % ointment Apply  to affected area three (3) times daily.  diclofenac (VOLTAREN) 1 % gel Apply 2-4 g to affected area four (4) times daily.  naloxone (NARCAN) 4 mg/actuation nasal spray Use 1 spray intranasally into 1 nostril. Use a new Narcan nasal spray for subsequent doses and administer into alternating nostrils. May repeat every 2 to 3 minutes as needed.  polyethylene glycol (MIRALAX) 17 gram packet 17 g.  
 ezetimibe-simvastatin (VYTORIN 10/40) 10-40 mg per tablet Take 1 Tab by mouth nightly.  losartan (COZAAR) 25 mg tablet TAKE ONE TABLET BY MOUTH TWICE A DAY  aspirin delayed-release 81 mg tablet Take 81 mg by mouth daily.  triamterene-hydrochlorothiazide (MAXZIDE) 37.5-25 mg per tablet TAKE ONE TABLET BY MOUTH DAILY  Cholecalciferol, Vitamin D3, 1,000 unit cap Take 1,000 Units by mouth daily.  OMEGA-3 FATTY ACIDS/FISH OIL (OMEGA 3 FISH OIL PO) Take 900 mg by mouth daily.  MULTIVITAMIN PO Take  by mouth daily.  pantoprazole (PROTONIX) 40 mg tablet Take 40 mg by mouth daily.  coenzyme q10 200 mg Cap Take  by mouth. No current facility-administered medications for this visit. PHYSICAL EXAMINATION: 
Visit Vitals /73 Pulse 62 Temp 97.2 °F (36.2 °C) (Oral) Resp 16 Ht 6' (1.829 m) Wt 223 lb 3.2 oz (101.2 kg) SpO2 98% BMI 30.27 kg/m² ORTHO EXAMINATION: 
Examination Right knee Left knee Skin Intact Intact Range of motion 120-0 120-0 Effusion +4 - Medial joint line tenderness + - Lateral joint line tenderness - - Popliteal tenderness - -  
Osteophytes palpable + - Kylahs - - Patella crepitus + - Anterior drawer - - Lateral laxity - - Medial laxity - - Varus deformity + -  
Valgus deformity - - Pretibial edema - - Calf tenderness - - Examination Right hip Left hip Skin Intact Intact External Rotation ROM 20 20 Internal Rotation ROM 10 10 Trochanteric tenderness + - Hip flexion contracture - - Antalgic gait - - Trendelenberg sign - - Lumbar tenderness - - Straight leg raise - - Calf tenderness - - Neurovascular Intact Intact PROCEDURE:  After timeout and under sterile conditions, right knee aspirated 70 cc of light yellow fluid. The fluid was discarded. After discussing treatment options, patient's right knee was injected with 2 cc of Euflexxa and patient's right hip was injected with 4 cc Marcaine and 1/2 cc Celestone. Chart reviewed for the following: 
 Phylicia Sarabia MD, have reviewed the History, Physical and updated the Allergic reactions for [facility] TIME OUT performed immediately prior to start of procedure: 
Dano Burt MD, have performed the following reviews on [facility] prior to the start of the procedure: 
         
* Patient was identified by name and date of birth * Agreement on procedure being performed was verified * Risks and Benefits explained to the patient * Procedure site verified and marked as necessary * Patient was positioned for comfort * Consent was obtained Time: 9:09 AM  
 
Date of procedure: 11/14/2018 Procedure performed by:  Milind Rodriguez MD 
 
Mr. Lee Rodriguez tolerated the procedure well with no complications. RADIOGRAPHS: 
XR KLAUS HIP 11/7/18 SAWYER IMPRESSION:  AP pelvis and two views - No fractures, moderate joint space narrowing, acetabular osteophytes present. Tonnis grade 2. Femoral acetabular impingement syndrome XR RIGHT KNEE 4/13/18 SAWYER IMPRESSION:  Three views - No fractures, no effusion, severe end stage with lateral subluxation joint space narrowing, + osteophytes present. IKDC Grade C. calcification of arteries IMPRESSION:   
  ICD-10-CM ICD-9-CM 1. Primary osteoarthritis of right knee M17.11 715.16 NH DRAIN/INJECT LARGE JOINT/BURSA EUFLEXXA INJECTION PER DOSE  
   sodium hyaluronate (SUPARTZ FX/HYALGAN/GENIVSC) 10 mg/mL syrg injection 2. Primary osteoarthritis of right hip M16.11 715.15 DRAIN/INJECT LARGE JOINT/BURSA  
   betamethasone (CELESTONE SOLUSPAN) 6 mg/mL injection BETAMETHASONE ACETATE & SODIUM PHOSPHATE INJECTION 3 MG EA.  
3. Trochanteric bursitis, right hip M70.61 726.5 PLAN:   After timeout and under sterile conditions, right knee aspirated 70 cc of light yellow fluid. The fluid was discarded. After discussing treatment options, patient's right knee was injected with 2 cc of Euflexxa and patient's rightt hip was injected with 4 cc Marcaine and 1/2 cc Celestone. He will follow up in one week for Euflexxa #3. Scribed by MD Julius Kim) as dictated by Thomas Plunkett MD

## 2018-11-17 NOTE — PROGRESS NOTES
68 y.o. WHITE OR  male who presents for evaluation. He reports no cardiovascular complaints and sees Dr Karli Love The vascular issues are stable and he sees Dr Adrien Collazo. He has stable claudication although walking is now limited by the arthritis. Seeing Dr Nelli Rendon and was told he has significant bilat hip arthritis. Also the right knee is ready for TKR although they want the vascular issues cleared up before any surgery is done Then pain is controlled and  regularly reviewed and no discrepancies Denies any recent gi sx. Remains on ppi therapy and no alarm complaints. The UC has not been flaring 
  
Denies polyuria, polydipsia, nocturia, vision change. Not checking sugars at this time. He tried to do IF but didn't succeed as he 'likes breakfast too much' Vitals 11/20/2018 11/14/2018 11/13/2018 11/7/2018 10/12/2018 Weight 219 lb 223 lb 3.2 oz 222 lb 226 lb 222 lb 9.6 oz He will f/u w Dr Edu Lynch later this year regarding the prostate ca LAST MEDICARE WELLNESS EXAM: 6/23/16, 6/29/17, 11/20/18 Past Medical History:  
Diagnosis Date  Adenoma of left adrenal gland 2009  1 cm, no change 1/15, 2/16  Asbestosis  Atrial fibrillation CHA2DS2-VAsc=3(age+, htn+, vasc dx+; estimated yearly stroke risk according to Lip et al. is 3,2%), Hasbled=2(estimated yearly bleeding risk according to pisters et al. is 1,88%); not anticoagulated due to h/o gi bleed  Atrial fibrillation ablation 10/08  Basal cell cancer Dr [place]; he's had >350 lesions removed  Carotid occlusion   
 left  Cervical radiculopathy MRI 9/11 showed C3-6 severe foraminal stenosis  Chronic pain  Colon polyp Dr Edu Lynch 9/15  Coronary artery disease RCA - 3.0 x 16mm TAXUS 9/04, 3.0 x 16mm TAXUS 12/06  Dyslipidemia  Erectile dysfunction  GERD   
 GI bleed  Hearing loss 2014  bilateral Dr Carpenter Median  Hernia, umbilical   
 Hypertension  Hypogonadism male  Lumbar radiculopathy Dr Payton Wilkinson;  MRI 9/11 L4-5 disc bulging, annular tear, disc dessication  Myofascial pain dysfunction syndrome   
 pain clinic  Osteoarthritis   
 right knee Dr Jim Olszewski  Overweight (BMI 25.0-29. 9) IF 5/18 start weight 214 lbs, not doing  Peripheral vascular disease 50-60% R iliac; s/p R iliac stent and L femoral artery stent in past; R OLIVIA 0.76 (1/16)  Plantar fasciitis   
 bilateral   
 PPD positive  Prediabetes  Prostate cancer T1c Imelda 7(3+4), 70% in 1 core, GS 7 (3+4) in 4 cores, GS 6 (3+3) in 1 core; psa 5.28, TRUS 18 gm;  Dr Samira Landa; s/p cryoablation 10/16  Recurrent umbilical hernia  Subclinical hypothyroidism   
 denies  Tinnitus Dr Scott Shelby  Ulcerative colitis  Venous insufficiency Past Surgical History:  
Procedure Laterality Date  CARDIAC SURG PROCEDURE UNLIST  04/2018 [address] Beck De La Garza; echo nl lv, ef 60%, dilated RV, biatrial enlargement, trace AI  
 CARDIAC SURG PROCEDURE UNLIST  04/2018 [address] [facility] Dr Beck De La Garza; NST neg, ef 60%  HAND/FINGER SURGERY UNLISTED  2017  HX CAROTID ENDARTERECTOMY 1/14  right  HX CATARACT REMOVAL    
 HX COLONOSCOPY Dr Samira Landa 9/15 polyps  HX CRYOABLATION OF THE PROSTATE    
 10/16 [facility] [address]  HX ORTHOPAEDIC    
 right knee surgery  HX ORTHOPAEDIC  12/2017 Dr Silvia Junior; R CTS release  HX REFRACTIVE SURGERY    
 OD (hemoplasty to right eye on retina to avoid blindness) [address]  CO LAP, RECURRENT INCISIONAL HERNIA REPAIR,REDUCIBLE N/A 02/09/2017 Dr. Desir Records  REPAIR ING HERNIA,5+Y/O,REDUCIBL    
 2/16  left  Dr. Desir Records  REPAIR UMBILICAL RHSY,7+W/N,UOQQM    
 2/16  Dr. Dseir Records  VASCULAR SURGERY PROCEDURE UNLIST    
 1/16  R OLIVIA 0.76, L OLIVIA 1.02  
 VASCULAR SURGERY PROCEDURE UNLIST    
 10/15   left fem art and left iliac stent Social History Socioeconomic History  Marital status:  Spouse name: Not on file  Number of children: Not on file  Years of education: Not on file  Highest education level: Not on file Social Needs  Financial resource strain: Not on file  Food insecurity - worry: Not on file  Food insecurity - inability: Not on file  Transportation needs - medical: Not on file  Transportation needs - non-medical: Not on file Occupational History  Not on file Tobacco Use  Smoking status: Former Smoker Packs/day: 1.50 Years: 25.00 Pack years: 37.50 Types: Cigarettes Last attempt to quit: 1/1/2003 Years since quitting: 15.8  Smokeless tobacco: Never Used Substance and Sexual Activity  Alcohol use: Yes Comment: RARELY  Drug use: No  
 Sexual activity: Yes  
  Partners: Female Birth control/protection: None Other Topics Concern  Not on file Social History Narrative  Not on file Current Outpatient Medications Medication Sig  
 gabapentin (NEURONTIN) 300 mg capsule 1 cap every day to bid  HYDROcodone-acetaminophen (NORCO) 5-325 mg per tablet Take 1 Tab by mouth every four (4) hours as needed (for chronic pain).  atenolol (TENORMIN) 25 mg tablet TAKE ONE TABLET BY MOUTH TWICE A DAY  LORazepam (ATIVAN) 1 mg tablet Take 1 Tab by mouth every eight (8) hours as needed.  diclofenac (VOLTAREN) 1 % gel Apply 2-4 g to affected area four (4) times daily.  polyethylene glycol (MIRALAX) 17 gram packet 17 g.  
 ezetimibe-simvastatin (VYTORIN 10/40) 10-40 mg per tablet Take 1 Tab by mouth nightly.  losartan (COZAAR) 25 mg tablet TAKE ONE TABLET BY MOUTH TWICE A DAY  aspirin delayed-release 81 mg tablet Take 81 mg by mouth daily.  triamterene-hydrochlorothiazide (MAXZIDE) 37.5-25 mg per tablet TAKE ONE TABLET BY MOUTH DAILY  Cholecalciferol, Vitamin D3, 1,000 unit cap Take 1,000 Units by mouth daily.  OMEGA-3 FATTY ACIDS/FISH OIL (OMEGA 3 FISH OIL PO) Take 900 mg by mouth daily.  MULTIVITAMIN PO Take  by mouth daily.  pantoprazole (PROTONIX) 40 mg tablet Take 40 mg by mouth daily.  coenzyme q10 200 mg Cap Take  by mouth.  diclofenac (FLECTOR) 1.3 % pt12 1 Patch by TransDERmal route every twelve (12) hours every twelve (12) hours. (Patient taking differently: 1 Patch by TransDERmal route every twelve (12) hours every twelve (12) hours.)  albuterol (PROVENTIL HFA, VENTOLIN HFA, PROAIR HFA) 90 mcg/actuation inhaler Take 2 Puffs by inhalation every six (6) hours as needed for Wheezing. Indications: BRONCHOSPASM PREVENTION (Patient taking differently: Take 2 Puffs by inhalation every six (6) hours as needed for Wheezing.)  inhalational spacing device 1 Each by Does Not Apply route as needed.  NAFTIN 2 % gel  mupirocin (BACTROBAN) 2 % ointment Apply  to affected area three (3) times daily.  naloxone (NARCAN) 4 mg/actuation nasal spray Use 1 spray intranasally into 1 nostril. Use a new Narcan nasal spray for subsequent doses and administer into alternating nostrils. May repeat every 2 to 3 minutes as needed. No current facility-administered medications for this visit. Allergies Allergen Reactions  Altace [Ramipril] Unknown (comments)  Lipitor [Atorvastatin] Other (comments) Muscle pain  Lisinopril Shortness of Breath and Other (comments) Turns bright red  Other Medication Other (comments) Vicryl suture on skin tends to be rejected with poor wound healing does better with monocryl  Procardia [Nifedipine] Other (comments) Caused afib  Rosuvastatin Other (comments) Muscle Pain REVIEW OF SYSTEMS: colo 9/15 Dr Hui Da Silva, sees Dr Krystle Alvarenga  no vision change or eye pain Oral  no mouth pain, tongue or tooth problems Ears  no hearing loss, ear pain, fullness, no swallowing problems Cardiac  no CP, PND, orthopnea, edema, palpitations or syncope Chest  no breast masses Resp  no wheezing, chronic coughing, dyspnea GI  no heartburn, nausea, vomiting, change in bowel habits, bleeding, hemorrhoids Urinary  no dysuria, hematuria, flank pain, urgency, frequency Genitals  no genital lesions, discharge, masses, ulceration, warts Visit Vitals /62 Pulse 65 Temp 98.8 °F (37.1 °C) (Oral) Resp 14 Ht 6' (1.829 m) Wt 219 lb (99.3 kg) SpO2 97% BMI 29.70 kg/m² A&O x3 Affect is appropriate. Mood stable No apparent distress Anicteric, no JVD, adenopathy or thyromegaly. No carotid bruits or radiated murmur Lungs clear to auscultation, no wheezes or rales Heart showed regular rhythm. No rubs, gallops, 1-2/6 karli rsb Abdomen soft nontender, no hepatosplenomegaly or masses. Extremities without edema. Pulses 0-1 on right, 1-2 on left LABS From 12/12 showed gluc 101, cr 0.77, gfr 94,   alt 26,           chol 182, tg 159, hdl 52, ldl-c 98, wbc 7.6, hb 15.4, plt 171, tsh 0.96, psa 3.20 From 7/13 showed                          test 263 From 1/14 showed   gluc 112, cr 0.78, gfr 107, alt 17,           chol 130, tg 129, hdl 37, ldl-c 67, wbc 6.8, hb 15.1, plt 186, tsh 0.64, psa 3.60, vit d 26.8 From 6/14 showed   gluc 101, cr 0.57, gfr>60,     hba1c 5.7, vit d 34.3 From 12/14 showed         hba1c 5.9, chol 141, tg 104, hdl 42, ldl-c 78, wbc 8.5, hb 14.8, plt 147, tsh 0.75, psa 5.60, vit d 31.8, ua neg From 6/15 showed   gluc 104, cr 0.75, gfr>60,     hba1c 5.9 From 8/15 showed                      tsh 0.44, psa 5.28,         test 215, lh 6.1, prl 11.1, ft4 1.61 From 12/15 showed gluc 95,   cr 0.71, gfr>60,  alt 36, hba1c 5.9, chol 135, tg 105, hdl 44, ldl-c 70,           tsh 0.51,     vit d 29.1 From 1/16 showed   gluc 112, cr 0.72, gfr>60,          wbc 7.8, hb 15.1, plt 163,            ua neg From 11/16 showed gluc 103, cr 0.70,           wbc 8.6, hb 14.5, plt 189, ck/trop neg From 12/16 showed gluc 89,   cr 0.70, gfr>60,  alt 31, hba1c 5.9, chol 159, tg 124, hdl 58, ldl-c 76, wbc 9.0, hb 15.1, plt 185,      vit d 27.8 From 3/17 showed   gluc 100, cr 0.75, gfr>60,     hba1c 5.9,               tsh 0.78, psa 0.18, ft4 1.30 From 6/17 showed   gluc 96,   cr 0.74, gfr>60, alt 34,  hba1c 5.7, chol 135, tg 71,   hdl 53, ldl-c 68 From 1/18 showed   gluc 107, cr 0.65, gfr>60, alt 31,  hba1c 5.8, chol 147, tg 105, hdl 46, ldl-c 80 From 5/18 showed   gluc 100, cr 0.73, gfr>60, alt 38,  hba1c 5.9, chol 152, tg 59,   hdl 66, ldl-c 74 Results for orders placed or performed during the hospital encounter of 11/13/18 CBC W/O DIFF Result Value Ref Range WBC 7.9 4.6 - 13.2 K/uL  
 RBC 4.81 4.70 - 5.50 M/uL  
 HGB 15.4 13.0 - 16.0 g/dL HCT 45.7 36.0 - 48.0 % MCV 95.0 74.0 - 97.0 FL  
 MCH 32.0 24.0 - 34.0 PG  
 MCHC 33.7 31.0 - 37.0 g/dL  
 RDW 12.5 11.6 - 14.5 % PLATELET 921 836 - 492 K/uL MPV 9.6 9.2 - 11.8 FL  
HEMOGLOBIN A1C W/O EAG Result Value Ref Range Hemoglobin A1c 6.1 (H) 4.2 - 5.6 % LIPID PANEL Result Value Ref Range LIPID PROFILE Cholesterol, total 146 <200 MG/DL Triglyceride 149 <150 MG/DL  
 HDL Cholesterol 51 40 - 60 MG/DL  
 LDL, calculated 65.2 0 - 100 MG/DL VLDL, calculated 29.8 MG/DL  
 CHOL/HDL Ratio 2.9 0 - 5.0    
VITAMIN D, 25 HYDROXY Result Value Ref Range Vitamin D 25-Hydroxy 36.6 30 - 100 ng/mL Patient Active Problem List  
Diagnosis Code  Colitis, ulcerative ([facility] [address] K51.90  Colon polyps K63.5  Peripheral vascular disease (HCC) Dr Sander Combs I73.9  Left carotid artery occlusion I65.22  
 Atherosclerosis of artery of extremity with intermittent claudication (HCC) I70.219  Hypovitaminosis D E55.9  Advance directive in chart Z78.9  Hypertension I11.9  Dyslipidemia E78.5  Coronary artery disease I25.10  Atrial fibrillation I48.91  
 Recurrent umbilical hernia H27.1  ED (erectile dysfunction) of organic origin N52.9  
 IFG (impaired fasting glucose) R73.01  
 Cervical radiculopathy M54.12  
 Lumbar radiculopathy M54.16  
 Cervical spinal stenosis M48.02  
 Degeneration of cervical and lumbar spine, relative cervical stenosis M50.30  Ulnar neuritis, right G56.21  
 Overweight (BMI 25.0-29. 9) E66.3  History of prostate cancer Z85.46  
 Facet hypertrophy of lumbar region M47.896 Assessment and plan: 1. Cardiac. Continue current regimen. F/U Dr Deepika Winston 2. Vascular. Continue current regimen. F/U Chaka Durham 3. Dyslipidemia. Continue current regimen. 4. PreDM. Lifestyle and dietary measures, wt loss as he is doing 5. Hypovit d. Supplement 6. Colon polyp. Fiber, colo 2020 7. Prostate ca. Per Dr Drea Munoz 8. Afib. Per Dr Deepika Winston 9. Ortho. F/U Dr Andreas Kuhn 10. Spine. F/U Dr Joe rollins 11. Overweight. Lifestyle and dietary measures. Portion control RTC 5/19 Above conditions discussed at length and patient vocalized understanding. All questions answered to patient satisfaction

## 2018-11-17 NOTE — PROGRESS NOTES
This is a subsequent Medicare Annual Wellness Exam  
 
I have reviewed the patient's medical history in detail and updated the computerized patient record. History Past Medical History:  
Diagnosis Date  Adenoma of left adrenal gland 2009  1 cm, no change 1/15, 2/16  Asbestosis  Atrial fibrillation CHA2DS2-VAsc=3(age+, htn+, vasc dx+; estimated yearly stroke risk according to Lip et al. is 3,2%), Hasbled=2(estimated yearly bleeding risk according to pisters et al. is 1,88%); not anticoagulated due to h/o gi bleed  Atrial fibrillation ablation 10/08  Basal cell cancer Dr Abby Chavez; he's had >350 lesions removed  Carotid occlusion   
 left  Cervical radiculopathy MRI 9/11 showed C3-6 severe foraminal stenosis  Chronic pain  Colon polyp Dr Dorinda Sanz 9/15  Coronary artery disease RCA - 3.0 x 16mm TAXUS 9/04, 3.0 x 16mm TAXUS 12/06  Dyslipidemia  Erectile dysfunction  GERD   
 GI bleed  Hearing loss 2014  bilateral Dr Mone Gutierrez  Hernia, umbilical   
 Hypertension  Hypogonadism male  Lumbar radiculopathy Dr Yosef Gagnon;  MRI 9/11 L4-5 disc bulging, annular tear, disc dessication  Myofascial pain dysfunction syndrome   
 pain clinic  Osteoarthritis   
 right knee Dr Stephani Beaver  Overweight (BMI 25.0-29. 9) IF 5/18 start weight 214 lbs, not doing  Peripheral vascular disease 50-60% R iliac; s/p R iliac stent and L femoral artery stent in past; R OLIVIA 0.76 (1/16)  Plantar fasciitis   
 bilateral   
 PPD positive  Prediabetes  Prostate cancer T1c Imelda 7(3+4), 70% in 1 core, GS 7 (3+4) in 4 cores, GS 6 (3+3) in 1 core; psa 5.28, TRUS 18 gm;  Dr Dorinda Sanz; s/p cryoablation 10/16  Recurrent umbilical hernia  Subclinical hypothyroidism   
 denies  Tinnitus Dr Mone Gutierrez  Ulcerative colitis  Venous insufficiency Past Surgical History:  
Procedure Laterality Date  CARDIAC SURG PROCEDURE UNLIST  04/2018 [address] Gely Pepe; echo nl lv, ef 60%, dilated RV, biatrial enlargement, trace AI  
 CARDIAC SURG PROCEDURE UNLIST  04/2018 [address] Gely Pepe; NST neg, ef 60%  HAND/FINGER SURGERY UNLISTED  2017  HX CAROTID ENDARTERECTOMY 1/14  right  HX CATARACT REMOVAL    
 HX COLONOSCOPY Dr Nikhil Allen 9/15 polyps  HX CRYOABLATION OF THE PROSTATE    
 10/16 [facility] [address]  HX ORTHOPAEDIC    
 right knee surgery  HX ORTHOPAEDIC  12/2017 Dr Daria Mccoy; R CTS release  HX REFRACTIVE SURGERY    
 OD (hemoplasty to right eye on retina to avoid blindness) [address]  LA LAP, RECURRENT INCISIONAL HERNIA REPAIR,REDUCIBLE N/A 02/09/2017 Dr. Jasmina Forrester  REPAIR ING HERNIA,5+Y/O,REDUCIBL    
 2/16  left  Dr. Jasmina Forrester  REPAIR UMBILICAL TJKU,5+K/G,OCUBG    
 2/16  Dr. Jasmina Forrester  VASCULAR SURGERY PROCEDURE UNLIST    
 1/16  R OLIVIA 0.76, L OLIVIA 1.02  
 VASCULAR SURGERY PROCEDURE UNLIST    
 10/15   left fem art and left iliac stent Social History Socioeconomic History  Marital status:  Spouse name: Not on file  Number of children: Not on file  Years of education: Not on file  Highest education level: Not on file Social Needs  Financial resource strain: Not on file  Food insecurity - worry: Not on file  Food insecurity - inability: Not on file  Transportation needs - medical: Not on file  Transportation needs - non-medical: Not on file Occupational History  Not on file Tobacco Use  Smoking status: Former Smoker Packs/day: 1.50 Years: 25.00 Pack years: 37.50 Types: Cigarettes Last attempt to quit: 1/1/2003 Years since quitting: 15.8  Smokeless tobacco: Never Used Substance and Sexual Activity  Alcohol use: Yes Comment: RARELY  Drug use: No  
 Sexual activity: Yes  
  Partners: Female Birth control/protection: None Other Topics Concern  Not on file Social History Narrative  Not on file Family History Problem Relation Age of Onset  Heart Disease Mother  Hypertension Mother  Diabetes Mother [facility] Arthritis-osteo Mother  Heart Disease Father  Stroke Father  Hypertension Father  Heart Disease Sister  Hypertension Sister Current Outpatient Medications Medication Sig  
 gabapentin (NEURONTIN) 300 mg capsule 1 cap every day to bid  HYDROcodone-acetaminophen (NORCO) 5-325 mg per tablet Take 1 Tab by mouth every four (4) hours as needed (for chronic pain).  atenolol (TENORMIN) 25 mg tablet TAKE ONE TABLET BY MOUTH TWICE A DAY  LORazepam (ATIVAN) 1 mg tablet Take 1 Tab by mouth every eight (8) hours as needed.  diclofenac (VOLTAREN) 1 % gel Apply 2-4 g to affected area four (4) times daily.  polyethylene glycol (MIRALAX) 17 gram packet 17 g.  
 ezetimibe-simvastatin (VYTORIN 10/40) 10-40 mg per tablet Take 1 Tab by mouth nightly.  losartan (COZAAR) 25 mg tablet TAKE ONE TABLET BY MOUTH TWICE A DAY  aspirin delayed-release 81 mg tablet Take 81 mg by mouth daily.  triamterene-hydrochlorothiazide (MAXZIDE) 37.5-25 mg per tablet TAKE ONE TABLET BY MOUTH DAILY  Cholecalciferol, Vitamin D3, 1,000 unit cap Take 1,000 Units by mouth daily.  OMEGA-3 FATTY ACIDS/FISH OIL (OMEGA 3 FISH OIL PO) Take 900 mg by mouth daily.  MULTIVITAMIN PO Take  by mouth daily.  pantoprazole (PROTONIX) 40 mg tablet Take 40 mg by mouth daily.  coenzyme q10 200 mg Cap Take  by mouth.  diclofenac (FLECTOR) 1.3 % pt12 1 Patch by TransDERmal route every twelve (12) hours every twelve (12) hours. (Patient taking differently: 1 Patch by TransDERmal route every twelve (12) hours every twelve (12) hours.)  albuterol (PROVENTIL HFA, VENTOLIN HFA, PROAIR HFA) 90 mcg/actuation inhaler Take 2 Puffs by inhalation every six (6) hours as needed for Wheezing. Indications: BRONCHOSPASM PREVENTION (Patient taking differently: Take 2 Puffs by inhalation every six (6) hours as needed for Wheezing.)  inhalational spacing device 1 Each by Does Not Apply route as needed.  NAFTIN 2 % gel  mupirocin (BACTROBAN) 2 % ointment Apply  to affected area three (3) times daily.  naloxone (NARCAN) 4 mg/actuation nasal spray Use 1 spray intranasally into 1 nostril. Use a new Narcan nasal spray for subsequent doses and administer into alternating nostrils. May repeat every 2 to 3 minutes as needed. No current facility-administered medications for this visit. Allergies Allergen Reactions  Altace [Ramipril] Unknown (comments)  Lipitor [Atorvastatin] Other (comments) Muscle pain  Lisinopril Shortness of Breath and Other (comments) Turns bright red  Other Medication Other (comments) Vicryl suture on skin tends to be rejected with poor wound healing does better with monocryl  Procardia [Nifedipine] Other (comments) Caused afib  Rosuvastatin Other (comments) Muscle Pain Depression Risk Factor Screening: PHQ 2 / 9, over the last two weeks 6/23/2016 Little interest or pleasure in doing things Not at all Feeling down, depressed or hopeless Not at all Total Score PHQ 2 0 SCREENINGS Colonoscopy last done 9/15 Dr Jeromy Mast Prostate Dr Jeromy Mast Alcohol Risk Factor Screening: On any occasion during the past 3 months, have you had more than 4 drinks containing alcohol? No 
 
Do you average more than 14 drinks per week? No 
 
Functional Ability and Level of Safety:  
 
Hearing Loss  
normal-to-mild Sees ophth Activities of Daily Living Self-care. Requires assistance with: no ADLs Fall Risk Fall Risk Assessment, last 12 mths 6/23/2016 Able to walk? Yes Fall in past 12 months? Yes Fall with injury? No  
Number of falls in past 12 months 1 Fall Risk Score 1 Abuse Screen Patient is not abused Review of Systems A comprehensive review of systems was negative except for that written in the HPI. Physical Examination No exam data present Evaluation of Cognitive Function: 
Mood/affect:  neutral 
Appearance: age appropriate, overweight, well dressed and within normal Limits Family member/caregiver input: na 
 
Visit Vitals /62 Pulse 65 Temp 98.8 °F (37.1 °C) (Oral) Resp 14 Ht 6' (1.829 m) Wt 219 lb (99.3 kg) SpO2 97% BMI 29.70 kg/m² Patient Care Team: 
Von Rendon MD as PCP - General (Internal Medicine) Jay Coto MD as Physician (Cardiology) JAYRO Sharpe (Vascular Surgery) Saundra Cheadle, MD as Physician (Vascular Surgery) Ale Palacios, LOY as Ambulatory Care Navigator (Internal Medicine) Adelfo Anand MD as Consulting Provider (Gastroenterology) Kayode Cotto RN as Ambulatory Care Navigator (Internal Medicine) JAYRO Morris (Physician Assistant) Sherry Harris MD (Orthopedic Surgery) Advice/Referrals/Counseling Education and counseling provided: 
Are appropriate based on today's review and evaluation End-of-Life planning (with patient's consent) Colorectal cancer screening tests Cardiovascular screening blood test 
Diabetes screening test 
 
Assessment/Plan ICD-10-CM ICD-9-CM 1. Medicare annual wellness visit, subsequent Z00.00 V70.0 2. Ulcerative colitis with complication, unspecified location ([facility] K51.919 556.9 3. Polyp of colon, unspecified part of colon, unspecified type K63.5 211.3 4. Peripheral vascular disease ([facility].) Dr Sander Combs I73.9 443.9 5. Hypertension I11.9 402.10 6. Dyslipidemia E78.5 272.4 7. Atherosclerosis of native coronary artery of native heart without angina pectoris I25.10 414.01   
8. Atrial fibrillation, unspecified type ([facility].) I48.91 427.31   
9.  IFG (impaired fasting glucose) R73.01 790.21 HEMOGLOBIN A1C W/O EAG  
 METABOLIC PANEL, BASIC 10. Degeneration of cervical and lumbar spine, relative cervical stenosis M50.30 722.4 11. Overweight (BMI 25.0-29. 9) E66.3 278.02   
12. History of prostate cancer Z85.46 V10.46   
13. Advanced directives, counseling/discussion Z71.89 V65.49 ADVANCE CARE PLANNING FIRST 30 MINS 14. Screening for alcoholism Z13.39 V79.1 CA ANNUAL ALCOHOL SCREEN 15 MIN  
15. Screening for depression Z13.31 V79.0 Katlyn Burnett 16. Screen for colon cancer Z12.11 V76.51   
17. Screening for diabetes mellitus Z13.1 V77.1 25. Screening for ischemic heart disease Z13.6 V81.0   
 
current treatment plan is effective, no change in therapy 
lab results and schedule of future lab studies reviewed with patient 
reviewed diet, exercise and weight control 
cardiovascular risk and specific lipid/LDL goals reviewed. Flu done 
shingrix advocated

## 2018-11-20 ENCOUNTER — OFFICE VISIT (OUTPATIENT)
Dept: INTERNAL MEDICINE CLINIC | Age: 73
End: 2018-11-20

## 2018-11-20 VITALS
RESPIRATION RATE: 14 BRPM | SYSTOLIC BLOOD PRESSURE: 116 MMHG | BODY MASS INDEX: 29.66 KG/M2 | WEIGHT: 219 LBS | HEIGHT: 72 IN | TEMPERATURE: 98.8 F | HEART RATE: 65 BPM | DIASTOLIC BLOOD PRESSURE: 62 MMHG | OXYGEN SATURATION: 97 %

## 2018-11-20 DIAGNOSIS — I48.91 ATRIAL FIBRILLATION, UNSPECIFIED TYPE (HCC): ICD-10-CM

## 2018-11-20 DIAGNOSIS — Z71.89 ADVANCED DIRECTIVES, COUNSELING/DISCUSSION: ICD-10-CM

## 2018-11-20 DIAGNOSIS — K63.5 POLYP OF COLON, UNSPECIFIED PART OF COLON, UNSPECIFIED TYPE: ICD-10-CM

## 2018-11-20 DIAGNOSIS — E66.3 OVERWEIGHT (BMI 25.0-29.9): ICD-10-CM

## 2018-11-20 DIAGNOSIS — K51.919 ULCERATIVE COLITIS WITH COMPLICATION, UNSPECIFIED LOCATION (HCC): ICD-10-CM

## 2018-11-20 DIAGNOSIS — Z00.00 MEDICARE ANNUAL WELLNESS VISIT, SUBSEQUENT: Primary | ICD-10-CM

## 2018-11-20 DIAGNOSIS — Z13.6 SCREENING FOR ISCHEMIC HEART DISEASE: ICD-10-CM

## 2018-11-20 DIAGNOSIS — I25.10 ATHEROSCLEROSIS OF NATIVE CORONARY ARTERY OF NATIVE HEART WITHOUT ANGINA PECTORIS: ICD-10-CM

## 2018-11-20 DIAGNOSIS — I73.9 PERIPHERAL VASCULAR DISEASE (HCC): ICD-10-CM

## 2018-11-20 DIAGNOSIS — M50.30 DEGENERATION OF CERVICAL INTERVERTEBRAL DISC: ICD-10-CM

## 2018-11-20 DIAGNOSIS — Z12.11 SCREEN FOR COLON CANCER: ICD-10-CM

## 2018-11-20 DIAGNOSIS — Z13.39 SCREENING FOR ALCOHOLISM: ICD-10-CM

## 2018-11-20 DIAGNOSIS — Z85.46 HISTORY OF PROSTATE CANCER: ICD-10-CM

## 2018-11-20 DIAGNOSIS — R73.01 IFG (IMPAIRED FASTING GLUCOSE): ICD-10-CM

## 2018-11-20 DIAGNOSIS — Z13.31 SCREENING FOR DEPRESSION: ICD-10-CM

## 2018-11-20 DIAGNOSIS — I11.9 BENIGN HYPERTENSIVE HEART DISEASE WITHOUT HEART FAILURE: ICD-10-CM

## 2018-11-20 DIAGNOSIS — E78.5 DYSLIPIDEMIA: ICD-10-CM

## 2018-11-20 DIAGNOSIS — Z13.1 SCREENING FOR DIABETES MELLITUS: ICD-10-CM

## 2018-11-20 NOTE — PROGRESS NOTES
1. Have you been to the ER, urgent care clinic or hospitalized since your last visit? NO.  
 
2. Have you seen or consulted any other health care providers outside of the [facility] since your last visit (Include any pap smears or colon screening)? NO Chief Complaint Patient presents with [address] [place] [address] Annual Wellness Visit  Hypertension 6 month follow up with labs

## 2018-11-21 ENCOUNTER — OFFICE VISIT (OUTPATIENT)
Dept: ORTHOPEDIC SURGERY | Age: 73
End: 2018-11-21

## 2018-11-21 ENCOUNTER — TELEPHONE (OUTPATIENT)
Dept: INTERNAL MEDICINE CLINIC | Age: 73
End: 2018-11-21

## 2018-11-21 VITALS
DIASTOLIC BLOOD PRESSURE: 72 MMHG | BODY MASS INDEX: 29.96 KG/M2 | TEMPERATURE: 96.4 F | WEIGHT: 221.2 LBS | HEART RATE: 59 BPM | HEIGHT: 72 IN | SYSTOLIC BLOOD PRESSURE: 133 MMHG | OXYGEN SATURATION: 99 % | RESPIRATION RATE: 16 BRPM

## 2018-11-21 DIAGNOSIS — M17.11 PRIMARY OSTEOARTHRITIS OF RIGHT KNEE: Primary | ICD-10-CM

## 2018-11-21 DIAGNOSIS — M25.561 CHRONIC PAIN OF RIGHT KNEE: ICD-10-CM

## 2018-11-21 DIAGNOSIS — I25.10 ATHEROSCLEROSIS OF NATIVE CORONARY ARTERY OF NATIVE HEART WITHOUT ANGINA PECTORIS: ICD-10-CM

## 2018-11-21 DIAGNOSIS — I73.9 PERIPHERAL VASCULAR DISEASE (HCC): Primary | ICD-10-CM

## 2018-11-21 DIAGNOSIS — G89.29 CHRONIC PAIN OF RIGHT KNEE: ICD-10-CM

## 2018-11-21 RX ORDER — HYALURONATE SODIUM 10 MG/ML
2 SYRINGE (ML) INTRAARTICULAR ONCE
Qty: 2 ML | Refills: 0
Start: 2018-11-21 | End: 2018-11-21

## 2018-11-21 NOTE — TELEPHONE ENCOUNTER
Pt came in said said he discussed wirh Dr Derrick Ray, one capsule a day for 90days at [email or web address] mail order is 125.00 if this is the dosage you want him to take  Please send to [email or web address] mail order - please call pt

## 2018-11-21 NOTE — PATIENT INSTRUCTIONS
Medicare Wellness Visit, Male The best way to live healthy is to have a lifestyle where you eat a well-balanced diet, exercise regularly, limit alcohol use, and quit all forms of tobacco/nicotine, if applicable. Regular preventive services are another way to keep healthy. Preventive services (vaccines, screening tests, monitoring & exams) can help personalize your care plan, which helps you manage your own care. Screening tests can find health problems at the earliest stages, when they are easiest to treat. [address] [facility] follows the current, evidence-based guidelines published by the [facility] Jonny Martha ([facility]) when recommending preventive services for our patients. Because we follow these guidelines, sometimes recommendations change over time as research supports it. (For example, a prostate screening blood test is no longer routinely recommended for men with no symptoms.) Of course, you and your doctor may decide to screen more often for some diseases, based on your risk and co-morbidities (chronic disease you are already diagnosed with). Preventive services for you include: - Medicare offers their members a free annual wellness visit, which is time for you and your primary care provider to discuss and plan for your preventive service needs. Take advantage of this benefit every year! 
-All adults over age 72 should receive the recommended pneumonia vaccines. Current USPSTF guidelines recommend a series of two vaccines for the best pneumonia protection.  
-All adults should have a flu vaccine yearly and an ECG.  All adults age 61 and older should receive a shingles vaccine once in their lifetime.   
-All adults age 38-68 who are overweight should have a diabetes screening test once every three years.  
-Other screening tests & preventive services for persons with diabetes include: an eye exam to screen for diabetic retinopathy, a kidney function test, a foot exam, and stricter control over your cholesterol.  
-Cardiovascular screening for adults with routine risk involves an electrocardiogram (ECG) at intervals determined by the provider.  
-Colorectal cancer screening should be done for adults age 54-65 with no increased risk factors for colorectal cancer. There are a number of acceptable methods of screening for this type of cancer. Each test has its own benefits and drawbacks. Discuss with your provider what is most appropriate for you during your annual wellness visit. The different tests include: colonoscopy (considered the best screening method), a fecal occult blood test, a fecal DNA test, and sigmoidoscopy. 
-All adults born between [facility] should be screened once for Hepatitis C. 
-An Abdominal Aortic Aneurysm (AAA) Screening is recommended for men age 73-68 who has ever smoked in their lifetime. Here is a list of your current Health Maintenance items (your personalized list of preventive services) with a due date: 
Health Maintenance Due Topic Date Due  Shingles Vaccine (1 of 2) 08/18/1995 Cynthia Annual Well Visit  06/30/2018

## 2018-11-21 NOTE — PROGRESS NOTES
Patient: Shawnie Habermann                MRN: [id number]       SSN: [id number] YOB: 1945        AGE: 68 y.o. SEX: male Body mass index is 30 kg/m². PCP: Bill Granda MD 
11/21/18 Chief Complaint Patient presents with  Knee Pain RIGHT KNEE PAIN  
 
HISTORY:  Shawnie Habermann is a 68 y.o. male who is seen for right knee pain. PROCEDURE:  After timeout and under sterile conditions, right aspirated 70 cc of light yellow fluid. The fluid was discarded. After discussing treatment options, Mr. Collette's right knee was injected with 2 cc of Euflexxa. TIME OUT performed immediately prior to start of procedure: 
Dangelo Reeder MD, have performed the following reviews on Shawnie Habermann prior to the start of the procedure: 
         
* Patient was identified by name and date of birth * Agreement on procedure being performed was verified * Risks and Benefits explained to the patient * Procedure site verified and marked as necessary * Patient was positioned for comfort * Consent was obtained Time: 8:35 AM  
 
Date of procedure: 11/21/2018 Procedure performed by:  Maliha Rojas MD 
 
Mr. Lucian Villa tolerated the procedure well with no complications ICD-10-CM ICD-9-CM 1. Primary osteoarthritis of right knee M17.11 715.16 TN DRAIN/INJECT LARGE JOINT/BURSA EUFLEXXA INJECTION PER DOSE  
   sodium hyaluronate (SUPARTZ FX/HYALGAN/GENIVSC) 10 mg/mL syrg injection 2. Chronic pain of right knee M25.561 719.46 TN DRAIN/INJECT LARGE JOINT/BURSA  
 G89.29 338.29 EUFLEXXA INJECTION PER DOSE  
   sodium hyaluronate (SUPARTZ FX/HYALGAN/GENIVSC) 10 mg/mL syrg injection PLAN:  After timeout and under sterile conditions, right aspirated 70 cc of light yellow fluid. The fluid was discarded. After discussing treatment options, patient's right knee was injected with 2 cc of Euflexxa.    Collette will follow up PRN now that he has completed his Euflexxa injection series.    
 
Scribed by Fredy Mccurdy [facility]) as dictated by Amanda Montes De Oca,

## 2018-11-21 NOTE — TELEPHONE ENCOUNTER
Spoke with patient and let him know the dose would need to be 4 capsules daily. He said he would forgo the medication for now. Instructed him to call [facility] back and price check the correct amount as the increase may not necessarily mean 4 times the price. He will do that and let us know what he would like to do.

## 2018-11-23 RX ORDER — ICOSAPENT ETHYL 1000 MG/1
2 CAPSULE ORAL 2 TIMES DAILY WITH MEALS
Qty: 360 CAP | Refills: 3 | Status: SHIPPED | OUTPATIENT
Start: 2018-11-23 | End: 2019-09-24 | Stop reason: SDUPTHER

## 2018-11-23 NOTE — TELEPHONE ENCOUNTER
4  A Day is $125 for 3 months- call into [facility] mailorder    Call PT when this is done

## 2018-11-26 ENCOUNTER — TELEPHONE (OUTPATIENT)
Dept: INTERNAL MEDICINE CLINIC | Age: 73
End: 2018-11-26

## 2018-11-26 NOTE — TELEPHONE ENCOUNTER
Pt wants to know if he needs to stop the fish oil once he has starts the vascepa. Also is it okay to take vascepa at lunch and dinner since he is not doing breakfast at this time.

## 2018-11-26 NOTE — TELEPHONE ENCOUNTER
Pt calling asking to speak to a nurse. Needs to discuss what meds he is supposed to stop and when to start new ones. Says he is confused.

## 2018-12-05 ENCOUNTER — TELEPHONE (OUTPATIENT)
Dept: INTERNAL MEDICINE CLINIC | Age: 73
End: 2018-12-05

## 2018-12-05 NOTE — TELEPHONE ENCOUNTER
Regarding: Prescription Question  Contact: [phone]  ----- Message from [address] [place] [address] [address], Generic sent at 2018  7:10 AM EST -----    I received the Vascepa prescription and I have several questions before I actually start taking the medication. 1. The literature that came with the medicine indicated that if I had Atrial Fibrillation, I should notify my doctor. While I do not currently have AFIB, I did have a catheter ablation done at [facility] a number of years ago to correct a chronic case of AFIB. While it has not recurred, I do not want to take any medication, that even has a remote possibility of causing AFIB to recur. 2. The literature also indicated to inform the doctor, if you were taking aspirin, atenolol, or diuretics. I take all three. So is this medication safe for me to take especially concerning these issues?   Dominick HUSTON 7-

## 2018-12-06 NOTE — TELEPHONE ENCOUNTER
Elliott Davidson Collette To  [facility] Nurses Sent  12/6/2018  9:50 AM   ----- Message from [address] [place] [address] [address], Generic sent at 12/6/2018  9:50 AM EST -----     One additional question, do I continue to take the 81mg aspirin every day?      Sorry to bother, Thank you   Dahlia Dove

## 2018-12-11 ENCOUNTER — OFFICE VISIT (OUTPATIENT)
Dept: INTERNAL MEDICINE CLINIC | Age: 73
End: 2018-12-11

## 2018-12-11 VITALS
TEMPERATURE: 98.6 F | OXYGEN SATURATION: 97 % | WEIGHT: 222 LBS | HEIGHT: 72 IN | DIASTOLIC BLOOD PRESSURE: 70 MMHG | BODY MASS INDEX: 30.07 KG/M2 | RESPIRATION RATE: 16 BRPM | SYSTOLIC BLOOD PRESSURE: 130 MMHG | HEART RATE: 68 BPM

## 2018-12-11 DIAGNOSIS — J06.9 VIRAL URI WITH COUGH: ICD-10-CM

## 2018-12-11 DIAGNOSIS — R09.82 POSTNASAL DRIP: Primary | ICD-10-CM

## 2018-12-11 RX ORDER — FLUTICASONE PROPIONATE 50 MCG
SPRAY, SUSPENSION (ML) NASAL
Qty: 1 BOTTLE | Refills: 1 | Status: SHIPPED | OUTPATIENT
Start: 2018-12-11 | End: 2019-02-25

## 2018-12-11 NOTE — PROGRESS NOTES
ROOM # [facility] [address] [facility] Somali presents today for   Chief Complaint   Patient presents with   Inna Lucio Symptoms       [facility] preferred language for health care discussion is english/other. Depression Screening:  PHQ over the last two weeks 12/11/2018 11/21/2018 11/20/2018 11/14/2018 11/13/2018 11/7/2018 10/12/2018   PHQ Not Done - - - - - - -   Little interest or pleasure in doing things Not at all Not at all Not at all Not at all Not at all Not at all Not at all   Feeling down, depressed, irritable, or hopeless Not at all Not at all Not at all Not at all Not at all Not at all Not at all   Total Score PHQ 2 0 0 0 0 0 0 0       Learning Assessment:  Learning Assessment 2/26/2018 1/9/2018 2/1/2017 10/12/2016 1/25/2016 10/15/2015 9/24/2015   PRIMARY LEARNER Patient Patient Patient Patient Patient Patient Patient   HIGHEST LEVEL OF EDUCATION - PRIMARY LEARNER  - - - - - - -   BARRIERS PRIMARY LEARNER - NONE - - - - -   CO-LEARNER CAREGIVER - No - - - - -   Diogo 8   LEARNER PREFERENCE PRIMARY LISTENING OTHER (COMMENT) LISTENING LISTENING LISTENING LISTENING LISTENING   ANSWERED BY patient patient patient patient patient patient patient   RELATIONSHIP SELF SELF SELF SELF SELF SELF SELF       Abuse Screening:  Abuse Screening Questionnaire 1/9/2018 12/23/2016 1/18/2016   Do you ever feel afraid of your partner? N N N   Are you in a relationship with someone who physically or mentally threatens you? N N N   Is it safe for you to go home? Jonathan Morrow       Fall Risk  Fall Risk Assessment, last 12 mths 12/11/2018 11/21/2018 11/20/2018 11/14/2018 11/13/2018 11/7/2018 10/12/2018   Able to walk? Yes Yes Yes Yes Yes Yes Yes   Fall in past 12 months? No Yes No Yes Yes Yes No   Fall with injury?  - Yes - No No No -   Number of falls in past 12 months - 1 - 1 1 1 -   Fall Risk Score - 2 - 1 1 1 -       Visit Vitals  /70 (BP 1 Location: Left arm, BP Patient Position: Sitting)   Pulse 68   Temp 98.6 °F (37 °C) (Oral)   Resp 16   Ht 6' (1.829 m)   Wt 222 lb (100.7 kg)   SpO2 97%   BMI 30.11 kg/m²       Health Maintenance reviewed and discussed per provider. Yes    Radha Bo is due for   Health Maintenance Due   Topic Date Due    Shingrix Vaccine Age 49> (1 of 2) 08/18/1995    AAA Screening 73-67 YO Male Smoking Patients  08/18/2010     Please order/place referral if appropriate. Advance Directive:  1. Do you have an advance directive in place? Patient Reply: No    2. If not, would you like material regarding how to put one in place? Patient Reply: No    Coordination of Care:  1. Have you been to the ER, urgent care clinic since your last visit? Hospitalized since your last visit?  No

## 2018-12-11 NOTE — PROGRESS NOTES
Khris Walters is a 68 y.o.  male and presents with    Chief Complaint   Patient presents with    Cold Symptoms       Subjective:  HPI  Mr. Rip Pallas presents today with cold like symptoms. He reports has been about 2 days. Started taking Coricidin and Benadryl with some relief. Tight to chest and upper shoulder blades. Denies fever, chills, gi symptoms, body aches. His wife was sick with similar symptoms about a week ago. Cough worse late at night and in am. He has Albuterol at home, used a couple of times but has not felt he needed it. Additional Concerns: none     ROS   Review of Systems   Constitutional: Positive for malaise/fatigue. Negative for chills and fever. HENT: Positive for congestion and ear pain. Negative for hearing loss, sinus pain, sore throat and tinnitus. Ear fullness   Respiratory: Positive for cough and sputum production. Cardiovascular: Negative. Gastrointestinal: Negative. Genitourinary: Negative. Musculoskeletal: Negative. Skin: Negative. Neurological: Positive for headaches. Allergies   Allergen Reactions    Altace [Ramipril] Unknown (comments)    Lipitor [Atorvastatin] Other (comments)     Muscle pain    Lisinopril Shortness of Breath and Other (comments)     Turns bright red    Other Medication Other (comments)     Vicryl suture on skin tends to be rejected with poor wound healing does better with monocryl    Procardia [Nifedipine] Other (comments)     Caused afib    Rosuvastatin Other (comments)     Muscle Pain         Current Outpatient Medications   Medication Sig Dispense Refill    fluticasone (FLONASE) 50 mcg/actuation nasal spray 1 puff to each nare before bedtime 1 Bottle 1    icosapent ethyl (VASCEPA) 1 gram capsule Take 2 Caps by mouth two (2) times daily (with meals). 360 Cap 3    diclofenac (FLECTOR) 1.3 % pt12 1 Patch by TransDERmal route every twelve (12) hours every twelve (12) hours.  (Patient taking differently: 1 Patch by TransDERmal route every twelve (12) hours every twelve (12) hours.) 60 Patch 2    gabapentin (NEURONTIN) 300 mg capsule 1 cap every day to bid 180 Cap 1    HYDROcodone-acetaminophen (NORCO) 5-325 mg per tablet Take 1 Tab by mouth every four (4) hours as needed (for chronic pain). 180 Tab 0    atenolol (TENORMIN) 25 mg tablet TAKE ONE TABLET BY MOUTH TWICE A  Tab 1    LORazepam (ATIVAN) 1 mg tablet Take 1 Tab by mouth every eight (8) hours as needed. 50 Tab 0    diclofenac (VOLTAREN) 1 % gel Apply 2-4 g to affected area four (4) times daily. 100 g 5    naloxone (NARCAN) 4 mg/actuation nasal spray Use 1 spray intranasally into 1 nostril. Use a new Narcan nasal spray for subsequent doses and administer into alternating nostrils. May repeat every 2 to 3 minutes as needed. 1 Each 1    polyethylene glycol (MIRALAX) 17 gram packet 17 g.      ezetimibe-simvastatin (VYTORIN 10/40) 10-40 mg per tablet Take 1 Tab by mouth nightly. 90 Tab 3    losartan (COZAAR) 25 mg tablet TAKE ONE TABLET BY MOUTH TWICE A  Tab 2    aspirin delayed-release 81 mg tablet Take 81 mg by mouth daily.  triamterene-hydrochlorothiazide (MAXZIDE) 37.5-25 mg per tablet TAKE ONE TABLET BY MOUTH DAILY 90 Tab 3    Cholecalciferol, Vitamin D3, 1,000 unit cap Take 1,000 Units by mouth daily.  MULTIVITAMIN PO Take  by mouth daily.  pantoprazole (PROTONIX) 40 mg tablet Take 40 mg by mouth daily.  coenzyme q10 200 mg Cap Take  by mouth.  albuterol (PROVENTIL HFA, VENTOLIN HFA, PROAIR HFA) 90 mcg/actuation inhaler Take 2 Puffs by inhalation every six (6) hours as needed for Wheezing. Indications: BRONCHOSPASM PREVENTION (Patient taking differently: Take 2 Puffs by inhalation every six (6) hours as needed for Wheezing.) 1 Inhaler 0    inhalational spacing device 1 Each by Does Not Apply route as needed.  1 Device 0    NAFTIN 2 % gel       mupirocin (BACTROBAN) 2 % ointment Apply  to affected area three (3) times daily.  OMEGA-3 FATTY ACIDS/FISH OIL (OMEGA 3 FISH OIL PO) Take 900 mg by mouth daily.          Social History     Socioeconomic History    Marital status:      Spouse name: Not on file    Number of children: Not on file    Years of education: Not on file    Highest education level: Not on file   Social Needs    Financial resource strain: Not on file    Food insecurity - worry: Not on file    Food insecurity - inability: Not on file    Transportation needs - medical: Not on file   [employer] needs - non-medical: Not on file   Occupational History    Not on file   Tobacco Use    Smoking status: Former Smoker     Packs/day: 1.50     Years: 25.00     Pack years: 37.50     Types: Cigarettes     Last attempt to quit: 1/1/2003     Years since quitting: 15.9    Smokeless tobacco: Never Used   Substance and Sexual Activity    Alcohol use: Yes     Comment: RARELY    Drug use: No    Sexual activity: Yes     Partners: Female     Birth control/protection: None   Other Topics Concern    Not on file   Social History Narrative    Not on file       Past Medical History:   Diagnosis Date    Adenoma of left adrenal gland     2009  1 cm, no change 1/15, 2/16    Asbestosis     Atrial fibrillation     CHA2DS2-VAsc=3(age+, htn+, vasc dx+; estimated yearly stroke risk according to Lip et al. is 3,2%), Hasbled=2(estimated yearly bleeding risk according to pisters et al. is 1,88%); not anticoagulated due to h/o gi bleed    Atrial fibrillation ablation     10/08    Basal cell cancer     Dr Dominga Thapa; he's had >350 lesions removed    Carotid occlusion     left     Cervical radiculopathy     MRI 9/11 showed C3-6 severe foraminal stenosis    Chronic pain     Colon polyp     Dr Lucero Driver 9/15    Coronary artery disease     RCA - 3.0 x 16mm TAXUS 9/04, 3.0 x 16mm TAXUS 12/06    Dyslipidemia     Erectile dysfunction     GERD     GI bleed     Hearing loss     2014  bilateral  [facility]    Hernia, umbilical     Hypertension     Hypogonadism male     Lumbar radiculopathy     Dr Buck Age;  MRI 9/11 L4-5 disc bulging, annular tear, disc dessication    Myofascial pain dysfunction syndrome     pain clinic     Osteoarthritis     right knee Dr Vivi Fitzgerald Overweight (BMI 25.0-29. 9)     IF 5/18 start weight 214 lbs, not doing    Peripheral vascular disease     50-60% R iliac; s/p R iliac stent and L femoral artery stent in past; R OLIVIA 0.76 (1/16)    Plantar fasciitis     bilateral     PPD positive     Prediabetes     Prostate cancer     T1c Imelda 7(3+4), 70% in 1 core, GS 7 (3+4) in 4 cores, GS 6 (3+3) in 1 core; psa 5.28, TRUS 18 gm;  Dr Steff Thomas; s/p cryoablation 10/16    Recurrent umbilical hernia     Subclinical hypothyroidism     denies    Tinnitus     Dr Bridget Chery Ulcerative colitis     Venous insufficiency        Past Surgical History:   Procedure Laterality Date    CARDIAC SURG PROCEDURE UNLIST  04/2018    [facility] Dr Winfred Claude; echo nl lv, ef 60%, dilated RV, biatrial enlargement, trace AI    CARDIAC SURG PROCEDURE UNLIST  04/2018    [facility] Dr Winfred Claude; NST neg, ef 60%    HAND/FINGER SURGERY UNLISTED  2017    HX CAROTID ENDARTERECTOMY      1/14  right    Zena[facility] Arts HX COLONOSCOPY      Dr Steff Thomas 9/15 polyps    HX CRYOABLATION OF THE PROSTATE      10/16    HX HEMORRHOIDECTOMY      1979    [facility], 1975    HX ORTHOPAEDIC      right knee surgery    HX ORTHOPAEDIC  12/2017    Dr Brumfield Mention; R CTS release    HX REFRACTIVE SURGERY      OD (hemoplasty to right eye on retina to avoid blindness)    HX TONSILLECTOMY      1955    VT LAP, RECURRENT INCISIONAL HERNIA REPAIR,REDUCIBLE N/A 02/09/2017    Dr. Al Jain HERNIA,5+Y/O,REDUCIBL      2/16  left  Dr. Clarence Whitaker YIQL,1+B/T,JCRQD      2/16  Dr. Slim Morelos      1/16  R OLIVIA 0.76, L OLIVIA 1.02    VASCULAR SURGERY PROCEDURE UNLIST      10/15   left fem art and left iliac stent       Family History   Problem Relation Age of Onset    Heart Disease Mother     Hypertension Mother     Diabetes Mother     Arthritis-osteo Mother     Heart Disease Father     Stroke Father     Hypertension Father     Heart Disease Sister     Hypertension Sister        Objective:  Vitals:    12/11/18 1146   BP: 130/70   Pulse: 68   Resp: 16   Temp: 98.6 °F (37 °C)   TempSrc: Oral   SpO2: 97%   Weight: 222 lb (100.7 kg)   Height: 6' (1.829 m)   PainSc:   0 - No pain       LABS   Results for orders placed or performed during the hospital encounter of 11/13/18   CBC W/O DIFF   Result Value Ref Range    WBC 7.9 4.6 - 13.2 K/uL    RBC 4.81 4.70 - 5.50 M/uL    HGB 15.4 13.0 - 16.0 g/dL    HCT 45.7 36.0 - 48.0 %    MCV 95.0 74.0 - 97.0 FL    MCH 32.0 24.0 - 34.0 PG    MCHC 33.7 31.0 - 37.0 g/dL    RDW 12.5 11.6 - 14.5 %    PLATELET 574 875 - 415 K/uL    MPV 9.6 9.2 - 11.8 FL   HEMOGLOBIN A1C W/O EAG   Result Value Ref Range    Hemoglobin A1c 6.1 (H) 4.2 - 5.6 %   LIPID PANEL   Result Value Ref Range    LIPID PROFILE          Cholesterol, total 146 <200 MG/DL    Triglyceride 149 <150 MG/DL    HDL Cholesterol 51 40 - 60 MG/DL    LDL, calculated 65.2 0 - 100 MG/DL    VLDL, calculated 29.8 MG/DL    CHOL/HDL Ratio 2.9 0 - 5.0     VITAMIN D, 25 HYDROXY   Result Value Ref Range    Vitamin D 25-Hydroxy 36.6 30 - 100 ng/mL       TESTS  none    PE  Physical Exam   Constitutional: He is oriented to person, place, and time. He appears well-developed and well-nourished. No distress. HENT:   Head: Normocephalic and atraumatic. Bilateral OME with bubbling, erythematous oropharynx, no exudate, some clear nasal drainage noted   Eyes: Conjunctivae and EOM are normal. Pupils are equal, round, and reactive to light. Neck: Normal range of motion. Cardiovascular: Normal rate, regular rhythm, normal heart sounds and intact distal pulses.    Pulmonary/Chest: Effort normal and breath sounds normal. No respiratory distress. He has no wheezes. He has no rales. He exhibits no tenderness. Abdominal: Soft. Bowel sounds are normal.   Musculoskeletal: Normal range of motion. Lymphadenopathy:     He has no cervical adenopathy. Neurological: He is alert and oriented to person, place, and time. Skin: Skin is warm and dry. He is not diaphoretic. Psychiatric: He has a normal mood and affect. His behavior is normal. Judgment and thought content normal.   Vitals reviewed. Assessment/Plan:    1. Viral URI with cough- Symptomatic Treatment:     Make sure you are staying hydrated, 6-8 glasses of water daily, warm teas with honey to soothe a sore throat, throat lozenges, cool mist humidifier    Over the counter medications:     Nasal saline rinses followed by Flonase or Nasacort 1 puff to each side of the nose to be done daily prior to bedtime to help decreased nasal congestion and post nasal drip. Mucinex daily to help loosen chest and nasal congestion and needs to be taken daily with good hydration to provide relief. Antihistamines (Claritin, Zyrtec, Allegra) help dry up congestion and decrease watery or itchy eyes, ear fullness if related to noninfectious fluid behind the ear drum, and itchy ears. Coricidin- helps with congestion and cough. Tylenol, Naproxsyn, Aleve can be used to help relieve pain/tenderness/headaches/fever    Call if new or worsening symptoms. Use Albuterol when chest is tight, upper back is tight, bronchospasm. Lab review: no lab studies available for review at time of visit    Today's Visit:   Diagnoses and all orders for this visit:    1. Postnasal drip  -     fluticasone (FLONASE) 50 mcg/actuation nasal spray; 1 puff to each nare before bedtime    2. Viral URI with cough      Health Maintenance: Deferred to PCP. I have discussed the diagnosis with the patient and the intended plan as seen in the above orders.   The patient has received an after-visit summary and questions were answered concerning future plans. I have discussed medication side effects and warnings with the patient as well. I have reviewed the plan of care with the patient, accepted their input and they are in agreement with the treatment goals. Follow-up Disposition: Not on File   More than 1/2 of this 15 minute visit was spent in counseling and coordination of care, as described above.     JOHN Lizama  Internist of [address], [address] [place] [address].  Phone: [phone]  Fax: [phone]

## 2018-12-14 ENCOUNTER — TELEPHONE (OUTPATIENT)
Dept: INTERNAL MEDICINE CLINIC | Age: 73
End: 2018-12-14

## 2018-12-14 DIAGNOSIS — J06.9 UPPER RESPIRATORY TRACT INFECTION, UNSPECIFIED TYPE: Primary | ICD-10-CM

## 2018-12-14 RX ORDER — AZITHROMYCIN 250 MG/1
TABLET, FILM COATED ORAL
Qty: 6 TAB | Refills: 0 | Status: SHIPPED | OUTPATIENT
Start: 2018-12-14 | End: 2019-03-04

## 2018-12-14 NOTE — TELEPHONE ENCOUNTER
Patient saw Alice Ryan on Tuesday and he is not getting any better. He has a bad headache, congestion, sore throat and low grade fever of 99.5. Please advise.

## 2018-12-18 ENCOUNTER — TELEPHONE (OUTPATIENT)
Dept: INTERNAL MEDICINE CLINIC | Age: 73
End: 2018-12-18

## 2018-12-18 NOTE — TELEPHONE ENCOUNTER
I started the Z-pack on Sunday, feeling a little better today. Still coughing up stuff but I guess that is normal. The Radiologist indicated that the X-rays they took indicated some other issues in the lung/lungs and suggested a CT  Scan or at least they be compared with other X-rays that your office has access to. I know that I have plural Plaque in both lungs from asbestos exposure. A number of years ago Dr. Mary Ann Lane had CT's done, but nothing changed. Please let me know if there is anything further that needs to be done in regards to the lung issue. Obviously with asbestos exposure, things can develop years after exposure. Urgent Care said they faxed all the information to your office. Please let me know.    Thank you   Adelina Bruno

## 2018-12-18 NOTE — TELEPHONE ENCOUNTER
reviewed the last several xrays in [facility] all the way to 2011  Radiology reports no changes in the pleural plaquing  No prior CT chest - several CT abd/pelvis though - too low to show anything in the chest    Up to him if he wants to check CT chest - we can order if he feels need to be reassured - the problem is the xray they took at urgent care had no comparison so the radiologist there had no choice except to rec CT for the abn results

## 2018-12-19 NOTE — TELEPHONE ENCOUNTER
I will do whatever Dr. Augustine Lal feels is appropriate.  What did the radiologist see that indicated a CT was advised. What is the benefit of the CT versus comparing the X-rays that you have on file. I really don't know which way to go with this, so any advise would be appreciated.    Let me know   Jarad Foster

## 2018-12-24 NOTE — TELEPHONE ENCOUNTER
Xray findings on asbestosis just show shadows in the periphery accounting for the pleural plaques    CT will give us 3D pics of the lungs  Insurance will cover CT and that's the best way to answer any questions

## 2018-12-26 ENCOUNTER — TELEPHONE (OUTPATIENT)
Dept: INTERNAL MEDICINE CLINIC | Age: 73
End: 2018-12-26

## 2018-12-26 DIAGNOSIS — Z79.899 CONTROLLED SUBSTANCE AGREEMENT SIGNED: ICD-10-CM

## 2018-12-26 DIAGNOSIS — M54.10 RADICULOPATHY, UNSPECIFIED SPINAL REGION: ICD-10-CM

## 2018-12-26 DIAGNOSIS — R93.89 ABNORMAL CHEST X-RAY: Primary | ICD-10-CM

## 2018-12-26 DIAGNOSIS — Z79.899 CONTROLLED SUBSTANCE AGREEMENT SIGNED: Primary | ICD-10-CM

## 2018-12-26 RX ORDER — HYDROCODONE BITARTRATE AND ACETAMINOPHEN 5; 325 MG/1; MG/1
1 TABLET ORAL
Qty: 180 TAB | Refills: 0 | Status: SHIPPED | OUTPATIENT
Start: 2018-12-26 | End: 2019-02-05 | Stop reason: SDUPTHER

## 2018-12-26 NOTE — TELEPHONE ENCOUNTER
Patient notified that CT has been ordered and to expect a phone call from scheduling. No further questions.

## 2018-12-26 NOTE — TELEPHONE ENCOUNTER
Last OV: 12/11/2018  Last Fill: 11/09/2018  Last UDS: 08/15/218  Pain Contract signed: 10/03/2017    UDS needed and ordered.

## 2018-12-26 NOTE — TELEPHONE ENCOUNTER
Please request that for me, at [facility] if possible. I guess they will have to call me to set up a time.    thank you   Timur Wells

## 2018-12-27 ENCOUNTER — HOSPITAL ENCOUNTER (OUTPATIENT)
Dept: LAB | Age: 73
Discharge: HOME | End: 2018-12-27
Payer: MEDICARE

## 2018-12-27 ENCOUNTER — APPOINTMENT (OUTPATIENT)
Dept: INTERNAL MEDICINE CLINIC | Age: 73
End: 2018-12-27

## 2018-12-27 PROCEDURE — 80307 DRUG TEST PRSMV CHEM ANLYZR: CPT

## 2018-12-27 PROCEDURE — 80361 OPIATES 1 OR MORE: CPT

## 2018-12-31 LAB
AMPHETAMINES UR QL SCN: NEGATIVE NG/ML
BARBITURATES UR QL SCN: NEGATIVE NG/ML
BENZODIAZ UR QL SCN: NEGATIVE NG/ML
BZE UR QL SCN: NEGATIVE NG/ML
CANNABINOIDS UR QL SCN: NEGATIVE NG/ML
CODEINE, 737848: NEGATIVE
CREAT UR-MCNC: 39.5 MG/DL (ref 20–300)
FENTANYL+NORFENTANYL UR QL SCN: NEGATIVE PG/ML
HYDROCODONE CONFIRM, 737855: 1026 NG/ML
HYDROCODONE, 737854: POSITIVE
HYDROMORPHONE, 737852: NEGATIVE
MEPERIDINE UR QL: NEGATIVE NG/ML
METHADONE UR QL SCN: NEGATIVE NG/ML
MORPHINE, 737850: NEGATIVE
OPIATES UR QL SCN: NORMAL NG/ML
OPIATES, 737847: POSITIVE NG/ML
OXYCODONE+OXYMORPHONE UR QL SCN: NEGATIVE NG/ML
PCP UR QL: NEGATIVE NG/ML
PH UR: 6.1 [PH] (ref 4.5–8.9)
PLEASE NOTE:, 733157: NORMAL
PROPOXYPH UR QL SCN: NEGATIVE NG/ML
SP GR UR: 1.02
TRAMADOL UR QL SCN: NEGATIVE NG/ML

## 2019-01-02 ENCOUNTER — TELEPHONE (OUTPATIENT)
Dept: INTERNAL MEDICINE CLINIC | Age: 74
End: 2019-01-02

## 2019-01-02 ENCOUNTER — HOSPITAL ENCOUNTER (OUTPATIENT)
Dept: CT IMAGING | Age: 74
Discharge: HOME | End: 2019-01-02
Attending: INTERNAL MEDICINE
Payer: MEDICARE

## 2019-01-02 DIAGNOSIS — R93.89 ABNORMAL CHEST X-RAY: ICD-10-CM

## 2019-01-02 LAB — CREAT UR-MCNC: 0.9 MG/DL (ref 0.6–1.3)

## 2019-01-02 PROCEDURE — 82565 ASSAY OF CREATININE: CPT

## 2019-01-02 PROCEDURE — 74011636320 HC RX REV CODE- 636/320: Performed by: INTERNAL MEDICINE

## 2019-01-02 PROCEDURE — 71260 CT THORAX DX C+: CPT

## 2019-01-02 RX ADMIN — IOPAMIDOL 100 ML: 612 INJECTION, SOLUTION INTRAVENOUS at 07:34

## 2019-01-02 NOTE — TELEPHONE ENCOUNTER
Pt had a CT scan done this morning, ordered by Dr Luca Badillo but [facility] told him that Medicare may not pay for it due to the coding and had him sign a waiver. He is asking if coding needs to be changed or is this standard procedure? Please advise him at [phone] (cell).

## 2019-01-07 ENCOUNTER — TELEPHONE (OUTPATIENT)
Dept: INTERNAL MEDICINE CLINIC | Age: 74
End: 2019-01-07

## 2019-01-07 NOTE — TELEPHONE ENCOUNTER
I started taking the Vascepa a little over three weeks ago. Beginning last week around the 30th or 31st of December, the joints in my fingers knees and shoulders, began to hurt. The pain became progressively worse as the week progressed, especially in my fingers to the point where I could hardly make a fist. The only thing I could think of was a side effect of the new medication. So I stopped the Vascepa on the 4th of January. The pain in my knees and shoulders has gotten much less and the pain in my finger joints is starting to lessen, but still hard to make a fist without pain. Now I am reluctant to restart the Vascepa. What do you recommend regarding this medication?    Please advise   thank you and sorry to kate Felipe

## 2019-01-07 NOTE — TELEPHONE ENCOUNTER
Have not seen that as a side effect of any fish oil in general    Would rechallenge and if the sx return, then he is having an idiosyncratic reaction and can't take it and should stop

## 2019-01-08 ENCOUNTER — TELEPHONE (OUTPATIENT)
Dept: INTERNAL MEDICINE CLINIC | Age: 74
End: 2019-01-08

## 2019-01-08 NOTE — TELEPHONE ENCOUNTER
The standard dose is 4 a day  If he wants to try to titrate up, then it might be ok - not sure - again haven't had to do that with other fish oils in general

## 2019-01-08 NOTE — TELEPHONE ENCOUNTER
pls call    Impression     IMPRESSION:    1.  No significant new pulmonary nodule, consolidation or mass. 2.  Mild emphysema and bronchitis. 3.  Sequela of granulomatous infection. 4.  Asbestos related pleural disease. 5.   Aortic valve calcifications may predispose patient to aortic stenosis.      Asbestos related disease as previosuly known

## 2019-01-08 NOTE — TELEPHONE ENCOUNTER
I went on line tonight and looked at [employer] and found the following:     Icosapent Ethyl   pronounced as (eye koe' sa pent eth il)   Icosapent ethyl may cause side effects. Tell your doctor if this symptom is severe or does not go away:   joint pain     I will try it again in a few days and see what happens. Would taking less each day do any good at all?    (From [employer])

## 2019-01-16 ENCOUNTER — OFFICE VISIT (OUTPATIENT)
Dept: ORTHOPEDIC SURGERY | Age: 74
End: 2019-01-16

## 2019-01-16 VITALS
TEMPERATURE: 96.1 F | HEART RATE: 65 BPM | HEIGHT: 72 IN | SYSTOLIC BLOOD PRESSURE: 132 MMHG | DIASTOLIC BLOOD PRESSURE: 69 MMHG | WEIGHT: 223.4 LBS | OXYGEN SATURATION: 95 % | BODY MASS INDEX: 30.26 KG/M2

## 2019-01-16 DIAGNOSIS — G56.02 CARPAL TUNNEL SYNDROME ON LEFT: Primary | ICD-10-CM

## 2019-01-16 DIAGNOSIS — M65.342 TRIGGER FINGER, LEFT RING FINGER: ICD-10-CM

## 2019-01-16 RX ORDER — BETAMETHASONE SODIUM PHOSPHATE AND BETAMETHASONE ACETATE 3; 3 MG/ML; MG/ML
6 INJECTION, SUSPENSION INTRA-ARTICULAR; INTRALESIONAL; INTRAMUSCULAR; SOFT TISSUE ONCE
Qty: 1 ML | Refills: 0
Start: 2019-01-16 | End: 2019-01-16

## 2019-01-16 NOTE — PROGRESS NOTES
Patient: Jan Renteria                MRN: [id number]       SSN: [id number] YOB: 1945        AGE: 68 y.o. SEX: male Body mass index is 30.3 kg/m². PCP: Sona Louie MD 
01/16/19 Chief Complaint: Left hand pain HISTORY OF PRESENT ILLNESS:   Laura Enriquez presents to the office today for left hand followup. I have not seen him for his left hand before. I have seen him for his right hand, which is doing well. He has noticed some triggering in his left ring finger, as well as numbness and tingling in the median nerve distribution. This has been going on for several weeks. The triggering is painful. He has had injections in his right hand in the past, which has been successful. He is here today to discuss possible injections in his left hand. Past Medical History:  
Diagnosis Date  Adenoma of left adrenal gland 2009  1 cm, no change 1/15, 2/16  Asbestosis CT 1/19 showed pleural plaques  Atrial fibrillation CHA2DS2-VAsc=3(age+, htn+, vasc dx+; estimated yearly stroke risk according to Lip et al. is 3,2%), Hasbled=2(estimated yearly bleeding risk according to pisters et al. is 1,88%); not anticoagulated due to h/o gi bleed  Atrial fibrillation ablation 10/08  Basal cell cancer Dr Jody Ortega; he's had >350 lesions removed  Carotid occlusion   
 left  Cervical radiculopathy MRI 9/11 showed C3-6 severe foraminal stenosis  Chronic pain  Colon polyp Dr Stacey Purcell 9/15  Coronary artery disease RCA - 3.0 x 16mm TAXUS 9/04, 3.0 x 16mm TAXUS 12/06  Dyslipidemia  Erectile dysfunction  GERD   
 GI bleed  Hearing loss 2014  bilateral Dr Luis Falter  Hernia, umbilical   
 Hypertension  Hypogonadism male  Lumbar radiculopathy Dr Maria Luisa Wall;  MRI 9/11 L4-5 disc bulging, annular tear, disc dessication  Myofascial pain dysfunction syndrome   
 pain clinic  Osteoarthritis right knee Dr Gloria Wilkerson  Overweight (BMI 25.0-29. 9) IF 5/18 start weight 214 lbs, not doing  Peripheral vascular disease 50-60% R iliac; s/p R iliac stent and L femoral artery stent in past; R OLIVIA 0.76 (1/16)  Plantar fasciitis   
 bilateral   
 PPD positive  Prediabetes  Prostate cancer T1c [place] 7(3+4), 70% in 1 core, GS 7 (3+4) in 4 cores, GS 6 (3+3) in 1 core; psa 5.28, TRUS 18 gm;  Dr Brenton Hernandez; s/p cryoablation 10/16  Recurrent umbilical hernia  Subclinical hypothyroidism   
 denies  Tinnitus Dr Sid Moss  Ulcerative colitis  Venous insufficiency Family History Problem Relation Age of Onset  Heart Disease Mother  Hypertension Mother  Diabetes Mother [facility] Arthritis-osteo Mother  Heart Disease Father  Stroke Father  Hypertension Father  Heart Disease Sister  Hypertension Sister Current Outpatient Medications Medication Sig Dispense Refill  betamethasone (CELESTONE SOLUSPAN) 6 mg/mL injection 1 mL by Intra artICUlar route once for 1 dose. 1 mL 0  
 betamethasone (CELESTONE SOLUSPAN) 6 mg/mL injection 1 mL by Intra artICUlar route once for 1 dose. 1 mL 0  
 atenolol (TENORMIN) 25 mg tablet TAKE ONE TABLET BY MOUTH TWICE A  Tab 3  
 HYDROcodone-acetaminophen (NORCO) 5-325 mg per tablet Take 1 Tab by mouth every four (4) hours as needed (for chronic pain). 180 Tab 0  
 fluticasone (FLONASE) 50 mcg/actuation nasal spray 1 puff to each nare before bedtime 1 Bottle 1  
 icosapent ethyl (VASCEPA) 1 gram capsule Take 2 Caps by mouth two (2) times daily (with meals). 360 Cap 3  
 diclofenac (FLECTOR) 1.3 % pt12 1 Patch by TransDERmal route every twelve (12) hours every twelve (12) hours.  (Patient taking differently: 1 Patch by TransDERmal route every twelve (12) hours every twelve (12) hours.) 60 Patch 2  
 gabapentin (NEURONTIN) 300 mg capsule 1 cap every day to bid 180 Cap 1  
  albuterol (PROVENTIL HFA, VENTOLIN HFA, PROAIR HFA) 90 mcg/actuation inhaler Take 2 Puffs by inhalation every six (6) hours as needed for Wheezing. Indications: BRONCHOSPASM PREVENTION (Patient taking differently: Take 2 Puffs by inhalation every six (6) hours as needed for Wheezing.) 1 Inhaler 0  
 inhalational spacing device 1 Each by Does Not Apply route as needed. 1 Device 0  
 NAFTIN 2 % gel  LORazepam (ATIVAN) 1 mg tablet Take 1 Tab by mouth every eight (8) hours as needed. 50 Tab 0  
 mupirocin (BACTROBAN) 2 % ointment Apply  to affected area three (3) times daily.  diclofenac (VOLTAREN) 1 % gel Apply 2-4 g to affected area four (4) times daily. 100 g 5  
 naloxone (NARCAN) 4 mg/actuation nasal spray Use 1 spray intranasally into 1 nostril. Use a new Narcan nasal spray for subsequent doses and administer into alternating nostrils. May repeat every 2 to 3 minutes as needed. 1 Each 1  
 polyethylene glycol (MIRALAX) 17 gram packet 17 g.    
 ezetimibe-simvastatin (VYTORIN 10/40) 10-40 mg per tablet Take 1 Tab by mouth nightly. 90 Tab 3  
 losartan (COZAAR) 25 mg tablet TAKE ONE TABLET BY MOUTH TWICE A  Tab 2  
 aspirin delayed-release 81 mg tablet Take 81 mg by mouth daily.  triamterene-hydrochlorothiazide (MAXZIDE) 37.5-25 mg per tablet TAKE ONE TABLET BY MOUTH DAILY 90 Tab 3  Cholecalciferol, Vitamin D3, 1,000 unit cap Take 1,000 Units by mouth daily.  OMEGA-3 FATTY ACIDS/FISH OIL (OMEGA 3 FISH OIL PO) Take 900 mg by mouth daily.  MULTIVITAMIN PO Take  by mouth daily.  pantoprazole (PROTONIX) 40 mg tablet Take 40 mg by mouth daily.  coenzyme q10 200 mg Cap Take  by mouth.  azithromycin (ZITHROMAX) 250 mg tablet Take 2 tablets today, then take 1 tablet daily 6 Tab 0 Allergies Allergen Reactions  Altace [Ramipril] Unknown (comments)  Lipitor [Atorvastatin] Other (comments) Muscle pain  Lisinopril Shortness of Breath and Other (comments) Turns bright red  Other Medication Other (comments) Vicryl suture on skin tends to be rejected with poor wound healing does better with monocryl  Procardia [Nifedipine] Other (comments) Caused afib  Rosuvastatin Other (comments) Muscle Pain Past Surgical History:  
Procedure Laterality Date  CARDIAC SURG PROCEDURE UNLIST  04/2018 [address] Sonja Bernstein; echo nl lv, ef 60%, dilated RV, biatrial enlargement, trace AI  
 CARDIAC SURG PROCEDURE UNLIST  04/2018 [address] Sonja Bernstein; NST neg, ef 60%  HAND/FINGER SURGERY UNLISTED  2017  HX CAROTID ENDARTERECTOMY 1/14  right  HX CATARACT REMOVAL    
 HX COLONOSCOPY Dr Jarred Wahl 9/15 polyps  HX CRYOABLATION OF THE PROSTATE    
 10/16 [facility] [address]  HX ORTHOPAEDIC    
 right knee surgery  HX ORTHOPAEDIC  12/2017 Dr Michael Yarbrough; R CTS release  HX REFRACTIVE SURGERY    
 OD (hemoplasty to right eye on retina to avoid blindness) [address]  [facility] LAP, RECURRENT INCISIONAL HERNIA REPAIR,REDUCIBLE N/A 02/09/2017 Dr. Alexandra Gandhi  REPAIR ING HERNIA,5+Y/O,REDUCIBL    
 2/16  left  Dr. Alexandra Gandhi  REPAIR UMBILICAL XOXW,0+D/S,CDZGB    
 2/16  Dr. Alexandra Gandhi  VASCULAR SURGERY PROCEDURE UNLIST    
 1/16  R OLIVIA 0.76, L OLIVIA 1.02  
 VASCULAR SURGERY PROCEDURE UNLIST    
 10/15   left fem art and left iliac stent Social History Socioeconomic History  Marital status:  Spouse name: Not on file  Number of children: Not on file  Years of education: Not on file  Highest education level: Not on file Social Needs  Financial resource strain: Not on file  Food insecurity - worry: Not on file  Food insecurity - inability: Not on file  Transportation needs - medical: Not on file  Transportation needs - non-medical: Not on file Occupational History  Not on file Tobacco Use  Smoking status: Former Smoker Packs/day: 1.50 Years: 25.00 Pack years: 37.50 Types: Cigarettes Last attempt to quit: 2003 Years since quittin.0  Smokeless tobacco: Never Used Substance and Sexual Activity  Alcohol use: Yes Comment: RARELY  Drug use: No  
 Sexual activity: Yes  
  Partners: Female Birth control/protection: None Other Topics Concern  Not on file Social History Narrative  Not on file REVIEW OF SYSTEMS:   
 
No changes from previous review of systems unless noted. PHYSICAL EXAMINATION: 
Visit Vitals /69 Pulse 65 Temp 96.1 °F (35.6 °C) (Oral) Ht 6' (1.829 m) Wt 223 lb 6.4 oz (101.3 kg) SpO2 95% BMI 30.30 kg/m² Body mass index is 30.3 kg/m². GENERAL: Alert and oriented x3, in no acute distress. HEENT: Normocephalic, atraumatic. RESP: Non labored breathing. SKIN: No rashes or lesions noted. PHYSICAL EXAMINATION:   Physical exam of the left hand with a positive Tinel's over the carpal tunnel. No atrophy is noted. Full range of motion with mild loss of extension likely related to the joints of the long finger. The ring finger is tender to palpation over the A1 pulley. He does not have any triggering today, but he does have a palpable mass over this area. He has a positive Phalen's over the carpal tunnel as well. ASSESSMENT/PLAN:   Christine Ramirez is a 68year-old male with left carpal tunnel and left ring trigger finger. I have recommended injections in both, which he is agreeable to. I gave him those today. We also dispensed him a cock-up wrist splint to wear at night. I will see him back as needed. [facility] PROCEDURE PROGRESS NOTE Chart reviewed for the following: 
 Jd Hays MD, have reviewed the History, Physical and updated the Allergic reactions for [facility] TIME OUT performed immediately prior to start of procedure: 
 Jeb Jauregui MD, have performed the following reviews on [facility] prior to the start of the procedure: 
         
* Patient was identified by name and date of birth * Agreement on procedure being performed was verified * Risks and Benefits explained to the patient * Procedure site verified and marked as necessary * Patient was positioned for comfort * Consent was signed and verified Time: 0930 Date of procedure: 1/16/2019 Procedure performed by:  Ortega Smith MD 
 
Provider assisted by: RT Rajesh Patient assisted by: self How tolerated by patient: tolerated the procedure well with no complications Post Procedural Pain Scale: 0 - No Hurt Comments: none Electronically signed by: Ortega Smith MD

## 2019-01-31 ENCOUNTER — TELEPHONE (OUTPATIENT)
Dept: INTERNAL MEDICINE CLINIC | Age: 74
End: 2019-01-31

## 2019-01-31 NOTE — TELEPHONE ENCOUNTER
Regarding: RE:(No subject)  Contact: [phone]  ----- Message from [address] [place] [address] [address], [facility] sent at 1/31/2019 11:49 AM EST -----    I started the Vascepa back again on the 21st of January,  I started getting joint pain moderately yesterday, today it is worse, but i will continue to try to take the medication for several more days and see what happens. Right now, it hurts to bend my fingers and I am having pain in other joints also. Just thought I would pass this on, I don't really expect a reply. Diana Shaikh  ----- Message -----  From: Genevieve Tejada LPN  Sent: 5/4/5572 56:62 AM EST  To: Elsworth Bombard Collette  Subject: RE:(No subject)  No problem at all! We are here to help you. Let us know if it happens again when you restart it. Thanks  Marsiol Diaz    ----- Message -----     From: Elsworth Bombard Collette     Sent: 1/8/2019  7:28 PM EST       To: Maulik Walton LPN  Subject: RE:(No subject)    I don't want to question anything, but the paperwork that came attached to the medication bottle, I looked at today and it also said under patient information, \"The most common side effect of Vascepa is joint pain. This was the paperwork that was actually adhered to the bottles the medication came in from [address] [place] [address]. NO need to share this with the Doctor I don't want to make him angry. I will try it again and see what happens, if the same thing, I will try reducing the dose and using it like I do regular fish oil I or 2 Gm's a day. No need to respond, again I do not want to make Dr. Renee Encarnacion angry. Thank you  Diana Shaikh  ----- Message -----  From: Maulik Walton LPN  Sent: 1/0/0463  4:50 PM EST  To: Elsworth Bombard Collette  Subject: (No subject)       Shawanda Huang Mr. Collette. I have the following message from [address].  The standard dose is 4 a day  If he wants to try to titrate up, then it might be ok - not sure - again haven't had to do that with other fish oils in general

## 2019-02-05 DIAGNOSIS — M54.10 RADICULOPATHY, UNSPECIFIED SPINAL REGION: ICD-10-CM

## 2019-02-05 RX ORDER — HYDROCODONE BITARTRATE AND ACETAMINOPHEN 5; 325 MG/1; MG/1
1 TABLET ORAL
Qty: 180 TAB | Refills: 0 | Status: SHIPPED | OUTPATIENT
Start: 2019-02-05 | End: 2019-02-25

## 2019-02-05 NOTE — TELEPHONE ENCOUNTER
[facility] Norco as 12/27/2018. There appears to be no inconsistencies in regards to the prescribing of this medication. Last Visit: 12/11/2018 with GILBERTO Jones  Next Appointment: 05/23/2019 with MD Clemetine Flow  Previous Refill Encounter(s): 12/26/2018 per MD Kush Carlin #[id number]    Requested Prescriptions     Pending Prescriptions Disp Refills    HYDROcodone-acetaminophen (NORCO) 5-325 mg per tablet 180 Tab 0     Sig: Take 1 Tab by mouth every four (4) hours as needed (for chronic pain).

## 2019-02-14 ENCOUNTER — OFFICE VISIT (OUTPATIENT)
Dept: ORTHOPEDIC SURGERY | Age: 74
End: 2019-02-14

## 2019-02-14 VITALS
SYSTOLIC BLOOD PRESSURE: 115 MMHG | DIASTOLIC BLOOD PRESSURE: 60 MMHG | OXYGEN SATURATION: 97 % | WEIGHT: 220.2 LBS | HEART RATE: 74 BPM | HEIGHT: 72 IN | RESPIRATION RATE: 14 BRPM | TEMPERATURE: 96.2 F | BODY MASS INDEX: 29.82 KG/M2

## 2019-02-14 DIAGNOSIS — G56.22 CUBITAL TUNNEL SYNDROME ON LEFT: ICD-10-CM

## 2019-02-14 DIAGNOSIS — M79.641 BILATERAL HAND PAIN: ICD-10-CM

## 2019-02-14 DIAGNOSIS — M79.642 BILATERAL HAND PAIN: ICD-10-CM

## 2019-02-14 DIAGNOSIS — M54.12 CERVICAL RADICULOPATHY: ICD-10-CM

## 2019-02-14 DIAGNOSIS — M65.321 TRIGGER INDEX FINGER OF RIGHT HAND: Primary | ICD-10-CM

## 2019-02-14 DIAGNOSIS — G56.02 CARPAL TUNNEL SYNDROME OF LEFT WRIST: ICD-10-CM

## 2019-02-14 NOTE — PROGRESS NOTES
1. Have you been to the ER, urgent care clinic since your last visit? Hospitalized since your last visit? No 
 
2. Have you seen or consulted any other health care providers outside of the [address] [place] [address] since your last visit? Include any pap smears or colon screening.  No

## 2019-02-14 NOTE — PATIENT INSTRUCTIONS
Trigger finger Trigger Finger: Care Instructions Your Care Instructions A trigger finger is a finger stuck in a bent position. The bent finger usually straightens out on its own. A trigger finger can be painful, but it normally is not a serious problem. Trigger fingers seem to occur more in some groups of people. These include people who have diabetes or arthritis or who have injured their hands in the past. This problem also occurs in musicians and people who  tools often. Rest, exercises, and other things you can do at home may help your trigger finger relax so that it can bend as it should. You may get a corticosteroid shot. This can reduce swelling and pain. Your doctor may put a splint on your finger. This will give your finger some rest and avoid irritating the joint. You may need surgery if the finger keeps locking in a bent position. Follow-up care is a key part of your treatment and safety. Be sure to make and go to all appointments, and call your doctor if you are having problems. It's also a good idea to know your test results and keep a list of the medicines you take. How can you care for yourself at home? · If your doctor put a splint on your finger, wear the splint as directed. Do not remove it until your doctor says you can. · You may need to change your activities to avoid movements that irritate the finger. · If your finger is swollen, put ice or a cold pack on your finger for 10 to 20 minutes at a time. Try to do this every 1 to 2 hours for the next 3 days (when you are awake) or until the swelling goes down. Put a thin cloth between the ice and your skin. · Prop up your hand on a pillow when you ice it or anytime you sit or lie down during the next 3 days. Try to keep it above the level of your heart. This will help reduce swelling. · Take your medicines exactly as prescribed. Call your doctor if you think you are having a problem with your medicine. · Ask your doctor if you can take an over-the-counter pain medicine, such as acetaminophen (Tylenol), ibuprofen (Advil, Motrin), or naproxen (Aleve). Be safe with medicines. Read and follow all instructions on the label. · If your doctor recommends exercises, do them as directed. When should you call for help? Call your doctor now or seek immediate medical care if: 
  · Your finger locks in a bent position and will not straighten.  
 Watch closely for changes in your health, and be sure to contact your doctor if: 
  · You do not get better as expected. Where can you learn more? Go to http://nneka-mindi.info/. Enter M826 in the search box to learn more about \"Trigger Finger: Care Instructions. \" Current as of: September 20, 2018 Content Version: 11.9 © 4235-3509 [employer]. Care instructions adapted under license by [employer] (which disclaims liability or warranty for this information). If you have questions about a medical condition or this instruction, always ask your healthcare professional. [address] any warranty or liability for your use of this information. Carpal Tunnel Syndrome: Care Instructions Your Care Instructions Carpal tunnel syndrome is a nerve problem. It can cause tingling, numbness, weakness, or pain in the fingers, thumb, and hand. The median nerve and several tough tissues called tendons run through a space in the wrist called the carpal tunnel. The repeated hand motions used in work and some hobbies and sports can put pressure on the nerve. Pregnancy and several conditions, including diabetes, arthritis, and an underactive thyroid, also can cause carpal tunnel syndrome. You may be able to limit an activity or do it differently to reduce your symptoms. You also can take other steps to feel better. If your symptoms are mild, 1 to 2 weeks of home treatment are likely to ease your pain. Surgery is needed only if other treatments do not work. Follow-up care is a key part of your treatment and safety. Be sure to make and go to all appointments, and call your doctor if you are having problems. It's also a good idea to know your test results and keep a list of the medicines you take. How can you care for yourself at home? · If possible, stop or reduce the activity that causes your symptoms. If you cannot stop the activity, take frequent breaks to rest and stretch or change hand positions to do a task. Try switching hands, such as when using a computer mouse. · Try to avoid bending or twisting your wrists. · Ask your doctor if you can take an over-the-counter pain medicine, such as acetaminophen (Tylenol), ibuprofen (Advil, Motrin), or naproxen (Aleve). Be safe with medicines. Read and follow all instructions on the label. · If your doctor prescribes corticosteroid medicine to help reduce pain and swelling, take it exactly as prescribed. Call your doctor if you think you are having a problem with your medicine. · Put ice or a cold pack on your wrist for 10 to 20 minutes at a time to ease pain. Put a thin cloth between the ice and your skin. · If your doctor or your physical or occupational therapist tells you to wear a wrist splint, wear it as directed to keep your wrist in a neutral position. This also eases pressure on your median nerve. · Ask your doctor whether you should have physical or occupational therapy to learn how to do tasks differently. · Try a yoga class to stretch your muscles and build strength in your hands and wrists. Yoga has been shown to ease carpal tunnel symptoms. To prevent carpal tunnel · When working at a computer, keep your hands and wrists in line with your forearms. Hold your elbows close to your sides. Take a break every 10 to 15 minutes. · Try these exercises: 
? Warm up: Rotate your wrist up, down, and from side to side.  Repeat this 4 times. Stretch your fingers far apart, relax them, then stretch them again. Repeat 4 times. Stretch your thumb by pulling it back gently, holding it, and then releasing it. Repeat 4 times. ? Prayer stretch: Start with your palms together in front of your chest just below your chin. Slowly lower your hands toward your waistline while keeping your hands close to your stomach and your palms together until you feel a mild to moderate stretch under your forearms. Hold for 10 to 20 seconds. Repeat 4 times. ? Wrist flexor stretch: Hold your arm in front of you with your palm up. Bend your wrist, pointing your hand toward the floor. With your other hand, gently bend your wrist further until you feel a mild to moderate stretch in your forearm. Hold for 10 to 20 seconds. Repeat 4 times. ? Wrist extensor stretch: Repeat the steps for the wrist flexor stretch, but begin with your extended hand palm down. · Squeeze a rubber exercise ball several times a day to keep your hands and fingers strong. · Avoid holding objects (such as a book) in one position for a long time. When possible, use your whole hand to grasp an object. Using just the thumb and index finger can put stress on the wrist. 
· Do not smoke. It can make this condition worse by reducing blood flow to the median nerve. If you need help quitting, talk to your doctor about stop-smoking programs and medicines. These can increase your chances of quitting for good. When should you call for help? Watch closely for changes in your health, and be sure to contact your doctor if: 
  · Your pain or other problems do not get better with home care.  
  · You want more information about physical or occupational therapy.  
  · You have side effects of your corticosteroid medicine, such as: 
? Weight gain. ? Mood changes. ? Trouble sleeping. ? Bruising easily.  
  · You have any other problems with your medicine. Where can you learn more? Go to http://nneka-mindi.info/. Enter R432 in the search box to learn more about \"Carpal Tunnel Syndrome: Care Instructions. \" Current as of: September 20, 2018 Content Version: 11.9 © 8063-6553 [employer]. Care instructions adapted under license by [employer] (which disclaims liability or warranty for this information). If you have questions about a medical condition or this instruction, always ask your healthcare professional. [address] any warranty or liability for your use of this information. Pinched Nerve in the Neck: Care Instructions Your Care Instructions A pinched nerve in the neck happens when a vertebra or disc in the upper part of your spine is damaged. This damage can happen because of an injury. Or it can just happen with age. The changes caused by the damage may put pressure on a nearby nerve root, pinching it. This causes symptoms such as sharp pain in your neck, shoulder, arm, hand, or back. You may also have tingling or numbness. Sometimes it makes your arm weaker. The symptoms are usually worse when you turn your head or strain your neck. For many people, the symptoms get better over time and finally go away. Early treatment usually includes medicines for pain and swelling. Sometimes physical therapy and special exercises may help. Follow-up care is a key part of your treatment and safety. Be sure to make and go to all appointments, and call your doctor if you are having problems. It's also a good idea to know your test results and keep a list of the medicines you take. How can you care for yourself at home? · Be safe with medicines. Read and follow all instructions on the label. ? If the doctor gave you a prescription medicine for pain, take it as prescribed. ? If you are not taking a prescription pain medicine, ask your doctor if you can take an over-the-counter medicine. · Try using a heating pad on a low or medium setting for 15 to 20 minutes every 2 or 3 hours. Try a warm shower in place of one session with the heating pad. You can also buy single-use heat wraps that last up to 8 hours. · You can also try an ice pack for 10 to 15 minutes every 2 to 3 hours. There isn't strong evidence that either heat or ice will help. But you can try them to see if they help you. · Don't spend too long in one position. Take short breaks to move around and change positions. · Wear a seat belt and shoulder harness when you are in a car. · Sleep with a pillow under your head and neck that keeps your neck straight. · If you were given a neck brace (cervical collar) to limit neck motion, wear it as instructed for as many days as your doctor tells you to. Do not wear it longer than you were told to. Wearing a brace for too long can lead to neck stiffness and can weaken the neck muscles. · Follow your doctor's instructions for gentle neck-stretching exercises. · Do not smoke. Smoking can slow healing of your discs. If you need help quitting, talk to your doctor about stop-smoking programs and medicines. These can increase your chances of quitting for good. · Avoid strenuous work or exercise until your doctor says it is okay. When should you call for help? Call 911 anytime you think you may need emergency care. For example, call if: 
  · You are unable to move an arm or a leg at all.  
[facility] your doctor now or seek immediate medical care if: 
  · You have new or worse symptoms in your arms, legs, chest, belly, or buttocks. Symptoms may include: 
? Numbness or tingling. ? Weakness. ? Pain.  
  · You lose bladder or bowel control.  
 Watch closely for changes in your health, and be sure to contact your doctor if: 
  · You are not getting better as expected. Where can you learn more? Go to http://nneka-mindi.info/. Enter N461 in the search box to learn more about \"Pinched Nerve in the Neck: Care Instructions. \" Current as of: September 20, 2018 Content Version: 11.9 © 6532-0789 [employer], [employer]. Care instructions adapted under license by [employer] (which disclaims liability or warranty for this information). If you have questions about a medical condition or this instruction, always ask your healthcare professional. [place] [address] any warranty or liability for your use of this information.

## 2019-02-14 NOTE — PROGRESS NOTES
Randall Barahona is a 68 y.o. male right handed retiree. Worker's Compensation and legal considerations: not known. Vitals:  
 02/14/19 1431 BP: 115/60 Pulse: 74 Resp: 14 Temp: 96.2 °F (35.7 °C) TempSrc: Oral  
SpO2: 97% Weight: 220 lb 3.2 oz (99.9 kg) Height: 6' (1.829 m) PainSc:   0 - No pain PainLoc: Hand Chief Complaint Patient presents with  
 Hand Pain KLAUS HAND PAIN  
 
 
 
HPI: Patient comes in today as a referral from my partner. 1 month ago he had a left carpal tunnel injection as well as a ring finger trigger finger injection. He says the numbness and tingling in the left is improved. However he says the ring finger is still locking up. He reports having an EMG many years ago. He also has a history of a right sided carpal tunnel release by Morris Travis in December 2017. He also has a history of cervical spine arthritis that he has been seen at the spine center for. He reports a one-week history of burning and pain in the index finger at the level of the palm. He denies any injuries to this area. Date of onset: Many years worsening since 2018 regarding the carpal tunnel syndrome on the left Injury: No 
 
Prior Treatment:  Yes: Comment: He has worn a cockup wrist brace on the left Numbness/ Tingling: Yes: Comment: Thumb index middle ring and small fingers on the left ROS: Review of Systems - General ROS: negative Respiratory ROS: no cough, shortness of breath, or wheezing Cardiovascular ROS: no chest pain or dyspnea on exertion Musculoskeletal ROS: positive for - pain in hand - bilateral 
Neurological ROS: positive for - numbness/tingling Dermatological ROS: negative Past Medical History:  
Diagnosis Date  Adenoma of left adrenal gland 2009  1 cm, no change 1/15, 2/16  Asbestosis CT 1/19 showed pleural plaques  Atrial fibrillation  CHA2DS2-VAsc=3(age+, htn+, vasc dx+; estimated yearly stroke risk according to Lip et al. is 3,2%), Hasbled=2(estimated yearly bleeding risk according to pisters et al. is 1,88%); not anticoagulated due to h/o gi bleed  Atrial fibrillation ablation 10/08  Basal cell cancer Dr Golden Buerger; he's had >350 lesions removed  Carotid occlusion   
 left  Cervical radiculopathy MRI 9/11 showed C3-6 severe foraminal stenosis  Chronic pain  Colon polyp Dr Denver Kay 9/15  Coronary artery disease RCA - 3.0 x 16mm TAXUS 9/04, 3.0 x 16mm TAXUS 12/06  Dyslipidemia  Erectile dysfunction  GERD   
 GI bleed  Hearing loss 2014  bilateral Dr Paulina Pagan  Hernia, umbilical   
 Hypertension  Hypogonadism male  Lumbar radiculopathy Dr Lico Narayan;  MRI 9/11 L4-5 disc bulging, annular tear, disc dessication  Myofascial pain dysfunction syndrome   
 pain clinic  Osteoarthritis   
 right knee Dr Oneil January  Overweight (BMI 25.0-29. 9) IF 5/18 start weight 214 lbs, not doing  Peripheral vascular disease 50-60% R iliac; s/p R iliac stent and L femoral artery stent in past; R OLIVIA 0.76 (1/16)  Plantar fasciitis   
 bilateral   
 PPD positive  Prediabetes  Prostate cancer T1c Imelda 7(3+4), 70% in 1 core, GS 7 (3+4) in 4 cores, GS 6 (3+3) in 1 core; psa 5.28, TRUS 18 gm;  Dr Denver Kay; s/p cryoablation 10/16  Recurrent umbilical hernia  Subclinical hypothyroidism   
 denies  Tinnitus Dr Paulina Pagan  Ulcerative colitis  Venous insufficiency Past Surgical History:  
Procedure Laterality Date  CARDIAC SURG PROCEDURE UNLIST  04/2018 [address] Miguel Solares; echo nl lv, ef 60%, dilated RV, biatrial enlargement, trace AI  
 CARDIAC SURG PROCEDURE UNLIST  04/2018 [address] Miguel Solares; NST neg, ef 60%  HAND/FINGER SURGERY UNLISTED  2017  HX CAROTID ENDARTERECTOMY 1/14  right  HX CATARACT REMOVAL    
 HX COLONOSCOPY Dr Denver Kay 9/15 polyps  HX CRYOABLATION OF THE PROSTATE    
 10/16 Dion[address]  HX ORTHOPAEDIC    
 right knee surgery  HX ORTHOPAEDIC  12/2017 Dr Mary Carmen Juárez; R CTS release  HX REFRACTIVE SURGERY    
 OD (hemoplasty to right eye on retina to avoid blindness) [address]  TN LAP, RECURRENT INCISIONAL HERNIA REPAIR,REDUCIBLE N/A 02/09/2017 Dr. Leslie Ruiz  REPAIR ING HERNIA,5+Y/O,REDUCIBL    
 2/16  left  Dr. Leslie Ruiz  REPAIR UMBILICAL SWPS,9+N/V,WVTHC    
 2/16  Dr. Leslie Ruiz  VASCULAR SURGERY PROCEDURE UNLIST    
 1/16  R OLIVIA 0.76, L OLIVIA 1.02  
 VASCULAR SURGERY PROCEDURE UNLIST    
 10/15   left fem art and left iliac stent Current Outpatient Medications Medication Sig Dispense Refill  triamcinolone acetonide (KENALOG) 10 mg/mL injection 1 mL by IntraMUSCular route once for 1 dose. 1 Vial 0  
 HYDROcodone-acetaminophen (NORCO) 5-325 mg per tablet Take 1 Tab by mouth every four (4) hours as needed (for chronic pain). 180 Tab 0  
 atenolol (TENORMIN) 25 mg tablet TAKE ONE TABLET BY MOUTH TWICE A  Tab 3  
 azithromycin (ZITHROMAX) 250 mg tablet Take 2 tablets today, then take 1 tablet daily 6 Tab 0  
 fluticasone (FLONASE) 50 mcg/actuation nasal spray 1 puff to each nare before bedtime 1 Bottle 1  
 icosapent ethyl (VASCEPA) 1 gram capsule Take 2 Caps by mouth two (2) times daily (with meals). 360 Cap 3  
 diclofenac (FLECTOR) 1.3 % pt12 1 Patch by TransDERmal route every twelve (12) hours every twelve (12) hours. (Patient taking differently: 1 Patch by TransDERmal route every twelve (12) hours every twelve (12) hours.) 60 Patch 2  
 gabapentin (NEURONTIN) 300 mg capsule 1 cap every day to bid 180 Cap 1  
 albuterol (PROVENTIL HFA, VENTOLIN HFA, PROAIR HFA) 90 mcg/actuation inhaler Take 2 Puffs by inhalation every six (6) hours as needed for Wheezing.  Indications: BRONCHOSPASM PREVENTION (Patient taking differently: Take 2 Puffs by inhalation every six (6) hours as needed for Wheezing.) 1 Inhaler 0  
 inhalational spacing device 1 Each by Does Not Apply route as needed. 1 Device 0  
 NAFTIN 2 % gel  LORazepam (ATIVAN) 1 mg tablet Take 1 Tab by mouth every eight (8) hours as needed. 50 Tab 0  
 mupirocin (BACTROBAN) 2 % ointment Apply  to affected area three (3) times daily.  diclofenac (VOLTAREN) 1 % gel Apply 2-4 g to affected area four (4) times daily. 100 g 5  
 naloxone (NARCAN) 4 mg/actuation nasal spray Use 1 spray intranasally into 1 nostril. Use a new Narcan nasal spray for subsequent doses and administer into alternating nostrils. May repeat every 2 to 3 minutes as needed. 1 Each 1  
 polyethylene glycol (MIRALAX) 17 gram packet 17 g.    
 ezetimibe-simvastatin (VYTORIN 10/40) 10-40 mg per tablet Take 1 Tab by mouth nightly. 90 Tab 3  
 losartan (COZAAR) 25 mg tablet TAKE ONE TABLET BY MOUTH TWICE A  Tab 2  
 aspirin delayed-release 81 mg tablet Take 81 mg by mouth daily.  triamterene-hydrochlorothiazide (MAXZIDE) 37.5-25 mg per tablet TAKE ONE TABLET BY MOUTH DAILY 90 Tab 3  Cholecalciferol, Vitamin D3, 1,000 unit cap Take 1,000 Units by mouth daily.  OMEGA-3 FATTY ACIDS/FISH OIL (OMEGA 3 FISH OIL PO) Take 900 mg by mouth daily.  MULTIVITAMIN PO Take  by mouth daily.  pantoprazole (PROTONIX) 40 mg tablet Take 40 mg by mouth daily.  coenzyme q10 200 mg Cap Take  by mouth. Allergies Allergen Reactions  Altace [Ramipril] Unknown (comments)  Lipitor [Atorvastatin] Other (comments) Muscle pain  Lisinopril Shortness of Breath and Other (comments) Turns bright red  Other Medication Other (comments) Vicryl suture on skin tends to be rejected with poor wound healing does better with monocryl  Procardia [Nifedipine] Other (comments) Caused afib  Rosuvastatin Other (comments) Muscle Pain PE:  
 
 NEUROVASCULAR: There is a mixed picture here of cervical versus peripheral provocative signs. There is no APB or ADQ weakness. There is no thenar or first webspace atrophy. Examination L R Examination L R Carpal Comp. + - Pronator Comp. - -  
Carpal Tinel + - Pronator Tinel - - Phalen's + - Pronator Stress - -  
Cubital Comp. + - Guyon Comp. - -  
Cubital Tinel + - Guyon Tinel - -  
Elbow Hyperflexion + - Adson's - - Spurling's ++ - SC Comp. - -  
PCB Median abn - - SC Tinel - - Radial Tinel - - IC Comp. - - Digital Tinel - - IC Tinel - - Radial 2-Pt WNL WNL Ulnar 2-Pt WNL WNL Radial Pulse: 2+ Capillary Refill: < 2 sec Robert: Not Performed Digital Rob Lewis: Not Performed Imaging:  
 
Plain films AP and lateral of the cervical spine shows hyperlordosis in his cervical spine. Additionally there is facet arthropathy at the 3 through 7 levels. There is also a noticeable curve to his cervical spine in the coronal plane. 2017 MRI of cervical spine IMPRESSION: 
  
1. Multilevel degenerative findings of cervical spine. 
-Facet arthropathy more severe than disc pathology. -Multilevel severe foraminal stenoses from facet arthropathy as delineated 
above. Similar distribution previously. 
-No high-grade spinal stenosis. ICD-10-CM ICD-9-CM 1. Bilateral hand pain M79.641 729.5 AMB POC XRAY, SPINE, CERVICAL; 2 OR 3  
 M79.642 2. Trigger index finger of right hand M65.321 727.03 TRIAMCINOLONE ACETONIDE INJ  
   triamcinolone acetonide (KENALOG) 10 mg/mL injection INJECT TENDON SHEATH/LIGAMENT 3. Carpal tunnel syndrome of left wrist G56.02 354.0 EMG ONE EXTREMITY UPPER LT  
   NCV/LAT MOTOR PER NERVE UP/LT 4. Cubital tunnel syndrome on left G56.22 354.2 EMG ONE EXTREMITY UPPER LT  
   NCV/LAT MOTOR PER NERVE UP/LT 5. Cervical radiculopathy M54.12 723.4 EMG ONE EXTREMITY UPPER LT  
   NCV/LAT MOTOR PER NERVE UP/LT Plan:  
 
Right index finger A1 pulley injection Left upper extremity EMG and nerve conduction study I have instructed the patient to consistently wear his brace at night on the left side Follow-up after EMG and nerve conduction study. We will have to see how much of this is coming from his neck versus how much is coming from his cervical spine. [facility] PROCEDURE PROGRESS NOTE Chart reviewed for the following: 
 Ascencion YODER DO, have reviewed the History, Physical and updated the Allergic reactions for [facility] performed immediately prior to start of procedure: 
 Ascencion YODER DO, have performed the following reviews on [facility] prior to the start of the procedure: 
         
* Patient was identified by name and date of birth * Agreement on procedure being performed was verified * Risks and Benefits explained to the patient * Procedure site verified and marked as necessary * Patient was positioned for comfort * Consent was signed and verified Time: 14:50 Date of procedure: 2/14/2019 Procedure performed by: Bar Cooper DO 
 
Provider assisted by: Albion LPN Patient assisted by: self How tolerated by patient: tolerated the procedure well with no complications Post Procedural Pain Scale: 0 - No Hurt Comments: none Procedure: After consent was obtained, using sterile technique the A1 pulley was prepped. Local anesthetic used: 1% lidocaine. Kenalog 5 mg and was then injected and the needle withdrawn. The procedure was well tolerated. The patient is asked to continue to rest the area for a few more days before resuming regular activities. It may be more painful for the first 1-2 days. Watch for fever, or increased swelling or persistent pain in the joint. Call or return to clinic prn if such symptoms occur or there is failure to improve as anticipated. Plan was reviewed with patient, who verbalized agreement and understanding of the plan

## 2019-02-19 RX ORDER — EZETIMIBE AND SIMVASTATIN 10; 40 MG/1; MG/1
TABLET ORAL
Qty: 90 TAB | Refills: 3 | Status: SHIPPED | OUTPATIENT
Start: 2019-02-19 | End: 2020-06-10 | Stop reason: SDUPTHER

## 2019-02-25 ENCOUNTER — TELEPHONE (OUTPATIENT)
Dept: INTERNAL MEDICINE CLINIC | Age: 74
End: 2019-02-25

## 2019-02-25 ENCOUNTER — OFFICE VISIT (OUTPATIENT)
Dept: INTERNAL MEDICINE CLINIC | Age: 74
End: 2019-02-25

## 2019-02-25 VITALS
HEART RATE: 65 BPM | TEMPERATURE: 98.2 F | SYSTOLIC BLOOD PRESSURE: 113 MMHG | HEIGHT: 72 IN | OXYGEN SATURATION: 97 % | WEIGHT: 215 LBS | RESPIRATION RATE: 14 BRPM | DIASTOLIC BLOOD PRESSURE: 68 MMHG | BODY MASS INDEX: 29.12 KG/M2

## 2019-02-25 DIAGNOSIS — F41.9 ANXIETY: ICD-10-CM

## 2019-02-25 DIAGNOSIS — M54.12 CERVICAL RADICULITIS: Primary | ICD-10-CM

## 2019-02-25 RX ORDER — HYOSCYAMINE SULFATE 0.12 MG/1
0.12 TABLET SUBLINGUAL
COMMUNITY
End: 2019-11-25

## 2019-02-25 RX ORDER — PREDNISONE 20 MG/1
20 TABLET ORAL
Qty: 5 TAB | Refills: 0 | Status: SHIPPED | OUTPATIENT
Start: 2019-02-25 | End: 2019-03-04

## 2019-02-25 RX ORDER — NITROGLYCERIN 0.6 MG/1
0.6 TABLET SUBLINGUAL
COMMUNITY
End: 2019-06-27

## 2019-02-25 RX ORDER — LORAZEPAM 1 MG/1
1 TABLET ORAL
Qty: 50 TAB | Refills: 0 | OUTPATIENT
Start: 2019-02-25 | End: 2019-10-14 | Stop reason: SDUPTHER

## 2019-02-25 RX ORDER — MECLIZINE HYDROCHLORIDE 25 MG/1
TABLET ORAL
COMMUNITY
End: 2019-03-04

## 2019-02-25 NOTE — PROGRESS NOTES
1. Have you been to the ER, urgent care clinic or hospitalized since your last visit? NO.  
 
2. Have you seen or consulted any other health care providers outside of the [address] [place] [address] since your last visit (Include any pap smears or colon screening)? NO Chief Complaint Patient presents with  Cough  
  sinus drainage  Shoulder Pain  
  right shoulder blade pain/ burning sensation that raidiates to across back to left shoulder blade  x several weeks. worse in morning

## 2019-02-25 NOTE — TELEPHONE ENCOUNTER
Pt is scheduled to see Sonia Epps today for cough and congestion at 12:00pm. She and Nicolas Shipley have both called out. Can RD see?

## 2019-02-25 NOTE — PROGRESS NOTES
68 y.o. WHITE [place]  male who presents for evaluation. He is known to have cervical spinal stenosis and is actually getting ready to undergo EMG to try to elucidate the problem regarding his upper extremity symptoms. Over the last several weeks, he has been having pain in the medial right shoulder blade region which sometimes crosses over into the left shoulder blade area. Describes it as a constant \"burning\" discomfort. He has not been able to take nonsteroidals due to his medical conditions. Reports no trauma to the area, bruising, rash, tends to be worse after he just gets up from bed, improved as the day goes on 
 
Past Medical History:  
Diagnosis Date  Adenoma of left adrenal gland 2009  1 cm, no change 1/15, 2/16  Asbestosis CT 1/19 showed pleural plaques  Atrial fibrillation CHA2DS2-VAsc=3(age+, htn+, vasc dx+; estimated yearly stroke risk according to Lip et al. is 3,2%), Hasbled=2(estimated yearly bleeding risk according to pisters et al. is 1,88%); not anticoagulated due to h/o gi bleed  Atrial fibrillation ablation 10/08  Basal cell cancer Dr Silvia Mcdonald; he's had >350 lesions removed  Carotid occlusion   
 left  Cervical radiculopathy MRI 9/11 showed C3-6 severe foraminal stenosis  Chronic pain  Colon polyp Dr Delfino Live 9/15  Coronary artery disease RCA - 3.0 x 16mm TAXUS 9/04, 3.0 x 16mm TAXUS 12/06  Dyslipidemia  Erectile dysfunction  GERD   
 GI bleed  Hearing loss 2014  bilateral Dr Pinzon Joe  Hernia, umbilical   
 Hypertension  Hypogonadism male  Lumbar radiculopathy Dr Cecile Mayer;  MRI 9/11 L4-5 disc bulging, annular tear, disc dessication  Myofascial pain dysfunction syndrome   
 pain clinic  Osteoarthritis   
 right knee Dr Maggie Moran  Overweight (BMI 25.0-29. 9) IF 5/18 start weight 214 lbs, not doing  Peripheral vascular disease 50-60% R iliac; s/p R iliac stent and L femoral artery stent in past; R OLIVIA 0.76 (1/16)  Plantar fasciitis   
 bilateral   
 PPD positive  Prediabetes  Prostate cancer T1c Imelda 7(3+4), 70% in 1 core, GS 7 (3+4) in 4 cores, GS 6 (3+3) in 1 core; psa 5.28, TRUS 18 gm;  Dr Darrel Pardo; s/p cryoablation 10/16  Recurrent umbilical hernia  Subclinical hypothyroidism   
 denies  Tinnitus Dr Reid Foil  Ulcerative colitis  Venous insufficiency Current Outpatient Medications Medication Sig  
 nitroglycerin (NITROQUICK) 0.6 mg SL tablet 0.6 mg by SubLINGual route every five (5) minutes as needed for Chest Pain.  hyoscyamine SL (LEVSIN/SL) 0.125 mg SL tablet 0.125 mg by SubLINGual route every four (4) hours as needed for Cramping.  methylcellulose, with sugar, (CITRUCEL FIBER LAXATIVE PO) Take  by mouth.  Bifidobacterium infantis (ALIGN PO) Take  by mouth.  meclizine (ANTIVERT) 25 mg tablet Take  by mouth three (3) times daily as needed.  LORazepam (ATIVAN) 1 mg tablet Take 1 Tab by mouth every eight (8) hours as needed.  predniSONE (DELTASONE) 20 mg tablet Take 20 mg by mouth daily (with breakfast).  ezetimibe-simvastatin (VYTORIN) 10-40 mg per tablet TAKE 1 TABLET NIGHTLY  atenolol (TENORMIN) 25 mg tablet TAKE ONE TABLET BY MOUTH TWICE A DAY  azithromycin (ZITHROMAX) 250 mg tablet Take 2 tablets today, then take 1 tablet daily  icosapent ethyl (VASCEPA) 1 gram capsule Take 2 Caps by mouth two (2) times daily (with meals).  diclofenac (FLECTOR) 1.3 % pt12 1 Patch by TransDERmal route every twelve (12) hours every twelve (12) hours. (Patient taking differently: 1 Patch by TransDERmal route every twelve (12) hours every twelve (12) hours.)  gabapentin (NEURONTIN) 300 mg capsule 1 cap every day to bid  diclofenac (VOLTAREN) 1 % gel Apply 2-4 g to affected area four (4) times daily.  polyethylene glycol (MIRALAX) 17 gram packet 17 g.  losartan (COZAAR) 25 mg tablet TAKE ONE TABLET BY MOUTH TWICE A DAY  aspirin delayed-release 81 mg tablet Take 81 mg by mouth daily.  triamterene-hydrochlorothiazide (MAXZIDE) 37.5-25 mg per tablet TAKE ONE TABLET BY MOUTH DAILY  Cholecalciferol, Vitamin D3, 1,000 unit cap Take 1,000 Units by mouth daily.  OMEGA-3 FATTY ACIDS/FISH OIL (OMEGA 3 FISH OIL PO) Take 900 mg by mouth daily.  MULTIVITAMIN PO Take  by mouth daily.  pantoprazole (PROTONIX) 40 mg tablet Take 40 mg by mouth daily.  coenzyme q10 200 mg Cap Take  by mouth.  inhalational spacing device 1 Each by Does Not Apply route as needed.  naloxone (NARCAN) 4 mg/actuation nasal spray Use 1 spray intranasally into 1 nostril. Use a new Narcan nasal spray for subsequent doses and administer into alternating nostrils. May repeat every 2 to 3 minutes as needed. No current facility-administered medications for this visit. Allergies Allergen Reactions  Altace [Ramipril] Unknown (comments)  Lipitor [Atorvastatin] Other (comments) Muscle pain  Lisinopril Shortness of Breath and Other (comments) Turns bright red  Other Medication Other (comments) Vicryl suture on skin tends to be rejected with poor wound healing does better with monocryl  Procardia [Nifedipine] Other (comments) Caused afib  Rosuvastatin Other (comments) Muscle Pain Visit Vitals /68 Pulse 65 Temp 98.2 °F (36.8 °C) (Oral) Resp 14 Ht 6' (1.829 m) Wt 215 lb (97.5 kg) SpO2 97% BMI 29.16 kg/m² Neck supple with full range of motion, no midline tenderness could be palpated. The bilateral paraspinal musculature was without overt tenderness or spasm. He describes the pain as being in the rhomboid region bilaterally. No bruising or rash Assessment and plan: 1. Probable cervical radiculitis. Limited options, I suggested prednisone for a few days.   I showed him how to stretch the musculature in the area. He is to follow-up with spine regarding his planned workup later this week. Above conditions discussed at length and patient vocalized understanding. All questions answered to patient satisfaction

## 2019-02-27 ENCOUNTER — OFFICE VISIT (OUTPATIENT)
Dept: ORTHOPEDIC SURGERY | Age: 74
End: 2019-02-27

## 2019-02-27 VITALS
HEIGHT: 72 IN | SYSTOLIC BLOOD PRESSURE: 106 MMHG | DIASTOLIC BLOOD PRESSURE: 60 MMHG | BODY MASS INDEX: 28.99 KG/M2 | HEART RATE: 70 BPM | WEIGHT: 214 LBS | RESPIRATION RATE: 14 BRPM | OXYGEN SATURATION: 95 % | TEMPERATURE: 98.1 F

## 2019-02-27 DIAGNOSIS — R94.131 ABNORMAL EMG: ICD-10-CM

## 2019-02-27 DIAGNOSIS — R20.2 NUMBNESS AND TINGLING IN LEFT HAND: Primary | ICD-10-CM

## 2019-02-27 DIAGNOSIS — R20.0 NUMBNESS AND TINGLING IN LEFT HAND: Primary | ICD-10-CM

## 2019-02-27 NOTE — PROGRESS NOTES
[address]  [address], [address]  [address]  Phone: [phone]  Fax: [phone]        Mamta Sawyer  :   PCP: Noemi Tilley MD  2019    ELECTROMYOGRAPHY AND NERVE CONDUCTION STUDIES    Tayler Joseph was referred by Dr. Roselia Walker for electrodiagnostic evaluation of left hand pain and paraesthesia. NCV & EMG Findings:  Evaluation of the left median motor nerve showed prolonged distal onset latency (5.2 ms) and decreased conduction velocity (Elbow-Wrist, 44 m/s). The left ulnar motor nerve showed decreased conduction velocity (A Elbow-B Elbow, 48 m/s). The left median sensory nerve showed prolonged distal peak latency (4.4 ms), reduced amplitude (5.0 µV), and decreased conduction velocity (Wrist-2nd Digit, 32 m/s). The left ulnar sensory nerve showed prolonged distal peak latency (3.9 ms), reduced amplitude (5.5 µV), and decreased conduction velocity (Wrist-5th Digit, 36 m/s). All remaining nerves (as indicated in the following tables) were within normal limits. Needle evaluation of the left triceps muscle showed increased motor unit amplitude and slightly increased polyphasic potentials. The left pronator teres and the left Ext Digitorum muscles showed slightly increased polyphasic potentials. All remaining muscles (as indicated in the following table) showed no evidence of electrical instability. INTERPRETATION  This is an abnormal electrodiagnostic examination. These findings may be consistent with:  1. 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  moderate carpal tunnel syndrome at the left wrist (carpal tunnel syndrome)  3. chronic C6/7 cervical radiculopathy on the left  4. mild signs of peripheral neuropathy     CLINICAL INTERPRETATION  His symptoms may multifactorial, related to any of these diagnoses, but most likely the carpal tunnel syndrome. HISTORY OF PRESENT ILLNESS  Jigar Wray is a 68 y.o. male. Pt presents today for LUE EMG for c/o left hand numbness and tingling encompassing his entire hand circumferentially. He found improvement with a left carpal tunnel injection. He has a left ring trigger finger. He had an EMG 5-6 years ago. He had a CTR on the R by Dr. Macie Zuniga. At the time, he was constantly dropping things from his right hand; he occasionally drops things from his left hand currently. He wakes up with numbness in his left hand. He states that he will occasionally have left arm pain that radiates into his chest - mimicking a heart attack. He has been to the ER three times for these instances. He is also a patient of Dr. Francisco Brannon for h/o neck pain. His MRI from 2017 reveals severe facet arthropathy and multilevel severe foraminal stenoses. He is not inclined to proceed with a surgical intervention for his neck. He denies h/o diabetes or numbness in his feet.  He has peripheral artery disease in his BLE and some associated neuropathy, for which he     PAST MEDICAL HISTORY   Past Medical History:   Diagnosis Date    Adenoma of left adrenal gland     2009  1 cm, no change 1/15, 2/16    Asbestosis     CT 1/19 showed pleural plaques    Atrial fibrillation     CHA2DS2-VAsc=3(age+, htn+, vasc dx+; estimated yearly stroke risk according to Lip et al. is 3,2%), Hasbled=2(estimated yearly bleeding risk according to pisters et al. is 1,88%); not anticoagulated due to h/o gi bleed    Atrial fibrillation ablation     10/08    Basal cell cancer     Dr Golden Buerger; he's had >350 lesions removed    Carotid occlusion     left     Cervical radiculopathy     MRI 9/11 showed C3-6 severe foraminal stenosis    Chronic pain     Colon polyp     Dr Denver Kay 9/15    Coronary artery disease     RCA - 3.0 x 16mm TAXUS 9/04, 3.0 x 16mm TAXUS 12/06    Dyslipidemia     Erectile dysfunction     GERD     GI bleed     Hearing loss     2014 bilateral Dr Troy Avila    Hernia, umbilical     Hypertension     Hypogonadism male     Lumbar radiculopathy     Dr Sanya Cantu;  MRI 9/11 L4-5 disc bulging, annular tear, disc dessication    Myofascial pain dysfunction syndrome     pain clinic     Osteoarthritis     right knee Dr Cuevas Due Overweight (BMI 25.0-29. 9)     IF 5/18 start weight 214 lbs, not doing    Peripheral vascular disease     50-60% R iliac; s/p R iliac stent and L femoral artery stent in past; R OLIVIA 0.76 (1/16)    Plantar fasciitis     bilateral     PPD positive     Prediabetes     Prostate cancer     T1c Grimes 7(3+4), 70% in 1 core, GS 7 (3+4) in 4 cores, GS 6 (3+3) in 1 core; psa 5.28, TRUS 18 gm;  Dr Michael Robles; s/p cryoablation 10/16    Recurrent umbilical hernia     Subclinical hypothyroidism     denies    Tinnitus     Dr Jansen Patient Ulcerative colitis     Venous insufficiency        Past Surgical History:   Procedure Laterality Date    CARDIAC SURG PROCEDURE UNLIST  04/2018    [facility] Dr Campbell Menezes; echo nl lv, ef 60%, dilated RV, biatrial enlargement, trace AI    CARDIAC SURG PROCEDURE UNLIST  04/2018    [facility] Dr Hightower Beverage; NST neg, ef 60%    HAND/FINGER SURGERY UNLISTED  2017    HX CAROTID ENDARTERECTOMY      1/14  right    Enrike Rater HX COLONOSCOPY      Dr Michael Robles 9/15 polyps    HX CRYOABLATION OF THE PROSTATE      10/16    HX HEMORRHOIDECTOMY      1979    [address], 1975    HX ORTHOPAEDIC      right knee surgery    HX ORTHOPAEDIC  12/2017    Dr Daria Isbell; R CTS release    HX REFRACTIVE SURGERY      OD (hemoplasty to right eye on retina to avoid blindness)    HX TONSILLECTOMY      1955    MS LAP, RECURRENT INCISIONAL HERNIA REPAIR,REDUCIBLE N/A 02/09/2017    Dr. Alanna Donald HERNIA,5+Y/O,REDUCIBL      2/16  left  Dr. Sonia Hernandez FQUL,0+P/X,LFEVM      2/16  Dr. Guerrero Asp      1/16  R OLIVIA 0.76, L OLIVIA 1.02    VASCULAR SURGERY PROCEDURE UNLIST 10/15   left fem art and left iliac stent   . MEDICATIONS    Current Outpatient Medications   Medication Sig Dispense Refill    nitroglycerin (NITROQUICK) 0.6 mg SL tablet 0.6 mg by SubLINGual route every five (5) minutes as needed for Chest Pain.  hyoscyamine SL (LEVSIN/SL) 0.125 mg SL tablet 0.125 mg by SubLINGual route every four (4) hours as needed for Cramping.  methylcellulose, with sugar, (CITRUCEL FIBER LAXATIVE PO) Take  by mouth.  Bifidobacterium infantis (ALIGN PO) Take  by mouth.  meclizine (ANTIVERT) 25 mg tablet Take  by mouth three (3) times daily as needed.  LORazepam (ATIVAN) 1 mg tablet Take 1 Tab by mouth every eight (8) hours as needed. 50 Tab 0    predniSONE (DELTASONE) 20 mg tablet Take 20 mg by mouth daily (with breakfast). 5 Tab 0    ezetimibe-simvastatin (VYTORIN) 10-40 mg per tablet TAKE 1 TABLET NIGHTLY 90 Tab 3    atenolol (TENORMIN) 25 mg tablet TAKE ONE TABLET BY MOUTH TWICE A  Tab 3    azithromycin (ZITHROMAX) 250 mg tablet Take 2 tablets today, then take 1 tablet daily 6 Tab 0    icosapent ethyl (VASCEPA) 1 gram capsule Take 2 Caps by mouth two (2) times daily (with meals). 360 Cap 3    diclofenac (FLECTOR) 1.3 % pt12 1 Patch by TransDERmal route every twelve (12) hours every twelve (12) hours. (Patient taking differently: 1 Patch by TransDERmal route every twelve (12) hours every twelve (12) hours.) 60 Patch 2    gabapentin (NEURONTIN) 300 mg capsule 1 cap every day to bid 180 Cap 1    inhalational spacing device 1 Each by Does Not Apply route as needed. 1 Device 0    diclofenac (VOLTAREN) 1 % gel Apply 2-4 g to affected area four (4) times daily. 100 g 5    naloxone (NARCAN) 4 mg/actuation nasal spray Use 1 spray intranasally into 1 nostril. Use a new Narcan nasal spray for subsequent doses and administer into alternating nostrils. May repeat every 2 to 3 minutes as needed.  1 Each 1    polyethylene glycol (MIRALAX) 17 gram packet 17 g.      losartan (COZAAR) 25 mg tablet TAKE ONE TABLET BY MOUTH TWICE A  Tab 2    aspirin delayed-release 81 mg tablet Take 81 mg by mouth daily.  triamterene-hydrochlorothiazide (MAXZIDE) 37.5-25 mg per tablet TAKE ONE TABLET BY MOUTH DAILY 90 Tab 3    Cholecalciferol, Vitamin D3, 1,000 unit cap Take 1,000 Units by mouth daily.  OMEGA-3 FATTY ACIDS/FISH OIL (OMEGA 3 FISH OIL PO) Take 900 mg by mouth daily.  MULTIVITAMIN PO Take  by mouth daily.  pantoprazole (PROTONIX) 40 mg tablet Take 40 mg by mouth daily.  coenzyme q10 200 mg Cap Take  by mouth.           ALLERGIES  Allergies   Allergen Reactions    Altace [Ramipril] Unknown (comments)    Lipitor [Atorvastatin] Other (comments)     Muscle pain    Lisinopril Shortness of Breath and Other (comments)     Turns bright red    Other Medication Other (comments)     Vicryl suture on skin tends to be rejected with poor wound healing does better with monocryl    Procardia [Nifedipine] Other (comments)     Caused afib    Rosuvastatin Other (comments)     Muscle Pain            SOCIAL HISTORY    Social History     Socioeconomic History    Marital status:      Spouse name: Not on file    Number of children: Not on file    Years of education: Not on file    Highest education level: Not on file   Tobacco Use    Smoking status: Former Smoker     Packs/day: 1.50     Years: 25.00     Pack years: 37.50     Types: Cigarettes     Last attempt to quit: 2003     Years since quittin.1    Smokeless tobacco: Never Used   Substance and Sexual Activity    Alcohol use: Yes     Comment: RARELY    Drug use: No    Sexual activity: Yes     Partners: Female     Birth control/protection: None       FAMILY HISTORY  Family History   Problem Relation Age of Onset    Heart Disease Mother     Hypertension Mother     Diabetes Mother     Arthritis-osteo Mother     Heart Disease Father     Stroke Father     Hypertension Father    Mary Ellen.Belen Heart Disease Sister     Hypertension Sister          PHYSICAL EXAMINATION  Visit Vitals  /60   Pulse 70   Temp 98.1 °F (36.7 °C) (Oral)   Resp 14   Ht 6' (1.829 m)   Wt 214 lb (97.1 kg)   SpO2 95%   BMI 29.02 kg/m²       Pain Assessment  2/14/2019   Location of Pain Hand   Location Modifiers Left;Right   Severity of Pain 0   Quality of Pain -   Quality of Pain Comment -   Duration of Pain -   Frequency of Pain -   Date Pain First Started -   Aggravating Factors -   Aggravating Factors Comment -   Limiting Behavior -   Relieving Factors -   Relieving Factors Comment -   Result of Injury -   Work-Related Injury -   Type of Injury -   Type of Injury Comment -           Constitutional:  Well developed, well nourished, in no acute distress. Psychiatric: Affect and mood are appropriate. Integumentary: No rashes or abrasions noted on exposed areas. SPINE/MUSCULOSKELETAL EXAM    DTRs are 2+ biceps, triceps, brachioradialis, patella, and Achilles.     On brief examination: Positive Tinel's sign at left elbow and wrist.    MOTOR:      Biceps  Triceps Deltoids Wrist Ext Wrist Flex Hand Intrin   Right 5/5 5/5 5/5 5/5 5/5 5/5   Left 5/5 5/5 5/5 5/5 5/5 5/5             Hip Flex  Quads Hamstrings Ankle DF EHL Ankle PF   Right 5/5 5/5 5/5 5/5 5/5 5/5   Left 5/5 5/5 5/5 5/5 5/5 5/5     NCV & EMG Findings:  Nerve Conduction Studies  Anti Sensory Summary Table     Stim Site NR Peak (ms) Norm Peak (ms) O-P Amp (µV) Norm O-P Amp Site1 Site2 Delta-P (ms) Dist (cm) Andrey (m/s) Norm Andrey (m/s)   Left DorsCutan Anti Sensory (Dorsum 5th MC)   Wrist    2.6  9.2  Wrist Dorsum 5th MC 2.6 0.0     Left Median Anti Sensory (2nd Digit)   Wrist    4.4 <3.6 5.0 >10 Wrist 2nd Digit 4.4 14.0 32 >39   Left Radial Anti Sensory (Base 1st Digit)   Wrist    2.6 <3.1 29.9  Wrist Base 1st Digit 2.6 0.0     Left Ulnar Anti Sensory (5th Digit)   Wrist    3.9 <3.7 5.5 >15.0 Wrist 5th Digit 3.9 14.0 36 >38     Motor Summary Table     Stim Site NR Onset (ms) Norm Onset (ms) O-P Amp (mV) Norm O-P Amp Site1 Site2 Delta-0 (ms) Dist (cm) Andrey (m/s) Norm Andrey (m/s)   Left Median Motor (Abd Poll Brev)   Wrist    5.2 <4.2 6.9 >5 Elbow Wrist 5.3 23.5 44 >50   Elbow    10.5  6.9          Left Ulnar Motor (Abd Dig Min)   Wrist    3.4 <4.2 9.4 >3 B Elbow Wrist 4.1 22.5 55 >53   B Elbow    7.5  8.2  A Elbow B Elbow 2.1 10.0 48 >53   A Elbow    9.6  8.0            EMG     Side Muscle Nerve Root Ins Act Fibs Psw Amp Dur Poly Recrt Int [place] Comment   Left Cervical Parasp Up Rami C1-3 Nml Nml Nml         Left Cervical Parasp Mid Rami C4-6 Nml Nml Nml         Left Cervical Parasp Low Rami C7-8 Nml Nml Nml         Left Biceps Musculocut C5-6 Nml Nml Nml Nml Nml 0 Nml Nml    Left Triceps Radial C6-7-8 Nml Nml Nml Incr Nml 1+ Nml Nml    Left PronatorTeres Median C6-7 Nml Nml Nml Nml Nml 1+ Nml Nml    Left Ext Digitorum Radial (Post Int) C7-8 Nml Nml Nml Nml Nml 1+ Nml Nml    Left Abd Poll Brev Median C8-T1 Nml Nml Nml Nml Nml 0 Nml Nml    Left 1stDorInt Ulnar C8-T1 Nml Nml Nml Nml Nml 0 Nml Nml    Left FlexCarpiUln Ulnar C8,T1 Nml Nml Nml Nml Nml 0 Nml Nml        Nerve Conduction Studies  Anti Sensory Left/Right Comparison     Stim Site L Lat (ms) R Lat (ms) L-R Lat (ms) L Amp (µV) R Amp (µV) L-R Amp (%) Site1 Site2 L Andrey (m/s) R Andrey (m/s) L-R Andrey (m/s)   DorsCutan Anti Sensory (Dorsum 5th MC)   Wrist 2.6   9.2   Wrist Dorsum 5th MC      Median Anti Sensory (2nd Digit)   Wrist 4.4   5.0   Wrist 2nd Digit 32     Radial Anti Sensory (Base 1st Digit)   Wrist 2.6   29.9   Wrist Base 1st Digit      Ulnar Anti Sensory (5th Digit)   Wrist 3.9   5.5   Wrist 5th Digit 36       Motor Left/Right Comparison     Stim Site L Lat (ms) R Lat (ms) L-R Lat (ms) L Amp (mV) R Amp (mV) L-R Amp (%) Site1 Site2 L Andrey (m/s) R Andrey (m/s) L-R Andrey (m/s)   Median Motor (Abd Poll Brev)   Wrist 5.2   6.9   Elbow Wrist 44     Elbow 10.5   6.9          Ulnar Motor (Abd Dig Min)   Wrist 3.4   9.4   B Elbow Wrist 55 B Elbow 7.5   8.2   A Elbow B Elbow 48     A Elbow 9.6   8.0                Waveforms:                      [facility]  OFFICE PROCEDURE PROGRESS NOTE        Chart reviewed for the following:   Junie YODER, have reviewed the History, Physical and updated the Allergic reactions for [address] performed immediately prior to start of procedure:   Junie YODER, have performed the following reviews on [facility] prior to the start of the procedure:            * Patient was identified by name and date of birth   * Agreement on procedure being performed was verified  * Risks and Benefits explained to the patient  * Procedure site verified and marked as necessary  * Patient was positioned for comfort  * Consent was signed and verified     Time: 2:20pm      Date of procedure: 2/27/2019    Procedure performed by:  Dmitri Nicholson MD    Provider accompanied by: Brandonibjomar. Patient accompanied by: None.     How tolerated by patient: tolerated the procedure well with no complications    Post Procedural Pain Scale: 0 - No Hurt    Comments: none    Written by Dearvilla Menjivar, [address] [place] [address] [address] as dictated by Junie Glover MD

## 2019-03-01 ENCOUNTER — OFFICE VISIT (OUTPATIENT)
Dept: ORTHOPEDIC SURGERY | Age: 74
End: 2019-03-01

## 2019-03-01 VITALS
SYSTOLIC BLOOD PRESSURE: 105 MMHG | DIASTOLIC BLOOD PRESSURE: 60 MMHG | WEIGHT: 218 LBS | HEART RATE: 67 BPM | OXYGEN SATURATION: 95 % | RESPIRATION RATE: 16 BRPM | BODY MASS INDEX: 29.53 KG/M2 | TEMPERATURE: 96.4 F | HEIGHT: 72 IN

## 2019-03-01 DIAGNOSIS — G60.9 IDIOPATHIC PERIPHERAL NEUROPATHY: ICD-10-CM

## 2019-03-01 DIAGNOSIS — G56.22 ULNAR NEUROPATHY AT WRIST, LEFT: ICD-10-CM

## 2019-03-01 DIAGNOSIS — M65.341 TRIGGER RING FINGER OF RIGHT HAND: ICD-10-CM

## 2019-03-01 DIAGNOSIS — M54.12 CERVICAL RADICULOPATHY: ICD-10-CM

## 2019-03-01 DIAGNOSIS — G56.02 CARPAL TUNNEL SYNDROME ON LEFT: Primary | ICD-10-CM

## 2019-03-01 DIAGNOSIS — G56.22 CUBITAL TUNNEL SYNDROME ON LEFT: ICD-10-CM

## 2019-03-01 NOTE — PROGRESS NOTES
Laverne Diaz is a 68 y.o. male right handed retiree. Worker's Compensation and legal considerations: not known. Vitals:  
 03/01/19 1404 BP: 105/60 Pulse: 67 Resp: 16 Temp: 96.4 °F (35.8 °C) TempSrc: Oral  
SpO2: 95% Weight: 218 lb (98.9 kg) Height: 6' (1.829 m) PainSc:   0 - No pain Chief Complaint Patient presents with  
 Hand Pain Left hand EMG follow up HPI: Patient comes in today for EMG follow-up of his left upper extremity. At his last visit he received a right index finger A1 pulley injection for trigger finger that he said has helped and has not come back. He reports the numbness and tingling in his left side has not changed. He says it is mostly at night but he thinks it involves all of the digits. Previous HPI: Patient comes in today as a referral from my partner. 1 month ago he had a left carpal tunnel injection as well as a ring finger trigger finger injection. He says the numbness and tingling in the left is improved. However he says the ring finger is still locking up. He reports having an EMG many years ago. He also has a history of a right sided carpal tunnel release by Sobia Tenorio in December 2017. He also has a history of cervical spine arthritis that he has been seen at the spine center for. He reports a one-week history of burning and pain in the index finger at the level of the palm. He denies any injuries to this area. Date of onset: Many years worsening since 2018 regarding the carpal tunnel syndrome on the left Injury: No 
 
Prior Treatment:  Yes: Comment: He has worn a cockup wrist brace on the left Numbness/ Tingling: Yes: Comment: Thumb index middle ring and small fingers on the left ROS: Review of Systems - General ROS: negative Respiratory ROS: no cough, shortness of breath, or wheezing Cardiovascular ROS: no chest pain or dyspnea on exertion Musculoskeletal ROS: positive for - pain in hand - bilateral 
 Neurological ROS: positive for - numbness/tingling Dermatological ROS: negative Past Medical History:  
Diagnosis Date  Adenoma of left adrenal gland 2009  1 cm, no change 1/15, 2/16  Asbestosis CT 1/19 showed pleural plaques  Atrial fibrillation CHA2DS2-VAsc=3(age+, htn+, vasc dx+; estimated yearly stroke risk according to Lip et al. is 3,2%), Hasbled=2(estimated yearly bleeding risk according to pisters et al. is 1,88%); not anticoagulated due to h/o gi bleed  Atrial fibrillation ablation 10/08  Basal cell cancer Dr Sly Goff; he's had >350 lesions removed  Carotid occlusion   
 left  Cervical radiculopathy MRI 9/11 showed C3-6 severe foraminal stenosis  Chronic pain  Colon polyp Dr Mara Ryan 9/15  Coronary artery disease RCA - 3.0 x 16mm TAXUS 9/04, 3.0 x 16mm TAXUS 12/06  Dyslipidemia  Erectile dysfunction  GERD   
 GI bleed  Hearing loss 2014  bilateral Dr Ana Gallardo  Hernia, umbilical   
 Hypertension  Hypogonadism male  Lumbar radiculopathy Dr Elizabeth Davenport;  MRI 9/11 L4-5 disc bulging, annular tear, disc dessication  Myofascial pain dysfunction syndrome   
 pain clinic  Osteoarthritis   
 right knee Dr Jf Delcid  Overweight (BMI 25.0-29. 9) IF 5/18 start weight 214 lbs, not doing  Peripheral vascular disease 50-60% R iliac; s/p R iliac stent and L femoral artery stent in past; R OLIVIA 0.76 (1/16)  Plantar fasciitis   
 bilateral   
 PPD positive  Prediabetes  Prostate cancer T1c Imelda 7(3+4), 70% in 1 core, GS 7 (3+4) in 4 cores, GS 6 (3+3) in 1 core; psa 5.28, TRUS 18 gm;  Dr Mara Ryan; s/p cryoablation 10/16  Recurrent umbilical hernia  Subclinical hypothyroidism   
 denies  Tinnitus Dr Ana Gallardo  Ulcerative colitis  Venous insufficiency Past Surgical History:  
Procedure Laterality Date  CARDIAC SURG PROCEDURE UNLIST  04/2018 [address] [facility] [address] Gopi Raymond; echo nl lv, ef 60%, dilated RV, biatrial enlargement, trace AI  
 CARDIAC SURG PROCEDURE UNLIST  04/2018 [address] Gopi Raymond; NST neg, ef 60%  HAND/FINGER SURGERY UNLISTED  2017  HX CAROTID ENDARTERECTOMY 1/14  right  HX CATARACT REMOVAL    
 HX COLONOSCOPY Dr Quintin Castillo 9/15 polyps  HX CRYOABLATION OF THE PROSTATE    
 10/16 [facility] [address]  HX ORTHOPAEDIC    
 right knee surgery  HX ORTHOPAEDIC  12/2017 Dr Jewel Fox; R CTS release  HX REFRACTIVE SURGERY    
 OD (hemoplasty to right eye on retina to avoid blindness) [address]  TN LAP, RECURRENT INCISIONAL HERNIA REPAIR,REDUCIBLE N/A 02/09/2017 Dr. Sp Turner  REPAIR ING HERNIA,5+Y/O,REDUCIBL    
 2/16  left  Dr. Sp Turner  REPAIR UMBILICAL UHRV,1+R/E,CMYSA    
 2/16  Dr. Sp Turner  VASCULAR SURGERY PROCEDURE UNLIST    
 1/16  R OLIVIA 0.76, L OLIVIA 1.02  
 VASCULAR SURGERY PROCEDURE UNLIST    
 10/15   left fem art and left iliac stent Current Outpatient Medications Medication Sig Dispense Refill  triamcinolone acetonide (KENALOG) 10 mg/mL injection 1 mL by IntraMUSCular route once for 1 dose. 1 Vial 0  
 triamcinolone acetonide (KENALOG) 10 mg/mL injection 1 mL by IntraMUSCular route once for 1 dose. 1 Vial 0  
 nitroglycerin (NITROQUICK) 0.6 mg SL tablet 0.6 mg by SubLINGual route every five (5) minutes as needed for Chest Pain.  hyoscyamine SL (LEVSIN/SL) 0.125 mg SL tablet 0.125 mg by SubLINGual route every four (4) hours as needed for Cramping.  methylcellulose, with sugar, (CITRUCEL FIBER LAXATIVE PO) Take  by mouth.  Bifidobacterium infantis (ALIGN PO) Take  by mouth.  meclizine (ANTIVERT) 25 mg tablet Take  by mouth three (3) times daily as needed.  LORazepam (ATIVAN) 1 mg tablet Take 1 Tab by mouth every eight (8) hours as needed.  50 Tab 0  
  predniSONE (DELTASONE) 20 mg tablet Take 20 mg by mouth daily (with breakfast). 5 Tab 0  
 ezetimibe-simvastatin (VYTORIN) 10-40 mg per tablet TAKE 1 TABLET NIGHTLY 90 Tab 3  
 atenolol (TENORMIN) 25 mg tablet TAKE ONE TABLET BY MOUTH TWICE A  Tab 3  
 icosapent ethyl (VASCEPA) 1 gram capsule Take 2 Caps by mouth two (2) times daily (with meals). 360 Cap 3  
 gabapentin (NEURONTIN) 300 mg capsule 1 cap every day to bid 180 Cap 1  
 inhalational spacing device 1 Each by Does Not Apply route as needed. 1 Device 0  
 diclofenac (VOLTAREN) 1 % gel Apply 2-4 g to affected area four (4) times daily. 100 g 5  polyethylene glycol (MIRALAX) 17 gram packet 17 g.    
 losartan (COZAAR) 25 mg tablet TAKE ONE TABLET BY MOUTH TWICE A  Tab 2  
 aspirin delayed-release 81 mg tablet Take 81 mg by mouth daily.  triamterene-hydrochlorothiazide (MAXZIDE) 37.5-25 mg per tablet TAKE ONE TABLET BY MOUTH DAILY 90 Tab 3  Cholecalciferol, Vitamin D3, 1,000 unit cap Take 1,000 Units by mouth daily.  OMEGA-3 FATTY ACIDS/FISH OIL (OMEGA 3 FISH OIL PO) Take 900 mg by mouth daily.  MULTIVITAMIN PO Take  by mouth daily.  pantoprazole (PROTONIX) 40 mg tablet Take 40 mg by mouth daily.  coenzyme q10 200 mg Cap Take  by mouth.  azithromycin (ZITHROMAX) 250 mg tablet Take 2 tablets today, then take 1 tablet daily 6 Tab 0  
 diclofenac (FLECTOR) 1.3 % pt12 1 Patch by TransDERmal route every twelve (12) hours every twelve (12) hours. (Patient taking differently: 1 Patch by TransDERmal route every twelve (12) hours every twelve (12) hours.) 60 Patch 2  
 naloxone (NARCAN) 4 mg/actuation nasal spray Use 1 spray intranasally into 1 nostril. Use a new Narcan nasal spray for subsequent doses and administer into alternating nostrils. May repeat every 2 to 3 minutes as needed. 1 Each 1 Allergies Allergen Reactions  Altace [Ramipril] Unknown (comments)  Lipitor [Atorvastatin] Other (comments) Muscle pain  Lisinopril Shortness of Breath and Other (comments) Turns bright red  Other Medication Other (comments) Vicryl suture on skin tends to be rejected with poor wound healing does better with monocryl  Procardia [Nifedipine] Other (comments) Caused afib  Rosuvastatin Other (comments) Muscle Pain PE:  
 
NEUROVASCULAR: There is a mixed picture here of cervical versus peripheral provocative signs. There is no APB or ADQ weakness. There is no thenar or first webspace atrophy. Examination L R Examination L R Carpal Comp. + - Pronator Comp. - -  
Carpal Tinel + - Pronator Tinel - - Phalen's + - Pronator Stress - -  
Cubital Comp. + - Guyon Comp. - -  
Cubital Tinel + - Guyon Tinel - -  
Elbow Hyperflexion + - Adson's - - Spurling's ++ - SC Comp. - -  
PCB Median abn - - SC Tinel - - Radial Tinel - - IC Comp. - - Digital Tinel - - IC Tinel - - Radial 2-Pt WNL WNL Ulnar 2-Pt WNL WNL Radial Pulse: 2+ Capillary Refill: < 2 sec Robert: Not Performed Digital Flory [place]: Not Performed Imaging:  
 
Plain films AP and lateral of the cervical spine shows hyperlordosis in his cervical spine. Additionally there is facet arthropathy at the 3 through 7 levels. There is also a noticeable curve to his cervical spine in the coronal plane. 2017 MRI of cervical spine IMPRESSION: 
  
1. Multilevel degenerative findings of cervical spine. 
-Facet arthropathy more severe than disc pathology. -Multilevel severe foraminal stenoses from facet arthropathy as delineated 
above. Similar distribution previously. 
-No high-grade spinal stenosis. NCV & EMG Findings: 
Evaluation of the left median motor nerve showed prolonged distal onset latency (5.2 ms) and decreased conduction velocity (Elbow-Wrist, 44 m/s).   The left ulnar motor nerve showed decreased conduction velocity (A Elbow-B Elbow, 48 m/s). The left median sensory nerve showed prolonged distal peak latency (4.4 ms), reduced amplitude (5.0 µV), and decreased conduction velocity (Wrist-2nd Digit, 32 m/s). The left ulnar sensory nerve showed prolonged distal peak latency (3.9 ms), reduced amplitude (5.5 µV), and decreased conduction velocity (Wrist-5th Digit, 36 m/s). All remaining nerves (as indicated in the following tables) were within normal limits.   
  
Needle evaluation of the left triceps muscle showed increased motor unit amplitude and slightly increased polyphasic potentials. The left pronator teres and the left Ext Digitorum muscles showed slightly increased polyphasic potentials. All remaining muscles (as indicated in the following table) showed no evidence of electrical instability.   
  
INTERPRETATION This is an abnormal electrodiagnostic examination. These findings may be consistent with: 
1. mild ulnar mononeuropathy at the left elbow (cubital tunnel) and at the wrist(Guyon's canal) 2. moderate carpal tunnel syndrome at the left wrist (carpal tunnel syndrome) 3. chronic C6/7 cervical radiculopathy on the left 4. mild signs of peripheral neuropathy  
  
CLINICAL INTERPRETATION His symptoms may multifactorial, related to any of these diagnoses, but most likely the carpal tunnel syndrome. ICD-10-CM ICD-9-CM 1. Carpal tunnel syndrome on left G56.02 354.0 INJECT CARPAL TUNNEL  
   TRIAMCINOLONE ACETONIDE INJ  
   triamcinolone acetonide (KENALOG) 10 mg/mL injection 2. Cubital tunnel syndrome on left G56.22 354.2 3. Ulnar neuropathy at wrist, left G56.22 354.2 4. Trigger ring finger of right hand M65.341 727.03 TRIAMCINOLONE ACETONIDE INJ  
   triamcinolone acetonide (KENALOG) 10 mg/mL injection INJECT TENDON SHEATH/LIGAMENT 5. Cervical radiculopathy M54.12 723.4 6. Idiopathic peripheral neuropathy G60.9 356.9 Plan:  
 
Left Ring Finger A1 Pulley injection Left Carpal Tunnel Injection I had a discussion with the patient regarding the likely need for a left carpal tunnel release in the future. However given the uncertainty as to whether or not his ulnar nerve is truly bothering him an injection may provide some diagnostic in addition to therapeutic value. The patient would also like to try an injection first. 
 
F/U in 3 months [address] [facility] PROCEDURE PROGRESS NOTE Chart reviewed for the following: 
 Ascencion YODER DO, have reviewed the History, Physical and updated the Allergic reactions for [place] TIME OUT performed immediately prior to start of procedure: 
 Ascencion YODER DO, have performed the following reviews on [place] prior to the start of the procedure: 
         
* Patient was identified by name and date of birth * Agreement on procedure being performed was verified * Risks and Benefits explained to the patient * Procedure site verified and marked as necessary * Patient was positioned for comfort * Consent was signed and verified Time: 14:55 Date of procedure: 3/1/2019 Procedure performed by: Casper Barron DO 
 
Provider assisted by: Katey Ace LPN Patient assisted by: self How tolerated by patient: tolerated the procedure well with no complications Post Procedural Pain Scale: 0 - No Hurt Comments: none Procedure: After consent was obtained, using sterile technique the A1 pulley and Ring Finger were prepped. Local anesthetic used: 1% lidocaine. Kenalog 5 mg X2 and was then injected and the needle withdrawn. The procedure was well tolerated. The patient is asked to continue to rest the area for a few more days before resuming regular activities. It may be more painful for the first 1-2 days. Watch for fever, or increased swelling or persistent pain in the joint.  Call or return to clinic prn if such symptoms occur or there is failure to improve as anticipated. Plan was reviewed with patient, who verbalized agreement and understanding of the plan

## 2019-03-01 NOTE — PATIENT INSTRUCTIONS
Carpal Tunnel Syndrome: Care Instructions Your Care Instructions Carpal tunnel syndrome is a nerve problem. It can cause tingling, numbness, weakness, or pain in the fingers, thumb, and hand. The median nerve and several tough tissues called tendons run through a space in the wrist called the carpal tunnel. The repeated hand motions used in work and some hobbies and sports can put pressure on the nerve. Pregnancy and several conditions, including diabetes, arthritis, and an underactive thyroid, also can cause carpal tunnel syndrome. You may be able to limit an activity or do it differently to reduce your symptoms. You also can take other steps to feel better. If your symptoms are mild, 1 to 2 weeks of home treatment are likely to ease your pain. Surgery is needed only if other treatments do not work. Follow-up care is a key part of your treatment and safety. Be sure to make and go to all appointments, and call your doctor if you are having problems. It's also a good idea to know your test results and keep a list of the medicines you take. How can you care for yourself at home? · If possible, stop or reduce the activity that causes your symptoms. If you cannot stop the activity, take frequent breaks to rest and stretch or change hand positions to do a task. Try switching hands, such as when using a computer mouse. · Try to avoid bending or twisting your wrists. · Ask your doctor if you can take an over-the-counter pain medicine, such as acetaminophen (Tylenol), ibuprofen (Advil, Motrin), or naproxen (Aleve). Be safe with medicines. Read and follow all instructions on the label. · If your doctor prescribes corticosteroid medicine to help reduce pain and swelling, take it exactly as prescribed. Call your doctor if you think you are having a problem with your medicine. · Put ice or a cold pack on your wrist for 10 to 20 minutes at a time to ease pain. Put a thin cloth between the ice and your skin. · If your doctor or your physical or occupational therapist tells you to wear a wrist splint, wear it as directed to keep your wrist in a neutral position. This also eases pressure on your median nerve. · Ask your doctor whether you should have physical or occupational therapy to learn how to do tasks differently. · Try a yoga class to stretch your muscles and build strength in your hands and wrists. Yoga has been shown to ease carpal tunnel symptoms. To prevent carpal tunnel · When working at a computer, keep your hands and wrists in line with your forearms. Hold your elbows close to your sides. Take a break every 10 to 15 minutes. · Try these exercises: 
? Warm up: Rotate your wrist up, down, and from side to side. Repeat this 4 times. Stretch your fingers far apart, relax them, then stretch them again. Repeat 4 times. Stretch your thumb by pulling it back gently, holding it, and then releasing it. Repeat 4 times. ? Prayer stretch: Start with your palms together in front of your chest just below your chin. Slowly lower your hands toward your waistline while keeping your hands close to your stomach and your palms together until you feel a mild to moderate stretch under your forearms. Hold for 10 to 20 seconds. Repeat 4 times. ? Wrist flexor stretch: Hold your arm in front of you with your palm up. Bend your wrist, pointing your hand toward the floor. With your other hand, gently bend your wrist further until you feel a mild to moderate stretch in your forearm. Hold for 10 to 20 seconds. Repeat 4 times. ? Wrist extensor stretch: Repeat the steps for the wrist flexor stretch, but begin with your extended hand palm down. · Squeeze a rubber exercise ball several times a day to keep your hands and fingers strong. · Avoid holding objects (such as a book) in one position for a long time. When possible, use your whole hand to grasp an object.  Using just the thumb and index finger can put stress on the wrist. 
 · Do not smoke. It can make this condition worse by reducing blood flow to the median nerve. If you need help quitting, talk to your doctor about stop-smoking programs and medicines. These can increase your chances of quitting for good. When should you call for help? Watch closely for changes in your health, and be sure to contact your doctor if: 
  · Your pain or other problems do not get better with home care.  
  · You want more information about physical or occupational therapy.  
  · You have side effects of your corticosteroid medicine, such as: 
? Weight gain. ? Mood changes. ? Trouble sleeping. ? Bruising easily.  
  · You have any other problems with your medicine. Where can you learn more? Go to http://nneka-mindi.info/. Enter R432 in the search box to learn more about \"Carpal Tunnel Syndrome: Care Instructions. \" Current as of: September 20, 2018 Content Version: 11.9 © 2547-9107 [employer]. Care instructions adapted under license by [employer] (which disclaims liability or warranty for this information). If you have questions about a medical condition or this instruction, always ask your healthcare professional. [place] [address] any warranty or liability for your use of this information. Ulnar Neuropathy (Handlebar Palsy): Exercises Your Care Instructions Here are some examples of typical rehabilitation exercises for your condition. Start each exercise slowly. Ease off the exercise if you start to have pain. Your doctor or your physical or occupational therapist will tell you when you can start these exercises and which ones will work best for you. How to do the exercises Neck rotation 1. Sit in a firm chair, or stand up straight. 2. Keeping your chin level, turn your head to the right, and hold for 15 to 30 seconds. 3. Turn your head to the left, and hold for 15 to 30 seconds. 4. Repeat 2 to 4 times to each side. Shoulder blade squeeze 1. While standing with your arms at your sides, squeeze your shoulder blades together. Do not raise your shoulders as you are squeezing. 2. Hold for 6 seconds. 3. Repeat 8 to 12 times. Neck stretches 1. Look straight ahead, and tip your right ear to your right shoulder. Do not let your left shoulder rise as you tip your head to the right. 2. Hold for 15 to 30 seconds. 3. Tilt your head to the left. Do not let your right shoulder rise as you tip your head to the left. 4. Hold for 15 to 30 seconds. 5. Repeat 2 to 4 times to each side. Elbow flexion and extension 1. Stand with your arms relaxed at your sides. 2. With your affected arm, gently bend your elbow up toward you as far as possible. 3. Then straighten your arm as much as you can. 4. Repeat 2 to 4 times. Wrist flexor stretch 1. Extend your affected arm in front of you with your palm facing away from your body. 2. Bend back your wrist on your affected arm, pointing your hand up toward the ceiling. 3. With your other hand, gently bend your wrist farther until you feel a mild to moderate stretch in your forearm. 4. Hold for at least 15 to 30 seconds. 5. Repeat 2 to 4 times. 6. Repeat steps 1 through 5, but this time extend your affected arm in front of you with your palm facing up. Then bend back your wrist, pointing your hand toward the floor. Wrist flexion and extension 1. Place your forearm on a table, with your affected hand and wrist extended beyond the table, palm down. 2. Slowly bend your wrist to move your hand upward and allow your hand to close into a fist. Hold for about 6 seconds. 3. Then lower your hand and allow your fingers to relax. Hold this position for about 6 seconds. You should feel a gentle stretch. 4. Repeat 8 to 12 times. Follow-up care is a key part of your treatment and safety.  Be sure to make and go to all appointments, and call your doctor if you are having problems. It's also a good idea to know your test results and keep a list of the medicines you take. Where can you learn more? Go to http://nneka-mindi.info/. Enter G955 in the search box to learn more about \"Ulnar Neuropathy (Handlebar Palsy): Exercises. \" Current as of: September 20, 2018 Content Version: 11.9 © 8760-5360 [employer]. Care instructions adapted under license by [employer] (which disclaims liability or warranty for this information). If you have questions about a medical condition or this instruction, always ask your healthcare professional. [address] any warranty or liability for your use of this information.

## 2019-03-01 NOTE — LETTER
NAME: Niesha Watts : 1945 MRN: [id number] CONSENT FOR TREATMENT/PROCEDURE I authorize Rafaela Davies DO at [address] [place] [address] and [facility] to perform the medical treatment and/or procedure(s) described below:  
 
Description of Medical Treatment and/or Procedure(s): (Specify number of treatments or procedures if repeated treatment is recommended) 
 
_____________inject steroid into left carpal tunnel and left ring finger trigger_________________________ I understand my provider may be assisted with significant procedural tasks by other qualified medical practitioners, who may perform important parts of the treatment/procedure(s) or assist with the administration of analgesia (pain-relieving medications) as necessary for my treatment/procedure(s). These practitioners will only perform tasks within the scope of their licensure and practice, as determined under [place] law and any other applicable regulation(s). Name and Credentials of Practitioners Who May Assist with My Treatment/Procedure(s):  
 
______________________________________________________________________ I understand that a medical company representative may be present during my treatment/procedure(s) to observe and provide verbal, technical advice to my provider. I consent to the participation of this representative in my treatment/procedure(s). My provider has explained that unforeseen conditions may be identified during the performance of my treatment/procedure(s) that necessitate an extension of the original treatment/procedure(s) or the performance of procedures other than those identified above. I consent to the performance of additional treatment/procedure(s) by my provider and the qualified medical practitioners assisting my provider as deemed necessary by my provider for treatment of my medical condition(s).   
 
I understand that certain complications may arise during the use of analgesia during my treatment/procedure(s) that may include, but are not limited to, decreased/altered awareness, respiratory problems, drug reactions, paralysis, brain damage, or possibly, death. I understand that the analgesia required for the performance of my treatment/procedure(s) involves additional risks beyond those of the treatment/procedure(s) to be performed, and authorize the use of analgesia by my provider as deemed necessary for the control of pain during my treatment/procedure(s). I understand that my provider may need to change the type of analgesia or medications used, possibly without explanation to me, and I consent to any such changes as deemed necessary by my provider for treatment of my medical condition(s). I understand that there are risks of infection and other unexpected complications associated with any medical or surgical treatment/procedure including the treatment/procedure(s) listed above or other treatment/procedure(s) necessitated by my medical condition, and that such complications may occur in the absence of any negligence on the part of my healthcare providers. My provider has explained to my satisfaction my medical condition and the specific treatment/procedure(s) recommended and identified above. I have been given an opportunity to ask and have answered to my satisfaction questions about: (CONTINUED PAGE 2) NAME: Katherine Mercado : 1945 MRN: [id number]  The nature and extent of the treatment/procedure(s) to be performed;  The benefit of treatment, and the risks associated with not having the recommended treatment/procedure(s);  The risks and possible complications associated with having the treatment, including those which, even though   
   unlikely to occur, may involve serious consequences;  
 Analgesia and alternative forms of analgesia;  Alternative procedures and methods of treatment, and the risk associated with each;  
  The expected consequences of the treatment/procedure(s) on my future health. I understand that no assurance can be given that the treatment/procedure(s) performed will be a success, and no guarantee or warranty of success for the treatment/procedure(s) has been given to me by my provider. I consent to the disposal by the examining Pathologist of any tissue removed during my treatment/procedure(s) in accordance with the receiving hospitals or laboratorys policy and any associated regulations specific to such disposal.  
 
I DO__X___  DO NOT______ consent to other health care personnel observing my treatment/procedure(s) for the purpose of medical education or other specified purposes as may be explained by my provider. I DO_______ DO NOT___X_____ consent to photography or videotaping of all or any part of my treatment/procedure(s) for medical and/or educational purposes. I understand that my identity will not be revealed in any photographs, videos, or accompanying explanations should these images be used by my healthcare providers. I certify that I have read and fully understand the above Consent for Treatment/Procedure and that all blanks were completed before I signed this consent.  
 
__________Charles Emeline Pulse Collette_________                      _________________________ Print Name of Patient or Legal Representative    Relationship to Patient (if not self) X_______________________________             _________________________ Signature of Patient (or legal representative) Witness to signature    
 
                       __3/1/2019___/___2:30 PM___ Date/Time (If patient is a minor or is unable to sign, complete the following) Patient is a minor, ________ years of age, or is unable to sign due to:______________________ I have explained the nature, purpose and anticipated benefits as well as any possible alternative methods of treatment, the known risks that are involved, and the possibility of complications of the proposed procedure(s) to the patient or patients legal representative. I have provided the patient or legal representative with an opportunity to ask and have answered to their satisfaction any questions about the proposed treatment/procedure(s) and alternative methods of treatment. Provider Signature:___________________  Date:__3/1/2019__Time:_2:30 PM_

## 2019-03-04 ENCOUNTER — TELEPHONE (OUTPATIENT)
Dept: INTERNAL MEDICINE CLINIC | Age: 74
End: 2019-03-04

## 2019-03-04 ENCOUNTER — OFFICE VISIT (OUTPATIENT)
Dept: VASCULAR SURGERY | Age: 74
End: 2019-03-04

## 2019-03-04 VITALS
HEART RATE: 80 BPM | SYSTOLIC BLOOD PRESSURE: 132 MMHG | BODY MASS INDEX: 29.53 KG/M2 | DIASTOLIC BLOOD PRESSURE: 84 MMHG | WEIGHT: 218 LBS | RESPIRATION RATE: 14 BRPM | HEIGHT: 72 IN

## 2019-03-04 DIAGNOSIS — I65.22 LEFT CAROTID ARTERY OCCLUSION: ICD-10-CM

## 2019-03-04 DIAGNOSIS — I74.09 AORTOILIAC OCCLUSIVE DISEASE (HCC): ICD-10-CM

## 2019-03-04 DIAGNOSIS — I73.9 PERIPHERAL VASCULAR DISEASE (HCC): ICD-10-CM

## 2019-03-04 DIAGNOSIS — I70.219 ATHEROSCLEROSIS OF ARTERY OF EXTREMITY WITH INTERMITTENT CLAUDICATION (HCC): Primary | ICD-10-CM

## 2019-03-04 NOTE — PROGRESS NOTES
[address] Chief Complaint Patient presents with  Leg Pain History and Physical   
[address] is a 68 y.o. male who is status post right carotid endarterectomy. With known occlusion of the left internal carotid artery. Patient also has right common iliac artery stent with left lower extremity atherectomy and balloon angioplasty. Overall patient states that his claudication of the right lower extremity is worsened to lifestyle limiting claudication. He is having difficulty with doing his activities of daily living. He also has a slow to heal wound of 2 months after a cut to the right lower extremity although has fully healed at this current time. No fevers or chills or rest pain. Past Medical History:  
Diagnosis Date  Adenoma of left adrenal gland 2009  1 cm, no change 1/15, 2/16  Asbestosis CT 1/19 showed pleural plaques  Atrial fibrillation CHA2DS2-VAsc=3(age+, htn+, vasc dx+; estimated yearly stroke risk according to Lip et al. is 3,2%), Hasbled=2(estimated yearly bleeding risk according to pisters et al. is 1,88%); not anticoagulated due to h/o gi bleed  Atrial fibrillation ablation 10/08  Basal cell cancer Dr Ortega Ann; he's had >350 lesions removed  Carotid occlusion   
 left  Cervical radiculopathy MRI 9/11 showed C3-6 severe foraminal stenosis  Chronic pain  Colon polyp Dr Darrel Pardo 9/15  Coronary artery disease RCA - 3.0 x 16mm TAXUS 9/04, 3.0 x 16mm TAXUS 12/06  Dyslipidemia  Erectile dysfunction  GERD   
 GI bleed  Hearing loss 2014  bilateral Dr Reid Foil  Hernia, umbilical   
 Hypertension  Hypogonadism male  Lumbar radiculopathy Dr Charmaine Fry;  MRI 9/11 L4-5 disc bulging, annular tear, disc dessication  Myofascial pain dysfunction syndrome   
 pain clinic  Osteoarthritis   
 right knee Dr Delroy Kelley  Overweight (BMI 25.0-29. 9) IF 5/18 start weight 214 lbs, not doing  Peripheral vascular disease 50-60% R iliac; s/p R iliac stent and L femoral artery stent in past; R OLIVIA 0.76 (1/16)  Plantar fasciitis   
 bilateral   
 PPD positive  Prediabetes  Prostate cancer T1c Imelda 7(3+4), 70% in 1 core, GS 7 (3+4) in 4 cores, GS 6 (3+3) in 1 core; psa 5.28, TRUS 18 gm;  Dr Jarred Wahl; s/p cryoablation 10/16  Recurrent umbilical hernia  Subclinical hypothyroidism   
 denies  Tinnitus Dr Sarah Tinsley  Ulcerative colitis  Venous insufficiency Past Surgical History:  
Procedure Laterality Date  CARDIAC SURG PROCEDURE UNLIST  04/2018 [address] Sonja Bernstein; echo nl lv, ef 60%, dilated RV, biatrial enlargement, trace AI  
 CARDIAC SURG PROCEDURE UNLIST  04/2018 [address] Sonja Bernstein; NST neg, ef 60%  HAND/FINGER SURGERY UNLISTED  2017  HX CAROTID ENDARTERECTOMY 1/14  right  HX CATARACT REMOVAL    
 HX COLONOSCOPY Dr Jarred Wahl 9/15 polyps  HX CRYOABLATION OF THE PROSTATE    
 10/16 [facility] [address]  HX ORTHOPAEDIC    
 right knee surgery  HX ORTHOPAEDIC  12/2017 Dr Michael Yarbrough; R CTS release  HX REFRACTIVE SURGERY    
 OD (hemoplasty to right eye on retina to avoid blindness) [address]  UT LAP, RECURRENT INCISIONAL HERNIA REPAIR,REDUCIBLE N/A 02/09/2017 Dr. Alexandra Gandhi  REPAIR ING HERNIA,5+Y/O,REDUCIBL    
 2/16  left  Dr. Alexandra Gandhi  REPAIR UMBILICAL XPNV,5+D/X,GWXNR    
 2/16  Dr. Alexandra Gandhi  VASCULAR SURGERY PROCEDURE UNLIST    
 1/16  R OLIVIA 0.76, L OLIVIA 1.02  
 VASCULAR SURGERY PROCEDURE UNLIST    
 10/15   left fem art and left iliac stent Patient Active Problem List  
Diagnosis Code  Colitis, ulcerative ([facility] K51.90  Colon polyps K63.5  Peripheral vascular disease (HCC) Dr Coles Gene I73.9  Left carotid artery occlusion I65.22  
 Atherosclerosis of artery of extremity with intermittent claudication ([facility] I70.219  Hypovitaminosis D E55.9  Advance directive in chart Z78.9  Hypertension I11.9  Dyslipidemia E78.5  Coronary artery disease I25.10  Atrial fibrillation I48.91  
 Recurrent umbilical hernia O03.8  ED (erectile dysfunction) of organic origin N52.9  
 IFG (impaired fasting glucose) R73.01  
 Cervical radiculopathy M54.12  
 Lumbar radiculopathy M54.16  
 Cervical spinal stenosis M48.02  
 Degeneration of cervical and lumbar spine, relative cervical stenosis M50.30  Ulnar neuritis, right G56.21  
 Overweight (BMI 25.0-29. 9) E66.3  History of prostate cancer Z85.46  
 Facet hypertrophy of lumbar region M47.896  Aortoiliac occlusive disease ([facility] I74.09  
 
Current Outpatient Medications Medication Sig Dispense Refill  nitroglycerin (NITROQUICK) 0.6 mg SL tablet 0.6 mg by SubLINGual route every five (5) minutes as needed for Chest Pain.  hyoscyamine SL (LEVSIN/SL) 0.125 mg SL tablet 0.125 mg by SubLINGual route every four (4) hours as needed for Cramping.  LORazepam (ATIVAN) 1 mg tablet Take 1 Tab by mouth every eight (8) hours as needed. 50 Tab 0  
 ezetimibe-simvastatin (VYTORIN) 10-40 mg per tablet TAKE 1 TABLET NIGHTLY 90 Tab 3  
 atenolol (TENORMIN) 25 mg tablet TAKE ONE TABLET BY MOUTH TWICE A  Tab 3  
 icosapent ethyl (VASCEPA) 1 gram capsule Take 2 Caps by mouth two (2) times daily (with meals). 360 Cap 3  
 gabapentin (NEURONTIN) 300 mg capsule 1 cap every day to bid 180 Cap 1  
 inhalational spacing device 1 Each by Does Not Apply route as needed. 1 Device 0  
 diclofenac (VOLTAREN) 1 % gel Apply 2-4 g to affected area four (4) times daily. 100 g 5  
 naloxone (NARCAN) 4 mg/actuation nasal spray Use 1 spray intranasally into 1 nostril. Use a new Narcan nasal spray for subsequent doses and administer into alternating nostrils. May repeat every 2 to 3 minutes as needed.  1 Each 1  
  polyethylene glycol (MIRALAX) 17 gram packet 17 g.    
 losartan (COZAAR) 25 mg tablet TAKE ONE TABLET BY MOUTH TWICE A  Tab 2  
 aspirin delayed-release 81 mg tablet Take 81 mg by mouth daily.  triamterene-hydrochlorothiazide (MAXZIDE) 37.5-25 mg per tablet TAKE ONE TABLET BY MOUTH DAILY 90 Tab 3  Cholecalciferol, Vitamin D3, 1,000 unit cap Take 1,000 Units by mouth daily.  MULTIVITAMIN PO Take  by mouth daily.  pantoprazole (PROTONIX) 40 mg tablet Take 40 mg by mouth daily.  coenzyme q10 200 mg Cap Take  by mouth. Allergies Allergen Reactions  Altace [Ramipril] Unknown (comments)  Lipitor [Atorvastatin] Other (comments) Muscle pain  Lisinopril Shortness of Breath and Other (comments) Turns bright red  Other Medication Other (comments) Vicryl suture on skin tends to be rejected with poor wound healing does better with monocryl  Procardia [Nifedipine] Other (comments) Caused afib  Rosuvastatin Other (comments) Muscle Pain Social History Socioeconomic History  Marital status:  Spouse name: Not on file  Number of children: Not on file  Years of education: Not on file  Highest education level: Not on file Social Needs  Financial resource strain: Not on file  Food insecurity - worry: Not on file  Food insecurity - inability: Not on file  Transportation needs - medical: Not on file  Transportation needs - non-medical: Not on file Occupational History  Not on file Tobacco Use  Smoking status: Former Smoker Packs/day: 1.50 Years: 25.00 Pack years: 37.50 Types: Cigarettes Last attempt to quit: 2003 Years since quittin.1  Smokeless tobacco: Never Used Substance and Sexual Activity  Alcohol use: Yes Comment: RARELY  Drug use: No  
 Sexual activity: Yes  
  Partners: Female Birth control/protection: None Other Topics Concern  Not on file Social History Narrative  Not on file Family History Problem Relation Age of Onset  Heart Disease Mother  Hypertension Mother  Diabetes Mother [facility] Arthritis-osteo Mother  Heart Disease Father  Stroke Father  Hypertension Father  Heart Disease Sister  Hypertension Sister Physical Exam:   
Visit Vitals /84 (BP 1 Location: Left arm, BP Patient Position: Sitting) Pulse 80 Resp 14 Ht 6' (1.829 m) Wt 218 lb (98.9 kg) BMI 29.57 kg/m² General: Well-appearing male in no acute distress HEENT: EOMI no scleral icterus is noted Pulmonary: No increased work of breathing is noted Extremities: Warm and perfused bilaterally no edema is identified and no ulcerations are identified Neuro: Cranial nerves II through XII are grossly intact Impression and Plan: 
Jan Renteria is a 68 y.o. male with carotid artery stenosis as well as occlusion. Patient has had a previous right carotid endarterectomy. His ultrasound shows that everything looks pristine from that point of view. Although he does continue to have the occlusion on the left internal carotid artery. As far as his lower extremities are concerned he does on ultrasound seem to have a greater than 75% narrowing of the common femoral artery. But I do believe that we need a little bit more information prior to moving forward with a common femoral endarterectomy. We talked about the possibility of performing CTA versus angiogram.  Given the risks and benefits of either of these he has decided on moving forward with angiography of the right lower extremity. He understands that more than likely this will be diagnostic although there is always a small chance that we can fix things with balloons or stents that may also be going on prior to any common femoral endarterectomy with patch.   We also went over the risks and benefits of common femoral endarterectomy with patch but we would schedule this at a later date and time. In either event we will move forward with angiography for his lifestyle limiting claudication of the right lower extremity and will most likely require right common femoral endarterectomy with patch angioplasty at a later date and time. We reviewed the plan with the patient and the patient understands. We also gave the patient appropriate instructions on their disease process and when to call back. Greater than 50% of this visit was spent with face to face discussion. Follow-up Disposition: Not on File Cosme Homans, MD 
 
PLEASE NOTE: 
This document has been produced using voice recognition software. Unrecognized errors in transcription may be present.

## 2019-03-04 NOTE — PROGRESS NOTES
1. Have you been to an emergency room or urgent care clinic since your last visit? No 
Hospitalized since your last visit? If yes, where, when, and reason for visit? No 
2. Have you seen or consulted any other health care providers outside of the [facility] since your last visit including any procedures, health maintenance items. If yes, where, when and reason for visit?

## 2019-03-04 NOTE — TELEPHONE ENCOUNTER
Pt has received a bill for $180.00 for drug screen test done 8/15/18 from [facility]. Dimas Moody he has never encountered this before and has had other drug screenings done in the past.  Dimas Moody he called Medicare and they stated it was due to how it was coded? ?  Pt said he can't afford this and ask if you can change coding to get it covered. Please advise pt at [phone].

## 2019-03-13 NOTE — TELEPHONE ENCOUNTER
I faxed an order to change dx to z79.899 so his insurance will cover hopefully, this is the dx we have used for his UDS tests after 8/15/18

## 2019-03-22 DIAGNOSIS — M54.10 RADICULOPATHY, UNSPECIFIED SPINAL REGION: ICD-10-CM

## 2019-03-22 DIAGNOSIS — Z02.89 PAIN MANAGEMENT CONTRACT SIGNED: Primary | ICD-10-CM

## 2019-03-22 RX ORDER — HYDROCODONE BITARTRATE AND ACETAMINOPHEN 5; 325 MG/1; MG/1
1 TABLET ORAL
Qty: 180 TAB | Refills: 0 | Status: SHIPPED | OUTPATIENT
Start: 2019-03-22 | End: 2019-04-21

## 2019-03-22 NOTE — TELEPHONE ENCOUNTER
Pt states he needs refill on this Norco 5/325, could not find it on his list.  Lux Santana he has been taking it for years. But, he also wants to know if there is another drug for pain that he could be prescribed where he wouldn't have to be drug tested because he is getting bills from [facility] for $180 his cost for the drug testing. He can't afford this. Please advise pt at [phone].

## 2019-03-22 NOTE — TELEPHONE ENCOUNTER
Last uds 12/27/18  Needs uds ordered   Last fill per  2/6/19  Last ov 2/25/19      But, he also wants to know if there is another drug for pain that he could be prescribed where he wouldn't have to be drug tested because he is getting bills from [place] [facility] for $180 his cost for the drug testing.   He can't afford this.

## 2019-03-22 NOTE — TELEPHONE ENCOUNTER
No other options  We are required to drug test everyone on chronic narcotic usage  Does he want to go to pain mgmt?   Possibly refer to Dr Gardenia Fernandez group

## 2019-03-25 ENCOUNTER — HOSPITAL ENCOUNTER (OUTPATIENT)
Dept: LAB | Age: 74
Discharge: HOME | End: 2019-03-25
Payer: MEDICARE

## 2019-03-25 ENCOUNTER — TELEPHONE (OUTPATIENT)
Dept: INTERNAL MEDICINE CLINIC | Age: 74
End: 2019-03-25

## 2019-03-25 PROCEDURE — 80361 OPIATES 1 OR MORE: CPT

## 2019-03-25 PROCEDURE — 80307 DRUG TEST PRSMV CHEM ANLYZR: CPT

## 2019-03-25 NOTE — TELEPHONE ENCOUNTER
Patient is here today to  his rx and to do a UDS. Stating the last time he did one he was charged $118 because insurance didn't like the dx code that was used (Z02.89). In the past it has been covered using the dx code Z79.899. Can this be changed so that he doesn't get stuck having to pay for it?

## 2019-03-26 NOTE — TELEPHONE ENCOUNTER
I have sent an order on 3/21/19 to re code with new dx Z79.891  sent to [facility]  Can we send it to billing somehow?

## 2019-03-28 LAB
AMPHETAMINES UR QL SCN: NEGATIVE NG/ML
BARBITURATES UR QL SCN: NEGATIVE NG/ML
BENZODIAZ UR QL SCN: NEGATIVE NG/ML
BZE UR QL SCN: NEGATIVE NG/ML
CANNABINOIDS UR QL SCN: NEGATIVE NG/ML
CODEINE, 737848: NEGATIVE
CREAT UR-MCNC: 23.4 MG/DL (ref 20–300)
FENTANYL+NORFENTANYL UR QL SCN: NEGATIVE PG/ML
HYDROCODONE CONFIRM, 737855: 144 NG/ML
HYDROCODONE, 737854: POSITIVE
HYDROMORPHONE, 737852: NEGATIVE
MEPERIDINE UR QL: NEGATIVE NG/ML
METHADONE UR QL SCN: NEGATIVE NG/ML
MORPHINE, 737850: NEGATIVE
OPIATES UR QL SCN: NORMAL NG/ML
OPIATES, 737847: POSITIVE NG/ML
OXYCODONE+OXYMORPHONE UR QL SCN: NEGATIVE NG/ML
PCP UR QL: NEGATIVE NG/ML
PH UR: 7.2 [PH] (ref 4.5–8.9)
PLEASE NOTE:, 733157: NORMAL
PROPOXYPH UR QL SCN: NEGATIVE NG/ML
SP GR UR: 1.01
TRAMADOL UR QL SCN: NEGATIVE NG/ML

## 2019-04-11 RX ORDER — DICLOFENAC SODIUM 10 MG/G
2-4 GEL TOPICAL 4 TIMES DAILY
Qty: 100 G | Refills: 5 | Status: SHIPPED | OUTPATIENT
Start: 2019-04-11 | End: 2020-01-15 | Stop reason: SDUPTHER

## 2019-04-11 NOTE — TELEPHONE ENCOUNTER
Last Visit: 11/21/18 with MD Torres Bernal  Next Appointment: none  Previous Refill Encounter(s): 8/7/18 #100 with 5 refills    Requested Prescriptions     Pending Prescriptions Disp Refills    diclofenac (VOLTAREN) 1 % gel 100 g 5     Sig: Apply 2-4 g to affected area four (4) times daily.

## 2019-04-17 ENCOUNTER — HOSPITAL ENCOUNTER (OUTPATIENT)
Dept: LAB | Age: 74
Discharge: HOME | End: 2019-04-17
Payer: MEDICARE

## 2019-04-17 ENCOUNTER — OFFICE VISIT (OUTPATIENT)
Dept: ORTHOPEDIC SURGERY | Facility: CLINIC | Age: 74
End: 2019-04-17

## 2019-04-17 VITALS
SYSTOLIC BLOOD PRESSURE: 105 MMHG | TEMPERATURE: 97.6 F | WEIGHT: 218 LBS | BODY MASS INDEX: 29.53 KG/M2 | HEIGHT: 72 IN | DIASTOLIC BLOOD PRESSURE: 58 MMHG | OXYGEN SATURATION: 99 % | HEART RATE: 72 BPM | RESPIRATION RATE: 16 BRPM

## 2019-04-17 DIAGNOSIS — I73.9 PERIPHERAL VASCULAR DISEASE (HCC): ICD-10-CM

## 2019-04-17 DIAGNOSIS — I70.219 ATHEROSCLEROSIS OF ARTERY OF EXTREMITY WITH INTERMITTENT CLAUDICATION (HCC): ICD-10-CM

## 2019-04-17 DIAGNOSIS — I74.09 AORTOILIAC OCCLUSIVE DISEASE (HCC): ICD-10-CM

## 2019-04-17 DIAGNOSIS — M17.11 PRIMARY OSTEOARTHRITIS OF RIGHT KNEE: Primary | ICD-10-CM

## 2019-04-17 DIAGNOSIS — M25.461 EFFUSION OF RIGHT KNEE: ICD-10-CM

## 2019-04-17 DIAGNOSIS — I65.22 LEFT CAROTID ARTERY OCCLUSION: ICD-10-CM

## 2019-04-17 LAB
ANION GAP SERPL CALC-SCNC: 3 MMOL/L (ref 3–18)
BUN SERPL-MCNC: 15 MG/DL (ref 7–18)
BUN/CREAT SERPL: 21 (ref 12–20)
CALCIUM SERPL-MCNC: 9.1 MG/DL (ref 8.5–10.1)
CHLORIDE SERPL-SCNC: 103 MMOL/L (ref 100–108)
CO2 SERPL-SCNC: 34 MMOL/L (ref 21–32)
CREAT SERPL-MCNC: 0.73 MG/DL (ref 0.6–1.3)
ERYTHROCYTE [DISTWIDTH] IN BLOOD BY AUTOMATED COUNT: 13 % (ref 11.6–14.5)
GLUCOSE SERPL-MCNC: 91 MG/DL (ref 74–99)
HCT VFR BLD AUTO: 41.8 % (ref 36–48)
HGB BLD-MCNC: 14.3 G/DL (ref 13–16)
MCH RBC QN AUTO: 32.4 PG (ref 24–34)
MCHC RBC AUTO-ENTMCNC: 34.2 G/DL (ref 31–37)
MCV RBC AUTO: 94.8 FL (ref 74–97)
PLATELET # BLD AUTO: 156 K/UL (ref 135–420)
PMV BLD AUTO: 9.7 FL (ref 9.2–11.8)
POTASSIUM SERPL-SCNC: 3.8 MMOL/L (ref 3.5–5.5)
RBC # BLD AUTO: 4.41 M/UL (ref 4.7–5.5)
SODIUM SERPL-SCNC: 140 MMOL/L (ref 136–145)
WBC # BLD AUTO: 8.1 K/UL (ref 4.6–13.2)

## 2019-04-17 PROCEDURE — 36415 COLL VENOUS BLD VENIPUNCTURE: CPT

## 2019-04-17 PROCEDURE — 85027 COMPLETE CBC AUTOMATED: CPT

## 2019-04-17 PROCEDURE — 80048 BASIC METABOLIC PNL TOTAL CA: CPT

## 2019-04-17 RX ORDER — TRIAMCINOLONE ACETONIDE 40 MG/ML
40 INJECTION, SUSPENSION INTRA-ARTICULAR; INTRAMUSCULAR ONCE
Qty: 1 ML | Refills: 0
Start: 2019-04-17 | End: 2019-04-17

## 2019-04-17 NOTE — PROGRESS NOTES
HISTORY OF PRESENT ILLNESS:  Siri Euceda returns to the office complaining of a return of swelling and pain to his right knee. He was recently a patient of Dr. Martina Bledsoe finishing a series of Euflexxa to the right knee. That was about four months ago, and he reports that is all the relief he was able to get out of the course of injections. The patient is limited in his ability to stand for a short period and walk short distances secondary to his right knee pain. He has severe end-staged arthritis. A consideration for surgical intervention has been requested to Dr. Martina Bledsoe, and of recent, Mr. Gino Singleton saw Dr. Summer Kong from Vascular Surgery, and unfortunately, he was found to have a 75% blockage of his femoral artery on the right. There is a procedure upcoming to evaluate whether it is the ascending or descending portion of the arterial structure. At this point, today, Mr. Gino Singleton would like an aspiration of his knee noting that when he walks, he has sharp pains in the knee and some catching sensations. REVIEW OF SYSTEMS:   No chest pain. No shortness of breath other than heavy exertional type. No fever, chills, or night sweats. No rash and no itching. No nausea or vomiting. Pain in the right knee is noted near constant, and as a side note, the Euflexxa that was provided may have given him perhaps four months of substantial relief. PHYSICAL EXAM:  He is a healthy-appearing, well-developed, well-nourished, pleasant, 77-year-old, obese,  male, atraumatic, normocephalic, alert and oriented times three, sitting on the table comfortably. Examination to the right knee reveals a 1+ effusion. He has tenderness on the medial joint aspect of the knee, which worsens on varus stressing. His motion is degraded today actively, and there is pain throughout with the patella tracking midline. The plane of motion tested today elicits [id number].   There is no calf tenderness or evidence of DVT, and distal sensation is intact fully to the right lower extremity. IMPRESSION:      1. Recurrent effusion of the right knee with underlying severe tricompartmental end-staged arthritis. 2. Right knee pain. 3. Decreased range of motion of the right knee secondary to above. 4. A new finding consistent with a femoral blockage on the right estimated at 75% femoral artery blockage. PROCEDURE:  Under sterile technique, after verbal and written consent were obtained and appropriate time out performed, with the patient supine and the right knee flexed at 20ø on a bolster, and as I am joined by KIRSTEN Walker, as my chaperone, 5 cc of 1% Lidocaine was used to anesthetize the superolateral, intraarticular approach. To follow, 95 cc of straw-colored, blood-tinted aspirate was removed. To follow, 1 cc of Kenalog at 40 mg per mL mixed with 7 mL of Sensorcaine 0.75% was injected. Overall, he tolerated the entire procedure well. PLAN:   Today, I am recommending an aspiration and injection of his right knee. He is going to follow up with Vascular surgery, as he indicated previously. We are going to see him back on a prn basis. Consideration with be given for an alternative viscosupplement for since Euflexxa only lasted about four months if we repeat the course of therapy again.

## 2019-04-19 ENCOUNTER — DOCUMENTATION ONLY (OUTPATIENT)
Dept: ORTHOPEDIC SURGERY | Age: 74
End: 2019-04-19

## 2019-04-25 ENCOUNTER — HOSPITAL ENCOUNTER (OUTPATIENT)
Age: 74
Setting detail: OUTPATIENT SURGERY
Discharge: HOME | End: 2019-04-25
Attending: SURGERY | Admitting: SURGERY
Payer: MEDICARE

## 2019-04-25 VITALS
WEIGHT: 210 LBS | HEIGHT: 72 IN | HEART RATE: 60 BPM | DIASTOLIC BLOOD PRESSURE: 74 MMHG | SYSTOLIC BLOOD PRESSURE: 151 MMHG | OXYGEN SATURATION: 97 % | BODY MASS INDEX: 28.44 KG/M2 | RESPIRATION RATE: 29 BRPM | TEMPERATURE: 98.4 F

## 2019-04-25 DIAGNOSIS — I70.211 ATHEROSCLEROSIS OF NATIVE ARTERY OF RIGHT LOWER EXTREMITY WITH INTERMITTENT CLAUDICATION (HCC): ICD-10-CM

## 2019-04-25 LAB
ANION GAP BLD CALC-SCNC: 17 MMOL/L (ref 10–20)
BUN BLD-MCNC: 14 MG/DL (ref 7–18)
CA-I BLD-MCNC: 1.22 MMOL/L (ref 1.12–1.32)
CHLORIDE BLD-SCNC: 93 MMOL/L (ref 100–108)
CO2 BLD-SCNC: 32 MMOL/L (ref 19–24)
CREAT UR-MCNC: 0.7 MG/DL (ref 0.6–1.3)
GLUCOSE BLD STRIP.AUTO-MCNC: 88 MG/DL (ref 74–106)
HCT VFR BLD CALC: 45 % (ref 36–49)
HGB BLD-MCNC: 15.3 G/DL (ref 12–16)
POTASSIUM BLD-SCNC: 3.2 MMOL/L (ref 3.5–5.5)
SODIUM BLD-SCNC: 138 MMOL/L (ref 136–145)

## 2019-04-25 PROCEDURE — 99152 MOD SED SAME PHYS/QHP 5/>YRS: CPT | Performed by: SURGERY

## 2019-04-25 PROCEDURE — 74011250636 HC RX REV CODE- 250/636: Performed by: SURGERY

## 2019-04-25 PROCEDURE — 36246 INS CATH ABD/L-EXT ART 2ND: CPT | Performed by: SURGERY

## 2019-04-25 PROCEDURE — C1894 INTRO/SHEATH, NON-LASER: HCPCS | Performed by: SURGERY

## 2019-04-25 PROCEDURE — 77030004561 HC CATH ANGI DX COBRA ANGI -B: Performed by: SURGERY

## 2019-04-25 PROCEDURE — 77030013744: Performed by: SURGERY

## 2019-04-25 PROCEDURE — 80047 BASIC METABLC PNL IONIZED CA: CPT

## 2019-04-25 PROCEDURE — 74011636320 HC RX REV CODE- 636/320: Performed by: SURGERY

## 2019-04-25 PROCEDURE — 75710 ARTERY X-RAYS ARM/LEG: CPT | Performed by: SURGERY

## 2019-04-25 PROCEDURE — 75625 CONTRAST EXAM ABDOMINL AORTA: CPT | Performed by: SURGERY

## 2019-04-25 PROCEDURE — 74011250636 HC RX REV CODE- 250/636

## 2019-04-25 PROCEDURE — 76937 US GUIDE VASCULAR ACCESS: CPT | Performed by: SURGERY

## 2019-04-25 RX ORDER — SODIUM CHLORIDE 0.9 % (FLUSH) 0.9 %
5-40 SYRINGE (ML) INJECTION AS NEEDED
Status: DISCONTINUED | OUTPATIENT
Start: 2019-04-25 | End: 2019-04-25 | Stop reason: HOSPADM

## 2019-04-25 RX ORDER — FENTANYL CITRATE 50 UG/ML
INJECTION, SOLUTION INTRAMUSCULAR; INTRAVENOUS AS NEEDED
Status: DISCONTINUED | OUTPATIENT
Start: 2019-04-25 | End: 2019-04-25 | Stop reason: HOSPADM

## 2019-04-25 RX ORDER — SODIUM CHLORIDE 0.9 % (FLUSH) 0.9 %
5-40 SYRINGE (ML) INJECTION EVERY 8 HOURS
Status: DISCONTINUED | OUTPATIENT
Start: 2019-04-25 | End: 2019-04-25 | Stop reason: HOSPADM

## 2019-04-25 RX ORDER — MORPHINE SULFATE 10 MG/ML
1 INJECTION, SOLUTION INTRAMUSCULAR; INTRAVENOUS
Status: DISCONTINUED | OUTPATIENT
Start: 2019-04-25 | End: 2019-04-25 | Stop reason: HOSPADM

## 2019-04-25 RX ORDER — DIPHENHYDRAMINE HYDROCHLORIDE 50 MG/ML
12.5 INJECTION, SOLUTION INTRAMUSCULAR; INTRAVENOUS
Status: DISCONTINUED | OUTPATIENT
Start: 2019-04-25 | End: 2019-04-25 | Stop reason: HOSPADM

## 2019-04-25 RX ORDER — MIDAZOLAM HYDROCHLORIDE 1 MG/ML
INJECTION, SOLUTION INTRAMUSCULAR; INTRAVENOUS AS NEEDED
Status: DISCONTINUED | OUTPATIENT
Start: 2019-04-25 | End: 2019-04-25 | Stop reason: HOSPADM

## 2019-04-25 RX ORDER — ACETAMINOPHEN 325 MG/1
650 TABLET ORAL
Status: DISCONTINUED | OUTPATIENT
Start: 2019-04-25 | End: 2019-04-25 | Stop reason: HOSPADM

## 2019-04-25 RX ORDER — OXYCODONE AND ACETAMINOPHEN 5; 325 MG/1; MG/1
1 TABLET ORAL
Status: DISCONTINUED | OUTPATIENT
Start: 2019-04-25 | End: 2019-04-25 | Stop reason: HOSPADM

## 2019-04-25 RX ORDER — LIDOCAINE HYDROCHLORIDE 10 MG/ML
INJECTION, SOLUTION EPIDURAL; INFILTRATION; INTRACAUDAL; PERINEURAL AS NEEDED
Status: DISCONTINUED | OUTPATIENT
Start: 2019-04-25 | End: 2019-04-25 | Stop reason: HOSPADM

## 2019-04-25 RX ORDER — ONDANSETRON 2 MG/ML
4 INJECTION INTRAMUSCULAR; INTRAVENOUS
Status: DISCONTINUED | OUTPATIENT
Start: 2019-04-25 | End: 2019-04-25 | Stop reason: HOSPADM

## 2019-04-25 RX ORDER — HYDROCODONE BITARTRATE AND ACETAMINOPHEN 5; 325 MG/1; MG/1
1 TABLET ORAL
COMMUNITY
End: 2019-05-23 | Stop reason: SDUPTHER

## 2019-04-25 NOTE — Clinical Note
Contrast Dose Calculator:  
Patient's age: 68.  
Patient's sex: Male. Patient weight (kg) = 95. Creatinine level (mg/dL) = 0.7. Creatinine clearance (mL/min): 126. Contrast concentration (mg/mL) = 300. MACD = 300 mL. Max Contrast dose per Creatinine Cl calculator = 283.5 mL.

## 2019-04-25 NOTE — DISCHARGE INSTRUCTIONS
DISCHARGE SUMMARY from Nurse:    Please resume taking your home medication as prescribed. Catheterization/Angiography Discharge Instructions    *Check the puncture site frequently for swelling or bleeding. If you see any bleeding, lie down and apply pressure over the area with a clean town or washcloth. Notify your doctor for any redness, swelling, drainage or oozing from the puncture site. Notify your doctor for any fever or chills. *If the leg or arm with the puncture becomes cold, numb or painful, call Dr Bo Lujan. Marii Nicole MD at  [phone]. *Activity should be limited for the next 48 hours. Climb stairs as little as possible and avoid any stooping, bending or strenuous activity for 48 hours. No heavy lifting (anything over 10 pounds) for three days. *Do not drive for 48 hours. *You may resume your usual diet. Drink more fluids than usual.    *Have a responsible person drive you home and stay with you for at least 24 hours after your heart catheterization/angiography. *You may remove the bandage from your Left and Groin in 24 hours. You may shower in 24 hours. No tub baths, hot tubs or swimming for one week. Do not place any lotions, creams, powders, ointments over the puncture site for one week. You may place a clean band-aid over the puncture site each day for 5 days. Change this daily. PATIENT INSTRUCTIONS:    After general anesthesia or intravenous sedation, for 24 hours or while taking prescription Narcotics:  · Limit your activities  · Do not drive and operate hazardous machinery  · Do not make important personal or business decisions  · Do  not drink alcoholic beverages  · If you have not urinated within 8 hours after discharge, please contact your surgeon on call.     Report the following to your surgeon:  · Excessive pain, swelling, redness or odor of or around the surgical area  · Temperature over 100.5  · Nausea and vomiting lasting longer than 4 hours or if unable to take medications  · Any signs of decreased circulation or nerve impairment to extremity: change in color, persistent  numbness, tingling, coldness or increase pain  · Any questions    What to do at Home:  Recommended activity: No lifting, Driving, or Strenuous exercise for 24 hours. If you experience any of the following symptoms bleeding,swelling,acute pain or numbness, fever, please follow up with Dr. Jt Bobby MD.    *  Please give a list of your current medications to your Primary Care Provider. *  Please update this list whenever your medications are discontinued, doses are      changed, or new medications (including over-the-counter products) are added. *  Please carry medication information at all times in case of emergency situations. These are general instructions for a healthy lifestyle:    No smoking/ No tobacco products/ Avoid exposure to second hand smoke  Surgeon General's Warning:  Quitting smoking now greatly reduces serious risk to your health. Obesity, smoking, and sedentary lifestyle greatly increases your risk for illness    A healthy diet, regular physical exercise & weight monitoring are important for maintaining a healthy lifestyle    You may be retaining fluid if you have a history of heart failure or if you experience any of the following symptoms:  Weight gain of 3 pounds or more overnight or 5 pounds in a week, increased swelling in our hands or feet or shortness of breath while lying flat in bed. Please call your doctor as soon as you notice any of these symptoms; do not wait until your next office visit. Recognize signs and symptoms of STROKE:    F-face looks uneven    A-arms unable to move or move unevenly    S-speech slurred or non-existent    T-time-call 911 as soon as signs and symptoms begin-DO NOT go       Back to bed or wait to see if you get better-TIME IS BRAIN.     Warning Signs of HEART ATTACK     Call 911 if you have these symptoms:   Chest discomfort. Most heart attacks involve discomfort in the center of the chest that lasts more than a few minutes, or that goes away and comes back. It can feel like uncomfortable pressure, squeezing, fullness, or pain.  Discomfort in other areas of the upper body. Symptoms can include pain or discomfort in one or both arms, the back, neck, jaw, or stomach.  Shortness of breath with or without chest discomfort.  Other signs may include breaking out in a cold sweat, nausea, or lightheadedness. Don't wait more than five minutes to call 911 - MINUTES MATTER! Fast action can save your life. Calling 911 is almost always the fastest way to get lifesaving treatment. Emergency Medical Services staff can begin treatment when they arrive -- up to an hour sooner than if someone gets to the hospital by car. The discharge information has been reviewed with the patient. The patient verbalized understanding. Discharge medications reviewed with the patient and appropriate educational materials and side effects teaching were provided. Patient armband removed and shredded  MyChart Activation    Thank you for requesting access to [employer]. Please follow the instructions below to securely access and download your online medical record. [employer] allows you to send messages to your doctor, view your test results, renew your prescriptions, schedule appointments, and more. How Do I Sign Up? 1. In your internet browser, go to [email or web address]. 2. Click on the First Time User? Click Here link in the Sign In box. You will see the New Member Sign Up page. 3. Enter your [employer] Access Code exactly as it appears below. You will not need to use this code after youve completed the sign-up process. If you do not sign up before the expiration date, you must request a new code. [employer] Access Code:  Activation code not generated  Current [employer] Status: Active (This is the date your [employer] access code will )    4. Enter the last four digits of your Social Security Number (xxxx) and Date of Birth (mm/dd/yyyy) as indicated and click Submit. You will be taken to the next sign-up page. 5. Create a [employer] ID. This will be your [employer] login ID and cannot be changed, so think of one that is secure and easy to remember. 6. Create a [employer] password. You can change your password at any time. 7. Enter your Password Reset Question and Answer. This can be used at a later time if you forget your password. 8. Enter your e-mail address. You will receive e-mail notification when new information is available in [address] 9. Click Sign Up. You can now view and download portions of your medical record. 10. Click the Download Summary menu link to download a portable copy of your medical information. Additional Information    If you have questions, please visit the Frequently Asked Questions section of the [employer] website at [email or web address] Remember, [employer] is NOT to be used for urgent needs.  For medical emergencies, dial 911.    ___________________________________________________________________________________________________________________________________

## 2019-04-25 NOTE — Clinical Note
TRANSFER - IN REPORT:  
 
Verbal report received from: Chani Sevilla RN. Report consisted of patient's Situation, Background, Assessment and  
Recommendations(SBAR). Opportunity for questions and clarification was provided. Assessment completed upon patient's arrival to unit and care assumed. Patient transported with a Cardiac Cath Tech / Patient Care Tech.

## 2019-04-25 NOTE — ROUTINE PROCESS
A+Ox4, ambulatory without difficulty. Left groin dressing intact, no bleeding or swelling. Discharge information reviewed. Escorted to car, tot his wife for transport

## 2019-04-25 NOTE — Clinical Note
Vessel: right AA w/ Runoff, Power injection to the artery. Single view taken. PSI = 600. Rate of rise = 0.5 sec. Injection rate = 4 mL/sec. Total injected volume = 40 mL.

## 2019-04-25 NOTE — H&P
Surgery History and Physical 
 
Subjective:  
  
Delia Hayden is a 68 y.o. male who presents with lifestyle limiting claudication of right lower extremity. Patient Active Problem List  
 Diagnosis Date Noted  Aortoiliac occlusive disease ([facility] 03/04/2019  Facet hypertrophy of lumbar region 02/12/2018  Overweight (BMI 25.0-29.9) 01/09/2018  History of prostate cancer 01/09/2018  Ulnar neuritis, right 11/13/2017  Cervical spinal stenosis 10/04/2017  Degeneration of cervical and lumbar spine, relative cervical stenosis 10/04/2017  Cervical radiculopathy 08/30/2017  Lumbar radiculopathy 08/30/2017  IFG (impaired fasting glucose) 06/29/2017  ED (erectile dysfunction) of organic origin 04/14/2017  Recurrent umbilical hernia 03/39/9790  Hypertension  Dyslipidemia  Coronary artery disease  Atrial fibrillation  Advance directive in chart 06/23/2016  Hypovitaminosis D 06/22/2016  Atherosclerosis of artery of extremity with intermittent claudication ([facility] 01/21/2015  Left carotid artery occlusion 02/12/2014  Peripheral vascular disease (HCC) Dr General Wynne  Colon polyps 06/13/2012  Colitis, ulcerative ([facility] 10/21/2011 Past Medical History:  
Diagnosis Date  Adenoma of left adrenal gland 2009  1 cm, no change 1/15, 2/16  Asbestosis CT 1/19 showed pleural plaques  Atrial fibrillation CHA2DS2-VAsc=3(age+, htn+, vasc dx+; estimated yearly stroke risk according to Lip et al. is 3,2%), Hasbled=2(estimated yearly bleeding risk according to pisters et al. is 1,88%); not anticoagulated due to h/o gi bleed  Atrial fibrillation ablation 10/08  Basal cell cancer Dr Cachorro Seo; he's had >350 lesions removed  Carotid occlusion   
 left  Cervical radiculopathy MRI 9/11 showed C3-6 severe foraminal stenosis  Chronic pain  Colon polyp Dr Jono Griggs 9/15  Coronary artery disease RCA - 3.0 x 16mm TAXUS 9/04, 3.0 x 16mm TAXUS 12/06  Dyslipidemia  Erectile dysfunction  GERD   
 GI bleed  Hearing loss 2014  bilateral Dr Kang Bardales  Hernia, umbilical   
 Hypertension  Hypogonadism male  Lumbar radiculopathy Dr Farzaneh Snell;  MRI 9/11 L4-5 disc bulging, annular tear, disc dessication  Myofascial pain dysfunction syndrome   
 pain clinic  OAB (overactive bladder)  Osteoarthritis   
 right knee Dr Eden Failing  Overweight (BMI 25.0-29. 9) IF 5/18 start weight 214 lbs, not doing  Peripheral vascular disease 50-60% R iliac; s/p R iliac stent and L femoral artery stent in past; R OLIVIA 0.76 (1/16)  Plantar fasciitis   
 bilateral   
 PPD positive  Prediabetes  Prostate cancer T1c Imelda 7(3+4), 70% in 1 core, GS 7 (3+4) in 4 cores, GS 6 (3+3) in 1 core; psa 5.28, TRUS 18 gm;  Dr Elena Mcdowell; s/p cryoablation 10/16  Recurrent umbilical hernia  Subclinical hypothyroidism   
 denies  Tinnitus Dr Kang Bardales  Ulcerative colitis  Venous insufficiency Past Surgical History:  
Procedure Laterality Date  CARDIAC SURG PROCEDURE UNLIST  04/2018 [address] [facility] [address] Alvina Garrison; echo nl lv, ef 60%, dilated RV, biatrial enlargement, trace AI  
 CARDIAC SURG PROCEDURE UNLIST  04/2018 [address] [facility] [address] Alvina Garrison; NST neg, ef 60%  HAND/FINGER SURGERY UNLISTED  2017  HX CAROTID ENDARTERECTOMY 1/14  right  HX CATARACT REMOVAL    
 HX COLONOSCOPY Dr Elena Mcdowell 9/15 polyps  HX CRYOABLATION OF THE PROSTATE    
 10/16 [facility] [address]  HX ORTHOPAEDIC    
 right knee surgery  HX ORTHOPAEDIC  12/2017 Dr Rachana Hopkins; R CTS release  HX REFRACTIVE SURGERY    
 OD (hemoplasty to right eye on retina to avoid blindness) [address]  [place] LAP, RECURRENT INCISIONAL HERNIA REPAIR,REDUCIBLE N/A 02/09/2017 Dr. Ted Melton  REPAIR ING HERNIA,5+Y/O,REDUCIBL   left  Dr. Hare Fails  REPAIR UMBILICAL USSJ,4+T/M,BURNB    
   Dr. Hare Fails  VASCULAR SURGERY PROCEDURE UNLIST    
   R OLIVIA 0.76, L OLIVIA 1.02  
 VASCULAR SURGERY PROCEDURE UNLIST    
 10/15   left fem art and left iliac stent Social History Tobacco Use  Smoking status: Former Smoker Packs/day: 1.50 Years: 25.00 Pack years: 37.50 Types: Cigarettes Last attempt to quit: 2003 Years since quittin.3  Smokeless tobacco: Never Used Substance Use Topics  Alcohol use: Yes Comment: RARELY Family History Problem Relation Age of Onset  Heart Disease Mother  Hypertension Mother  Diabetes Mother [facility] Arthritis-osteo Mother  Heart Disease Father  Stroke Father  Hypertension Father  Heart Disease Sister  Hypertension Sister Prior to Admission medications Medication Sig Start Date End Date Taking? Authorizing Provider  
diclofenac (VOLTAREN) 1 % gel Apply 2-4 g to affected area four (4) times daily. 19   Destiny Jackman PA-C  
colestipol (COLESTID) 1 gram tablet  3/9/19   Provider, Historical  
nitroglycerin (NITROQUICK) 0.6 mg SL tablet 0.6 mg by SubLINGual route every five (5) minutes as needed for Chest Pain. Provider, Historical  
hyoscyamine SL (LEVSIN/SL) 0.125 mg SL tablet 0.125 mg by SubLINGual route every four (4) hours as needed for Cramping. Provider, Historical  
LORazepam (ATIVAN) 1 mg tablet Take 1 Tab by mouth every eight (8) hours as needed. 19   Edwin Maldonado MD  
ezetimibe-simvastatin (VYTORIN) 10-40 mg per tablet TAKE 1 TABLET NIGHTLY 19   Edwin Maldonado MD  
atenolol (TENORMIN) 25 mg tablet TAKE ONE TABLET BY MOUTH TWICE A DAY 19   Christal Webster MD  
icosapent ethyl (VASCEPA) 1 gram capsule Take 2 Caps by mouth two (2) times daily (with meals).  18   Edwin Maldonado MD  
gabapentin (NEURONTIN) 300 mg capsule 1 cap every day to bid 18 Herminio Linares NP  
inhalational spacing device 1 Each by Does Not Apply route as needed. 10/12/18   Curt Jones, GILBERTO  
naloxone [facility]) 4 mg/actuation nasal spray Use 1 spray intranasally into 1 nostril. Use a new Narcan nasal spray for subsequent doses and administer into alternating nostrils. May repeat every 2 to 3 minutes as needed. 5/11/18   Emani Kay MD  
polyethylene glycol (MIRALAX) 17 gram packet 17 g. Provider, Historical  
losartan (COZAAR) 25 mg tablet TAKE ONE TABLET BY MOUTH TWICE A DAY 3/14/17   Sadaf Watkins NP  
aspirin delayed-release 81 mg tablet Take 81 mg by mouth daily. Provider, Historical  
triamterene-hydrochlorothiazide (MAXZIDE) 37.5-25 mg per tablet TAKE ONE TABLET BY MOUTH DAILY 9/16/16   Sindhu Novoa MD  
Cholecalciferol, Vitamin D3, 1,000 unit cap Take 1,000 Units by mouth daily. Provider, Historical  
MULTIVITAMIN PO Take  by mouth daily. 4/5/11   Provider, Historical  
pantoprazole (PROTONIX) 40 mg tablet Take 40 mg by mouth daily. Provider, Historical  
coenzyme q10 200 mg Cap Take  by mouth. Provider, Historical  
 
Allergies Allergen Reactions  Altace [Ramipril] Unknown (comments)  Lipitor [Atorvastatin] Other (comments) Muscle pain  Lisinopril Shortness of Breath and Other (comments) Turns bright red  Other Medication Other (comments) Vicryl suture on skin tends to be rejected with poor wound healing does better with monocryl  Procardia [Nifedipine] Other (comments) Caused afib  Rosuvastatin Other (comments) Muscle Pain ROS: 
Pertinent items are noted in HPI. Unless otherwise mentioned in the HPI. Objective:  
 
No data found. No data recorded.  
 
 
Physical Exam: 
GENERAL: alert, cooperative, no distress, appears stated age, THROAT & NECK: normal and no erythema or exudates noted. , LUNG: clear to auscultation bilaterally, HEART: regular rate and rhythm, S1, S2 normal, no murmur, click, rub or gallop, ABDOMEN: soft, non-tender. Bowel sounds normal. No masses,  no organomegaly Labs: No results found for this or any previous visit (from the past 24 hour(s)). Data Review: CBC:  
Lab Results Component Value Date/Time WBC 8.1 04/17/2019 03:40 PM  
 RBC 4.41 (L) 04/17/2019 03:40 PM  
 HGB 14.3 04/17/2019 03:40 PM  
 HCT 41.8 04/17/2019 03:40 PM  
 PLATELET 448 23/85/2751 03:40 PM  
  
BMP:  
Lab Results Component Value Date/Time Glucose 91 04/17/2019 03:40 PM  
 Sodium 140 04/17/2019 03:40 PM  
 Potassium 3.8 04/17/2019 03:40 PM  
 Chloride 103 04/17/2019 03:40 PM  
 CO2 34 (H) 04/17/2019 03:40 PM  
 BUN 15 04/17/2019 03:40 PM  
 Creatinine 0.73 04/17/2019 03:40 PM  
 Calcium 9.1 04/17/2019 03:40 PM  
 
Coagulation:  
Lab Results Component Value Date/Time Prothrombin time 13.3 10/19/2016 07:10 AM  
 INR 1.0 10/19/2016 07:10 AM  
 aPTT 34.3 10/19/2016 07:10 AM  
 
 
Assessment:  
 
Active Problems: * No active hospital problems. * 
 
 
Plan:  
 
Right lower extremity angiogram. 
 
Signed By: Abrahan Calixto MD   
 April 25, 2019

## 2019-04-25 NOTE — Clinical Note
Bilateral groin prepped with ChloraPrep and draped. Wet prep solution applied at: 912. Wet prep solution dried at: 915. Wet prep elapsed drying time: 3 mins.

## 2019-05-08 ENCOUNTER — OFFICE VISIT (OUTPATIENT)
Dept: VASCULAR SURGERY | Age: 74
End: 2019-05-08

## 2019-05-08 VITALS
DIASTOLIC BLOOD PRESSURE: 80 MMHG | WEIGHT: 210 LBS | HEART RATE: 80 BPM | RESPIRATION RATE: 16 BRPM | HEIGHT: 72 IN | BODY MASS INDEX: 28.44 KG/M2 | SYSTOLIC BLOOD PRESSURE: 136 MMHG

## 2019-05-08 DIAGNOSIS — I74.09 AORTOILIAC OCCLUSIVE DISEASE (HCC): Primary | ICD-10-CM

## 2019-05-08 DIAGNOSIS — I70.219 ATHEROSCLEROSIS OF ARTERY OF EXTREMITY WITH INTERMITTENT CLAUDICATION (HCC): ICD-10-CM

## 2019-05-08 DIAGNOSIS — I65.22 LEFT CAROTID ARTERY OCCLUSION: ICD-10-CM

## 2019-05-08 NOTE — PROGRESS NOTES
[address] Chief Complaint Patient presents with  Surgical Follow-up History and Physical   
67Shan Stacy is a 68 y.o. male with previous right carotid endarterectomy and patch angioplasty with known left carotid occlusion. Patient has lifestyle limiting claudication of the right lower extremity. Angiogram showed right common iliac artery stenosis as well as significant to severe stenosis of the right common femoral artery and proximal profunda femoris and superficial femoral arteries. Patient continues to have claudication but no rest pain. No shortness of breath. No angina symptoms. Patient also has no nonhealing wounds. Past Medical History:  
Diagnosis Date  Adenoma of left adrenal gland 2009  1 cm, no change 1/15, 2/16  Asbestosis CT 1/19 showed pleural plaques  Atrial fibrillation CHA2DS2-VAsc=3(age+, htn+, vasc dx+; estimated yearly stroke risk according to Lip et al. is 3,2%), Hasbled=2(estimated yearly bleeding risk according to pisters et al. is 1,88%); not anticoagulated due to h/o gi bleed  Atrial fibrillation ablation 10/08  Basal cell cancer Dr Kulwinder Faustin; he's had >350 lesions removed  Carotid occlusion   
 left  Cervical radiculopathy MRI 9/11 showed C3-6 severe foraminal stenosis  Chronic pain  Colon polyp Dr Boss Rad 9/15  Coronary artery disease RCA - 3.0 x 16mm TAXUS 9/04, 3.0 x 16mm TAXUS 12/06  Dyslipidemia  Erectile dysfunction  GERD   
 GI bleed  Hearing loss 2014  bilateral Dr Kingsley Medicus  Hernia, umbilical   
 Hypertension  Hypogonadism male  Lumbar radiculopathy Dr Shon Wells;  MRI 9/11 L4-5 disc bulging, annular tear, disc dessication  Myofascial pain dysfunction syndrome   
 pain clinic  OAB (overactive bladder)  Osteoarthritis   
 right knee Dr Carol Roque  Overweight (BMI 25.0-29. 9) IF 5/18 start weight 214 lbs, not doing  Peripheral vascular disease 50-60% R iliac; s/p R iliac stent and L femoral artery stent in past; R OLIVIA 0.76 (1/16)  Plantar fasciitis   
 bilateral   
 PPD positive  Prediabetes  Prostate cancer T1c Imelda 7(3+4), 70% in 1 core, GS 7 (3+4) in 4 cores, GS 6 (3+3) in 1 core; psa 5.28, TRUS 18 gm;  Dr Lia Agee; s/p cryoablation 10/16  Recurrent umbilical hernia  Subclinical hypothyroidism   
 denies  Tinnitus Dr Zahira Bey  Ulcerative colitis  Venous insufficiency Past Surgical History:  
Procedure Laterality Date  CARDIAC SURG PROCEDURE UNLIST  04/2018 [address] Pradeep Wolfe; echo nl lv, ef 60%, dilated RV, biatrial enlargement, trace AI  
 CARDIAC SURG PROCEDURE UNLIST  04/2018 [address] Pradeep Wolfe; NST neg, ef 60%  HAND/FINGER SURGERY UNLISTED  2017  HX CAROTID ENDARTERECTOMY 1/14  right  HX CATARACT REMOVAL    
 HX COLONOSCOPY Dr Lia Agee 9/15 polyps  HX CRYOABLATION OF THE PROSTATE    
 10/16 [facility] [address]  HX ORTHOPAEDIC    
 right knee surgery  HX ORTHOPAEDIC  12/2017 Dr Marii John; R CTS release  HX REFRACTIVE SURGERY    
 OD (hemoplasty to right eye on retina to avoid blindness) [address]  WI LAP, RECURRENT INCISIONAL HERNIA REPAIR,REDUCIBLE N/A 02/09/2017 Dr. Fabi Dela Cruz  REPAIR ING HERNIA,5+Y/O,REDUCIBL    
 2/16  left  Dr. Fabi Dela Cruz  REPAIR UMBILICAL [facility],6+N/H,STIXJ    
 2/16  Dr. Fabi Dela Cruz  VASCULAR SURGERY PROCEDURE UNLIST    
 1/16  R OLIVIA 0.76, L OLIVIA 1.02  
 VASCULAR SURGERY PROCEDURE UNLIST    
 10/15   left fem art and left iliac stent Patient Active Problem List  
Diagnosis Code  Colitis, ulcerative ([facility] [address] K51.90  Colon polyps K63.5  Peripheral vascular disease (HCC) Dr Louis Syed I73.9  Left carotid artery occlusion I65.22  
 Atherosclerosis of artery of extremity with intermittent claudication (HCC) I70.219  Hypovitaminosis D E55.9  Advance directive in chart Z78.9  Hypertension I11.9  Dyslipidemia E78.5  Coronary artery disease I25.10  Atrial fibrillation I48.91  
 Recurrent umbilical hernia K05.7  ED (erectile dysfunction) of organic origin N52.9  
 IFG (impaired fasting glucose) R73.01  
 Cervical radiculopathy M54.12  
 Lumbar radiculopathy M54.16  
 Cervical spinal stenosis M48.02  
 Degeneration of cervical and lumbar spine, relative cervical stenosis M50.30  Ulnar neuritis, right G56.21  
 Overweight (BMI 25.0-29. 9) E66.3  History of prostate cancer Z85.46  
 Facet hypertrophy of lumbar region M47.816  Aortoiliac occlusive disease ([facility] [address] I74.09  
 
Current Outpatient Medications Medication Sig Dispense Refill  
 HYDROcodone-acetaminophen (NORCO) 5-325 mg per tablet Take 1 Tab by mouth.  diclofenac (VOLTAREN) 1 % gel Apply 2-4 g to affected area four (4) times daily. 100 g 5  
 colestipol (COLESTID) 1 gram tablet  nitroglycerin (NITROQUICK) 0.6 mg SL tablet 0.6 mg by SubLINGual route every five (5) minutes as needed for Chest Pain.  hyoscyamine SL (LEVSIN/SL) 0.125 mg SL tablet 0.125 mg by SubLINGual route every four (4) hours as needed for Cramping.  LORazepam (ATIVAN) 1 mg tablet Take 1 Tab by mouth every eight (8) hours as needed. 50 Tab 0  
 ezetimibe-simvastatin (VYTORIN) 10-40 mg per tablet TAKE 1 TABLET NIGHTLY 90 Tab 3  
 atenolol (TENORMIN) 25 mg tablet TAKE ONE TABLET BY MOUTH TWICE A  Tab 3  
 icosapent ethyl (VASCEPA) 1 gram capsule Take 2 Caps by mouth two (2) times daily (with meals). 360 Cap 3  
 gabapentin (NEURONTIN) 300 mg capsule 1 cap every day to bid 180 Cap 1  
 naloxone (NARCAN) 4 mg/actuation nasal spray Use 1 spray intranasally into 1 nostril. Use a new Narcan nasal spray for subsequent doses and administer into alternating nostrils. May repeat every 2 to 3 minutes as needed. 1 Each 1  
 polyethylene glycol (MIRALAX) 17 gram packet 17 g.  losartan (COZAAR) 25 mg tablet TAKE ONE TABLET BY MOUTH TWICE A  Tab 2  
 aspirin delayed-release 81 mg tablet Take 81 mg by mouth daily.  triamterene-hydrochlorothiazide (MAXZIDE) 37.5-25 mg per tablet TAKE ONE TABLET BY MOUTH DAILY 90 Tab 3  Cholecalciferol, Vitamin D3, 1,000 unit cap Take 1,000 Units by mouth daily.  MULTIVITAMIN PO Take  by mouth daily.  pantoprazole (PROTONIX) 40 mg tablet Take 40 mg by mouth daily.  coenzyme q10 200 mg Cap Take  by mouth. Allergies Allergen Reactions  Altace [Ramipril] Unknown (comments)  Lipitor [Atorvastatin] Other (comments) Muscle pain  Lisinopril Shortness of Breath and Other (comments) Turns bright red  Other Medication Other (comments) Vicryl suture on skin tends to be rejected with poor wound healing does better with monocryl  Procardia [Nifedipine] Other (comments) Caused afib  Rosuvastatin Other (comments) Muscle Pain Social History Socioeconomic History  Marital status:  Spouse name: Not on file  Number of children: Not on file  Years of education: Not on file  Highest education level: Not on file Occupational History  Not on file Social Needs  Financial resource strain: Not on file  Food insecurity:  
  Worry: Not on file Inability: Not on file  Transportation needs:  
  Medical: Not on file Non-medical: Not on file Tobacco Use  Smoking status: Former Smoker Packs/day: 1.50 Years: 25.00 Pack years: 37.50 Types: Cigarettes Last attempt to quit: 2003 Years since quittin.3  Smokeless tobacco: Never Used Substance and Sexual Activity  Alcohol use: Yes Comment: RARELY  Drug use: No  
 Sexual activity: Yes  
  Partners: Female Birth control/protection: None Lifestyle  Physical activity:  
  Days per week: Not on file Minutes per session: Not on file  Stress: Not on file Relationships  Social connections:  
  Talks on phone: Not on file Gets together: Not on file Attends Hoahaoism service: Not on file Active member of club or organization: Not on file Attends meetings of clubs or organizations: Not on file Relationship status: Not on file  Intimate partner violence:  
  Fear of current or ex partner: Not on file Emotionally abused: Not on file Physically abused: Not on file Forced sexual activity: Not on file Other Topics Concern  Not on file Social History Narrative  Not on file Family History Problem Relation Age of Onset  Heart Disease Mother  Hypertension Mother  Diabetes Mother [facility] Arthritis-osteo Mother  Heart Disease Father  Stroke Father  Hypertension Father  Heart Disease Sister  Hypertension Sister Physical Exam:   
Visit Vitals /80 (BP 1 Location: Left arm, BP Patient Position: Sitting) Pulse 80 Resp 16 Ht 6' (1.829 m) Wt 210 lb (95.3 kg) BMI 28.48 kg/m² General: Well-appearing male in no acute distress HEENT: EOMI no scleral icterus is noted Pulmonary: No increased work of breathing is noted Extremities: Warm and perfused bilaterally Neuro: Cranial nerves II through XII are grossly intact Impression and Plan: 
Ami Cunningham is a 68 y.o. male with aortoiliac occlusive disease patient would do very well with a right common femoral endarterectomy with patch angioplasty with endarterectomy to superficial femoral artery and profunda femoris arteries. We would also at the same time perform aortogram with bilateral common iliac artery stenting. Patient was given the risks and benefits of the procedure including but not limited to bleeding, infection, damage to adjacent structures, MI, stroke, death, need for further surgery. ,  Loss of lower extremity. Patient is understanding all the risks and is willing to move forward with the surgery. We reviewed the plan with the patient and the patient understands. We also gave the patient appropriate instructions on their disease process and when to call back. Greater than 50% of this visit was spent with face to face discussion. Alon Tariq MD 
 
PLEASE NOTE: 
This document has been produced using voice recognition software. Unrecognized errors in transcription may be present.

## 2019-05-08 NOTE — LETTER
5/8/19 Patient: Selwyn Arshad YOB: 1945 Date of Visit: 5/8/2019 Sariah Chisholm MD 
[place] [address] [address] [place] [address] VIA In Basket Dear Sariah Chisholm MD, Thank you for referring Mr. Nelia Rojas to [facility] for evaluation. My notes for this consultation are attached. If you have questions, please do not hesitate to call me. I look forward to following your patient along with you. Sincerely, Radha Samuel MD

## 2019-05-08 NOTE — PROGRESS NOTES
1. Have you been to an emergency room or urgent care clinic since your last visit? NO Hospitalized since your last visit? If yes, where, when, and reason for visit? NO 
2. Have you seen or consulted any other health care providers outside of the [facility] since your last visit including any procedures, health maintenance items. If yes, where, when and reason for visit?  NO

## 2019-05-13 ENCOUNTER — OFFICE VISIT (OUTPATIENT)
Dept: ORTHOPEDIC SURGERY | Age: 74
End: 2019-05-13

## 2019-05-13 VITALS
RESPIRATION RATE: 16 BRPM | HEIGHT: 72 IN | HEART RATE: 65 BPM | DIASTOLIC BLOOD PRESSURE: 86 MMHG | WEIGHT: 219.4 LBS | OXYGEN SATURATION: 98 % | BODY MASS INDEX: 29.72 KG/M2 | TEMPERATURE: 97.9 F | SYSTOLIC BLOOD PRESSURE: 147 MMHG

## 2019-05-13 DIAGNOSIS — M50.30 DEGENERATION OF CERVICAL INTERVERTEBRAL DISC: ICD-10-CM

## 2019-05-13 DIAGNOSIS — M48.02 CERVICAL SPINAL STENOSIS: Primary | ICD-10-CM

## 2019-05-13 DIAGNOSIS — M47.812 FACET ARTHRITIS OF CERVICAL REGION: ICD-10-CM

## 2019-05-13 NOTE — PROGRESS NOTES
Chief complaint/History of Present Illness:  Chief Complaint   Patient presents with    Back Pain     Lumbar    Leg Pain     Right    Follow-up     6 mo f/u      HPI  Sarah Kim is a  68 y.o.  male      HISTORY OF PRESENT ILLNESS:  The patient comes in today for followup of his chronic neck and back pain. He states his neck and back hurt. Occasionally, he will get some right leg pain, but it is manageable. He is taking Neurontin 300 mg at nighttime. He states he really cannot take the two at night like we were hoping he would be able to. It just makes him too sleepy, but it seems to be working in helping with his neck and back pain. He has had a lot that has gone on in the six months since we have seen him. Back on February 27, 2019, He had an EMG of the left upper extremity, which showed left carpal tunnel syndrome, some chronic C6-7 radiculopathy, some left cubital tunnel syndrome, and some signs of peripheral neuropathy. He saw Dr. Tobi Casey on March 1, 2019. He needs a left carpal tunnel release, but he states he is putting that off. He has a lot of other issues to deal with at the moment. On April 8, 2019, he saw Dr. Luba Lauren. He is now two and a half years clear from his prostate cancer. On April 27, 2019, he saw Boo García P.A.-C., who aspirated 100 cc of fluid out of his knee and put a cortisone injection in there. That did help quite a bit. On May 8, 2019, he saw Dr. Mony Wei. He needs a right femoral endarterectomy with a patch. That surgery is scheduled for July 2, 2019. He states he has four blockages. He is retired. He is a nonsmoker. He denies fever and bowel or bladder dysfunction. PHYSICAL EXAM:  Mr. Mikki Chacon is a 51-year-old male. He is alert and oriented. He has a normal mood and affect. He has a full weightbearing, non-antalgic gait. He uses no assistive device. He has a mildly poor tandem gait.   He has 5/5 strength of the bilateral upper extremities and 4/5 on the lower extremities. He has a negative straight leg raise and negative Cortess. ASSESSENT/PLAN:  This is a patient with cervical stenosis and facet arthropathy. He has degenerative disc disease and stenosis of the lumbar spine. He is managing his back and neck pain with Neurontin 300 mg at bedtime. He does not need a refill at this time. We will see him back in six months or sooner if needed. Review of systems:    Past Medical History:   Diagnosis Date    Adenoma of left adrenal gland     2009  1 cm, no change 1/15, 2/16    Asbestosis     CT 1/19 showed pleural plaques    Atrial fibrillation     CHA2DS2-VAsc=3(age+, htn+, vasc dx+; estimated yearly stroke risk according to Lip et al. is 3,2%), Hasbled=2(estimated yearly bleeding risk according to pisters et al. is 1,88%); not anticoagulated due to h/o gi bleed    Atrial fibrillation ablation     10/08    Basal cell cancer     Dr Estelle Daniels; he's had >350 lesions removed    Carotid occlusion     left     Cervical radiculopathy     MRI 9/11 showed C3-6 severe foraminal stenosis    Chronic pain     Colon polyp     Dr Jose Manuel Mirza 9/15    Coronary artery disease     RCA - 3.0 x 16mm TAXUS 9/04, 3.0 x 16mm TAXUS 12/06    Dyslipidemia     Erectile dysfunction     GERD     GI bleed     Hearing loss     2014  bilateral Dr Aguirre Zully    Hernia, umbilical     Hypertension     Hypogonadism male     Lumbar radiculopathy     Dr Skylar Gavin;  MRI 9/11 L4-5 disc bulging, annular tear, disc dessication    Myofascial pain dysfunction syndrome     pain clinic     OAB (overactive bladder)     Osteoarthritis     right knee Dr Tee Gonzalez Overweight (BMI 25.0-29. 9)     IF 5/18 start weight 214 lbs, not doing    Peripheral vascular disease     50-60% R iliac; s/p R iliac stent and L femoral artery stent in past; R OLIVIA 0.76 (1/16)    Plantar fasciitis     bilateral     PPD positive     Prediabetes     Prostate cancer     T1c Courtenay 7(3+4), 70% in 1 core, GS 7 (3+4) in 4 cores, GS 6 (3+3) in 1 core; psa 5.28, TRUS 18 gm;  Dr Moser Records; s/p cryoablation 10/16    Recurrent umbilical hernia     Subclinical hypothyroidism     denies    Tinnitus     Dr Gilda Arriaza Ulcerative colitis     Venous insufficiency      Past Surgical History:   Procedure Laterality Date    CARDIAC SURG PROCEDURE UNLIST  04/2018    [facility] Dr Toussaint Ro; echo nl lv, ef 60%, dilated RV, biatrial enlargement, trace AI    CARDIAC SURG PROCEDURE UNLIST  04/2018    [facility] Dr Toussaint Ro; NST neg, ef 60%    HAND/FINGER SURGERY UNLISTED  2017    HX CAROTID ENDARTERECTOMY      1/14  right    Conrad Gustavo HX COLONOSCOPY      Dr Moser Records 9/15 polyps    HX CRYOABLATION OF THE PROSTATE      10/16    [address]X [address], [address]    HX ORTHOPAEDIC      right knee surgery    HX ORTHOPAEDIC  12/2017    Dr Maddi Roldan; R CTS release    HX REFRACTIVE SURGERY      OD (hemoplasty to right eye on retina to avoid blindness)    HX TONSILLECTOMY      1955    AR LAP, RECURRENT INCISIONAL HERNIA REPAIR,REDUCIBLE N/A 02/09/2017    Dr. Kina Anthony HERNIA,5+Y/O,REDUCIBL      2/16  left  Dr. Aris Hare EWVF,4+V/O,UHCYV      2/16  Dr. Claritza Combs      1/16  R OLIVIA 0.76, L OLIVIA 1.02    VASCULAR SURGERY PROCEDURE UNLIST      10/15   left fem art and left iliac stent     Social History     Socioeconomic History    Marital status:      Spouse name: Not on file    Number of children: Not on file    Years of education: Not on file    Highest education level: Not on file   Occupational History    Not on file   Social Needs    Financial resource strain: Not on file    Food insecurity:     Worry: Not on file     Inability: Not on file    Transportation needs:     Medical: Not on file     Non-medical: Not on file   Tobacco Use    Smoking status: Former Smoker     Packs/day: 1.50     Years: 25.00     Pack years: 37.50 Types: Cigarettes     Last attempt to quit: 2003     Years since quittin.3    Smokeless tobacco: Never Used   Substance and Sexual Activity    Alcohol use: Yes     Comment: RARELY    Drug use: No    Sexual activity: Yes     Partners: Female     Birth control/protection: None   Lifestyle    Physical activity:     Days per week: Not on file     Minutes per session: Not on file    Stress: Not on file   Relationships    Social connections:     Talks on phone: Not on file     Gets together: Not on file     Attends Religion service: Not on file     Active member of club or organization: Not on file     Attends meetings of clubs or organizations: Not on file     Relationship status: Not on file    Intimate partner violence:     Fear of current or ex partner: Not on file     Emotionally abused: Not on file     Physically abused: Not on file     Forced sexual activity: Not on file   Other Topics Concern    Not on file   Social History Narrative    Not on file     Family History   Problem Relation Age of Onset    Heart Disease Mother     Hypertension Mother     Diabetes Mother     Arthritis-osteo Mother     Heart Disease Father     Stroke Father     Hypertension Father     Heart Disease Sister     Hypertension Sister        Physical Exam:  Visit Vitals  /86   Pulse 65   Temp 97.9 °F (36.6 °C)   Resp 16   Ht 6' (1.829 m)   Wt 219 lb 6.4 oz (99.5 kg)   SpO2 98%   BMI 29.76 kg/m²     Pain Scale: 5/10            has been . reviewed and is appropriate          Diagnoses and all orders for this visit:    1. Cervical spinal stenosis    2. Degeneration of cervical and lumbar spine, relative cervical stenosis    3. Facet arthritis of cervical region            Follow-up and Dispositions    · Return in about 6 months (around 2019) for with NP.              We have informed Suma Luogiovanaamilcar to notify us for immediate appointment if he has any worsening neurogical symptoms or if an emergency situation presents, then call 911

## 2019-05-13 NOTE — PATIENT INSTRUCTIONS
Neck Arthritis: Exercises  Your Care Instructions  Here are some examples of typical rehabilitation exercises for your condition. Start each exercise slowly. Ease off the exercise if you start to have pain. Your doctor or physical therapist will tell you when you can start these exercises and which ones will work best for you. How to do the exercises  Neck stretches to the side    1. This stretch works best if you keep your shoulder down as you lean away from it. To help you remember to do this, start by relaxing your shoulders and lightly holding on to your thighs or your chair. 2. Tilt your head toward your shoulder and hold for 15 to 30 seconds. Let the weight of your head stretch your muscles. 3. Repeat 2 to 4 times toward each shoulder. Chin tuck    1. Lie on the floor with a rolled-up towel under your neck. Your head should be touching the floor. 2. Slowly bring your chin toward your chest.  3. Hold for a count of 6, and then relax for up to 10 seconds. 4. Repeat 8 to 12 times. Active cervical rotation    1. Sit in a firm chair, or stand up straight. 2. Keeping your chin level, turn your head to the right, and hold for 15 to 30 seconds. 3. Turn your head to the left and hold for 15 to 30 seconds. 4. Repeat 2 to 4 times to each side. Shoulder blade squeeze    1. While standing, squeeze your shoulder blades together. 2. Do not raise your shoulders up as you are squeezing. 3. Hold for 6 seconds. 4. Repeat 8 to 12 times. Shoulder rolls    1. Sit comfortably with your feet shoulder-width apart. You can also do this exercise standing up. 2. Roll your shoulders up, then back, and then down in a smooth, circular motion. 3. Repeat 2 to 4 times. Follow-up care is a key part of your treatment and safety. Be sure to make and go to all appointments, and call your doctor if you are having problems.  It's also a good idea to know your test results and keep a list of the medicines you take.  Where can you learn more? Go to http://nneka-mindi.info/. Enter Y075 in the search box to learn more about \"Neck Arthritis: Exercises. \"  Current as of: September 20, 2018  Content Version: 11.9  © 3141-4910 [employer]. Care instructions adapted under license by [employer] (which disclaims liability or warranty for this information). If you have questions about a medical condition or this instruction, always ask your healthcare professional. [place] [address] any warranty or liability for your use of this information. Back Pain: Care Instructions  Your Care Instructions    Back pain has many possible causes. It is often related to problems with muscles and ligaments of the back. It may also be related to problems with the nerves, discs, or bones of the back. Moving, lifting, standing, sitting, or sleeping in an awkward way can strain the back. Sometimes you don't notice the injury until later. Arthritis is another common cause of back pain. Although it may hurt a lot, back pain usually improves on its own within several weeks. Most people recover in 12 weeks or less. Using good home treatment and being careful not to stress your back can help you feel better sooner. Follow-up care is a key part of your treatment and safety. Be sure to make and go to all appointments, and call your doctor if you are having problems. It's also a good idea to know your test results and keep a list of the medicines you take. How can you care for yourself at home? · Sit or lie in positions that are most comfortable and reduce your pain. Try one of these positions when you lie down:  ? Lie on your back with your knees bent and supported by large pillows. ? Lie on the floor with your legs on the seat of a sofa or chair. ? Lie on your side with your knees and hips bent and a pillow between your legs.   ? Lie on your stomach if it does not make pain worse.  · Do not sit up in bed, and avoid soft couches and twisted positions. Bed rest can help relieve pain at first, but it delays healing. Avoid bed rest after the first day of back pain. · Change positions every 30 minutes. If you must sit for long periods of time, take breaks from sitting. Get up and walk around, or lie in a comfortable position. · Try using a heating pad on a low or medium setting for 15 to 20 minutes every 2 or 3 hours. Try a warm shower in place of one session with the heating pad. · You can also try an ice pack for 10 to 15 minutes every 2 to 3 hours. Put a thin cloth between the ice pack and your skin. · Take pain medicines exactly as directed. ? If the doctor gave you a prescription medicine for pain, take it as prescribed. ? If you are not taking a prescription pain medicine, ask your doctor if you can take an over-the-counter medicine. · Take short walks several times a day. You can start with 5 to 10 minutes, 3 or 4 times a day, and work up to longer walks. Walk on level surfaces and avoid hills and stairs until your back is better. · Return to work and other activities as soon as you can. Continued rest without activity is usually not good for your back. · To prevent future back pain, do exercises to stretch and strengthen your back and stomach. Learn how to use good posture, safe lifting techniques, and proper body mechanics. When should you call for help? Call your doctor now or seek immediate medical care if:    · You have new or worsening numbness in your legs.     · You have new or worsening weakness in your legs. (This could make it hard to stand up.)     · You lose control of your bladder or bowels.    Watch closely for changes in your health, and be sure to contact your doctor if:    · You have a fever, lose weight, or don't feel well.     · You do not get better as expected. Where can you learn more? Go to http://nneka-mindi.info/.   Enter I594 in the search box to learn more about \"Back Pain: Care Instructions. \"  Current as of: September 20, 2018  Content Version: 11.9  © 8759-7483 [employer]. Care instructions adapted under license by [employer] (which disclaims liability or warranty for this information). If you have questions about a medical condition or this instruction, always ask your healthcare professional. [place] [address] any warranty or liability for your use of this information.

## 2019-05-14 DIAGNOSIS — M48.02 CERVICAL SPINAL STENOSIS: ICD-10-CM

## 2019-05-14 DIAGNOSIS — M54.12 CERVICAL RADICULOPATHY: Primary | ICD-10-CM

## 2019-05-14 RX ORDER — HYDROCODONE BITARTRATE AND ACETAMINOPHEN 5; 325 MG/1; MG/1
1 TABLET ORAL
Qty: 180 TAB | Refills: 0 | Status: SHIPPED | OUTPATIENT
Start: 2019-05-14 | End: 2019-06-13

## 2019-05-14 NOTE — TELEPHONE ENCOUNTER
[facility] Norco as 3[facility]25/19 for a 30 d/s. Last Visit: 2/25/19 with MD Akila Aaron  Next Appointment: 5/23/19 with MD Akila Aaron  Previous Refill Encounter(s): 3/22/19 #180    Requested Prescriptions     Pending Prescriptions Disp Refills    HYDROcodone-acetaminophen (NORCO) 5-325 mg per tablet 180 Tab 0     Sig: Take 1 Tab by mouth every four (4) hours as needed (chronic pain) for up to 30 days. Max Daily Amount: 6 Tabs.

## 2019-05-16 ENCOUNTER — HOSPITAL ENCOUNTER (OUTPATIENT)
Dept: LAB | Age: 74
Discharge: HOME | End: 2019-05-16
Payer: MEDICARE

## 2019-05-16 ENCOUNTER — APPOINTMENT (OUTPATIENT)
Dept: INTERNAL MEDICINE CLINIC | Age: 74
End: 2019-05-16

## 2019-05-16 DIAGNOSIS — I74.09 AORTOILIAC OCCLUSIVE DISEASE (HCC): ICD-10-CM

## 2019-05-16 DIAGNOSIS — I65.22 LEFT CAROTID ARTERY OCCLUSION: ICD-10-CM

## 2019-05-16 DIAGNOSIS — I70.219 ATHEROSCLEROSIS OF ARTERY OF EXTREMITY WITH INTERMITTENT CLAUDICATION (HCC): ICD-10-CM

## 2019-05-16 LAB
ANION GAP SERPL CALC-SCNC: 7 MMOL/L (ref 3–18)
BUN SERPL-MCNC: 10 MG/DL (ref 7–18)
BUN/CREAT SERPL: 13 (ref 12–20)
CALCIUM SERPL-MCNC: 9.5 MG/DL (ref 8.5–10.1)
CHLORIDE SERPL-SCNC: 100 MMOL/L (ref 100–108)
CO2 SERPL-SCNC: 31 MMOL/L (ref 21–32)
CREAT SERPL-MCNC: 0.78 MG/DL (ref 0.6–1.3)
ERYTHROCYTE [DISTWIDTH] IN BLOOD BY AUTOMATED COUNT: 13.4 % (ref 11.6–14.5)
GLUCOSE SERPL-MCNC: 109 MG/DL (ref 74–99)
HCT VFR BLD AUTO: 44.9 % (ref 36–48)
HGB BLD-MCNC: 14.8 G/DL (ref 13–16)
MCH RBC QN AUTO: 32.7 PG (ref 24–34)
MCHC RBC AUTO-ENTMCNC: 33 G/DL (ref 31–37)
MCV RBC AUTO: 99.1 FL (ref 74–97)
PLATELET # BLD AUTO: 160 K/UL (ref 135–420)
PMV BLD AUTO: 9.7 FL (ref 9.2–11.8)
POTASSIUM SERPL-SCNC: 3.8 MMOL/L (ref 3.5–5.5)
RBC # BLD AUTO: 4.53 M/UL (ref 4.7–5.5)
SODIUM SERPL-SCNC: 138 MMOL/L (ref 136–145)
WBC # BLD AUTO: 9.2 K/UL (ref 4.6–13.2)

## 2019-05-16 PROCEDURE — 36415 COLL VENOUS BLD VENIPUNCTURE: CPT

## 2019-05-16 PROCEDURE — 80048 BASIC METABOLIC PNL TOTAL CA: CPT

## 2019-05-16 PROCEDURE — 85027 COMPLETE CBC AUTOMATED: CPT

## 2019-05-21 ENCOUNTER — TELEPHONE (OUTPATIENT)
Dept: INTERNAL MEDICINE CLINIC | Age: 74
End: 2019-05-21

## 2019-05-21 DIAGNOSIS — I73.9 PERIPHERAL VASCULAR DISEASE (HCC): Primary | ICD-10-CM

## 2019-05-21 NOTE — PROGRESS NOTES
68 y.o. WHITE OR  male who presents for evaluation. He reports no cardiovascular complaints and sees Dr Brian Hale. Pressures are running in the 120s over 60s at home, higher at [address]  Dr Rosalva Sanches outlined several diff p[rocedures that are to be done in the near future regarding blockages he has. Then pain is controlled and  regularly reviewed and no discrepancies    The UC has been flaring and GI has him on colestipol for sx relief     Denies polyuria, polydipsia, nocturia, vision change. Not checking sugars at this time. . Weight continues to drop as doing better with the diet    Vitals 5/23/2019 5/13/2019 5/8/2019   Weight 217 lb 219 lb 6.4 oz 210 lb     Vitals 11/20/2018 11/14/2018 11/13/2018 11/7/2018 10/12/2018   Weight 219 lb 223 lb 3.2 oz 222 lb 226 lb 222 lb 9.6 oz     He continues to see Dr Jonah Osman for the prostate issues    We started vascepa after the last encounter, he is able to take 2-3/d; above that he has hand pain? ?     LAST MEDICARE WELLNESS EXAM: 6/23/16, 6/29/17, 11/20/18    Past Medical History:   Diagnosis Date    Adenoma of left adrenal gland     2009  1 cm, no change 1/15, 2/16    Asbestosis     CT 1/19 showed pleural plaques    Atrial fibrillation     CHA2DS2-VAsc=3(age+, htn+, vasc dx+; estimated yearly stroke risk according to Lip et al. is 3,2%), Hasbled=2(estimated yearly bleeding risk according to pisters et al. is 1,88%); not anticoagulated due to h/o gi bleed    Atrial fibrillation ablation     10/08    Basal cell cancer     Dr Bouchra Altman; he's had >350 lesions removed    Carotid occlusion     left     Cervical radiculopathy     MRI 9/11 showed C3-6 severe foraminal stenosis    Chronic pain     Colon polyp     Dr Jonah Osman 9/15    Coronary artery disease     RCA - 3.0 x 16mm TAXUS 9/04, 3.0 x 16mm TAXUS 12/06    Dyslipidemia     Erectile dysfunction     GERD     GI bleed     Hearing loss     2014  bilateral Dr Dorina Gracia    Hernia, umbilical     Hypertension     with component of white coat    Hypogonadism male     Lumbar radiculopathy     Dr Albert Schwarz;  MRI 9/11 L4-5 disc bulging, annular tear, disc dessication    Myofascial pain dysfunction syndrome     pain clinic     OAB (overactive bladder)     Osteoarthritis     right knee Dr Iman Cortes Overweight (BMI 25.0-29. 9)     IF 5/18 start weight 214 lbs, not doing    Peripheral vascular disease     50-60% R iliac; s/p R iliac stent and L femoral artery stent in past; R OLIVIA 0.76 (1/16)    Plantar fasciitis     bilateral     PPD positive     Prediabetes     Prostate cancer     T1c Imelda 7(3+4), 70% in 1 core, GS 7 (3+4) in 4 cores, GS 6 (3+3) in 1 core; psa 5.28, TRUS 18 gm;  Dr José Miguel Johnson; s/p cryoablation 10/16    Recurrent umbilical hernia     Subclinical hypothyroidism     denies    Tinnitus     Dr Denise Nuñez Ulcerative colitis     Venous insufficiency      Past Surgical History:   Procedure Laterality Date    CARDIAC SURG PROCEDURE UNLIST  04/2018    [facility] Dr Radha Mckeon; echo nl lv, ef 60%, dilated RV, biatrial enlargement, trace AI    CARDIAC SURG PROCEDURE UNLIST  04/2018    [facility] Dr Radha Mckeon; NST neg, ef 60%    HAND/FINGER SURGERY UNLISTED  2017    HX CAROTID ENDARTERECTOMY      1/14  right    Erin Hieu HX COLONOSCOPY      Dr José Miguel Johnson 9/15 polyps    HX CRYOABLATION OF THE PROSTATE      10/16    HX HEMORRHOIDECTOMY      1979    [facility] [place] [address]    HX ORTHOPAEDIC      right knee surgery    HX ORTHOPAEDIC  12/2017    Dr Adam Pedraza; R CTS release    HX REFRACTIVE SURGERY      OD (hemoplasty to right eye on retina to avoid blindness)    HX TONSILLECTOMY      1955    ME LAP, RECURRENT INCISIONAL HERNIA REPAIR,REDUCIBLE N/A 02/09/2017    Dr. Kaye Sample HERNIA,5+Y/O,REDUCIBL      2/16  left  Dr. [facility] [place],7+A/B,EOVGE      2/16  Dr. Bernabe Ee      1/16  R OLIVIA 0.76, L OLIVIA 1.02    VASCULAR SURGERY PROCEDURE UNLIST      10/15   left fem art and left iliac stent     Social History     Socioeconomic History    Marital status:      Spouse name: Not on file    Number of children: Not on file    Years of education: Not on file    Highest education level: Not on file   Occupational History    Not on file   Social Needs    Financial resource strain: Not on file    Food insecurity:     Worry: Not on file     Inability: Not on file    Transportation needs:     Medical: Not on file     Non-medical: Not on file   Tobacco Use    Smoking status: Former Smoker     Packs/day: 1.50     Years: 25.00     Pack years: 37.50     Types: Cigarettes     Last attempt to quit: 2003     Years since quittin.4    Smokeless tobacco: Never Used   Substance and Sexual Activity    Alcohol use: Yes     Comment: RARELY    Drug use: No    Sexual activity: Yes     Partners: Female     Birth control/protection: None   Lifestyle    Physical activity:     Days per week: Not on file     Minutes per session: Not on file    Stress: Not on file   Relationships    Social connections:     Talks on phone: Not on file     Gets together: Not on file     Attends Gnosticism service: Not on file     Active member of club or organization: Not on file     Attends meetings of clubs or organizations: Not on file     Relationship status: Not on file    Intimate partner violence:     Fear of current or ex partner: Not on file     Emotionally abused: Not on file     Physically abused: Not on file     Forced sexual activity: Not on file   Other Topics Concern    Not on file   Social History Narrative    Not on file     Current Outpatient Medications   Medication Sig    HYDROcodone-acetaminophen (NORCO) 5-325 mg per tablet Take 1 Tab by mouth every four (4) hours as needed (chronic pain) for up to 30 days. Max Daily Amount: 6 Tabs.  diclofenac (VOLTAREN) 1 % gel Apply 2-4 g to affected area four (4) times daily.     colestipol (COLESTID) 1 gram tablet     nitroglycerin (NITROQUICK) 0.6 mg SL tablet 0.6 mg by SubLINGual route every five (5) minutes as needed for Chest Pain.  hyoscyamine SL (LEVSIN/SL) 0.125 mg SL tablet 0.125 mg by SubLINGual route every four (4) hours as needed for Cramping.  LORazepam (ATIVAN) 1 mg tablet Take 1 Tab by mouth every eight (8) hours as needed.  ezetimibe-simvastatin (VYTORIN) 10-40 mg per tablet TAKE 1 TABLET NIGHTLY    atenolol (TENORMIN) 25 mg tablet TAKE ONE TABLET BY MOUTH TWICE A DAY    icosapent ethyl (VASCEPA) 1 gram capsule Take 2 Caps by mouth two (2) times daily (with meals).  gabapentin (NEURONTIN) 300 mg capsule 1 cap every day to bid (Patient taking differently: daily. 1 cap every day to bid  Indications: Neuropathic Pain)    naloxone (NARCAN) 4 mg/actuation nasal spray Use 1 spray intranasally into 1 nostril. Use a new Narcan nasal spray for subsequent doses and administer into alternating nostrils. May repeat every 2 to 3 minutes as needed.  polyethylene glycol (MIRALAX) 17 gram packet 17 g.    losartan (COZAAR) 25 mg tablet TAKE ONE TABLET BY MOUTH TWICE A DAY    aspirin delayed-release 81 mg tablet Take 81 mg by mouth daily.  triamterene-hydrochlorothiazide (MAXZIDE) 37.5-25 mg per tablet TAKE ONE TABLET BY MOUTH DAILY    Cholecalciferol, Vitamin D3, 1,000 unit cap Take 1,000 Units by mouth daily.  MULTIVITAMIN PO Take  by mouth daily.  pantoprazole (PROTONIX) 40 mg tablet Take 40 mg by mouth daily.  coenzyme q10 200 mg Cap Take  by mouth. No current facility-administered medications for this visit.       Allergies   Allergen Reactions    Altace [Ramipril] Unknown (comments)    Lipitor [Atorvastatin] Other (comments)     Muscle pain    Lisinopril Shortness of Breath and Other (comments)     Turns bright red    Other Medication Other (comments)     Vicryl suture on skin tends to be rejected with poor wound healing does better with monocryl    Procardia [Nifedipine] Other (comments)     Caused afib    Rosuvastatin Other (comments)     Muscle Pain       REVIEW OF SYSTEMS: colo 9/15 Dr Colleen Griggs, sees Dr Neil Minor no vision change or eye pain  Oral  no mouth pain, tongue or tooth problems  Ears  no hearing loss, ear pain, fullness, no swallowing problems  Cardiac  no CP, PND, orthopnea, edema, palpitations or syncope  Chest  no breast masses  Resp  no wheezing, chronic coughing, dyspnea  GI  no heartburn, nausea, vomiting, change in bowel habits, bleeding, hemorrhoids  Urinary  no dysuria, hematuria, flank pain, urgency, frequency  Genitals  no genital lesions, discharge, masses, ulceration, warts    Visit Vitals  /76   Pulse 63   Temp 98 °F (36.7 °C) (Oral)   Resp 14   Ht 6' (1.829 m)   Wt 217 lb (98.4 kg)   SpO2 98%   BMI 29.43 kg/m²      A&O x3  Affect is appropriate. Mood stable  No apparent distress  Anicteric, no JVD, adenopathy or thyromegaly. No carotid bruits or radiated murmur  Lungs clear to auscultation, no wheezes or rales  Heart showed regular rhythm. No rubs, gallops, 1-2/6 karli rsb  Abdomen soft nontender, no hepatosplenomegaly or masses. Extremities without edema.   Pulses 0-1 on right, 1-2 on left    LABS  From 12/12 showed gluc 101, cr 0.77, gfr 94,   alt 26,           chol 182, tg 159, hdl 52, ldl-c 98, wbc 7.6, hb 15.4, plt 171, tsh 0.96, psa 3.20  From 7/13 showed                          test 263  From 1/14 showed   gluc 112, cr 0.78, gfr 107, alt 17,           chol 130, tg 129, hdl 37, ldl-c 67, wbc 6.8, hb 15.1, plt 186, tsh 0.64, psa 3.60, vit d 26.8  From 6/14 showed   gluc 101, cr 0.57, gfr>60,     hba1c 5.7, vit d 34.3  From 12/14 showed         hba1c 5.9, chol 141, tg 104, hdl 42, ldl-c 78, wbc 8.5, hb 14.8, plt 147, tsh 0.75, psa 5.60, vit d 31.8, ua neg  From 6/15 showed   gluc 104, cr 0.75, gfr>60,     hba1c 5.9  From 8/15 showed                      tsh 0.44, psa 5.28,         test 215, lh 6.1, prl 11.1, ft4 1.61  From 12/15 showed gluc 95,   cr 0.71, gfr>60,  alt 36, hba1c 5.9, chol 135, tg 105, hdl 44, ldl-c 70,           tsh 0.51,     vit d 29.1   From 1/16 showed   gluc 112, cr 0.72, gfr>60,          wbc 7.8, hb 15.1, plt 163,            ua neg  From 11/16 showed gluc 103, cr 0.70,           wbc 8.6, hb 14.5, plt 189, ck/trop neg  From 12/16 showed gluc 89,   cr 0.70, gfr>60,  alt 31, hba1c 5.9, chol 159, tg 124, hdl 58, ldl-c 76, wbc 9.0, hb 15.1, plt 185,      vit d 27.8  From 3/17 showed   gluc 100, cr 0.75, gfr>60,     hba1c 5.9,                tsh 0.78, psa 0.18, ft4 1.30  From 6/17 showed   gluc 96,   cr 0.74, gfr>60, alt 34,  hba1c 5.7, chol 135, tg 71,   hdl 53, ldl-c 68  From 1/18 showed   gluc 107, cr 0.65, gfr>60, alt 31,  hba1c 5.8, chol 147, tg 105, hdl 46, ldl-c 80  From 5/18 showed   gluc 100, cr 0.73, gfr>60, alt 38,  hba1c 5.9, chol 152, tg 59,   hdl 66, ldl-c 74  From 11/18 showed         hba1c 6.1, chol 146, tg 149, hdl 51, ldl-c 65, wbc 7.9, hb 15.4, plt 164    Results for orders placed or performed in visit on 05/23/19   AMB POC HEMOGLOBIN A1C   Result Value Ref Range    Hemoglobin A1c (POC) 5.5 %     Patient Active Problem List   Diagnosis Code    Colitis, ulcerative ([facility] K51.90    Colon polyps K63.5    Peripheral vascular disease ([facility] Dr Mariposa Gomez I73.9    Left carotid artery occlusion I65.22    Atherosclerosis of artery of extremity with intermittent claudication (HCC) I70.219    Hypovitaminosis D E55.9    Advance directive in chart Z78.9    Hypertension I11.9    Dyslipidemia E78.5    Coronary artery disease I25.10    Atrial fibrillation I48.91    Recurrent umbilical hernia A34.4    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 9) E66.3    History of prostate cancer Z85.46    Facet hypertrophy of lumbar region M47.816    Aortoiliac occlusive disease (HCC) I74.09     Assessment and plan:  1. Cardiac. Continue current regimen. F/U Dr Onofre Chou  2. Vascular. Plans per Red  3. Dyslipidemia. Continue current regimen. 4. PreDM. Lifestyle and dietary measures, wt loss as he is doing  5. Hypovit d. Supplement  6. Colon polyp. Fiber, colo 2020  7. Prostate ca. Per Dr Mitchel Pena  8. Afib. Per Dr Onofre Chou  9. Ortho. F/U Dr Bobbi Reynoso  10. Spine. F/U Dr Osiris rollins  11. Overweight. Lifestyle and dietary measures. Portion control         RTC 5/19    Above conditions discussed at length and patient vocalized understanding.   All questions answered to patient satisfaction

## 2019-05-21 NOTE — TELEPHONE ENCOUNTER
Spoke with patient he stated he contacted  and they reorder the labs he needs to have done for his surgery. Advised patient we will get a POC A1C on Thursday for Kris Santana since that was the only lab not drawn.

## 2019-05-21 NOTE — TELEPHONE ENCOUNTER
Pt had labs drawn here on 05/16- for his 6 mo f/up with Dr Radha Sherman, he went to check his my chart for his results and said labs were ordered by Dr Kallie Wagner - pt said he is going to have surgery on 07/02- and Dr Kallie Wagner did order labs for his pre op but he is not suppose to get them until the end of June .  He is concerned that the wrong labs were done please advise [phone]

## 2019-05-22 NOTE — PROGRESS NOTES
Moises Salas presents today for   Chief Complaint   Patient presents with    Hypertension     6 month follow up with labs              Depression Screening:  3 most recent PHQ Screens 5/23/2019   PHQ Not Done -   Little interest or pleasure in doing things Not at all   Feeling down, depressed, irritable, or hopeless Not at all   Total Score PHQ 2 0       Learning Assessment:  Learning Assessment 5/23/2019   PRIMARY LEARNER Patient   HIGHEST LEVEL OF EDUCATION - PRIMARY LEARNER  2 YEARS OF COLLEGE   BARRIERS PRIMARY LEARNER NONE   CO-LEARNER CAREGIVER No   PRIMARY LANGUAGE ENGLISH   LEARNER PREFERENCE PRIMARY DEMONSTRATION     PICTURES     READING     VIDEOS     LISTENING   ANSWERED BY patient   RELATIONSHIP SELF       Abuse Screening:  Abuse Screening Questionnaire 5/23/2019   Do you ever feel afraid of your partner? N   Are you in a relationship with someone who physically or mentally threatens you? N   Is it safe for you to go home? Y       Fall Risk  Fall Risk Assessment, last 12 mths 5/23/2019   Able to walk? Yes   Fall in past 12 months? No   Fall with injury? -   Number of falls in past 12 months -   Fall Risk Score -           Coordination of Care:  1. Have you been to the ER, urgent care clinic since your last visit? Hospitalized since your last visit? no    2. Have you seen or consulted any other health care providers outside of the [facility] since your last visit? Include any pap smears or colon screening.  no

## 2019-05-23 ENCOUNTER — OFFICE VISIT (OUTPATIENT)
Dept: INTERNAL MEDICINE CLINIC | Age: 74
End: 2019-05-23

## 2019-05-23 VITALS
WEIGHT: 217 LBS | OXYGEN SATURATION: 98 % | HEIGHT: 72 IN | SYSTOLIC BLOOD PRESSURE: 138 MMHG | DIASTOLIC BLOOD PRESSURE: 76 MMHG | HEART RATE: 63 BPM | BODY MASS INDEX: 29.39 KG/M2 | TEMPERATURE: 98 F | RESPIRATION RATE: 14 BRPM

## 2019-05-23 DIAGNOSIS — I73.9 PERIPHERAL VASCULAR DISEASE (HCC): ICD-10-CM

## 2019-05-23 DIAGNOSIS — M54.16 LUMBAR RADICULOPATHY: ICD-10-CM

## 2019-05-23 DIAGNOSIS — I25.10 ATHEROSCLEROSIS OF NATIVE CORONARY ARTERY OF NATIVE HEART WITHOUT ANGINA PECTORIS: ICD-10-CM

## 2019-05-23 DIAGNOSIS — Z85.46 HISTORY OF PROSTATE CANCER: ICD-10-CM

## 2019-05-23 DIAGNOSIS — K51.919 ULCERATIVE COLITIS WITH COMPLICATION, UNSPECIFIED LOCATION (HCC): ICD-10-CM

## 2019-05-23 DIAGNOSIS — I11.9 BENIGN HYPERTENSIVE HEART DISEASE WITHOUT HEART FAILURE: ICD-10-CM

## 2019-05-23 DIAGNOSIS — K63.5 POLYP OF COLON, UNSPECIFIED PART OF COLON, UNSPECIFIED TYPE: ICD-10-CM

## 2019-05-23 DIAGNOSIS — M50.30 DEGENERATION OF CERVICAL INTERVERTEBRAL DISC: ICD-10-CM

## 2019-05-23 DIAGNOSIS — E78.5 DYSLIPIDEMIA: ICD-10-CM

## 2019-05-23 DIAGNOSIS — I65.22 LEFT CAROTID ARTERY OCCLUSION: ICD-10-CM

## 2019-05-23 DIAGNOSIS — R73.01 IFG (IMPAIRED FASTING GLUCOSE): Primary | ICD-10-CM

## 2019-05-23 DIAGNOSIS — I48.91 ATRIAL FIBRILLATION, UNSPECIFIED TYPE (HCC): ICD-10-CM

## 2019-05-23 DIAGNOSIS — E66.3 OVERWEIGHT (BMI 25.0-29.9): ICD-10-CM

## 2019-05-23 DIAGNOSIS — I70.219 ATHEROSCLEROSIS OF ARTERY OF EXTREMITY WITH INTERMITTENT CLAUDICATION (HCC): ICD-10-CM

## 2019-05-23 DIAGNOSIS — I74.09 AORTOILIAC OCCLUSIVE DISEASE (HCC): ICD-10-CM

## 2019-05-23 LAB — HBA1C MFR BLD HPLC: 5.5 %

## 2019-06-07 ENCOUNTER — OFFICE VISIT (OUTPATIENT)
Dept: ORTHOPEDIC SURGERY | Age: 74
End: 2019-06-07

## 2019-06-07 VITALS
SYSTOLIC BLOOD PRESSURE: 154 MMHG | TEMPERATURE: 97.3 F | HEIGHT: 72 IN | RESPIRATION RATE: 24 BRPM | HEART RATE: 63 BPM | DIASTOLIC BLOOD PRESSURE: 83 MMHG | OXYGEN SATURATION: 97 % | BODY MASS INDEX: 29.93 KG/M2 | WEIGHT: 221 LBS

## 2019-06-07 DIAGNOSIS — G56.02 CARPAL TUNNEL SYNDROME ON LEFT: Primary | ICD-10-CM

## 2019-06-07 NOTE — PROGRESS NOTES
Elmer Alexander is a 68 y.o. male right handed retiree. Worker's Compensation and legal considerations: not known. Vitals:    06/07/19 0805   BP: 154/83   Pulse: 63   Resp: 24   Temp: 97.3 °F (36.3 °C)   SpO2: 97%   Weight: 221 lb (100.2 kg)   Height: 6' (1.829 m)   PainSc:   4           Chief Complaint   Patient presents with    Wrist Pain     Left    Follow-up     3 month       HPI: Patient comes in today for follow-up of left carpal tunnel syndrome. Proximally 3 months ago he received an injection in his left carpal tunnel. We discussed the need for surgery in the future. However he is having a vascular surgery in his right lower extremity that we will delay this. He is requesting an injection today. He reports the injection for his left ring trigger finger helped significantly. He reports since injection not really noticing that his small finger was going numb. Previous HPI: Patient comes in today for EMG follow-up of his left upper extremity. At his last visit he received a right index finger A1 pulley injection for trigger finger that he said has helped and has not come back. He reports the numbness and tingling in his left side has not changed. He says it is mostly at night but he thinks it involves all of the digits. Initial HPI: Patient comes in today as a referral from my partner. 1 month ago he had a left carpal tunnel injection as well as a ring finger trigger finger injection. He says the numbness and tingling in the left is improved. However he says the ring finger is still locking up. He reports having an EMG many years ago. He also has a history of a right sided carpal tunnel release by Lenny Rivas in December 2017. He also has a history of cervical spine arthritis that he has been seen at the spine center for. He reports a one-week history of burning and pain in the index finger at the level of the palm. He denies any injuries to this area. Date of onset:  Many years worsening since 2018 regarding the carpal tunnel syndrome on the left    Injury: No    Prior Treatment:  Yes: Comment: He has worn a cockup wrist brace on the left    Numbness/ Tingling: Yes: Comment: Thumb index middle ring and small fingers on the left    ROS: Review of Systems - General ROS: negative  Respiratory ROS: no cough, shortness of breath, or wheezing  Cardiovascular ROS: no chest pain or dyspnea on exertion  Musculoskeletal ROS: positive for - pain in hand - bilateral  Neurological ROS: positive for - numbness/tingling  Dermatological ROS: negative    Past Medical History:   Diagnosis Date    Adenoma of left adrenal gland     2009  1 cm, no change 1/15, 2/16    Asbestosis     CT 1/19 showed pleural plaques    Atrial fibrillation     CHA2DS2-VAsc=3(age+, htn+, vasc dx+; estimated yearly stroke risk according to Lip et al. is 3,2%), Hasbled=2(estimated yearly bleeding risk according to pisters et al. is 1,88%); not anticoagulated due to h/o gi bleed    Atrial fibrillation ablation     10/08    Basal cell cancer     Dr Nathan Al; he's had >350 lesions removed    Carotid occlusion     left     Cervical radiculopathy     MRI 9/11 showed C3-6 severe foraminal stenosis    Chronic pain     Colon polyp     Dr Laurel Casper 9/15    Coronary artery disease     RCA - 3.0 x 16mm TAXUS 9/04, 3.0 x 16mm TAXUS 12/06    Dyslipidemia     Erectile dysfunction     GERD     GI bleed     Hearing loss     2014  bilateral Dr Bryan Radaubrie    Hernia, umbilical     Hypertension     with component of white coat    Hypogonadism male     Lumbar radiculopathy     Dr Noel Jimenez;  MRI 9/11 L4-5 disc bulging, annular tear, disc dessication    Myofascial pain dysfunction syndrome     pain clinic     OAB (overactive bladder)     Osteoarthritis     right knee Dr Simon Shafer Overweight (BMI 25.0-29. 9)     IF 5/18 start weight 214 lbs, not doing    Peripheral vascular disease     50-60% R iliac; s/p R iliac stent and L femoral artery stent in past; R OLIVIA 0.76 (1/16)    Plantar fasciitis     bilateral     PPD positive     Prediabetes     Prostate cancer     T1c Imelda 7(3+4), 70% in 1 core, GS 7 (3+4) in 4 cores, GS 6 (3+3) in 1 core; psa 5.28, TRUS 18 gm;  Dr Yazmin Paulino; s/p cryoablation 10/16    Recurrent umbilical hernia     Subclinical hypothyroidism     denies    Tinnitus     Dr Annalisa Herrmann Ulcerative colitis     Venous insufficiency        Past Surgical History:   Procedure Laterality Date    CARDIAC SURG PROCEDURE UNLIST  04/2018    [facility] Dr Emily Harkins; echo nl lv, ef 60%, dilated RV, biatrial enlargement, trace AI    CARDIAC SURG PROCEDURE UNLIST  04/2018    [facility] Dr Emily Harkins; NST neg, ef 60%    HAND/FINGER SURGERY UNLISTED  2017    HX CAROTID ENDARTERECTOMY      1/14  right    Sherron Ali HX COLONOSCOPY      Dr Yazmin Paulino 9/15 polyps    HX CRYOABLATION OF THE PROSTATE      10/16    HX [address], 1975    HX ORTHOPAEDIC      right knee surgery    HX ORTHOPAEDIC  12/2017    Dr Delgado Situ; R CTS release    HX REFRACTIVE SURGERY      OD (hemoplasty to right eye on retina to avoid blindness)    HX TONSILLECTOMY      1955    TN LAP, RECURRENT INCISIONAL HERNIA REPAIR,REDUCIBLE N/A 02/09/2017    Dr. Escoto Left HERNIA,5+Y/O,REDUCIBL      2/16  left  Dr. Shaggy Zuniga TJWW,1+Z/K,MEMJL      2/16  Dr. Dc Shaw      1/16  R OLIVIA 0.76, L OLIVIA 1.02    VASCULAR SURGERY PROCEDURE UNLIST      10/15   left fem art and left iliac stent       Current Outpatient Medications   Medication Sig Dispense Refill    triamcinolone acetonide (KENALOG) 10 mg/mL injection 1 mL by Intra artICUlar route once for 1 dose. 1 Vial 0    HYDROcodone-acetaminophen (NORCO) 5-325 mg per tablet Take 1 Tab by mouth every four (4) hours as needed (chronic pain) for up to 30 days. Max Daily Amount: 6 Tabs.  180 Tab 0    diclofenac (VOLTAREN) 1 % gel Apply 2-4 g to affected area four (4) times daily. 100 g 5    colestipol (COLESTID) 1 gram tablet       nitroglycerin (NITROQUICK) 0.6 mg SL tablet 0.6 mg by SubLINGual route every five (5) minutes as needed for Chest Pain.  hyoscyamine SL (LEVSIN/SL) 0.125 mg SL tablet 0.125 mg by SubLINGual route every four (4) hours as needed for Cramping.  LORazepam (ATIVAN) 1 mg tablet Take 1 Tab by mouth every eight (8) hours as needed. 50 Tab 0    ezetimibe-simvastatin (VYTORIN) 10-40 mg per tablet TAKE 1 TABLET NIGHTLY 90 Tab 3    atenolol (TENORMIN) 25 mg tablet TAKE ONE TABLET BY MOUTH TWICE A  Tab 3    icosapent ethyl (VASCEPA) 1 gram capsule Take 2 Caps by mouth two (2) times daily (with meals). 360 Cap 3    gabapentin (NEURONTIN) 300 mg capsule 1 cap every day to bid (Patient taking differently: daily. 1 cap every day to bid  Indications: Neuropathic Pain) 180 Cap 1    naloxone (NARCAN) 4 mg/actuation nasal spray Use 1 spray intranasally into 1 nostril. Use a new Narcan nasal spray for subsequent doses and administer into alternating nostrils. May repeat every 2 to 3 minutes as needed. 1 Each 1    polyethylene glycol (MIRALAX) 17 gram packet 17 g.      losartan (COZAAR) 25 mg tablet TAKE ONE TABLET BY MOUTH TWICE A  Tab 2    aspirin delayed-release 81 mg tablet Take 81 mg by mouth daily.  triamterene-hydrochlorothiazide (MAXZIDE) 37.5-25 mg per tablet TAKE ONE TABLET BY MOUTH DAILY 90 Tab 3    Cholecalciferol, Vitamin D3, 1,000 unit cap Take 1,000 Units by mouth daily.  MULTIVITAMIN PO Take  by mouth daily.  pantoprazole (PROTONIX) 40 mg tablet Take 40 mg by mouth daily.  coenzyme q10 200 mg Cap Take  by mouth.          Allergies   Allergen Reactions    Altace [Ramipril] Unknown (comments)    Lipitor [Atorvastatin] Other (comments)     Muscle pain    Lisinopril Shortness of Breath and Other (comments)     Turns bright red    Other Medication Other (comments)     Vicryl suture on skin tends to be rejected with poor wound healing does better with monocryl    Procardia [Nifedipine] Other (comments)     Caused afib    Rosuvastatin Other (comments)     Muscle Pain           PE:     NEUROVASCULAR: There is a mixed picture here of cervical versus peripheral provocative signs. There is no APB or ADQ weakness. There is no thenar or first webspace atrophy. Examination L R Examination L R   Carpal Comp. + - Pronator Comp. - -   Carpal Tinel + - Pronator Tinel - -   Phalen's + - Pronator Stress - -   Cubital Comp. ? - Guyon Comp. - -   Cubital Tinel ? - Guyon Tinel - -   Elbow Hyperflexion ? - Adson's - -   Spurling's ++ - SC Comp. - -   PCB Median abn - - SC Tinel - -   Radial Tinel - - IC Comp. - -   Digital Tinel - - IC Tinel - -   Radial 2-Pt WNL WNL Ulnar 2-Pt WNL WNL     Radial Pulse: 2+  Capillary Refill: < 2 sec  Robert: Not Performed  Digital Robert: Not Performed      Imaging:     Plain films AP and lateral of the cervical spine shows hyperlordosis in his cervical spine. Additionally there is facet arthropathy at the 3 through 7 levels. There is also a noticeable curve to his cervical spine in the coronal plane. 2017 MRI of cervical spine  IMPRESSION:     1. Multilevel degenerative findings of cervical spine.  -Facet arthropathy more severe than disc pathology. -Multilevel severe foraminal stenoses from facet arthropathy as delineated  above. Similar distribution previously.  -No high-grade spinal stenosis. NCV & EMG Findings:  Evaluation of the left median motor nerve showed prolonged distal onset latency (5.2 ms) and decreased conduction velocity (Elbow-Wrist, 44 m/s). The left ulnar motor nerve showed decreased conduction velocity (A Elbow-B Elbow, 48 m/s). The left median sensory nerve showed prolonged distal peak latency (4.4 ms), reduced amplitude (5.0 µV), and decreased conduction velocity (Wrist-2nd Digit, 32 m/s).   The left ulnar sensory nerve showed prolonged distal peak latency (3.9 ms), reduced amplitude (5.5 µV), and decreased conduction velocity (Wrist-5th Digit, 36 m/s). All remaining nerves (as indicated in the following tables) were within normal limits.       Needle evaluation of the left triceps muscle showed increased motor unit amplitude and slightly increased polyphasic potentials. The left pronator teres and the left Ext Digitorum muscles showed slightly increased polyphasic potentials. All remaining muscles (as indicated in the following table) showed no evidence of electrical instability.       INTERPRETATION  This is an abnormal electrodiagnostic examination. These findings may be consistent with:  1. 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 moderate carpal tunnel syndrome at the left wrist (carpal tunnel syndrome)  3. chronic C6/7 cervical radiculopathy on the left  4. mild signs of peripheral neuropathy      CLINICAL INTERPRETATION  His symptoms may multifactorial, related to any of these diagnoses, but most likely the carpal tunnel syndrome. ICD-10-CM ICD-9-CM    1.  Carpal tunnel syndrome on left G56.02 354.0 TRIAMCINOLONE ACETONIDE INJ      triamcinolone acetonide (KENALOG) 10 mg/mL injection      INJECT CARPAL TUNNEL       Plan:     Left Carpal Tunnel Injection    F/U in 3 months    [address] [facility]  OFFICE PROCEDURE PROGRESS NOTE        Chart reviewed for the following:   Ascencion YODER DO, have reviewed the History, Physical and updated the [facility] [address] performed immediately prior to start of procedure:   Ascencion YODER DO, have performed the following reviews on [facility] prior to the start of the procedure:            * Patient was identified by name and date of birth   * Agreement on procedure being performed was verified  * Risks and Benefits explained to the patient  * Procedure site verified and marked as necessary  * Patient was positioned for comfort  * Consent was signed and verified     Time: 08:25 AM      Date of procedure: 6/7/2019    Procedure performed by: Jacob Price DO    Provider assisted by: Thu Delgado LPN    Patient assisted by: self    How tolerated by patient: tolerated the procedure well with no complications    Post Procedural Pain Scale: 0 - No Hurt    Comments: none    Procedure:  After consent was obtained, using sterile technique the A1 pulley and Ring Finger were prepped. Local anesthetic used: 1% lidocaine. Kenalog 5 mg and was then injected and the needle withdrawn. The procedure was well tolerated. The patient is asked to continue to rest the area for a few more days before resuming regular activities. It may be more painful for the first 1-2 days. Watch for fever, or increased swelling or persistent pain in the joint. Call or return to clinic prn if such symptoms occur or there is failure to improve as anticipated.     Plan was reviewed with patient, who verbalized agreement and understanding of the plan

## 2019-06-18 DIAGNOSIS — M48.02 CERVICAL SPINAL STENOSIS: ICD-10-CM

## 2019-06-18 DIAGNOSIS — M54.12 CERVICAL RADICULOPATHY: Primary | ICD-10-CM

## 2019-06-18 RX ORDER — HYDROCODONE BITARTRATE AND ACETAMINOPHEN 5; 325 MG/1; MG/1
1 TABLET ORAL
Qty: 180 TAB | Refills: 0 | Status: SHIPPED | OUTPATIENT
Start: 2019-06-18 | End: 2019-07-04

## 2019-06-18 NOTE — TELEPHONE ENCOUNTER
[facility] Norco [facility] 5[facility]16/19 for a 30 d/s. Last Visit: 5/23/19 with MD Linette Gupta  Next Appointment: 11/25/19 with MD Linette Gupta  Previous Refill Encounter(s): 5/14/19 #180    Requested Prescriptions     Pending Prescriptions Disp Refills    HYDROcodone-acetaminophen (NORCO) 5-325 mg per tablet 180 Tab 0     Sig: Take 1 Tab by mouth every four (4) hours as needed (chronic pain) for up to 30 days. Max Daily Amount: 6 Tabs.

## 2019-06-18 NOTE — TELEPHONE ENCOUNTER
norco    Pt calling, says his son passed away and he has to leave for [place]. Needs refill before he goes. Wants today.

## 2019-06-24 ENCOUNTER — HOSPITAL ENCOUNTER (OUTPATIENT)
Dept: LAB | Age: 74
Discharge: HOME | End: 2019-06-24
Payer: MEDICARE

## 2019-06-24 ENCOUNTER — HOSPITAL ENCOUNTER (OUTPATIENT)
Dept: GENERAL RADIOLOGY | Age: 74
Discharge: HOME | End: 2019-06-24
Payer: MEDICARE

## 2019-06-24 DIAGNOSIS — I70.219 ATHEROSCLEROSIS OF ARTERY OF EXTREMITY WITH INTERMITTENT CLAUDICATION (HCC): ICD-10-CM

## 2019-06-24 DIAGNOSIS — I73.9 PERIPHERAL VASCULAR DISEASE (HCC): ICD-10-CM

## 2019-06-24 DIAGNOSIS — I65.22 LEFT CAROTID ARTERY OCCLUSION: ICD-10-CM

## 2019-06-24 DIAGNOSIS — I74.09 AORTOILIAC OCCLUSIVE DISEASE (HCC): ICD-10-CM

## 2019-06-24 LAB
ANION GAP SERPL CALC-SCNC: 5 MMOL/L (ref 3–18)
BASOPHILS # BLD: 0 K/UL (ref 0–0.1)
BASOPHILS NFR BLD: 0 % (ref 0–2)
BUN SERPL-MCNC: 9 MG/DL (ref 7–18)
BUN/CREAT SERPL: 12 (ref 12–20)
CALCIUM SERPL-MCNC: 9.4 MG/DL (ref 8.5–10.1)
CHLORIDE SERPL-SCNC: 99 MMOL/L (ref 100–108)
CO2 SERPL-SCNC: 33 MMOL/L (ref 21–32)
CREAT SERPL-MCNC: 0.78 MG/DL (ref 0.6–1.3)
DIFFERENTIAL METHOD BLD: ABNORMAL
EOSINOPHIL # BLD: 0.1 K/UL (ref 0–0.4)
EOSINOPHIL NFR BLD: 2 % (ref 0–5)
ERYTHROCYTE [DISTWIDTH] IN BLOOD BY AUTOMATED COUNT: 12.4 % (ref 11.6–14.5)
GLUCOSE SERPL-MCNC: 101 MG/DL (ref 74–99)
HCT VFR BLD AUTO: 43 % (ref 36–48)
HGB BLD-MCNC: 15.1 G/DL (ref 13–16)
INR PPP: 1.1 (ref 0.8–1.2)
LYMPHOCYTES # BLD: 1.6 K/UL (ref 0.9–3.6)
LYMPHOCYTES NFR BLD: 24 % (ref 21–52)
MCH RBC QN AUTO: 33.4 PG (ref 24–34)
MCHC RBC AUTO-ENTMCNC: 35.1 G/DL (ref 31–37)
MCV RBC AUTO: 95.1 FL (ref 74–97)
MONOCYTES # BLD: 0.5 K/UL (ref 0.05–1.2)
MONOCYTES NFR BLD: 8 % (ref 3–10)
NEUTS SEG # BLD: 4.5 K/UL (ref 1.8–8)
NEUTS SEG NFR BLD: 66 % (ref 40–73)
PLATELET # BLD AUTO: 161 K/UL (ref 135–420)
PMV BLD AUTO: 10 FL (ref 9.2–11.8)
POTASSIUM SERPL-SCNC: 3.7 MMOL/L (ref 3.5–5.5)
PROTHROMBIN TIME: 13.8 SEC (ref 11.5–15.2)
RBC # BLD AUTO: 4.52 M/UL (ref 4.7–5.5)
SODIUM SERPL-SCNC: 137 MMOL/L (ref 136–145)
WBC # BLD AUTO: 6.8 K/UL (ref 4.6–13.2)

## 2019-06-24 PROCEDURE — 85610 PROTHROMBIN TIME: CPT

## 2019-06-24 PROCEDURE — 36415 COLL VENOUS BLD VENIPUNCTURE: CPT

## 2019-06-24 PROCEDURE — 71046 X-RAY EXAM CHEST 2 VIEWS: CPT

## 2019-06-24 PROCEDURE — 85025 COMPLETE CBC W/AUTO DIFF WBC: CPT

## 2019-06-24 PROCEDURE — 80048 BASIC METABOLIC PNL TOTAL CA: CPT

## 2019-06-27 RX ORDER — MECLIZINE HYDROCHLORIDE 25 MG/1
12.5 TABLET ORAL
COMMUNITY
End: 2019-11-25

## 2019-07-01 ENCOUNTER — ANESTHESIA EVENT (OUTPATIENT)
Dept: CARDIOTHORACIC SURGERY | Age: 74
DRG: 254 | End: 2019-07-01
Payer: MEDICARE

## 2019-07-02 ENCOUNTER — APPOINTMENT (OUTPATIENT)
Dept: GENERAL RADIOLOGY | Age: 74
DRG: 254 | End: 2019-07-02
Attending: SURGERY
Payer: MEDICARE

## 2019-07-02 ENCOUNTER — HOSPITAL ENCOUNTER (INPATIENT)
Age: 74
LOS: 2 days | Discharge: HOME | DRG: 254 | End: 2019-07-04
Attending: SURGERY | Admitting: SURGERY
Payer: MEDICARE

## 2019-07-02 ENCOUNTER — ANESTHESIA (OUTPATIENT)
Dept: CARDIOTHORACIC SURGERY | Age: 74
DRG: 254 | End: 2019-07-02
Payer: MEDICARE

## 2019-07-02 DIAGNOSIS — I74.09 AORTOILIAC OCCLUSIVE DISEASE (HCC): Primary | ICD-10-CM

## 2019-07-02 PROBLEM — I70.213 ATHEROSCLEROSIS OF NATIVE ARTERY OF BOTH LOWER EXTREMITIES WITH INTERMITTENT CLAUDICATION (HCC): Status: ACTIVE | Noted: 2019-07-02

## 2019-07-02 PROBLEM — I73.9 PAD (PERIPHERAL ARTERY DISEASE) (HCC): Status: ACTIVE | Noted: 2019-07-02

## 2019-07-02 LAB
ABO + RH BLD: NORMAL
BLOOD GROUP ANTIBODIES SERPL: NORMAL
SPECIMEN EXP DATE BLD: NORMAL

## 2019-07-02 PROCEDURE — 74011250636 HC RX REV CODE- 250/636: Performed by: NURSE ANESTHETIST, CERTIFIED REGISTERED

## 2019-07-02 PROCEDURE — 77030039266 HC ADH SKN EXOFIN S2SG -A: Performed by: SURGERY

## 2019-07-02 PROCEDURE — C1725 CATH, TRANSLUMIN NON-LASER: HCPCS | Performed by: SURGERY

## 2019-07-02 PROCEDURE — 74011250636 HC RX REV CODE- 250/636

## 2019-07-02 PROCEDURE — 047D3DZ DILATION OF LEFT COMMON ILIAC ARTERY WITH INTRALUMINAL DEVICE, PERCUTANEOUS APPROACH: ICD-10-PCS | Performed by: SURGERY

## 2019-07-02 PROCEDURE — 77030026438 HC STYL ET INTUB CARD -A: Performed by: ANESTHESIOLOGY

## 2019-07-02 PROCEDURE — 77030013079 HC BLNKT BAIR HGGR 3M -A: Performed by: SURGERY

## 2019-07-02 PROCEDURE — 74011250636 HC RX REV CODE- 250/636: Performed by: SURGERY

## 2019-07-02 PROCEDURE — C1769 GUIDE WIRE: HCPCS | Performed by: SURGERY

## 2019-07-02 PROCEDURE — C1894 INTRO/SHEATH, NON-LASER: HCPCS | Performed by: SURGERY

## 2019-07-02 PROCEDURE — 77030002933 HC SUT MCRYL J&J -A: Performed by: SURGERY

## 2019-07-02 PROCEDURE — 77030018836 HC SOL IRR NACL ICUM -A: Performed by: SURGERY

## 2019-07-02 PROCEDURE — 74011250637 HC RX REV CODE- 250/637: Performed by: NURSE ANESTHETIST, CERTIFIED REGISTERED

## 2019-07-02 PROCEDURE — 65620000000 HC RM CCU GENERAL

## 2019-07-02 PROCEDURE — 86900 BLOOD TYPING SEROLOGIC ABO: CPT

## 2019-07-02 PROCEDURE — 77030034850: Performed by: SURGERY

## 2019-07-02 PROCEDURE — 77030037878 HC DRSG MEPILEX >48IN BORD MOLN -B

## 2019-07-02 PROCEDURE — C1768 GRAFT, VASCULAR: HCPCS | Performed by: SURGERY

## 2019-07-02 PROCEDURE — 77030019908 HC STETH ESOPH SIMS -A: Performed by: ANESTHESIOLOGY

## 2019-07-02 PROCEDURE — 76060000037 HC ANESTHESIA 3 TO 3.5 HR: Performed by: SURGERY

## 2019-07-02 PROCEDURE — 047C3DZ DILATION OF RIGHT COMMON ILIAC ARTERY WITH INTRALUMINAL DEVICE, PERCUTANEOUS APPROACH: ICD-10-PCS | Performed by: SURGERY

## 2019-07-02 PROCEDURE — 77030008684 HC TU ET CUF COVD -B: Performed by: ANESTHESIOLOGY

## 2019-07-02 PROCEDURE — 74011636320 HC RX REV CODE- 636/320: Performed by: SURGERY

## 2019-07-02 PROCEDURE — 77030002986 HC SUT PROL J&J -A: Performed by: SURGERY

## 2019-07-02 PROCEDURE — 77030010514 HC APPL CLP LIG COVD -B: Performed by: SURGERY

## 2019-07-02 PROCEDURE — 77030002996 HC SUT SLK J&J -A: Performed by: SURGERY

## 2019-07-02 PROCEDURE — 77030013058 HC DEV INFL ANGIO BSC -B: Performed by: SURGERY

## 2019-07-02 PROCEDURE — 74011250637 HC RX REV CODE- 250/637: Performed by: SURGERY

## 2019-07-02 PROCEDURE — 77030003390 HC NDL ANGI MRTM -A: Performed by: SURGERY

## 2019-07-02 PROCEDURE — 77030031139 HC SUT VCRL2 J&J -A: Performed by: SURGERY

## 2019-07-02 PROCEDURE — 76010000110 HC CV SURG 3 TO 3.5 HR: Performed by: SURGERY

## 2019-07-02 PROCEDURE — 04UK0KZ SUPPLEMENT RIGHT FEMORAL ARTERY WITH NONAUTOLOGOUS TISSUE SUBSTITUTE, OPEN APPROACH: ICD-10-PCS | Performed by: SURGERY

## 2019-07-02 PROCEDURE — 74011000250 HC RX REV CODE- 250

## 2019-07-02 PROCEDURE — 77030002924 HC SUT GORTX WLGO -B: Performed by: SURGERY

## 2019-07-02 PROCEDURE — C1760 CLOSURE DEV, VASC: HCPCS | Performed by: SURGERY

## 2019-07-02 PROCEDURE — 77030004565 HC CATH ANGI DX TMPO CARD -B: Performed by: SURGERY

## 2019-07-02 PROCEDURE — C1874 STENT, COATED/COV W/DEL SYS: HCPCS | Performed by: SURGERY

## 2019-07-02 PROCEDURE — 77030013079 HC BLNKT BAIR HGGR 3M -A: Performed by: ANESTHESIOLOGY

## 2019-07-02 PROCEDURE — 04CK0ZZ EXTIRPATION OF MATTER FROM RIGHT FEMORAL ARTERY, OPEN APPROACH: ICD-10-PCS | Performed by: SURGERY

## 2019-07-02 DEVICE — STENT EVAR L59MM DIA8-11MM CATH L80CM INTRO SHTH 7FR: Type: IMPLANTABLE DEVICE | Site: COMMON ILIAC | Status: FUNCTIONAL

## 2019-07-02 DEVICE — XENOSURE BIOLOGIC PATCH, 1CM X 6CM
Type: IMPLANTABLE DEVICE | Site: ARTERIAL | Status: FUNCTIONAL
Brand: XENOSURE BIOLOGIC PATCH

## 2019-07-02 DEVICE — STENT ENDOVASC L39MM DIA8-11MM CATH L80CM INTRO SHTH 7FR: Type: IMPLANTABLE DEVICE | Site: COMMON ILIAC | Status: FUNCTIONAL

## 2019-07-02 RX ORDER — GABAPENTIN 100 MG/1
300 CAPSULE ORAL
Status: DISCONTINUED | OUTPATIENT
Start: 2019-07-02 | End: 2019-07-04 | Stop reason: HOSPADM

## 2019-07-02 RX ORDER — CEFAZOLIN SODIUM 2 G/50ML
2 SOLUTION INTRAVENOUS ONCE
Status: COMPLETED | OUTPATIENT
Start: 2019-07-02 | End: 2019-07-02

## 2019-07-02 RX ORDER — SODIUM CHLORIDE 0.9 % (FLUSH) 0.9 %
5-40 SYRINGE (ML) INJECTION EVERY 8 HOURS
Status: DISCONTINUED | OUTPATIENT
Start: 2019-07-02 | End: 2019-07-04 | Stop reason: HOSPADM

## 2019-07-02 RX ORDER — INSULIN LISPRO 100 [IU]/ML
INJECTION, SOLUTION INTRAVENOUS; SUBCUTANEOUS ONCE
Status: DISCONTINUED | OUTPATIENT
Start: 2019-07-02 | End: 2019-07-02 | Stop reason: HOSPADM

## 2019-07-02 RX ORDER — LOSARTAN POTASSIUM 50 MG/1
25 TABLET ORAL 2 TIMES DAILY
Status: DISCONTINUED | OUTPATIENT
Start: 2019-07-03 | End: 2019-07-04 | Stop reason: HOSPADM

## 2019-07-02 RX ORDER — GLYCOPYRROLATE 0.2 MG/ML
INJECTION INTRAMUSCULAR; INTRAVENOUS AS NEEDED
Status: DISCONTINUED | OUTPATIENT
Start: 2019-07-02 | End: 2019-07-02 | Stop reason: HOSPADM

## 2019-07-02 RX ORDER — NALOXONE HYDROCHLORIDE 0.4 MG/ML
0.4 INJECTION, SOLUTION INTRAMUSCULAR; INTRAVENOUS; SUBCUTANEOUS AS NEEDED
Status: DISCONTINUED | OUTPATIENT
Start: 2019-07-02 | End: 2019-07-04 | Stop reason: HOSPADM

## 2019-07-02 RX ORDER — ATENOLOL 25 MG/1
25 TABLET ORAL EVERY 12 HOURS
Status: DISCONTINUED | OUTPATIENT
Start: 2019-07-03 | End: 2019-07-04 | Stop reason: HOSPADM

## 2019-07-02 RX ORDER — LOSARTAN POTASSIUM 50 MG/1
25 TABLET ORAL 2 TIMES DAILY
Status: DISCONTINUED | OUTPATIENT
Start: 2019-07-02 | End: 2019-07-02

## 2019-07-02 RX ORDER — DOCUSATE SODIUM 100 MG/1
100 CAPSULE, LIQUID FILLED ORAL 2 TIMES DAILY
Status: DISCONTINUED | OUTPATIENT
Start: 2019-07-02 | End: 2019-07-04 | Stop reason: HOSPADM

## 2019-07-02 RX ORDER — SODIUM CHLORIDE 0.9 % (FLUSH) 0.9 %
5-40 SYRINGE (ML) INJECTION AS NEEDED
Status: DISCONTINUED | OUTPATIENT
Start: 2019-07-02 | End: 2019-07-02 | Stop reason: HOSPADM

## 2019-07-02 RX ORDER — HEPARIN SODIUM 200 [USP'U]/100ML
INJECTION, SOLUTION INTRAVENOUS
Status: DISCONTINUED
Start: 2019-07-02 | End: 2019-07-02

## 2019-07-02 RX ORDER — ASPIRIN 81 MG/1
81 TABLET ORAL DAILY
Status: DISCONTINUED | OUTPATIENT
Start: 2019-07-02 | End: 2019-07-04 | Stop reason: HOSPADM

## 2019-07-02 RX ORDER — ACETAMINOPHEN 325 MG/1
650 TABLET ORAL
Status: DISCONTINUED | OUTPATIENT
Start: 2019-07-02 | End: 2019-07-04 | Stop reason: HOSPADM

## 2019-07-02 RX ORDER — MIDAZOLAM HYDROCHLORIDE 1 MG/ML
INJECTION, SOLUTION INTRAMUSCULAR; INTRAVENOUS AS NEEDED
Status: DISCONTINUED | OUTPATIENT
Start: 2019-07-02 | End: 2019-07-02 | Stop reason: HOSPADM

## 2019-07-02 RX ORDER — SODIUM CHLORIDE 0.9 % (FLUSH) 0.9 %
5-40 SYRINGE (ML) INJECTION EVERY 8 HOURS
Status: DISCONTINUED | OUTPATIENT
Start: 2019-07-02 | End: 2019-07-02 | Stop reason: HOSPADM

## 2019-07-02 RX ORDER — ONDANSETRON 2 MG/ML
INJECTION INTRAMUSCULAR; INTRAVENOUS AS NEEDED
Status: DISCONTINUED | OUTPATIENT
Start: 2019-07-02 | End: 2019-07-02 | Stop reason: HOSPADM

## 2019-07-02 RX ORDER — EPHEDRINE SULFATE 50 MG/ML
INJECTION, SOLUTION INTRAVENOUS AS NEEDED
Status: DISCONTINUED | OUTPATIENT
Start: 2019-07-02 | End: 2019-07-02 | Stop reason: HOSPADM

## 2019-07-02 RX ORDER — SODIUM CHLORIDE 0.9 % (FLUSH) 0.9 %
5-40 SYRINGE (ML) INJECTION AS NEEDED
Status: DISCONTINUED | OUTPATIENT
Start: 2019-07-02 | End: 2019-07-04 | Stop reason: HOSPADM

## 2019-07-02 RX ORDER — HEPARIN SODIUM 1000 [USP'U]/ML
INJECTION, SOLUTION INTRAVENOUS; SUBCUTANEOUS AS NEEDED
Status: DISCONTINUED | OUTPATIENT
Start: 2019-07-02 | End: 2019-07-02 | Stop reason: HOSPADM

## 2019-07-02 RX ORDER — SODIUM CHLORIDE, SODIUM LACTATE, POTASSIUM CHLORIDE, CALCIUM CHLORIDE 600; 310; 30; 20 MG/100ML; MG/100ML; MG/100ML; MG/100ML
50 INJECTION, SOLUTION INTRAVENOUS CONTINUOUS
Status: DISCONTINUED | OUTPATIENT
Start: 2019-07-02 | End: 2019-07-04 | Stop reason: HOSPADM

## 2019-07-02 RX ORDER — HYDROMORPHONE HYDROCHLORIDE 1 MG/ML
0.5 INJECTION, SOLUTION INTRAMUSCULAR; INTRAVENOUS; SUBCUTANEOUS
Status: DISCONTINUED | OUTPATIENT
Start: 2019-07-02 | End: 2019-07-04 | Stop reason: HOSPADM

## 2019-07-02 RX ORDER — EZETIMIBE AND SIMVASTATIN 10; 40 MG/1; MG/1
1 TABLET ORAL
Status: DISCONTINUED | OUTPATIENT
Start: 2019-07-02 | End: 2019-07-02

## 2019-07-02 RX ORDER — PANTOPRAZOLE SODIUM 40 MG/1
40 TABLET, DELAYED RELEASE ORAL DAILY
Status: DISCONTINUED | OUTPATIENT
Start: 2019-07-02 | End: 2019-07-04 | Stop reason: HOSPADM

## 2019-07-02 RX ORDER — HYOSCYAMINE SULFATE 0.12 MG/1
0.12 TABLET SUBLINGUAL
Status: DISCONTINUED | OUTPATIENT
Start: 2019-07-02 | End: 2019-07-04 | Stop reason: HOSPADM

## 2019-07-02 RX ORDER — SODIUM CHLORIDE, SODIUM LACTATE, POTASSIUM CHLORIDE, CALCIUM CHLORIDE 600; 310; 30; 20 MG/100ML; MG/100ML; MG/100ML; MG/100ML
75 INJECTION, SOLUTION INTRAVENOUS CONTINUOUS
Status: DISPENSED | OUTPATIENT
Start: 2019-07-02 | End: 2019-07-03

## 2019-07-02 RX ORDER — NALOXONE HYDROCHLORIDE 0.4 MG/ML
0.1 INJECTION, SOLUTION INTRAMUSCULAR; INTRAVENOUS; SUBCUTANEOUS AS NEEDED
Status: DISCONTINUED | OUTPATIENT
Start: 2019-07-02 | End: 2019-07-04 | Stop reason: HOSPADM

## 2019-07-02 RX ORDER — LIDOCAINE HYDROCHLORIDE 20 MG/ML
INJECTION, SOLUTION EPIDURAL; INFILTRATION; INTRACAUDAL; PERINEURAL AS NEEDED
Status: DISCONTINUED | OUTPATIENT
Start: 2019-07-02 | End: 2019-07-02 | Stop reason: HOSPADM

## 2019-07-02 RX ORDER — OXYCODONE AND ACETAMINOPHEN 5; 325 MG/1; MG/1
1 TABLET ORAL
Status: DISCONTINUED | OUTPATIENT
Start: 2019-07-02 | End: 2019-07-04 | Stop reason: HOSPADM

## 2019-07-02 RX ORDER — DIPHENHYDRAMINE HYDROCHLORIDE 50 MG/ML
12.5 INJECTION, SOLUTION INTRAMUSCULAR; INTRAVENOUS
Status: DISCONTINUED | OUTPATIENT
Start: 2019-07-02 | End: 2019-07-04 | Stop reason: HOSPADM

## 2019-07-02 RX ORDER — PROPOFOL 10 MG/ML
INJECTION, EMULSION INTRAVENOUS AS NEEDED
Status: DISCONTINUED | OUTPATIENT
Start: 2019-07-02 | End: 2019-07-02 | Stop reason: HOSPADM

## 2019-07-02 RX ORDER — ROPIVACAINE HYDROCHLORIDE 2 MG/ML
INJECTION, SOLUTION EPIDURAL; INFILTRATION; PERINEURAL
Status: DISCONTINUED
Start: 2019-07-02 | End: 2019-07-02

## 2019-07-02 RX ORDER — CEFAZOLIN SODIUM 2 G/50ML
2 SOLUTION INTRAVENOUS EVERY 8 HOURS
Status: DISCONTINUED | OUTPATIENT
Start: 2019-07-02 | End: 2019-07-02 | Stop reason: SDUPTHER

## 2019-07-02 RX ORDER — LABETALOL HYDROCHLORIDE 5 MG/ML
INJECTION, SOLUTION INTRAVENOUS AS NEEDED
Status: DISCONTINUED | OUTPATIENT
Start: 2019-07-02 | End: 2019-07-02 | Stop reason: HOSPADM

## 2019-07-02 RX ORDER — MORPHINE SULFATE 2 MG/ML
INJECTION, SOLUTION INTRAMUSCULAR; INTRAVENOUS AS NEEDED
Status: DISCONTINUED | OUTPATIENT
Start: 2019-07-02 | End: 2019-07-02 | Stop reason: HOSPADM

## 2019-07-02 RX ORDER — SODIUM CHLORIDE, SODIUM LACTATE, POTASSIUM CHLORIDE, CALCIUM CHLORIDE 600; 310; 30; 20 MG/100ML; MG/100ML; MG/100ML; MG/100ML
INJECTION, SOLUTION INTRAVENOUS
Status: DISCONTINUED | OUTPATIENT
Start: 2019-07-02 | End: 2019-07-02 | Stop reason: HOSPADM

## 2019-07-02 RX ORDER — EZETIMIBE AND SIMVASTATIN 10; 40 MG/1; MG/1
1 TABLET ORAL EVERY EVENING
Status: DISCONTINUED | OUTPATIENT
Start: 2019-07-02 | End: 2019-07-04 | Stop reason: HOSPADM

## 2019-07-02 RX ORDER — ICOSAPENT ETHYL 1000 MG/1
2 CAPSULE ORAL 2 TIMES DAILY WITH MEALS
Status: DISCONTINUED | OUTPATIENT
Start: 2019-07-02 | End: 2019-07-04 | Stop reason: HOSPADM

## 2019-07-02 RX ORDER — CLOPIDOGREL BISULFATE 75 MG/1
75 TABLET ORAL DAILY
Status: DISCONTINUED | OUTPATIENT
Start: 2019-07-03 | End: 2019-07-04 | Stop reason: HOSPADM

## 2019-07-02 RX ORDER — FAMOTIDINE 20 MG/1
20 TABLET, FILM COATED ORAL ONCE
Status: COMPLETED | OUTPATIENT
Start: 2019-07-02 | End: 2019-07-02

## 2019-07-02 RX ORDER — ONDANSETRON 2 MG/ML
4 INJECTION INTRAMUSCULAR; INTRAVENOUS
Status: DISCONTINUED | OUTPATIENT
Start: 2019-07-02 | End: 2019-07-04 | Stop reason: HOSPADM

## 2019-07-02 RX ORDER — TRIAMTERENE/HYDROCHLOROTHIAZID 37.5-25 MG
1 TABLET ORAL DAILY
Status: DISCONTINUED | OUTPATIENT
Start: 2019-07-02 | End: 2019-07-04 | Stop reason: HOSPADM

## 2019-07-02 RX ORDER — ATENOLOL 25 MG/1
25 TABLET ORAL EVERY 12 HOURS
Status: DISCONTINUED | OUTPATIENT
Start: 2019-07-02 | End: 2019-07-02

## 2019-07-02 RX ORDER — ROCURONIUM BROMIDE 10 MG/ML
INJECTION, SOLUTION INTRAVENOUS AS NEEDED
Status: DISCONTINUED | OUTPATIENT
Start: 2019-07-02 | End: 2019-07-02 | Stop reason: HOSPADM

## 2019-07-02 RX ORDER — MECLIZINE HCL 12.5 MG 12.5 MG/1
12.5 TABLET ORAL
Status: DISCONTINUED | OUTPATIENT
Start: 2019-07-02 | End: 2019-07-04 | Stop reason: HOSPADM

## 2019-07-02 RX ORDER — LORAZEPAM 1 MG/1
1 TABLET ORAL
Status: DISCONTINUED | OUTPATIENT
Start: 2019-07-02 | End: 2019-07-04 | Stop reason: HOSPADM

## 2019-07-02 RX ORDER — FENTANYL CITRATE 50 UG/ML
INJECTION, SOLUTION INTRAMUSCULAR; INTRAVENOUS AS NEEDED
Status: DISCONTINUED | OUTPATIENT
Start: 2019-07-02 | End: 2019-07-02 | Stop reason: HOSPADM

## 2019-07-02 RX ORDER — SODIUM CHLORIDE, SODIUM LACTATE, POTASSIUM CHLORIDE, CALCIUM CHLORIDE 600; 310; 30; 20 MG/100ML; MG/100ML; MG/100ML; MG/100ML
75 INJECTION, SOLUTION INTRAVENOUS CONTINUOUS
Status: DISCONTINUED | OUTPATIENT
Start: 2019-07-02 | End: 2019-07-02 | Stop reason: HOSPADM

## 2019-07-02 RX ORDER — FENTANYL CITRATE 50 UG/ML
50 INJECTION, SOLUTION INTRAMUSCULAR; INTRAVENOUS AS NEEDED
Status: DISCONTINUED | OUTPATIENT
Start: 2019-07-02 | End: 2019-07-04 | Stop reason: HOSPADM

## 2019-07-02 RX ORDER — MORPHINE SULFATE 2 MG/ML
2 INJECTION, SOLUTION INTRAMUSCULAR; INTRAVENOUS
Status: DISCONTINUED | OUTPATIENT
Start: 2019-07-02 | End: 2019-07-04 | Stop reason: HOSPADM

## 2019-07-02 RX ORDER — NEOSTIGMINE METHYLSULFATE 1 MG/ML
INJECTION, SOLUTION INTRAVENOUS AS NEEDED
Status: DISCONTINUED | OUTPATIENT
Start: 2019-07-02 | End: 2019-07-02 | Stop reason: HOSPADM

## 2019-07-02 RX ORDER — POLYETHYLENE GLYCOL 3350 17 G/17G
17 POWDER, FOR SOLUTION ORAL DAILY
Status: DISCONTINUED | OUTPATIENT
Start: 2019-07-02 | End: 2019-07-04 | Stop reason: HOSPADM

## 2019-07-02 RX ORDER — HEPARIN SODIUM 5000 [USP'U]/ML
5000 INJECTION, SOLUTION INTRAVENOUS; SUBCUTANEOUS EVERY 8 HOURS
Status: DISCONTINUED | OUTPATIENT
Start: 2019-07-02 | End: 2019-07-04 | Stop reason: HOSPADM

## 2019-07-02 RX ADMIN — PANTOPRAZOLE 40 MG: 40 TABLET, DELAYED RELEASE ORAL at 12:19

## 2019-07-02 RX ADMIN — FENTANYL CITRATE 100 MCG: 50 INJECTION, SOLUTION INTRAMUSCULAR; INTRAVENOUS at 11:01

## 2019-07-02 RX ADMIN — PROPOFOL 150 MG: 10 INJECTION, EMULSION INTRAVENOUS at 08:03

## 2019-07-02 RX ADMIN — Medication 10 ML: at 14:05

## 2019-07-02 RX ADMIN — FENTANYL CITRATE 50 MCG: 50 INJECTION, SOLUTION INTRAMUSCULAR; INTRAVENOUS at 14:02

## 2019-07-02 RX ADMIN — EPHEDRINE SULFATE 20 MG: 50 INJECTION, SOLUTION INTRAVENOUS at 08:20

## 2019-07-02 RX ADMIN — LABETALOL HYDROCHLORIDE 5 MG: 5 INJECTION, SOLUTION INTRAVENOUS at 08:56

## 2019-07-02 RX ADMIN — HEPARIN SODIUM 8000 UNITS: 1000 INJECTION, SOLUTION INTRAVENOUS; SUBCUTANEOUS at 09:02

## 2019-07-02 RX ADMIN — ONDANSETRON 4 MG: 2 INJECTION INTRAMUSCULAR; INTRAVENOUS at 10:35

## 2019-07-02 RX ADMIN — CEFAZOLIN 2 G: 10 INJECTION, POWDER, FOR SOLUTION INTRAVENOUS at 08:30

## 2019-07-02 RX ADMIN — SODIUM CHLORIDE, SODIUM LACTATE, POTASSIUM CHLORIDE, AND CALCIUM CHLORIDE 75 ML/HR: 600; 310; 30; 20 INJECTION, SOLUTION INTRAVENOUS at 11:43

## 2019-07-02 RX ADMIN — DOCUSATE SODIUM 100 MG: 100 CAPSULE, LIQUID FILLED ORAL at 12:19

## 2019-07-02 RX ADMIN — MIDAZOLAM HYDROCHLORIDE 2 MG: 1 INJECTION, SOLUTION INTRAMUSCULAR; INTRAVENOUS at 11:11

## 2019-07-02 RX ADMIN — LABETALOL HYDROCHLORIDE 10 MG: 5 INJECTION, SOLUTION INTRAVENOUS at 09:09

## 2019-07-02 RX ADMIN — FENTANYL CITRATE 50 MCG: 50 INJECTION, SOLUTION INTRAMUSCULAR; INTRAVENOUS at 12:14

## 2019-07-02 RX ADMIN — FENTANYL CITRATE 100 MCG: 50 INJECTION, SOLUTION INTRAMUSCULAR; INTRAVENOUS at 08:52

## 2019-07-02 RX ADMIN — LOSARTAN POTASSIUM 25 MG: 50 TABLET ORAL at 12:18

## 2019-07-02 RX ADMIN — HEPARIN SODIUM 5000 UNITS: 5000 INJECTION INTRAVENOUS; SUBCUTANEOUS at 12:10

## 2019-07-02 RX ADMIN — OXYCODONE HYDROCHLORIDE AND ACETAMINOPHEN 1 TABLET: 5; 325 TABLET ORAL at 21:13

## 2019-07-02 RX ADMIN — ATENOLOL 25 MG: 25 TABLET ORAL at 12:32

## 2019-07-02 RX ADMIN — ROCURONIUM BROMIDE 50 MG: 10 INJECTION, SOLUTION INTRAVENOUS at 08:03

## 2019-07-02 RX ADMIN — NEOSTIGMINE METHYLSULFATE 3 MG: 1 INJECTION, SOLUTION INTRAVENOUS at 10:35

## 2019-07-02 RX ADMIN — FENTANYL CITRATE 100 MCG: 50 INJECTION, SOLUTION INTRAMUSCULAR; INTRAVENOUS at 08:51

## 2019-07-02 RX ADMIN — LIDOCAINE HYDROCHLORIDE 100 MG: 20 INJECTION, SOLUTION EPIDURAL; INFILTRATION; INTRACAUDAL; PERINEURAL at 08:03

## 2019-07-02 RX ADMIN — EZETIMIBE AND SIMVASTATIN 1 TABLET: 10; 40 TABLET ORAL at 20:32

## 2019-07-02 RX ADMIN — LABETALOL HYDROCHLORIDE 5 MG: 5 INJECTION, SOLUTION INTRAVENOUS at 08:55

## 2019-07-02 RX ADMIN — FAMOTIDINE 20 MG: 20 TABLET, FILM COATED ORAL at 06:50

## 2019-07-02 RX ADMIN — Medication 10 ML: at 21:14

## 2019-07-02 RX ADMIN — SODIUM CHLORIDE, SODIUM LACTATE, POTASSIUM CHLORIDE, CALCIUM CHLORIDE: 600; 310; 30; 20 INJECTION, SOLUTION INTRAVENOUS at 07:51

## 2019-07-02 RX ADMIN — GLYCOPYRROLATE 0.4 MG: 0.2 INJECTION INTRAMUSCULAR; INTRAVENOUS at 10:35

## 2019-07-02 RX ADMIN — HEPARIN SODIUM 5000 UNITS: 5000 INJECTION INTRAVENOUS; SUBCUTANEOUS at 20:33

## 2019-07-02 RX ADMIN — SODIUM CHLORIDE, SODIUM LACTATE, POTASSIUM CHLORIDE, AND CALCIUM CHLORIDE 75 ML/HR: 600; 310; 30; 20 INJECTION, SOLUTION INTRAVENOUS at 06:54

## 2019-07-02 RX ADMIN — SODIUM CHLORIDE, SODIUM LACTATE, POTASSIUM CHLORIDE, CALCIUM CHLORIDE: 600; 310; 30; 20 INJECTION, SOLUTION INTRAVENOUS at 09:55

## 2019-07-02 RX ADMIN — OXYCODONE HYDROCHLORIDE AND ACETAMINOPHEN 1 TABLET: 5; 325 TABLET ORAL at 17:27

## 2019-07-02 RX ADMIN — MORPHINE SULFATE 2 MG: 2 INJECTION, SOLUTION INTRAMUSCULAR; INTRAVENOUS at 10:58

## 2019-07-02 RX ADMIN — MORPHINE SULFATE 2 MG: 2 INJECTION, SOLUTION INTRAMUSCULAR; INTRAVENOUS at 10:56

## 2019-07-02 RX ADMIN — GABAPENTIN 300 MG: 100 CAPSULE ORAL at 21:05

## 2019-07-02 RX ADMIN — FENTANYL CITRATE 100 MCG: 50 INJECTION, SOLUTION INTRAMUSCULAR; INTRAVENOUS at 08:00

## 2019-07-02 RX ADMIN — ASPIRIN 81 MG: 81 TABLET ORAL at 12:19

## 2019-07-02 NOTE — ANESTHESIA POSTPROCEDURE EVALUATION
Procedure(s):  RIGHT COMMON FEMORAL ENDARTERECTOMY WITH PATCH ANGIOPLASTY/BILATERAL COMMON ILIAC ARTERY STENTING/C-ARM. general    Anesthesia Post Evaluation      Multimodal analgesia: multimodal analgesia used between 6 hours prior to anesthesia start to PACU discharge  Patient location during evaluation: bedside  Patient participation: complete - patient participated  Level of consciousness: awake  Pain management: adequate  Airway patency: patent  Anesthetic complications: no  Cardiovascular status: stable  Respiratory status: acceptable  Hydration status: acceptable  Post anesthesia nausea and vomiting:  controlled      Vitals Value Taken Time   /64 7/2/2019 12:30 PM   Temp     Pulse 64 7/2/2019 12:36 PM   Resp 22 7/2/2019 12:36 PM   SpO2 99 % 7/2/2019 12:36 PM   Vitals shown include unvalidated device data.

## 2019-07-02 NOTE — ANESTHESIA PREPROCEDURE EVALUATION
Anesthetic History               Review of Systems / Medical History  Patient summary reviewed and pertinent labs reviewed    Pulmonary                   Neuro/Psych              Cardiovascular    Hypertension: well controlled        Dysrhythmias : atrial fibrillation  CAD         GI/Hepatic/Renal     GERD: well controlled           Endo/Other      Hypothyroidism: well controlled  Arthritis     Other Findings   Comments:   Risk Factors for Postoperative nausea/vomiting:       History of postoperative nausea/vomiting? NO       Female? NO       Motion sickness? NO       Intended opioid administration for postoperative analgesia? YES      Smoking Abstinence  Current Smoker? NO  Elective Surgery? YES  Seen preoperatively by anesthesiologist or proxy prior to day of surgery? YES  Pt abstained from smoking 24 hours prior to anesthesia?  N/A           Physical Exam    Airway  Mallampati: II  TM Distance: > 6 cm  Neck ROM: normal range of motion   Mouth opening: Normal     Cardiovascular    Rhythm: regular  Rate: normal         Dental  No notable dental hx       Pulmonary  Breath sounds clear to auscultation               Abdominal  GI exam deferred       Other Findings            Anesthetic Plan    ASA: 3  Anesthesia type: general          Induction: Intravenous  Anesthetic plan and risks discussed with: Patient

## 2019-07-02 NOTE — BRIEF OP NOTE
BRIEF OPERATIVE NOTE    Date of Procedure: 7/2/2019   Preoperative Diagnosis: I74.09 AORTIC OCCLUSIVE DISEASE  Postoperative Diagnosis: 74.09 AORTIC OCCLUSIVE DISEASE    Procedure(s):  RIGHT COMMON FEMORAL ENDARTERECTOMY WITH PATCH ANGIOPLASTY/BILATERAL COMMON ILIAC ARTERY STENTING/C-ARM  Surgeon(s) and Role:     * Heather Brooks MD - Primary         Surgical Assistant: none    Surgical Staff:  Circ-1: Roz Kaplan  Scrub Tech-1: Kristin Her Surg Asst-1: Ed PHAM  No case tracking events are documented in the log. Anesthesia: General   Estimated Blood Loss: 150mL  Specimens: * No specimens in log *   Findings: calcified plaque near occlusive at CFA bifurcation and tight lesion within right JP bilateral internal iliac arteries are occluded. Complications: none  Implants:   Implant Name Type Inv.  Item Serial No.  Lot No. LRB No. Used Action   PATCH VASC BIO XENOSURE 1X6CM --  -   PATCH VASC BIO XENOSURE 1X6CM --  0000 [employer] VASCULAR [facility] [id number] Right 1 Implanted   GRAFT STNT EXP [id number] -- Sammye Sham - [id number] Stent GRAFT STNT EXP [id number] -- Sammye Sham [de-identified] [employer]  Left 1 Implanted   GRAFT STNT EXP [id number] -- Sammye Sham - [id number] Stent GRAFT STNT EXP [id number] -- Sammye Sham [de-identified] [employer]  Right 1 Implanted

## 2019-07-02 NOTE — PROGRESS NOTES
1200 received from OR in stable cond, AAOx4 [place], pulses rt leg doppler DP,PT. Rt groin incision CDI left groin puntcure site CDI both with no s/s of hematoma formation noted. Good waveform on inocencio. 1214 c/o pain rt leg fentanyl 50 mcg iv given  1300 resting quietly  1402 c/o rt leg pain fentanyl 50 mcg given as ordered. 1430 resting quietly  1600 reassessed with no new change noted. 1727 c/o pain 5/10 percocet 1 po given as ordered. Cont no s/s of bleeding or hematoma formation noted.   1900 SBAR to [employer]

## 2019-07-02 NOTE — H&P
Surgery History and Physical    Subjective:      Kavya Galvan is a 68 y.o. male who presents with RLE lifestyle limiting claudication.     Patient Active Problem List    Diagnosis Date Noted    Aortoiliac occlusive disease ([facility] 03/04/2019    Facet hypertrophy of lumbar region 02/12/2018    Overweight (BMI 25.0-29.9) 01/09/2018    History of prostate cancer 01/09/2018    Ulnar neuritis, right 11/13/2017    Cervical spinal stenosis 10/04/2017    Degeneration of cervical and lumbar spine, relative cervical stenosis 10/04/2017    Cervical radiculopathy 08/30/2017    Lumbar radiculopathy 08/30/2017    IFG (impaired fasting glucose) 06/29/2017    ED (erectile dysfunction) of organic origin 04/14/2017    Recurrent umbilical hernia 45/36/0650    Hypertension     Dyslipidemia     Coronary artery disease     Atrial fibrillation     Advance directive in chart 06/23/2016    Hypovitaminosis D 06/22/2016    Atherosclerosis of artery of extremity with intermittent claudication ([facility] 01/21/2015    Left carotid artery occlusion 02/12/2014    Peripheral vascular disease ([facility] Dr Prateek Alan Colon polyps 06/13/2012    Colitis, ulcerative ([facility] 10/21/2011     Past Medical History:   Diagnosis Date    Adenoma of left adrenal gland     2009  1 cm, no change 1/15, 2/16    Asbestosis     CT 1/19 showed pleural plaques    Atrial fibrillation     CHA2DS2-VAsc=3(age+, htn+, vasc dx+; estimated yearly stroke risk according to Lip et al. is 3,2%), Hasbled=2(estimated yearly bleeding risk according to pisters et al. is 1,88%); not anticoagulated due to h/o gi bleed    Atrial fibrillation ablation     10/08    Basal cell cancer     Dr Shaun Fritz; he's had >350 lesions removed    Carotid occlusion     left     Cervical radiculopathy     MRI 9/11 showed C3-6 severe foraminal stenosis    Chronic pain     Colon polyp     Dr Verner Salina 9/15    Coronary artery disease     RCA - 3.0 x 16mm TAXUS 9/04, 3.0 x 16mm TAXUS 12/06    Dyslipidemia     Erectile dysfunction     GERD     GI bleed     Hearing loss     2014  bilateral Dr Wood Bogus    Hernia, umbilical     Hypertension     with component of white coat    Hypogonadism male     Lumbar radiculopathy     Dr Robyn Almeida;  MRI 9/11 L4-5 disc bulging, annular tear, disc dessication    Myofascial pain dysfunction syndrome     pain clinic     OAB (overactive bladder)     Osteoarthritis     right knee Dr Madai Sidhu Overweight (BMI 25.0-29. 9)     IF 5/18 start weight 214 lbs, not doing    Peripheral vascular disease     50-60% R iliac; s/p R iliac stent and L femoral artery stent in past; R OLIVIA 0.76 (1/16)    Plantar fasciitis     bilateral     PPD positive     Prediabetes     Prostate cancer     T1c Imelda 7(3+4), 70% in 1 core, GS 7 (3+4) in 4 cores, GS 6 (3+3) in 1 core; psa 5.28, TRUS 18 gm;  Dr Pascual Weiss; s/p cryoablation 10/16    Recurrent umbilical hernia     Subclinical hypothyroidism     denies    Tinnitus     Dr Alexi Melgar Ulcerative colitis     Venous insufficiency       Past Surgical History:   Procedure Laterality Date    CARDIAC SURG PROCEDURE UNLIST  04/2018    [facility] Dr Wero Macedo; echo nl lv, ef 60%, dilated RV, biatrial enlargement, trace AI    CARDIAC SURG PROCEDURE UNLIST  04/2018    [facility] Dr Wero Macedo; NST neg, ef 60%    HAND/FINGER SURGERY UNLISTED  2017    HX CAROTID ENDARTERECTOMY      1/14  right    Ambrose Yeager HX COLONOSCOPY      Dr Pascual Weiss 9/15 polyps    HX CRYOABLATION OF THE PROSTATE      10/16    HX HEMORRHOIDECTOMY      1979    Emilee Dub 37, 1975    HX ORTHOPAEDIC      right knee surgery    HX ORTHOPAEDIC  12/2017    Dr Vikash Currieails; R CTS release    HX REFRACTIVE SURGERY      OD (hemoplasty to right eye on retina to avoid blindness)    HX TONSILLECTOMY      1955    VA LAP, RECURRENT INCISIONAL HERNIA REPAIR,REDUCIBLE N/A 02/09/2017    Dr. Salome Lovett HERNIA,5+Y/O,REDUCIBL      2/16  left  Dr. Pinky Rincon UMBILICAL PJNV,4+V/D,HAHRB        Dr. Jeramy Scott        R OLIVIA 0.76, L OLIVIA 1.02    VASCULAR SURGERY PROCEDURE UNLIST      10/15   left fem art and left iliac stent      Social History     Tobacco Use    Smoking status: Former Smoker     Packs/day: 1.50     Years: 25.00     Pack years: 37.50     Types: Cigarettes     Last attempt to quit: 2003     Years since quittin.5    Smokeless tobacco: Never Used   Substance Use Topics    Alcohol use: Yes     Comment: RARELY      Family History   Problem Relation Age of Onset    Heart Disease Mother     Hypertension Mother     Diabetes Mother     Arthritis-osteo Mother     Heart Disease Father     Stroke Father     Hypertension Father     Heart Disease Sister     Hypertension Sister       Prior to Admission medications    Medication Sig Start Date End Date Taking? Authorizing Provider   HYDROcodone-acetaminophen (NORCO) 5-325 mg per tablet Take 1 Tab by mouth every four (4) hours as needed (chronic pain) for up to 30 days. Max Daily Amount: 6 Tabs. 19 Yes Faby Jones MD   diclofenac (VOLTAREN) 1 % gel Apply 2-4 g to affected area four (4) times daily. Patient taking differently: Apply 2-4 g to affected area daily as needed. 19  Yes Marsha Jackman PA-C   colestipol (COLESTID) 1 gram tablet 1 g daily as needed. 3/9/19  Yes Provider, Historical   hyoscyamine SL (LEVSIN/SL) 0.125 mg SL tablet 0.125 mg by SubLINGual route every four (4) hours as needed for Cramping. Yes Provider, Historical   ezetimibe-simvastatin (VYTORIN) 10-40 mg per tablet TAKE 1 TABLET NIGHTLY 19  Yes Faby Jones MD   atenolol (TENORMIN) 25 mg tablet TAKE ONE TABLET BY MOUTH TWICE A DAY 19  Yes Faby Jones MD   icosapent ethyl (VASCEPA) 1 gram capsule Take 2 Caps by mouth two (2) times daily (with meals).  18  Yes Faby Jones MD   gabapentin (NEURONTIN) 300 mg capsule 1 cap every day to bid  Patient taking differently: nightly. 1 cap every day to bid  Indications: Neuropathic Pain 18  Yes Fanta Starks NP   polyethylene glycol (MIRALAX) 17 gram packet 17 g. Yes Provider, Historical   losartan (COZAAR) 25 mg tablet TAKE ONE TABLET BY MOUTH TWICE A DAY 3/14/17  Yes Sean Thorne NP   triamterene-hydrochlorothiazide (MAXZIDE) 37.5-25 mg per tablet TAKE ONE TABLET BY MOUTH DAILY 16  Yes Earnest Johnson MD   Cholecalciferol, Vitamin D3, 1,000 unit cap Take 1,000 Units by mouth daily. Yes Provider, Historical   MULTIVITAMIN PO Take  by mouth daily. 11  Yes Provider, Historical   pantoprazole (PROTONIX) 40 mg tablet Take 40 mg by mouth daily. Yes Provider, Historical   coenzyme q10 200 mg Cap Take  by mouth daily. Yes Provider, Historical   meclizine (ANTIVERT) 25 mg tablet Take 12.5 mg by mouth three (3) times daily as needed. Provider, Historical   LORazepam (ATIVAN) 1 mg tablet Take 1 Tab by mouth every eight (8) hours as needed. 19   Mike Hatfield MD   aspirin delayed-release 81 mg tablet Take 81 mg by mouth daily. Provider, Historical     Allergies   Allergen Reactions    Altace [Ramipril] Unknown (comments)     Pt does not remember reaction    Lipitor [Atorvastatin] Other (comments)     Muscle pain    Lisinopril Shortness of Breath and Other (comments)     Turns bright red    Other Medication Other (comments)     Vicryl suture on skin tends to be rejected with poor wound healing does better with monocryl    Procardia [Nifedipine] Other (comments)     Caused afib    Rosuvastatin Other (comments)     Muscle Pain           ROS:  Pertinent items are noted in HPI. Unless otherwise mentioned in the HPI.     Objective:     Patient Vitals for the past 8 hrs:   BP Temp Pulse Resp SpO2 Height Weight   19 0636 157/81 98.5 °F (36.9 °C) 64 18 97 % 6' (1.829 m) 215 lb 6.4 oz (97.7 kg)       Temp (24hrs), Av.5 °F (36.9 °C), Min:98.5 °F (36.9 °C), Max:98.5 °F (36.9 °C)      Physical Exam:  GENERAL: alert, cooperative, no distress, appears stated age, THROAT & NECK: normal and no erythema or exudates noted. , LUNG: clear to auscultation bilaterally, HEART: regular rate and rhythm, S1, S2 normal, no murmur, click, rub or gallop, ABDOMEN: soft, non-tender. Bowel sounds normal. No masses,  no organomegaly, NEUROLOGIC: negative findings: alert, oriented x3  cranial nerves II-XII intact    Labs: No results found for this or any previous visit (from the past 24 hour(s)). Data Review:    CBC:   Lab Results   Component Value Date/Time    WBC 6.8 06/24/2019 11:00 AM    RBC 4.52 (L) 06/24/2019 11:00 AM    HGB 15.1 06/24/2019 11:00 AM    HCT 43.0 06/24/2019 11:00 AM    PLATELET 450 76/31/5865 11:00 AM      BMP:   Lab Results   Component Value Date/Time    Glucose 101 (H) 06/24/2019 11:00 AM    Sodium 137 06/24/2019 11:00 AM    Potassium 3.7 06/24/2019 11:00 AM    Chloride 99 (L) 06/24/2019 11:00 AM    CO2 33 (H) 06/24/2019 11:00 AM    BUN 9 06/24/2019 11:00 AM    Creatinine 0.78 06/24/2019 11:00 AM    Calcium 9.4 06/24/2019 11:00 AM     Coagulation:   Lab Results   Component Value Date/Time    Prothrombin time 13.8 06/24/2019 11:00 AM    INR 1.1 06/24/2019 11:00 AM    aPTT 34.3 10/19/2016 07:10 AM       Assessment:     Active Problems:    * No active hospital problems. *      Plan:     Right cfa endarterectomy with patch and bilateral kissing iliac artery stents.     Signed By: Renea Rousseau MD     July 2, 2019

## 2019-07-02 NOTE — PROGRESS NOTES
Reason for Admission:  Aortoiliac occlusive disease ([facility] [I74.09]  PAD (peripheral artery disease) ([facility] [facility] [I73.9]  Atherosclerosis of native artery of both lower extremities with intermittent claudication ([facility] [I70.213]                 RRAT Score:   18           Plan for utilizing home health:    Patient has no home health orders in place at this time. This writer will continue to closely monitor for potential home health needs and orders. Likelihood of Readmission:   Moderate                         Do you (patient/family) have any concerns for transition/discharge?  no    Transition of Care Plan:   Patient will return home with help from his wife and family. Patient's wife will transport home at the time of discharge. Initial assessment completed with patient and his wife Alexander Coffey. Cognitive status of patient: Alert and oriented. Face sheet information confirmed:  yes. The patient designates his wife Alexander Coffey to participate in his discharge plan and to receive any needed information. This patient lives in a house with his wife Alexander Coffey  Patient is able to navigate steps as needed. Prior to hospitalization, patient was considered to be independent with ADLs/IADLS : yes . Patient has a current ACP document on file: yes     Patient's wife Alexander Coffey will be available to transport patient home upon discharge. The patient already has a cane available in the home. Patient is not currently active with home health. Patient has not stayed in a skilled nursing facility or rehab. Currently, the discharge plan is to return home with help from his family and wife. Patient's wife will transport home at the time of discharge. Patient's wife is (Rian Bence, WT#[phone] and C-#[phone]). Patient's PCP is Fabiola Solares MD. Patient is insured through Medicare and he also has [employer].     The patient states that he can obtain his medications from the pharmacy, and take his medications as directed. This writer will continue to closely monitor for discharge planning to ensure a safe discharge home from [facility]. Care Management Interventions  PCP Verified by CM: Yun Lobato MD)  Palliative Care Criteria Met (RRAT>21 & CHF Dx)?: No  Mode of Transport at Discharge: Other (see comment)(Wife will transport home.)  Transition of Care Consult (CM Consult): Discharge Planning  Current Support Network: Lives with Spouse  Confirm Follow Up Transport: Family  Plan discussed with Pt/Family/Caregiver: Yes  Discharge Location  Discharge Placement: Home with family assistance        Kelly Pantoja MSW  Care Manager  Pager#: [phone]

## 2019-07-03 LAB
ANION GAP SERPL CALC-SCNC: 4 MMOL/L (ref 3–18)
BUN SERPL-MCNC: 6 MG/DL (ref 7–18)
BUN/CREAT SERPL: 11 (ref 12–20)
CALCIUM SERPL-MCNC: 8.5 MG/DL (ref 8.5–10.1)
CHLORIDE SERPL-SCNC: 103 MMOL/L (ref 100–108)
CO2 SERPL-SCNC: 31 MMOL/L (ref 21–32)
CREAT SERPL-MCNC: 0.57 MG/DL (ref 0.6–1.3)
ERYTHROCYTE [DISTWIDTH] IN BLOOD BY AUTOMATED COUNT: 12.5 % (ref 11.6–14.5)
GLUCOSE SERPL-MCNC: 104 MG/DL (ref 74–99)
HCT VFR BLD AUTO: 37.1 % (ref 36–48)
HGB BLD-MCNC: 12.5 G/DL (ref 13–16)
MCH RBC QN AUTO: 31.9 PG (ref 24–34)
MCHC RBC AUTO-ENTMCNC: 33.7 G/DL (ref 31–37)
MCV RBC AUTO: 94.6 FL (ref 74–97)
PLATELET # BLD AUTO: 122 K/UL (ref 135–420)
PMV BLD AUTO: 9.5 FL (ref 9.2–11.8)
POTASSIUM SERPL-SCNC: 3.2 MMOL/L (ref 3.5–5.5)
RBC # BLD AUTO: 3.92 M/UL (ref 4.7–5.5)
SODIUM SERPL-SCNC: 138 MMOL/L (ref 136–145)
WBC # BLD AUTO: 6.9 K/UL (ref 4.6–13.2)

## 2019-07-03 PROCEDURE — 85027 COMPLETE CBC AUTOMATED: CPT

## 2019-07-03 PROCEDURE — 65620000000 HC RM CCU GENERAL

## 2019-07-03 PROCEDURE — 74011250636 HC RX REV CODE- 250/636: Performed by: SURGERY

## 2019-07-03 PROCEDURE — 94762 N-INVAS EAR/PLS OXIMTRY CONT: CPT

## 2019-07-03 PROCEDURE — 36600 WITHDRAWAL OF ARTERIAL BLOOD: CPT

## 2019-07-03 PROCEDURE — 74011250637 HC RX REV CODE- 250/637: Performed by: SURGERY

## 2019-07-03 PROCEDURE — 80048 BASIC METABOLIC PNL TOTAL CA: CPT

## 2019-07-03 RX ADMIN — OXYCODONE HYDROCHLORIDE AND ACETAMINOPHEN 1 TABLET: 5; 325 TABLET ORAL at 02:04

## 2019-07-03 RX ADMIN — SODIUM CHLORIDE, SODIUM LACTATE, POTASSIUM CHLORIDE, AND CALCIUM CHLORIDE 75 ML/HR: 600; 310; 30; 20 INJECTION, SOLUTION INTRAVENOUS at 01:05

## 2019-07-03 RX ADMIN — OXYCODONE HYDROCHLORIDE AND ACETAMINOPHEN 1 TABLET: 5; 325 TABLET ORAL at 15:58

## 2019-07-03 RX ADMIN — Medication 10 ML: at 06:42

## 2019-07-03 RX ADMIN — Medication 10 ML: at 15:59

## 2019-07-03 RX ADMIN — PANTOPRAZOLE 40 MG: 40 TABLET, DELAYED RELEASE ORAL at 05:15

## 2019-07-03 RX ADMIN — OXYCODONE HYDROCHLORIDE AND ACETAMINOPHEN 1 TABLET: 5; 325 TABLET ORAL at 20:01

## 2019-07-03 RX ADMIN — LOSARTAN POTASSIUM 25 MG: 50 TABLET, FILM COATED ORAL at 17:21

## 2019-07-03 RX ADMIN — ATENOLOL 25 MG: 25 TABLET ORAL at 17:21

## 2019-07-03 RX ADMIN — OXYCODONE HYDROCHLORIDE AND ACETAMINOPHEN 1 TABLET: 5; 325 TABLET ORAL at 10:47

## 2019-07-03 RX ADMIN — LOSARTAN POTASSIUM 25 MG: 50 TABLET, FILM COATED ORAL at 05:33

## 2019-07-03 RX ADMIN — HEPARIN SODIUM 5000 UNITS: 5000 INJECTION INTRAVENOUS; SUBCUTANEOUS at 13:10

## 2019-07-03 RX ADMIN — OXYCODONE HYDROCHLORIDE AND ACETAMINOPHEN 1 TABLET: 5; 325 TABLET ORAL at 06:36

## 2019-07-03 RX ADMIN — Medication 10 ML: at 06:43

## 2019-07-03 RX ADMIN — OXYCODONE HYDROCHLORIDE AND ACETAMINOPHEN 1 TABLET: 5; 325 TABLET ORAL at 23:40

## 2019-07-03 RX ADMIN — DOCUSATE SODIUM 100 MG: 100 CAPSULE, LIQUID FILLED ORAL at 08:38

## 2019-07-03 RX ADMIN — Medication 10 ML: at 21:33

## 2019-07-03 RX ADMIN — GABAPENTIN 300 MG: 100 CAPSULE ORAL at 21:33

## 2019-07-03 RX ADMIN — ASPIRIN 81 MG: 81 TABLET ORAL at 08:38

## 2019-07-03 RX ADMIN — CLOPIDOGREL BISULFATE 75 MG: 75 TABLET ORAL at 08:38

## 2019-07-03 RX ADMIN — HEPARIN SODIUM 5000 UNITS: 5000 INJECTION INTRAVENOUS; SUBCUTANEOUS at 03:49

## 2019-07-03 RX ADMIN — TRIAMTERENE AND HYDROCHLOROTHIAZIDE 1 TABLET: 37.5; 25 TABLET ORAL at 05:15

## 2019-07-03 RX ADMIN — EZETIMIBE AND SIMVASTATIN 1 TABLET: 10; 40 TABLET ORAL at 17:21

## 2019-07-03 RX ADMIN — DOCUSATE SODIUM 100 MG: 100 CAPSULE, LIQUID FILLED ORAL at 17:21

## 2019-07-03 RX ADMIN — ATENOLOL 25 MG: 25 TABLET ORAL at 05:34

## 2019-07-03 RX ADMIN — LORAZEPAM 1 MG: 1 TABLET ORAL at 20:04

## 2019-07-03 RX ADMIN — HEPARIN SODIUM 5000 UNITS: 5000 INJECTION INTRAVENOUS; SUBCUTANEOUS at 20:01

## 2019-07-03 NOTE — PROGRESS NOTES
Right groin incision clean and intact, no hematoma  excellent dp pulse  Left groin with no hematoma  C/o only of fluid on his right knee  Will ask ortho to see  Has walked  Hopefully DC soon

## 2019-07-03 NOTE — ROUTINE PROCESS
Pedal pulses palpable on both feet. Tibial audible by doppler bilateral. Rt. Groin no bleeding, mildly swollen and slightly pinkish in color.

## 2019-07-03 NOTE — HOME CARE
Rounded on this \"[facility]\" patient, explained to patient about [facility] services offered and left pt a brochure on [facility]. KIRSTEN MCFARLAND.

## 2019-07-03 NOTE — PROGRESS NOTES
0730-Received pt resting in bed with eyes open, no sign of distress, vss, incisions clean dry and intact, will continue to monitor  0830-Assisted pt to toilet, tolerated well, voided post edwards removal  0845-Assisted to chair, tolerated well, sitting up, eating breakfast  0945-Assisted back to bed per pt request, tolerated well  1115-Assisted to toilet and back to bed, tolerated well  1130-Ice pack applied to pt right knee per pt request  1200-Up in chair to eat lunch, Pt seen by Dr. Ricardo Vaughn  1300-Assisted to toilet and back to bed, fairly tolerated  1800-Multiple assists to toilet throughout remainder of shift, pt has difficulty moving right leg and has which has continuous knee pain, incision sites clean dry and intact, no hematoma  1915-Bedside and Verbal shift change report given to David Kamara RN (oncoming nurse) by Katelyn Davenport RN (offgoing nurse).  Report included the following information SBAR, Kardex, Intake/Output, MAR, Recent Results and Cardiac Rhythm SR.

## 2019-07-03 NOTE — OP NOTES
[address] [place] [address]   OPERATIVE REPORT    Name:  Alex Kim  MR#:   [id number]  :  1945  ACCOUNT #:  [de-identified]  DATE OF SERVICE:  2019    PREOPERATIVE DIAGNOSES:  1. Peripheral arterial disease. 2.  Claudication of bilateral lower extremities, right worse than left. 3.  Aortoiliac occlusive disease. POSTOPERATIVE DIAGNOSES:  1. Peripheral arterial disease. 2.  Claudication of bilateral lower extremities, right worse than left. 3.  Aortoiliac occlusive disease. PROCEDURES PERFORMED:  1. Right common femoral endarterectomy with patch angioplasty using bovine pericardial patch. 2.  Aortogram.  3.  Bilateral kissing common iliac artery stents, 8 x 59 Viabahn balloon expandable on the right side and 8 x 39 Viabahn balloon expandable on the left side. 4.  Ultrasound-guided access of left common iliac artery. 5.  Catheter in aorta. 6.  Arterial closure of left common femoral artery with 6-Sinhala ProGlide. ATTENDING SURGEON:  Reanna Ramirez MD    ASSISTANTS:  None. ANESTHESIA:  General.    COMPLICATIONS:  None. CULTURES:  None. SPECIMENS REMOVED:  None. DRAINS:  None. IMPLANTS:  Please see above. ESTIMATED BLOOD LOSS:  150 mL. INDICATIONS FOR THE PROCEDURE:  The patient is a 69-year-old gentleman with peripheral arterial disease and aortoiliac occlusive disease. The patient was recommended for procedure. The patient was given the risks and benefits of the surgery including, but not limited to bleeding, infection, damage to the adjacent structures, MI, stroke, and death as well as loss of lower extremity, and need for further surgery. The patient was understanding of all the risks and underwent the procedure. OPERATIVE FINDINGS:  Right common femoral artery is nearly completely occluded at the bifurcation and had heavy calcific plaque at this area.   On aortogram, the patient has 80% narrowing of the right common iliac artery at the origin as well as distally and then appears to show between 50% to 60% narrowing within the left common iliac artery. Both internal iliac arteries are occluded. DESCRIPTION OF PROCEDURE:  The patient was correctly identified in the preoperative holding area and taken to the operating room in stable condition. The patient had pre-incision time-out prior to any incision. The patient was prepped and draped in normal sterile fashion according to the [facility] guidelines for aseptic technique. The patient also had preoperative antibiotics prior to any incision. We then were able to make our incision obliquely overlying the right common femoral artery, dissected down to the common femoral artery and looped it with red vessel loops in a Young fashion as well as just above the circumflex iliac vessels and then the SFA and both profunda femoris arteries as well as all branches with either red or blue vessel loops as necessary. We then heparinized the patient appropriately, created an arteriotomy using 11-blade and Young scissors and created an endarterectomy, went all the way into the external iliac artery, pulled this out, and then went down about 4 cm down the superficial femoral artery. We then were able to tack this down with 6-0 Prolene suture and then tack on the major profunda femoris at three separate sites while at that area and then we created an angioplasty using bovine pericardial patch with 5-0 Prolene suture, one repair suture was required. Once this was done, we had good flow going down. We then were able to take a single-wall entry needle to gain access in through the patch, placed a Bentson wire in and placed a 7-Moldovan sheath up after using Bentson and ContraFlush to get into the aorta. We then were able to use an ultrasound to visualize the left common femoral artery. A picture was taken and copied to the patient's chart. We then were able to take a single-wall entry needle to gain access into the artery. Once the needle tip was identified within the artery and there was positive blood return, a Bentson wire was then placed, and a 6-Brazilian sheath was then placed. We then dissected down towards the artery, placed our ProGlide appropriately, and then upsized to our 7-Brazilian sheath. On ultrasound, there was some minor disease within the common femoral artery, but it was patent. We then were able to perform aortogram from the right side. This showed all of our narrowings. We placed an 8 x 59 Viabahn balloon expandable on the right and then an 8 x 39 on the left, both over Bentson wires. Deployed these kissing, raising the bifurcation only minimally to the point where we would still be able to perform peripheral interventions as necessary, and then, we were able to post dilate the right side with a 9 x 40 balloon distally. Repeat angiogram now showed rapid blood flow going down both sides without any evidence of embolization. We then were able to close the left side appropriately over the Bentson, removed the Bentson appropriately, held pressure for about 5 minutes. We removed our sheath on the right side and repaired it with three separate 5-0 Prolene sutures. The patient tolerated the procedure well without any issues. We gained hemostasis with Surgicel, closed with multiple Monocryl sutures and then a 4-0 Monocryl subcuticular stitch and Dermabond for dressing. The patient tolerated the procedure well without any issues.       Shraddha Veras MD      [id number]  D:  07/02/2019 16:58  T:  07/02/2019 22:25  JOB #:  [id number]

## 2019-07-03 NOTE — PROGRESS NOTES
conducted an initial consultation and Spiritual Assessment for Silvano, who is a 68 y. o.,male. Patients Primary Language is: [place]. According to the patients EMR [employer] Affiliation is: [employer]. The reason the Patient came to the hospital is:   Patient Active Problem List    Diagnosis Date Noted    PAD (peripheral artery disease) ([facility] 07/02/2019    Atherosclerosis of native artery of both lower extremities with intermittent claudication ([address] 07/02/2019    Aortoiliac occlusive disease ([address] 03/04/2019    Facet hypertrophy of lumbar region 02/12/2018    Overweight (BMI 25.0-29.9) 01/09/2018    History of prostate cancer 01/09/2018    Ulnar neuritis, right 11/13/2017    Cervical spinal stenosis 10/04/2017    Degeneration of cervical and lumbar spine, relative cervical stenosis 10/04/2017    Cervical radiculopathy 08/30/2017    Lumbar radiculopathy 08/30/2017    IFG (impaired fasting glucose) 06/29/2017    ED (erectile dysfunction) of organic origin 04/14/2017    Recurrent umbilical hernia 77/18/1650    Hypertension     Dyslipidemia     Coronary artery disease     Atrial fibrillation     Advance directive in chart 06/23/2016    Hypovitaminosis D 06/22/2016    Atherosclerosis of artery of extremity with intermittent claudication ([address] 01/21/2015    Left carotid artery occlusion 02/12/2014    Peripheral vascular disease ([facility] Dr Shelly Dawson Colon polyps 06/13/2012    Colitis, ulcerative ([facility] 10/21/2011        The  provided the following Interventions:  Initiated a relationship of care and support. Explored issues of neville, spirituality and/or Nondenominational needs while hospitalized. Listened empathically. Offered prayer and assurance of continued prayers on patient's behalf. Chart reviewed. The following outcomes were achieved:  Patient shared some information about their medical narrative and spiritual journey/beliefs.   Patient processed feeling about current hospitalization. Patient expressed gratitude for the 's visit. Assessment:  Patient did not indicate any spiritual or Church issues which require Spiritual Care Services interventions at this time. Patient does not have any Church/cultural needs that will affect patients preferences in health care. Plan:  Chaplains will continue to follow and will provide pastoral care on an as needed or requested basis.  recommends bedside caregivers page  on duty if patient shows signs of acute spiritual or emotional distress.     [address] [facility]  [phone]

## 2019-07-03 NOTE — ROUTINE PROCESS
Bedside, Verbal and Written shift change report given to LEÓN ARMENTA (oncoming nurse) by Orly Carmen RN 
 (offgoing nurse). Report included the following information SBAR, Kardex, Procedure Summary, Intake/Output, MAR, Recent Results, Cardiac Rhythm NSR and Alarm Parameters .

## 2019-07-04 VITALS
WEIGHT: 215.4 LBS | SYSTOLIC BLOOD PRESSURE: 150 MMHG | HEART RATE: 61 BPM | RESPIRATION RATE: 19 BRPM | OXYGEN SATURATION: 94 % | HEIGHT: 72 IN | TEMPERATURE: 98.5 F | BODY MASS INDEX: 29.17 KG/M2 | DIASTOLIC BLOOD PRESSURE: 78 MMHG

## 2019-07-04 PROCEDURE — 74011250637 HC RX REV CODE- 250/637: Performed by: SURGERY

## 2019-07-04 PROCEDURE — 74011250636 HC RX REV CODE- 250/636: Performed by: SURGERY

## 2019-07-04 RX ORDER — CLOPIDOGREL BISULFATE 75 MG/1
75 TABLET ORAL DAILY
Qty: 31 TAB | Refills: 6 | Status: SHIPPED | OUTPATIENT
Start: 2019-07-05 | End: 2019-09-28 | Stop reason: SDUPTHER

## 2019-07-04 RX ORDER — OXYCODONE AND ACETAMINOPHEN 5; 325 MG/1; MG/1
1 TABLET ORAL
Qty: 30 TAB | Refills: 0 | Status: SHIPPED | OUTPATIENT
Start: 2019-07-04 | End: 2019-07-18

## 2019-07-04 RX ADMIN — TRIAMTERENE AND HYDROCHLOROTHIAZIDE 1 TABLET: 37.5; 25 TABLET ORAL at 05:18

## 2019-07-04 RX ADMIN — Medication 10 ML: at 05:19

## 2019-07-04 RX ADMIN — DOCUSATE SODIUM 100 MG: 100 CAPSULE, LIQUID FILLED ORAL at 08:08

## 2019-07-04 RX ADMIN — PANTOPRAZOLE 40 MG: 40 TABLET, DELAYED RELEASE ORAL at 05:18

## 2019-07-04 RX ADMIN — POLYETHYLENE GLYCOL 3350 17 G: 17 POWDER, FOR SOLUTION ORAL at 08:08

## 2019-07-04 RX ADMIN — ASPIRIN 81 MG: 81 TABLET ORAL at 08:08

## 2019-07-04 RX ADMIN — LOSARTAN POTASSIUM 25 MG: 50 TABLET, FILM COATED ORAL at 05:18

## 2019-07-04 RX ADMIN — OXYCODONE HYDROCHLORIDE AND ACETAMINOPHEN 1 TABLET: 5; 325 TABLET ORAL at 08:08

## 2019-07-04 RX ADMIN — OXYCODONE HYDROCHLORIDE AND ACETAMINOPHEN 1 TABLET: 5; 325 TABLET ORAL at 04:24

## 2019-07-04 RX ADMIN — HEPARIN SODIUM 5000 UNITS: 5000 INJECTION INTRAVENOUS; SUBCUTANEOUS at 04:24

## 2019-07-04 RX ADMIN — ATENOLOL 25 MG: 25 TABLET ORAL at 05:18

## 2019-07-04 RX ADMIN — CLOPIDOGREL BISULFATE 75 MG: 75 TABLET ORAL at 08:08

## 2019-07-04 RX ADMIN — Medication 10 ML: at 05:18

## 2019-07-04 NOTE — PROGRESS NOTES
0700: Assumed care of patient resting in bed quietly, patient ambulated to bathroom with minimal assistance. 1106: Patient discharged home with wife. Patient has all belongings including cell phone and glasses, prescription and discharge instructions.

## 2019-07-04 NOTE — DISCHARGE INSTRUCTIONS
DISCHARGE SUMMARY from Nurse    PATIENT INSTRUCTIONS:    After general anesthesia or intravenous sedation, for 24 hours or while taking prescription Narcotics:  · Limit your activities  · Do not drive and operate hazardous machinery  · Do not make important personal or business decisions  · Do  not drink alcoholic beverages  · If you have not urinated within 8 hours after discharge, please contact your surgeon on call. Report the following to your surgeon:  · Excessive pain, swelling, redness or odor of or around the surgical area  · Temperature over 100.5  · Nausea and vomiting lasting longer than 4 hours or if unable to take medications  · Any signs of decreased circulation or nerve impairment to extremity: change in color, persistent  numbness, tingling, coldness or increase pain  · Any questions    What to do at Home:  Recommended activity: Activity as tolerated and No lifting, Driving, or Strenuous exercise for 1 week    If you experience any of the following symptoms numbness/tingling to legs, if your incision opens, starts bleeding, or you notice large bruising around insicion please follow up with Dr Melina Cogan. *  Please give a list of your current medications to your Primary Care Provider. *  Please update this list whenever your medications are discontinued, doses are      changed, or new medications (including over-the-counter products) are added. *  Please carry medication information at all times in case of emergency situations. These are general instructions for a healthy lifestyle:    No smoking/ No tobacco products/ Avoid exposure to second hand smoke  Surgeon General's Warning:  Quitting smoking now greatly reduces serious risk to your health.     Obesity, smoking, and sedentary lifestyle greatly increases your risk for illness    A healthy diet, regular physical exercise & weight monitoring are important for maintaining a healthy lifestyle    You may be retaining fluid if you have a history of heart failure or if you experience any of the following symptoms:  Weight gain of 3 pounds or more overnight or 5 pounds in a week, increased swelling in our hands or feet or shortness of breath while lying flat in bed. Please call your doctor as soon as you notice any of these symptoms; do not wait until your next office visit. The discharge information has been reviewed with the patient. The patient verbalized understanding. Discharge medications reviewed with the patient and appropriate educational materials and side effects teaching were provided. ___________________________________________________________________________________________________________________________________    Patient Education        Femoral Endarterectomy: What to Expect at [address] [place] [address] will have some pain from the cut (incision) the doctor made. This usually gets better after a couple of days. Your doctor will give you pain medicine for this. Your leg may be swollen at first. This may last 2 to 3 months. You will have stitches or staples in the incision. If you have stitches, they may dissolve on their own. Or your doctor may take them out 7 to 14 days after your surgery. After surgery, blood may flow better throughout your leg, which can decrease leg pain, numbness, and cramping. You may be able to walk longer distances without leg pain. This care sheet gives you a general idea about how long it will take for you to recover. But each person recovers at a different pace. Follow the steps below to get better as quickly as possible. How can you care for yourself at home? Activity    · Rest when you feel tired. Getting enough sleep will help you recover.     · Try to walk every day or as often as your doctor tells you. Start by walking a little more than you did the day before. Bit by bit, increase the amount you walk.  Walking boosts blood flow and helps prevent pneumonia and constipation.     · Avoid strenuous activities, such as bicycle riding, jogging, weight lifting, or aerobic exercise, until your doctor says it is okay.     · Ask your doctor when you can drive again.     · You will probably need to take off 1 to 4 weeks from work. It depends on the type of work you do and how you feel.     · You may shower as usual. Do not take a bath for the first 2 weeks, or until your doctor tells you it is okay. Diet    · You can eat your normal diet. If your stomach is upset, try bland, low-fat foods like plain rice, broiled chicken, toast, and yogurt.     · Drink plenty of fluids (unless your doctor tells you not to).     · You may notice that your bowel movements are not regular right after your surgery. This is common. You may want to take a fiber supplement every day. If you have not had a bowel movement after a couple of days, ask your doctor about taking a mild laxative. Medicines    · Your doctor will tell you if and when you can restart your medicines. He or she will also give you instructions about taking any new medicines.     · If you take blood thinners, such as warfarin (Coumadin), clopidogrel (Plavix), or aspirin, be sure to talk to your doctor. He or she will tell you if and when to start taking those medicines again. Make sure that you understand exactly what your doctor wants you to do.     · Be safe with medicines. Take your medicines exactly as prescribed. Call your doctor if you think you are having a problem with your medicine.     · Take pain medicines exactly as directed. ? If the doctor gave you a prescription medicine for pain, take it as prescribed. ? If you are not taking a prescription pain medicine, ask your doctor if you can take an over-the-counter medicine.     · If you think your pain medicine is making you sick to your stomach:  ? Take your medicine after meals (unless your doctor has told you not to). ?  Ask your doctor for a different pain medicine.     · If your doctor prescribed antibiotics, take them as directed. Do not stop taking them just because you feel better. You need to take the full course of antibiotics.     · Your doctor may prescribe a blood thinner when you go home. This helps prevent blood clots. Be sure you get instructions about how to take your medicine safely. Blood thinners can cause serious bleeding problems. Incision care    · If you have strips of tape on the cut (incision) the doctor made, leave the tape on for a week or until it falls off. Or follow your doctor's instructions for removing the tape.     · Wash the area daily with warm, soapy water, and pat it dry. Don't use hydrogen peroxide or alcohol, which can slow healing. You may cover the area with a gauze bandage if it weeps or rubs against clothing. Change the bandage every day.     · Keep the area clean and dry. Follow-up care is a key part of your treatment and safety. Be sure to make and go to all appointments, and call your doctor if you are having problems. It's also a good idea to know your test results and keep a list of the medicines you take. When should you call for help? Call 911 anytime you think you may need emergency care. For example, call if:    · You passed out (lost consciousness).     · You have trouble breathing.    Call your doctor now or seek immediate medical care if:    · You have severe pain in your leg, or it becomes cold, pale, blue, tingly, or numb.     · You have pain that does not get better after you take pain medicine.     · You have loose stitches, or your incision comes open.     · You are bleeding a lot from the incision.     · You have signs of infection, such as:  ? Increased pain, swelling, warmth, or redness. ? Red streaks leading from the incision. ? Pus draining from the incision. ?  A fever.     · You are sick to your stomach or cannot keep fluids down.    Watch closely for any changes in your health, and be sure to contact your doctor if:    · You do not get better as expected. Where can you learn more? Go to http://nneka-mindi.info/. Enter P886 in the search box to learn more about \"Femoral Endarterectomy: What to Expect at Home. \"  Current as of: July 22, 2018  Content Version: 11.9  © 9579-7299 [employer]. Care instructions adapted under license by [employer] (which disclaims liability or warranty for this information). If you have questions about a medical condition or this instruction, always ask your healthcare professional. [address] any warranty or liability for your use of this information.

## 2019-07-04 NOTE — DISCHARGE SUMMARY
Physician Discharge Summary     Patient ID:  Ian Thakur  [id number]  8 y.o.  1945    Admit date: 7/2/2019    Discharge date: 7/4/2019      Admitting Physician: Laura Solis MD     Discharge Physician: Laura Solis MD     Admission Diagnoses: Aortoiliac occlusive disease (HCC) [I74.09];PAD (peripheral artery disease) ([facility] [I73.9]; Atherosclerosis of native artery of both lower extremities with intermittent claudication ([facility] [I70.213]    Discharge Diagnoses: There are no discharge diagnoses documented for the most recent discharge. Procedures for this admission: Procedure(s):  RIGHT COMMON FEMORAL ENDARTERECTOMY WITH PATCH ANGIOPLASTY/BILATERAL COMMON ILIAC ARTERY STENTING/C-ARM    Discharged Condition: stable    Hospital Course: admitted after bilateral common femoral endarterectomies with patch angioplasty and bilateral common iliac artery kissing stents. Did well without issue other than right knee pain. Wanted to see ortho but unable to get seen yesterday and wants to go home today. Will get outpatient clinic appt. He is walking, eating, voiding, and tolerating PO pain meds and ready for discharge. Consults: None    Significant Diagnostic Studies: none    Treatments: IV hydration and analgesia: Acetaminophen, Dilaudid and Oxycodone    Discharge Exam: GEN: alert, cooperative, no distress, appears stated age  LUNG: clear to auscultation bilaterally  HEART: regular rate and rhythm, S1, S2 normal, no murmur, click, rub or gallop  ABDOMEN:  no change  EXTREMITIES:   right lower extremity  extremities normal, atraumatic, no cyanosis or edema and left lower extremity  extremities normal, atraumatic, no cyanosis or edema  NEURO: negative findings: alert, oriented x3  cranial nerves II-XII intact    Disposition: home    Patient Instructions:   Current Discharge Medication List      START taking these medications    Details   clopidogrel (PLAVIX) 75 mg tab Take 1 Tab by mouth daily.   Qty: 31 Tab, Refills: 6      oxyCODONE-acetaminophen (PERCOCET) 5-325 mg per tablet Take 1 Tab by mouth every four (4) hours as needed for Pain for up to 14 days. Max Daily Amount: 6 Tabs. Qty: 30 Tab, Refills: 0    Associated Diagnoses: Aortoiliac occlusive disease ([facility]         CONTINUE these medications which have NOT CHANGED    Details   diclofenac (VOLTAREN) 1 % gel Apply 2-4 g to affected area four (4) times daily. Qty: 100 g, Refills: 5      colestipol (COLESTID) 1 gram tablet 1 g daily as needed. hyoscyamine SL (LEVSIN/SL) 0.125 mg SL tablet 0.125 mg by SubLINGual route every four (4) hours as needed for Cramping.      ezetimibe-simvastatin (VYTORIN) 10-40 mg per tablet TAKE 1 TABLET NIGHTLY  Qty: 90 Tab, Refills: 3      atenolol (TENORMIN) 25 mg tablet TAKE ONE TABLET BY MOUTH TWICE A DAY  Qty: 180 Tab, Refills: 3    Associated Diagnoses: Benign hypertensive heart disease without heart failure      icosapent ethyl (VASCEPA) 1 gram capsule Take 2 Caps by mouth two (2) times daily (with meals). Qty: 360 Cap, Refills: 3    Associated Diagnoses: Peripheral vascular disease ([facility] Atherosclerosis of native coronary artery of native heart without angina pectoris      gabapentin (NEURONTIN) 300 mg capsule 1 cap every day to bid  Qty: 180 Cap, Refills: 1      polyethylene glycol (MIRALAX) 17 gram packet 17 g.      losartan (COZAAR) 25 mg tablet TAKE ONE TABLET BY MOUTH TWICE A DAY  Qty: 180 Tab, Refills: 2    Associated Diagnoses: Benign hypertensive heart disease without heart failure      triamterene-hydrochlorothiazide (MAXZIDE) 37.5-25 mg per tablet TAKE ONE TABLET BY MOUTH DAILY  Qty: 90 Tab, Refills: 3    Associated Diagnoses: Benign hypertensive heart disease without heart failure      Cholecalciferol, Vitamin D3, 1,000 unit cap Take 1,000 Units by mouth daily. MULTIVITAMIN PO Take  by mouth daily. pantoprazole (PROTONIX) 40 mg tablet Take 40 mg by mouth daily.       coenzyme q10 200 mg Cap Take by mouth daily. meclizine (ANTIVERT) 25 mg tablet Take 12.5 mg by mouth three (3) times daily as needed. LORazepam (ATIVAN) 1 mg tablet Take 1 Tab by mouth every eight (8) hours as needed. Qty: 50 Tab, Refills: 0    Associated Diagnoses: Anxiety      aspirin delayed-release 81 mg tablet Take 81 mg by mouth daily. STOP taking these medications       HYDROcodone-acetaminophen (NORCO) 5-325 mg per tablet Comments:   Reason for Stopping:               Reference discharge instructions as provided by nursing for diet, labs, medications, activity, wound care and any outpatient referrals. Follow-up with 2 weeks with Dr. Carmela Lux     Signed:   Calli Mejia MD  7/4/2019  9:39 AM

## 2019-07-05 ENCOUNTER — TELEPHONE (OUTPATIENT)
Dept: VASCULAR SURGERY | Age: 74
End: 2019-07-05

## 2019-07-05 ENCOUNTER — PATIENT OUTREACH (OUTPATIENT)
Dept: INTERNAL MEDICINE CLINIC | Age: 74
End: 2019-07-05

## 2019-07-05 NOTE — TELEPHONE ENCOUNTER
Patient called and states that since Tuesday he has not had BM . He has been taking [place] lax with no results was wondering what else he could do? After discussing with provider , advised the patient to take MOM , take 30 cc when he gets it and nightly at bed time , and drink plenty of water. Pt verbalized understanding, and will call back if any other issues arise.

## 2019-07-05 NOTE — PROGRESS NOTES
Hospital Discharge Follow-Up      Date/Time:  2019 1:10 PM    Patient was admitted to [facility] on 19 and discharged on 19 for right common femoral endarterectomy with patch angioplasty using bovine pericardial patch. The physician discharge summary was available at the time of outreach. Patient was contacted within 1 business days of discharge. Top Challenges reviewed with the provider   None      Method of communication with provider :none    Inpatient RRAT score: 13  Was this a readmission? no   Patient stated reason for the readmission: N/A    Nurse Navigator (NN) contacted the patient by telephone to perform post hospital discharge assessment. Verified name and  with patient as identifiers. Provided introduction to self, and explanation of the Nurse Navigator role. Reviewed discharge instructions and red flags with patient who verbalized understanding. Patient given an opportunity to ask questions and does not have any further questions or concerns at this time. The patient agrees to contact the PCP office for questions related to their healthcare. NN provided contact information for future reference. Disease Specific:   N/A    Summary of patient's top problems:  1. Right knee pain: Pt reported swelling; denied redness; pain 2-3/10 when sitting; 5-6/10 with walking. Pt voiced he will call ortho on Monday for an appt. Pt taking Orene Mihaela he okayed this with vascular  2. Incisional pain: 3-4/10 right fem. Pt reported burning feeling and bruising. Denied redness, swelling, lump or drainage  3. Constipation: Pt reported no BM since . Patient was instructed to take milk of magnesia 30cc BID. Pt reported she took Miralax prior to calling vascular.  Reinforced instructions to increase water intake    Home Health orders at discharge: none  [address]: N/A  Date of initial visit: Viktor Kelly ordered/company: N/A  Durable Medical Equipment received: N/A    Barriers to care? None at this time. Advance Care Planning:   Does patient have an Advance Directive:  reviewed and needs to be updated     Medication(s):   New Medications at Discharge: Plavix, Percocet  Changed Medications at Discharge: None  Discontinued Medications at Discharge: Norco    Medication reconciliation was performed with patient, who verbalizes understanding of administration of home medications. There were no barriers to obtaining medications identified at this time. Referral to Pharm D needed: no     Current Outpatient Medications   Medication Sig    clopidogrel (PLAVIX) 75 mg tab Take 1 Tab by mouth daily.  meclizine (ANTIVERT) 25 mg tablet Take 12.5 mg by mouth three (3) times daily as needed.  diclofenac (VOLTAREN) 1 % gel Apply 2-4 g to affected area four (4) times daily. (Patient taking differently: Apply 2-4 g to affected area daily as needed.)    hyoscyamine SL (LEVSIN/SL) 0.125 mg SL tablet 0.125 mg by SubLINGual route every four (4) hours as needed for Cramping.  LORazepam (ATIVAN) 1 mg tablet Take 1 Tab by mouth every eight (8) hours as needed.  ezetimibe-simvastatin (VYTORIN) 10-40 mg per tablet TAKE 1 TABLET NIGHTLY    atenolol (TENORMIN) 25 mg tablet TAKE ONE TABLET BY MOUTH TWICE A DAY    icosapent ethyl (VASCEPA) 1 gram capsule Take 2 Caps by mouth two (2) times daily (with meals).  gabapentin (NEURONTIN) 300 mg capsule 1 cap every day to bid (Patient taking differently: nightly. 1 cap every day to bid  Indications: Neuropathic Pain)    polyethylene glycol (MIRALAX) 17 gram packet 17 g.    losartan (COZAAR) 25 mg tablet TAKE ONE TABLET BY MOUTH TWICE A DAY    aspirin delayed-release 81 mg tablet Take 81 mg by mouth daily.  triamterene-hydrochlorothiazide (MAXZIDE) 37.5-25 mg per tablet TAKE ONE TABLET BY MOUTH DAILY    Cholecalciferol, Vitamin D3, 1,000 unit cap Take 1,000 Units by mouth daily.     MULTIVITAMIN PO Take  by mouth daily.    pantoprazole (PROTONIX) 40 mg tablet Take 40 mg by mouth daily.  coenzyme q10 200 mg Cap Take  by mouth daily.  oxyCODONE-acetaminophen (PERCOCET) 5-325 mg per tablet Take 1 Tab by mouth every four (4) hours as needed for Pain for up to 14 days. Max Daily Amount: 6 Tabs.  colestipol (COLESTID) 1 gram tablet 1 g daily as needed. No current facility-administered medications for this visit. There are no discontinued medications. [facility] follow up appointment(s):   Future Appointments   Date Time Provider Mary Amy   7/15/2019  9:00 AM Shobha Wasserman MD [address]   9/13/2019  8:00 AM [facility]   10/3/2019  8:50 AM [facility] NURSE [facility] SCHED   10/10/2019 10:30 AM MD Niles Hager   11/11/2019  8:30 AM Román Francois,  [id number]    11/18/2019  8:30 AM [facility] NURSE [facility]ISIT [facility] SCHED   11/25/2019  2:00 PM Susy Taylor MD [facility] SCHED      Non-[facility] follow up appointment(s): Dr. Niesha Barr, cardiology 8/9/19 at 9:45 AM  [facility]:  out of service area       Goals      Attends follow-up appointments as directed. 7/5: Patient aware of upcoming appts with vascular. Patient to contact ortho for right knee pain.  Understands red flags post discharge.       7/5: Educated patient on s/s of infection: redness, warmth at site(s), swelling, bleeding or pus-like drainage, chills, fever (> 100.5), low body temperature (<97.2), nausea/vomiting that prevents eating/drinking/taking medications, SOB, chest pain/pressure, palpitations, increased respirations, decreased urine output

## 2019-07-12 ENCOUNTER — PATIENT OUTREACH (OUTPATIENT)
Dept: INTERNAL MEDICINE CLINIC | Age: 74
End: 2019-07-12

## 2019-07-12 NOTE — PROGRESS NOTES
Follow-Up      Date/Time:  2019 1:52 PM    History:  Patient was admitted to [facility] on 19 and discharged on 19 for right common femoral endarterectomy with patch angioplasty using bovine pericardial patch. Nurse Navigator (NN) contacted the patient by telephone in follow up. Verified name and  with patient as identifiers. Since last contact () the patient states he is doing ok. He continues to have swelling and pain in his right knee and also has some soreness in right leg incision. This does limit his activity to some degree but he states that he is walking as much as he can tolerate. He has an appt on Monday 7/15/19 with both Dr. Johan Guzman and Dr. Mattie Ferraro. He is taking pain medication prn and he states that it does relieve his discomfort . Patient denies fever , chills, swelling or redness at surgical site and knows to call surgeon office or PCP office if develops any of these symptoms. Update on   Goals      Attends follow-up appointments as directed. : Patient aware of upcoming appts with vascular. Patient to contact ortho for right knee pain.  Understands red flags post discharge.       : Educated patient on s/s of infection: redness, warmth at site(s), swelling, bleeding or pus-like drainage, chills, fever (> 100.5), low body temperature (<97.2), nausea/vomiting that prevents eating/drinking/taking medications, SOB, chest pain/pressure, palpitations, increased respirations, decreased urine output                PCP/Specialist follow up:   Future Appointments   Date Time Provider Mary Bullockisti   7/15/2019  9:00 AM Bipin Winslow MD [address]   7/15/2019 11:00 AM Amie Hernadez MD [facility] SCHED   2019  8:00 AM Jamila Velázquez DO [facility] SCHED   10/3/2019  8:50 AM [facility] NURSE [facility] SCHED   10/10/2019 10:30 AM MD Niles Burnette   2019  8:30 AM Alonzo Elizondo GILBERTO TEJADA   11/18/2019  8:30 AM [facility] NURSE VISIT [facility] [place] [place]   11/25/2019  2:00 PM Tae Stevens MD [facility] [place] [place]          Will continue to follow for now.

## 2019-07-15 ENCOUNTER — TELEPHONE (OUTPATIENT)
Dept: VASCULAR SURGERY | Age: 74
End: 2019-07-15

## 2019-07-15 ENCOUNTER — OFFICE VISIT (OUTPATIENT)
Dept: VASCULAR SURGERY | Age: 74
End: 2019-07-15

## 2019-07-15 ENCOUNTER — OFFICE VISIT (OUTPATIENT)
Dept: ORTHOPEDIC SURGERY | Facility: CLINIC | Age: 74
End: 2019-07-15

## 2019-07-15 VITALS
WEIGHT: 215 LBS | BODY MASS INDEX: 29.12 KG/M2 | HEART RATE: 73 BPM | DIASTOLIC BLOOD PRESSURE: 65 MMHG | HEIGHT: 72 IN | SYSTOLIC BLOOD PRESSURE: 127 MMHG | TEMPERATURE: 96.2 F | OXYGEN SATURATION: 98 % | RESPIRATION RATE: 17 BRPM

## 2019-07-15 VITALS
WEIGHT: 215 LBS | HEART RATE: 70 BPM | BODY MASS INDEX: 29.12 KG/M2 | DIASTOLIC BLOOD PRESSURE: 80 MMHG | HEIGHT: 72 IN | RESPIRATION RATE: 18 BRPM | SYSTOLIC BLOOD PRESSURE: 130 MMHG

## 2019-07-15 DIAGNOSIS — M25.561 CHRONIC PAIN OF RIGHT KNEE: ICD-10-CM

## 2019-07-15 DIAGNOSIS — G89.29 CHRONIC PAIN OF RIGHT KNEE: ICD-10-CM

## 2019-07-15 DIAGNOSIS — M17.11 PRIMARY OSTEOARTHRITIS OF RIGHT KNEE: Primary | ICD-10-CM

## 2019-07-15 DIAGNOSIS — I65.22 LEFT CAROTID ARTERY OCCLUSION: ICD-10-CM

## 2019-07-15 DIAGNOSIS — I73.9 PAD (PERIPHERAL ARTERY DISEASE) (HCC): ICD-10-CM

## 2019-07-15 DIAGNOSIS — I70.213 ATHEROSCLEROSIS OF NATIVE ARTERY OF BOTH LOWER EXTREMITIES WITH INTERMITTENT CLAUDICATION (HCC): ICD-10-CM

## 2019-07-15 DIAGNOSIS — I74.09 AORTOILIAC OCCLUSIVE DISEASE (HCC): Primary | ICD-10-CM

## 2019-07-15 RX ORDER — BETAMETHASONE SODIUM PHOSPHATE AND BETAMETHASONE ACETATE 3; 3 MG/ML; MG/ML
6 INJECTION, SUSPENSION INTRA-ARTICULAR; INTRALESIONAL; INTRAMUSCULAR; SOFT TISSUE ONCE
Qty: 0.5 ML | Refills: 0
Start: 2019-07-15 | End: 2019-07-15

## 2019-07-15 NOTE — PROGRESS NOTES
Chelsey Posey    Chief Complaint   Patient presents with    Surgical Follow-up       History and Physical    Chelsey Posey is a 68 y.o. male with extensive vascular surgical history. In short patient just recently had right common femoral endarterectomy with patch angioplasty and bilateral common iliac artery stenting. Overall he seems to be doing quite well without any issues. But he does have a lot of swelling of his right knee. He is supposed to see orthopedics today. He does not complain of claudication or rest pain. No ulcerations. Past Medical History:   Diagnosis Date    Adenoma of left adrenal gland     2009  1 cm, no change 1/15, 2/16    Asbestosis     CT 1/19 showed pleural plaques    Atrial fibrillation     CHA2DS2-VAsc=3(age+, htn+, vasc dx+; estimated yearly stroke risk according to Lip et al. is 3,2%), Hasbled=2(estimated yearly bleeding risk according to pisters et al. is 1,88%); not anticoagulated due to h/o gi bleed    Atrial fibrillation ablation     10/08    Basal cell cancer     Dr Marcelo Patel; he's had >350 lesions removed    Carotid occlusion     left     Cervical radiculopathy     MRI 9/11 showed C3-6 severe foraminal stenosis    Chronic pain     Colon polyp     Dr Dallin Perez 9/15    Coronary artery disease     RCA - 3.0 x 16mm TAXUS 9/04, 3.0 x 16mm TAXUS 12/06    Dyslipidemia     Erectile dysfunction     GERD     GI bleed     Hearing loss     2014  bilateral Dr Jessica Garcia    Hernia, umbilical     Hypertension     with component of white coat    Hypogonadism male     Lumbar radiculopathy     Dr Willa Card;  MRI 9/11 L4-5 disc bulging, annular tear, disc dessication    Myofascial pain dysfunction syndrome     pain clinic     OAB (overactive bladder)     Osteoarthritis     right knee Dr Erin Lincoln Overweight (BMI 25.0-29. 9)     IF 5/18 start weight 214 lbs, not doing    Peripheral vascular disease     50-60% R iliac; s/p R iliac stent and L femoral artery stent in past; R OLIVIA 0.76 (1/16)    Plantar fasciitis     bilateral     PPD positive     Prediabetes     Prostate cancer     T1c Imelda 7(3+4), 70% in 1 core, GS 7 (3+4) in 4 cores, GS 6 (3+3) in 1 core; psa 5.28, TRUS 18 gm;  Dr Donis Mosley; s/p cryoablation 10/16    Recurrent umbilical hernia     Subclinical hypothyroidism     denies    Tinnitus     Dr Aly Lal Ulcerative colitis     Venous insufficiency      Past Surgical History:   Procedure Laterality Date    CARDIAC SURG PROCEDURE UNLIST  04/2018    [facility] Dr Valerie Tinsley; echo nl lv, ef 60%, dilated RV, biatrial enlargement, trace AI    CARDIAC SURG PROCEDURE UNLIST  04/2018    [facility] Dr Valerie Tinsley; NST neg, ef 60%    HAND/FINGER SURGERY UNLISTED  2017    HX CAROTID ENDARTERECTOMY      1/14  right    Karlis Baar HX COLONOSCOPY      Dr Donis Mosley 9/15 polyps    HX CRYOABLATION OF THE PROSTATE      10/16    HX [address], 1975    HX ORTHOPAEDIC      right knee surgery    HX ORTHOPAEDIC  12/2017    Dr Christian Caldwell; R CTS release    HX REFRACTIVE SURGERY      OD (hemoplasty to right eye on retina to avoid blindness)    HX TONSILLECTOMY      1955    [place] LAP, RECURRENT INCISIONAL HERNIA REPAIR,REDUCIBLE N/A 02/09/2017    Dr. Jean Gallo HERNIA,5+Y/O,REDUCIBL      2/16  left  Dr. Jean Pierre Duran VKLQ,0+Y/P,RLIPO      2/16  Dr. Nereyda Wood      1/16  R OLIVIA 0.76, L OLIVIA 1.02    VASCULAR SURGERY PROCEDURE UNLIST      10/15   left fem art and left iliac stent     Patient Active Problem List   Diagnosis Code    Colitis, ulcerative ([address] K51.90    Colon polyps K63.5    Peripheral vascular disease ([facility] Dr Luca Brady I73.9    Left carotid artery occlusion I65.22    Atherosclerosis of artery of extremity with intermittent claudication ([facility] I70.219    Hypovitaminosis D E55.9    Advance directive in chart Z78.9    Hypertension I11.9    Dyslipidemia E78.5    Coronary artery disease I25.10    Atrial fibrillation I48.91    Recurrent umbilical hernia V10.7    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 9) E66.3    History of prostate cancer Z85.46    Facet hypertrophy of lumbar region M47.816    Aortoiliac occlusive disease ([facility]) I74.09    PAD (peripheral artery disease) ([facility]) I73.9    Atherosclerosis of native artery of both lower extremities with intermittent claudication ([facility]) I70.213     Current Outpatient Medications   Medication Sig Dispense Refill    clopidogrel (PLAVIX) 75 mg tab Take 1 Tab by mouth daily. 31 Tab 6    oxyCODONE-acetaminophen (PERCOCET) 5-325 mg per tablet Take 1 Tab by mouth every four (4) hours as needed for Pain for up to 14 days. Max Daily Amount: 6 Tabs. 30 Tab 0    meclizine (ANTIVERT) 25 mg tablet Take 12.5 mg by mouth three (3) times daily as needed.  diclofenac (VOLTAREN) 1 % gel Apply 2-4 g to affected area four (4) times daily. (Patient taking differently: Apply 2-4 g to affected area daily as needed.) 100 g 5    colestipol (COLESTID) 1 gram tablet 1 g daily as needed.  hyoscyamine SL (LEVSIN/SL) 0.125 mg SL tablet 0.125 mg by SubLINGual route every four (4) hours as needed for Cramping.  LORazepam (ATIVAN) 1 mg tablet Take 1 Tab by mouth every eight (8) hours as needed. 50 Tab 0    ezetimibe-simvastatin (VYTORIN) 10-40 mg per tablet TAKE 1 TABLET NIGHTLY 90 Tab 3    atenolol (TENORMIN) 25 mg tablet TAKE ONE TABLET BY MOUTH TWICE A  Tab 3    icosapent ethyl (VASCEPA) 1 gram capsule Take 2 Caps by mouth two (2) times daily (with meals).  360 Cap 3    gabapentin (NEURONTIN) 300 mg capsule 1 cap every day to bid (Patient taking differently: nightly. 1 cap every day to bid  Indications: Neuropathic Pain) 180 Cap 1    polyethylene glycol (MIRALAX) 17 gram packet 17 g.      losartan (COZAAR) 25 mg tablet TAKE ONE TABLET BY MOUTH TWICE A  Tab 2    aspirin delayed-release 81 mg tablet Take 81 mg by mouth daily.  triamterene-hydrochlorothiazide (MAXZIDE) 37.5-25 mg per tablet TAKE ONE TABLET BY MOUTH DAILY 90 Tab 3    Cholecalciferol, Vitamin D3, 1,000 unit cap Take 1,000 Units by mouth daily.  MULTIVITAMIN PO Take  by mouth daily.  pantoprazole (PROTONIX) 40 mg tablet Take 40 mg by mouth daily.  coenzyme q10 200 mg Cap Take  by mouth daily.        Allergies   Allergen Reactions    Altace [Ramipril] Unknown (comments)     Pt does not remember reaction    Lipitor [Atorvastatin] Other (comments)     Muscle pain    Lisinopril Shortness of Breath and Other (comments)     Turns bright red    Other Medication Other (comments)     Vicryl suture on skin tends to be rejected with poor wound healing does better with monocryl    Procardia [Nifedipine] Other (comments)     Caused afib    Rosuvastatin Other (comments)     Muscle Pain       Social History     Socioeconomic History    Marital status:      Spouse name: Not on file    Number of children: Not on file    Years of education: Not on file    Highest education level: Not on file   Occupational History    Not on file   Social Needs    Financial resource strain: Not on file    Food insecurity:     Worry: Not on file     Inability: Not on file    Transportation needs:     Medical: Not on file     Non-medical: Not on file   Tobacco Use    Smoking status: Former Smoker     Packs/day: 1.50     Years: 25.00     Pack years: 37.50     Types: Cigarettes     Last attempt to quit: 2003     Years since quittin.5    Smokeless tobacco: Never Used   Substance and Sexual Activity    Alcohol use: Yes     Comment: RARELY    Drug use: No    Sexual activity: Yes     Partners: Female     Birth control/protection: None   Lifestyle    Physical activity:     Days per week: Not on file     Minutes per session: Not on file    Stress: Not on file   Relationships    Social connections:     Talks on phone: Not on file     Gets together: Not on file     Attends Mandaeism service: Not on file     Active member of club or organization: Not on file     Attends meetings of clubs or organizations: Not on file     Relationship status: Not on file    Intimate partner violence:     Fear of current or ex partner: Not on file     Emotionally abused: Not on file     Physically abused: Not on file     Forced sexual activity: Not on file   Other Topics Concern    Not on file   Social History Narrative    Not on file      Family History   Problem Relation Age of Onset    Heart Disease Mother     Hypertension Mother     Diabetes Mother     Arthritis-osteo Mother     Heart Disease Father     Stroke Father     Hypertension Father     Heart Disease Sister     Hypertension Sister        Physical Exam:    Visit Vitals  /80 (BP 1 Location: Left arm, BP Patient Position: Sitting)   Pulse 70   Resp 18   Ht 6' (1.829 m)   Wt 215 lb (97.5 kg)   BMI 29.16 kg/m²      General: Well-appearing male in no acute distress  HEENT: EOMI no scleral icterus is noted patient is wearing eyeglasses with moist mucous membranes  Pulmonary: No increased work of breathing is noted  Extremity: Right lower extremity is warm and perfused the groin incision is healing but not fully healed at this current time no signs of cellulitis or abscess. He does have a very swollen right knee. Neuro: Cranial nerves II through XII are grossly intact within normal gait    Impression and Plan:  Helen Mcdaniel is a 68 y.o. male with peripheral arterial disease as well as aortoiliac occlusive disease and no carotid disease.   I will bring him back in 2 weeks time with repeat ultrasounds of his aortoiliac system as well as lower legs. That will give him 1 month follow-up. Further planning after that. We reviewed the plan with the patient and the patient understands. We also gave the patient appropriate instructions on their disease process and when to call back. Greater than 50% of this visit was spent with face to face discussion. Follow-up and Dispositions    · Return in about 2 weeks (around 7/29/2019). Clarissa Bermudez MD    PLEASE NOTE:  This document has been produced using voice recognition software. Unrecognized errors in transcription may be present.

## 2019-07-15 NOTE — TELEPHONE ENCOUNTER
Discussed with Dr. Cipriano Chavarria, and patient has no restrictions at this time , called the patient back and advised no restrictions can do what he wants , pt verbalized understanding .

## 2019-07-15 NOTE — PROGRESS NOTES
1. Have you been to an emergency room or urgent care clinic since your last visit? No    Hospitalized since your last visit? If yes, where, when, and reason for visit? NO  2. Have you seen or consulted any other health care providers outside of the [facility] since your last visit including any procedures, health maintenance items. If yes, where, when and reason for visit? NO

## 2019-07-15 NOTE — PROGRESS NOTES
Patient: Pedro Pyle                MRN: [id number]       SSN: [id number]  YOB: 1945        AGE: 68 y.o. SEX: male    PCP: Kyle Hardy MD  07/15/19    Chief Complaint   Patient presents with    Knee Pain     right     HISTORY:  Pedro Pyle is a 68 y.o. male who is seen for right knee and L>R bilateral hip pain. He has been experiencing increasing knee pain for the past few years. He reports no h/o injury. He notes pain with standing and walking. He has pain ascending/descending stairs. He reports increasing difficulty with everyday activities. He reports his knee pain will wake him up at night. He reports that he sleeps with a pillow between his legs for some relief. He reports relief from previous cortisone and visco supplementation injections by Dr. Jovanny Boateng in the past. He reports some right leg claudication with increased walking. He says his knee has not been able to completely extend his leg since 1964. He injured his right knee playing football and underwent total meniscectomy and arthrotomy by Dr. Gonzales Pricne. He is s/p right endarterectomy for circulation problems in July 2019--doing well. He currently takes Plavix. He was previously seen for hip pain. He has hip pain after walking more than one block. He reports he is bothered mostly by his left hip pain. He reports hip and knee startup pain and pain increased use. He reports that he takes a diuretic that causes him to urinate frequently. He was previously seen by Dr. Jovanny oBateng and VERO Holloway . He has a stent in his right femoral artery. He reports a blockage in his left [address] of [facility].        Pain Assessment  7/15/2019   Location of Pain Knee   Location Modifiers Right   Severity of Pain 4   Quality of Pain Dull   Quality of Pain Comment -   Duration of Pain A few minutes   Frequency of Pain Intermittent   Date Pain First Started -   Aggravating Factors Walking   Aggravating Factors Comment - Limiting Behavior -   Relieving Factors (No Data)   Relieving Factors Comment Vicodin   Result of Injury -   Work-Related Injury -   Type of Injury -   Type of Injury Comment -     Occupation, etc:  Mr. Fernanda Joseph he lives with his wife in [facility]. He has 3 sons not in the area. He retired 15 years ago as a  for the [employer] 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 He reports that he lost 20 pounds by exercising more recently while moving. He reports that he cut his right leg a few months ago. His wound took 2 months to heal. He enjoys doing water exercises. His eldest son passed away on 19 and he recently returned from [place] for his . He is unsure of the cause of his son's death. Current weight is 226 pounds. He is 6' tall.       Lab Results   Component Value Date/Time    Hemoglobin A1c 6.1 (H) 2018 07:50 AM    Hemoglobin A1c (POC) 5.5 2019 09:05 AM     Weight Metrics 7/15/2019 7/15/2019 2019 2019 2019 2019 2019   Weight 215 lb 215 lb 215 lb 6.4 oz 221 lb 217 lb 219 lb 6.4 oz 210 lb   BMI 29.16 kg/m2 29.16 kg/m2 29.21 kg/m2 29.97 kg/m2 29.43 kg/m2 29.76 kg/m2 28.48 kg/m2       Patient Active Problem List   Diagnosis Code    Colitis, ulcerative ([facility] K51.90    Colon polyps K63.5    Peripheral vascular disease ([facility] Dr Tracee Reese I73.9    Left carotid artery occlusion I65.22    Atherosclerosis of artery of extremity with intermittent claudication (HCC) I70.219    Hypovitaminosis D E55.9    Advance directive in chart Z78.9    Hypertension I11.9    Dyslipidemia E78.5    Coronary artery disease I25.10    Atrial fibrillation I48.91    Recurrent umbilical hernia U23.9    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 9) E66.3    History of prostate cancer Z85.46    Facet hypertrophy of lumbar region M47.816    Aortoiliac occlusive disease ([facility]) I74.09    PAD (peripheral artery disease) ([facility]) I73.9    Atherosclerosis of native artery of both lower extremities with intermittent claudication ([facility]) I70.213     REVIEW OF SYSTEMS: All Below are Negative except: See HPI   Constitutional: negative for fever, chills, and weight loss. Cardiovascular: negative for chest pain, claudication, leg swelling, SOB, DUONG   Gastrointestinal: Negative for pain, N/V/C/D, Blood in stool or urine, dysuria,  hematuria, incontinence, pelvic pain. Musculoskeletal: See HPI   Neurological: Negative for dizziness and weakness. Negative for headaches, Visual changes, confusion, seizures   Phychiatric/Behavioral: Negative for depression, memory loss, substance  abuse. Extremities: Negative for hair changes, rash, or skin lesion changes. Hematologic: Negative for bleeding problems, bruising, pallor or swollen lymph  nodes   Peripheral Vascular: No calf pain, no circulation deficits.     Social History     Socioeconomic History    Marital status:      Spouse name: Not on file    Number of children: Not on file    Years of education: Not on file    Highest education level: Not on file   Occupational History    Not on file   Social Needs    Financial resource strain: Not on file    Food insecurity:     Worry: Not on file     Inability: Not on file    Transportation needs:     Medical: Not on file     Non-medical: Not on file   Tobacco Use    Smoking status: Former Smoker     Packs/day: 1.50     Years: 25.00     Pack years: 37.50     Types: Cigarettes     Last attempt to quit: 2003     Years since quittin.5    Smokeless tobacco: Never Used   Substance and Sexual Activity    Alcohol use: Yes     Comment: RARELY    Drug use: No    Sexual activity: Yes     Partners: Female     Birth control/protection: None   Lifestyle    Physical activity:     Days per week: Not on file     Minutes per session: Not on file    Stress: Not on file   Relationships    Social connections:     Talks on phone: Not on file     Gets together: Not on file     Attends Yazdanism service: Not on file     Active member of club or organization: Not on file     Attends meetings of clubs or organizations: Not on file     Relationship status: Not on file    Intimate partner violence:     Fear of current or ex partner: Not on file     Emotionally abused: Not on file     Physically abused: Not on file     Forced sexual activity: Not on file   Other Topics Concern    Not on file   Social History Narrative    Not on file      Allergies   Allergen Reactions    Altace [Ramipril] Unknown (comments)     Pt does not remember reaction    Lipitor [Atorvastatin] Other (comments)     Muscle pain    Lisinopril Shortness of Breath and Other (comments)     Turns bright red    Other Medication Other (comments)     Vicryl suture on skin tends to be rejected with poor wound healing does better with monocryl    Procardia [Nifedipine] Other (comments)     Caused afib    Rosuvastatin Other (comments)     Muscle Pain        Current Outpatient Medications   Medication Sig    clopidogrel (PLAVIX) 75 mg tab Take 1 Tab by mouth daily.  oxyCODONE-acetaminophen (PERCOCET) 5-325 mg per tablet Take 1 Tab by mouth every four (4) hours as needed for Pain for up to 14 days. Max Daily Amount: 6 Tabs.  meclizine (ANTIVERT) 25 mg tablet Take 12.5 mg by mouth three (3) times daily as needed.  diclofenac (VOLTAREN) 1 % gel Apply 2-4 g to affected area four (4) times daily. (Patient taking differently: Apply 2-4 g to affected area daily as needed.)    colestipol (COLESTID) 1 gram tablet 1 g daily as needed.     hyoscyamine SL (LEVSIN/SL) 0.125 mg SL tablet 0.125 mg by SubLINGual route every four (4) hours as needed for Cramping.  LORazepam (ATIVAN) 1 mg tablet Take 1 Tab by mouth every eight (8) hours as needed.  ezetimibe-simvastatin (VYTORIN) 10-40 mg per tablet TAKE 1 TABLET NIGHTLY    atenolol (TENORMIN) 25 mg tablet TAKE ONE TABLET BY MOUTH TWICE A DAY    icosapent ethyl (VASCEPA) 1 gram capsule Take 2 Caps by mouth two (2) times daily (with meals).  gabapentin (NEURONTIN) 300 mg capsule 1 cap every day to bid (Patient taking differently: nightly. 1 cap every day to bid  Indications: Neuropathic Pain)    polyethylene glycol (MIRALAX) 17 gram packet 17 g.    losartan (COZAAR) 25 mg tablet TAKE ONE TABLET BY MOUTH TWICE A DAY    aspirin delayed-release 81 mg tablet Take 81 mg by mouth daily.  triamterene-hydrochlorothiazide (MAXZIDE) 37.5-25 mg per tablet TAKE ONE TABLET BY MOUTH DAILY    Cholecalciferol, Vitamin D3, 1,000 unit cap Take 1,000 Units by mouth daily.  MULTIVITAMIN PO Take  by mouth daily.  pantoprazole (PROTONIX) 40 mg tablet Take 40 mg by mouth daily.  coenzyme q10 200 mg Cap Take  by mouth daily. No current facility-administered medications for this visit.        PHYSICAL EXAMINATION:  Visit Vitals  /65 (BP 1 Location: Left arm, BP Patient Position: Sitting)   Pulse 73   Temp 96.2 °F (35.7 °C) (Oral)   Resp 17   Ht 6' (1.829 m)   Wt 215 lb (97.5 kg)   SpO2 98% Comment: RA   BMI 29.16 kg/m²      ORTHO EXAMINATION:  Examination Right knee Left knee   Skin Intact Intact   Range of motion 120-0 120-0   Effusion - -   Medial joint line tenderness + -   Lateral joint line tenderness - -   Popliteal tenderness - -   Osteophytes palpable + -   Kylahs - -   Patella crepitus + -   Anterior drawer - -   Lateral laxity - -   Medial laxity - -   Varus deformity + -   Valgus deformity - -   Pretibial edema - -   Calf tenderness - -   Brisk capilary refill, palpable pulse  Examination Right hip Left hip   Skin Intact Intact   External Rotation ROM 20 20   Internal Rotation ROM 10 10   Trochanteric tenderness + +   Hip flexion contracture - -   Antalgic gait - -   Trendelenberg sign - -   Lumbar tenderness - -   Straight leg raise - -   Calf tenderness - -   Neurovascular Intact Intact       Chart reviewed for the following:   I, Jeremiah Gore MD, have reviewed the History, Physical and updated the Allergic reactions for [address] performed immediately prior to start of procedure:  Noemi Dimas MD, have performed the following reviews on [facility] prior to the start of the procedure:            * Patient was identified by name and date of birth   * Agreement on procedure being performed was verified  * Risks and Benefits explained to the patient  * Procedure site verified and marked as necessary  * Patient was positioned for comfort  * Consent was obtained     Time: 10:50 AM    Date of procedure: 7/15/2019    Procedure performed by:  Jeremiah Gore MD    Mr. Go Kilpatrick tolerated the procedure well with no complications. RADIOGRAPHS:  XR KLAUS HIP 11/7/18 SAWYER  IMPRESSION:  AP pelvis and two views - No fractures, moderate joint space narrowing, acetabular osteophytes present. Tonnis grade 2. Femoral acetabular impingement syndrome     XR RIGHT KNEE 4/13/18 SAWYER  IMPRESSION:  Three views - No fractures, no effusion, severe end stage with lateral subluxation joint space narrowing, + osteophytes present. IKDC Grade C. calcification of arteries    IMPRESSION:      ICD-10-CM ICD-9-CM    1. Primary osteoarthritis of right knee M17.11 715.16 betamethasone (CELESTONE SOLUSPAN) 6 mg/mL injection      BETAMETHASONE ACETATE & SODIUM PHOSPHATE INJECTION 3 MG EA.      DRAIN/INJECT LARGE JOINT/BURSA      PROCEDURE AUTHORIZATION TO    2.  Chronic pain of right knee M25.561 719.46 betamethasone (CELESTONE SOLUSPAN) 6 mg/mL injection    G89.29 338.29 BETAMETHASONE ACETATE & SODIUM PHOSPHATE INJECTION 3 MG EA.      DRAIN/INJECT LARGE JOINT/BURSA      PROCEDURE AUTHORIZATION TO      PLAN:   After discussing treatment options, patient's right knee was injected with 4 cc Marcaine and 1/2 cc Celestone. There is no need for surgery at this time. He will follow up as needed.     Scribed by Preethi Feldman ([address]) as dictated by Berna Cain MD

## 2019-07-15 NOTE — LETTER
7/15/19 Patient: Lei Gottron YOB: 1945 Date of Visit: 7/15/2019 Radhames Croft MD 
[place] [address] [address] [place] [address] VIA In Basket Dear Radhames Croft MD, Thank you for referring Mr. Daniel Carlos to [facility] for evaluation. My notes for this consultation are attached. If you have questions, please do not hesitate to call me. I look forward to following your patient along with you. Sincerely, Rachel Wade MD

## 2019-07-15 NOTE — PROGRESS NOTES
1. Have you been to the ER, urgent care clinic since your last visit? Hospitalized since your last visit? No    2. Have you seen or consulted any other health care providers outside of the [address] [place] [address] since your last visit? Include any pap smears or colon screening.  No

## 2019-07-16 ENCOUNTER — TELEPHONE (OUTPATIENT)
Dept: VASCULAR SURGERY | Age: 74
End: 2019-07-16

## 2019-07-19 ENCOUNTER — OFFICE VISIT (OUTPATIENT)
Dept: ORTHOPEDIC SURGERY | Age: 74
End: 2019-07-19

## 2019-07-19 VITALS
OXYGEN SATURATION: 98 % | HEART RATE: 67 BPM | BODY MASS INDEX: 29.8 KG/M2 | DIASTOLIC BLOOD PRESSURE: 87 MMHG | HEIGHT: 72 IN | SYSTOLIC BLOOD PRESSURE: 168 MMHG | RESPIRATION RATE: 18 BRPM | WEIGHT: 220 LBS

## 2019-07-19 DIAGNOSIS — M79.645 FINGER PAIN, LEFT: ICD-10-CM

## 2019-07-19 DIAGNOSIS — M65.342 TRIGGER RING FINGER OF LEFT HAND: Primary | ICD-10-CM

## 2019-07-19 NOTE — PROGRESS NOTES
Amanda Mckeon is a 68 y.o. male right handed retiree. Worker's Compensation and legal considerations: not known. Vitals:    07/19/19 1054   BP: 168/87   Pulse: 67   Resp: 18   SpO2: 98%   Weight: 220 lb (99.8 kg)   Height: 6' (1.829 m)   PainSc:   3           Chief Complaint   Patient presents with    Hand Pain     left hand pain       HPI: Patient comes in today with complaints of left ring finger locking and pain. He also reports that there is some redness over the back of the finger but he is unsure of any injury or if he was possibly bitten by something. Prior HPI: Patient comes in today for follow-up of left carpal tunnel syndrome. Proximally 3 months ago he received an injection in his left carpal tunnel. We discussed the need for surgery in the future. However he is having a vascular surgery in his right lower extremity that we will delay this. He is requesting an injection today. He reports the injection for his left ring trigger finger helped significantly. He reports since injection not really noticing that his small finger was going numb. Previous HPI: Patient comes in today for EMG follow-up of his left upper extremity. At his last visit he received a right index finger A1 pulley injection for trigger finger that he said has helped and has not come back. He reports the numbness and tingling in his left side has not changed. He says it is mostly at night but he thinks it involves all of the digits. Initial HPI: Patient comes in today as a referral from my partner. 1 month ago he had a left carpal tunnel injection as well as a ring finger trigger finger injection. He says the numbness and tingling in the left is improved. However he says the ring finger is still locking up. He reports having an EMG many years ago. He also has a history of a right sided carpal tunnel release by Hailey Gutierres in December 2017.   He also has a history of cervical spine arthritis that he has been seen at the spine center for. He reports a one-week history of burning and pain in the index finger at the level of the palm. He denies any injuries to this area. Date of onset:  Many years worsening since 2018 regarding the carpal tunnel syndrome on the left    Injury: No    Prior Treatment:  Yes: Comment: He has worn a cockup wrist brace on the left    Numbness/ Tingling: Yes: Comment: Thumb index middle ring and small fingers on the left    ROS: Review of Systems - General ROS: negative  Respiratory ROS: no cough, shortness of breath, or wheezing  Cardiovascular ROS: no chest pain or dyspnea on exertion  Musculoskeletal ROS: positive for - pain in hand - bilateral  Neurological ROS: positive for - numbness/tingling  Dermatological ROS: negative    Past Medical History:   Diagnosis Date    Adenoma of left adrenal gland     2009  1 cm, no change 1/15, 2/16    Asbestosis     CT 1/19 showed pleural plaques    Atrial fibrillation     CHA2DS2-VAsc=3(age+, htn+, vasc dx+; estimated yearly stroke risk according to Lip et al. is 3,2%), Hasbled=2(estimated yearly bleeding risk according to pisters et al. is 1,88%); not anticoagulated due to h/o gi bleed    Atrial fibrillation ablation     10/08    Basal cell cancer     Dr Jeramie Juarez; he's had >350 lesions removed    Carotid occlusion     left     Cervical radiculopathy     MRI 9/11 showed C3-6 severe foraminal stenosis    Chronic pain     Colon polyp     Dr Charlene Sevilla 9/15    Coronary artery disease     RCA - 3.0 x 16mm TAXUS 9/04, 3.0 x 16mm TAXUS 12/06    Dyslipidemia     Erectile dysfunction     GERD     GI bleed     Hearing loss     2014  bilateral Dr Moya Cardinal    Hernia, umbilical     Hypertension     with component of white coat    Hypogonadism male     Lumbar radiculopathy     Dr Jann Lee;  MRI 9/11 L4-5 disc bulging, annular tear, disc dessication    Myofascial pain dysfunction syndrome     pain clinic     OAB (overactive bladder)     Osteoarthritis     right knee Dr Philip Puente Overweight (BMI 25.0-29. 9)     IF 5/18 start weight 214 lbs, not doing    Peripheral vascular disease     50-60% R iliac; s/p R iliac stent and L femoral artery stent in past; R OLIVIA 0.76 (1/16)    Plantar fasciitis     bilateral     PPD positive     Prediabetes     Prostate cancer     T1c [place] 7(3+4), 70% in 1 core, GS 7 (3+4) in 4 cores, GS 6 (3+3) in 1 core; psa 5.28, TRUS 18 gm;  Dr Karen Doyle; s/p cryoablation 10/16    Recurrent umbilical hernia     Subclinical hypothyroidism     denies    Tinnitus     Dr Frandy Lujan Ulcerative colitis     Venous insufficiency        Past Surgical History:   Procedure Laterality Date    CARDIAC SURG PROCEDURE UNLIST  04/2018    [facility] Dr Alisia Alvarado; echo nl lv, ef 60%, dilated RV, biatrial enlargement, trace AI    CARDIAC SURG PROCEDURE UNLIST  04/2018    [facility] Dr Alisia Alvarado; NST neg, ef 60%    HAND/FINGER SURGERY UNLISTED  2017    HX CAROTID ENDARTERECTOMY      1/14  right    Brianne Eben HX COLONOSCOPY      Dr Karen Doyle 9/15 polyps    HX CRYOABLATION OF THE PROSTATE      10/16    HX HEMORRHOIDECTOMY      1979    [address], 1975    HX ORTHOPAEDIC      right knee surgery    HX ORTHOPAEDIC  12/2017    Dr Tabatha Forrest; R CTS release    HX REFRACTIVE SURGERY      OD (hemoplasty to right eye on retina to avoid blindness)    HX TONSILLECTOMY      1955    MI LAP, RECURRENT INCISIONAL HERNIA REPAIR,REDUCIBLE N/A 02/09/2017    Dr. Sawyer Latin HERNIA,5+Y/O,REDUCIBL      2/16  left  Dr. Patti DE LEON,0+U/G,WBQZQ      2/16  Dr. Maria Elena Henriquez      1/16  R OLIVIA 0.76, L OLIVIA 1.02    VASCULAR SURGERY PROCEDURE UNLIST      10/15   left fem art and left iliac stent       Current Outpatient Medications   Medication Sig Dispense Refill    triamcinolone acetonide (KENALOG) 10 mg/mL injection 1 mL by Intra artICUlar route once for 1 dose.  1 Vial 0    clopidogrel (PLAVIX) 75 mg tab Take 1 Tab by mouth daily. 31 Tab 6    meclizine (ANTIVERT) 25 mg tablet Take 12.5 mg by mouth three (3) times daily as needed.  diclofenac (VOLTAREN) 1 % gel Apply 2-4 g to affected area four (4) times daily. (Patient taking differently: Apply 2-4 g to affected area daily as needed.) 100 g 5    colestipol (COLESTID) 1 gram tablet 1 g daily as needed.  hyoscyamine SL (LEVSIN/SL) 0.125 mg SL tablet 0.125 mg by SubLINGual route every four (4) hours as needed for Cramping.  LORazepam (ATIVAN) 1 mg tablet Take 1 Tab by mouth every eight (8) hours as needed. 50 Tab 0    ezetimibe-simvastatin (VYTORIN) 10-40 mg per tablet TAKE 1 TABLET NIGHTLY 90 Tab 3    atenolol (TENORMIN) 25 mg tablet TAKE ONE TABLET BY MOUTH TWICE A  Tab 3    icosapent ethyl (VASCEPA) 1 gram capsule Take 2 Caps by mouth two (2) times daily (with meals). 360 Cap 3    gabapentin (NEURONTIN) 300 mg capsule 1 cap every day to bid (Patient taking differently: nightly. 1 cap every day to bid  Indications: Neuropathic Pain) 180 Cap 1    polyethylene glycol (MIRALAX) 17 gram packet 17 g.      losartan (COZAAR) 25 mg tablet TAKE ONE TABLET BY MOUTH TWICE A  Tab 2    aspirin delayed-release 81 mg tablet Take 81 mg by mouth daily.  triamterene-hydrochlorothiazide (MAXZIDE) 37.5-25 mg per tablet TAKE ONE TABLET BY MOUTH DAILY 90 Tab 3    Cholecalciferol, Vitamin D3, 1,000 unit cap Take 1,000 Units by mouth daily.  MULTIVITAMIN PO Take  by mouth daily.  pantoprazole (PROTONIX) 40 mg tablet Take 40 mg by mouth daily.  coenzyme q10 200 mg Cap Take  by mouth daily.          Allergies   Allergen Reactions    Altace [Ramipril] Unknown (comments)     Pt does not remember reaction    Lipitor [Atorvastatin] Other (comments)     Muscle pain    Lisinopril Shortness of Breath and Other (comments)     Turns bright red    Other Medication Other (comments)     Vicryl suture on skin tends to be rejected with poor wound healing does better with monocryl    Procardia [Nifedipine] Other (comments)     Caused afib    Rosuvastatin Other (comments)     Muscle Pain           PE:     Hand:  LRF DIP superficial erythema likely skin irritated    Examination L Digit(s) R Digit(s)   1st CMC Tenderness -  -    1st CMC Grind -  -    Pennie Nodes -  -    [place]den Nodes -  -    A1 Pulley Tenderness + RF -    Triggering + RF -    UCL Instability -  -    RCL Instability -  -    Lateral Stress Pain -  -    Palmar Cords -  -    Tabletop test -  -    Garrod's Pads -  -     Strength       Pinch Strength         ROM: Full        Imaging:     Plain films of the left ring finger are negative for any acute fracture dislocation. There are minimal degenerative changes. 2017 MRI of cervical spine  IMPRESSION:     1. Multilevel degenerative findings of cervical spine.  -Facet arthropathy more severe than disc pathology. -Multilevel severe foraminal stenoses from facet arthropathy as delineated  above. Similar distribution previously.  -No high-grade spinal stenosis. NCV & EMG Findings:  Evaluation of the left median motor nerve showed prolonged distal onset latency (5.2 ms) and decreased conduction velocity (Elbow-Wrist, 44 m/s). The left ulnar motor nerve showed decreased conduction velocity (A Elbow-B Elbow, 48 m/s). The left median sensory nerve showed prolonged distal peak latency (4.4 ms), reduced amplitude (5.0 µV), and decreased conduction velocity (Wrist-2nd Digit, 32 m/s). The left ulnar sensory nerve showed prolonged distal peak latency (3.9 ms), reduced amplitude (5.5 µV), and decreased conduction velocity (Wrist-5th Digit, 36 m/s). All remaining nerves (as indicated in the following tables) were within normal limits.       Needle evaluation of the left triceps muscle showed increased motor unit amplitude and slightly increased polyphasic potentials.   The left pronator teres and the left Ext Digitorum muscles showed slightly increased polyphasic potentials. All remaining muscles (as indicated in the following table) showed no evidence of electrical instability.       INTERPRETATION  This is an abnormal electrodiagnostic examination. These findings may be consistent with:  1. 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 moderate carpal tunnel syndrome at the left wrist (carpal tunnel syndrome)  3. chronic C6/7 cervical radiculopathy on the left  4. mild signs of peripheral neuropathy      CLINICAL INTERPRETATION  His symptoms may multifactorial, related to any of these diagnoses, but most likely the carpal tunnel syndrome. ICD-10-CM ICD-9-CM    1. Trigger ring finger of left hand M65.342 727.03 TRIAMCINOLONE ACETONIDE INJ      triamcinolone acetonide (KENALOG) 10 mg/mL injection      INJECT TENDON ORIGIN/INSERT   2. Finger pain, left M79.645 729.5 AMB POC XRAY, FINGER(S), 2+ VIEWS       Plan:     Left RF A1 Pulley Injection    F/U PRN    [facility]  OFFICE PROCEDURE PROGRESS NOTE        Chart reviewed for the following:   Ascencion YODER DO, have reviewed the History, Physical and updated the Allergic reactions for [address] performed immediately prior to start of procedure:   Ascencion YODER DO, have performed the following reviews on [facility] prior to the start of the procedure:            * Patient was identified by name and date of birth   * Agreement on procedure being performed was verified  * Risks and Benefits explained to the patient  * Procedure site verified and marked as necessary  * Patient was positioned for comfort  * Consent was signed and verified     Time: 11:10 AM      Date of procedure: 7/19/2019    Procedure performed by:   Carola Velez DO    Provider assisted by: Ayush Alas LPN    Patient assisted by: self    How tolerated by patient: tolerated the procedure well with no complications    Post Procedural Pain Scale: 0 - No Hurt    Comments: none    Procedure:  After consent was obtained, using sterile technique the A1 pulley was prepped. Local anesthetic used: 1% lidocaine. Kenalog 5 mg and was then injected and the needle withdrawn. The procedure was well tolerated. The patient is asked to continue to rest the area for a few more days before resuming regular activities. It may be more painful for the first 1-2 days. Watch for fever, or increased swelling or persistent pain in the joint. Call or return to clinic prn if such symptoms occur or there is failure to improve as anticipated.     Plan was reviewed with patient, who verbalized agreement and understanding of the plan

## 2019-07-23 ENCOUNTER — PATIENT OUTREACH (OUTPATIENT)
Dept: INTERNAL MEDICINE CLINIC | Age: 74
End: 2019-07-23

## 2019-07-23 ENCOUNTER — TELEPHONE (OUTPATIENT)
Dept: INTERNAL MEDICINE CLINIC | Age: 74
End: 2019-07-23

## 2019-07-23 NOTE — PROGRESS NOTES
Transitions of Care Coordination  Follow-Up    Date/Time:  7/23/2019 4:32 PM     NN contacted patient for Transitions of Care Coordination  follow up. Spoke to patient. Introduced self/role and reason for call. Patient reported:   Incision to right groin/fem area is healed up; denied all s/s of infection  Swelling and redness to ring finger,left hand; denied warmth   Denied bowel or bladder issues     Medications:   New medications since last outreach: no  Does patient need refills on any medications: no  Medication changes since last outreach (dose adjustments or discontinued meds): no    Home Health company: N/A  Date of discharge: N/A    Barriers to care? None    Specialist appointments since last outreach? yes  If so, specialist and date: ortho 7/19/19    Patient reported he saw ortho last week for finger but they could not figure out was going on with it. Patient also verbalized he has seen ortho as well and they were unable to figure it out either. Patient scheduled to see PCP next week for follow up. Patient reminded that there are physicians on call 24 hours a day / 7 days a week (M-F 5pm to 8am and from Friday 5pm until Monday 8a for the weekend) should the patient have questions or concerns.      Future Appointments   Date Time Provider Mary Reyes   7/29/2019  8:00 AM [facility] 1 [facility] SCHED   7/29/2019  9:00 AM [facility] NONIMAGING [facility] SCHED   7/31/2019  8:15 AM Kaylee Krabbe, MD [facility] [place] SCHED   7/31/2019 11:00 AM Jacques Noonan MD [facility] SCHED   9/13/2019  8:00 AM Yamel Sarmiento DO [facility] SCHED   10/3/2019  8:50 AM [facility] NURSE [facility] SCHED   10/10/2019 10:30 AM Mita Gonzalez MD [facility] SCHED   11/11/2019  8:30 AM Kizzy Blair  [address] 11/18/2019  8:30 AM [facility] NURSE VISIT [facility] SCHED   11/25/2019  2:00 PM Jacques Noonan MD [facility] SCHED        Other upcoming appointments:  N/A    Goals      Attends follow-up appointments as directed. 7/5: Patient aware of upcoming appts with vascular. Patient to contact ortho for right knee pain. 7/12: Has appt with ortho and vascular on 7/15/19  7/23: Patient attended ortho appt in 7/15 and was seen again on 7/19. Patient aware of PCP appt on 7/31/19.  Understands red flags post discharge. 7/5: Educated patient on s/s of infection: redness, warmth at site(s), swelling, bleeding or pus-like drainage, chills, fever (> 100.5), low body temperature (<97.2), nausea/vomiting that prevents eating/drinking/taking medications, SOB, chest pain/pressure, palpitations, increased respirations, decreased urine output    7/23: Patient denied s/s of infection to right groin area.  Has redness and swelling to left ring finger (onset date 6/23)

## 2019-07-31 ENCOUNTER — OFFICE VISIT (OUTPATIENT)
Dept: VASCULAR SURGERY | Age: 74
End: 2019-07-31

## 2019-07-31 ENCOUNTER — OFFICE VISIT (OUTPATIENT)
Dept: INTERNAL MEDICINE CLINIC | Age: 74
End: 2019-07-31

## 2019-07-31 ENCOUNTER — PATIENT OUTREACH (OUTPATIENT)
Dept: INTERNAL MEDICINE CLINIC | Age: 74
End: 2019-07-31

## 2019-07-31 VITALS
SYSTOLIC BLOOD PRESSURE: 125 MMHG | HEART RATE: 66 BPM | RESPIRATION RATE: 14 BRPM | OXYGEN SATURATION: 96 % | WEIGHT: 220 LBS | BODY MASS INDEX: 29.8 KG/M2 | DIASTOLIC BLOOD PRESSURE: 78 MMHG | TEMPERATURE: 98 F | HEIGHT: 72 IN

## 2019-07-31 VITALS
BODY MASS INDEX: 29.8 KG/M2 | SYSTOLIC BLOOD PRESSURE: 136 MMHG | HEART RATE: 68 BPM | RESPIRATION RATE: 18 BRPM | DIASTOLIC BLOOD PRESSURE: 72 MMHG | HEIGHT: 72 IN | WEIGHT: 220 LBS

## 2019-07-31 DIAGNOSIS — I65.22 LEFT CAROTID ARTERY OCCLUSION: ICD-10-CM

## 2019-07-31 DIAGNOSIS — L98.9 SKIN LESION: Primary | ICD-10-CM

## 2019-07-31 DIAGNOSIS — I70.213 ATHEROSCLEROSIS OF NATIVE ARTERY OF BOTH LOWER EXTREMITIES WITH INTERMITTENT CLAUDICATION (HCC): ICD-10-CM

## 2019-07-31 DIAGNOSIS — I74.09 AORTOILIAC OCCLUSIVE DISEASE (HCC): Primary | ICD-10-CM

## 2019-07-31 DIAGNOSIS — I73.9 PAD (PERIPHERAL ARTERY DISEASE) (HCC): ICD-10-CM

## 2019-07-31 NOTE — PROGRESS NOTES
Lindsey Snider presents today for   Chief Complaint   Patient presents with    Finger Swelling     inflammation, slight redness and warmth to left ring finger onset June 23    Medication Evaluation     requesting a differnt pain medication that does not have acetaminophen              Depression Screening:  3 most recent PHQ Screens 7/31/2019   PHQ Not Done -   Little interest or pleasure in doing things Not at all   Feeling down, depressed, irritable, or hopeless Not at all   Total Score PHQ 2 0       Learning Assessment:  Learning Assessment 7/31/2019   PRIMARY LEARNER Patient   HIGHEST LEVEL OF EDUCATION - PRIMARY LEARNER  [id number] [address] PRIMARY LEARNER NONE   CO-LEARNER CAREGIVER No   PRIMARY LANGUAGE ENGLISH   LEARNER PREFERENCE PRIMARY LISTENING     DEMONSTRATION     PICTURES     VIDEOS     READING   ANSWERED BY Evaristo   RELATIONSHIP SELF       Abuse Screening:  Abuse Screening Questionnaire 5/23/2019   Do you ever feel afraid of your partner? N   Are you in a relationship with someone who physically or mentally threatens you? N   Is it safe for you to go home? Y       Fall Risk  Fall Risk Assessment, last 12 mths 7/31/2019   Able to walk? Yes   Fall in past 12 months? No   Fall with injury? -   Number of falls in past 12 months -   Fall Risk Score -           Coordination of Care:  1. Have you been to the ER, urgent care clinic since your last visit? Hospitalized since your last visit? no    2. Have you seen or consulted any other health care providers outside of the [address] [place] [address] since your last visit? Include any pap smears or colon screening.  no

## 2019-07-31 NOTE — LETTER
7/31/19 Patient: Micheal Espinal YOB: 1945 Date of Visit: 7/31/2019 Yara So MD 
[address] [place] [address] VIA In Basket Dear Yara So MD, Thank you for referring Mr. Maria Guadalupe Lombardo to [facility] for evaluation. My notes for this consultation are attached. If you have questions, please do not hesitate to call me. I look forward to following your patient along with you. Sincerely, Jayesh Pinto MD

## 2019-07-31 NOTE — PROGRESS NOTES
68 y.o. WHITE OR  male who presents for evaluation. He has a spot in the left fourth DIP area that he wanted checked out. Showed up about a month ago, slightly raised reddish area, not really bothering him in terms of pain, itching. He does not recall any trauma, insect bites, he is pretty sure there is no foreign body in there. He talked to Dr. Jaime Ambrose about it, x-rays apparently showed mild arthritis but he does not think that it was related to the skin issue. He then saw Dr. Jeramie Juarez who gave him some cream but could not define exactly what it was either. He is wondering if he should see plastics for resection    Past Medical History:   Diagnosis Date    Adenoma of left adrenal gland     2009  1 cm, no change 1/15, 2/16    Asbestosis     CT 1/19 showed pleural plaques    Atrial fibrillation     CHA2DS2-VAsc=3(age+, htn+, vasc dx+; estimated yearly stroke risk according to Lip et al. is 3,2%), Hasbled=2(estimated yearly bleeding risk according to pisters et al. is 1,88%); not anticoagulated due to h/o gi bleed    Atrial fibrillation ablation     10/08    Basal cell cancer     Dr Jeramie Juarez; he's had >350 lesions removed    Carotid occlusion     left     Cervical radiculopathy     MRI 9/11 showed C3-6 severe foraminal stenosis    Chronic pain     Colon polyp     Dr Charlene Sevilla 9/15    Coronary artery disease     RCA - 3.0 x 16mm TAXUS 9/04, 3.0 x 16mm TAXUS 12/06    Dyslipidemia     Erectile dysfunction     GERD     GI bleed     Hearing loss     2014  bilateral Dr Moya Cardinal    Hernia, umbilical     Hypertension     with component of white coat    Hypogonadism male     Lumbar radiculopathy     Dr Jann Lee;  MRI 9/11 L4-5 disc bulging, annular tear, disc dessication    Myofascial pain dysfunction syndrome     pain clinic     OAB (overactive bladder)     Osteoarthritis     right knee Dr Abel Jaramillo Overweight (BMI 25.0-29. 9)     IF 5/18 start weight 214 lbs, not doing    Peripheral vascular disease     50-60% R iliac; s/p R iliac stent and L femoral artery stent in past; R OLIVIA 0.76 (1/16)    Plantar fasciitis     bilateral     PPD positive     Prediabetes     Prostate cancer     T1c [place] 7(3+4), 70% in 1 core, GS 7 (3+4) in 4 cores, GS 6 (3+3) in 1 core; psa 5.28, TRUS 18 gm;  Dr Gio Guajardo; s/p cryoablation 10/16    Recurrent umbilical hernia     Subclinical hypothyroidism     denies    Tinnitus     Dr Olivares Sees Ulcerative colitis     Venous insufficiency      Current Outpatient Medications   Medication Sig    clopidogrel (PLAVIX) 75 mg tab Take 1 Tab by mouth daily.  diclofenac (VOLTAREN) 1 % gel Apply 2-4 g to affected area four (4) times daily. (Patient taking differently: Apply 2-4 g to affected area daily as needed.)    colestipol (COLESTID) 1 gram tablet 1 g daily as needed.  LORazepam (ATIVAN) 1 mg tablet Take 1 Tab by mouth every eight (8) hours as needed.  ezetimibe-simvastatin (VYTORIN) 10-40 mg per tablet TAKE 1 TABLET NIGHTLY    atenolol (TENORMIN) 25 mg tablet TAKE ONE TABLET BY MOUTH TWICE A DAY    icosapent ethyl (VASCEPA) 1 gram capsule Take 2 Caps by mouth two (2) times daily (with meals).  gabapentin (NEURONTIN) 300 mg capsule 1 cap every day to bid (Patient taking differently: nightly. 1 cap every day to bid  Indications: Neuropathic Pain)    polyethylene glycol (MIRALAX) 17 gram packet 17 g.    losartan (COZAAR) 25 mg tablet TAKE ONE TABLET BY MOUTH TWICE A DAY    aspirin delayed-release 81 mg tablet Take 81 mg by mouth daily.  triamterene-hydrochlorothiazide (MAXZIDE) 37.5-25 mg per tablet TAKE ONE TABLET BY MOUTH DAILY    Cholecalciferol, Vitamin D3, 1,000 unit cap Take 1,000 Units by mouth daily.  MULTIVITAMIN PO Take  by mouth daily.  pantoprazole (PROTONIX) 40 mg tablet Take 40 mg by mouth daily.  coenzyme q10 200 mg Cap Take  by mouth daily.  meclizine (ANTIVERT) 25 mg tablet Take 12.5 mg by mouth three (3) times daily as needed.     hyoscyamine SL (LEVSIN/SL) 0.125 mg SL tablet 0.125 mg by SubLINGual route every four (4) hours as needed for Cramping. No current facility-administered medications for this visit. Allergies   Allergen Reactions    Altace [Ramipril] Unknown (comments)     Pt does not remember reaction    Lipitor [Atorvastatin] Other (comments)     Muscle pain    Lisinopril Shortness of Breath and Other (comments)     Turns bright red    Other Medication Other (comments)     Vicryl suture on skin tends to be rejected with poor wound healing does better with monocryl    Procardia [Nifedipine] Other (comments)     Caused afib    Rosuvastatin Other (comments)     Muscle Pain       Visit Vitals  /78   Pulse 66   Temp 98 °F (36.7 °C) (Oral)   Resp 14   Ht 6' (1.829 m)   Wt 220 lb (99.8 kg)   SpO2 96%   BMI 29.84 kg/m²   There is a area in the left fourth finger dorsally just proximal to the DIP joint that slightly raised and reddish, measuring maybe 1.5 cm greatest diameter. Nontender, no fluctuance no obvious drainage. No red streaking. The PIP and DIP joints are nontender, range of motion normal.    Assessment and plan:  1. Skin lesion or subcutaneous lesion etiology unclear. Not sure what else to suggest except either observation or sending him for biopsy and/or resection. He wants to observe for now and will call Dr. Keon Lizarraga if he chooses to get more definitive diagnosis      Above conditions discussed at length and patient vocalized understanding.   All questions answered to patient satisfaction

## 2019-07-31 NOTE — PROGRESS NOTES
Helen Mcdaniel    Chief Complaint   Patient presents with    Leg Pain       History and Physical    Helen Mcdaniel is a 68 y.o. male with known aortoiliac occlusive disease as well as carotid disease. Patient recently had right common femoral endarterectomy with patch angioplasty as well as bilateral common iliac artery stenting. He is now 1 month status post this procedure. Overall he has no claudication or rest pain. He seems to be walking just fine without any fevers or chills. Past Medical History:   Diagnosis Date    Adenoma of left adrenal gland     2009  1 cm, no change 1/15, 2/16    Asbestosis     CT 1/19 showed pleural plaques    Atrial fibrillation     CHA2DS2-VAsc=3(age+, htn+, vasc dx+; estimated yearly stroke risk according to Lip et al. is 3,2%), Hasbled=2(estimated yearly bleeding risk according to pisters et al. is 1,88%); not anticoagulated due to h/o gi bleed    Atrial fibrillation ablation     10/08    Basal cell cancer     Dr Addy Munguia; he's had >350 lesions removed    Carotid occlusion     left     Cervical radiculopathy     MRI 9/11 showed C3-6 severe foraminal stenosis    Chronic pain     Colon polyp     Dr Kim Mariano 9/15    Coronary artery disease     RCA - 3.0 x 16mm TAXUS 9/04, 3.0 x 16mm TAXUS 12/06    Dyslipidemia     Erectile dysfunction     GERD     GI bleed     Hearing loss     2014  bilateral  [employer]    Hernia, umbilical     Hypertension     with component of white coat    Hypogonadism male     Lumbar radiculopathy     Dr Mike Brock;  MRI 9/11 L4-5 disc bulging, annular tear, disc dessication    Myofascial pain dysfunction syndrome     pain clinic     OAB (overactive bladder)     Osteoarthritis     right knee Dr Edwige Tracy Overweight (BMI 25.0-29. 9)     IF 5/18 start weight 214 lbs, not doing    Peripheral vascular disease     50-60% R iliac; s/p R iliac stent and L femoral artery stent in past; R OLIVIA 0.76 (1/16)    Plantar fasciitis bilateral     PPD positive     Prediabetes     Prostate cancer     T1c Imelda 7(3+4), 70% in 1 core, GS 7 (3+4) in 4 cores, GS 6 (3+3) in 1 core; psa 5.28, TRUS 18 gm;  Dr Alvenia Severance; s/p cryoablation 10/16    Recurrent umbilical hernia     Subclinical hypothyroidism     denies    Tinnitus     Dr Radha Diaz Ulcerative colitis     Venous insufficiency      Past Surgical History:   Procedure Laterality Date    CARDIAC SURG PROCEDURE UNLIST  04/2018    [facility] Dr Valentina Santos; echo nl lv, ef 60%, dilated RV, biatrial enlargement, trace AI    CARDIAC SURG PROCEDURE UNLIST  04/2018    [facility] Dr Valentina Santos; NST neg, ef 60%    HAND/FINGER SURGERY UNLISTED  2017    HX CAROTID ENDARTERECTOMY      1/14  right    Tiana Horn HX COLONOSCOPY      Dr Alvenia Severance 9/15 polyps    HX CRYOABLATION OF THE PROSTATE      10/16    HX [address], 1975    HX ORTHOPAEDIC      right knee surgery    HX ORTHOPAEDIC  12/2017    Dr David Littlejohn; R CTS release    HX REFRACTIVE SURGERY      OD (hemoplasty to right eye on retina to avoid blindness)    HX TONSILLECTOMY      1955    [place] LAP, RECURRENT INCISIONAL HERNIA REPAIR,REDUCIBLE N/A 02/09/2017    Dr. Claudette Huerta HERNIA,5+Y/O,REDUCIBL      2/16  left  Dr. Shayla Lozano NIBA,8+B/E,PUAQS      2/16  Dr. Chace Bonner      1/16  R OLIVIA 0.76, L OLIVIA 1.02    VASCULAR SURGERY PROCEDURE UNLIST      10/15   left fem art and left iliac stent     Patient Active Problem List   Diagnosis Code    Colitis, ulcerative ([address] K51.90    Colon polyps K63.5    Peripheral vascular disease ([address] Dr Yosef Carmichael I73.9    Left carotid artery occlusion I65.22    Atherosclerosis of artery of extremity with intermittent claudication ([address] I70.219    Hypovitaminosis D E55.9    Advance directive in chart Z78.9    Hypertension I11.9    Dyslipidemia E78.5    Coronary artery disease I25.10    Atrial fibrillation I48.91  Recurrent umbilical hernia O91.9    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 9) E66.3    History of prostate cancer Z85.46    Facet hypertrophy of lumbar region M47.816    Aortoiliac occlusive disease ([facility]) I74.09    PAD (peripheral artery disease) ([facility]) I73.9    Atherosclerosis of native artery of both lower extremities with intermittent claudication ([facility]) I70.213     Current Outpatient Medications   Medication Sig Dispense Refill    clopidogrel (PLAVIX) 75 mg tab Take 1 Tab by mouth daily. 31 Tab 6    meclizine (ANTIVERT) 25 mg tablet Take 12.5 mg by mouth three (3) times daily as needed.  diclofenac (VOLTAREN) 1 % gel Apply 2-4 g to affected area four (4) times daily. (Patient taking differently: Apply 2-4 g to affected area daily as needed.) 100 g 5    colestipol (COLESTID) 1 gram tablet 1 g daily as needed.  hyoscyamine SL (LEVSIN/SL) 0.125 mg SL tablet 0.125 mg by SubLINGual route every four (4) hours as needed for Cramping.  LORazepam (ATIVAN) 1 mg tablet Take 1 Tab by mouth every eight (8) hours as needed. 50 Tab 0    ezetimibe-simvastatin (VYTORIN) 10-40 mg per tablet TAKE 1 TABLET NIGHTLY 90 Tab 3    atenolol (TENORMIN) 25 mg tablet TAKE ONE TABLET BY MOUTH TWICE A  Tab 3    icosapent ethyl (VASCEPA) 1 gram capsule Take 2 Caps by mouth two (2) times daily (with meals). 360 Cap 3    gabapentin (NEURONTIN) 300 mg capsule 1 cap every day to bid (Patient taking differently: nightly.  1 cap every day to bid  Indications: Neuropathic Pain) 180 Cap 1    polyethylene glycol (MIRALAX) 17 gram packet 17 g.      losartan (COZAAR) 25 mg tablet TAKE ONE TABLET BY MOUTH TWICE A  Tab 2  aspirin delayed-release 81 mg tablet Take 81 mg by mouth daily.  triamterene-hydrochlorothiazide (MAXZIDE) 37.5-25 mg per tablet TAKE ONE TABLET BY MOUTH DAILY 90 Tab 3    Cholecalciferol, Vitamin D3, 1,000 unit cap Take 1,000 Units by mouth daily.  MULTIVITAMIN PO Take  by mouth daily.  pantoprazole (PROTONIX) 40 mg tablet Take 40 mg by mouth daily.  coenzyme q10 200 mg Cap Take  by mouth daily.        Allergies   Allergen Reactions    Altace [Ramipril] Unknown (comments)     Pt does not remember reaction    Lipitor [Atorvastatin] Other (comments)     Muscle pain    Lisinopril Shortness of Breath and Other (comments)     Turns bright red    Other Medication Other (comments)     Vicryl suture on skin tends to be rejected with poor wound healing does better with monocryl    Procardia [Nifedipine] Other (comments)     Caused afib    Rosuvastatin Other (comments)     Muscle Pain       Social History     Socioeconomic History    Marital status:      Spouse name: Not on file    Number of children: Not on file    Years of education: Not on file    Highest education level: Not on file   Occupational History    Not on file   Social Needs    Financial resource strain: Not on file    Food insecurity:     Worry: Not on file     Inability: Not on file    Transportation needs:     Medical: Not on file     Non-medical: Not on file   Tobacco Use    Smoking status: Former Smoker     Packs/day: 1.50     Years: 25.00     Pack years: 37.50     Types: Cigarettes     Last attempt to quit: 2003     Years since quittin.5    Smokeless tobacco: Never Used   Substance and Sexual Activity    Alcohol use: Yes     Comment: RARELY    Drug use: No    Sexual activity: Yes     Partners: Female     Birth control/protection: None   Lifestyle    Physical activity:     Days per week: Not on file     Minutes per session: Not on file    Stress: Not on file   Relationships    Social connections:     Talks on phone: Not on file     Gets together: Not on file     Attends Anabaptist service: Not on file     Active member of club or organization: Not on file     Attends meetings of clubs or organizations: Not on file     Relationship status: Not on file    Intimate partner violence:     Fear of current or ex partner: Not on file     Emotionally abused: Not on file     Physically abused: Not on file     Forced sexual activity: Not on file   Other Topics Concern    Not on file   Social History Narrative    Not on file      Family History   Problem Relation Age of Onset    Heart Disease Mother     Hypertension Mother     Diabetes Mother     Arthritis-osteo Mother     Heart Disease Father     Stroke Father     Hypertension Father     Heart Disease Sister     Hypertension Sister        Physical Exam:    Visit Vitals  /72 (BP 1 Location: Left arm, BP Patient Position: Sitting)   Pulse 68   Resp 18   Ht 6' (1.829 m)   Wt 220 lb (99.8 kg)   BMI 29.84 kg/m²      General: Well-appearing male in no acute distress  HEENT: EOMI no scleral icterus is noted patient is wearing eyeglasses  Pulmonary: No increased work of breathing is noted  Extremities: Warm and perfused bilaterally his right groin incision is healing quite well there is an area on the middle portion that has some minor wound. No evidence of cellulitis or drainage is noted  Neuro: Cranial nerves II through XII are grossly intact    Impression and Plan:  Linda Diaz is a 68 y.o. male with aortoiliac occlusive disease as well as peripheral arterial disease. I reviewed his ultrasounds in clinic today showing absolutely no aortoiliac occlusive disease and completely normal blood flow going bilaterally to his lower extremities. We have fully removed all of his disease and more than likely this will continue for him hopefully. I will bring him back in 5 months for his 6-month visit.   At that point in time we will then have to bring him back in additional 6 months for his anniversary along with carotid ultrasounds for his anniversary of his carotid disease as well. We reviewed the plan with the patient and the patient understands. We also gave the patient appropriate instructions on their disease process and when to call back. Greater than 50% of this visit was spent with face to face discussion. Follow-up and Dispositions    · Return in about 5 months (around 12/31/2019). Kristine Aldridge MD    PLEASE NOTE:  This document has been produced using voice recognition software. Unrecognized errors in transcription may be present.

## 2019-08-05 ENCOUNTER — PATIENT OUTREACH (OUTPATIENT)
Dept: INTERNAL MEDICINE CLINIC | Age: 74
End: 2019-08-05

## 2019-08-05 NOTE — PROGRESS NOTES
Patient has graduated from the Transitions of Care Coordination  program on 7/5/19. Patient's symptoms are stable at this time. Patient/family has the ability to self-manage. Care management goals have been completed at this time. No further nurse navigator follow up scheduled. Goals Addressed                 This Visit's Progress     COMPLETED: Attends follow-up appointments as directed. 7/5: Patient aware of upcoming appts with vascular. Patient to contact ortho for right knee pain. 7/12: Has appt with ortho and vascular on 7/15/19  7/23: Patient attended ortho appt in 7/15 and was seen again on 7/19. Patient aware of PCP appt on 7/31/19.   7/31: Patient attended PCP appt today as scheduled.  COMPLETED: Understands red flags post discharge. On track     7/5: Educated patient on s/s of infection: redness, warmth at site(s), swelling, bleeding or pus-like drainage, chills, fever (> 100.5), low body temperature (<97.2), nausea/vomiting that prevents eating/drinking/taking medications, SOB, chest pain/pressure, palpitations, increased respirations, decreased urine output    7/23: Patient denied s/s of infection to right groin area. Has redness and swelling to left ring finger (onset date 6/23)            Pt has nurse navigator's contact information for any further questions, concerns, or needs.   Patients upcoming visits:    Future Appointments   Date Time Provider Mary Reyes   9/13/2019  8:00 AM [place]est [place]hi [place] [id number]   10/3/2019  8:50 AM [facility] NURSE [facility] SCHED   10/10/2019 10:30 AM MD Niles Rockwell   11/11/2019  8:30 AM Cristy Gutierrez NP [facility] [place] SCHED   11/18/2019  8:30 AM [facility] NURSE VISIT [facility] SCHED   11/25/2019  2:00 PM Mayuri Blackburn MD [facility] SCHED   1/6/2020  8:00 AM [facility] 1 [facility] [place] SCHED   1/6/2020  9:00 AM [facility] NONIMAGING 2VV [place] SCHED   1/8/2020  9:00 AM Korey Nicholson MD 2V [place] [facility]

## 2019-08-06 DIAGNOSIS — M54.12 RADICULOPATHY, CERVICAL: Primary | ICD-10-CM

## 2019-08-06 DIAGNOSIS — M48.02 CERVICAL SPINAL STENOSIS: ICD-10-CM

## 2019-08-06 NOTE — TELEPHONE ENCOUNTER
[facility] last [facility] date for Norco as 6/24/19 for a 30 d/s. Last Visit: 7/31/19 with MD Margret Umana  Next Appointment: 11/25/19 with MD Margret Umana  Previous Refill Encounter(s): 6/18/19 #180    Requested Prescriptions     Pending Prescriptions Disp Refills    HYDROcodone-acetaminophen (NORCO) 5-325 mg per tablet 180 Tab 0     Sig: Take 1 Tab by mouth every four (4) hours as needed for Pain for up to 30 days. Max Daily Amount: 6 Tabs.

## 2019-08-08 RX ORDER — HYDROCODONE BITARTRATE AND ACETAMINOPHEN 5; 325 MG/1; MG/1
1 TABLET ORAL
Qty: 180 TAB | Refills: 0 | Status: SHIPPED | OUTPATIENT
Start: 2019-08-08 | End: 2019-08-09 | Stop reason: SDUPTHER

## 2019-08-09 ENCOUNTER — HOSPITAL ENCOUNTER (OUTPATIENT)
Dept: LAB | Age: 74
Discharge: HOME | End: 2019-08-09
Payer: MEDICARE

## 2019-08-09 ENCOUNTER — TELEPHONE (OUTPATIENT)
Dept: INTERNAL MEDICINE CLINIC | Age: 74
End: 2019-08-09

## 2019-08-09 ENCOUNTER — APPOINTMENT (OUTPATIENT)
Dept: INTERNAL MEDICINE CLINIC | Age: 74
End: 2019-08-09

## 2019-08-09 DIAGNOSIS — M54.12 RADICULOPATHY, CERVICAL: ICD-10-CM

## 2019-08-09 DIAGNOSIS — Z79.899 CONTROLLED SUBSTANCE AGREEMENT SIGNED: ICD-10-CM

## 2019-08-09 DIAGNOSIS — Z79.899 CONTROLLED SUBSTANCE AGREEMENT SIGNED: Primary | ICD-10-CM

## 2019-08-09 DIAGNOSIS — M48.02 CERVICAL SPINAL STENOSIS: ICD-10-CM

## 2019-08-09 PROCEDURE — 80361 OPIATES 1 OR MORE: CPT

## 2019-08-09 PROCEDURE — 80307 DRUG TEST PRSMV CHEM ANLYZR: CPT

## 2019-08-09 RX ORDER — HYDROCODONE BITARTRATE AND ACETAMINOPHEN 5; 325 MG/1; MG/1
1 TABLET ORAL
Qty: 180 TAB | Refills: 0 | Status: SHIPPED | OUTPATIENT
Start: 2019-08-09 | End: 2019-09-08

## 2019-08-09 NOTE — LETTER
[address] MARY English MR #:[id number] 73 [facility] Page 1 of 5 CONTROLLED SUBSTANCE AGREEMENT I may be prescribed medications that are controlled substances as part  of my treatment plan for management of my medical condition(s). The goal of my treatment plan is to maintain and/or improve my health and wellbeing. Because controlled substances have an increased risk of abuse or harm, continual re-evaluation is needed determine if the goals of my treatment plan are being met for my safety and the safety of others. Cipriano Liang  am entering into this Controlled Substance Agreement with my provider, __________________________________ at [place] [address] . I understand that successful treatment requires mutual trust and honesty between me and my provider. I understand that there are state and federal laws and regulations which apply to the medications that my provider may prescribe that must be followed. I understand there are risks and benefits ts of taking the medicines that my provider may prescribe. I understand and agree that following this Agreement is necessary in continuing my provider-patient relationship and success of my treatment plan. As a part of my treatment plan, I agree to the following: COMMUNICATION: 
 
1. I will communicate fully with my provider about my medical condition(s), including the effect on my daily life and how well my medications are helping. I will tell my provider all of the medications that I take for any reason, including medications I receive from another health care provider, and will notify my provider about all issues, problems or concerns, including any side effects, which may be related to my medications. I understand that this information allows my provider to adjust my treatment plan to help manage my medical condition.  I understand that this information will become part of my permanent medical record. 2. I will notify my provider if I have a history of alcohol/drug misuse/addiction or if I have had treatment for alcohol/drug addiction in the past, or if I have a new problem with or concern about alcohol/drug use/addiction, because this increases the likelihood of high risk behaviors and may lead to serious medical conditions. 3. Females Only: I will notify my provider if I am or become pregnant, or if I intend to become pregnant, or if I intend to breastfeed. I understand that communication of these issues with my provider is important, due to possible effects my medication could have on an unborn fetus or breastfeeding child. Initials_____ [address] Renae MR #:[id number] 73 [facility] Page 2 of 5 MISUSE OF MEDICATIONS / DRUGS: 
 
1. I agree to take all controlled substances as prescribed, and will not misuse or abuse any controlled substances prescribed by my provider. For my safety, I will not increase the amount of medicine I take without first talking with and getting permission from my provider. 2. If I have a medical emergency, another health care provider may prescribe me medication. If I seek emergency treatment, I will notify my provider within seventy-two (72) hours. 3. I understand that my provider may discuss my use and/or possible misuse/abuse of controlled substances and alcohol, as appropriate, with any health care provider involved in my care, pharmacist or legal authority. ILLEGAL DRUGS: 
 
1. I will not use illegal drugs of any kind, including but not limited to marijuana, heroin, cocaine, or any prescription drug which is not prescribed to me. DRUG DIVERSION / PRESCRIPTION FRAUD: 
 
1. I will not share, sell, trade, give away, or otherwise misuse my prescriptions or medications. 2. I will not alter any prescriptions provided to me by my provider. SINGLE PROVIDER: 
 
1. I agree that all controlled substances that I take will be prescribed only by my provider (or his/her covering provider) under this Agreement. This agreement does not prevent me from seeking emergency medical treatment or receiving pain management related to a surgery. PROTECTING MEDICATIONS: 
 
1. I am responsible for keeping my prescriptions and medications in a safe and secure place including safeguarding them from loss or theft. I understand that lost, stolen or damaged/destroyed prescriptions or medications will not be replaced. Initials____ [address] Cartagena MR #:[id number] 73 [facility] Page 3 of 5 PRESCRIPTION RENEWALS/REFILLS: 
 
1. I will follow my controlled substance medication schedule as prescribed by my provider. 2. I understand and agree that I will make any requests for renewals or refills of my prescriptions only at the time of an office visit or during my providers regular office hours subject to the prescription refill requirements of the individual practice. 3. I understand that my provider may not call in prescriptions for controlled substances to my pharmacy. 4. I understand that my provider may adjust or discontinue these medications as deemed appropriate for my medical treatment plan. This Agreement does not guarantee the prescription of controlled medications. 5. I agree that if my medications are adjusted or discontinued, I will properly dispose of any remaining medications. I understand that I will be required to dispose of any remaining controlled medications prior to being provided with any prescriptions for other controlled medications. 6. I understand that the renewal of my prescription depends on my medical condition, my consistent participation, and my adherence with my treatment plan and this Agreement. 7. I understand that if I do not keep an appointment with my provider, I may not receive a renewal or refill for my controlled substance medication. PRESCRIPTION MONITORING / DRUG TESTIN. I understand that my provider may require me to provide urine, saliva or blood for testing at any time. I understand that this testing will be used to monitor for safety and adherence with my treatment plan and this Agreement. 2. I understand that my provider may ask me to provide an observed urine specimen, which means that a nurse or other health care provider may watch me provide urine, and I agree to cooperate if I am asked to provide an observed specimen. 3. I understand that if I do not provide urine, saliva or blood samples within two (2) hours of my providers request, or other timeframe decided by my provider, my treatment plan could be changed, or my prescriptions and medications may be changed or ended. 4. I understand that urine, saliva and blood test results will be a part of my permanent medical record. Initials_____ [address] Robins MR #:[id number] [address] [place] [address] Page 4 of 5 
 
5. I understand that my provider is required to obtain a copy of my State Prescription Monitoring Program () Report at any time in order to safely prescribe medications. 6. I will bring all of my prescribed controlled substance medications in their original bottles to all of my scheduled appointments. 7. I understand that my provider may ask me to come to the practice with all of my prescribed medications for a random pill count at any time. I agree to cooperate if I am asked to come in for a random pill count. I understand that if I do not arrive in the timeframe decided by my provider, my treatment plan could be changed, or my prescriptions and medications may be changed or ended.  
 
COOPERATION WITH INVESTIGATIONS: 
 
 1. I authorize my provider and my pharmacy to cooperate fully with any local, state, or federal law enforcement agency in the investigation of any possible misuse, sale, or other diversion of my controlled substance prescriptions or medications. RISKS: 
 
1. I understand that my level of consciousness may be affected from the use of controlled substances, and I understand that there are risks, benefits, effects and potential alternatives (including no treatment) to the medications that my provider has prescribed. 2. I understand that I may become drowsy, tired, dizzy, constipated, and sick to my stomach, or have changes in my mood or in my sleep while taking my medications. I have talked with my provider about these possible side effects, risks, benefits, and alternative treatments, and my provider has answered all of my questions. 3. I understand that I should not suddenly stop taking my medications without first speaking with my provider. I understand that if I suddenly stop taking my medications, I may experience nausea, vomiting, sweating,anxiety, sleeplessness, itching or other uncomfortable feelings. 4. I will not take my medications with alcohol of any kind, including beer, wine or liquor. I understand that drinking alcohol with my medications increases the chances of side effects, including breathing problems or even death. 5. I understand that if I have a history of alcoholism or other drug addiction I may be at increased risk of addiction to my medications. Signs of addiction might include craving, compulsive use, and continued use despite harm. Since addiction is a disease, I understand my provider may decide to change my medications and refer me to appropriate treatment services. I understand that this information would become part of my permanent medical record. Initials_____ [address] HERNANDEZ Shelia Sifuentesedgar MR #:[id number] 73 [facility] Page 5 of 5  
 
 
6. Females only: Children born to women who regularly take controlled substances are likely to have physical problems and suffer withdrawal symptoms at birth. If I am of child-bearing age, I understand that I should use safe and effective birth control while taking any controlled substances to avoid the impact of medications on an unborn fetus or  child. I agree to notify my provider immediately if I should become pregnant so that my treatment plan can be adjusted. 7. Males only: I understand that chronic use of controlled substances has been associated with low testosterone levels in males which may affect my mood, stamina, sexual desire, and general health. I understand that my provider may order the appropriate laboratory test to determine my testosterone level,and I agree to this testing. ADHERENCE: 
 
1. I understand that if I do not adhere to this Agreement in any way, my provider may change my prescriptions, stop prescribing controlled substances or end our provider-patient relationship. 2. If my provider decides to stop prescribing medication, or decides to end our provider-patient relationship,my provider may require that I taper my medications slowly. If necessary, my provider may also provide a prescription for other medications to treat my withdrawal symptoms. UNDERSTANDING THIS AGREEMENT: 
 
I understand that my provider may adjust or stop my prescriptions for controlled substances based on my medical condition and my treatment plan. I understand that this Agreement does not guarantee that I will be prescribed medications or controlled substances. I understand that controlled substances may be just one part 
of my treatment plan. My initial on each page and my signature below shows that I have read each page of this Agreement, I have had an opportunity to ask questions, and all of my questions have been answered to my satisfaction by my provider. By signing below, I agree to comply with this Agreement, and I understand that if I do not follow the Agreements listed above, my provider may stop 
 
_________________________________________  Date/Time 8/9/2019 9:03 AM   
             (Patient Signature) 
 
________________________________________    Date/Time 8/9/2019 9:03 AM 
 (Parent or Guardian Signature if <18 yrs) 
 
_________________________________________ Date/Time 8/9/2019 9:03 AM 
 (Provider Signature)

## 2019-08-14 LAB
AMPHETAMINES UR QL SCN: NEGATIVE NG/ML
BARBITURATES UR QL SCN: NEGATIVE NG/ML
BENZODIAZ UR QL SCN: NEGATIVE NG/ML
BZE UR QL SCN: NEGATIVE NG/ML
CANNABINOIDS UR QL SCN: NEGATIVE NG/ML
CODEINE, 737848: NEGATIVE
CREAT UR-MCNC: 55.5 MG/DL (ref 20–300)
FENTANYL+NORFENTANYL UR QL SCN: NEGATIVE PG/ML
HYDROCODONE CONFIRM, 737855: 456 NG/ML
HYDROCODONE, 737854: POSITIVE
HYDROMORPHONE, 737852: NEGATIVE
MEPERIDINE UR QL: NEGATIVE NG/ML
METHADONE UR QL SCN: NEGATIVE NG/ML
MORPHINE, 737850: NEGATIVE
OPIATES UR QL SCN: NORMAL NG/ML
OPIATES, 737847: POSITIVE NG/ML
OXYCODONE+OXYMORPHONE UR QL SCN: NEGATIVE NG/ML
PCP UR QL: NEGATIVE NG/ML
PH UR: 7.4 [PH] (ref 4.5–8.9)
PLEASE NOTE:, 733157: NORMAL
PROPOXYPH UR QL SCN: NEGATIVE NG/ML
SP GR UR: 1.02
TRAMADOL UR QL SCN: NEGATIVE NG/ML

## 2019-08-27 ENCOUNTER — OFFICE VISIT (OUTPATIENT)
Dept: VASCULAR SURGERY | Age: 74
End: 2019-08-27

## 2019-08-27 DIAGNOSIS — I73.9 PERIPHERAL VASCULAR DISEASE (HCC): Primary | ICD-10-CM

## 2019-08-27 NOTE — PROGRESS NOTES
Patient being seen to have right groin incision assessed. He got up this morning and when removing underwear noticed there was a spot of blood on them and it was coming from incision. Upon assessment of incision, the incision is intact but there is pinpoint area at distal end of incision that appears to be an in grown hair, attempted to manipulate this area but not able to get hair to come out. Had JAYRO Guillermo assess as well and advised patient to apply warm compress to the area and apply betadine also. Patient is to call if anything worsens or changes.

## 2019-09-12 ENCOUNTER — OFFICE VISIT (OUTPATIENT)
Dept: VASCULAR SURGERY | Age: 74
End: 2019-09-12

## 2019-09-12 ENCOUNTER — TELEPHONE (OUTPATIENT)
Dept: VASCULAR SURGERY | Age: 74
End: 2019-09-12

## 2019-09-12 VITALS
SYSTOLIC BLOOD PRESSURE: 140 MMHG | WEIGHT: 220 LBS | RESPIRATION RATE: 17 BRPM | DIASTOLIC BLOOD PRESSURE: 84 MMHG | HEART RATE: 80 BPM | BODY MASS INDEX: 29.8 KG/M2 | HEIGHT: 72 IN

## 2019-09-12 DIAGNOSIS — L08.9 LOCAL INFECTION OF SKIN AND SUBCUTANEOUS TISSUE: Primary | ICD-10-CM

## 2019-09-12 DIAGNOSIS — Z95.828 S/P INSERTION OF ILIAC ARTERY STENT: ICD-10-CM

## 2019-09-12 DIAGNOSIS — I70.213 ATHEROSCLEROSIS OF NATIVE ARTERY OF BOTH LOWER EXTREMITIES WITH INTERMITTENT CLAUDICATION (HCC): ICD-10-CM

## 2019-09-12 DIAGNOSIS — I74.09 AORTOILIAC OCCLUSIVE DISEASE (HCC): ICD-10-CM

## 2019-09-12 RX ORDER — SULFAMETHOXAZOLE AND TRIMETHOPRIM 800; 160 MG/1; MG/1
1 TABLET ORAL 2 TIMES DAILY
Qty: 10 TAB | Refills: 0 | Status: SHIPPED | OUTPATIENT
Start: 2019-09-12 | End: 2019-09-17

## 2019-09-12 NOTE — PROGRESS NOTES
Clarisa Malin    Chief Complaint   Patient presents with    Wound Check       History and Physical    Christian Christos Goldsmith is a 76 y.o. male with known aortoiliac occlusive disease as well as carotid disease. He underwent right common femoral endarterectomy with patch angioplasty as well as bilateral common iliac artery stenting in July of this year. He has had some skin irritation at the right groin incision since surgery. He does have a history of severe reaction to Vicryl sutures in the past but Vicryl was not used during this particular procedure. He states that the incision line well blister and drain some bloody drainage periodically. He does have some irritation at this site but does not describe any pain. He has no swelling or fluctuance to the area. He denies any fevers or chills. He is not complaining of any claudication symptoms and has no rest pain.       Past Medical History:   Diagnosis Date    Adenoma of left adrenal gland     2009  1 cm, no change 1/15, 2/16    Asbestosis     CT 1/19 showed pleural plaques    Atrial fibrillation     CHA2DS2-VAsc=3(age+, htn+, vasc dx+; estimated yearly stroke risk according to Lip et al. is 3,2%), Hasbled=2(estimated yearly bleeding risk according to pisters et al. is 1,88%); not anticoagulated due to h/o gi bleed    Atrial fibrillation ablation     10/08    Basal cell cancer     Dr Wagner Hernandez; he's had >350 lesions removed    Carotid occlusion     left     Cervical radiculopathy     MRI 9/11 showed C3-6 severe foraminal stenosis    Chronic pain     Colon polyp     Dr Justus Tejada 9/15    Coronary artery disease     RCA - 3.0 x 16mm TAXUS 9/04, 3.0 x 16mm TAXUS 12/06    Dyslipidemia     Erectile dysfunction     GERD     GI bleed     Hearing loss     2014  bilateral Dr Casper Olmedo    Hernia, umbilical     Hypertension     with component of white coat    Hypogonadism male     Lumbar radiculopathy     Dr Vilma Urena;  MRI 9/11 L4-5 disc bulging, annular tear, disc dessication    Myofascial pain dysfunction syndrome     pain clinic     OAB (overactive bladder)     Osteoarthritis     right knee Dr Leon Faust Overweight (BMI 25.0-29. 9)     IF 5/18 start weight 214 lbs, not doing    Peripheral vascular disease     50-60% R iliac; s/p R iliac stent and L femoral artery stent in past; R OLIVIA 0.76 (1/16)    Plantar fasciitis     bilateral     PPD positive     Prediabetes     Prostate cancer     T1c Imelda 7(3+4), 70% in 1 core, GS 7 (3+4) in 4 cores, GS 6 (3+3) in 1 core; psa 5.28, TRUS 18 gm;  Dr Bhavin Lopez; s/p cryoablation 10/16    Recurrent umbilical hernia     Subclinical hypothyroidism     denies    Tinnitus     Dr Sloan Knight Ulcerative colitis     Venous insufficiency      Past Surgical History:   Procedure Laterality Date    CARDIAC SURG PROCEDURE UNLIST  04/2018    [facility] Dr Carmen Nguyen; echo nl lv, ef 60%, dilated RV, biatrial enlargement, trace AI    CARDIAC SURG PROCEDURE UNLIST  04/2018    [facility] Dr Carmen Nguyen; NST neg, ef 60%    HAND/FINGER SURGERY UNLISTED  2017    HX CAROTID ENDARTERECTOMY      1/14  right    Aby Lanie HX COLONOSCOPY      Dr Bhavin Lopez 9/15 polyps    HX CRYOABLATION OF THE PROSTATE      10/16    HX HEMORRHOIDECTOMY      1979    [facility]    HX ORTHOPAEDIC      right knee surgery    HX ORTHOPAEDIC  12/2017    Dr Alejandro Marlow; R CTS release    HX REFRACTIVE SURGERY      OD (hemoplasty to right eye on retina to avoid blindness)    HX TONSILLECTOMY      1955    NV LAP, RECURRENT INCISIONAL HERNIA REPAIR,REDUCIBLE N/A 02/09/2017    Dr. Thomas Torres HERNIA,5+Y/O,REDUCIBL      2/16  left  Dr. Angi Ramirez YZOJ,8+L/D,IOJRA      2/16  Dr. Ortiz Rein      1/16  R OLIVIA 0.76, L OLIVIA 1.02    VASCULAR SURGERY PROCEDURE UNLIST      10/15   left fem art and left iliac stent     Patient Active Problem List   Diagnosis Code    Colitis, ulcerative ([facility] [address] K51.90    Colon polyps K63.5    Peripheral vascular disease (HCC) Dr Lizzy Frey I73.9    Left carotid artery occlusion I65.22    Atherosclerosis of artery of extremity with intermittent claudication ([facility]) I70.219    Hypovitaminosis D E55.9    Advance directive in chart Z78.9    Hypertension I11.9    Dyslipidemia E78.5    Coronary artery disease I25.10    Atrial fibrillation I48.91    Recurrent umbilical hernia E66.2    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 9) E66.3    History of prostate cancer Z85.46    Facet hypertrophy of lumbar region M47.816    Aortoiliac occlusive disease ([facility]) I74.09    PAD (peripheral artery disease) ([facility]) I73.9    Atherosclerosis of native artery of both lower extremities with intermittent claudication ([facility]) I70.213     Current Outpatient Medications   Medication Sig Dispense Refill    trimethoprim-sulfamethoxazole (BACTRIM DS, SEPTRA DS) 160-800 mg per tablet Take 1 Tab by mouth two (2) times a day for 5 days. 10 Tab 0    clopidogrel (PLAVIX) 75 mg tab Take 1 Tab by mouth daily. 31 Tab 6    meclizine (ANTIVERT) 25 mg tablet Take 12.5 mg by mouth three (3) times daily as needed.  diclofenac (VOLTAREN) 1 % gel Apply 2-4 g to affected area four (4) times daily. (Patient taking differently: Apply 2-4 g to affected area daily as needed.) 100 g 5    colestipol (COLESTID) 1 gram tablet 1 g daily as needed.  hyoscyamine SL (LEVSIN/SL) 0.125 mg SL tablet 0.125 mg by SubLINGual route every four (4) hours as needed for Cramping.  LORazepam (ATIVAN) 1 mg tablet Take 1 Tab by mouth every eight (8) hours as needed.  50 Tab 0    ezetimibe-simvastatin (VYTORIN) 10-40 mg per tablet TAKE 1 TABLET NIGHTLY 90 Tab 3    atenolol (TENORMIN) 25 mg tablet TAKE ONE TABLET BY MOUTH TWICE A  Tab 3    icosapent ethyl (VASCEPA) 1 gram capsule Take 2 Caps by mouth two (2) times daily (with meals). 360 Cap 3    gabapentin (NEURONTIN) 300 mg capsule 1 cap every day to bid (Patient taking differently: nightly. 1 cap every day to bid  Indications: Neuropathic Pain) 180 Cap 1    polyethylene glycol (MIRALAX) 17 gram packet 17 g.      losartan (COZAAR) 25 mg tablet TAKE ONE TABLET BY MOUTH TWICE A  Tab 2    aspirin delayed-release 81 mg tablet Take 81 mg by mouth daily.  triamterene-hydrochlorothiazide (MAXZIDE) 37.5-25 mg per tablet TAKE ONE TABLET BY MOUTH DAILY 90 Tab 3    Cholecalciferol, Vitamin D3, 1,000 unit cap Take 1,000 Units by mouth daily.  MULTIVITAMIN PO Take  by mouth daily.  pantoprazole (PROTONIX) 40 mg tablet Take 40 mg by mouth daily.  coenzyme q10 200 mg Cap Take  by mouth daily.        Allergies   Allergen Reactions    Altace [Ramipril] Unknown (comments)     Pt does not remember reaction    Lipitor [Atorvastatin] Other (comments)     Muscle pain    Lisinopril Shortness of Breath and Other (comments)     Turns bright red    Other Medication Other (comments)     Vicryl suture on skin tends to be rejected with poor wound healing does better with monocryl    Procardia [Nifedipine] Other (comments)     Caused afib    Rosuvastatin Other (comments)     Muscle Pain       Social History     Socioeconomic History    Marital status:      Spouse name: Not on file    Number of children: Not on file    Years of education: Not on file    Highest education level: Not on file   Occupational History    Not on file   Social Needs    Financial resource strain: Not on file    Food insecurity:     Worry: Not on file     Inability: Not on file    Transportation needs:     Medical: Not on file     Non-medical: Not on file   Tobacco Use    Smoking status: Former Smoker     Packs/day: 1.50     Years: 25.00     Pack years: 37.50     Types: Cigarettes     Last attempt to quit: 2003     Years since quittin.7    Smokeless tobacco: Never Used   Substance and Sexual Activity    Alcohol use: Yes     Comment: RARELY    Drug use: No    Sexual activity: Yes     Partners: Female     Birth control/protection: None   Lifestyle    Physical activity:     Days per week: Not on file     Minutes per session: Not on file    Stress: Not on file   Relationships    Social connections:     Talks on phone: Not on file     Gets together: Not on file     Attends Rastafarian service: Not on file     Active member of club or organization: Not on file     Attends meetings of clubs or organizations: Not on file     Relationship status: Not on file    Intimate partner violence:     Fear of current or ex partner: Not on file     Emotionally abused: Not on file     Physically abused: Not on file     Forced sexual activity: Not on file   Other Topics Concern    Not on file   Social History Narrative    Not on file      Family History   Problem Relation Age of Onset    Heart Disease Mother     Hypertension Mother     Diabetes Mother     Arthritis-osteo Mother     Heart Disease Father     Stroke Father     Hypertension Father     Heart Disease Sister     Hypertension Sister        Physical Exam:    Visit Vitals  /84 (BP 1 Location: Left arm, BP Patient Position: Sitting)   Pulse 80   Resp 17   Ht 6' (1.829 m)   Wt 220 lb (99.8 kg)   BMI 29.84 kg/m²      General: Well-appearing male in no acute distress  HEENT: EOMI no scleral icterus is noted patient is wearing eyeglasses  Pulmonary: No increased work of breathing is noted  Extremities: Warm and perfused bilaterally. Right groin incision is intact and healing nicely however the middle portion of the incision does have some erythema and multiple superficial pinpoint openings consistent with burst blisters.  No drainage on today's exam.   Neuro: Cranial nerves II through XII are grossly intact    Impression and Plan:  Pedro Pyle is a 76 y.o. male with aortoiliac occlusive disease as well as peripheral arterial disease. He is doing very well from a postoperative standpoint. Unfortunately he does have some skin irritation at the right groin incision site. We did send a prescription for Bactrim to his pharmacy. I also advised him to treat the area with topical antibiotic ointment and to keep a clean dry dressing over the area. We will have him follow-up in 1 week's time to reassess. I am unsure if this is a superficial skin infection possible reaction to the internal sutures as he has had this in the past.      We reviewed the plan with the patient and the patient understands. We also gave the patient appropriate instructions on their disease process and when to call back. Greater than 50% of this visit was spent with face to face discussion. [address] [place] [address]    PLEASE NOTE:  This document has been produced using voice recognition software. Unrecognized errors in transcription may be present.

## 2019-09-12 NOTE — TELEPHONE ENCOUNTER
----- Message from [address] [place] [address] Place sent at 9/12/2019 12:58 PM EDT -----  Patient called and wanted to know if their was another medication you could prescribe other than the BACTRIM he said after reading the side effects he's not comfortable with taking this medication unless he absolutely have to.

## 2019-09-12 NOTE — TELEPHONE ENCOUNTER
Called the patient back and patient is concerned with taking antibiotic due to the side effects, discussed with provider and if patient does not want to take it then he can continue with topical ointment. Advised the patient that we would recommend taking the antibiotic as with any meds all have side effects, patient will continue with topical for a few days if it looks better then he will continue with that if not will try antibiotics. Advised would let Armond Jackson know.

## 2019-09-12 NOTE — PROGRESS NOTES
1. Have you been to an emergency room or urgent care clinic since your last visit? NO    Hospitalized since your last visit? If yes, where, when, and reason for visit? NO  2. Have you seen or consulted any other health care providers outside of the [facility] since your last visit including any procedures, health maintenance items. If yes, where, when and reason for visit?  NO

## 2019-09-13 ENCOUNTER — OFFICE VISIT (OUTPATIENT)
Dept: ORTHOPEDIC SURGERY | Age: 74
End: 2019-09-13

## 2019-09-13 VITALS
HEART RATE: 64 BPM | WEIGHT: 220 LBS | BODY MASS INDEX: 29.8 KG/M2 | DIASTOLIC BLOOD PRESSURE: 66 MMHG | OXYGEN SATURATION: 96 % | TEMPERATURE: 96.1 F | RESPIRATION RATE: 17 BRPM | SYSTOLIC BLOOD PRESSURE: 121 MMHG | HEIGHT: 72 IN

## 2019-09-13 DIAGNOSIS — G56.02 CARPAL TUNNEL SYNDROME ON LEFT: Primary | ICD-10-CM

## 2019-09-13 NOTE — PROGRESS NOTES
1. Have you been to the ER, urgent care clinic since your last visit? Hospitalized since your last visit? No    2. Have you seen or consulted any other health care providers outside of the [address] [place] [address] since your last visit? Include any pap smears or colon screening.  No

## 2019-09-13 NOTE — PROGRESS NOTES
Gallito Fairchild is a 76 y.o. male right handed retiree. Worker's Compensation and legal considerations: not known. Vitals:    09/13/19 0756   BP: 121/66   Pulse: 64   Resp: 17   Temp: 96.1 °F (35.6 °C)   TempSrc: Oral   SpO2: 96%   Weight: 220 lb (99.8 kg)   Height: 6' (1.829 m)   PainSc:   0 - No pain   PainLoc: Wrist           Chief Complaint   Patient presents with    Wrist Pain     Left       HPI: Patient comes in today for follow-up regarding his left carpal tunnel syndrome as well as redness over the back of his left ring finger. At his last visit he had a left ring finger A1 pulley injection that he says helped his trigger finger. He says he can have any surgery for the next several months as he is on Plavix for 6 more months. Previous HPI: Patient comes in today with complaints of left ring finger locking and pain. He also reports that there is some redness over the back of the finger but he is unsure of any injury or if he was possibly bitten by something. Prior HPI: Patient comes in today for follow-up of left carpal tunnel syndrome. Proximally 3 months ago he received an injection in his left carpal tunnel. We discussed the need for surgery in the future. However he is having a vascular surgery in his right lower extremity that we will delay this. He is requesting an injection today. He reports the injection for his left ring trigger finger helped significantly. He reports since injection not really noticing that his small finger was going numb. Previous HPI: Patient comes in today for EMG follow-up of his left upper extremity. At his last visit he received a right index finger A1 pulley injection for trigger finger that he said has helped and has not come back. He reports the numbness and tingling in his left side has not changed. He says it is mostly at night but he thinks it involves all of the digits.     Initial HPI: Patient comes in today as a referral from my partner. 1 month ago he had a left carpal tunnel injection as well as a ring finger trigger finger injection. He says the numbness and tingling in the left is improved. However he says the ring finger is still locking up. He reports having an EMG many years ago. He also has a history of a right sided carpal tunnel release by Nayeli Box in December 2017. He also has a history of cervical spine arthritis that he has been seen at the spine center for. He reports a one-week history of burning and pain in the index finger at the level of the palm. He denies any injuries to this area. Date of onset:  Many years worsening since 2018 regarding the carpal tunnel syndrome on the left    Injury: No    Prior Treatment:  Yes: Comment: He has worn a cockup wrist brace on the left    Numbness/ Tingling: Yes: Comment: Thumb index middle ring and small fingers on the left    ROS: Review of Systems - General ROS: negative  Respiratory ROS: no cough, shortness of breath, or wheezing  Cardiovascular ROS: no chest pain or dyspnea on exertion  Musculoskeletal ROS: positive for - pain in hand - bilateral  Neurological ROS: positive for - numbness/tingling  Dermatological ROS: negative    Past Medical History:   Diagnosis Date    Adenoma of left adrenal gland     2009  1 cm, no change 1/15, 2/16    Asbestosis     CT 1/19 showed pleural plaques    Atrial fibrillation     CHA2DS2-VAsc=3(age+, htn+, vasc dx+; estimated yearly stroke risk according to Lip et al. is 3,2%), Hasbled=2(estimated yearly bleeding risk according to pisters et al. is 1,88%); not anticoagulated due to h/o gi bleed    Atrial fibrillation ablation     10/08    Basal cell cancer     Dr Gwen Rodriguez; he's had >350 lesions removed    Carotid occlusion     left     Cervical radiculopathy     MRI 9/11 showed C3-6 severe foraminal stenosis    Chronic pain     Colon polyp     Dr Jonathan Dneson 9/15    Coronary artery disease     RCA - 3.0 x 16mm TAXUS 9/04, 3.0 x 16mm TAXUS 12/06    Dyslipidemia     Erectile dysfunction     GERD     GI bleed     Hearing loss     2014  bilateral Dr Eleanor Summers    Hernia, umbilical     Hypertension     with component of white coat    Hypogonadism male     Lumbar radiculopathy     Dr Kate Daniels;  MRI 9/11 L4-5 disc bulging, annular tear, disc dessication    Myofascial pain dysfunction syndrome     pain clinic     OAB (overactive bladder)     Osteoarthritis     right knee Dr Kirsty Dukes Overweight (BMI 25.0-29. 9)     IF 5/18 start weight 214 lbs, not doing    Peripheral vascular disease     50-60% R iliac; s/p R iliac stent and L femoral artery stent in past; R OLIVIA 0.76 (1/16)    Plantar fasciitis     bilateral     PPD positive     Prediabetes     Prostate cancer     T1c Canadian 7(3+4), 70% in 1 core, GS 7 (3+4) in 4 cores, GS 6 (3+3) in 1 core; psa 5.28, TRUS 18 gm;  Dr Billie Shields; s/p cryoablation 10/16    Recurrent umbilical hernia     Subclinical hypothyroidism     denies    Tinnitus     Dr Candido Shen Ulcerative colitis     Venous insufficiency        Past Surgical History:   Procedure Laterality Date    CARDIAC SURG PROCEDURE UNLIST  04/2018    [facility] Dr Risa Muniz; echo nl lv, ef 60%, dilated RV, biatrial enlargement, trace AI    CARDIAC SURG PROCEDURE UNLIST  04/2018    [facility] Dr Risa Muniz; NST neg, ef 60%    HAND/FINGER SURGERY UNLISTED  2017    HX CAROTID ENDARTERECTOMY      1/14  right    Amber Starch HX COLONOSCOPY      Dr Billie Shields 9/15 polyps    HX CRYOABLATION OF THE PROSTATE      10/16    HX HEMORRHOIDECTOMY      1979    [address], 1975    HX ORTHOPAEDIC      right knee surgery    HX ORTHOPAEDIC  12/2017    Dr Jessie Levin; R CTS release    HX REFRACTIVE SURGERY      OD (hemoplasty to right eye on retina to avoid blindness)    HX TONSILLECTOMY      1955    GA LAP, RECURRENT INCISIONAL HERNIA REPAIR,REDUCIBLE N/A 02/09/2017    Dr. Bernice Ritchie HERNIA,5+Y/O,REDUCIBL      2/16  left  Dr. Phil Antonio REPAIR UMBILICAL VKRX,0+T/K,[facility]      2/16  Dr. Chace Bonner      1/16  R OLIVIA 0.76, L OLIVIA 1.02    VASCULAR SURGERY PROCEDURE UNLIST      10/15   left fem art and left iliac stent       Current Outpatient Medications   Medication Sig Dispense Refill    trimethoprim-sulfamethoxazole (BACTRIM DS, SEPTRA DS) 160-800 mg per tablet Take 1 Tab by mouth two (2) times a day for 5 days. 10 Tab 0    clopidogrel (PLAVIX) 75 mg tab Take 1 Tab by mouth daily. 31 Tab 6    meclizine (ANTIVERT) 25 mg tablet Take 12.5 mg by mouth three (3) times daily as needed.  diclofenac (VOLTAREN) 1 % gel Apply 2-4 g to affected area four (4) times daily. (Patient taking differently: Apply 2-4 g to affected area daily as needed.) 100 g 5    colestipol (COLESTID) 1 gram tablet 1 g daily as needed.  hyoscyamine SL (LEVSIN/SL) 0.125 mg SL tablet 0.125 mg by SubLINGual route every four (4) hours as needed for Cramping.  LORazepam (ATIVAN) 1 mg tablet Take 1 Tab by mouth every eight (8) hours as needed. 50 Tab 0    ezetimibe-simvastatin (VYTORIN) 10-40 mg per tablet TAKE 1 TABLET NIGHTLY 90 Tab 3    atenolol (TENORMIN) 25 mg tablet TAKE ONE TABLET BY MOUTH TWICE A  Tab 3    icosapent ethyl (VASCEPA) 1 gram capsule Take 2 Caps by mouth two (2) times daily (with meals). 360 Cap 3    gabapentin (NEURONTIN) 300 mg capsule 1 cap every day to bid (Patient taking differently: nightly. 1 cap every day to bid  Indications: Neuropathic Pain) 180 Cap 1    polyethylene glycol (MIRALAX) 17 gram packet 17 g.      losartan (COZAAR) 25 mg tablet TAKE ONE TABLET BY MOUTH TWICE A  Tab 2    aspirin delayed-release 81 mg tablet Take 81 mg by mouth daily.  triamterene-hydrochlorothiazide (MAXZIDE) 37.5-25 mg per tablet TAKE ONE TABLET BY MOUTH DAILY 90 Tab 3    Cholecalciferol, Vitamin D3, 1,000 unit cap Take 1,000 Units by mouth daily.  MULTIVITAMIN PO Take  by mouth daily.       pantoprazole (PROTONIX) 40 mg tablet Take 40 mg by mouth daily.  coenzyme q10 200 mg Cap Take  by mouth daily. Allergies   Allergen Reactions    Altace [Ramipril] Unknown (comments)     Pt does not remember reaction    Lipitor [Atorvastatin] Other (comments)     Muscle pain    Lisinopril Shortness of Breath and Other (comments)     Turns bright red    Other Medication Other (comments)     Vicryl suture on skin tends to be rejected with poor wound healing does better with monocryl    Procardia [Nifedipine] Other (comments)     Caused afib    Rosuvastatin Other (comments)     Muscle Pain           PE:     NEUROVASCULAR    Examination L R Examination L R   Carpal Comp. + - Pronator Comp. - -   Carpal Tinel + - Pronator Tinel - -   Phalen's + - Pronator Stress - -   Cubital Comp. - - Guyon Comp. - -   Cubital Tinel - - Guyon Tinel - -   Elbow Hyperflexion - - Adson's - -   Spurling's - - SC Comp. - -   PCB Median abn - - SC Tinel - -   Radial Tinel - - IC Comp. - -   Digital Tinel - - IC Tinel - -   Radial 2-Pt WNL WNL Ulnar 2-Pt WNL WNL     Radial Pulse: 2+  Capillary Refill: < 2 sec  Robert: Not Performed  Digital Robert: Not Performed          Imaging:     Plain films of the left ring finger are negative for any acute fracture dislocation. There are minimal degenerative changes. 2017 MRI of cervical spine  IMPRESSION:     1. Multilevel degenerative findings of cervical spine.  -Facet arthropathy more severe than disc pathology. -Multilevel severe foraminal stenoses from facet arthropathy as delineated  above. Similar distribution previously.  -No high-grade spinal stenosis. NCV & EMG Findings:  Evaluation of the left median motor nerve showed prolonged distal onset latency (5.2 ms) and decreased conduction velocity (Elbow-Wrist, 44 m/s). The left ulnar motor nerve showed decreased conduction velocity (A Elbow-B Elbow, 48 m/s).   The left median sensory nerve showed prolonged distal peak latency (4.4 ms), reduced amplitude (5.0 µV), and decreased conduction velocity (Wrist-2nd Digit, 32 m/s). The left ulnar sensory nerve showed prolonged distal peak latency (3.9 ms), reduced amplitude (5.5 µV), and decreased conduction velocity (Wrist-5th Digit, 36 m/s). All remaining nerves (as indicated in the following tables) were within normal limits.       Needle evaluation of the left triceps muscle showed increased motor unit amplitude and slightly increased polyphasic potentials. The left pronator teres and the left Ext Digitorum muscles showed slightly increased polyphasic potentials. All remaining muscles (as indicated in the following table) showed no evidence of electrical instability.       INTERPRETATION  This is an abnormal electrodiagnostic examination. These findings may be consistent with:  1. 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 moderate carpal tunnel syndrome at the left wrist (carpal tunnel syndrome)  3. chronic C6/7 cervical radiculopathy on the left  4. mild signs of peripheral neuropathy      CLINICAL INTERPRETATION  His symptoms may multifactorial, related to any of these diagnoses, but most likely the carpal tunnel syndrome. ICD-10-CM ICD-9-CM    1.  Carpal tunnel syndrome on left G56.02 354.0 triamcinolone acetonide (KENALOG) 10 mg/mL injection      INJECT CARPAL TUNNEL      TRIAMCINOLONE ACETONIDE INJ       Plan:     Left CT Injection    Continue Night time brace wear    F/U PRN    [facility] [employer]  OFFICE PROCEDURE PROGRESS NOTE        Chart reviewed for the following:   IAscencion DO, have reviewed the History, Physical and updated the Allergic reactions for [address] performed immediately prior to start of procedure:   Shravan YODER DO, have performed the following reviews on [facility] prior to the start of the procedure: * Patient was identified by name and date of birth   * Agreement on procedure being performed was verified  * Risks and Benefits explained to the patient  * Procedure site verified and marked as necessary  * Patient was positioned for comfort  * Consent was signed and verified     Time: 08:20 AM      Date of procedure: 9/13/2019    Procedure performed by: Jacy Mace DO    Provider assisted by: Ren Colindres LPN    Patient assisted by: self    How tolerated by patient: tolerated the procedure well with no complications    Post Procedural Pain Scale: 0 - No Hurt    Comments: none    Procedure:  After consent was obtained, using sterile technique the carpal tunnel was prepped. Local anesthetic used: 1% lidocaine. Kenalog 5 mg and was then injected and the needle withdrawn. The procedure was well tolerated. The patient is asked to continue to rest the area for a few more days before resuming regular activities. It may be more painful for the first 1-2 days. Watch for fever, or increased swelling or persistent pain in the joint. Call or return to clinic prn if such symptoms occur or there is failure to improve as anticipated.     Plan was reviewed with patient, who verbalized agreement and understanding of the plan

## 2019-09-16 ENCOUNTER — TELEPHONE (OUTPATIENT)
Dept: VASCULAR SURGERY | Age: 74
End: 2019-09-16

## 2019-09-16 NOTE — TELEPHONE ENCOUNTER
Patient called stated that he starting taking antibiotics that was prescribed to him and started taking it last Saturday, September. Patient is calling because he is not having diarrhea and has gone almost three times today. He wanted to know what he needs to do. Requesting for a return call.

## 2019-09-16 NOTE — TELEPHONE ENCOUNTER
Patient states having diarrhea from antibiotic and has finished half of the prescription but has colitis and wants to discontinue antibiotic because of the diarrhea and discomfort. Patient already takes a probiotic twice a day a well. Informed patient that would notify provider of having to stop antibiotic. Patient will be seen Wednesday for assessment.

## 2019-09-18 ENCOUNTER — OFFICE VISIT (OUTPATIENT)
Dept: VASCULAR SURGERY | Age: 74
End: 2019-09-18

## 2019-09-18 VITALS
RESPIRATION RATE: 16 BRPM | SYSTOLIC BLOOD PRESSURE: 132 MMHG | HEART RATE: 88 BPM | DIASTOLIC BLOOD PRESSURE: 80 MMHG | BODY MASS INDEX: 29.8 KG/M2 | HEIGHT: 72 IN | WEIGHT: 220 LBS

## 2019-09-18 DIAGNOSIS — I74.09 AORTOILIAC OCCLUSIVE DISEASE (HCC): ICD-10-CM

## 2019-09-18 DIAGNOSIS — I70.213 ATHEROSCLEROSIS OF NATIVE ARTERY OF BOTH LOWER EXTREMITIES WITH INTERMITTENT CLAUDICATION (HCC): ICD-10-CM

## 2019-09-18 DIAGNOSIS — L08.9 LOCAL INFECTION OF SKIN AND SUBCUTANEOUS TISSUE: Primary | ICD-10-CM

## 2019-09-18 NOTE — PROGRESS NOTES
Bora Hansen    Chief Complaint   Patient presents with    Surgical Follow-up       History and Physical    Bora Hansen is a 76 y.o. male with known aortoiliac occlusive disease as well as carotid disease. He underwent right common femoral endarterectomy with patch angioplasty as well as bilateral common iliac artery stenting in July of this year. He has had some skin irritation at the right groin incision since surgery. He does have a history of severe reaction to Vicryl sutures in the past but Vicryl was not used during this particular procedure. The incision line will blister and drain some bloody drainage periodically. At his last office visit he was given a prescription for Bactrim which he states he took 5 pills but his colitis started to act up so he stopped the medication. He has also been using a topical antibiotic ointment. He states that the area is closed and no longer draining. He is feeling much better. He denies any fevers or chills. He denies any pain.     Past Medical History:   Diagnosis Date    Adenoma of left adrenal gland     2009  1 cm, no change 1/15, 2/16    Asbestosis     CT 1/19 showed pleural plaques    Atrial fibrillation     CHA2DS2-VAsc=3(age+, htn+, vasc dx+; estimated yearly stroke risk according to Lip et al. is 3,2%), Hasbled=2(estimated yearly bleeding risk according to pisters et al. is 1,88%); not anticoagulated due to h/o gi bleed    Atrial fibrillation ablation     10/08    Basal cell cancer     Dr Brenda Espino; he's had >350 lesions removed    Carotid occlusion     left     Cervical radiculopathy     MRI 9/11 showed C3-6 severe foraminal stenosis    Chronic pain     Colon polyp     Dr Lisa Alexander 9/15    Coronary artery disease     RCA - 3.0 x 16mm TAXUS 9/04, 3.0 x 16mm TAXUS 12/06    Dyslipidemia     Erectile dysfunction     GERD     GI bleed     Hearing loss     2014  bilateral Dr Albert Mora    Hernia, umbilical     Hypertension     with component of white coat    Hypogonadism male     Lumbar radiculopathy     Dr Zo Lebron;  MRI 9/11 L4-5 disc bulging, annular tear, disc dessication    Myofascial pain dysfunction syndrome     pain clinic     OAB (overactive bladder)     Osteoarthritis     right knee Dr Leslie Quinn Overweight (BMI 25.0-29. 9)     IF 5/18 start weight 214 lbs, not doing    Peripheral vascular disease     50-60% R iliac; s/p R iliac stent and L femoral artery stent in past; R OLIVIA 0.76 (1/16)    Plantar fasciitis     bilateral     PPD positive     Prediabetes     Prostate cancer     T1c [place] 7(3+4), 70% in 1 core, GS 7 (3+4) in 4 cores, GS 6 (3+3) in 1 core; psa 5.28, TRUS 18 gm;  Dr Angel Loaiza; s/p cryoablation 10/16    Recurrent umbilical hernia     Subclinical hypothyroidism     denies    Tinnitus     Dr Bharat Gonzalez Ulcerative colitis     Venous insufficiency      Past Surgical History:   Procedure Laterality Date    CARDIAC SURG PROCEDURE UNLIST  04/2018    [facility] Dr Bennie Baca; echo nl lv, ef 60%, dilated RV, biatrial enlargement, trace AI    CARDIAC SURG PROCEDURE UNLIST  04/2018    [facility] Dr Bennie Baca; NST neg, ef 60%    HAND/FINGER SURGERY UNLISTED  2017    HX CAROTID ENDARTERECTOMY      1/14  right    Laura Roberts HX COLONOSCOPY      Dr Angel Loaiza 9/15 polyps    HX CRYOABLATION OF THE PROSTATE      10/16    HX HEMORRHOIDECTOMY      1979    Emilee Dub 37, 1975    HX ORTHOPAEDIC      right knee surgery    HX ORTHOPAEDIC  12/2017    Dr Monica Juan; R CTS release    HX REFRACTIVE SURGERY      OD (hemoplasty to right eye on retina to avoid blindness)    HX TONSILLECTOMY      1955    [place] LAP, RECURRENT INCISIONAL HERNIA REPAIR,REDUCIBLE N/A 02/09/2017    Dr. Angel Baugh HERNIA,5+Y/O,REDUCIBL      2/16  left  Dr. Debbie Burton LHDG,4+P/N,QVZZB      2/16  Dr. Ferris Lanes      1/16  R OLIVIA 0.76, L OLIVIA 1.02    VASCULAR SURGERY PROCEDURE UNLIST      10/15 left fem art and left iliac stent     Patient Active Problem List   Diagnosis Code    Colitis, ulcerative ([facility] [address] K51.90    Colon polyps K63.5    Peripheral vascular disease ([facility] [address] Dr Carmela Lux I73.9    Left carotid artery occlusion I65.22    Atherosclerosis of artery of extremity with intermittent claudication ([facility]) I70.219    Hypovitaminosis D E55.9    Advance directive in chart Z78.9    Hypertension I11.9    Dyslipidemia E78.5    Coronary artery disease I25.10    Atrial fibrillation I48.91    Recurrent umbilical hernia S07.1    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 9) E66.3    History of prostate cancer Z85.46    Facet hypertrophy of lumbar region M47.816    Aortoiliac occlusive disease ([facility]) I74.09    PAD (peripheral artery disease) ([facility]) I73.9    Atherosclerosis of native artery of both lower extremities with intermittent claudication ([facility]) I70.213     Current Outpatient Medications   Medication Sig Dispense Refill    clopidogrel (PLAVIX) 75 mg tab Take 1 Tab by mouth daily. 31 Tab 6    meclizine (ANTIVERT) 25 mg tablet Take 12.5 mg by mouth three (3) times daily as needed.  diclofenac (VOLTAREN) 1 % gel Apply 2-4 g to affected area four (4) times daily. (Patient taking differently: Apply 2-4 g to affected area daily as needed.) 100 g 5    colestipol (COLESTID) 1 gram tablet 1 g daily as needed.  hyoscyamine SL (LEVSIN/SL) 0.125 mg SL tablet 0.125 mg by SubLINGual route every four (4) hours as needed for Cramping.  LORazepam (ATIVAN) 1 mg tablet Take 1 Tab by mouth every eight (8) hours as needed.  50 Tab 0    ezetimibe-simvastatin (VYTORIN) 10-40 mg per tablet TAKE 1 TABLET NIGHTLY 90 Tab 3    atenolol (TENORMIN) 25 mg tablet TAKE ONE TABLET BY MOUTH TWICE A  Tab 3    icosapent ethyl (VASCEPA) 1 gram capsule Take 2 Caps by mouth two (2) times daily (with meals). 360 Cap 3    gabapentin (NEURONTIN) 300 mg capsule 1 cap every day to bid (Patient taking differently: nightly. 1 cap every day to bid  Indications: Neuropathic Pain) 180 Cap 1    polyethylene glycol (MIRALAX) 17 gram packet 17 g.      losartan (COZAAR) 25 mg tablet TAKE ONE TABLET BY MOUTH TWICE A  Tab 2    aspirin delayed-release 81 mg tablet Take 81 mg by mouth daily.  triamterene-hydrochlorothiazide (MAXZIDE) 37.5-25 mg per tablet TAKE ONE TABLET BY MOUTH DAILY 90 Tab 3    Cholecalciferol, Vitamin D3, 1,000 unit cap Take 1,000 Units by mouth daily.  MULTIVITAMIN PO Take  by mouth daily.  pantoprazole (PROTONIX) 40 mg tablet Take 40 mg by mouth daily.  coenzyme q10 200 mg Cap Take  by mouth daily.        Allergies   Allergen Reactions    Altace [Ramipril] Unknown (comments)     Pt does not remember reaction    Lipitor [Atorvastatin] Other (comments)     Muscle pain    Lisinopril Shortness of Breath and Other (comments)     Turns bright red    Other Medication Other (comments)     Vicryl suture on skin tends to be rejected with poor wound healing does better with monocryl    Procardia [Nifedipine] Other (comments)     Caused afib    Rosuvastatin Other (comments)     Muscle Pain       Social History     Socioeconomic History    Marital status:      Spouse name: Not on file    Number of children: Not on file    Years of education: Not on file    Highest education level: Not on file   Occupational History    Not on file   Social Needs    Financial resource strain: Not on file    Food insecurity:     Worry: Not on file     Inability: Not on file    Transportation needs:     Medical: Not on file     Non-medical: Not on file   Tobacco Use    Smoking status: Former Smoker Packs/day: 1.50     Years: 25.00     Pack years: 37.50     Types: Cigarettes     Last attempt to quit: 2003     Years since quittin.7    Smokeless tobacco: Never Used   Substance and Sexual Activity    Alcohol use: Yes     Comment: RARELY    Drug use: No    Sexual activity: Yes     Partners: Female     Birth control/protection: None   Lifestyle    Physical activity:     Days per week: Not on file     Minutes per session: Not on file    Stress: Not on file   Relationships    Social connections:     Talks on phone: Not on file     Gets together: Not on file     Attends L[employer] service: Not on file     Active member of club or organization: Not on file     Attends meetings of clubs or organizations: Not on file     Relationship status: Not on file    Intimate partner violence:     Fear of current or ex partner: Not on file     Emotionally abused: Not on file     Physically abused: Not on file     Forced sexual activity: Not on file   Other Topics Concern    Not on file   Social History Narrative    Not on file      Family History   Problem Relation Age of Onset    Heart Disease Mother     Hypertension Mother     Diabetes Mother     Arthritis-osteo Mother     Heart Disease Father     Stroke Father     Hypertension Father     Heart Disease Sister     Hypertension Sister        Physical Exam:    Visit Vitals  /80 (BP 1 Location: Left arm, BP Patient Position: Sitting)   Pulse 88   Resp 16   Ht 6' (1.829 m)   Wt 220 lb (99.8 kg)   BMI 29.84 kg/m²      General: Well-appearing male in no acute distress  HEENT: EOMI no scleral icterus is noted  Pulmonary: No increased work of breathing is noted  Extremities: Warm and perfused bilaterally. Right groin incision is intact and healing nicely. The superficial pinpoint openings and blisters that were noted at his last office visit have completely resolved at this current time.    Neuro: Cranial nerves II through XII are grossly intact    Impression and Plan:  Pepe Woodall is a 76 y.o. male with aortoiliac occlusive disease as well as peripheral arterial disease. He is doing very well from a postoperative standpoint. Unfortunately he did have some skin irritation at the right groin incision site. This area is much improved with a short course of Bactrim and topical antibiotic ointment. I did advise him to continue the antibiotic ointment as needed. He will also continue to use an antibacterial soap and Hibiclens washes as needed. He will call the office with any new or concerning symptoms. Otherwise he will follow-up in January with follow-up imaging as scheduled. [address] [place] [address]    PLEASE NOTE:  This document has been produced using voice recognition software. Unrecognized errors in transcription may be present.

## 2019-09-18 NOTE — PROGRESS NOTES
1. Have you been to an emergency room or urgent care clinic since your last visit? NO    Hospitalized since your last visit? If yes, where, when, and reason for visit? NO  2. Have you seen or consulted any other health care providers outside of the [facility] since your last visit including any procedures, health maintenance items. If yes, where, when and reason for visit?  NO

## 2019-09-23 ENCOUNTER — TELEPHONE (OUTPATIENT)
Dept: INTERNAL MEDICINE CLINIC | Age: 74
End: 2019-09-23

## 2019-09-23 DIAGNOSIS — M54.12 CERVICAL RADICULOPATHY: Primary | ICD-10-CM

## 2019-09-23 RX ORDER — HYDROCODONE BITARTRATE AND ACETAMINOPHEN 5; 325 MG/1; MG/1
1 TABLET ORAL
Qty: 60 TAB | Refills: 0 | Status: SHIPPED | OUTPATIENT
Start: 2019-09-23 | End: 2019-09-24 | Stop reason: SDUPTHER

## 2019-09-23 NOTE — TELEPHONE ENCOUNTER
Patient is requesting a new Rx for HYDROcodone-acetaminophen (NORCO) 5-325 mg with a dose DECREASE of twice a day. Please advise and pend new Rx if appropriate. Last visit:  7/31/19 with MD Sherley Peace  Next appt:  11/25/19 with MD Sherley Peace  Previous refill encounter:  8/9/19 #180    [facility] reports the last fill date for Norco as 8/9/19 for a 30 d/s.

## 2019-09-24 ENCOUNTER — TELEPHONE (OUTPATIENT)
Dept: INTERNAL MEDICINE CLINIC | Age: 74
End: 2019-09-24

## 2019-09-24 DIAGNOSIS — M54.12 CERVICAL RADICULOPATHY: ICD-10-CM

## 2019-09-24 DIAGNOSIS — I25.10 ATHEROSCLEROSIS OF NATIVE CORONARY ARTERY OF NATIVE HEART WITHOUT ANGINA PECTORIS: ICD-10-CM

## 2019-09-24 DIAGNOSIS — I73.9 PERIPHERAL VASCULAR DISEASE (HCC): ICD-10-CM

## 2019-09-24 RX ORDER — ICOSAPENT ETHYL 1000 MG/1
1 CAPSULE ORAL 2 TIMES DAILY WITH MEALS
Qty: 180 CAP | Refills: 3 | Status: SHIPPED | OUTPATIENT
Start: 2019-09-24 | End: 2020-03-12 | Stop reason: SDUPTHER

## 2019-09-24 RX ORDER — HYDROCODONE BITARTRATE AND ACETAMINOPHEN 5; 325 MG/1; MG/1
1 TABLET ORAL
Qty: 60 TAB | Refills: 0 | Status: SHIPPED | OUTPATIENT
Start: 2019-09-24 | End: 2019-09-24 | Stop reason: SDUPTHER

## 2019-09-24 RX ORDER — HYDROCODONE BITARTRATE AND ACETAMINOPHEN 5; 325 MG/1; MG/1
1 TABLET ORAL
Qty: 180 TAB | Refills: 0 | Status: SHIPPED | OUTPATIENT
Start: 2019-09-24 | End: 2019-10-24

## 2019-09-24 NOTE — TELEPHONE ENCOUNTER
Last Visit: 7/31/19 with MD Noemí Corona  Next Appointment: 11/25/19 with MD Noemí Corona  Previous Refill Encounter(s): 11/23/18 #360 with 3 refills    Requested Prescriptions     Pending Prescriptions Disp Refills    icosapent ethyl (VASCEPA) 1 gram capsule 360 Cap 3     Sig: Take 2 Caps by mouth two (2) times daily (with meals).

## 2019-09-24 NOTE — TELEPHONE ENCOUNTER
Patient is requesting a decrease to take 1 capsule by mouth 2 x a day.     [facility]

## 2019-09-25 ENCOUNTER — TELEPHONE (OUTPATIENT)
Dept: INTERNAL MEDICINE CLINIC | Age: 74
End: 2019-09-25

## 2019-09-25 NOTE — TELEPHONE ENCOUNTER
Spoke with patient he stated Vascepa cost the same amount as original so for next refill he wants to go back to old script 2 tabs BID. Nishi Foster  Patient will call back when script is needed

## 2019-09-30 ENCOUNTER — OFFICE VISIT (OUTPATIENT)
Dept: ORTHOPEDIC SURGERY | Facility: CLINIC | Age: 74
End: 2019-09-30

## 2019-09-30 VITALS
SYSTOLIC BLOOD PRESSURE: 133 MMHG | BODY MASS INDEX: 30.37 KG/M2 | OXYGEN SATURATION: 96 % | TEMPERATURE: 96.9 F | DIASTOLIC BLOOD PRESSURE: 73 MMHG | WEIGHT: 224.2 LBS | RESPIRATION RATE: 18 BRPM | HEIGHT: 72 IN | HEART RATE: 67 BPM

## 2019-09-30 DIAGNOSIS — M25.562 CHRONIC PAIN OF LEFT KNEE: ICD-10-CM

## 2019-09-30 DIAGNOSIS — M17.11 PRIMARY OSTEOARTHRITIS OF RIGHT KNEE: ICD-10-CM

## 2019-09-30 DIAGNOSIS — M25.552 CHRONIC LEFT HIP PAIN: ICD-10-CM

## 2019-09-30 DIAGNOSIS — M25.561 CHRONIC PAIN OF RIGHT KNEE: ICD-10-CM

## 2019-09-30 DIAGNOSIS — M17.12 PRIMARY OSTEOARTHRITIS OF LEFT KNEE: ICD-10-CM

## 2019-09-30 DIAGNOSIS — M25.461 EFFUSION OF RIGHT KNEE: ICD-10-CM

## 2019-09-30 DIAGNOSIS — G89.29 CHRONIC PAIN OF LEFT KNEE: ICD-10-CM

## 2019-09-30 DIAGNOSIS — G89.29 CHRONIC LEFT HIP PAIN: ICD-10-CM

## 2019-09-30 DIAGNOSIS — G89.29 CHRONIC PAIN OF RIGHT KNEE: ICD-10-CM

## 2019-09-30 DIAGNOSIS — M16.12 PRIMARY OSTEOARTHRITIS OF LEFT HIP: Primary | ICD-10-CM

## 2019-09-30 RX ORDER — CLOPIDOGREL BISULFATE 75 MG/1
TABLET ORAL
Qty: 90 TAB | Refills: 5 | Status: SHIPPED | OUTPATIENT
Start: 2019-09-30 | End: 2020-11-23 | Stop reason: SDUPTHER

## 2019-09-30 RX ORDER — HYALURONATE SODIUM 10 MG/ML
2 SYRINGE (ML) INTRAARTICULAR ONCE
Qty: 2 ML | Refills: 0
Start: 2019-09-30 | End: 2019-09-30

## 2019-09-30 RX ORDER — BETAMETHASONE SODIUM PHOSPHATE AND BETAMETHASONE ACETATE 3; 3 MG/ML; MG/ML
6 INJECTION, SUSPENSION INTRA-ARTICULAR; INTRALESIONAL; INTRAMUSCULAR; SOFT TISSUE ONCE
Qty: 0.5 ML | Refills: 0
Start: 2019-09-30 | End: 2019-09-30

## 2019-09-30 NOTE — PROGRESS NOTES
Verbal order given by Dr. Aayush Avery to draw up 4 mL 0.25% Sensorcaine and 0.5 mL 30 mg/5mL Betamethasone.   Verbal order given by Dr. Aayush Avery to draw up 10 mL 0.25% Sensorcaine

## 2019-09-30 NOTE — PROGRESS NOTES
Patient: Pedro Pyle                MRN: [id number]       SSN: [id number]  YOB: 1945        AGE: 76 y.o. SEX: male    PCP: Kyle Hardy MD  09/30/19    CC: LEFT HIP AND BILATERAL KNEE PAIN    HISTORY:  Pedro Pyle is a 76 y.o. male who is seen for bilateral knee and left hip pain. He has hip pain after walking more than one block. He reports he is bothered mostly by his left hip pain. He does not recall any injury. He reports hip and knee startup pain and pain increased use. He is also seen for bilateral knee pain. He has been experiencing increasing knee pain for the past few years. He reports no h/o injury. He notes pain with standing and walking. He has pain ascending/descending stairs. He reports increasing difficulty with everyday activities. He reports his knee pain will wake him up at night. He reports that he sleeps with a pillow between his legs for some relief. He reports relief from previous cortisone and visco supplementation injections by Dr. Jovanny Boateng in the past. He reports some right leg claudication with increased walking. He says his knee has not been able to completely extend his leg since 1964. He injured his right knee playing football and underwent total meniscectomy and arthrotomy by Dr. Gonzales Prince. He is s/p right endarterectomy for circulation problems in July 2019--doing well. He currently takes Plavix. He reports that he takes a diuretic that causes him to urinate frequently. He was previously seen by Dr. Jovanny Boateng and VERO Holloway . He has a stent in his right femoral artery. He reports a blockage in his left [facility] of [facility].      Pain Assessment  9/30/2019   Location of Pain Knee   Location Modifiers Left;Right   Severity of Pain 5   Quality of Pain Sharp;Dull;Aching   Quality of Pain Comment -   Duration of Pain Persistent   Frequency of Pain Constant   Date Pain First Started -   Aggravating Factors Walking;Standing Aggravating Factors Comment -   Limiting Behavior Yes   Relieving Factors Other (Comment);Ice;Heat;Elevation   Relieving Factors Comment Norco   Result of Injury No   Work-Related Injury -   Type of Injury -   Type of Injury Comment -     Occupation, etc:  Mr. Kaushal Lafleur he lives with his wife in [facility]. He has 2 sons not in the area. His youngest son lives in [place]. His middle son lives in [place] and is becoming a Sabianist . He has 7 grandchildren. He retired 15 years ago as a  for the [employer] 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 He reports that he lost 20 pounds by exercising more recently while moving. He reports that he cut his right leg a few months ago. His wound took 2 months to heal. He enjoys doing water exercises. His eldest son passed away on 6/17/19 in [place]. He is unsure of the cause of his son's death. Current weight is 224 pounds. He is 6' tall.       Lab Results   Component Value Date/Time    Hemoglobin A1c 6.1 (H) 11/13/2018 07:50 AM    Hemoglobin A1c (POC) 5.5 05/23/2019 09:05 AM     Weight Metrics 9/30/2019 9/18/2019 9/13/2019 9/12/2019 7/31/2019 7/31/2019 7/19/2019   Weight 224 lb 3.2 oz 220 lb 220 lb 220 lb 220 lb 220 lb 220 lb   BMI 30.41 kg/m2 29.84 kg/m2 29.84 kg/m2 29.84 kg/m2 29.84 kg/m2 29.84 kg/m2 29.84 kg/m2       Patient Active Problem List   Diagnosis Code    Colitis, ulcerative ([facility] K51.90    Colon polyps K63.5    Peripheral vascular [facility] ([facility] Dr Colin Flores I73.9    Left carotid artery occlusion I65.22    Atherosclerosis of artery of extremity with intermittent claudication (HCC) I70.219    Hypovitaminosis D E55.9    Advance directive in chart Z78.9    Hypertension I11.9    Dyslipidemia E78.5    Coronary artery disease I25.10    Atrial fibrillation I48.91    Recurrent umbilical hernia Y04.5    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 9) E66.3    History of prostate cancer Z85.46    Facet hypertrophy of lumbar region M47.816    Aortoiliac occlusive disease (HCC) I74.09    PAD (peripheral artery disease) ([facility]) I73.9    Atherosclerosis of native artery of both lower extremities with intermittent claudication ([facility]) I70.213     REVIEW OF SYSTEMS: All Below are Negative except: See HPI   Constitutional: negative for fever, chills, and weight loss. Cardiovascular: negative for chest pain, claudication, leg swelling, SOB, DUONG   Gastrointestinal: Negative for pain, N/V/C/D, Blood in stool or urine, dysuria,  hematuria, incontinence, pelvic pain. Musculoskeletal: See HPI   Neurological: Negative for dizziness and weakness. Negative for headaches, Visual changes, confusion, seizures   Phychiatric/Behavioral: Negative for depression, memory loss, substance  abuse. Extremities: Negative for hair changes, rash, or skin lesion changes. Hematologic: Negative for bleeding problems, bruising, pallor or swollen lymph  nodes   Peripheral Vascular: No calf pain, no circulation deficits.     Social History     Socioeconomic History    Marital status:      Spouse name: Not on file    Number of children: Not on file    Years of education: Not on file    Highest education level: Not on file   Occupational History    Not on file   Social Needs    Financial resource strain: Not on file    Food insecurity:     Worry: Not on file     Inability: Not on file    Transportation needs:     Medical: Not on file     Non-medical: Not on file   Tobacco Use    Smoking status: Former Smoker     Packs/day: 1.50     Years: 25.00     Pack years: 37.50     Types: Cigarettes     Last attempt to quit: 2003     Years since quittin.7    Smokeless tobacco: Never Used   Substance and Sexual Activity    Alcohol use: Yes     Comment: RARELY    Drug use: No    Sexual activity: Yes     Partners: Female     Birth control/protection: None   Lifestyle    Physical activity:     Days per week: Not on file     Minutes per session: Not on file    Stress: Not on file   Relationships    Social connections:     Talks on phone: Not on file     Gets together: Not on file     Attends Taoist service: Not on file     Active member of club or organization: Not on file     Attends meetings of clubs or organizations: Not on file     Relationship status: Not on file    Intimate partner violence:     Fear of current or ex partner: Not on file     Emotionally abused: Not on file     Physically abused: Not on file     Forced sexual activity: Not on file   Other Topics Concern    Not on file   Social History Narrative    Not on file      Allergies   Allergen Reactions    Altace [Ramipril] Unknown (comments)     Pt does not remember reaction    Lipitor [Atorvastatin] Other (comments)     Muscle pain    Lisinopril Shortness of Breath and Other (comments)     Turns bright red    Other Medication Other (comments)     Vicryl suture on skin tends to be rejected with poor wound healing does better with monocryl    Procardia [Nifedipine] Other (comments)     Caused afib    Rosuvastatin Other (comments)     Muscle Pain        Current Outpatient Medications   Medication Sig    clopidogrel (PLAVIX) 75 mg tab TAKE ONE TABLET BY MOUTH DAILY    icosapent ethyl (VASCEPA) 1 gram capsule Take 1 Cap by mouth two (2) times daily (with meals).  HYDROcodone-acetaminophen (NORCO) 5-325 mg per tablet Take 1 Tab by mouth every four (4) hours as needed for Pain for up to 30 days. Max Daily Amount: 6 Tabs. FOR CHRONIC PAIN    diclofenac (VOLTAREN) 1 % gel Apply 2-4 g to affected area four (4) times daily.  (Patient taking differently: Apply 2-4 g to affected area daily as needed.)    colestipol (COLESTID) 1 gram tablet 1 g daily as needed.  hyoscyamine SL (LEVSIN/SL) 0.125 mg SL tablet 0.125 mg by SubLINGual route every four (4) hours as needed for Cramping.  LORazepam (ATIVAN) 1 mg tablet Take 1 Tab by mouth every eight (8) hours as needed.  ezetimibe-simvastatin (VYTORIN) 10-40 mg per tablet TAKE 1 TABLET NIGHTLY    atenolol (TENORMIN) 25 mg tablet TAKE ONE TABLET BY MOUTH TWICE A DAY    gabapentin (NEURONTIN) 300 mg capsule 1 cap every day to bid (Patient taking differently: nightly. 1 cap every day to bid  Indications: Neuropathic Pain)    polyethylene glycol (MIRALAX) 17 gram packet 17 g.    losartan (COZAAR) 25 mg tablet TAKE ONE TABLET BY MOUTH TWICE A DAY    aspirin delayed-release 81 mg tablet Take 81 mg by mouth daily.  triamterene-hydrochlorothiazide (MAXZIDE) 37.5-25 mg per tablet TAKE ONE TABLET BY MOUTH DAILY    Cholecalciferol, Vitamin D3, 1,000 unit cap Take 1,000 Units by mouth daily.  MULTIVITAMIN PO Take  by mouth daily.  pantoprazole (PROTONIX) 40 mg tablet Take 40 mg by mouth daily.  coenzyme q10 200 mg Cap Take  by mouth daily.  meclizine (ANTIVERT) 25 mg tablet Take 12.5 mg by mouth three (3) times daily as needed. No current facility-administered medications for this visit.        PHYSICAL EXAMINATION:  Visit Vitals  /73   Pulse 67   Temp 96.9 °F (36.1 °C) (Oral)   Resp 18   Ht 6' (1.829 m)   Wt 224 lb 3.2 oz (101.7 kg)   SpO2 96%   BMI 30.41 kg/m²      ORTHO EXAMINATION:  Examination Right knee Left knee   Skin Intact Intact   Range of motion 120-0 120-0   Effusion 3+ -   Medial joint line tenderness + +   Lateral joint line tenderness - -   Popliteal tenderness - -   Osteophytes palpable + -   Kylahs - -   Patella crepitus + -   Anterior drawer - -   Lateral laxity - -   Medial laxity - -   Varus deformity + -   Valgus deformity - -   Pretibial edema - -   Calf tenderness - -   Brisk capilary refill, palpable pulse  Examination Right hip Left hip   Skin Intact Intact   External Rotation ROM 20 20   Internal Rotation ROM 10 10   Trochanteric tenderness + +   Hip flexion contracture - -   Antalgic gait - -   Trendelenberg sign - -   Lumbar tenderness - -   Straight leg raise - -   Calf tenderness - -   Neurovascular Intact Intact       Chart reviewed for the following:   IJorge MD, have reviewed the History, Physical and updated the Allergic reactions for [address] performed immediately prior to start of procedure:  Marlyn Hess MD, have performed the following reviews on [facility] prior to the start of the procedure:            * Patient was identified by name and date of birth   * Agreement on procedure being performed was verified  * Risks and Benefits explained to the patient  * Procedure site verified and marked as necessary  * Patient was positioned for comfort  * Consent was obtained     Time: 3:45 PM    Date of procedure: 9/30/2019    Procedure performed by:  Jorge Bardales MD    Mr. Isaac Spence tolerated the procedure well with no complications. RADIOGRAPHS:  XR KLAUS HIP 11/7/18 SAWYER  IMPRESSION:  AP pelvis and two views - No fractures, moderate joint space narrowing, acetabular osteophytes present. Tonnis grade 2. Femoral acetabular impingement syndrome     XR RIGHT KNEE 4/13/18 SAWYER  IMPRESSION:  Three views - No fractures, no effusion, severe end stage with lateral subluxation joint space narrowing, + osteophytes present. IKDC Grade C. calcification of arteries    IMPRESSION:      ICD-10-CM ICD-9-CM    1. Primary osteoarthritis of left hip M16.12 715.15 betamethasone (CELESTONE SOLUSPAN) 6 mg/mL injection      BETAMETHASONE ACETATE & SODIUM PHOSPHATE INJECTION 3 MG EA.      DRAIN/INJECT LARGE JOINT/BURSA   2.  Primary osteoarthritis of left knee M17.12 715.16 PROCEDURE AUTHORIZATION TO       [place] DRAIN/INJECT LARGE JOINT/BURSA EUFLEXXA INJECTION PER DOSE      sodium hyaluronate (SUPARTZ FX/HYALGAN/GENIVSC) 10 mg/mL syrg injection   3. Chronic pain of left knee M25.562 719.46 PROCEDURE AUTHORIZATION TO     G89.29 338.29 [place] DRAIN/INJECT LARGE JOINT/BURSA      EUFLEXXA INJECTION PER DOSE      sodium hyaluronate (SUPARTZ FX/HYALGAN/GENIVSC) 10 mg/mL syrg injection   4. Primary osteoarthritis of right knee M17.11 715.16 [place] DRAIN/INJECT LARGE JOINT/BURSA      EUFLEXXA INJECTION PER DOSE      sodium hyaluronate (SUPARTZ FX/HYALGAN/GENIVSC) 10 mg/mL syrg injection   5. Chronic pain of right knee M25.561 719.46 [place] DRAIN/INJECT LARGE JOINT/BURSA    G89.29 338.29 EUFLEXXA INJECTION PER DOSE      sodium hyaluronate (SUPARTZ FX/HYALGAN/GENIVSC) 10 mg/mL syrg injection   6. Chronic left hip pain M25.552 719.45 betamethasone (CELESTONE SOLUSPAN) 6 mg/mL injection    G89.29 338.29 BETAMETHASONE ACETATE & SODIUM PHOSPHATE INJECTION 3 MG EA.      DRAIN/INJECT LARGE JOINT/BURSA   7. Effusion of right knee M25.461 719.06      PLAN:  After timeout and under sterile conditions, right knee aspirated 45 cc of light yellow fluid. The fluid was discarded. After discussing treatment options, patient's left hip was injected with 4 cc Marcaine and 1/2 cc Celestone and knees were injected with 2 cc Euflexxa. There is no need for surgery at this time. He will follow up in 1 week for Euflexxa #2.     Scribed by Nimisha Blackman ([address]) as dictated by Alissa Wells MD

## 2019-10-07 ENCOUNTER — OFFICE VISIT (OUTPATIENT)
Dept: ORTHOPEDIC SURGERY | Facility: CLINIC | Age: 74
End: 2019-10-07

## 2019-10-07 VITALS
HEIGHT: 72 IN | DIASTOLIC BLOOD PRESSURE: 79 MMHG | OXYGEN SATURATION: 97 % | SYSTOLIC BLOOD PRESSURE: 132 MMHG | RESPIRATION RATE: 18 BRPM | TEMPERATURE: 97.1 F | WEIGHT: 224.8 LBS | BODY MASS INDEX: 30.45 KG/M2

## 2019-10-07 DIAGNOSIS — M17.12 PRIMARY OSTEOARTHRITIS OF LEFT KNEE: Primary | ICD-10-CM

## 2019-10-07 DIAGNOSIS — G89.29 CHRONIC PAIN OF RIGHT KNEE: ICD-10-CM

## 2019-10-07 DIAGNOSIS — G89.29 CHRONIC PAIN OF LEFT KNEE: ICD-10-CM

## 2019-10-07 DIAGNOSIS — M25.561 CHRONIC PAIN OF RIGHT KNEE: ICD-10-CM

## 2019-10-07 DIAGNOSIS — M17.11 PRIMARY OSTEOARTHRITIS OF RIGHT KNEE: ICD-10-CM

## 2019-10-07 DIAGNOSIS — M25.562 CHRONIC PAIN OF LEFT KNEE: ICD-10-CM

## 2019-10-07 RX ORDER — HYALURONATE SODIUM 10 MG/ML
2 SYRINGE (ML) INTRAARTICULAR ONCE
Qty: 2 ML | Refills: 0
Start: 2019-10-07 | End: 2019-10-07

## 2019-10-07 NOTE — PROGRESS NOTES
Patient: Ruma Clark                MRN: [id number]       SSN: [id number]  YOB: 1945        AGE: 76 y.o. SEX: male  Body mass index is 30.49 kg/m². PCP: Gayla Davis MD  10/07/19    Chief Complaint   Patient presents with    Knee Pain     Jarrell     HISTORY:  Ruma Clark is a 76 y.o. male who is seen for bilateral knee pain. ICD-10-CM ICD-9-CM    1. Primary osteoarthritis of left knee M17.12 715.16 [place] DRAIN/INJECT LARGE JOINT/BURSA      EUFLEXXA INJECTION PER DOSE      sodium hyaluronate (SUPARTZ FX/HYALGAN/GENIVSC) 10 mg/mL syrg injection   2. Chronic pain of left knee M25.562 719.46 [place] DRAIN/INJECT LARGE JOINT/BURSA    G89.29 338.29 EUFLEXXA INJECTION PER DOSE      sodium hyaluronate (SUPARTZ FX/HYALGAN/GENIVSC) 10 mg/mL syrg injection   3. Primary osteoarthritis of right knee M17.11 715.16 [place] DRAIN/INJECT LARGE JOINT/BURSA      EUFLEXXA INJECTION PER DOSE      sodium hyaluronate (SUPARTZ FX/HYALGAN/GENIVSC) 10 mg/mL syrg injection   4.  Chronic pain of right knee M25.561 719.46 [place] DRAIN/INJECT LARGE JOINT/BURSA    G89.29 338.29 EUFLEXXA INJECTION PER DOSE      sodium hyaluronate (SUPARTZ FX/HYALGAN/GENIVSC) 10 mg/mL syrg injection       Chart reviewed for the following:   Mitchell Ascencio MD, have reviewed the History, Physical and updated the Allergic reactions for [address] performed immediately prior to start of procedure:  Mitchell Ascencio MD, have performed the following reviews on Ruma Clark prior to the start of the procedure:            * Patient was identified by name and date of birth   * Agreement on procedure being performed was verified  * Risks and Benefits explained to the patient  * Procedure site verified and marked as necessary  * Patient was positioned for comfort  * Consent was obtained     Time: 3:05 PM     Date of procedure: 10/7/2019    Procedure performed by:  Shahriar Prado MD    Mr. Eladio Parham tolerated the procedure well with no complications. PLAN:  After discussing treatment options, patient's knees were injected with 2 cc of Euflexxa. Mr. Eladio Parham will follow up in one week to continue his visco supplementation injection series.       Scribed by Melanie Shah ([address]) as dictated by Kelly Vivas MD

## 2019-10-14 ENCOUNTER — OFFICE VISIT (OUTPATIENT)
Dept: ORTHOPEDIC SURGERY | Facility: CLINIC | Age: 74
End: 2019-10-14

## 2019-10-14 VITALS
TEMPERATURE: 97.1 F | SYSTOLIC BLOOD PRESSURE: 151 MMHG | DIASTOLIC BLOOD PRESSURE: 78 MMHG | BODY MASS INDEX: 30.07 KG/M2 | HEIGHT: 72 IN | HEART RATE: 70 BPM | WEIGHT: 222 LBS | OXYGEN SATURATION: 96 % | RESPIRATION RATE: 16 BRPM

## 2019-10-14 DIAGNOSIS — G89.29 CHRONIC PAIN OF LEFT KNEE: ICD-10-CM

## 2019-10-14 DIAGNOSIS — M25.562 CHRONIC PAIN OF LEFT KNEE: ICD-10-CM

## 2019-10-14 DIAGNOSIS — G89.29 CHRONIC PAIN OF RIGHT KNEE: ICD-10-CM

## 2019-10-14 DIAGNOSIS — M25.561 CHRONIC PAIN OF RIGHT KNEE: ICD-10-CM

## 2019-10-14 DIAGNOSIS — M17.12 PRIMARY OSTEOARTHRITIS OF LEFT KNEE: Primary | ICD-10-CM

## 2019-10-14 DIAGNOSIS — M17.11 PRIMARY OSTEOARTHRITIS OF RIGHT KNEE: ICD-10-CM

## 2019-10-14 DIAGNOSIS — F41.9 ANXIETY: ICD-10-CM

## 2019-10-14 RX ORDER — HYALURONATE SODIUM 10 MG/ML
2 SYRINGE (ML) INTRAARTICULAR ONCE
Qty: 2 ML | Refills: 0
Start: 2019-10-14 | End: 2019-10-14

## 2019-10-14 RX ORDER — LORAZEPAM 1 MG/1
1 TABLET ORAL
Qty: 50 TAB | Refills: 0 | OUTPATIENT
Start: 2019-10-14 | End: 2020-05-14 | Stop reason: SDUPTHER

## 2019-10-14 NOTE — TELEPHONE ENCOUNTER
[facility] for a 16 d/s. Last Visit: 7/31/19 with MD José Miguel Warner  Next Appointment: 11/25/19 with MD José Miguel Warner  Previous Refill Encounter(s): 2/25/19 #50    Requested Prescriptions     Pending Prescriptions Disp Refills    LORazepam (ATIVAN) 1 mg tablet 50 Tab 0     Sig: Take 1 Tab by mouth every eight (8) hours as needed.

## 2019-10-14 NOTE — PROGRESS NOTES
Patient: Devorah White                MRN: [id number]       SSN: [id number]  YOB: 1945        AGE: 76 y.o. SEX: male  Body mass index is 30.11 kg/m². PCP: Halle Shelby MD  10/14/19    Chief Complaint   Patient presents with    Knee Pain     sukhjinder knee pain, f/u appt. HISTORY:  Devorah White is a 76 y.o. male who is seen for bilateral  knee pain. TIME OUT performed immediately prior to start of procedure:  Chaz Casillas MD, have performed the following reviews on Devorah White prior to the start of the procedure:            * Patient was identified by name and date of birth   * Agreement on procedure being performed was verified  * Risks and Benefits explained to the patient  * Procedure site verified and marked as necessary  * Patient was positioned for comfort  * Consent was obtained     Time: 2:45 PM     Date of procedure: 10/14/2019    Procedure performed by:  Vladimir Tenorio MD    Mr. Fanta Conner tolerated the procedure well with no complications      [id number] ICD-9-CM    1. Primary osteoarthritis of left knee M17.12 715.16 [place] DRAIN/INJECT LARGE JOINT/BURSA      EUFLEXXA INJECTION PER DOSE      sodium hyaluronate (SUPARTZ FX/HYALGAN/GENIVSC) 10 mg/mL syrg injection   2. Chronic pain of left knee M25.562 719.46 [place] DRAIN/INJECT LARGE JOINT/BURSA    G89.29 338.29 EUFLEXXA INJECTION PER DOSE      sodium hyaluronate (SUPARTZ FX/HYALGAN/GENIVSC) 10 mg/mL syrg injection   3. Primary osteoarthritis of right knee M17.11 715.16 [place] DRAIN/INJECT LARGE JOINT/BURSA      EUFLEXXA INJECTION PER DOSE      sodium hyaluronate (SUPARTZ FX/HYALGAN/GENIVSC) 10 mg/mL syrg injection   4.  Chronic pain of right knee M25.561 719.46 [place] DRAIN/INJECT LARGE JOINT/BURSA    G89.29 338.29 EUFLEXXA INJECTION PER DOSE      sodium hyaluronate (SUPARTZ FX/HYALGAN/GENIVSC) 10 mg/mL syrg injection     PLAN:  After discussing treatment options, patient's knees were injected with 2 cc of Euflexxa. Mr. Aldair Azul will follow up PRN now that he has completed his visco supplementation injection series.       Scribed by Elyssa Keene ([address]) as dictated by Grady Joya MD

## 2019-10-14 NOTE — PROGRESS NOTES
1. Have you been to the ER, urgent care clinic since your last visit? Hospitalized since your last visit? NO    2. Have you seen or consulted any other health care providers outside of the [address] [place] [address] since your last visit? Include any pap smears or colon screening.    NO

## 2019-10-29 DIAGNOSIS — M48.02 CERVICAL SPINAL STENOSIS: Primary | ICD-10-CM

## 2019-10-29 RX ORDER — GABAPENTIN 300 MG/1
CAPSULE ORAL
Qty: 30 CAP | Refills: 0 | Status: SHIPPED | OUTPATIENT
Start: 2019-10-29 | End: 2019-12-16 | Stop reason: SDUPTHER

## 2019-11-07 ENCOUNTER — OFFICE VISIT (OUTPATIENT)
Dept: INTERNAL MEDICINE CLINIC | Age: 74
End: 2019-11-07

## 2019-11-07 ENCOUNTER — TELEPHONE (OUTPATIENT)
Dept: INTERNAL MEDICINE CLINIC | Age: 74
End: 2019-11-07

## 2019-11-07 VITALS
HEIGHT: 72 IN | BODY MASS INDEX: 30.34 KG/M2 | OXYGEN SATURATION: 95 % | WEIGHT: 224 LBS | SYSTOLIC BLOOD PRESSURE: 130 MMHG | DIASTOLIC BLOOD PRESSURE: 78 MMHG | RESPIRATION RATE: 14 BRPM | TEMPERATURE: 99.8 F | HEART RATE: 73 BPM

## 2019-11-07 DIAGNOSIS — R68.89 FLU-LIKE SYMPTOMS: Primary | ICD-10-CM

## 2019-11-07 DIAGNOSIS — M54.12 CERVICAL RADICULOPATHY: Primary | ICD-10-CM

## 2019-11-07 LAB
QUICKVUE INFLUENZA TEST: NEGATIVE
VALID INTERNAL CONTROL?: YES

## 2019-11-07 RX ORDER — HYDROCODONE BITARTRATE AND ACETAMINOPHEN 5; 325 MG/1; MG/1
1 TABLET ORAL
Qty: 180 TAB | Refills: 0 | Status: SHIPPED | OUTPATIENT
Start: 2019-11-07 | End: 2019-12-07

## 2019-11-07 NOTE — PROGRESS NOTES
Ruma Amber presents today for   Chief Complaint   Patient presents with    Flu Like Symptoms     sore throat, productive cough, aches and onset yesterday              Depression Screening:  3 most recent PHQ Screens 10/14/2019   PHQ Not Done -   Little interest or pleasure in doing things Not at all   Feeling down, depressed, irritable, or hopeless Not at all   Total Score PHQ 2 0       Learning Assessment:  Learning Assessment 9/18/2019   PRIMARY LEARNER Patient   HIGHEST LEVEL OF EDUCATION - PRIMARY LEARNER  -   BARRIERS PRIMARY LEARNER -   CO-LEARNER CAREGIVER -   PRIMARY LANGUAGE ENGLISH   LEARNER PREFERENCE PRIMARY LISTENING     -     -     -     -   ANSWERED BY patient   RELATIONSHIP SELF       Abuse Screening:  Abuse Screening Questionnaire 5/23/2019   Do you ever feel afraid of your partner? N   Are you in a relationship with someone who physically or mentally threatens you? N   Is it safe for you to go home? Y       Fall Risk  Fall Risk Assessment, last 12 mths 10/14/2019   Able to walk? Yes   Fall in past 12 months? No   Fall with injury? -   Number of falls in past 12 months -   Fall Risk Score -           Coordination of Care:  1. Have you been to the ER, urgent care clinic since your last visit? Hospitalized since your last visit? no    2. Have you seen or consulted any other health care providers outside of the [address] [place] [address] since your last visit? Include any pap smears or colon screening.  no

## 2019-11-07 NOTE — TELEPHONE ENCOUNTER
[facility] Norco as [facility]24/19 for a 30 d/s. Last Visit: 7/31/19 with MD Urbina Billsudeep  Next Appointment: 11/25/19 with MD Augustine Whalen  Previous Refill Encounter(s): 9/24/19 #180    Requested Prescriptions     Pending Prescriptions Disp Refills    HYDROcodone-acetaminophen (NORCO) 5-325 mg per tablet 180 Tab 0     Sig: Take 1 Tab by mouth every four (4) hours as needed (chronic pain) for up to 30 days. Max Daily Amount: 6 Tabs.

## 2019-11-07 NOTE — PROGRESS NOTES
76 y.o. WHITE [place]  male who presents for evaluation. He came down with URI symptoms about a day ago. Mostly sore throat, generalized achiness, low-grade temp. He has ear fullness, sinus drainage, minimal cough. He knows of no exposures. Been using Coricidin HBP and Vicks cough medicine. Feels a bit better this morning. He took his flu shot over 6 weeks ago. Past Medical History:   Diagnosis Date    Adenoma of left adrenal gland     2009  1 cm, no change 1/15, 2/16    Asbestosis     CT 1/19 showed pleural plaques    Atrial fibrillation     CHA2DS2-VAsc=3(age+, htn+, vasc dx+; estimated yearly stroke risk according to Lip et al. is 3,2%), Hasbled=2(estimated yearly bleeding risk according to pisters et al. is 1,88%); not anticoagulated due to h/o gi bleed    Atrial fibrillation ablation     10/08    Basal cell cancer     Dr Tricia Ramos; he's had >350 lesions removed    Carotid occlusion     left     Cervical radiculopathy     MRI 9/11 showed C3-6 severe foraminal stenosis    Chronic pain     Colon polyp     Dr Fall Patikatey 9/15    Coronary artery disease     RCA - 3.0 x 16mm TAXUS 9/04, 3.0 x 16mm TAXUS 12/06    Dyslipidemia     Erectile dysfunction     GERD     GI bleed     Hearing loss     2014  bilateral Dr Ibarra Grow    Hernia, umbilical     Hypertension     with component of white coat    Hypogonadism male     Lumbar radiculopathy     Dr Radny Tuttle;  MRI 9/11 L4-5 disc bulging, annular tear, disc dessication    Myofascial pain dysfunction syndrome     pain clinic     OAB (overactive bladder)     Osteoarthritis     right knee Dr Lizz Wang Overweight (BMI 25.0-29. 9)     IF 5/18 start weight 214 lbs, not doing    Peripheral vascular disease     50-60% R iliac; s/p R iliac stent and L femoral artery stent in past; R OLIVIA 0.76 (1/16)    Plantar fasciitis     bilateral     PPD positive     Prediabetes     Prostate cancer     T1c Imelda 7(3+4), 70% in 1 core, GS 7 (3+4) in 4 cores, GS 6 (3+3) in 1 core; psa 5.28, TRUS 18 gm;  Dr Julio Birmingham; s/p cryoablation 10/16    Recurrent umbilical hernia     Subclinical hypothyroidism     denies    Tinnitus     Dr Thaddeus Nunez Ulcerative colitis     Venous insufficiency      Current Outpatient Medications   Medication Sig    gabapentin (NEURONTIN) 300 mg capsule 1 cap QHS  Indications: Neuropathic Pain    LORazepam (ATIVAN) 1 mg tablet Take 1 Tab by mouth every eight (8) hours as needed for Anxiety.  atenolol (TENORMIN) 25 mg tablet TAKE ONE TABLET BY MOUTH TWICE A DAY    clopidogrel (PLAVIX) 75 mg tab TAKE ONE TABLET BY MOUTH DAILY    icosapent ethyl (VASCEPA) 1 gram capsule Take 1 Cap by mouth two (2) times daily (with meals).  meclizine (ANTIVERT) 25 mg tablet Take 12.5 mg by mouth three (3) times daily as needed.  diclofenac (VOLTAREN) 1 % gel Apply 2-4 g to affected area four (4) times daily. (Patient taking differently: Apply 2-4 g to affected area daily as needed.)    colestipol (COLESTID) 1 gram tablet 1 g daily as needed.  hyoscyamine SL (LEVSIN/SL) 0.125 mg SL tablet 0.125 mg by SubLINGual route every four (4) hours as needed for Cramping.  ezetimibe-simvastatin (VYTORIN) 10-40 mg per tablet TAKE 1 TABLET NIGHTLY    polyethylene glycol (MIRALAX) 17 gram packet 17 g.    losartan (COZAAR) 25 mg tablet TAKE ONE TABLET BY MOUTH TWICE A DAY    aspirin delayed-release 81 mg tablet Take 81 mg by mouth daily.  triamterene-hydrochlorothiazide (MAXZIDE) 37.5-25 mg per tablet TAKE ONE TABLET BY MOUTH DAILY    Cholecalciferol, Vitamin D3, 1,000 unit cap Take 1,000 Units by mouth daily.  MULTIVITAMIN PO Take  by mouth daily.  pantoprazole (PROTONIX) 40 mg tablet Take 40 mg by mouth daily.  coenzyme q10 200 mg Cap Take  by mouth daily.  HYDROcodone-acetaminophen (NORCO) 5-325 mg per tablet Take 1 Tab by mouth every four (4) hours as needed (chronic pain) for up to 30 days. Max Daily Amount: 6 Tabs.      No current facility-administered medications for this visit. Allergies   Allergen Reactions    Altace [Ramipril] Unknown (comments)     Pt does not remember reaction    Lipitor [Atorvastatin] Other (comments)     Muscle pain    Lisinopril Shortness of Breath and Other (comments)     Turns bright red    Other Medication Other (comments)     Vicryl suture on skin tends to be rejected with poor wound healing does better with monocryl    Procardia [Nifedipine] Other (comments)     Caused afib    Rosuvastatin Other (comments)     Muscle Pain       Visit Vitals  /78   Pulse 73   Temp 99.8 °F (37.7 °C) (Oral)   Resp 14   Ht 6' (1.829 m)   Wt 224 lb (101.6 kg)   SpO2 95%   BMI 30.38 kg/m²   Tympanic membrane's normal.  Sinuses nontender with mildly boggy mucosa, oropharynx otherwise benign, no adenopathy. Lungs are clear with good breath sounds. Heart showed regular rate rhythm. Abdomen soft and nontender. Results for orders placed or performed in visit on 11/07/19   AMB POC RAPID INFLUENZA TEST   Result Value Ref Range    VALID INTERNAL CONTROL POC Yes     QuickVue Influenza test Negative Negative     Assessment and plan:  1. Viral syndrome. Symptomatic treatment, call if worsening symptoms. 2.  Norco refilled. Above conditions discussed at length and patient vocalized understanding.   All questions answered to patient satisfaction

## 2019-11-07 NOTE — TELEPHONE ENCOUNTER
Pt calling asking if RD can see him today. Says he has either the flu or a really bad chest cold.     Can you fit him in?

## 2019-11-13 ENCOUNTER — TELEPHONE (OUTPATIENT)
Dept: INTERNAL MEDICINE CLINIC | Age: 74
End: 2019-11-13

## 2019-11-13 DIAGNOSIS — R05.9 COUGH: Primary | ICD-10-CM

## 2019-11-13 DIAGNOSIS — J40 BRONCHITIS: ICD-10-CM

## 2019-11-13 RX ORDER — DOXYCYCLINE 100 MG/1
100 TABLET ORAL 2 TIMES DAILY
Qty: 20 TAB | Refills: 0 | Status: SHIPPED | OUTPATIENT
Start: 2019-11-13 | End: 2019-11-23

## 2019-11-13 RX ORDER — HYDROCODONE POLISTIREX AND CHLORPHENIRAMINE POLISTIREX 10; 8 MG/5ML; MG/5ML
5 SUSPENSION, EXTENDED RELEASE ORAL
Qty: 100 ML | Refills: 0 | Status: SHIPPED | OUTPATIENT
Start: 2019-11-13 | End: 2019-11-23

## 2019-11-13 NOTE — TELEPHONE ENCOUNTER
Patient was seen last week for a viral infection. Stating he does not have a fever but  He is coughing all night long. Sometimes to the point he can barely cath his breath. Sometimes he's coughing up phlegm but not all the time.  Please advise

## 2019-11-14 ENCOUNTER — TELEPHONE (OUTPATIENT)
Dept: INTERNAL MEDICINE CLINIC | Age: 74
End: 2019-11-14

## 2019-11-14 NOTE — TELEPHONE ENCOUNTER
Pt  Was perscribed Kayla Evans , yesterday  Pharmacy has question about wheather pt should take it along with his [address] and GABAPENTIN, pt was told by pharmacy they were going to contact the Dr Jose Stewart , but pt has not heard anything yet   He has not picked up meds yet please advise .  [phone]

## 2019-11-15 NOTE — TELEPHONE ENCOUNTER
tussionex has hydrocodone  norco has hydrocodone  She could take dayquil/nyquil along with the norco and have similar effect  That's why the pharmacy is asking for clarification  Ok to take as far as i'm concerned

## 2019-11-15 NOTE — TELEPHONE ENCOUNTER
I called the patient to confirm the medication per Dr. Gloria Gillespie. The patient was reassured that if he is concern about taking the gabapentin with the cough medication he can hold his gabapentin until he finishes the cough med. The patient felt comfortable with holding one of his pain meds while taking the cough med, and will restart medication after treatment.

## 2019-11-18 ENCOUNTER — HOSPITAL ENCOUNTER (OUTPATIENT)
Dept: LAB | Age: 74
Discharge: HOME | End: 2019-11-18
Payer: MEDICARE

## 2019-11-18 ENCOUNTER — APPOINTMENT (OUTPATIENT)
Dept: INTERNAL MEDICINE CLINIC | Age: 74
End: 2019-11-18

## 2019-11-18 DIAGNOSIS — E78.5 DYSLIPIDEMIA: ICD-10-CM

## 2019-11-18 DIAGNOSIS — R73.01 IFG (IMPAIRED FASTING GLUCOSE): ICD-10-CM

## 2019-11-18 LAB
ALBUMIN SERPL-MCNC: 3.9 G/DL (ref 3.4–5)
ALBUMIN/GLOB SERPL: 1.3 {RATIO} (ref 0.8–1.7)
ALP SERPL-CCNC: 64 U/L (ref 45–117)
ALT SERPL-CCNC: 31 U/L (ref 16–61)
ANION GAP SERPL CALC-SCNC: 4 MMOL/L (ref 3–18)
AST SERPL-CCNC: 18 U/L (ref 10–38)
BILIRUB SERPL-MCNC: 0.6 MG/DL (ref 0.2–1)
BUN SERPL-MCNC: 13 MG/DL (ref 7–18)
BUN/CREAT SERPL: 17 (ref 12–20)
CALCIUM SERPL-MCNC: 9.7 MG/DL (ref 8.5–10.1)
CHLORIDE SERPL-SCNC: 99 MMOL/L (ref 100–111)
CO2 SERPL-SCNC: 33 MMOL/L (ref 21–32)
CREAT SERPL-MCNC: 0.77 MG/DL (ref 0.6–1.3)
GLOBULIN SER CALC-MCNC: 2.9 G/DL (ref 2–4)
GLUCOSE SERPL-MCNC: 108 MG/DL (ref 74–99)
HBA1C MFR BLD: 5.5 % (ref 4.2–5.6)
POTASSIUM SERPL-SCNC: 4 MMOL/L (ref 3.5–5.5)
PROT SERPL-MCNC: 6.8 G/DL (ref 6.4–8.2)
SODIUM SERPL-SCNC: 136 MMOL/L (ref 136–145)

## 2019-11-18 PROCEDURE — 36415 COLL VENOUS BLD VENIPUNCTURE: CPT

## 2019-11-18 PROCEDURE — 83036 HEMOGLOBIN GLYCOSYLATED A1C: CPT

## 2019-11-18 PROCEDURE — 80061 LIPID PANEL: CPT

## 2019-11-18 PROCEDURE — 80053 COMPREHEN METABOLIC PANEL: CPT

## 2019-11-18 PROCEDURE — 80361 OPIATES 1 OR MORE: CPT

## 2019-11-18 PROCEDURE — 80307 DRUG TEST PRSMV CHEM ANLYZR: CPT

## 2019-11-19 LAB
CHOLEST SERPL-MCNC: 128 MG/DL
HDLC SERPL-MCNC: 36 MG/DL (ref 40–60)
HDLC SERPL: 3.6 {RATIO} (ref 0–5)
LDLC SERPL CALC-MCNC: 70.4 MG/DL (ref 0–100)
LIPID PROFILE,FLP: ABNORMAL
TRIGL SERPL-MCNC: 108 MG/DL (ref ?–150)
VLDLC SERPL CALC-MCNC: 21.6 MG/DL

## 2019-11-21 NOTE — PROGRESS NOTES
This is a subsequent Medicare Annual Wellness Exam     I have reviewed the patient's medical history in detail and updated the computerized patient record. History     Past Medical History:   Diagnosis Date    Adenoma of left adrenal gland     2009  1 cm, no change 1/15, 2/16    Asbestosis     CT 1/19 showed pleural plaques    Atrial fibrillation     CHA2DS2-VAsc=3(age+, htn+, vasc dx+; estimated yearly stroke risk according to Lip et al. is 3,2%), Hasbled=2(estimated yearly bleeding risk according to pisters et al. is 1,88%); not anticoagulated due to h/o gi bleed    Atrial fibrillation ablation     10/08    Basal cell cancer     Dr Mendoza Carlin; he's had >350 lesions removed    Carotid occlusion     left     Cervical radiculopathy     MRI 9/11 showed C3-6 severe foraminal stenosis    Chronic pain     Colon polyp     Dr Ventura Meyer 9/15    Coronary artery disease     RCA - 3.0 x 16mm TAXUS 9/04, 3.0 x 16mm TAXUS 12/06    Dyslipidemia     Erectile dysfunction     GERD     GI bleed     Hearing loss     2014  bilateral Dr Inna Solorzano    Hernia, umbilical     Hypertension     with component of white coat    Hypogonadism male     Lumbar radiculopathy     Dr Jillian Knight;  MRI 9/11 L4-5 disc bulging, annular tear, disc dessication    Myofascial pain dysfunction syndrome     pain clinic     OAB (overactive bladder)     Osteoarthritis     right knee Dr Junior Moe Overweight (BMI 25.0-29. 9)     IF 5/18 start weight 214 lbs, not doing    Peripheral vascular disease     50-60% R iliac; s/p R iliac stent and L femoral artery stent in past; R OLIVIA 0.76 (1/16)    Plantar fasciitis     bilateral     PPD positive     Prediabetes     Prostate cancer     T1c Imelda 7(3+4), 70% in 1 core, GS 7 (3+4) in 4 cores, GS 6 (3+3) in 1 core; psa 5.28, TRUS 18 gm;  Dr Ventura Meyer; s/p cryoablation 10/16    Recurrent umbilical hernia     Subclinical hypothyroidism     denies    Tinnitus     Dr Anthony Ornita Ulcerative colitis     Venous insufficiency      Past Surgical History:   Procedure Laterality Date    CARDIAC SURG PROCEDURE UNLIST  2018    [facility] Dr Rukhsana Dumont; echo nl lv, ef 60%, dilated RV, biatrial enlargement, trace AI    CARDIAC SURG PROCEDURE UNLIST  2018    [facility] Dr Rukhsana Dumont; NST neg, ef 60%    HAND/FINGER SURGERY UNLISTED      HX CAROTID ENDARTERECTOMY        right    [place] HX COLONOSCOPY      Dr Christie Lazar 9/15 polyps    HX CRYOABLATION OF THE PROSTATE      10/16    HX HEMORRHOIDECTOMY          [address] 37, 1975    HX ORTHOPAEDIC      right knee surgery    HX ORTHOPAEDIC  2017    Dr Chase Kenyon; R CTS release    HX REFRACTIVE SURGERY      OD (hemoplasty to right eye on retina to avoid blindness)    HX TONSILLECTOMY          FL LAP, RECURRENT INCISIONAL HERNIA REPAIR,REDUCIBLE N/A 2017    Dr. Mj Madrigal HERNIA,5+Y/O,REDUCIBL        left  Dr. Juhi Lebron FNQP,2+Y/L,ZIYCM        Dr. Promise Banegas        R OLIVIA 0.76, L OLIVIA 1.02    VASCULAR SURGERY PROCEDURE UNLIST      10/15   left fem art and left iliac stent     Social History     Socioeconomic History    Marital status:      Spouse name: Not on file    Number of children: Not on file    Years of education: Not on file    Highest education level: Not on file   Occupational History    Not on file   Social Needs    Financial resource strain: Not on file    Food insecurity:     Worry: Not on file     Inability: Not on file    Transportation needs:     Medical: Not on file     Non-medical: Not on file   Tobacco Use    Smoking status: Former Smoker     Packs/day: 1.50     Years: 25.00     Pack years: 37.50     Types: Cigarettes     Last attempt to quit: 2003     Years since quittin.9    Smokeless tobacco: Never Used   Substance and Sexual Activity    Alcohol use: Yes     Comment: RARELY    Drug use: No    Sexual activity: Yes Partners: Female     Birth control/protection: None   Lifestyle    Physical activity:     Days per week: Not on file     Minutes per session: Not on file    Stress: Not on file   Relationships    Social connections:     Talks on phone: Not on file     Gets together: Not on file     Attends Synagogue service: Not on file     Active member of club or organization: Not on file     Attends meetings of clubs or organizations: Not on file     Relationship status: Not on file    Intimate partner violence:     Fear of current or ex partner: Not on file     Emotionally abused: Not on file     Physically abused: Not on file     Forced sexual activity: Not on file   Other Topics Concern    Not on file   Social History Narrative    Not on file     Family History   Problem Relation Age of Onset    Heart Disease Mother     Hypertension Mother     Diabetes Mother     Arthritis-osteo Mother     Heart Disease Father     Stroke Father     Hypertension Father     Heart Disease Sister     Hypertension Sister      Current Outpatient Medications   Medication Sig    HYDROcodone-acetaminophen (NORCO) 5-325 mg per tablet Take 1 Tab by mouth every four (4) hours as needed (chronic pain) for up to 30 days. Max Daily Amount: 6 Tabs.  gabapentin (NEURONTIN) 300 mg capsule 1 cap QHS  Indications: Neuropathic Pain    LORazepam (ATIVAN) 1 mg tablet Take 1 Tab by mouth every eight (8) hours as needed for Anxiety.  atenolol (TENORMIN) 25 mg tablet TAKE ONE TABLET BY MOUTH TWICE A DAY    clopidogrel (PLAVIX) 75 mg tab TAKE ONE TABLET BY MOUTH DAILY    icosapent ethyl (VASCEPA) 1 gram capsule Take 1 Cap by mouth two (2) times daily (with meals).  diclofenac (VOLTAREN) 1 % gel Apply 2-4 g to affected area four (4) times daily. (Patient taking differently: Apply 2-4 g to affected area daily as needed.)    colestipol (COLESTID) 1 gram tablet 1 g daily as needed.     ezetimibe-simvastatin (VYTORIN) 10-40 mg per tablet TAKE 1 TABLET NIGHTLY    losartan (COZAAR) 25 mg tablet TAKE ONE TABLET BY MOUTH TWICE A DAY    aspirin delayed-release 81 mg tablet Take 81 mg by mouth daily.  triamterene-hydrochlorothiazide (MAXZIDE) 37.5-25 mg per tablet TAKE ONE TABLET BY MOUTH DAILY    Cholecalciferol, Vitamin D3, 1,000 unit cap Take 1,000 Units by mouth daily.  MULTIVITAMIN PO Take  by mouth daily.  pantoprazole (PROTONIX) 40 mg tablet Take 40 mg by mouth daily.  coenzyme q10 200 mg Cap Take  by mouth daily. No current facility-administered medications for this visit. Allergies   Allergen Reactions    Altace [Ramipril] Unknown (comments)     Pt does not remember reaction    Lipitor [Atorvastatin] Other (comments)     Muscle pain    Lisinopril Shortness of Breath and Other (comments)     Turns bright red    Other Medication Other (comments)     Vicryl suture on skin tends to be rejected with poor wound healing does better with monocryl    Procardia [Nifedipine] Other (comments)     Caused afib    Rosuvastatin Other (comments)     Muscle Pain       Depression Risk Factor Screening:     3 most recent PHQ Screens 10/14/2019   PHQ Not Done -   Little interest or pleasure in doing things Not at all   Feeling down, depressed, irritable, or hopeless Not at all   Total Score PHQ 2 0     SCREENINGS  Colonoscopy last done 9/15 Dr Ajay Marino  Prostate Dr Amanda Ochoa Screening: On any occasion during the past 3 months, have you had more than 4 drinks containing alcohol? No    Do you average more than 14 drinks per week? No    Functional Ability and Level of Safety:     Hearing Loss   normal-to-mild    Sees ophth    Activities of Daily Living   Self-care. Requires assistance with: no ADLs    Fall Risk     Fall Risk Assessment, last 12 mths 6/23/2016   Able to walk? Yes   Fall in past 12 months? Yes   Fall with injury?  No   Number of falls in past 12 months 1   Fall Risk Score 1     Abuse Screen   Patient is not abused    Review of Systems   A comprehensive review of systems was negative except for that written in the HPI. Physical Examination     No exam data present    Evaluation of Cognitive Function:  Mood/affect:  neutral  Appearance: age appropriate, overweight, well dressed and within normal Limits  Family member/caregiver input: na    Visit Vitals  /80   Pulse 67   Temp 98.3 °F (36.8 °C) (Oral)   Resp 14   Ht 6' (1.829 m)   Wt 220 lb (99.8 kg)   SpO2 98%   BMI 29.84 kg/m²       Patient Care Team:  Parrish Greene MD as PCP - General (Internal Medicine)  Sheldon Velasco MD as Physician (Cardiology)  Dee Dailey (Vascular Surgery)  Naeem Omer MD as Physician (Vascular Surgery)  Adelina Haynes RN as Ambulatory Care Navigator (Internal Medicine)  Owen Cotto MD as Consulting Provider (Gastroenterology)  Toña Lopez RN as Ambulatory Care Navigator (Internal Medicine)  Dee Hawkins (Physician Assistant)  Cl Ambriz MD (Orthopedic Surgery)    Advice/Referrals/Counseling   Education and counseling provided:  Are appropriate based on today's review and evaluation  End-of-Life planning (with patient's consent)  Colorectal cancer screening tests  Cardiovascular screening blood test  Diabetes screening test    Assessment/Plan     Diagnoses and all orders for this visit:    1. Medicare annual wellness visit, subsequent    2. Advanced directives, counseling/discussion  -     ADVANCE CARE PLANNING FIRST 30 MINS    3. Screening for alcoholism  -     DC ANNUAL ALCOHOL SCREEN 15 MIN    4. Screening for depression  -     DEPRESSION SCREEN ANNUAL    5. Screening for diabetes mellitus    6. Polyp of colon, unspecified part of colon, unspecified type    7. Ulcerative colitis with complication, unspecified location ([address]    8. Atherosclerosis of artery of extremity with intermittent claudication ([address]    9.  IFG (impaired fasting glucose)  -     HEMOGLOBIN A1C W/O EAG; Future    10. Cervical spinal stenosis    11. Dyslipidemia  -     CBC W/O DIFF; Future  -     LIPID PANEL; Future    12. Atherosclerosis of native coronary artery of native heart without angina pectoris    13. Atrial fibrillation, unspecified type ([facility]    14. PAD (peripheral artery disease) ([facility]    15.  Hypertension        Health Maintenance Due   Topic Date Due    AAA Screening 73-69 YO Male Smoking Patients  08/18/2010    GLAUCOMA SCREENING Q2Y  06/25/2019    MEDICARE YEARLY EXAM  11/21/2019

## 2019-11-21 NOTE — PROGRESS NOTES
76 y.o. WHITE OR  male who presents for evaluation. No cardiovascular complaints and sees Dr Lizzette Reis performed [facility] endarterectomy w patch angioplasty/B CI artery stenting. He is now able to walk long distances without having right sided claudication. He has f/u testing and visit in January. Claims to be walking about 30 min daily    continues to see ortho and received euflexxa both knees and this usually gives good relief for several months. He is using norco for pain control when he has breakthrough.  regularly reviewed and no discrepancies    The UC has been stable     Denies polyuria, polydipsia, nocturia, vision change. Not checking sugars at this time.  . Weight is stable over months now    He followed Dr Camden Melchor to  for the prostate issues and he is now down to yearly f/u    We started vascepa after the last encounter, he is able to take /d max    LAST MEDICARE WELLNESS EXAM: 6/23/16, 6/29/17, 11/20/18, 11/25/19    Past Medical History:   Diagnosis Date    Adenoma of left adrenal gland     2009  1 cm, no change 1/15, 2/16    Asbestosis     CT 1/19 showed pleural plaques    Atrial fibrillation     CHA2DS2-VAsc=3(age+, htn+, vasc dx+; estimated yearly stroke risk according to Lip et al. is 3,2%), Hasbled=2(estimated yearly bleeding risk according to pisters et al. is 1,88%); not anticoagulated due to h/o gi bleed    Atrial fibrillation ablation     10/08    Basal cell cancer     Dr Leonid Maciel; he's had >350 lesions removed    Carotid occlusion     left     Cervical radiculopathy     MRI 9/11 showed C3-6 severe foraminal stenosis    Chronic pain     Colon polyp     Dr Camden Melchor 9/15    Coronary artery disease     RCA - 3.0 x 16mm TAXUS 9/04, 3.0 x 16mm TAXUS 12/06    Dyslipidemia     Erectile dysfunction     GERD     GI bleed     Hearing loss     2014  bilateral Dr Kei Yancey    Hernia, umbilical     Hypertension     with component of white coat    Hypogonadism male    Erica Mike Lumbar radiculopathy     Dr Rodriges Ebbs;  MRI 9/11 L4-5 disc bulging, annular tear, disc dessication    Myofascial pain dysfunction syndrome     pain clinic     OAB (overactive bladder)     Osteoarthritis     right knee Dr Tosha Madden Overweight (BMI 25.0-29. 9)     IF 5/18 start weight 214 lbs, not doing    Peripheral vascular disease     50-60% R iliac; s/p R iliac stent and L femoral artery stent in past; R OLIVIA 0.76 (1/16)    Plantar fasciitis     bilateral     PPD positive     Prediabetes     Prostate cancer     T1c [place] 7(3+4), 70% in 1 core, GS 7 (3+4) in 4 cores, GS 6 (3+3) in 1 core; psa 5.28, TRUS 18 gm;  Dr Wesley Dempsey; s/p cryoablation 10/16    Recurrent umbilical hernia     Subclinical hypothyroidism     denies    Tinnitus     Dr Promise Rowley Ulcerative colitis     Venous insufficiency      Past Surgical History:   Procedure Laterality Date    CARDIAC SURG PROCEDURE UNLIST  04/2018    [facility] Dr Christian Karimi; echo nl lv, ef 60%, dilated RV, biatrial enlargement, trace AI    CARDIAC SURG PROCEDURE UNLIST  04/2018    [facility] Dr Christian Karimi; NST neg, ef 60%    HAND/FINGER SURGERY UNLISTED  2017    HX CAROTID ENDARTERECTOMY      1/14  right    [place] [facility] HX COLONOSCOPY      Dr Wesley Dempsey 9/15 polyps    HX CRYOABLATION OF THE PROSTATE      10/16    HX HEMORRHOIDECTOMY      1979    [facility]    HX ORTHOPAEDIC      right knee surgery    HX ORTHOPAEDIC  12/2017    Dr Melanie Mcgrath; R CTS release    HX REFRACTIVE SURGERY      OD (hemoplasty to right eye on retina to avoid blindness)    HX TONSILLECTOMY      1955    OH LAP, RECURRENT INCISIONAL HERNIA REPAIR,REDUCIBLE N/A 02/09/2017    Dr. Sarah Gamez HERNIA,5+Y/O,REDUCIBL      2/16  left  Dr. Brandon Rivas GIOL,5+C/H,XFVOT      2/16  Dr. Kristian Laura      1/16  R OLIVIA 0.76, L OLIVIA 1.02    VASCULAR SURGERY PROCEDURE UNLIST      10/15   left fem art and left iliac stent     Social History     Socioeconomic History    Marital status:      Spouse name: Not on file    Number of children: Not on file    Years of education: Not on file    Highest education level: Not on file   Occupational History    Not on file   Social Needs    Financial resource strain: Not on file    Food insecurity:     Worry: Not on file     Inability: Not on file    Transportation needs:     Medical: Not on file     Non-medical: Not on file   Tobacco Use    Smoking status: Former Smoker     Packs/day: 1.50     Years: 25.00     Pack years: 37.50     Types: Cigarettes     Last attempt to quit: 2003     Years since quittin.9    Smokeless tobacco: Never Used   Substance and Sexual Activity    Alcohol use: Yes     Comment: RARELY    Drug use: No    Sexual activity: Yes     Partners: Female     Birth control/protection: None   Lifestyle    Physical activity:     Days per week: Not on file     Minutes per session: Not on file    Stress: Not on file   Relationships    Social connections:     Talks on phone: Not on file     Gets together: Not on file     Attends Anabaptism service: Not on file     Active member of club or organization: Not on file     Attends meetings of clubs or organizations: Not on file     Relationship status: Not on file    Intimate partner violence:     Fear of current or ex partner: Not on file     Emotionally abused: Not on file     Physically abused: Not on file     Forced sexual activity: Not on file   Other Topics Concern    Not on file   Social History Narrative    Not on file     Current Outpatient Medications   Medication Sig    HYDROcodone-acetaminophen (NORCO) 5-325 mg per tablet Take 1 Tab by mouth every four (4) hours as needed (chronic pain) for up to 30 days. Max Daily Amount: 6 Tabs.     gabapentin (NEURONTIN) 300 mg capsule 1 cap QHS  Indications: Neuropathic Pain    LORazepam (ATIVAN) 1 mg tablet Take 1 Tab by mouth every eight (8) hours as needed for Anxiety.  atenolol (TENORMIN) 25 mg tablet TAKE ONE TABLET BY MOUTH TWICE A DAY    clopidogrel (PLAVIX) 75 mg tab TAKE ONE TABLET BY MOUTH DAILY    icosapent ethyl (VASCEPA) 1 gram capsule Take 1 Cap by mouth two (2) times daily (with meals).  diclofenac (VOLTAREN) 1 % gel Apply 2-4 g to affected area four (4) times daily. (Patient taking differently: Apply 2-4 g to affected area daily as needed.)    colestipol (COLESTID) 1 gram tablet 1 g daily as needed.  ezetimibe-simvastatin (VYTORIN) 10-40 mg per tablet TAKE 1 TABLET NIGHTLY    losartan (COZAAR) 25 mg tablet TAKE ONE TABLET BY MOUTH TWICE A DAY    aspirin delayed-release 81 mg tablet Take 81 mg by mouth daily.  triamterene-hydrochlorothiazide (MAXZIDE) 37.5-25 mg per tablet TAKE ONE TABLET BY MOUTH DAILY    Cholecalciferol, Vitamin D3, 1,000 unit cap Take 1,000 Units by mouth daily.  MULTIVITAMIN PO Take  by mouth daily.  pantoprazole (PROTONIX) 40 mg tablet Take 40 mg by mouth daily.  coenzyme q10 200 mg Cap Take  by mouth daily. No current facility-administered medications for this visit.       Allergies   Allergen Reactions    Altace [Ramipril] Unknown (comments)     Pt does not remember reaction    Lipitor [Atorvastatin] Other (comments)     Muscle pain    Lisinopril Shortness of Breath and Other (comments)     Turns bright red    Other Medication Other (comments)     Vicryl suture on skin tends to be rejected with poor wound healing does better with monocryl    Procardia [Nifedipine] Other (comments)     Caused afib    Rosuvastatin Other (comments)     Muscle Pain       REVIEW OF SYSTEMS: colo 9/15 Dr Waldo Romero, sees Dr Bashir Montana no vision change or eye pain  Oral  no mouth pain, tongue or tooth problems  Ears  no hearing loss, ear pain, fullness, no swallowing problems  Cardiac  no CP, PND, orthopnea, edema, palpitations or syncope  Chest  no breast masses  Resp  no wheezing, chronic coughing, dyspnea  GI  no heartburn, nausea, vomiting, change in bowel habits, bleeding, hemorrhoids  Urinary  no dysuria, hematuria, flank pain, urgency, frequency  Genitals  no genital lesions, discharge, masses, ulceration, warts    Visit Vitals  /80   Pulse 67   Temp 98.3 °F (36.8 °C) (Oral)   Resp 14   Ht 6' (1.829 m)   Wt 220 lb (99.8 kg)   SpO2 98%   BMI 29.84 kg/m²      A&O x3  Affect is appropriate. Mood stable  No apparent distress  Anicteric, no JVD, adenopathy or thyromegaly. No carotid bruits or radiated murmur  Lungs clear to auscultation, no wheezes or rales  Heart showed regular rhythm. No rubs, gallops, 1-2/6 karli rsb  Abdomen soft nontender, no hepatosplenomegaly or masses. Extremities without edema.   Pulses 0-1 on right, 0-1 on left    LABS  From 12/12 showed gluc 101, cr 0.77, gfr 94,   alt 26,           chol 182, tg 159, hdl 52, ldl-c 98, wbc 7.6, hb 15.4, plt 171, tsh 0.96, psa 3.20  From 7/13 showed                          test 263  From 1/14 showed   gluc 112, cr 0.78, gfr 107, alt 17,           chol 130, tg 129, hdl 37, ldl-c 67, wbc 6.8, hb 15.1, plt 186, tsh 0.64, psa 3.60, vit d 26.8  From 6/14 showed   gluc 101, cr 0.57, gfr>60,     hba1c 5.7, vit d 34.3  From 12/14 showed         hba1c 5.9, chol 141, tg 104, hdl 42, ldl-c 78, wbc 8.5, hb 14.8, plt 147, tsh 0.75, psa 5.60, vit d 31.8, ua neg  From 6/15 showed   gluc 104, cr 0.75, gfr>60,     hba1c 5.9  From 8/15 showed                      tsh 0.44, psa 5.28,         test 215, lh 6.1, prl 11.1, ft4 1.61  From 12/15 showed gluc 95,   cr 0.71, gfr>60,  alt 36, hba1c 5.9, chol 135, tg 105, hdl 44, ldl-c 70,           tsh 0.51,     vit d 29.1   From 1/16 showed   gluc 112, cr 0.72, gfr>60,          wbc 7.8, hb 15.1, plt 163,            ua neg  From 11/16 showed gluc 103, cr 0.70,           wbc 8.6, hb 14.5, plt 189, ck/trop neg  From 12/16 showed gluc 89,   cr 0.70, gfr>60,  alt 31, hba1c 5.9, chol 159, tg 124, hdl 58, ldl-c 76, wbc 9.0, hb 15.1, plt 185,      vit d 27.8  From 3/17 showed   gluc 100, cr 0.75, gfr>60,     hba1c 5.9,                tsh 0.78, psa 0.18, ft4 1.30  From 6/17 showed   gluc 96,   cr 0.74, gfr>60, alt 34,  hba1c 5.7, chol 135, tg 71,   hdl 53, ldl-c 68  From 1/18 showed   gluc 107, cr 0.65, gfr>60, alt 31,  hba1c 5.8, chol 147, tg 105, hdl 46, ldl-c 80  From 5/18 showed   gluc 100, cr 0.73, gfr>60, alt 38,  hba1c 5.9, chol 152, tg 59,   hdl 66, ldl-c 74  From 11/18 showed         hba1c 6.1, chol 146, tg 149, hdl 51, ldl-c 65, wbc 7.9, hb 15.4, plt 164  From 5/19 showed         hba1c 5.5    Results for orders placed or performed during the hospital encounter of 71/99/45   METABOLIC PANEL, COMPREHENSIVE   Result Value Ref Range    Sodium 136 136 - 145 mmol/L    Potassium 4.0 3.5 - 5.5 mmol/L    Chloride 99 (L) 100 - 111 mmol/L    CO2 33 (H) 21 - 32 mmol/L    Anion gap 4 3.0 - 18 mmol/L    Glucose 108 (H) 74 - 99 mg/dL    BUN 13 7.0 - 18 MG/DL    Creatinine 0.77 0.6 - 1.3 MG/DL    BUN/Creatinine ratio 17 12 - 20      GFR est AA >60 >60 ml/min/1.73m2    GFR est non-AA >60 >60 ml/min/1.73m2    Calcium 9.7 8.5 - 10.1 MG/DL    Bilirubin, total 0.6 0.2 - 1.0 MG/DL    ALT (SGPT) 31 16 - 61 U/L    AST (SGOT) 18 10 - 38 U/L    Alk.  phosphatase 64 45 - 117 U/L    Protein, total 6.8 6.4 - 8.2 g/dL    Albumin 3.9 3.4 - 5.0 g/dL    Globulin 2.9 2.0 - 4.0 g/dL    A-G Ratio 1.3 0.8 - 1.7     LIPID PANEL   Result Value Ref Range    LIPID PROFILE          Cholesterol, total 128 <200 MG/DL    Triglyceride 108 <150 MG/DL    HDL Cholesterol 36 (L) 40 - 60 MG/DL    LDL, calculated 70.4 0 - 100 MG/DL    VLDL, calculated 21.6 MG/DL    CHOL/HDL Ratio 3.6 0 - 5.0     HEMOGLOBIN A1C W/O EAG   Result Value Ref Range    Hemoglobin A1c 5.5 4.2 - 5.6 %   PAIN MGMT PANEL W/REFL, UR   Result Value Ref Range    Amphetamine Screen, urine NEGATIVE  Qsmyax=4967 ng/mL    Barbiturates Screen, urine NEGATIVE  Bjycqm=032 ng/mL    Benzodiazepines Screen, urine NEGATIVE Iexxpx=351 ng/mL    Cannabinoid Screen, urine NEGATIVE  Cutoff=20 ng/mL    Cocaine Metab. Screen, urine NEGATIVE  Ycpfox=719 ng/mL    Opiate Screen, urine See Final Results Cqnclf=390 ng/mL    Oxycodone/Oxymorphone, urine NEGATIVE  Vizexc=775 ng/mL    Phencyclidine Screen, urine NEGATIVE  Cutoff=25 ng/mL    Methadone Screen, urine NEGATIVE  Rstmiv=334 ng/mL    Propoxyphene Screen, urine NEGATIVE  Dlaufl=230 ng/mL    Meperidine Screen, urine NEGATIVE  Hxcodd=655 ng/mL    Fentanyl Screen, urine NEGATIVE  Vrpopl=5475 pg/mL    Tramadol Screen, urine NEGATIVE  Ygkqwn=870 ng/mL    Creatinine, urine 88.2 20.0 - 300.0 mg/dL    Specific Gravity 1.014      pH, urine 7.0 4.5 - 8.9      Please Note Comment     OPIATES, CONFIRM   Result Value Ref Range    Opiates Positive (A) Yignti=678 ng/mL    Codeine NEGATIVE  Xdxmdu=622      Morphine NEGATIVE  Tdhygn=548      Hydromorphone Positive (A)      Hydromorphone confirm 128 Sitrvj=095 ng/mL    Hydrocodone Positive (A)      Hydrocodone confirm 762 Ttcqmq=572 ng/mL     Patient Active Problem List   Diagnosis Code    Colitis, ulcerative (HCC) K51.90    Colon polyps K63.5    Left carotid artery occlusion I65.22    Atherosclerosis of artery of extremity with intermittent claudication (HCC) I70.219    Hypovitaminosis D E55.9    Advance directive in chart Z78.9    Hypertension I11.9    Dyslipidemia E78.5    Coronary artery disease I25.10    Atrial fibrillation I48.91    Recurrent umbilical hernia T99.2    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 9) E66.3    History of prostate cancer Z85.46    Facet hypertrophy of lumbar region M47.816    Aortoiliac occlusive disease ([facility] [address] I74.09    PAD (peripheral artery disease) ([facility]) I73.9     Assessment and plan:  1. Cardiac. Continue current regimen. F/U Dr Isaias Murdock  2. Vascular. Plans per Moon  3. Dyslipidemia. Continue current regimen and doing well on vascepa  4. PreDM. Lifestyle and dietary measures, wt loss as he is trying to do  5. Hypovit d. Supplement  6. Colon polyp. Fiber, colo 2020  7. Prostate ca. Per Dr Camden Melchor  8. Afib. Per Dr Isaias Murdock  9. Ortho. F/U Dr Chanelle Kim  10. Spine. F/U Dr Asha rollins  11. Overweight. Lifestyle and dietary measures. Portion control         RTC 5/20    Above conditions discussed at length and patient vocalized understanding.   All questions answered to patient satisfaction

## 2019-11-23 LAB
AMPHETAMINES UR QL SCN: NEGATIVE NG/ML
BARBITURATES UR QL SCN: NEGATIVE NG/ML
BENZODIAZ UR QL SCN: NEGATIVE NG/ML
BZE UR QL SCN: NEGATIVE NG/ML
CANNABINOIDS UR QL SCN: NEGATIVE NG/ML
CODEINE, 737848: NEGATIVE
CREAT UR-MCNC: 88.2 MG/DL (ref 20–300)
FENTANYL+NORFENTANYL UR QL SCN: NEGATIVE PG/ML
HYDROCODONE CONFIRM, 737855: 762 NG/ML
HYDROCODONE, 737854: POSITIVE
HYDROMORPHONE CONFIRM, 737853: 128 NG/ML
HYDROMORPHONE, 737852: POSITIVE
MEPERIDINE UR QL: NEGATIVE NG/ML
METHADONE UR QL SCN: NEGATIVE NG/ML
MORPHINE, 737850: NEGATIVE
OPIATES UR QL SCN: NORMAL NG/ML
OPIATES, 737847: POSITIVE NG/ML
OXYCODONE+OXYMORPHONE UR QL SCN: NEGATIVE NG/ML
PCP UR QL: NEGATIVE NG/ML
PH UR: 7 [PH] (ref 4.5–8.9)
PLEASE NOTE:, 733157: NORMAL
PROPOXYPH UR QL SCN: NEGATIVE NG/ML
SP GR UR: 1.01
TRAMADOL UR QL SCN: NEGATIVE NG/ML

## 2019-11-25 ENCOUNTER — OFFICE VISIT (OUTPATIENT)
Dept: INTERNAL MEDICINE CLINIC | Age: 74
End: 2019-11-25

## 2019-11-25 VITALS
BODY MASS INDEX: 29.8 KG/M2 | RESPIRATION RATE: 14 BRPM | WEIGHT: 220 LBS | TEMPERATURE: 98.3 F | HEIGHT: 72 IN | OXYGEN SATURATION: 98 % | SYSTOLIC BLOOD PRESSURE: 114 MMHG | DIASTOLIC BLOOD PRESSURE: 80 MMHG | HEART RATE: 67 BPM

## 2019-11-25 DIAGNOSIS — I25.10 ATHEROSCLEROSIS OF NATIVE CORONARY ARTERY OF NATIVE HEART WITHOUT ANGINA PECTORIS: ICD-10-CM

## 2019-11-25 DIAGNOSIS — E78.5 DYSLIPIDEMIA: ICD-10-CM

## 2019-11-25 DIAGNOSIS — M48.02 CERVICAL SPINAL STENOSIS: ICD-10-CM

## 2019-11-25 DIAGNOSIS — I11.9 BENIGN HYPERTENSIVE HEART DISEASE WITHOUT HEART FAILURE: ICD-10-CM

## 2019-11-25 DIAGNOSIS — I70.219 ATHEROSCLEROSIS OF ARTERY OF EXTREMITY WITH INTERMITTENT CLAUDICATION (HCC): ICD-10-CM

## 2019-11-25 DIAGNOSIS — Z00.00 MEDICARE ANNUAL WELLNESS VISIT, SUBSEQUENT: Primary | ICD-10-CM

## 2019-11-25 DIAGNOSIS — Z13.1 SCREENING FOR DIABETES MELLITUS: ICD-10-CM

## 2019-11-25 DIAGNOSIS — Z71.89 ADVANCED DIRECTIVES, COUNSELING/DISCUSSION: ICD-10-CM

## 2019-11-25 DIAGNOSIS — Z13.31 SCREENING FOR DEPRESSION: ICD-10-CM

## 2019-11-25 DIAGNOSIS — K51.919 ULCERATIVE COLITIS WITH COMPLICATION, UNSPECIFIED LOCATION (HCC): ICD-10-CM

## 2019-11-25 DIAGNOSIS — I73.9 PAD (PERIPHERAL ARTERY DISEASE) (HCC): ICD-10-CM

## 2019-11-25 DIAGNOSIS — R73.01 IFG (IMPAIRED FASTING GLUCOSE): ICD-10-CM

## 2019-11-25 DIAGNOSIS — I48.91 ATRIAL FIBRILLATION, UNSPECIFIED TYPE (HCC): ICD-10-CM

## 2019-11-25 DIAGNOSIS — Z13.39 SCREENING FOR ALCOHOLISM: ICD-10-CM

## 2019-11-25 DIAGNOSIS — K63.5 POLYP OF COLON, UNSPECIFIED PART OF COLON, UNSPECIFIED TYPE: ICD-10-CM

## 2019-11-25 PROBLEM — I70.213 ATHEROSCLEROSIS OF NATIVE ARTERY OF BOTH LOWER EXTREMITIES WITH INTERMITTENT CLAUDICATION (HCC): Status: RESOLVED | Noted: 2019-07-02 | Resolved: 2019-11-25

## 2019-11-25 NOTE — PROGRESS NOTES
Yrn Dubois presents today for   Chief Complaint   Patient presents with    Cholesterol Problem     6 month follow up with labs              Depression Screening:  3 most recent PHQ Screens 10/14/2019   PHQ Not Done -   Little interest or pleasure in doing things Not at all   Feeling down, depressed, irritable, or hopeless Not at all   Total Score PHQ 2 0       Learning Assessment:  Learning Assessment 9/18/2019   PRIMARY LEARNER Patient   HIGHEST LEVEL OF EDUCATION - PRIMARY LEARNER  -   BARRIERS PRIMARY LEARNER -   CO-LEARNER CAREGIVER -   PRIMARY LANGUAGE ENGLISH   LEARNER PREFERENCE PRIMARY LISTENING     -     -     -     -   ANSWERED BY patient   RELATIONSHIP SELF       Abuse Screening:  Abuse Screening Questionnaire 5/23/2019   Do you ever feel afraid of your partner? N   Are you in a relationship with someone who physically or mentally threatens you? N   Is it safe for you to go home? Y       Fall Risk  Fall Risk Assessment, last 12 mths 10/14/2019   Able to walk? Yes   Fall in past 12 months? No   Fall with injury? -   Number of falls in past 12 months -   Fall Risk Score -           Coordination of Care:  1. Have you been to the ER, urgent care clinic since your last visit? Hospitalized since your last visit? no    2. Have you seen or consulted any other health care providers outside of the [address] [place] [address] since your last visit? Include any pap smears or colon screening.  no

## 2019-11-26 NOTE — ACP (ADVANCE CARE PLANNING)
Advance Care Planning    Advance Care Planning (ACP) Provider Note - Comprehensive     Date of ACP Conversation: 11/25/19  Persons included in Conversation:  patient  Length of ACP Conversation in minutes:  16 minutes    Authorized Decision Maker (if patient is incapable of making informed decisions): This person is: wife  Healthcare Agent/Medical Power of  under Advance Directive          General ACP for ALL Patients with Decision Making Capacity:   Importance of advance care planning, including choosing a healthcare agent to communicate patient's healthcare decisions if patient lost the ability to make decisions, such as after a sudden illness or accident    Review of Existing Advance Directive:  Does this advance directive still reflect your preferences?   Yes (Provide new form/Refer for assistance in updating)    For Serious or Chronic Illness:  Understanding of medical condition      Interventions Provided:  Recommended communicating the plan and making copies for the healthcare agent, personal physician, and others as appropriate (e.g., health system)  Recommended review of completed ACP document annually or upon change in health status

## 2019-11-26 NOTE — PATIENT INSTRUCTIONS
Medicare Wellness Visit, Male The best way to live healthy is to have a lifestyle where you eat a well-balanced diet, exercise regularly, limit alcohol use, and quit all forms of tobacco/nicotine, if applicable. Regular preventive services are another way to keep healthy. Preventive services (vaccines, screening tests, monitoring & exams) can help personalize your care plan, which helps you manage your own care. Screening tests can find health problems at the earliest stages, when they are easiest to treat. [employer] follows the current, evidence-based guidelines published by the [facility] Jonny Martha ([facility]) when recommending preventive services for our patients. Because we follow these guidelines, sometimes recommendations change over time as research supports it. (For example, a prostate screening blood test is no longer routinely recommended for men with no symptoms). Of course, you and your doctor may decide to screen more often for some diseases, based on your risk and co-morbidities (chronic disease you are already diagnosed with). Preventive services for you include: - Medicare offers their members a free annual wellness visit, which is time for you and your primary care provider to discuss and plan for your preventive service needs. Take advantage of this benefit every year! 
-All adults over age 72 should receive the recommended pneumonia vaccines. Current USPSTF guidelines recommend a series of two vaccines for the best pneumonia protection.  
-All adults should have a flu vaccine yearly and tetanus vaccine every 10 years. 
-All adults age 48 and older should receive the shingles vaccines (series of two vaccines).       
-All adults age 38-68 who are overweight should have a diabetes screening test once every three years.  
-Other screening tests & preventive services for persons with diabetes include: an eye exam to screen for diabetic retinopathy, a kidney function test, a foot exam, and stricter control over your cholesterol.  
-Cardiovascular screening for adults with routine risk involves an electrocardiogram (ECG) at intervals determined by the provider.  
-Colorectal cancer screening should be done for adults age 54-65 with no increased risk factors for colorectal cancer. There are a number of acceptable methods of screening for this type of cancer. Each test has its own benefits and drawbacks. Discuss with your provider what is most appropriate for you during your annual wellness visit. The different tests include: colonoscopy (considered the best screening method), a fecal occult blood test, a fecal DNA test, and sigmoidoscopy. 
-All adults born between [facility] should be screened once for Hepatitis C. 
-An Abdominal Aortic Aneurysm (AAA) Screening is recommended for men age 73-68 who has ever smoked in their lifetime. Here is a list of your current Health Maintenance items (your personalized list of preventive services) with a due date: 
Health Maintenance Due Topic Date Due  
 AAA Screening  08/18/2010  Glaucoma Screening   06/25/2019 [address] [place] [address] Annual Well Visit  11/21/2019

## 2019-12-02 ENCOUNTER — TELEPHONE (OUTPATIENT)
Dept: VASCULAR SURGERY | Age: 74
End: 2019-12-02

## 2019-12-03 ENCOUNTER — DOCUMENTATION ONLY (OUTPATIENT)
Dept: VASCULAR SURGERY | Age: 74
End: 2019-12-03

## 2019-12-03 ENCOUNTER — OFFICE VISIT (OUTPATIENT)
Dept: VASCULAR SURGERY | Age: 74
End: 2019-12-03

## 2019-12-03 VITALS
BODY MASS INDEX: 29.8 KG/M2 | WEIGHT: 220 LBS | RESPIRATION RATE: 17 BRPM | HEIGHT: 72 IN | DIASTOLIC BLOOD PRESSURE: 82 MMHG | SYSTOLIC BLOOD PRESSURE: 142 MMHG

## 2019-12-03 DIAGNOSIS — M79.604 RIGHT LEG PAIN: Primary | ICD-10-CM

## 2019-12-03 DIAGNOSIS — I74.09 AORTOILIAC OCCLUSIVE DISEASE (HCC): ICD-10-CM

## 2019-12-03 DIAGNOSIS — M19.90 ARTHRITIS: ICD-10-CM

## 2019-12-03 DIAGNOSIS — I70.213 ATHEROSCLEROSIS OF NATIVE ARTERY OF BOTH LOWER EXTREMITIES WITH INTERMITTENT CLAUDICATION (HCC): ICD-10-CM

## 2019-12-03 DIAGNOSIS — Z95.828 S/P INSERTION OF ILIAC ARTERY STENT: ICD-10-CM

## 2019-12-03 NOTE — PROGRESS NOTES
Anju Ortiz    Chief Complaint   Patient presents with    Leg Pain       History and Physical    Anju Ortiz is a 76 y.o. male with known aortoiliac occlusive disease as well as carotid disease. He underwent right common femoral endarterectomy with patch angioplasty as well as bilateral common iliac artery stenting in July of this year. He has done very well postoperatively however today he presents with some pain to his right lateral lower leg and pain surrounding his knee. He does have a long history of chronic back problems and also has history of end-stage arthritis of his right knee. He has been recommended for a total knee replacement but is trying to put this off until after his wife has a bunion surgery in February. He does state that he recently had about 90 cc of fluid removed off of his right knee a couple of months ago. He states that he does have in a follow-up appointment this week with his orthopedic surgeon is expecting to have more fluid removed. He did have arterial studies in the office today which revealed no significant arterial insufficiency in the bilateral lower extremities. ABIs were within normal limits. He did have isolated monophasic waveforms in the left posterior tibial artery. Otherwise he has at least biphasic to triphasic waveforms throughout. He denies any fevers or chills.     Past Medical History:   Diagnosis Date    Adenoma of left adrenal gland     2009  1 cm, no change 1/15, 2/16    Asbestosis     CT 1/19 showed pleural plaques    Atrial fibrillation     CHA2DS2-VAsc=3(age+, htn+, vasc dx+; estimated yearly stroke risk according to Lip et al. is 3,2%), Hasbled=2(estimated yearly bleeding risk according to pisters et al. is 1,88%); not anticoagulated due to h/o gi bleed    Atrial fibrillation ablation     10/08    Basal cell cancer     Dr Kaushal Diaz; he's had >350 lesions removed    Carotid occlusion     left     Cervical radiculopathy MRI 9/11 showed C3-6 severe foraminal stenosis    Chronic pain     Colon polyp     Dr Amy Zimmerman 9/15    Coronary artery disease     RCA - 3.0 x 16mm TAXUS 9/04, 3.0 x 16mm TAXUS 12/06    Dyslipidemia     Erectile dysfunction     GERD     GI bleed     Hearing loss     2014  bilateral Dr Miller Shock    Hernia, umbilical     Hypertension     with component of white coat    Hypogonadism male     Lumbar radiculopathy     Dr Lucrecia Gallegos;  MRI 9/11 L4-5 disc bulging, annular tear, disc dessication    Myofascial pain dysfunction syndrome     pain clinic     OAB (overactive bladder)     Osteoarthritis     right knee Dr Janessa Gallegos Overweight (BMI 25.0-29. 9)     IF 5/18 start weight 214 lbs, not doing    Peripheral vascular disease     50-60% R iliac; s/p R iliac stent and L femoral artery stent in past; R OLIVIA 0.76 (1/16)    Plantar fasciitis     bilateral     PPD positive     Prediabetes     Prostate cancer     T1c Imelda 7(3+4), 70% in 1 core, GS 7 (3+4) in 4 cores, GS 6 (3+3) in 1 core; psa 5.28, TRUS 18 gm;  Dr Amy Zimmerman; s/p cryoablation 10/16    Recurrent umbilical hernia     Subclinical hypothyroidism     denies    Tinnitus     Dr Abelino Downey Ulcerative colitis     Venous insufficiency      Past Surgical History:   Procedure Laterality Date    CARDIAC SURG PROCEDURE UNLIST  04/2018    [facility] Dr Filomena Lemos; echo nl lv, ef 60%, dilated RV, biatrial enlargement, trace AI    CARDIAC SURG PROCEDURE UNLIST  04/2018    [facility] Dr Filomena Lemos; NST neg, ef 60%    HAND/FINGER SURGERY UNLISTED  2017    HX CAROTID ENDARTERECTOMY      1/14  right    HX CATARACT REMOVAL      HX COLONOSCOPY      Dr Amy Zimmerman 9/15 polyps    HX CRYOABLATION OF THE PROSTATE      10/16    HX HEMORRHOIDECTOMY      1979    Emilee Dub [address], 1975    HX ORTHOPAEDIC      right knee surgery    HX ORTHOPAEDIC  12/2017    Dr Miguel A Llamas; R CTS release    HX REFRACTIVE SURGERY      OD (hemoplasty to right eye on retina to avoid blindness)    HX TONSILLECTOMY      1955    GA LAP, RECURRENT INCISIONAL HERNIA REPAIR,REDUCIBLE N/A 02/09/2017    Dr. Erika Melendrez HERNIA,5+Y/O,REDUCIBL      2/16  left  Dr. Donis Rob KQPE,4+M/A,CCAZO      2/16  Dr. Mirian Baltazar      1/16  R OLIVIA 0.76, L OLIVIA 1.02    VASCULAR SURGERY PROCEDURE UNLIST      10/15   left fem art and left iliac stent     Patient Active Problem List   Diagnosis Code    Colitis, ulcerative ([facility] [address] K51.90    Colon polyps K63.5    Left carotid artery occlusion I65.22    Atherosclerosis of artery of extremity with intermittent claudication (HCC) I70.219    Hypovitaminosis D E55.9    Advance directive in chart Z78.9    Hypertension I11.9    Dyslipidemia E78.5    Coronary artery disease I25.10    Atrial fibrillation I48.91    Recurrent umbilical hernia Y35.1    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 9) E66.3    History of prostate cancer Z85.46    Facet hypertrophy of lumbar region M47.816    Aortoiliac occlusive disease (HCC) I74.09    PAD (peripheral artery disease) ([facility]) I73.9     Current Outpatient Medications   Medication Sig Dispense Refill    HYDROcodone-acetaminophen (NORCO) 5-325 mg per tablet Take 1 Tab by mouth every four (4) hours as needed (chronic pain) for up to 30 days. Max Daily Amount: 6 Tabs. 180 Tab 0    gabapentin (NEURONTIN) 300 mg capsule 1 cap QHS  Indications: Neuropathic Pain 30 Cap 0    LORazepam (ATIVAN) 1 mg tablet Take 1 Tab by mouth every eight (8) hours as needed for Anxiety.  50 Tab 0    atenolol (TENORMIN) 25 mg tablet TAKE ONE TABLET BY MOUTH TWICE A  Tab 3    clopidogrel (PLAVIX) 75 mg tab TAKE ONE TABLET BY MOUTH DAILY 90 Tab 5    icosapent ethyl (VASCEPA) 1 gram capsule Take 1 Cap by mouth two (2) times daily (with meals). 180 Cap 3    diclofenac (VOLTAREN) 1 % gel Apply 2-4 g to affected area four (4) times daily. (Patient taking differently: Apply 2-4 g to affected area daily as needed.) 100 g 5    colestipol (COLESTID) 1 gram tablet 1 g daily as needed.  ezetimibe-simvastatin (VYTORIN) 10-40 mg per tablet TAKE 1 TABLET NIGHTLY 90 Tab 3    losartan (COZAAR) 25 mg tablet TAKE ONE TABLET BY MOUTH TWICE A  Tab 2    aspirin delayed-release 81 mg tablet Take 81 mg by mouth daily.  triamterene-hydrochlorothiazide (MAXZIDE) 37.5-25 mg per tablet TAKE ONE TABLET BY MOUTH DAILY 90 Tab 3    Cholecalciferol, Vitamin D3, 1,000 unit cap Take 1,000 Units by mouth daily.  MULTIVITAMIN PO Take  by mouth daily.  pantoprazole (PROTONIX) 40 mg tablet Take 40 mg by mouth daily.  coenzyme q10 200 mg Cap Take  by mouth daily.        Allergies   Allergen Reactions    Altace [Ramipril] Unknown (comments)     Pt does not remember reaction    Lipitor [Atorvastatin] Other (comments)     Muscle pain    Lisinopril Shortness of Breath and Other (comments)     Turns bright red    Other Medication Other (comments)     Vicryl suture on skin tends to be rejected with poor wound healing does better with monocryl    Procardia [Nifedipine] Other (comments)     Caused afib    Rosuvastatin Other (comments)     Muscle Pain       Social History     Socioeconomic History    Marital status:      Spouse name: Not on file    Number of children: Not on file    Years of education: Not on file    Highest education level: Not on file   Occupational History    Not on file   Social Needs    Financial resource strain: Not on file    Food insecurity:     Worry: Not on file     Inability: Not on file    Transportation needs:     Medical: Not on file     Non-medical: Not on file   Tobacco Use    Smoking status: Former Smoker     Packs/day: 1.50     Years: 25.00     Pack years: 37.50     Types: Cigarettes     Last attempt to quit: 2003     Years since quittin.9    Smokeless tobacco: Never Used   Substance and Sexual Activity    Alcohol use: Yes     Comment: RARELY    Drug use: No    Sexual activity: Yes     Partners: Female     Birth control/protection: None   Lifestyle    Physical activity:     Days per week: Not on file     Minutes per session: Not on file    Stress: Not on file   Relationships    Social connections:     Talks on phone: Not on file     Gets together: Not on file     Attends [employer] service: Not on file     Active member of club or organization: Not on file     Attends meetings of clubs or organizations: Not on file     Relationship status: Not on file    Intimate partner violence:     Fear of current or ex partner: Not on file     Emotionally abused: Not on file     Physically abused: Not on file     Forced sexual activity: Not on file   Other Topics Concern    Not on file   Social History Narrative    Not on file      Family History   Problem Relation Age of Onset    Heart Disease Mother     Hypertension Mother     Diabetes Mother     Arthritis-osteo Mother     Heart Disease Father     Stroke Father     Hypertension Father     Heart Disease Sister     Hypertension Sister        Physical Exam:    Visit Vitals  /82 (BP 1 Location: Left arm, BP Patient Position: Sitting)   Resp 17   Ht 6' (1.829 m)   Wt 220 lb (99.8 kg)   BMI 29.84 kg/m²      General: Well-appearing male in no acute distress  HEENT: EOMI no scleral icterus is noted  Pulmonary: No increased work of breathing is noted  Extremities: Warm and perfused bilaterally. He does not have any significant bilateral lower extremity edema on today's exam.  He does have significant swelling in the right knee.   Neuro: Cranial nerves II through XII are grossly intact    Impression and Plan:  Deedee Aldridge Keturah Rubin is a 76 y.o. male with aortoiliac occlusive disease as well as peripheral arterial disease. He recently developed some pain to his right lateral lower leg and knee. Imaging was reviewed with him in the office today. I do not appreciate any significant arterial insufficiency which may be a cause of his new pain. I discussed that this is very likely secondary to his orthopedic issues. He expresses understanding to this and agrees. He is not complaining of any classic claudication symptoms and has no true rest pain. He does have a follow-up studies scheduled for January of his iliac stents and carotid arteries. We will also obtain a right leg duplex to rule out a possible popliteal aneurysm as he does complain of pain to the posterior right knee and palpation is very difficult due to the significant edema in his knee. He will follow-up afterwards accordingly. He is certainly understanding to call the office sooner as needed. [address], [place]    PLEASE NOTE:  This document has been produced using voice recognition software. Unrecognized errors in transcription may be present.

## 2019-12-03 NOTE — PROGRESS NOTES
1. Have you been to an emergency room or urgent care clinic since your last visit? NO    Hospitalized since your last visit? If yes, where, when, and reason for visit? No  2. Have you seen or consulted any other health care providers outside of the [facility] since your last visit including any procedures, health maintenance items. If yes, where, when and reason for visit?  NO

## 2019-12-03 NOTE — PROGRESS NOTES
Per [place], [place], leg art to be scheduled and completed prior to patient's appointment this afternoon. PSR will contact patient.

## 2019-12-05 ENCOUNTER — OFFICE VISIT (OUTPATIENT)
Dept: ORTHOPEDIC SURGERY | Facility: CLINIC | Age: 74
End: 2019-12-05

## 2019-12-05 VITALS
HEIGHT: 72 IN | TEMPERATURE: 96.5 F | BODY MASS INDEX: 30.12 KG/M2 | DIASTOLIC BLOOD PRESSURE: 75 MMHG | WEIGHT: 222.4 LBS | HEART RATE: 63 BPM | SYSTOLIC BLOOD PRESSURE: 125 MMHG | RESPIRATION RATE: 16 BRPM

## 2019-12-05 DIAGNOSIS — G89.29 CHRONIC PAIN OF RIGHT KNEE: ICD-10-CM

## 2019-12-05 DIAGNOSIS — M25.461 EFFUSION OF RIGHT KNEE: ICD-10-CM

## 2019-12-05 DIAGNOSIS — M17.11 PRIMARY OSTEOARTHRITIS OF RIGHT KNEE: Primary | ICD-10-CM

## 2019-12-05 DIAGNOSIS — M25.561 CHRONIC PAIN OF RIGHT KNEE: ICD-10-CM

## 2019-12-05 RX ORDER — BETAMETHASONE SODIUM PHOSPHATE AND BETAMETHASONE ACETATE 3; 3 MG/ML; MG/ML
6 INJECTION, SUSPENSION INTRA-ARTICULAR; INTRALESIONAL; INTRAMUSCULAR; SOFT TISSUE ONCE
Qty: 0.5 ML | Refills: 0
Start: 2019-12-05 | End: 2019-12-05

## 2019-12-05 RX ORDER — EZETIMIBE AND SIMVASTATIN 10; 40 MG/1; MG/1
TABLET ORAL
Qty: 90 TAB | Refills: 3 | Status: SHIPPED | OUTPATIENT
Start: 2019-12-05 | End: 2020-09-23

## 2019-12-05 NOTE — PROGRESS NOTES
Patient: Bridget Menendez                MRN: [id number]       SSN: [id number]  YOB: 1945        AGE: 76 y.o. SEX: male    PCP: Lula Sandoval MD  12/05/19    Chief Complaint   Patient presents with    Knee Pain     Right knee pain     HISTORY:  Bridget Menendez is a 76 y.o. male who is seen for increased right knee pain and swelling. He noticed increased right knee swelling recently. He thinks he may have aggravated his right knee while walking on his treadmill recently. He has been experiencing increasing knee pain for the past few years. He reports no h/o injury. He notes pain with standing and walking. He has pain ascending/descending stairs. He reports increasing difficulty with everyday activities. He reports his knee pain will wake him up at night. He reports that he sleeps with a pillow between his legs for some relief. He reports relief from previous cortisone and visco supplementation injections by Dr. Rachelle Shaver in the past. He reports some right leg claudication with increased walking. He says his knee has not been able to completely extend his leg since 1964. He injured his right knee playing football and underwent total meniscectomy and arthrotomy by Dr. Derrick Mccloud. He completed a Euflexxa series on 10/14/19. He was previously seen for left hip pain. He has hip pain after walking more than one block. He reports he is bothered mostly by his left hip pain. He does not recall any injury. He reports hip and knee startup pain and pain increased use. He is s/p right endarterectomy for circulation problems in July 2019 by Dr. Jana Balderrama well. He currently takes Plavix. He reports that he takes a diuretic that causes him to urinate frequently. He was previously seen by Dr. Rachelle Shaver and R Robbin Holloway . He has a stent in his right femoral artery. He reports a blockage in his left [address] of [facility].      Pain Assessment  12/5/2019   Location of Pain Knee   Location Modifiers Right   Severity of Pain 7   Quality of Pain Aching; Sharp   Quality of Pain Comment -   Duration of Pain Persistent   Frequency of Pain Intermittent   Date Pain First Started -   Aggravating Factors Standing;Walking;Bending   Aggravating Factors Comment Sitting   Limiting Behavior -   Relieving Factors Rest;Ice;Heat;Elevation   Relieving Factors Comment -   Result of Injury No   Work-Related Injury No   Type of Injury -   Type of Injury Comment -     Occupation, etc:  Mr. Fanta Conner he lives with his wife in [facility]. He has 2 sons not in the area. His youngest son lives in [place]. His middle son lives in [place] and is becoming a Rastafari . He has 7 grandchildren. He retired 15 years ago as a  for the [employer] 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 He reports that he lost 20 pounds by exercising more recently while moving. He reports that he cut his right leg a few months ago. His wound took 2 months to heal. He enjoys doing water exercises. His eldest son passed away on 6/17/19 in [place]. He is unsure of the cause of his son's death. Current weight is 222 pounds. He is 6' tall.       Lab Results   Component Value Date/Time    Hemoglobin A1c 5.5 11/18/2019 08:17 AM    Hemoglobin A1c (POC) 5.5 05/23/2019 09:05 AM     Weight Metrics 12/5/2019 12/3/2019 11/25/2019 11/7/2019 10/14/2019 10/10/2019 10/7/2019   Weight 222 lb 6.4 oz 220 lb 220 lb 224 lb 222 lb 224 lb 224 lb 12.8 oz   BMI 30.16 kg/m2 29.84 kg/m2 29.84 kg/m2 30.38 kg/m2 30.11 kg/m2 30.38 kg/m2 30.49 kg/m2       Patient Active Problem List   Diagnosis Code    Colitis, ulcerative ([facility] [address] K51.90    Colon polyps K63.5    Left carotid artery occlusion I65.22    Atherosclerosis of artery of extremity with intermittent claudication (HCC) I70.219    Hypovitaminosis D E55.9    Advance directive in chart Z78.9    Hypertension I11.9    Dyslipidemia E78.5    Coronary artery disease I25.10    Atrial fibrillation I48.91    Recurrent umbilical hernia P47.0    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 9) E66.3    History of prostate cancer Z85.46    Facet hypertrophy of lumbar region M47.816    Aortoiliac occlusive disease (HCC) I74.09    PAD (peripheral artery disease) ([facility]) I73.9     REVIEW OF SYSTEMS: All Below are Negative except: See HPI   Constitutional: negative for fever, chills, and weight loss. Cardiovascular: negative for chest pain, claudication, leg swelling, SOB, DUONG   Gastrointestinal: Negative for pain, N/V/C/D, Blood in stool or urine, dysuria,  hematuria, incontinence, pelvic pain. Musculoskeletal: See HPI   Neurological: Negative for dizziness and weakness. Negative for headaches, Visual changes, confusion, seizures   Phychiatric/Behavioral: Negative for depression, memory loss, substance  abuse. Extremities: Negative for hair changes, rash, or skin lesion changes. Hematologic: Negative for bleeding problems, bruising, pallor or swollen lymph  nodes   Peripheral Vascular: No calf pain, no circulation deficits.     Social History     Socioeconomic History    Marital status:      Spouse name: Not on file    Number of children: Not on file    Years of education: Not on file    Highest education level: Not on file   Occupational History    Not on file   Social Needs    Financial resource strain: Not on file    Food insecurity:     Worry: Not on file     Inability: Not on file    Transportation needs:     Medical: Not on file     Non-medical: Not on file   Tobacco Use    Smoking status: Former Smoker     Packs/day: 1.50     Years: 25.00     Pack years: 37.50     Types: Cigarettes Last attempt to quit: 2003     Years since quittin.9    Smokeless tobacco: Never Used   Substance and Sexual Activity    Alcohol use: Yes     Comment: RARELY    Drug use: No    Sexual activity: Yes     Partners: Female     Birth control/protection: None   Lifestyle    Physical activity:     Days per week: Not on file     Minutes per session: Not on file    Stress: Not on file   Relationships    Social connections:     Talks on phone: Not on file     Gets together: Not on file     Attends [employer] service: Not on file     Active member of club or organization: Not on file     Attends meetings of clubs or organizations: Not on file     Relationship status: Not on file    Intimate partner violence:     Fear of current or ex partner: Not on file     Emotionally abused: Not on file     Physically abused: Not on file     Forced sexual activity: Not on file   Other Topics Concern    Not on file   Social History Narrative    Not on file      Allergies   Allergen Reactions    Altace [Ramipril] Unknown (comments)     Pt does not remember reaction    Lipitor [Atorvastatin] Other (comments)     Muscle pain    Lisinopril Shortness of Breath and Other (comments)     Turns bright red    Other Medication Other (comments)     Vicryl suture on skin tends to be rejected with poor wound healing does better with monocryl    Procardia [Nifedipine] Other (comments)     Caused afib    Rosuvastatin Other (comments)     Muscle Pain        Current Outpatient Medications   Medication Sig    HYDROcodone-acetaminophen (NORCO) 5-325 mg per tablet Take 1 Tab by mouth every four (4) hours as needed (chronic pain) for up to 30 days. Max Daily Amount: 6 Tabs.  gabapentin (NEURONTIN) 300 mg capsule 1 cap QHS  Indications: Neuropathic Pain    LORazepam (ATIVAN) 1 mg tablet Take 1 Tab by mouth every eight (8) hours as needed for Anxiety.     atenolol (TENORMIN) 25 mg tablet TAKE ONE TABLET BY MOUTH TWICE A DAY    clopidogrel (PLAVIX) 75 mg tab TAKE ONE TABLET BY MOUTH DAILY    icosapent ethyl (VASCEPA) 1 gram capsule Take 1 Cap by mouth two (2) times daily (with meals).  diclofenac (VOLTAREN) 1 % gel Apply 2-4 g to affected area four (4) times daily. (Patient taking differently: Apply 2-4 g to affected area daily as needed.)    colestipol (COLESTID) 1 gram tablet 1 g daily as needed.  ezetimibe-simvastatin (VYTORIN) 10-40 mg per tablet TAKE 1 TABLET NIGHTLY    losartan (COZAAR) 25 mg tablet TAKE ONE TABLET BY MOUTH TWICE A DAY    aspirin delayed-release 81 mg tablet Take 81 mg by mouth daily.  triamterene-hydrochlorothiazide (MAXZIDE) 37.5-25 mg per tablet TAKE ONE TABLET BY MOUTH DAILY    Cholecalciferol, Vitamin D3, 1,000 unit cap Take 1,000 Units by mouth daily.  MULTIVITAMIN PO Take  by mouth daily.  pantoprazole (PROTONIX) 40 mg tablet Take 40 mg by mouth daily.  coenzyme q10 200 mg Cap Take  by mouth daily. No current facility-administered medications for this visit.        PHYSICAL EXAMINATION:  Visit Vitals  /75   Pulse 63   Temp 96.5 °F (35.8 °C) (Oral)   Resp 16   Ht 6' (1.829 m)   Wt 222 lb 6.4 oz (100.9 kg)   BMI 30.16 kg/m²      ORTHO EXAMINATION:  Examination Right knee Left knee   Skin Intact Intact   Range of motion 110-0 120-0   Effusion 3+ -   Medial joint line tenderness + -   Lateral joint line tenderness - -   Popliteal tenderness - -   Osteophytes palpable + -   Kylahs - -   Patella crepitus + -   Anterior drawer - -   Lateral laxity - -   Medial laxity - -   Varus deformity + -   Valgus deformity - -   Pretibial edema - -   Calf tenderness - -   Brisk capilary refill, palpable pulse    Examination Right hip Left hip   Skin Intact Intact   External Rotation ROM 20 20   Internal Rotation ROM 10 10   Trochanteric tenderness + +   Hip flexion contracture - -   Antalgic gait - -   Trendelenberg sign - -   Lumbar tenderness - -   Straight leg raise - -   Calf tenderness - -   Neurovascular Intact Intact       Chart reviewed for the following:   Maryellen Alvarez MD, have reviewed the History, Physical and updated the Allergic reactions for [address] performed immediately prior to start of procedure:  Maryellen Alvarez MD, have performed the following reviews on [facility] prior to the start of the procedure:            * Patient was identified by name and date of birth   * Agreement on procedure being performed was verified  * Risks and Benefits explained to the patient  * Procedure site verified and marked as necessary  * Patient was positioned for comfort  * Consent was obtained     Time: 9:24 AM    Date of procedure: 12/5/2019    Procedure performed by:  Aniya Portillo MD    Mr. Crow Ellison tolerated the procedure well with no complications. RADIOGRAPHS:  XR KLAUS HIP 11/7/18 SAWYER  IMPRESSION:  AP pelvis and two views - No fractures, moderate joint space narrowing, acetabular osteophytes present. Tonnis grade 2. Femoral acetabular impingement syndrome     XR RIGHT KNEE 4/13/18 SAWYER  IMPRESSION:  Three views - No fractures, no effusion, severe end stage with lateral subluxation joint space narrowing, + osteophytes present. IKDC Grade C. calcification of arteries    IMPRESSION:      ICD-10-CM ICD-9-CM    1. Primary osteoarthritis of right knee M17.11 715.16 betamethasone (CELESTONE SOLUSPAN) 6 mg/mL injection      BETAMETHASONE ACETATE & SODIUM PHOSPHATE INJECTION 3 MG EA.      DRAIN/INJECT LARGE JOINT/BURSA   2. Chronic pain of right knee M25.561 719.46 betamethasone (CELESTONE SOLUSPAN) 6 mg/mL injection    G89.29 338.29 BETAMETHASONE ACETATE & SODIUM PHOSPHATE INJECTION 3 MG EA.      DRAIN/INJECT LARGE JOINT/BURSA   3. Effusion of right knee M25.461 719.06      PLAN:  After timeout and under sterile conditions, right knee aspirated 65 cc of light yellow fluid. The fluid was discarded.   After discussing treatment options, patient's right knee was injected with 4 cc Marcaine and 1/2 cc Celestone. There is no need for surgery at this time. He will follow up as needed.     Scribed by Alisia Aguiar ([address]) as dictated by Abby Wilson MD

## 2019-12-05 NOTE — PROGRESS NOTES
Verbal order given by Dr. Shahzad Richard to draw up 4 mL 0.25% Sensorcaine and 0.5 mL 30 mg/5mL Betamethasone. Verbal order given by Dr. Shahzad Richard to draw up 10 mL 0.25% Sensorcaine.

## 2019-12-11 ENCOUNTER — TELEPHONE (OUTPATIENT)
Dept: INTERNAL MEDICINE CLINIC | Age: 74
End: 2019-12-11

## 2019-12-12 ENCOUNTER — CLINICAL SUPPORT (OUTPATIENT)
Dept: INTERNAL MEDICINE CLINIC | Age: 74
End: 2019-12-12

## 2019-12-12 DIAGNOSIS — Z48.00 DRESSING CHANGE: Primary | ICD-10-CM

## 2019-12-12 NOTE — PROGRESS NOTES
Patient here for a dressing change for right leg. Patient went to ED 12/10/19 for abrasion of right leg and was told to follow up with PCP for dressing change due to being on blood thinners. Per  patient was scheduled to see a nurse. The abrasion was cleansed and dressed. Slight bleeding noted. Advised patient to return 12/13/19 so that abrasion could be rechecked before weekend  to make sure bleeding stopped.

## 2019-12-13 NOTE — PROGRESS NOTES
Patient returned 12/13/19 for dressing change. Very minimal blood noted. Advised patient to monitor area through the weekend and keep area clean, wrapped with gauze  and non stick pad.

## 2019-12-16 DIAGNOSIS — M48.02 CERVICAL SPINAL STENOSIS: ICD-10-CM

## 2019-12-16 RX ORDER — GABAPENTIN 300 MG/1
CAPSULE ORAL
Qty: 30 CAP | Refills: 0 | Status: SHIPPED | OUTPATIENT
Start: 2019-12-16 | End: 2020-01-15 | Stop reason: SDUPTHER

## 2019-12-16 NOTE — TELEPHONE ENCOUNTER
Regarding RX: gabapentin (NEURONTIN) 300 mg capsule     Preferred Pharmacy:   [address] [place] [address], [facility] [address]      Patient advised he is going to need a refill to hold him over until is upcoming appt on 1/15/2020 with LB.      Patient can be reached: [phone]

## 2019-12-23 DIAGNOSIS — M54.12 CERVICAL RADICULOPATHY: Primary | ICD-10-CM

## 2019-12-23 RX ORDER — HYDROCODONE BITARTRATE AND ACETAMINOPHEN 5; 325 MG/1; MG/1
1 TABLET ORAL
Qty: 180 TAB | Refills: 0 | Status: SHIPPED | OUTPATIENT
Start: 2019-12-23 | End: 2020-01-22

## 2019-12-23 NOTE — TELEPHONE ENCOUNTER
[facility] date for Norco as 11/7/19 for a 30 d/s. UDS done 11/18/19  CSA signed 8/16/19    Last Visit: 11/25/19 with MD Emi Doyle  Next Appointment: 5/26/20 with MD Emi Doyle  Previous Refill Encounter(s): 11/7/19 #180    Requested Prescriptions     Pending Prescriptions Disp Refills    HYDROcodone-acetaminophen (NORCO) 5-325 mg per tablet  0     Sig: Take 1 Tab by mouth every four (4) hours as needed (chronic pain) for up to 30 days. Max Daily Amount: 6 Tabs.

## 2020-01-15 ENCOUNTER — OFFICE VISIT (OUTPATIENT)
Dept: ORTHOPEDIC SURGERY | Age: 75
End: 2020-01-15

## 2020-01-15 VITALS
TEMPERATURE: 97.9 F | HEIGHT: 72 IN | OXYGEN SATURATION: 97 % | WEIGHT: 222.2 LBS | BODY MASS INDEX: 30.1 KG/M2 | SYSTOLIC BLOOD PRESSURE: 120 MMHG | HEART RATE: 64 BPM | RESPIRATION RATE: 14 BRPM | DIASTOLIC BLOOD PRESSURE: 69 MMHG

## 2020-01-15 DIAGNOSIS — M50.30 DEGENERATION OF CERVICAL INTERVERTEBRAL DISC: ICD-10-CM

## 2020-01-15 DIAGNOSIS — M48.02 CERVICAL SPINAL STENOSIS: Primary | ICD-10-CM

## 2020-01-15 DIAGNOSIS — M47.812 FACET ARTHRITIS OF CERVICAL REGION: ICD-10-CM

## 2020-01-15 RX ORDER — POTASSIUM CHLORIDE 20 MEQ/1
20 TABLET, EXTENDED RELEASE ORAL
COMMUNITY
Start: 2020-01-02 | End: 2020-06-10 | Stop reason: ALTCHOICE

## 2020-01-15 RX ORDER — FUROSEMIDE 20 MG/1
TABLET ORAL
COMMUNITY
Start: 2020-01-02 | End: 2020-06-10 | Stop reason: ALTCHOICE

## 2020-01-15 RX ORDER — DICLOFENAC SODIUM 10 MG/G
2 GEL TOPICAL 4 TIMES DAILY
Qty: 200 G | Refills: 0 | Status: CANCELLED | OUTPATIENT
Start: 2020-01-15

## 2020-01-15 RX ORDER — GABAPENTIN 300 MG/1
CAPSULE ORAL
Qty: 30 CAP | Refills: 5 | Status: SHIPPED | OUTPATIENT
Start: 2020-01-15 | End: 2020-06-10 | Stop reason: SDUPTHER

## 2020-01-15 NOTE — TELEPHONE ENCOUNTER
Rx ordered and pend to [facility].  Please review orders for Voltaren gel and sign for approval. Angel Thomason LPN

## 2020-01-15 NOTE — TELEPHONE ENCOUNTER
Patient is requesting refill on Diclofenac 1%. He states he pharmacy sent over a fax last week.     Kim De La Cruz [phone]    His# [phone]

## 2020-01-15 NOTE — PROGRESS NOTES
Chief complaint/History of Present Illness:  Chief Complaint   Patient presents with    Neck Pain     6 month follow up and med refill    Back Pain     low back     Leg Pain     rle     RAF Cunningham is a  76 y.o.  male      HISTORY OF PRESENT ILLNESS:  The patient comes in today for followup of his neck and back pain. His neck pain is worse than his back pain. He does get occasional right lower extremity pain, but it is mostly controlled with Neurontin 300 mg daily. He is unable to tolerate higher doses. He did have his right femoral endarterectomy with patch done by Dr. Marii Nicole on July 2, 2019. He states he is doing well from that; however, he thinks they may have over straightened his neck when they intubated him, because he has had increased neck pain since them and does get occasional left shoulder pain that radiates to the forearm. It is off and on. He states he had that before; however, it seemed to get worse after that surgery. He has multiple vascular issues, including 100% blockage in the left carotid artery. He has had two stents in his heart, one in 2004, and one in 2006. He had an ablation done for cardiac arrhythmia in 2009. He sees Dr. Joyce Luis. He has a history of prostate cancer. He is doing okay with that. He had a right carpal tunnel release in 2017 and has known carpal tunnel syndrome on the left. He sees Dr. Isaac Hobbs. He denies fever and bowel and bladder dysfunction. He has no other new medical issues. He is retired. He is a nonsmoker. PHYSICAL EXAM:  Mr. Kelle Escobar is a 60-year-old male. He is alert and oriented. He has a normal mood and affect. He has a full weightbearing, non-antalgic gait using no assistive device. He has 4/5 strength of the bilateral upper and lower extremities, negative straight leg raise, and negative Cortess. He does have pain with hyperextension of the lumbar spine.     ASSESSMENT/PLAN:  This is a patient who has chronic neck and back pain due to cervical stenosis, facet arthropathy, and degenerative disc disease. He is managing with 300 mg of Neurontin at bedtime. It does seem to help. If his left arm pain becomes more problematic, we can always work that up and get an MRI. He wishes to hold off on that at this time. I have refilled his Neurontin. We will see him back in six months or sooner if needed. Review of systems:    Past Medical History:   Diagnosis Date    Adenoma of left adrenal gland     2009  1 cm, no change 1/15, 2/16    Asbestosis     CT 1/19 showed pleural plaques    Atrial fibrillation     CHA2DS2-VAsc=3(age+, htn+, vasc dx+; estimated yearly stroke risk according to Lip et al. is 3,2%), Hasbled=2(estimated yearly bleeding risk according to pisters et al. is 1,88%); not anticoagulated due to h/o gi bleed    Atrial fibrillation ablation     10/08    Basal cell cancer     Dr Abdoulaye Cruz; he's had >350 lesions removed    Carotid occlusion     left     Cervical radiculopathy     MRI 9/11 showed C3-6 severe foraminal stenosis    Chronic pain     Colon polyp     Dr Valdez Elizabeth 9/15    Coronary artery disease     RCA - 3.0 x 16mm TAXUS 9/04, 3.0 x 16mm TAXUS 12/06    Dyslipidemia     Erectile dysfunction     GERD     GI bleed     Hearing loss     2014  bilateral Dr Garcia Corpus    Hernia, umbilical     Hypertension     with component of white coat    Hypogonadism male     Lumbar radiculopathy     Dr Albert Schwarz;  MRI 9/11 L4-5 disc bulging, annular tear, disc dessication    Myofascial pain dysfunction syndrome     pain clinic     OAB (overactive bladder)     Osteoarthritis     right knee Dr Iman Cortes Overweight (BMI 25.0-29. 9)     IF 5/18 start weight 214 lbs, not doing    Peripheral vascular disease     50-60% R iliac; s/p R iliac stent and L femoral artery stent in past; R OLIVIA 0.76 (1/16)    Plantar fasciitis     bilateral     PPD positive     Prediabetes     Prostate cancer     T1c Imelda 7(3+4), 70% in 1 core, GS 7 (3+4) in 4 cores, GS 6 (3+3) in 1 core; psa 5.28, TRUS 18 gm;  Dr Rony Castillo; s/p cryoablation 10/16    Recurrent umbilical hernia     Subclinical hypothyroidism     denies    Tinnitus     Dr Agnieszka Jason Ulcerative colitis     Venous insufficiency      Past Surgical History:   Procedure Laterality Date    CARDIAC SURG PROCEDURE UNLIST  04/2018    [facility] Dr Linda Mark; echo nl lv, ef 60%, dilated RV, biatrial enlargement, trace AI    CARDIAC SURG PROCEDURE UNLIST  04/2018    [facility] Dr Linda Mark; NST neg, ef 60%    HAND/FINGER SURGERY UNLISTED  2017    HX CAROTID ENDARTERECTOMY      1/14  right    Ethelene Palms HX COLONOSCOPY      Dr Rony Castillo 9/15 polyps    HX CRYOABLATION OF THE PROSTATE      10/16    HX [address]    HX ORTHOPAEDIC      right knee surgery    HX ORTHOPAEDIC  12/2017    Dr Tash Banks; R CTS release    HX REFRACTIVE SURGERY      OD (hemoplasty to right eye on retina to avoid blindness)    HX TONSILLECTOMY      1955    [place] LAP, RECURRENT INCISIONAL HERNIA REPAIR,REDUCIBLE N/A 02/09/2017    Dr. Hilda Underwood HERNIA,5+Y/O,REDUCIBL      2/16  left  Dr. Marck ARRIAZAWU,2+O/L,ESANK      2/16  Dr. Amita Espitia      1/16  R OLIVIA 0.76, L OLIVIA 1.02    VASCULAR SURGERY PROCEDURE UNLIST      10/15   left fem art and left iliac stent     Social History     Socioeconomic History    Marital status:      Spouse name: Not on file    Number of children: Not on file    Years of education: Not on file    Highest education level: Not on file   Occupational History    Not on file   Social Needs    Financial resource strain: Not on file    Food insecurity:     Worry: Not on file     Inability: Not on file    Transportation needs:     Medical: Not on file     Non-medical: Not on file   Tobacco Use    Smoking status: Former Smoker     Packs/day: 1.50     Years: 25.00     Pack years: 37. 50     Types: Cigarettes     Last attempt to quit: 2003     Years since quittin.0    Smokeless tobacco: Never Used   Substance and Sexual Activity    Alcohol use: Yes     Comment: RARELY    Drug use: No    Sexual activity: Yes     Partners: Female     Birth control/protection: None   Lifestyle    Physical activity:     Days per week: Not on file     Minutes per session: Not on file    Stress: Not on file   Relationships    Social connections:     Talks on phone: Not on file     Gets together: Not on file     Attends Bahai service: Not on file     Active member of club or organization: Not on file     Attends meetings of clubs or organizations: Not on file     Relationship status: Not on file    Intimate partner violence:     Fear of current or ex partner: Not on file     Emotionally abused: Not on file     Physically abused: Not on file     Forced sexual activity: Not on file   Other Topics Concern    Not on file   Social History Narrative    Not on file     Family History   Problem Relation Age of Onset    Heart Disease Mother     Hypertension Mother     Diabetes Mother     Arthritis-osteo Mother     Heart Disease Father     Stroke Father     Hypertension Father     Heart Disease Sister     Hypertension Sister        Physical Exam:  Visit Vitals  /69 (BP 1 Location: Left arm, BP Patient Position: Sitting)   Pulse 64   Temp 97.9 °F (36.6 °C) (Oral)   Resp 14   Ht 6' (1.829 m)   Wt 222 lb 3.2 oz (100.8 kg)   SpO2 97%   BMI 30.14 kg/m²     Pain Scale: 5/10       has been . reviewed and is appropriate        Diagnoses and all orders for this visit:    1. Cervical spinal stenosis  -     gabapentin (NEURONTIN) 300 mg capsule; 1 cap QHS  Indications: Neuropathic Pain    2. Degeneration of cervical and lumbar spine, relative cervical stenosis  -     gabapentin (NEURONTIN) 300 mg capsule; 1 cap QHS  Indications: Neuropathic Pain    3.  Facet arthritis of cervical region  - gabapentin (NEURONTIN) 300 mg capsule; 1 cap QHS  Indications: Neuropathic Pain            Follow-up and Dispositions    · Return in about 6 months (around 7/15/2020) for with NP.              We have informed Jenny Mccray to notify us for immediate appointment if he has any worsening neurogical symptoms or if an emergency situation presents, then call [phone]

## 2020-01-16 NOTE — TELEPHONE ENCOUNTER
Patient called again regarding this refill. He states he is out and needs asap. I advised him the script was being processed and was waiting on MD approval. Patient verbalized understanding.

## 2020-01-17 RX ORDER — DICLOFENAC SODIUM 10 MG/G
2-4 GEL TOPICAL
Qty: 200 G | Refills: 0 | Status: SHIPPED | OUTPATIENT
Start: 2020-01-17 | End: 2020-03-02 | Stop reason: SDUPTHER

## 2020-01-27 DIAGNOSIS — I65.22 LEFT CAROTID ARTERY OCCLUSION: Primary | ICD-10-CM

## 2020-01-29 ENCOUNTER — OFFICE VISIT (OUTPATIENT)
Dept: ORTHOPEDIC SURGERY | Facility: CLINIC | Age: 75
End: 2020-01-29

## 2020-01-29 VITALS
OXYGEN SATURATION: 98 % | WEIGHT: 220.4 LBS | HEART RATE: 63 BPM | SYSTOLIC BLOOD PRESSURE: 133 MMHG | HEIGHT: 72 IN | BODY MASS INDEX: 29.85 KG/M2 | DIASTOLIC BLOOD PRESSURE: 73 MMHG | RESPIRATION RATE: 18 BRPM | TEMPERATURE: 96.5 F

## 2020-01-29 DIAGNOSIS — G56.02 CARPAL TUNNEL SYNDROME ON LEFT: Primary | ICD-10-CM

## 2020-01-29 RX ORDER — HYDROCODONE BITARTRATE AND ACETAMINOPHEN 5; 325 MG/1; MG/1
TABLET ORAL
COMMUNITY
End: 2020-02-04 | Stop reason: SDUPTHER

## 2020-01-29 NOTE — PROGRESS NOTES
Anette Lowe is a 76 y.o. male right handed retiree. Worker's Compensation and legal considerations: not known. Vitals:    01/29/20 0835   BP: 133/73   Pulse: 63   Resp: 18   Temp: 96.5 °F (35.8 °C)   TempSrc: Oral   SpO2: 98%   Weight: 220 lb 6.4 oz (100 kg)   Height: 6' (1.829 m)   PainSc:   0 - No pain   PainLoc: Hand           Chief Complaint   Patient presents with    Hand Pain     Left       HPI: Patient comes in today for follow-up regarding his left carpal tunnel syndrome. Approximately 4 months ago he received an injection that he said helped significantly. He cannot have surgery at this time as he is on Plavix no recent cardiovascular event. He is requesting another injection at this time. Previous HPI: Patient comes in today for follow-up regarding his left carpal tunnel syndrome as well as redness over the back of his left ring finger. At his last visit he had a left ring finger A1 pulley injection that he says helped his trigger finger. He says he can have any surgery for the next several months as he is on Plavix for 6 more months. Previous HPI: Patient comes in today with complaints of left ring finger locking and pain. He also reports that there is some redness over the back of the finger but he is unsure of any injury or if he was possibly bitten by something. Prior HPI: Patient comes in today for follow-up of left carpal tunnel syndrome. Proximally 3 months ago he received an injection in his left carpal tunnel. We discussed the need for surgery in the future. However he is having a vascular surgery in his right lower extremity that we will delay this. He is requesting an injection today. He reports the injection for his left ring trigger finger helped significantly. He reports since injection not really noticing that his small finger was going numb. Previous HPI: Patient comes in today for EMG follow-up of his left upper extremity.   At his last visit he received a right index finger A1 pulley injection for trigger finger that he said has helped and has not come back. He reports the numbness and tingling in his left side has not changed. He says it is mostly at night but he thinks it involves all of the digits. Initial HPI: Patient comes in today as a referral from my partner. 1 month ago he had a left carpal tunnel injection as well as a ring finger trigger finger injection. He says the numbness and tingling in the left is improved. However he says the ring finger is still locking up. He reports having an EMG many years ago. He also has a history of a right sided carpal tunnel release by Cassius Arenas in December 2017. He also has a history of cervical spine arthritis that he has been seen at the spine center for. He reports a one-week history of burning and pain in the index finger at the level of the palm. He denies any injuries to this area. Date of onset:  Many years worsening since 2018 regarding the carpal tunnel syndrome on the left    Injury: No    Prior Treatment:  Yes: Comment: He has worn a cockup wrist brace on the left    Numbness/ Tingling: Yes: Comment: Thumb index middle ring and small fingers on the left    ROS: Review of Systems - General ROS: negative  Respiratory ROS: no cough, shortness of breath, or wheezing  Cardiovascular ROS: no chest pain or dyspnea on exertion  Musculoskeletal ROS: positive for - pain in hand - bilateral  Neurological ROS: positive for - numbness/tingling  Dermatological ROS: negative    Past Medical History:   Diagnosis Date    Adenoma of left adrenal gland     2009  1 cm, no change 1/15, 2/16    Asbestosis     CT 1/19 showed pleural plaques    Atrial fibrillation     CHA2DS2-VAsc=3(age+, htn+, vasc dx+; estimated yearly stroke risk according to Lip et al. is 3,2%), Hasbled=2(estimated yearly bleeding risk according to pisters et al. is 1,88%); not anticoagulated due to h/o gi bleed    Atrial fibrillation ablation     10/08    Basal cell cancer     Dr Rolo Marie; he's had >350 lesions removed    Carotid occlusion     left     Cervical radiculopathy     MRI 9/11 showed C3-6 severe foraminal stenosis    Chronic pain     Colon polyp     Dr Alondra Harris 9/15    Coronary artery disease     RCA - 3.0 x 16mm TAXUS 9/04, 3.0 x 16mm TAXUS 12/06    Dyslipidemia     Erectile dysfunction     GERD     GI bleed     Hearing loss     2014  bilateral Dr Eduardo Lujan    Hernia, umbilical     Hypertension     with component of white coat    Hypogonadism male     Lumbar radiculopathy     Dr Sam Bridges;  MRI 9/11 L4-5 disc bulging, annular tear, disc dessication    Myofascial pain dysfunction syndrome     pain clinic     OAB (overactive bladder)     Osteoarthritis     right knee Dr Jennifer Liz Overweight (BMI 25.0-29. 9)     IF 5/18 start weight 214 lbs, not doing    Peripheral vascular disease     50-60% R iliac; s/p R iliac stent and L femoral artery stent in past; R OLIVIA 0.76 (1/16)    Plantar fasciitis     bilateral     PPD positive     Prediabetes     Prostate cancer     T1c Willard 7(3+4), 70% in 1 core, GS 7 (3+4) in 4 cores, GS 6 (3+3) in 1 core; psa 5.28, TRUS 18 gm;  Dr Alondra Harris; s/p cryoablation 10/16    Recurrent umbilical hernia     Subclinical hypothyroidism     denies    Tinnitus     Dr Shane Clark Ulcerative colitis     Venous insufficiency        Past Surgical History:   Procedure Laterality Date    CARDIAC SURG PROCEDURE UNLIST  04/2018    [facility] Dr Sona Grijalva; echo nl lv, ef 60%, dilated RV, biatrial enlargement, trace AI    CARDIAC SURG PROCEDURE UNLIST  04/2018    [facility] Dr Sona Grijalva; NST neg, ef 60%    HAND/FINGER SURGERY UNLISTED  2017    HX CAROTID ENDARTERECTOMY      1/14  right    HX CATARACT REMOVAL      HX COLONOSCOPY      Dr Alondra Harris 9/15 polyps    HX CRYOABLATION OF THE PROSTATE      10/16    HX HEMORRHOIDECTOMY      1979    [address] 37, 1975    HX ORTHOPAEDIC      right knee surgery    HX ORTHOPAEDIC  12/2017    Dr Alondra Daly; R CTS release    HX REFRACTIVE SURGERY      OD (hemoplasty to right eye on retina to avoid blindness)    HX TONSILLECTOMY      1955    [place] LAP, RECURRENT INCISIONAL HERNIA REPAIR,REDUCIBLE N/A 02/09/2017    Dr. Marshlal Rumble HERNIA,5+Y/O,REDUCIBL      2/16  left  Dr. Lieutenant Mccollum ENKJ,6+T/A,YHOHM      2/16  Dr. Conrado Bains      1/16  R OLIVIA 0.76, L OLIVIA 1.02    VASCULAR SURGERY PROCEDURE UNLIST      10/15   left fem art and left iliac stent       Current Outpatient Medications   Medication Sig Dispense Refill    HYDROcodone-acetaminophen (NORCO) 5-325 mg per tablet Take  by mouth.  triamcinolone acetonide (KENALOG) 10 mg/mL injection 1 mL by Intra artICUlar route once for 1 dose. 1 Vial 0    diclofenac (VOLTAREN) 1 % gel Apply 2-4 g to affected area daily as needed (Take 2-4 grams as needed for pain). 200 g 0    gabapentin (NEURONTIN) 300 mg capsule 1 cap QHS  Indications: Neuropathic Pain 30 Cap 5    LORazepam (ATIVAN) 1 mg tablet Take 1 Tab by mouth every eight (8) hours as needed for Anxiety. 50 Tab 0    atenolol (TENORMIN) 25 mg tablet TAKE ONE TABLET BY MOUTH TWICE A  Tab 3    clopidogrel (PLAVIX) 75 mg tab TAKE ONE TABLET BY MOUTH DAILY 90 Tab 5    icosapent ethyl (VASCEPA) 1 gram capsule Take 1 Cap by mouth two (2) times daily (with meals). 180 Cap 3    colestipol (COLESTID) 1 gram tablet 1 g daily as needed.  ezetimibe-simvastatin (VYTORIN) 10-40 mg per tablet TAKE 1 TABLET NIGHTLY 90 Tab 3    losartan (COZAAR) 25 mg tablet TAKE ONE TABLET BY MOUTH TWICE A  Tab 2    aspirin delayed-release 81 mg tablet Take 81 mg by mouth daily.  triamterene-hydrochlorothiazide (MAXZIDE) 37.5-25 mg per tablet TAKE ONE TABLET BY MOUTH DAILY 90 Tab 3    Cholecalciferol, Vitamin D3, 1,000 unit cap Take 1,000 Units by mouth daily.  MULTIVITAMIN PO Take  by mouth daily.       pantoprazole (PROTONIX) 40 mg tablet Take 40 mg by mouth daily.  coenzyme q10 200 mg Cap Take  by mouth daily.  potassium chloride (K-DUR, KLOR-CON) 20 mEq tablet Take 20 mEq by mouth.  furosemide (LASIX) 20 mg tablet       ezetimibe-simvastatin (VYTORIN) 10-40 mg per tablet TAKE 1 TABLET NIGHTLY 90 Tab 3       Allergies   Allergen Reactions    Altace [Ramipril] Unknown (comments)     Pt does not remember reaction    Lipitor [Atorvastatin] Other (comments)     Muscle pain    Lisinopril Shortness of Breath and Other (comments)     Turns bright red    Other Medication Other (comments)     Vicryl suture on skin tends to be rejected with poor wound healing does better with monocryl    Procardia [Nifedipine] Other (comments)     Caused afib    Rosuvastatin Other (comments)     Muscle Pain           PE:     NEUROVASCULAR    Examination L R Examination L R   Carpal Comp. + - Pronator Comp. - -   Carpal Tinel + - Pronator Tinel - -   Phalen's + - Pronator Stress - -   Cubital Comp. - - Guyon Comp. - -   Cubital Tinel - - Guyon Tinel - -   Elbow Hyperflexion - - Adson's - -   Spurling's - - SC Comp. - -   PCB Median abn - - SC Tinel - -   Radial Tinel - - IC Comp. - -   Digital Tinel - - IC Tinel - -   Radial 2-Pt WNL WNL Ulnar 2-Pt WNL WNL     Radial Pulse: 2+  Capillary Refill: < 2 sec  Robert: Not Performed  Digital Robert: Not Performed          Imaging:     Plain films of the left ring finger are negative for any acute fracture dislocation. There are minimal degenerative changes. 2017 MRI of cervical spine  IMPRESSION:     1. Multilevel degenerative findings of cervical spine.  -Facet arthropathy more severe than disc pathology. -Multilevel severe foraminal stenoses from facet arthropathy as delineated  above. Similar distribution previously.  -No high-grade spinal stenosis.     NCV & EMG Findings:  Evaluation of the left median motor nerve showed prolonged distal onset latency (5.2 ms) and decreased conduction velocity (Elbow-Wrist, 44 m/s). The left ulnar motor nerve showed decreased conduction velocity (A Elbow-B Elbow, 48 m/s). The left median sensory nerve showed prolonged distal peak latency (4.4 ms), reduced amplitude (5.0 µV), and decreased conduction velocity (Wrist-2nd Digit, 32 m/s). The left ulnar sensory nerve showed prolonged distal peak latency (3.9 ms), reduced amplitude (5.5 µV), and decreased conduction velocity (Wrist-5th Digit, 36 m/s). All remaining nerves (as indicated in the following tables) were within normal limits.       Needle evaluation of the left triceps muscle showed increased motor unit amplitude and slightly increased polyphasic potentials. The left pronator teres and the left Ext Digitorum muscles showed slightly increased polyphasic potentials. All remaining muscles (as indicated in the following table) showed no evidence of electrical instability.       INTERPRETATION  This is an abnormal electrodiagnostic examination. These findings may be consistent with:  1. 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 moderate carpal tunnel syndrome at the left wrist (carpal tunnel syndrome)  3. chronic C6/7 cervical radiculopathy on the left  4. mild signs of peripheral neuropathy      CLINICAL INTERPRETATION  His symptoms may multifactorial, related to any of these diagnoses, but most likely the carpal tunnel syndrome. ICD-10-CM ICD-9-CM    1.  Carpal tunnel syndrome on left G56.02 354.0 TRIAMCINOLONE ACETONIDE INJ      triamcinolone acetonide (KENALOG) 10 mg/mL injection      INJECT CARPAL TUNNEL       Plan:     Repeat Left CT Injection    Continue Night time brace wear    F/U PRN    [facility] - [facility]  OFFICE PROCEDURE PROGRESS NOTE        Chart reviewed for the following:   IAscencion DO, have reviewed the History, Physical and updated the Allergic reactions for Lucero Ranjana Collette     TIME OUT performed immediately prior to start of procedure:   Ascencion YODER DO, have performed the following reviews on [facility] prior to the start of the procedure:            * Patient was identified by name and date of birth   * Agreement on procedure being performed was verified  * Risks and Benefits explained to the patient  * Procedure site verified and marked as necessary  * Patient was positioned for comfort  * Consent was signed and verified     Time: 08:47 AM      Date of procedure: 1/29/2020    Procedure performed by: Eusebio Daily DO    Provider assisted by: Juanito Alvarez LPN    Patient assisted by: self    How tolerated by patient: tolerated the procedure well with no complications    Post Procedural Pain Scale: 0 - No Hurt    Comments: none    Procedure:  After consent was obtained, using sterile technique the carpal tunnel was prepped. Local anesthetic used: 1% lidocaine. Kenalog 5 mg and was then injected and the needle withdrawn. The procedure was well tolerated. The patient is asked to continue to rest the area for a few more days before resuming regular activities. It may be more painful for the first 1-2 days. Watch for fever, or increased swelling or persistent pain in the joint. Call or return to clinic prn if such symptoms occur or there is failure to improve as anticipated.     Plan was reviewed with patient, who verbalized agreement and understanding of the plan

## 2020-02-04 DIAGNOSIS — M54.12 CERVICAL RADICULOPATHY: Primary | ICD-10-CM

## 2020-02-04 RX ORDER — HYDROCODONE BITARTRATE AND ACETAMINOPHEN 5; 325 MG/1; MG/1
1 TABLET ORAL
Qty: 180 TAB | Refills: 0 | Status: SHIPPED | OUTPATIENT
Start: 2020-02-04 | End: 2020-03-05

## 2020-02-04 NOTE — TELEPHONE ENCOUNTER
This patient contacted office for the following prescriptions to be filled:    Medication requested :   Requested Prescriptions     Pending Prescriptions Disp Refills    HYDROcodone-acetaminophen (NORCO) 5-325 mg per tablet       Sig: Take  by mouth.      PCP: Jairo Wood 70 or Print: Naina  Mail order or [employer] [phone]

## 2020-02-04 NOTE — TELEPHONE ENCOUNTER
[facility] the [facility] Norco as [facility]23/19 for a 30 d/s. UDS done 11/18/19  CSA signed 8/9/19    Last Visit: 11/25/19 with MD Tony Angel  Next Appointment: 5/26/20 with MD Tony Angel  Previous Refill Encounter(s): 12/23/19 #180    Requested Prescriptions     Pending Prescriptions Disp Refills    HYDROcodone-acetaminophen (NORCO) 5-325 mg per tablet 180 Tab 0     Sig: Take 1 Tab by mouth every four (4) hours as needed (chronic pain) for up to 30 days. Max Daily Amount: 6 Tabs.

## 2020-02-24 ENCOUNTER — OFFICE VISIT (OUTPATIENT)
Dept: VASCULAR SURGERY | Age: 75
End: 2020-02-24

## 2020-02-24 VITALS
RESPIRATION RATE: 17 BRPM | DIASTOLIC BLOOD PRESSURE: 60 MMHG | HEIGHT: 72 IN | BODY MASS INDEX: 29.8 KG/M2 | WEIGHT: 220 LBS | SYSTOLIC BLOOD PRESSURE: 130 MMHG

## 2020-02-24 DIAGNOSIS — I74.09 AORTOILIAC OCCLUSIVE DISEASE (HCC): Primary | ICD-10-CM

## 2020-02-24 DIAGNOSIS — I70.219 ATHEROSCLEROSIS OF ARTERY OF EXTREMITY WITH INTERMITTENT CLAUDICATION (HCC): ICD-10-CM

## 2020-02-24 DIAGNOSIS — I65.22 LEFT CAROTID ARTERY OCCLUSION: ICD-10-CM

## 2020-02-24 DIAGNOSIS — I73.9 PAD (PERIPHERAL ARTERY DISEASE) (HCC): ICD-10-CM

## 2020-02-24 NOTE — PROGRESS NOTES
Melvia Fabry    Chief Complaint   Patient presents with    Carotid Artery Stenosis       History and Physical    Melvia Fabry is a 76 y.o. male with known aortoiliac occlusive disease status post femoral endarterectomy and bilateral common iliac artery stenting. Patient also has known occlusion of the left internal carotid artery mild stenosis of the right internal carotid artery. He also has known PAD distally. Currently patient is asymptomatic. He has no TIA or strokelike symptoms. No claudication no rest pain no gangrene. No buttock claudication. Past Medical History:   Diagnosis Date    Adenoma of left adrenal gland     2009  1 cm, no change 1/15, 2/16    Asbestosis     CT 1/19 showed pleural plaques    Atrial fibrillation     CHA2DS2-VAsc=3(age+, htn+, vasc dx+; estimated yearly stroke risk according to Lip et al. is 3,2%), Hasbled=2(estimated yearly bleeding risk according to pisters et al. is 1,88%); not anticoagulated due to h/o gi bleed    Atrial fibrillation ablation     10/08    Basal cell cancer     Dr Aneudy Sousa; he's had >350 lesions removed    Carotid occlusion     left     Cervical radiculopathy     MRI 9/11 showed C3-6 severe foraminal stenosis    Chronic pain     Colon polyp     Dr Yazmin Paulino 9/15    Coronary artery disease     RCA - 3.0 x 16mm TAXUS 9/04, 3.0 x 16mm TAXUS 12/06    Dyslipidemia     Erectile dysfunction     GERD     GI bleed     Hearing loss     2014  bilateral Dr Patten Nehemiah    Hernia, umbilical     Hypertension     with component of white coat    Hypogonadism male     Lumbar radiculopathy     Dr Dean Arenas;  MRI 9/11 L4-5 disc bulging, annular tear, disc dessication    Myofascial pain dysfunction syndrome     pain clinic     OAB (overactive bladder)     Osteoarthritis     right knee Dr Rajiv Blair Overweight (BMI 25.0-29. 9)     IF 5/18 start weight 214 lbs, not doing    Peripheral vascular disease     50-60% R iliac; s/p R iliac stent and L femoral artery stent in past; R OLIVIA 0.76 (1/16)    Plantar fasciitis     bilateral     PPD positive     Prediabetes     Prostate cancer     T1c Imelda 7(3+4), 70% in 1 core, GS 7 (3+4) in 4 cores, GS 6 (3+3) in 1 core; psa 5.28, TRUS 18 gm;  Dr Austin Dumont; s/p cryoablation 10/16    Recurrent umbilical hernia     Subclinical hypothyroidism     denies    Tinnitus     Dr Gunjan Zendejas Ulcerative colitis     Venous insufficiency      Past Surgical History:   Procedure Laterality Date    CARDIAC SURG PROCEDURE UNLIST  04/2018    [facility] Dr Jey Serna; echo nl lv, ef 60%, dilated RV, biatrial enlargement, trace AI    CARDIAC SURG PROCEDURE UNLIST  04/2018    [facility] Dr Jey Serna; NST neg, ef 60%    HAND/FINGER SURGERY UNLISTED  2017    HX CAROTID ENDARTERECTOMY      1/14  right    [place] [facility] HX COLONOSCOPY      Dr Austin Dumont 9/15 polyps    HX CRYOABLATION OF THE PROSTATE      10/16    HX [address], 1975    HX ORTHOPAEDIC      right knee surgery    HX ORTHOPAEDIC  12/2017    Dr Olman Arenas; R CTS release    HX REFRACTIVE SURGERY      OD (hemoplasty to right eye on retina to avoid blindness)    HX TONSILLECTOMY      1955    [facility], RECURRENT INCISIONAL HERNIA REPAIR,REDUCIBLE N/A 02/09/2017    Dr. Funk Borne HERNIA,5+Y/O,REDUCIBL      2/16  left  Dr. Kaylee Redd QROG,6+R/T,QMLGR      2/16  Dr. Ro Reese      1/16  R OLIVIA 0.76, L OLIVIA 1.02    VASCULAR SURGERY PROCEDURE UNLIST      10/15   left fem art and left iliac stent     Patient Active Problem List   Diagnosis Code    Colitis, ulcerative ([facility] K51.90    Colon polyps K63.5    Left carotid artery occlusion I65.22    Atherosclerosis of artery of extremity with intermittent claudication ([facility] I70.219    Hypovitaminosis D E55.9    Advance directive in chart Z78.9    Hypertension I11.9    Dyslipidemia E78.5    Coronary artery disease I25.10    Atrial fibrillation I48.91    Recurrent umbilical hernia W50.7    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 9) E66.3    History of prostate cancer Z85.46    Facet hypertrophy of lumbar region M47.816    Aortoiliac occlusive disease (HCC) I74.09    PAD (peripheral artery disease) ([facility]) I73.9     Current Outpatient Medications   Medication Sig Dispense Refill    HYDROcodone-acetaminophen (NORCO) 5-325 mg per tablet Take 1 Tab by mouth every four (4) hours as needed (chronic pain) for up to 30 days. Max Daily Amount: 6 Tabs. 180 Tab 0    diclofenac (VOLTAREN) 1 % gel Apply 2-4 g to affected area daily as needed (Take 2-4 grams as needed for pain). 200 g 0    potassium chloride (K-DUR, KLOR-CON) 20 mEq tablet Take 20 mEq by mouth.  furosemide (LASIX) 20 mg tablet       gabapentin (NEURONTIN) 300 mg capsule 1 cap QHS  Indications: Neuropathic Pain 30 Cap 5    ezetimibe-simvastatin (VYTORIN) 10-40 mg per tablet TAKE 1 TABLET NIGHTLY 90 Tab 3    LORazepam (ATIVAN) 1 mg tablet Take 1 Tab by mouth every eight (8) hours as needed for Anxiety. 50 Tab 0    atenolol (TENORMIN) 25 mg tablet TAKE ONE TABLET BY MOUTH TWICE A  Tab 3    clopidogrel (PLAVIX) 75 mg tab TAKE ONE TABLET BY MOUTH DAILY 90 Tab 5    icosapent ethyl (VASCEPA) 1 gram capsule Take 1 Cap by mouth two (2) times daily (with meals). 180 Cap 3    colestipol (COLESTID) 1 gram tablet 1 g daily as needed.  ezetimibe-simvastatin (VYTORIN) 10-40 mg per tablet TAKE 1 TABLET NIGHTLY 90 Tab 3    losartan (COZAAR) 25 mg tablet TAKE ONE TABLET BY MOUTH TWICE A  Tab 2    aspirin delayed-release 81 mg tablet Take 81 mg by mouth daily.       triamterene-hydrochlorothiazide (MAXZIDE) 37.5-25 mg per tablet TAKE ONE TABLET BY MOUTH DAILY 90 Tab 3    Cholecalciferol, Vitamin D3, 1,000 unit cap Take 1,000 Units by mouth daily.  MULTIVITAMIN PO Take  by mouth daily.  pantoprazole (PROTONIX) 40 mg tablet Take 40 mg by mouth daily.  coenzyme q10 200 mg Cap Take  by mouth daily.        Allergies   Allergen Reactions    Altace [Ramipril] Unknown (comments)     Pt does not remember reaction    Lipitor [Atorvastatin] Other (comments)     Muscle pain    Lisinopril Shortness of Breath and Other (comments)     Turns bright red    Other Medication Other (comments)     Vicryl suture on skin tends to be rejected with poor wound healing does better with monocryl    Procardia [Nifedipine] Other (comments)     Caused afib    Rosuvastatin Other (comments)     Muscle Pain       Social History     Socioeconomic History    Marital status:      Spouse name: Not on file    Number of children: Not on file    Years of education: Not on file    Highest education level: Not on file   Occupational History    Not on file   Social Needs    Financial resource strain: Not on file    Food insecurity:     Worry: Not on file     Inability: Not on file    Transportation needs:     Medical: Not on file     Non-medical: Not on file   Tobacco Use    Smoking status: Former Smoker     Packs/day: 1.50     Years: 25.00     Pack years: 37.50     Types: Cigarettes     Last attempt to quit: 2003     Years since quittin.1    Smokeless tobacco: Never Used   Substance and Sexual Activity    Alcohol use: Yes     Comment: RARELY    Drug use: No    Sexual activity: Yes     Partners: Female     Birth control/protection: None   Lifestyle    Physical activity:     Days per week: Not on file     Minutes per session: Not on file    Stress: Not on file   Relationships    Social connections:     Talks on phone: Not on file     Gets together: Not on file Attends [employer] service: Not on file     Active member of club or organization: Not on file     Attends meetings of clubs or organizations: Not on file     Relationship status: Not on file    Intimate partner violence:     Fear of current or ex partner: Not on file     Emotionally abused: Not on file     Physically abused: Not on file     Forced sexual activity: Not on file   Other Topics Concern    Not on file   Social History Narrative    Not on file      Family History   Problem Relation Age of Onset    Heart Disease Mother     Hypertension Mother     Diabetes Mother     Arthritis-osteo Mother     Heart Disease Father     Stroke Father     Hypertension Father     Heart Disease Sister     Hypertension Sister        Physical Exam:    Visit Vitals  /60 (BP 1 Location: Left arm, BP Patient Position: Sitting)   Resp 17   Ht 6' (1.829 m)   Wt 220 lb (99.8 kg)   BMI 29.84 kg/m²      General: Well-appearing male in no acute distress  HEENT: EOMI no scleral icterus is noted  Pulmonary: No increased work of breathing is noted  Extremities: Warm and well-perfused bilaterally no visible ulcerations are identified bilaterally no gangrene is noted bilaterally no edema is noted bilaterally  Neuro: Cranial nerves II through XII are grossly intact strength is 5 x 5 in upper and lower extremities bilaterally with normal gait normal mentation and speech    Impression and Plan:  Yuliya Alcala is a 76 y.o. male with known carotid disease as well as peripheral arterial disease and aortoiliac occlusive disease. I reviewed all of his ultrasounds in clinic today showing continued occlusion of the left internal carotid artery mild stenosis of the right internal carotid artery. We can continue to follow this on a yearly basis. He has fully repaired aortoiliac occlusive disease after we performed bilateral common iliac artery stenting as well as right common femoral endarterectomy and patch angioplasty. Patient does have continued PAD within his lower extremities mild to moderate on the right lower extremity mild on the left. We can continue to follow him on a yearly basis with ultrasounds. I did let him know that if he needs to stop his Plavix for any knee surgery that he is more than welcome to do this. He is well outside the window of absolutely requiring Plavix but the Plavix at this time is much more risk reduction at this time. Patient is understanding and we will see him again next year. We reviewed the plan with the patient and the patient understands. We also gave the patient appropriate instructions on their disease process and when to call back. Greater than 50% of this visit was spent with face to face discussion. Follow-up and Dispositions    · Return in about 1 year (around 2/24/2021). Lori Middleton MD    PLEASE NOTE:  This document has been produced using voice recognition software. Unrecognized errors in transcription may be present.

## 2020-02-24 NOTE — PROGRESS NOTES
1. Have you been to an emergency room or urgent care clinic since your last visit? NO    Hospitalized since your last visit? If yes, where, when, and reason for visit? No  2. Have you seen or consulted any other health care providers outside of the [facility] since your last visit including any procedures, health maintenance items. If yes, where, when and reason for visit?  NO

## 2020-03-02 ENCOUNTER — TELEPHONE (OUTPATIENT)
Dept: ORTHOPEDIC SURGERY | Facility: CLINIC | Age: 75
End: 2020-03-02

## 2020-03-02 RX ORDER — DICLOFENAC SODIUM 10 MG/G
2-4 GEL TOPICAL
Qty: 200 G | Refills: 2 | Status: SHIPPED | OUTPATIENT
Start: 2020-03-02 | End: 2020-10-23 | Stop reason: SDUPTHER

## 2020-03-02 NOTE — TELEPHONE ENCOUNTER
Emory Pineda from [facility] called stating that the patient's Voltaren Gel was denied by [facility]. I did not see where there was a request for a recent refill other than in January 2020. She stated that the patient told her he wasn't sure if he needed to get it from a different doctor or not.  Please advise Emory Pineda at [phone]

## 2020-03-02 NOTE — TELEPHONE ENCOUNTER
Spoke with pharmacist--pt wants a refill of Voltaren Gel--OK per Jamie--he sent RX to pharmacy--Lynda stroud

## 2020-03-10 DIAGNOSIS — M54.12 CERVICAL RADICULOPATHY: Primary | ICD-10-CM

## 2020-03-11 ENCOUNTER — TELEPHONE (OUTPATIENT)
Dept: ORTHOPEDIC SURGERY | Age: 75
End: 2020-03-11

## 2020-03-11 RX ORDER — HYDROCODONE BITARTRATE AND ACETAMINOPHEN 5; 325 MG/1; MG/1
1 TABLET ORAL
Qty: 180 TAB | Refills: 0 | Status: SHIPPED | OUTPATIENT
Start: 2020-03-11 | End: 2020-04-10

## 2020-03-11 NOTE — TELEPHONE ENCOUNTER
Please call Natalya Parker and clarify what they want.   Pt takes 1 tab at night and was given rx for #30 with 5 RFs in January

## 2020-03-11 NOTE — TELEPHONE ENCOUNTER
[facility] Norco [facility]4/20 for a 30 d/s. UDS done 11/18/19  CSA signed 8/9/19    Last Visit: 11/25/19 with MD Tucker Amato  Next Appointment: 5/26/20 with MD Tucker Amato  Previous Refill Encounter(s): 2/4/20 #180    Requested Prescriptions     Pending Prescriptions Disp Refills    HYDROcodone-acetaminophen (NORCO) 5-325 mg per tablet 180 Tab 0     Sig: Take 1 Tab by mouth every four (4) hours as needed (chronic pain) for up to 30 days. Max Daily Amount: 6 Tabs.

## 2020-03-11 NOTE — TELEPHONE ENCOUNTER
I called [address] [place] [address] [facility] She was given the verbal order to change the prescription to 90 days. The order was RBV'd. No questions at this time.

## 2020-03-11 NOTE — TELEPHONE ENCOUNTER
Naina needs verbal to change patients rx for gabapentin (NEURONTIN) 300 mg capsule - current dosage is for 30 tabs, however, patient still has 90 tabs left. Please contact Naina to authorize change so they can fill for patient, [phone].

## 2020-03-11 NOTE — TELEPHONE ENCOUNTER
I called and spoke with pharmacist at [facility] about pt's prescription. The pt is wanting to convert the prescription to 90 day supply. He has 4 refills left on the prescription and this would be more cost effective for him. Because the medication is a controlled substance, the pharmacy needs permission from the provider for this. Please advise.

## 2020-03-12 DIAGNOSIS — I73.9 PERIPHERAL VASCULAR DISEASE (HCC): ICD-10-CM

## 2020-03-12 DIAGNOSIS — I25.10 ATHEROSCLEROSIS OF NATIVE CORONARY ARTERY OF NATIVE HEART WITHOUT ANGINA PECTORIS: ICD-10-CM

## 2020-03-12 NOTE — TELEPHONE ENCOUNTER
Patient states this should be 2 capsules 2x/day. I do not see this dose listed in his chart. Please advise    Last Visit: 11/25/19 with MD Martínez Mike  Next Appointment: 5/26/20 with MD Martínez Mike  Previous Refill Encounter(s): 9/24/19 #180 with 3 refills    Requested Prescriptions     Pending Prescriptions Disp Refills    icosapent ethyL (VASCEPA) 1 gram capsule 180 Cap 3     Sig: Take  by mouth two (2) times daily (with meals).

## 2020-03-12 NOTE — TELEPHONE ENCOUNTER
Dinah Verma; [facility] 4 minutes ago (3:35 PM)      Pt said medication should be 2 capsuld twice a day with meals    Routing comment          Please review and clarify above dosage.

## 2020-03-13 RX ORDER — ICOSAPENT ETHYL 1000 MG/1
2 CAPSULE ORAL 2 TIMES DAILY WITH MEALS
Qty: 180 CAP | Refills: 3 | Status: SHIPPED | OUTPATIENT
Start: 2020-03-13 | End: 2021-05-26 | Stop reason: SDUPTHER

## 2020-04-27 DIAGNOSIS — M54.12 CERVICAL RADICULOPATHY: Primary | ICD-10-CM

## 2020-04-27 RX ORDER — HYDROCODONE BITARTRATE AND ACETAMINOPHEN 5; 325 MG/1; MG/1
1 TABLET ORAL
Qty: 180 TAB | Refills: 0 | Status: SHIPPED | OUTPATIENT
Start: 2020-04-27 | End: 2020-05-27

## 2020-04-27 NOTE — TELEPHONE ENCOUNTER
[facility] Norco as 3/11/20 for a 30 d/s. UDS done 11/18/19  CSA signed 8/9/19    Last Visit: 11/25/19 with MD Rosaline Mark  Next Appointment: 5/26/20 with MD Rosaline Mark  Previous Refill Encounter(s): 3/11/20 #180    Requested Prescriptions     Pending Prescriptions Disp Refills    HYDROcodone-acetaminophen (NORCO) 5-325 mg per tablet 180 Tab 0     Sig: Take 1 Tab by mouth every four (4) hours as needed (chronic pain) for up to 30 days. Max Daily Amount: 6 Tabs.

## 2020-05-14 DIAGNOSIS — F41.9 ANXIETY: ICD-10-CM

## 2020-05-14 RX ORDER — LORAZEPAM 1 MG/1
1 TABLET ORAL
Qty: 50 TAB | Refills: 0 | Status: SHIPPED | OUTPATIENT
Start: 2020-05-14 | End: 2020-09-10 | Stop reason: SDUPTHER

## 2020-05-18 ENCOUNTER — HOSPITAL ENCOUNTER (OUTPATIENT)
Dept: LAB | Age: 75
Discharge: HOME | End: 2020-05-18
Payer: MEDICARE

## 2020-05-18 ENCOUNTER — APPOINTMENT (OUTPATIENT)
Dept: INTERNAL MEDICINE CLINIC | Age: 75
End: 2020-05-18

## 2020-05-18 DIAGNOSIS — E78.5 DYSLIPIDEMIA: ICD-10-CM

## 2020-05-18 DIAGNOSIS — R73.01 IFG (IMPAIRED FASTING GLUCOSE): ICD-10-CM

## 2020-05-18 LAB
CHOLEST SERPL-MCNC: 142 MG/DL
ERYTHROCYTE [DISTWIDTH] IN BLOOD BY AUTOMATED COUNT: 12.8 % (ref 11.6–14.5)
HBA1C MFR BLD: 5.9 % (ref 4.2–5.6)
HCT VFR BLD AUTO: 42.6 % (ref 36–48)
HDLC SERPL-MCNC: 52 MG/DL (ref 40–60)
HDLC SERPL: 2.7 {RATIO} (ref 0–5)
HGB BLD-MCNC: 14.1 G/DL (ref 13–16)
LDLC SERPL CALC-MCNC: 63.4 MG/DL (ref 0–100)
LIPID PROFILE,FLP: NORMAL
MCH RBC QN AUTO: 32.6 PG (ref 24–34)
MCHC RBC AUTO-ENTMCNC: 33.1 G/DL (ref 31–37)
MCV RBC AUTO: 98.4 FL (ref 74–97)
PLATELET # BLD AUTO: 160 K/UL (ref 135–420)
PMV BLD AUTO: 10.1 FL (ref 9.2–11.8)
RBC # BLD AUTO: 4.33 M/UL (ref 4.7–5.5)
TRIGL SERPL-MCNC: 133 MG/DL (ref ?–150)
VLDLC SERPL CALC-MCNC: 26.6 MG/DL
WBC # BLD AUTO: 8.7 K/UL (ref 4.6–13.2)

## 2020-05-18 PROCEDURE — 80061 LIPID PANEL: CPT

## 2020-05-18 PROCEDURE — 36415 COLL VENOUS BLD VENIPUNCTURE: CPT

## 2020-05-18 PROCEDURE — 85027 COMPLETE CBC AUTOMATED: CPT

## 2020-05-18 PROCEDURE — 83036 HEMOGLOBIN GLYCOSYLATED A1C: CPT

## 2020-05-19 NOTE — PROGRESS NOTES
Rea Lim is a 76 y.o. male who was seen by synchronous (real-time) audio-video technology on 5/26/2020. Consent: Rea Lim, who was seen by synchronous (real-time) audio-video technology, and/or his healthcare decision maker, is aware that this patient-initiated, Telehealth encounter on 5/26/2020 is a billable service, with coverage as determined by his insurance carrier. He is aware that he may receive a bill and has provided verbal consent to proceed: Yes. Assessment & Plan:   Diagnoses and all orders for this visit:    1. Aortoiliac occlusive disease ([facility]    2. Atherosclerosis of artery of extremity with intermittent claudication ([facility]    3. Atrial fibrillation, unspecified type ([facility]    4. Atherosclerosis of native coronary artery of native heart without angina pectoris    5. Hypertension    6. Left carotid artery occlusion    7. PAD (peripheral artery disease) ([facility]    8. Ulcerative colitis with complication, unspecified location ([facility]    9. IFG (impaired fasting glucose)  -     METABOLIC PANEL, COMPREHENSIVE; Future  -     HEMOGLOBIN A1C W/O EAG; Future    10. Degeneration of cervical and lumbar spine, relative cervical stenosis    11. Cervical spinal stenosis    12. Dyslipidemia    13. History of prostate cancer          I spent at least 23 minutes on this visit with this established patient. [phone]  Subjective:   Rea Lim is a 76 y.o. male who was seen for No chief complaint on file. Objective: There were no vitals taken for this visit.    General: alert, cooperative, no distress   Mental  status: normal mood, behavior, speech, dress, motor activity, and thought processes, able to follow commands   HENT: NCAT   Neck: no visualized mass   Resp: no respiratory distress   Neuro: no gross deficits   Skin: no discoloration or lesions of concern on visible areas   Psychiatric: normal affect, consistent with stated mood, no evidence of hallucinations     Additional exam findings: We discussed the expected course, resolution and complications of the diagnosis(es) in detail. Medication risks, benefits, costs, interactions, and alternatives were discussed as indicated. I advised him to contact the office if his condition worsens, changes or fails to improve as anticipated. He expressed understanding with the diagnosis(es) and plan. Darrel Benton is a 76 y.o. male who was evaluated by a video visit encounter for concerns as above. Patient identification was verified prior to start of the visit. A caregiver was present when appropriate. Due to this being a TeleHealth encounter (During AJKE-11 public health emergency), evaluation of the following organ systems was limited: Vitals/Constitutional/EENT/Resp/CV/GI//MS/Neuro/Skin/Heme-Lymph-Imm. Pursuant to the emergency declaration under the [facility], [facility]5 waiver authority and the [employer] and [employer] Act, this Virtual  Visit was conducted, with patient's (and/or legal guardian's) consent, to reduce the patient's risk of exposure to COVID-19 and provide necessary medical care. Services were provided through a video synchronous discussion virtually to substitute for in-person clinic visit. Patient and provider were located at their individual homes. Jacques Hamlin MD    No cardiovascular complaints and he saw Dr Jasmin Torres last week and no new changes implemented. He walks about a mile 3x/week and the knees are the limiting factor    He thinks he's ready for another shot in the knees but reluctant to schedule with ortho with the pandemic going on    The UC has been stable     Denies polyuria, polydipsia, nocturia, vision change. Not checking sugars at this time.   He was able to inc the vascepa to 2x/d    He sees Dr Martínez Moise once yearly and new new developments    LAST MEDICARE WELLNESS EXAM: 6/23/16, 6/29/17, 11/20/18, 11/25/19    Past Medical History:   Diagnosis Date    Adrenal adenoma 2009    LEFT 1 cm, no change 1/15, 2/16    Asbestosis     CT 1/19 showed pleural plaques    Atrial fibrillation     CHA2DS2-VAsc=3(age+, htn+, vasc dx+; estimated yearly stroke risk according to Lip et al. is 3,2%), Hasbled=2(estimated yearly bleeding risk according to pisters et al. is 1,88%); not anticoagulated due to h/o gi bleed    Atrial fibrillation ablation     10/08    Basal cell cancer     Dr Hannah Malave; he's had >350 lesions removed    Carotid disease, bilateral ([address]     Cervical radiculopathy     MRI 9/11 showed C3-6 severe foraminal stenosis    Chronic pain     myofascial pain syndrome; seen in pain clinic in past    Colon polyp     Dr Tia Steele 9/15    Coronary artery disease     RCA - 3.0 x 16mm TAXUS 9/04, 3.0 x 16mm TAXUS 12/06    Dyslipidemia     Erectile dysfunction     GERD     GI bleed     Hearing loss 2014    Dr Karina Santana, umbilical     Hypertension     with component of white coat    Hypogonadism male     IFG (impaired fasting glucose) [id number]    Lumbar radiculopathy     Dr Abby Vazquez;  MRI 9/11 L4-5 disc bulging, annular tear, disc dessication    OAB (overactive bladder)     Osteoarthritis     Dr Tejeda Pulse Overweight (BMI 25.0-29. 9)     IF 5/18 start weight 214 lbs, not doing    Peripheral vascular disease     50-60% R iliac; s/p R iliac stent and L femoral artery stent in past; R OLIVIA 0.76 (1/16)    Plantar fasciitis     bilateral     PPD positive     Prostate cancer     T1c [place] 7(3+4), 70% in 1 core, GS 7 (3+4) in 4 cores, GS 6 (3+3) in 1 core; psa 5.28, TRUS 18 gm;  Dr Tia Steele; s/p cryoablation 10/16    Recurrent umbilical hernia     Subclinical hypothyroidism     Tinnitus     Dr Rubén Spear Ulcerative colitis     Venous insufficiency      Past Surgical History:   Procedure Laterality Date    CARDIAC SURG PROCEDURE UNLIST  04/2018    [facility] Dr Kael Huffman; echo nl lv, ef 60%, dilated RV, biatrial enlargement, trace AI    CARDIAC SURG PROCEDURE UNLIST  2018    [address] Yin Garcia; NST neg, ef 60%    HAND/FINGER SURGERY UNLISTED      HX CAROTID ENDARTERECTOMY  2014    RIGHT    Claudene Perfect HX COLONOSCOPY      Dr Rosa Fields 9/15 polyps    HX CRYOABLATION OF THE PROSTATE      10/16    HX HEMORRHOIDECTOMY          [address]    HX ORTHOPAEDIC      RIGHT knee surgery    HX ORTHOPAEDIC  2017    Dr Melisa Reeder; RIGHT CTS release    HX REFRACTIVE SURGERY      OD (hemoplasty to right eye on retina to avoid blindness)    HX TONSILLECTOMY          IL LAP, RECURRENT INCISIONAL HERNIA REPAIR,REDUCIBLE N/A 2017    Dr. Hairston Valdez HERNIA,5+Y/O,REDUCIBL  2016    Dr Villanueva Marrow KZBA,4+O/Z,SMOJW  2016    Dr. Carlos Junior        R OLIVIA 0.76, L OLIVIA 1.02    VASCULAR SURGERY PROCEDURE UNLIST      LEFT fem artery and iliac stents (10/15); RCF endarterectomy w patch angioplasty/B CI artery stenting ()     Social History     Socioeconomic History    Marital status:      Spouse name: Not on file    Number of children: Not on file    Years of education: Not on file    Highest education level: Not on file   Occupational History    Not on file   Social Needs    Financial resource strain: Not on file    Food insecurity     Worry: Not on file     Inability: Not on file   [employer] needs     Medical: Not on file     Non-medical: Not on file   Tobacco Use    Smoking status: Former Smoker     Packs/day: 1.50     Years: 25.00     Pack years: 37.50     Types: Cigarettes     Last attempt to quit: 2003     Years since quittin.4    Smokeless tobacco: Never Used   Substance and Sexual Activity    Alcohol use: Yes     Comment: RARELY    Drug use: No    Sexual activity: Yes     Partners: Female     Birth control/protection: None   Lifestyle  Physical activity     Days per week: Not on file     Minutes per session: Not on file    Stress: Not on file   Relationships    Social connections     Talks on phone: Not on file     Gets together: Not on file     Attends Spiritism service: Not on file     Active member of club or organization: Not on file     Attends meetings of clubs or organizations: Not on file     Relationship status: Not on file    Intimate partner violence     Fear of current or ex partner: Not on file     Emotionally abused: Not on file     Physically abused: Not on file     Forced sexual activity: Not on file   Other Topics Concern    Not on file   Social History Narrative    Not on file     Current Outpatient Medications   Medication Sig    LORazepam (Ativan) 1 mg tablet Take 1 Tab by mouth every eight (8) hours as needed for Anxiety.  HYDROcodone-acetaminophen (NORCO) 5-325 mg per tablet Take 1 Tab by mouth every four (4) hours as needed (chronic pain) for up to 30 days. Max Daily Amount: 6 Tabs.  icosapent ethyL (VASCEPA) 1 gram capsule Take 2 Caps by mouth two (2) times daily (with meals).  diclofenac (VOLTAREN) 1 % gel Apply 2-4 g to affected area daily as needed (Take 2-4 grams as needed for pain).  potassium chloride (K-DUR, KLOR-CON) 20 mEq tablet Take 20 mEq by mouth.  furosemide (LASIX) 20 mg tablet     gabapentin (NEURONTIN) 300 mg capsule 1 cap QHS  Indications: Neuropathic Pain    ezetimibe-simvastatin (VYTORIN) 10-40 mg per tablet TAKE 1 TABLET NIGHTLY    atenolol (TENORMIN) 25 mg tablet TAKE ONE TABLET BY MOUTH TWICE A DAY    clopidogrel (PLAVIX) 75 mg tab TAKE ONE TABLET BY MOUTH DAILY    colestipol (COLESTID) 1 gram tablet 1 g daily as needed.  ezetimibe-simvastatin (VYTORIN) 10-40 mg per tablet TAKE 1 TABLET NIGHTLY    losartan (COZAAR) 25 mg tablet TAKE ONE TABLET BY MOUTH TWICE A DAY    aspirin delayed-release 81 mg tablet Take 81 mg by mouth daily.     triamterene-hydrochlorothiazide (MAXZIDE) 37.5-25 mg per tablet TAKE ONE TABLET BY MOUTH DAILY    Cholecalciferol, Vitamin D3, 1,000 unit cap Take 1,000 Units by mouth daily.  MULTIVITAMIN PO Take  by mouth daily.  pantoprazole (PROTONIX) 40 mg tablet Take 40 mg by mouth daily.  coenzyme q10 200 mg Cap Take  by mouth daily. No current facility-administered medications for this visit.       Allergies   Allergen Reactions    Altace [Ramipril] Unknown (comments)     Pt does not remember reaction    Lipitor [Atorvastatin] Other (comments)     Muscle pain    Lisinopril Shortness of Breath and Other (comments)     Turns bright red    Other Medication Other (comments)     Vicryl suture on skin tends to be rejected with poor wound healing does better with monocryl    Procardia [Nifedipine] Other (comments)     Caused afib    Rosuvastatin Other (comments)     Muscle Pain       REVIEW OF SYSTEMS: colo 9/15 Dr Brian Duenas, sees Dr Timur Akers no vision change or eye pain  Oral  no mouth pain, tongue or tooth problems  Ears  no hearing loss, ear pain, fullness, no swallowing problems  Cardiac  no CP, PND, orthopnea, edema, palpitations or syncope  Chest  no breast masses  Resp  no wheezing, chronic coughing, dyspnea  GI  no heartburn, nausea, vomiting, change in bowel habits, bleeding, hemorrhoids  Urinary  no dysuria, hematuria, flank pain, urgency, frequency    LABS  From 12/12 showed gluc 101, cr 0.77, gfr 94,   alt 26,           chol 182, tg 159, hdl 52, ldl-c 98, wbc 7.6, hb 15.4, plt 171, tsh 0.96, psa 3.20  From 7/13 showed                          test 263  From 1/14 showed   gluc 112, cr 0.78, gfr 107, alt 17,           chol 130, tg 129, hdl 37, ldl-c 67, wbc 6.8, hb 15.1, plt 186, tsh 0.64, psa 3.60, vit d 26.8  From 6/14 showed   gluc 101, cr 0.57, gfr>60,     hba1c 5.7, vit d 34.3  From 12/14 showed         hba1c 5.9, chol 141, tg 104, hdl 42, ldl-c 78, wbc 8.5, hb 14.8, plt 147, tsh 0.75, psa 5.60, vit d 31.8, ua neg  From 6/15 showed   gluc 104, cr 0.75, gfr>60,     hba1c 5.9  From 8/15 showed                      tsh 0.44, psa 5.28,         test 215, lh 6.1, prl 11.1, ft4 1.61  From 12/15 showed gluc 95,   cr 0.71, gfr>60,  alt 36, hba1c 5.9, chol 135, tg 105, hdl 44, ldl-c 70,           tsh 0.51,     vit d 29.1   From 1/16 showed   gluc 112, cr 0.72, gfr>60,          wbc 7.8, hb 15.1, plt 163,            ua neg  From 11/16 showed gluc 103, cr 0.70,           wbc 8.6, hb 14.5, plt 189, ck/trop neg  From 12/16 showed gluc 89,   cr 0.70, gfr>60,  alt 31, hba1c 5.9, chol 159, tg 124, hdl 58, ldl-c 76, wbc 9.0, hb 15.1, plt 185,      vit d 27.8  From 3/17 showed   gluc 100, cr 0.75, gfr>60,     hba1c 5.9,                tsh 0.78, psa 0.18, ft4 1.30  From 6/17 showed   gluc 96,   cr 0.74, gfr>60, alt 34,  hba1c 5.7, chol 135, tg 71,   hdl 53, ldl-c 68  From 1/18 showed   gluc 107, cr 0.65, gfr>60, alt 31,  hba1c 5.8, chol 147, tg 105, hdl 46, ldl-c 80  From 5/18 showed   gluc 100, cr 0.73, gfr>60, alt 38,  hba1c 5.9, chol 152, tg 59,   hdl 66, ldl-c 74  From 11/18 showed         hba1c 6.1, chol 146, tg 149, hdl 51, ldl-c 65, wbc 7.9, hb 15.4, plt 164  From 5/19 showed         hba1c 5.5  From 11/19 showed gluc 108, cr 0.77, gfr>60, alt 31,  hba1c 5.5, chol 128, tg 108, hdl 36, ldl-c 70    Results for orders placed or performed during the hospital encounter of 05/18/20   HEMOGLOBIN A1C W/O EAG   Result Value Ref Range    Hemoglobin A1c 5.9 (H) 4.2 - 5.6 %   CBC W/O DIFF   Result Value Ref Range    WBC 8.7 4.6 - 13.2 K/uL    RBC 4.33 (L) 4.70 - 5.50 M/uL    HGB 14.1 13.0 - 16.0 g/dL    HCT 42.6 36.0 - 48.0 %    MCV 98.4 (H) 74.0 - 97.0 FL    MCH 32.6 24.0 - 34.0 PG    MCHC 33.1 31.0 - 37.0 g/dL    RDW 12.8 11.6 - 14.5 %    PLATELET 926 263 - 142 K/uL    MPV 10.1 9.2 - 11.8 FL   LIPID PANEL   Result Value Ref Range    LIPID PROFILE          Cholesterol, total 142 <200 MG/DL    Triglyceride 133 <150 MG/DL    HDL Cholesterol 52 40 - 60 MG/DL    LDL, calculated 63.4 0 - 100 MG/DL    VLDL, calculated 26.6 MG/DL    CHOL/HDL Ratio 2.7 0 - 5.0       Patient Active Problem List   Diagnosis Code    Colitis, ulcerative ([facility] K51.90    Colon polyps K63.5    Left carotid artery occlusion I65.22    Hypovitaminosis D E55.9    Advance directive in chart Z78.9    Hypertension I11.9    Dyslipidemia E78.5    Coronary artery disease I25.10    Atrial fibrillation I48.91    Recurrent umbilical hernia C64.1    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 9) [id number]    History of prostate cancer Z85.46    Facet hypertrophy of lumbar region M47.816    Aortoiliac occlusive disease ([facility] I74.09    PAD (peripheral artery disease) ([facility] I73.9     Assessment and plan:  1. Cardiac. Continue current regimen. F/U Dr Elena Gonzalez  2. Vascular. Plans per Moon  3. Dyslipidemia. Continue current regimen and doing well on vascepa  4. PreDM. Lifestyle and dietary measures, wt loss as he is trying to do  5. Hypovit d. Supplement  6. Colon polyp. Fiber, colo 2020  7. Prostate ca. Per Dr Blanco Pendleton  8. Afib. Per Dr Elena Gonzalez  9. Ortho. F/U Dr Bebo Jacobson  10. Spine. F/U Dr Monster rollins  11. Overweight. Lifestyle and dietary measures. Portion control         RTC 5/20    Above conditions discussed at length and patient vocalized understanding.   All questions answered to patient satisfaction

## 2020-05-26 ENCOUNTER — VIRTUAL VISIT (OUTPATIENT)
Dept: INTERNAL MEDICINE CLINIC | Age: 75
End: 2020-05-26

## 2020-05-26 DIAGNOSIS — M50.30 DEGENERATION OF CERVICAL INTERVERTEBRAL DISC: ICD-10-CM

## 2020-05-26 DIAGNOSIS — I48.91 ATRIAL FIBRILLATION, UNSPECIFIED TYPE (HCC): ICD-10-CM

## 2020-05-26 DIAGNOSIS — M48.02 CERVICAL SPINAL STENOSIS: ICD-10-CM

## 2020-05-26 DIAGNOSIS — K51.919 ULCERATIVE COLITIS WITH COMPLICATION, UNSPECIFIED LOCATION (HCC): ICD-10-CM

## 2020-05-26 DIAGNOSIS — I25.10 ATHEROSCLEROSIS OF NATIVE CORONARY ARTERY OF NATIVE HEART WITHOUT ANGINA PECTORIS: ICD-10-CM

## 2020-05-26 DIAGNOSIS — I65.22 LEFT CAROTID ARTERY OCCLUSION: ICD-10-CM

## 2020-05-26 DIAGNOSIS — R73.01 IFG (IMPAIRED FASTING GLUCOSE): ICD-10-CM

## 2020-05-26 DIAGNOSIS — I70.219 ATHEROSCLEROSIS OF ARTERY OF EXTREMITY WITH INTERMITTENT CLAUDICATION (HCC): ICD-10-CM

## 2020-05-26 DIAGNOSIS — I11.9 BENIGN HYPERTENSIVE HEART DISEASE WITHOUT HEART FAILURE: ICD-10-CM

## 2020-05-26 DIAGNOSIS — I73.9 PAD (PERIPHERAL ARTERY DISEASE) (HCC): ICD-10-CM

## 2020-05-26 DIAGNOSIS — I74.09 AORTOILIAC OCCLUSIVE DISEASE (HCC): Primary | ICD-10-CM

## 2020-05-26 DIAGNOSIS — Z85.46 HISTORY OF PROSTATE CANCER: ICD-10-CM

## 2020-05-26 DIAGNOSIS — E78.5 DYSLIPIDEMIA: ICD-10-CM

## 2020-05-28 ENCOUNTER — TELEPHONE (OUTPATIENT)
Dept: VASCULAR SURGERY | Age: 75
End: 2020-05-28

## 2020-05-28 NOTE — TELEPHONE ENCOUNTER
Patient is taking plavix.  He is due for a cleaning in Dr Ramirez Comment office and they state that he may or may not need to be off plavix and they want confirmation eithr way  Fax number is [phone]

## 2020-05-28 NOTE — TELEPHONE ENCOUNTER
Returned phone call to patient who stated he will be just having a regular cleaning of teeth and informed patient that do not need to hold Plavix and will fax prescription stating this to his dentist.  Patient stated understood.

## 2020-06-10 ENCOUNTER — VIRTUAL VISIT (OUTPATIENT)
Dept: ORTHOPEDIC SURGERY | Age: 75
End: 2020-06-10

## 2020-06-10 DIAGNOSIS — M50.30 DEGENERATION OF CERVICAL INTERVERTEBRAL DISC: ICD-10-CM

## 2020-06-10 DIAGNOSIS — M48.02 CERVICAL SPINAL STENOSIS: ICD-10-CM

## 2020-06-10 DIAGNOSIS — M47.812 FACET ARTHRITIS OF CERVICAL REGION: ICD-10-CM

## 2020-06-10 RX ORDER — GABAPENTIN 300 MG/1
CAPSULE ORAL
Qty: 30 CAP | Refills: 5 | Status: SHIPPED | OUTPATIENT
Start: 2020-06-10 | End: 2020-07-08 | Stop reason: SDUPTHER

## 2020-06-10 RX ORDER — METHYLPREDNISOLONE 4 MG/1
TABLET ORAL
Qty: 1 DOSE PACK | Refills: 0 | Status: SHIPPED | OUTPATIENT
Start: 2020-06-10 | End: 2020-06-10 | Stop reason: SDUPTHER

## 2020-06-10 RX ORDER — METHYLPREDNISOLONE 4 MG/1
TABLET ORAL
Qty: 1 DOSE PACK | Refills: 0 | Status: SHIPPED | OUTPATIENT
Start: 2020-06-10 | End: 2020-10-23 | Stop reason: ALTCHOICE
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
## 2020-06-11 DIAGNOSIS — M54.12 CERVICAL RADICULOPATHY: Primary | ICD-10-CM

## 2020-06-11 RX ORDER — HYDROCODONE BITARTRATE AND ACETAMINOPHEN 5; 325 MG/1; MG/1
1 TABLET ORAL
Qty: 180 TAB | Refills: 0 | Status: SHIPPED | OUTPATIENT
Start: 2020-06-11 | End: 2020-07-11

## 2020-06-11 NOTE — TELEPHONE ENCOUNTER
[facility] Norco as 4/27/20 for a 30 d/s. UDS done 11/18/19  CSA signed 8/9/19    Last Visit: 5/26/20 with MD Rosaline Mark  Next Appointment: 11/24/20 with MD Rosaline Mark  Previous Refill Encounter(s): 4/27/20 #180    Requested Prescriptions     Pending Prescriptions Disp Refills    HYDROcodone-acetaminophen (NORCO) 5-325 mg per tablet 180 Tab 0     Sig: Take 1 Tab by mouth every four (4) hours as needed for Pain for up to 30 days. Max Daily Amount: 6 Tabs.

## 2020-06-17 ENCOUNTER — OFFICE VISIT (OUTPATIENT)
Dept: ORTHOPEDIC SURGERY | Age: 75
End: 2020-06-17

## 2020-06-17 VITALS
RESPIRATION RATE: 15 BRPM | TEMPERATURE: 97.2 F | WEIGHT: 221.8 LBS | SYSTOLIC BLOOD PRESSURE: 154 MMHG | DIASTOLIC BLOOD PRESSURE: 82 MMHG | HEART RATE: 67 BPM | BODY MASS INDEX: 30.04 KG/M2 | OXYGEN SATURATION: 98 % | HEIGHT: 72 IN

## 2020-06-17 DIAGNOSIS — M25.461 EFFUSION OF RIGHT KNEE: ICD-10-CM

## 2020-06-17 DIAGNOSIS — G89.29 CHRONIC PAIN OF RIGHT KNEE: ICD-10-CM

## 2020-06-17 DIAGNOSIS — M17.11 PRIMARY OSTEOARTHRITIS OF RIGHT KNEE: Primary | ICD-10-CM

## 2020-06-17 DIAGNOSIS — M25.561 CHRONIC PAIN OF RIGHT KNEE: ICD-10-CM

## 2020-06-17 RX ORDER — BETAMETHASONE SODIUM PHOSPHATE AND BETAMETHASONE ACETATE 3; 3 MG/ML; MG/ML
6 INJECTION, SUSPENSION INTRA-ARTICULAR; INTRALESIONAL; INTRAMUSCULAR; SOFT TISSUE ONCE
Qty: 0.5 ML | Refills: 0
Start: 2020-06-17 | End: 2020-06-17

## 2020-06-17 NOTE — PROGRESS NOTES
Patient: Pastora Christian                MRN: [id number]       SSN: [id number]  YOB: 1945        AGE: 76 y.o. SEX: male    PCP: Ray Junior MD  06/17/20    CC: RIGHT KNEE PAIN    HISTORY:  Pastora Christian is a 76 y.o. male who is seen for increased right knee pain and swelling. He thinks he may have aggravated his right knee while walking on his treadmill recently. He has been experiencing increasing knee pain for the past few years. He reports no h/o injury. He notes pain with standing and walking. He has pain ascending/descending stairs. He reports increasing difficulty with everyday activities. He reports his knee pain will wake him up at night. He reports that he sleeps with a pillow between his legs for some relief. He reports relief from previous cortisone and visco supplementation injections by Dr. Tameka Cartagena in the past. He reports some right leg claudication with increased walking. He says his knee has not been able to completely extend his leg since 1964. He injured his right knee playing football and underwent total meniscectomy and arthrotomy by Dr. Homer Jones. He completed a Euflexxa series on 10/14/19. He was previously seen for left hip pain. He has hip pain after walking more than one block. He reports he is bothered mostly by his left hip pain. He does not recall any injury. He reports hip and knee startup pain and pain increased use. He is s/p right endarterectomy for circulation problems in July 2019 by Dr. Dena pichardo. He currently takes Plavix. He reports that he takes a diuretic that causes him to urinate frequently. He was previously seen by Dr. Tameka Cartagena and VERO Holloway . He has a stent in his right femoral artery. He reports a blockage in his left [facility]. Pain Assessment  6/17/2020   Location of Pain Knee; Hip   Location Modifiers Right;Left   Severity of Pain 5   Quality of Pain Aching; Sharp   Quality of Pain Comment - Duration of Pain Persistent   Frequency of Pain Constant   Date Pain First Started -   Aggravating Factors Walking;Standing   Aggravating Factors Comment -   Limiting Behavior -   Relieving Factors Heat;Elevation   Relieving Factors Comment -   Result of Injury No   Work-Related Injury -   Type of Injury -   Type of Injury Comment -     Occupation, etc:  Mr. Charli Ly he lives with his wife in [facility]. He has 2 sons not in the area. His youngest son lives in [place]. His middle son lives in [place] and is a [employer]  for [employer] and he is considering moving to [place]. His middle son's wife has [place]'s disease. He has 7 grandchildren. He retired 15 years ago as a  for the [employer] 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 He states that he has respiratory problems due to asbestos exposure. He reports that he cut his right leg a few months ago. His wound took 2 months to heal. He enjoys doing water exercises. He has been walking a mile everyday. His eldest son passed away on 6/17/19 in [place]. He is unsure of the cause of his son's death. He is not diabetic. He has h/o peripheral vascular disease. Current weight is 221 pounds. He is 6' tall.       Lab Results   Component Value Date/Time    Hemoglobin A1c 5.9 (H) 05/18/2020 08:20 AM    Hemoglobin A1c (POC) 5.5 05/23/2019 09:05 AM     Weight Metrics 6/17/2020 2/24/2020 1/29/2020 1/15/2020 12/5/2019 12/3/2019 11/25/2019   Weight 221 lb 12.8 oz 220 lb 220 lb 6.4 oz 222 lb 3.2 oz 222 lb 6.4 oz 220 lb 220 lb   BMI 30.08 kg/m2 29.84 kg/m2 29.89 kg/m2 30.14 kg/m2 30.16 kg/m2 29.84 kg/m2 29.84 kg/m2       Patient Active Problem List   Diagnosis Code    Colitis, ulcerative ([address] K51.90    Colon polyps K63.5    Left carotid artery occlusion I65.22    Hypovitaminosis D E55.9    Advance directive in chart Z78.9    Hypertension I11.9    Dyslipidemia E78.5    Coronary artery disease I25.10    Atrial fibrillation I48.91    Recurrent umbilical hernia F86.9    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 9) [id number]    History of prostate cancer Z85.46    Facet hypertrophy of lumbar region M47.816    Aortoiliac occlusive disease ([facility] [address] I74.09    PAD (peripheral artery disease) ([facility]) I73.9     REVIEW OF SYSTEMS: All Below are Negative except: See HPI   Constitutional: negative for fever, chills, and weight loss. Cardiovascular: negative for chest pain, claudication, leg swelling, SOB, DUONG   Gastrointestinal: Negative for pain, N/V/C/D, Blood in stool or urine, dysuria,  hematuria, incontinence, pelvic pain. Musculoskeletal: See HPI   Neurological: Negative for dizziness and weakness. Negative for headaches, Visual changes, confusion, seizures   Phychiatric/Behavioral: Negative for depression, memory loss, substance  abuse. Extremities: Negative for hair changes, rash, or skin lesion changes. Hematologic: Negative for bleeding problems, bruising, pallor or swollen lymph  nodes   Peripheral Vascular: No calf pain, no circulation deficits.     Social History     Socioeconomic History    Marital status:      Spouse name: Not on file    Number of children: Not on file    Years of education: Not on file    Highest education level: Not on file   Occupational History    Not on file   Social Needs    Financial resource strain: Not on file    Food insecurity     Worry: Not on file     Inability: Not on file    Transportation needs     Medical: Not on file     Non-medical: Not on file   Tobacco Use    Smoking status: Former Smoker     Packs/day: 1.50     Years: 25.00     Pack years: 37.50 Types: Cigarettes     Last attempt to quit: 2003     Years since quittin.4    Smokeless tobacco: Never Used   Substance and Sexual Activity    Alcohol use: Yes     Comment: RARELY    Drug use: No    Sexual activity: Yes     Partners: Female     Birth control/protection: None   Lifestyle    Physical activity     Days per week: Not on file     Minutes per session: Not on file    Stress: Not on file   Relationships    Social connections     Talks on phone: Not on file     Gets together: Not on file     Attends [employer]ist service: Not on file     Active member of club or organization: Not on file     Attends meetings of clubs or organizations: Not on file     Relationship status: Not on file    Intimate partner violence     Fear of current or ex partner: Not on file     Emotionally abused: Not on file     Physically abused: Not on file     Forced sexual activity: Not on file   Other Topics Concern    Not on file   Social History Narrative    Not on file      Allergies   Allergen Reactions    Altace [Ramipril] Unknown (comments)     Pt does not remember reaction    Lipitor [Atorvastatin] Other (comments)     Muscle pain    Lisinopril Shortness of Breath and Other (comments)     Turns bright red    Other Medication Other (comments)     Vicryl suture on skin tends to be rejected with poor wound healing does better with monocryl    Procardia [Nifedipine] Other (comments)     Caused afib    Rosuvastatin Other (comments)     Muscle Pain        Current Outpatient Medications   Medication Sig    betamethasone (Celestone Soluspan) 6 mg/mL injection 1 mL by Intra artICUlar route once for 1 dose.  HYDROcodone-acetaminophen (NORCO) 5-325 mg per tablet Take 1 Tab by mouth every four (4) hours as needed for Pain for up to 30 days. Max Daily Amount: 6 Tabs.     gabapentin (NEURONTIN) 300 mg capsule 1 cap QHS  Indications: neuropathic pain    methylPREDNISolone (Medrol, Ephraim,) 4 mg tablet Per dose pack instructions    LORazepam (Ativan) 1 mg tablet Take 1 Tab by mouth every eight (8) hours as needed for Anxiety.  icosapent ethyL (VASCEPA) 1 gram capsule Take 2 Caps by mouth two (2) times daily (with meals).  diclofenac (VOLTAREN) 1 % gel Apply 2-4 g to affected area daily as needed (Take 2-4 grams as needed for pain).  ezetimibe-simvastatin (VYTORIN) 10-40 mg per tablet TAKE 1 TABLET NIGHTLY    atenolol (TENORMIN) 25 mg tablet TAKE ONE TABLET BY MOUTH TWICE A DAY    clopidogrel (PLAVIX) 75 mg tab TAKE ONE TABLET BY MOUTH DAILY    colestipol (COLESTID) 1 gram tablet 1 g daily as needed.  losartan (COZAAR) 25 mg tablet TAKE ONE TABLET BY MOUTH TWICE A DAY    aspirin delayed-release 81 mg tablet Take 81 mg by mouth daily.  triamterene-hydrochlorothiazide (MAXZIDE) 37.5-25 mg per tablet TAKE ONE TABLET BY MOUTH DAILY    Cholecalciferol, Vitamin D3, 1,000 unit cap Take 1,000 Units by mouth daily.  MULTIVITAMIN PO Take  by mouth daily.  pantoprazole (PROTONIX) 40 mg tablet Take 40 mg by mouth daily.  coenzyme q10 200 mg Cap Take  by mouth daily. No current facility-administered medications for this visit.        PHYSICAL EXAMINATION:  Visit Vitals  /82 (BP 1 Location: Left arm, BP Patient Position: Sitting)   Pulse 67   Temp 97.2 °F (36.2 °C) (Oral)   Resp 15   Ht 6' (1.829 m)   Wt 221 lb 12.8 oz (100.6 kg)   SpO2 98%   BMI 30.08 kg/m²      ORTHO EXAMINATION:  Examination Right knee Left knee   Skin Intact Intact   Range of motion 110-0 120-0   Effusion 3+ -   Medial joint line tenderness + -   Lateral joint line tenderness - -   Popliteal tenderness - -   Osteophytes palpable + -   Kylahs - -   Patella crepitus + -   Anterior drawer - -   Lateral laxity - -   Medial laxity - -   Varus deformity + -   Valgus deformity - -   Pretibial edema - -   Calf tenderness - -   Brisk capilary refill, palpable pulse    Examination Right hip Left hip   Skin Intact Intact   External Rotation ROM 20 20   Internal Rotation ROM 10 10   Trochanteric tenderness + +   Hip flexion contracture - -   Antalgic gait - -   Trendelenberg sign - -   Lumbar tenderness - -   Straight leg raise - -   Calf tenderness - -   Neurovascular Intact Intact       Chart reviewed for the following:   Yao YODER MD, have reviewed the History, Physical and updated the Allergic reactions for [address] performed immediately prior to start of procedure:  Manjeet Milian MD, have performed the following reviews on [facility] prior to the start of the procedure:            * Patient was identified by name and date of birth   * Agreement on procedure being performed was verified  * Risks and Benefits explained to the patient  * Procedure site verified and marked as necessary  * Patient was positioned for comfort  * Consent was obtained     Time: 10:59 AM    Date of procedure: 6/17/2020    Procedure performed by:  Yao Wright MD    Mr. Madai Gamez tolerated the procedure well with no complications. RADIOGRAPHS:  XR KLAUS HIP 11/7/18 SAWYER  IMPRESSION:  AP pelvis and two views - No fractures, moderate joint space narrowing, acetabular osteophytes present. Tonnis grade 2. Femoral acetabular impingement syndrome     XR RIGHT KNEE 4/13/18 SAWYER  IMPRESSION:  Three views - No fractures, no effusion, severe end stage with lateral subluxation joint space narrowing, + osteophytes present. IKDC Grade C. calcification of arteries    IMPRESSION:      ICD-10-CM ICD-9-CM    1. Primary osteoarthritis of right knee M17.11 715.16 betamethasone (Celestone Soluspan) 6 mg/mL injection      BETAMETHASONE ACETATE & SODIUM PHOSPHATE INJECTION 3 MG EA.      DRAIN/INJECT LARGE JOINT/BURSA      PROCEDURE AUTHORIZATION TO    2.  Effusion of right knee M25.461 719.06 betamethasone (Celestone Soluspan) 6 mg/mL injection      BETAMETHASONE ACETATE & SODIUM PHOSPHATE INJECTION 3 MG EA. DRAIN/INJECT LARGE JOINT/BURSA   3. Chronic pain of right knee M25.561 719.46 betamethasone (Celestone Soluspan) 6 mg/mL injection    G89.29 338.29 BETAMETHASONE ACETATE & SODIUM PHOSPHATE INJECTION 3 MG EA.      DRAIN/INJECT LARGE JOINT/BURSA      PROCEDURE AUTHORIZATION TO      PLAN:  After timeout and under sterile conditions, right knee aspirated 70 cc of light yellow blood tinged fluid. The fluid was discarded. After discussing treatment options, patient's right knee was injected with 4 cc Marcaine and 1/2 cc Celestone. There is no need for surgery at this time. He will follow up as needed.     Scribed by Jesse Rojas ([employer] [place]) as dictated by Aldair Snyder MD

## 2020-06-19 ENCOUNTER — OFFICE VISIT (OUTPATIENT)
Dept: ORTHOPEDIC SURGERY | Age: 75
End: 2020-06-19

## 2020-06-19 VITALS
OXYGEN SATURATION: 96 % | DIASTOLIC BLOOD PRESSURE: 75 MMHG | HEART RATE: 66 BPM | WEIGHT: 223 LBS | RESPIRATION RATE: 16 BRPM | HEIGHT: 72 IN | BODY MASS INDEX: 30.2 KG/M2 | TEMPERATURE: 97.2 F | SYSTOLIC BLOOD PRESSURE: 134 MMHG

## 2020-06-19 DIAGNOSIS — G56.02 CARPAL TUNNEL SYNDROME ON LEFT: Primary | ICD-10-CM

## 2020-06-19 DIAGNOSIS — M65.341 TRIGGER RING FINGER OF RIGHT HAND: ICD-10-CM

## 2020-06-19 NOTE — PATIENT INSTRUCTIONS
Trigger Finger: Care Instructions Overview A trigger finger is a finger stuck in a bent position. It happens when the tendon that bends and straightens the thumb or finger can't slide smoothly under the ligaments that hold the tendon against the bones. In most cases, it's caused by a bump (nodule) that forms on the tendon. The bent finger usually straightens out on its own. A trigger finger can be painful. But it normally isn't a serious problem. Trigger fingers seem to occur more in some groups of people. These groups include: · People who have diabetes or arthritis. · People who have injured their hands in the past. 
· Musicians. · People who  tools often. Rest and exercises may help your finger relax so that it can bend. You may get a corticosteroid shot. This can reduce swelling and pain. Your doctor may put a splint on your finger. It will give your finger some rest. You may need surgery if the finger keeps locking in a bent position. Follow-up care is a key part of your treatment and safety. Be sure to make and go to all appointments, and call your doctor if you are having problems. It's also a good idea to know your test results and keep a list of the medicines you take. How can you care for yourself at home? · If your doctor put a splint on your finger, wear it as directed. Don't take it off until your doctor says you can. · You may need to change your activities to avoid movements that irritate the finger. · Take your medicines exactly as prescribed. Call your doctor if you think you are having a problem with your medicine. · Ask your doctor if you can take an over-the-counter pain medicine, such as acetaminophen (Tylenol), ibuprofen (Advil, Motrin), or naproxen (Aleve). Be safe with medicines. Read and follow all instructions on the label. · If your doctor recommends exercises, do them as directed. When should you call for help? Call your doctor now or seek immediate medical care if: 
· Your finger locks in a bent position and will not straighten. Watch closely for changes in your health, and be sure to contact your doctor if: 
· You do not get better as expected. Where can you learn more? Go to http://nneka-mindi.info/ Enter M826 in the search box to learn more about \"Trigger Finger: Care Instructions. \" Current as of: March 2, 2020               Content Version: 12.5 © 4620-6266 [employer]. Care instructions adapted under license by [employer] (which disclaims liability or warranty for this information). If you have questions about a medical condition or this instruction, always ask your healthcare professional. [address] any warranty or liability for your use of this information. Carpal Tunnel Syndrome: Care Instructions Overview Carpal tunnel syndrome is numbness, tingling, weakness, and pain in your hand, wrist, and sometimes forearm. It is caused by pressure on the median nerve. This nerve and several tough tissues called tendons run through a space in the wrist. This space is called the carpal tunnel. The repeated hand motions used in work and some hobbies and sports can put pressure on the median nerve. Pregnancy can cause carpal tunnel syndrome. Several conditions, such as diabetes, arthritis, and an underactive thyroid, can also cause it. You may be able to limit an activity or change the way you do it to reduce your symptoms. You also can take other steps to feel better. If your symptoms are mild, 1 to 2 weeks of home treatment are likely to ease your pain. Surgery is needed only if other treatments do not work. Follow-up care is a key part of your treatment and safety. Be sure to make and go to all appointments, and call your doctor if you are having problems.  It's also a good idea to know your test results and keep a list of the medicines you take. How can you care for yourself at home? · If possible, stop or reduce the activity that causes your symptoms. If you cannot stop the activity, take frequent breaks to rest and stretch or change hand positions to do a task. Try switching hands, such as when using a computer mouse. · Try to avoid bending or twisting your wrists. · Ask your doctor if you can take an over-the-counter pain medicine, such as acetaminophen (Tylenol), ibuprofen (Advil, Motrin), or naproxen (Aleve). Be safe with medicines. Read and follow all instructions on the label. · If your doctor prescribes corticosteroid medicine to help reduce pain and swelling, take it exactly as prescribed. Call your doctor if you think you are having a problem with your medicine. · Put ice or a cold pack on your wrist for 10 to 20 minutes at a time to ease pain. Put a thin cloth between the ice and your skin. · If your doctor or your physical or occupational therapist tells you to wear a wrist splint, wear it as directed to keep your wrist in a neutral position. This also eases pressure on your median nerve. · Ask your doctor whether you should have physical or occupational therapy to learn how to do tasks differently. · Try a yoga class to stretch your muscles and build strength in your hands and wrists. Yoga has been shown to ease carpal tunnel symptoms. To prevent carpal tunnel · When working at a computer, keep your hands and wrists in line with your forearms. Hold your elbows close to your sides. Take a break every 10 to 15 minutes. · Try these exercises: 
? Warm up: Rotate your wrist up, down, and from side to side. Repeat this 4 times. Stretch your fingers far apart, relax them, then stretch them again. Repeat 4 times. Stretch your thumb by pulling it back gently, holding it, and then releasing it. Repeat 4 times.  
? Prayer stretch: Start with your palms together in front of your chest just below your chin. Slowly lower your hands toward your waistline while keeping your hands close to your stomach and your palms together until you feel a mild to moderate stretch under your forearms. Hold for 10 to 20 seconds. Repeat 4 times. ? Wrist flexor stretch: Hold your arm in front of you with your palm up. Bend your wrist, pointing your hand toward the floor. With your other hand, gently bend your wrist further until you feel a mild to moderate stretch in your forearm. Hold for 10 to 20 seconds. Repeat 4 times. ? Wrist extensor stretch: Repeat the steps for the wrist flexor stretch, but begin with your extended hand palm down. · Squeeze a rubber exercise ball several times a day to keep your hands and fingers strong. · Avoid holding objects (such as a book) in one position for a long time. When possible, use your whole hand to grasp an object. Using just the thumb and index finger can put stress on the wrist. 
· Do not smoke. It can make this condition worse by reducing blood flow to the median nerve. If you need help quitting, talk to your doctor about stop-smoking programs and medicines. These can increase your chances of quitting for good. When should you call for help? Watch closely for changes in your health, and be sure to contact your doctor if: 
· Your pain or other problems do not get better with home care. · You want more information about physical or occupational therapy. · You have side effects of your corticosteroid medicine, such as: 
? Weight gain. ? Mood changes. ? Trouble sleeping. ? Bruising easily. · You have any other problems with your medicine. Where can you learn more? Go to http://nneka-mindi.info/ Enter R432 in the search box to learn more about \"Carpal Tunnel Syndrome: Care Instructions. \" Current as of: March 2, 2020               Content Version: 12.5 © 8586-6985 [employer]. Care instructions adapted under license by [employer] (which disclaims liability or warranty for this information). If you have questions about a medical condition or this instruction, always ask your healthcare professional. [address] any warranty or liability for your use of this information.

## 2020-06-19 NOTE — PROGRESS NOTES
Patti Landers is a 76 y.o. male right handed retiree. Worker's Compensation and legal considerations: not known. Vitals:    06/19/20 1109   BP: 134/75   Pulse: 66   Resp: 16   Temp: 97.2 °F (36.2 °C)   TempSrc: Skin   SpO2: 96%   Weight: 223 lb (101.2 kg)   Height: 6' (1.829 m)   PainSc:   5   PainLoc: Wrist           Chief Complaint   Patient presents with    Hand Pain     left    Wrist Pain     left       HPI:  Returns requesting injections for Left CT and RIght Ring Trigger Finger. He is on plavix for the forseeable future and cannot get off yet so will have to delay surgery. Previous HPI: Patient comes in today for follow-up regarding his left carpal tunnel syndrome. Approximately 4 months ago he received an injection that he said helped significantly. He cannot have surgery at this time as he is on Plavix no recent cardiovascular event. He is requesting another injection at this time. Previous HPI: Patient comes in today for follow-up regarding his left carpal tunnel syndrome as well as redness over the back of his left ring finger. At his last visit he had a left ring finger A1 pulley injection that he says helped his trigger finger. He says he can have any surgery for the next several months as he is on Plavix for 6 more months. Previous HPI: Patient comes in today with complaints of left ring finger locking and pain. He also reports that there is some redness over the back of the finger but he is unsure of any injury or if he was possibly bitten by something. Prior HPI: Patient comes in today for follow-up of left carpal tunnel syndrome. Proximally 3 months ago he received an injection in his left carpal tunnel. We discussed the need for surgery in the future. However he is having a vascular surgery in his right lower extremity that we will delay this. He is requesting an injection today. He reports the injection for his left ring trigger finger helped significantly. He reports since injection not really noticing that his small finger was going numb. Previous HPI: Patient comes in today for EMG follow-up of his left upper extremity. At his last visit he received a right index finger A1 pulley injection for trigger finger that he said has helped and has not come back. He reports the numbness and tingling in his left side has not changed. He says it is mostly at night but he thinks it involves all of the digits. Initial HPI: Patient comes in today as a referral from my partner. 1 month ago he had a left carpal tunnel injection as well as a ring finger trigger finger injection. He says the numbness and tingling in the left is improved. However he says the ring finger is still locking up. He reports having an EMG many years ago. He also has a history of a right sided carpal tunnel release by Alesha Butler in December 2017. He also has a history of cervical spine arthritis that he has been seen at the spine center for. He reports a one-week history of burning and pain in the index finger at the level of the palm. He denies any injuries to this area. Date of onset:  Many years worsening since 2018 regarding the carpal tunnel syndrome on the left    Injury: No    Prior Treatment:  Yes: Comment: He has worn a cockup wrist brace on the left    Numbness/ Tingling: Yes: Comment: Thumb index middle ring and small fingers on the left    ROS: Review of Systems - General ROS: negative  Respiratory ROS: no cough, shortness of breath, or wheezing  Cardiovascular ROS: no chest pain or dyspnea on exertion  Musculoskeletal ROS: positive for - pain in hand - bilateral  Neurological ROS: positive for - numbness/tingling  Dermatological ROS: negative    Past Medical History:   Diagnosis Date    Adrenal adenoma 2009    LEFT 1 cm, no change 1/15, 2/16    Asbestosis     CT 1/19 showed pleural plaques    Atrial fibrillation     CHA2DS2-VAsc=3(age+, htn+, vasc dx+; estimated yearly stroke risk according to Lip et al. is 3,2%), Hasbled=2(estimated yearly bleeding risk according to pisters et al. is 1,88%); not anticoagulated due to h/o gi bleed    Atrial fibrillation ablation     10/08    Basal cell cancer     Dr Karin Roesn; he's had >350 lesions removed    Carotid disease, bilateral ([address]     Cervical radiculopathy     MRI 9/11 showed C3-6 severe foraminal stenosis    Chronic pain     myofascial pain syndrome; seen in pain clinic in past    Colon polyp     Dr Davin Razo 9/15    Coronary artery disease     RCA - 3.0 x 16mm TAXUS 9/04, 3.0 x 16mm TAXUS 12/06    Dyslipidemia     Erectile dysfunction     GERD     GI bleed     Hearing loss 2014    Dr Maye Rosario, umbilical     Hypertension     with component of white coat    Hypogonadism male     IFG (impaired fasting glucose) [id number]    Lumbar radiculopathy     Dr Laurie Rust;  MRI 9/11 L4-5 disc bulging, annular tear, disc dessication    OAB (overactive bladder)     Osteoarthritis     Dr Jef Hyman Overweight (BMI 25.0-29. 9)     IF 5/18 start weight 214 lbs, not doing    Peripheral vascular disease     50-60% R iliac; s/p R iliac stent and L femoral artery stent in past; R OLIVIA 0.76 (1/16)    Plantar fasciitis     bilateral     PPD positive     Prostate cancer     T1c Imelda 7(3+4), 70% in 1 core, GS 7 (3+4) in 4 cores, GS 6 (3+3) in 1 core; psa 5.28, TRUS 18 gm;  Dr Davin Razo; s/p cryoablation 10/16    Recurrent umbilical hernia     Subclinical hypothyroidism     Tinnitus     Dr Marino Grimaldo Ulcerative colitis     Venous insufficiency        Past Surgical History:   Procedure Laterality Date    CARDIAC SURG PROCEDURE UNLIST  04/2018    [facility] Dr Nia Friend; echo nl lv, ef 60%, dilated RV, biatrial enlargement, trace AI    CARDIAC SURG PROCEDURE UNLIST  04/2018    [facility] Dr Nia Friend; NST neg, ef 60%    HAND/FINGER SURGERY UNLISTED  2017    HX CAROTID ENDARTERECTOMY  01/2014    RIGHT    HX CATARACT REMOVAL      BILAT    HX COLONOSCOPY      Dr Delgado Brewer 9/15 polyps    HX CRYOABLATION OF THE PROSTATE      10/16    HX HEMORRHOIDECTOMY      1979    [address]ilee [address] 37, 1975    HX ORTHOPAEDIC      RIGHT knee surgery    HX ORTHOPAEDIC  12/2017    Dr Masood Clifton; RIGHT CTS release    HX REFRACTIVE SURGERY      OD (hemoplasty to right eye on retina to avoid blindness)    HX TONSILLECTOMY      1955    SC LAP, RECURRENT INCISIONAL HERNIA REPAIR,REDUCIBLE N/A 02/09/2017    Dr. Miranda Chawla HERNIA,5+Y/O,REDUCIBL  02/2016    Dr Lashawn Lee UGJK,3+K/R,RPLCG  02/2016    Dr. Anabell Murray      1/16  R OLIVIA 0.76, L OLIVIA 1.02    VASCULAR SURGERY PROCEDURE UNLIST      LEFT fem artery and iliac stents (10/15); RCF endarterectomy w patch angioplasty/B CI artery stenting (7/19)       Current Outpatient Medications   Medication Sig Dispense Refill    triamcinolone acetonide (KENALOG) 10 mg/mL injection 1 mL by Intra artICUlar route once for 1 dose. 1 Vial 0    HYDROcodone-acetaminophen (NORCO) 5-325 mg per tablet Take 1 Tab by mouth every four (4) hours as needed for Pain for up to 30 days. Max Daily Amount: 6 Tabs. 180 Tab 0    gabapentin (NEURONTIN) 300 mg capsule 1 cap QHS  Indications: neuropathic pain 30 Cap 5    methylPREDNISolone (Medrol, Ephraim,) 4 mg tablet Per dose pack instructions 1 Dose Pack 0    LORazepam (Ativan) 1 mg tablet Take 1 Tab by mouth every eight (8) hours as needed for Anxiety. 50 Tab 0    icosapent ethyL (VASCEPA) 1 gram capsule Take 2 Caps by mouth two (2) times daily (with meals). 180 Cap 3    diclofenac (VOLTAREN) 1 % gel Apply 2-4 g to affected area daily as needed (Take 2-4 grams as needed for pain).  200 g 2    ezetimibe-simvastatin (VYTORIN) 10-40 mg per tablet TAKE 1 TABLET NIGHTLY 90 Tab 3    atenolol (TENORMIN) 25 mg tablet TAKE ONE TABLET BY MOUTH TWICE A  Tab 3    clopidogrel (PLAVIX) 75 mg tab TAKE ONE TABLET BY MOUTH DAILY 90 Tab 5    colestipol (COLESTID) 1 gram tablet 1 g daily as needed.  losartan (COZAAR) 25 mg tablet TAKE ONE TABLET BY MOUTH TWICE A  Tab 2    aspirin delayed-release 81 mg tablet Take 81 mg by mouth daily.  triamterene-hydrochlorothiazide (MAXZIDE) 37.5-25 mg per tablet TAKE ONE TABLET BY MOUTH DAILY 90 Tab 3    Cholecalciferol, Vitamin D3, 1,000 unit cap Take 1,000 Units by mouth daily.  MULTIVITAMIN PO Take  by mouth daily.  pantoprazole (PROTONIX) 40 mg tablet Take 40 mg by mouth daily.  coenzyme q10 200 mg Cap Take  by mouth daily. Allergies   Allergen Reactions    Altace [Ramipril] Unknown (comments)     Pt does not remember reaction    Lipitor [Atorvastatin] Other (comments)     Muscle pain    Lisinopril Shortness of Breath and Other (comments)     Turns bright red    Other Medication Other (comments)     Vicryl suture on skin tends to be rejected with poor wound healing does better with monocryl    Procardia [Nifedipine] Other (comments)     Caused afib    Rosuvastatin Other (comments)     Muscle Pain           PE:     Physical Exam  Constitutional:       General: He is not in acute distress. Appearance: Normal appearance. He is normal weight. He is not ill-appearing or toxic-appearing. Eyes:      Pupils: Pupils are equal, round, and reactive to light. Neck:      Musculoskeletal: Normal range of motion. Cardiovascular:      Pulses: Normal pulses. Pulmonary:      Effort: Pulmonary effort is normal. No respiratory distress. Abdominal:      General: Abdomen is flat. Musculoskeletal: Normal range of motion. General: Swelling and tenderness present. Skin:     General: Skin is warm and dry. Capillary Refill: Capillary refill takes less than 2 seconds. Neurological:      General: No focal deficit present. Mental Status: He is alert and oriented to person, place, and time. Sensory: Sensory deficit present.    Psychiatric:         Mood and Affect: Mood normal.         Behavior: Behavior normal.         NEUROVASCULAR    Examination L R Examination L R   Carpal Comp. + - Pronator Comp. - -   Carpal Tinel + - Pronator Tinel - -   Phalen's + - Pronator Stress - -   Cubital Comp. - - Guyon Comp. - -   Cubital Tinel - - Guyon Tinel - -   Elbow Hyperflexion - - Adson's - -   Spurling's - - SC Comp. - -   PCB Median abn - - SC Tinel - -   Radial Tinel - - IC Comp. - -   Digital Tinel - - IC Tinel - -   Radial 2-Pt WNL WNL Ulnar 2-Pt WNL WNL     Radial Pulse: 2+  Capillary Refill: < 2 sec  Robert: Not Performed  Digital Robert: Not Performed    Hand:    Examination L Digit(s) R Digit(s)   1st CMC Tenderness -  -    1st CMC Grind -  -    Pennie Nodes -  -    Heberden Nodes -  -    A1 Pulley Tenderness -  + RF   Triggering -  + RF   UCL Instability -  -    RCL Instability -  -    Lateral Stress Pain -  -    Palmar Cords -  -    Tabletop test -  -    Garrod's Pads -  -     Strength       Pinch Strength         ROM: Full      Imaging:     Plain films of the left ring finger are negative for any acute fracture dislocation. There are minimal degenerative changes. 2017 MRI of cervical spine  IMPRESSION:     1. Multilevel degenerative findings of cervical spine.  -Facet arthropathy more severe than disc pathology. -Multilevel severe foraminal stenoses from facet arthropathy as delineated  above. Similar distribution previously.  -No high-grade spinal stenosis. NCV & EMG Findings:  Evaluation of the left median motor nerve showed prolonged distal onset latency (5.2 ms) and decreased conduction velocity (Elbow-Wrist, 44 m/s). The left ulnar motor nerve showed decreased conduction velocity (A Elbow-B Elbow, 48 m/s). The left median sensory nerve showed prolonged distal peak latency (4.4 ms), reduced amplitude (5.0 µV), and decreased conduction velocity (Wrist-2nd Digit, 32 m/s).   The left ulnar sensory nerve showed prolonged distal peak latency (3.9 ms), reduced amplitude (5.5 µV), and decreased conduction velocity (Wrist-5th Digit, 36 m/s). All remaining nerves (as indicated in the following tables) were within normal limits.       Needle evaluation of the left triceps muscle showed increased motor unit amplitude and slightly increased polyphasic potentials. The left pronator teres and the left Ext Digitorum muscles showed slightly increased polyphasic potentials. All remaining muscles (as indicated in the following table) showed no evidence of electrical instability.       INTERPRETATION  This is an abnormal electrodiagnostic examination. These findings may be consistent with:  1. 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 moderate carpal tunnel syndrome at the left wrist (carpal tunnel syndrome)  3. chronic C6/7 cervical radiculopathy on the left  4. mild signs of peripheral neuropathy      CLINICAL INTERPRETATION  His symptoms may multifactorial, related to any of these diagnoses, but most likely the carpal tunnel syndrome. ICD-10-CM ICD-9-CM    1. Carpal tunnel syndrome on left G56.02 354.0 TRIAMCINOLONE ACETONIDE INJ      triamcinolone acetonide (KENALOG) 10 mg/mL injection      INJECT CARPAL TUNNEL   2. Trigger ring finger of right hand M65.341 727.03 TRIAMCINOLONE ACETONIDE INJ      triamcinolone acetonide (KENALOG) 10 mg/mL injection      INJECT TENDON SHEATH/LIGAMENT       Plan:     Left CT and Right RF A1 Norm Injections    Follow-up and Dispositions    · Return if symptoms worsen or fail to improve.          [address] [facility]  OFFICE PROCEDURE PROGRESS NOTE        Chart reviewed for the following:   Ascencion YODER DO, have reviewed the History, Physical and updated the Allergic reactions for [address] performed immediately prior to start of procedure:   Mara YODER DO, have performed the following reviews on Evaristo Naa Larios prior to the start of the procedure:            * Patient was identified by name and date of birth   * Agreement on procedure being performed was verified  * Risks and Benefits explained to the patient  * Procedure site verified and marked as necessary  * Patient was positioned for comfort  * Consent was signed and verified     Time: 11:35 AM      Date of procedure: 6/19/2020    Procedure performed by: Michelle Lara DO    Provider assisted by: Desirae Bahena LPN    Patient assisted by: self    How tolerated by patient: tolerated the procedure well with no complications    Post Procedural Pain Scale: 0 - No Hurt    Comments: none    Procedure:  After consent was obtained, using sterile technique the carpal tunnel was prepped. Local anesthetic used: 1% lidocaine. Kenalog 5 mg X2 and was then injected and the needle withdrawn. The procedure was well tolerated. The patient is asked to continue to rest the area for a few more days before resuming regular activities. It may be more painful for the first 1-2 days. Watch for fever, or increased swelling or persistent pain in the joint. Call or return to clinic prn if such symptoms occur or there is failure to improve as anticipated.     Plan was reviewed with patient, who verbalized agreement and understanding of the plan

## 2020-07-08 ENCOUNTER — OFFICE VISIT (OUTPATIENT)
Dept: ORTHOPEDIC SURGERY | Age: 75
End: 2020-07-08

## 2020-07-08 ENCOUNTER — TELEPHONE (OUTPATIENT)
Dept: ORTHOPEDIC SURGERY | Age: 75
End: 2020-07-08

## 2020-07-08 VITALS
TEMPERATURE: 97.3 F | SYSTOLIC BLOOD PRESSURE: 127 MMHG | HEIGHT: 72 IN | HEART RATE: 64 BPM | RESPIRATION RATE: 16 BRPM | WEIGHT: 221 LBS | BODY MASS INDEX: 29.93 KG/M2 | DIASTOLIC BLOOD PRESSURE: 81 MMHG | OXYGEN SATURATION: 97 %

## 2020-07-08 DIAGNOSIS — M47.812 FACET ARTHRITIS OF CERVICAL REGION: ICD-10-CM

## 2020-07-08 DIAGNOSIS — M50.30 DEGENERATION OF CERVICAL INTERVERTEBRAL DISC: ICD-10-CM

## 2020-07-08 DIAGNOSIS — M25.512 ACUTE PAIN OF LEFT SHOULDER: Primary | ICD-10-CM

## 2020-07-08 DIAGNOSIS — M48.02 CERVICAL SPINAL STENOSIS: ICD-10-CM

## 2020-07-08 DIAGNOSIS — M75.102 ROTATOR CUFF SYNDROME OF LEFT SHOULDER: ICD-10-CM

## 2020-07-08 RX ORDER — TRIAMCINOLONE ACETONIDE 40 MG/ML
40 INJECTION, SUSPENSION INTRA-ARTICULAR; INTRAMUSCULAR ONCE
Qty: 1 VIAL | Refills: 0
Start: 2020-07-08 | End: 2020-07-08

## 2020-07-08 RX ORDER — GABAPENTIN 300 MG/1
CAPSULE ORAL
Qty: 90 CAP | Refills: 1 | Status: SHIPPED | OUTPATIENT
Start: 2020-07-08 | End: 2021-01-26 | Stop reason: SDUPTHER

## 2020-07-08 NOTE — PROGRESS NOTES
Patient: Tanner Rodas                MRN: [id number]       SSN: [id number]  YOB: 1945        AGE: 76 y.o. SEX: male  Body mass index is 29.97 kg/m². PCP: Marita Rubin MD  07/08/20    Chief Complaint: Left shoulder pain    HPI: Tanner Rodas is a 76 y.o. male patient who presents to the office today for his left shoulder. His main left shoulder pain worsening over the past 5 to 6 months. Is worse when he raises his arm above his head. He also notices it at night. He takes Norco occasionally. He injured it initially when trying to get his wife's wheelchair back into their car any was unable to get the wheelchair fully into the car and fell backwards with a wheelchair fell on top of them. Is been having pain since that time.     Past Medical History:   Diagnosis Date    Adrenal adenoma 2009    LEFT 1 cm, no change 1/15, 2/16    Asbestosis     CT 1/19 showed pleural plaques    Atrial fibrillation     CHA2DS2-VAsc=3(age+, htn+, vasc dx+; estimated yearly stroke risk according to Lip et al. is 3,2%), Hasbled=2(estimated yearly bleeding risk according to pisters et al. is 1,88%); not anticoagulated due to h/o gi bleed    Atrial fibrillation ablation     10/08    Basal cell cancer     Dr Ashley Lowery; he's had >350 lesions removed    Carotid disease, bilateral ([address]     Cervical radiculopathy     MRI 9/11 showed C3-6 severe foraminal stenosis    Chronic pain     myofascial pain syndrome; seen in pain clinic in past    Colon polyp     Dr Brennon Avery 9/15    Coronary artery disease     RCA - 3.0 x 16mm TAXUS 9/04, 3.0 x 16mm TAXUS 12/06    Dyslipidemia     Erectile dysfunction     GERD     GI bleed     Hearing loss 2014    Dr Mishel Valerio, umbilical     Hypertension     with component of white coat    Hypogonadism male     IFG (impaired fasting glucose) [id number]    Lumbar radiculopathy     Dr Angelo Ramos;  MRI 9/11 L4-5 disc bulging, annular tear, disc dessication    OAB (overactive bladder)     Osteoarthritis     Dr Timothy Andres Overweight (BMI 25.0-29. 9)     IF 5/18 start weight 214 lbs, not doing    Peripheral vascular disease     50-60% R iliac; s/p R iliac stent and L femoral artery stent in past; R OLIVIA 0.76 (1/16)    Plantar fasciitis     bilateral     PPD positive     Prostate cancer     T1c [place] 7(3+4), 70% in 1 core, GS 7 (3+4) in 4 cores, GS 6 (3+3) in 1 core; psa 5.28, TRUS 18 gm;  Dr Sierra Hagen; s/p cryoablation 10/16    Recurrent umbilical hernia     Subclinical hypothyroidism     Tinnitus     Dr Teresita Lima Ulcerative colitis     Venous insufficiency        Family History   Problem Relation Age of Onset    Heart Disease Mother     Hypertension Mother     Diabetes Mother     Arthritis-osteo Mother     Heart Disease Father     Stroke Father     Hypertension Father     Heart Disease Sister     Hypertension Sister        Current Outpatient Medications   Medication Sig Dispense Refill    triamcinolone acetonide (KENALOG-40) 40 mg/mL injection 1 mL by Intra artICUlar route once for 1 dose. 1 Vial 0    HYDROcodone-acetaminophen (NORCO) 5-325 mg per tablet Take 1 Tab by mouth every four (4) hours as needed for Pain for up to 30 days. Max Daily Amount: 6 Tabs. 180 Tab 0    gabapentin (NEURONTIN) 300 mg capsule 1 cap QHS  Indications: neuropathic pain 30 Cap 5    methylPREDNISolone (Medrol, Ephraim,) 4 mg tablet Per dose pack instructions 1 Dose Pack 0    LORazepam (Ativan) 1 mg tablet Take 1 Tab by mouth every eight (8) hours as needed for Anxiety. 50 Tab 0    icosapent ethyL (VASCEPA) 1 gram capsule Take 2 Caps by mouth two (2) times daily (with meals). 180 Cap 3    diclofenac (VOLTAREN) 1 % gel Apply 2-4 g to affected area daily as needed (Take 2-4 grams as needed for pain).  200 g 2    ezetimibe-simvastatin (VYTORIN) 10-40 mg per tablet TAKE 1 TABLET NIGHTLY 90 Tab 3    atenolol (TENORMIN) 25 mg tablet TAKE ONE TABLET BY MOUTH TWICE A  Tab 3    clopidogrel (PLAVIX) 75 mg tab TAKE ONE TABLET BY MOUTH DAILY 90 Tab 5    colestipol (COLESTID) 1 gram tablet 1 g daily as needed.  losartan (COZAAR) 25 mg tablet TAKE ONE TABLET BY MOUTH TWICE A  Tab 2    aspirin delayed-release 81 mg tablet Take 81 mg by mouth daily.  triamterene-hydrochlorothiazide (MAXZIDE) 37.5-25 mg per tablet TAKE ONE TABLET BY MOUTH DAILY 90 Tab 3    Cholecalciferol, Vitamin D3, 1,000 unit cap Take 1,000 Units by mouth daily.  MULTIVITAMIN PO Take  by mouth daily.  pantoprazole (PROTONIX) 40 mg tablet Take 40 mg by mouth daily.  coenzyme q10 200 mg Cap Take  by mouth daily.          Allergies   Allergen Reactions    Altace [Ramipril] Unknown (comments)     Pt does not remember reaction    Lipitor [Atorvastatin] Other (comments)     Muscle pain    Lisinopril Shortness of Breath and Other (comments)     Turns bright red    Other Medication Other (comments)     Vicryl suture on skin tends to be rejected with poor wound healing does better with monocryl    Procardia [Nifedipine] Other (comments)     Caused afib    Rosuvastatin Other (comments)     Muscle Pain         Past Surgical History:   Procedure Laterality Date    CARDIAC SURG PROCEDURE UNLIST  04/2018    [address] [facility] [address] Leeann Leon; echo nl lv, ef 60%, dilated RV, biatrial enlargement, trace AI    CARDIAC SURG PROCEDURE UNLIST  04/2018    [address] [facility] [address] Leeann Leon; NST neg, ef 60%    HAND/FINGER SURGERY UNLISTED  2017    HX CAROTID ENDARTERECTOMY  01/2014    RIGHT    Lacy Saras HX COLONOSCOPY      Dr Reinaldo Jon 9/15 polyps    HX CRYOABLATION OF THE PROSTATE      10/16    HX HEMORRHOIDECTOMY      1979    [address], 1975    HX ORTHOPAEDIC      RIGHT knee surgery    HX ORTHOPAEDIC  12/2017    Dr Landon Daniels; RIGHT CTS release    HX REFRACTIVE SURGERY      OD (hemoplasty to right eye on retina to avoid blindness)    HX TONSILLECTOMY      1955    IL LAP, RECURRENT INCISIONAL HERNIA REPAIR,REDUCIBLE N/A 2017    Dr. Yadiel Dorantes HERNIA,5+Y/O,REDUCIBL  2016    Dr Radha Taveras QVGT,5+Y/A,KEVRO  2016    Dr. Radha Aaron        R OLIVIA 0.76, L OLIVIA 1.02    VASCULAR SURGERY PROCEDURE UNLIST      LEFT fem artery and iliac stents (10/15); RCF endarterectomy w patch angioplasty/B CI artery stenting ()       Social History     Socioeconomic History    Marital status:      Spouse name: Not on file    Number of children: Not on file    Years of education: Not on file    Highest education level: Not on file   Occupational History    Not on file   Social Needs    Financial resource strain: Not on file    Food insecurity     Worry: Not on file     Inability: Not on file    Transportation needs     Medical: Not on file     Non-medical: Not on file   Tobacco Use    Smoking status: Former Smoker     Packs/day: 1.50     Years: 25.00     Pack years: 37.50     Types: Cigarettes     Last attempt to quit: 2003     Years since quittin.5    Smokeless tobacco: Never Used   Substance and Sexual Activity    Alcohol use: Yes     Comment: RARELY    Drug use: No    Sexual activity: Yes     Partners: Female     Birth control/protection: None   Lifestyle    Physical activity     Days per week: Not on file     Minutes per session: Not on file    Stress: Not on file   Relationships    Social connections     Talks on phone: Not on file     Gets together: Not on file     Attends Synagogue service: Not on file     Active member of club or organization: Not on file     Attends meetings of clubs or organizations: Not on file     Relationship status: Not on file    Intimate partner violence     Fear of current or ex partner: Not on file     Emotionally abused: Not on file     Physically abused: Not on file     Forced sexual activity: Not on file   Other Topics Concern    Not on file   Social History Narrative    Not on file       REVIEW OF SYSTEMS:      No changes from previous review of systems unless noted. PHYSICAL EXAMINATION:  Visit Vitals  /81 (BP 1 Location: Left arm, BP Patient Position: Sitting)   Pulse 64   Temp 97.3 °F (36.3 °C) (Skin)   Resp 16   Ht 6' (1.829 m)   Wt 221 lb (100.2 kg)   SpO2 97%   BMI 29.97 kg/m²     Body mass index is 29.97 kg/m². GENERAL: Alert and oriented x3, in no acute distress. HEENT: Normocephalic, atraumatic. RESP: Non labored breathing. SKIN: No rashes or lesions noted. Shoulder Examination     R   L  ROM   FF  Full   Full  ER  Full   Full   IR  Full   Full  Rotator Cuff Pain   Supra  -   +   Infra  -   -   Subscap -   -  Crepitus  -   -  Effusion  -   -  Warmth  -   -   Erythema  -   -  Instability  -   -  AC Joint TTP  -   -  Clavicle   Deformity -   -   TTP  -   -  Proximal Humerus   Deformity -   -   TTP  -   -  Deltoid Strength 5   5  Biceps Strength 5   5  Biceps Deformity -   -  Biceps Groove Pain -   -  Impingement Sign -   -       IMAGING:  left shoulder x-rays taken the office today do not show any obvious bony abnormalities    ASSESSMENT & PLAN  Diagnosis: Left shoulder rotator cuff pain    Nivia Laird has left shoulder likely supraspinatus rotator cuff pain. We discussed several treatment options. I started him on a home exercise program with a cortisone shot today. Follow-up as needed.     [facility]  OFFICE PROCEDURE PROGRESS NOTE        Chart reviewed for the following:   I, Milton Dinero MD, have reviewed the History, Physical and updated the Allergic reactions for [address] performed immediately prior to start of procedure:   Amanda Santiago MD, have performed the following reviews on [facility] prior to the start of the procedure:            * Patient was identified by name and date of birth   * Agreement on procedure being performed was verified  * Risks and Benefits explained to the patient  * Procedure site verified and marked as necessary  * Patient was positioned for comfort  * Consent was signed and verified    Time: 10:30 AM    Location: Left shoulder    Kenalog 40mg & 3cc Lidocaine    Date of procedure: 7/8/2020    Procedure performed by:  Francisca Hoffman MD    Provider assisted by: Garrick Wood LPN    Patient assisted by: self    How tolerated by patient: tolerated the procedure well with no complications    Post Procedural Pain Scale: 0 - No Hurt    Comments: none                  Electronically signed by: Francisca Hoffman MD

## 2020-07-08 NOTE — TELEPHONE ENCOUNTER
PT STATES HE NORMALLY GETS HIS GABAPENTIN FOR 90 DAY SUPPLY BUT FOR SOME REASON IT WAS DONE FOR ONLY 30 DAY SUPPLY HE DOESN'T RETURN FOR A FOLLOW-UP UNTIL 12/9/2020. PT WANTS TO KNOW IF IT CAN BE CHANGED FOR 90 DAYS 
 
PT L#[phone]

## 2020-07-15 ENCOUNTER — OFFICE VISIT (OUTPATIENT)
Dept: ORTHOPEDIC SURGERY | Age: 75
End: 2020-07-15

## 2020-07-15 VITALS
HEIGHT: 72 IN | SYSTOLIC BLOOD PRESSURE: 142 MMHG | TEMPERATURE: 96 F | BODY MASS INDEX: 29.26 KG/M2 | OXYGEN SATURATION: 96 % | WEIGHT: 216 LBS | DIASTOLIC BLOOD PRESSURE: 85 MMHG | HEART RATE: 64 BPM

## 2020-07-15 DIAGNOSIS — M17.11 PRIMARY OSTEOARTHRITIS OF RIGHT KNEE: Primary | ICD-10-CM

## 2020-07-15 DIAGNOSIS — M54.2 CERVICAL PAIN: ICD-10-CM

## 2020-07-15 DIAGNOSIS — M25.561 CHRONIC PAIN OF RIGHT KNEE: ICD-10-CM

## 2020-07-15 DIAGNOSIS — M54.12 CERVICAL RADICULOPATHY: ICD-10-CM

## 2020-07-15 DIAGNOSIS — G89.29 CHRONIC PAIN OF RIGHT KNEE: ICD-10-CM

## 2020-07-15 RX ORDER — BETAMETHASONE SODIUM PHOSPHATE AND BETAMETHASONE ACETATE 3; 3 MG/ML; MG/ML
6 INJECTION, SUSPENSION INTRA-ARTICULAR; INTRALESIONAL; INTRAMUSCULAR; SOFT TISSUE ONCE
Qty: 0.5 ML | Refills: 0
Start: 2020-07-15 | End: 2020-07-15

## 2020-07-15 RX ORDER — HYALURONATE SODIUM 10 MG/ML
2 SYRINGE (ML) INTRAARTICULAR ONCE
Qty: 2 ML | Refills: 0
Start: 2020-07-15 | End: 2020-07-15

## 2020-07-15 RX ORDER — DICLOFENAC SODIUM 10 MG/G
4 GEL TOPICAL 4 TIMES DAILY
Qty: 5 EACH | Refills: 5 | Status: SHIPPED | OUTPATIENT
Start: 2020-07-15 | End: 2021-11-13

## 2020-07-15 NOTE — PROGRESS NOTES
Patient: Anjel Donald                MRN: [id number]       SSN: [id number]  YOB: 1945        AGE: 76 y.o. SEX: male    PCP: Mayco Benítez MD  07/15/20    CC: RIGHT KNEE EUFFUSION AND NECK PAIN    HISTORY:  Anjel Donald is a 76 y.o. male who is seen for increased right knee pain and swelling and neck pain. His neck pain radiates into his left shoulder. He experiences neck and trapezius tightness and discomort. He does not recall any neck injury. He thinks he may have aggravated his right knee while walking on his treadmill recently. He has been experiencing increasing knee pain for the past few years. He reports relief from previous cortisone and visco supplementation injections by Dr. Sohan Norris in the past.  He says his knee has not been able to completely extend his leg since 1964. He injured his right knee playing football and underwent total meniscectomy and arthrotomy by Dr. Dillon Alvarez. He completed a successful Euflexxa series on 10/14/19. He was previously seen for left hip pain. He is s/p right endarterectomy for circulation problems in July 2019 by Dr. Flora pichardo. He currently takes Plavix & a diuretic. He has a stent in his right femoral artery. He reports a blockage in his [facility] He was seen recently by Dr. Brock Mazariegos for his left shoulder--responded to an injection. Pain Assessment  7/15/2020   Location of Pain Knee   Location Modifiers Right   Severity of Pain 4   Quality of Pain Sharp; Aching   Quality of Pain Comment -   Duration of Pain -   Frequency of Pain Intermittent   Date Pain First Started -   Aggravating Factors Walking;Standing   Aggravating Factors Comment -   Limiting Behavior Yes   Relieving Factors Other (Comment)   Relieving Factors Comment -   Result of Injury Yes   Work-Related Injury No   Type of Injury Other (Comment)   Type of Injury Comment -     Occupation, etc:  Mr. Felice Phipps he lives with his wife in [facility]. He has 2 sons not in the area. His youngest son lives in [place]. His middle son lives in [place] and is a Jainism  for 2 churches and he is considering moving to [place]. His middle son's wife has Kin's disease. He has 7 grandchildren. He retired 15 years ago as a  for the [employer] He retired in 2013 from [employer]. 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 He states that he has respiratory problems due to asbestos exposure. He reports that he cut his right leg a few months ago. His wound took 2 months to heal. He enjoys doing water exercises. He has been walking a mile everyday. His eldest son passed away on 6/17/19 in [place]. He is unsure of the cause of his son's death. He is not diabetic. He has h/o peripheral vascular disease. Current weight is 216 pounds. He is 6' tall.       Lab Results   Component Value Date/Time    Hemoglobin A1c 5.9 (H) 05/18/2020 08:20 AM    Hemoglobin A1c (POC) 5.5 05/23/2019 09:05 AM     Weight Metrics 7/15/2020 7/8/2020 6/19/2020 6/17/2020 2/24/2020 1/29/2020 1/15/2020   Weight 216 lb 221 lb 223 lb 221 lb 12.8 oz 220 lb 220 lb 6.4 oz 222 lb 3.2 oz   BMI 29.29 kg/m2 29.97 kg/m2 30.24 kg/m2 30.08 kg/m2 29.84 kg/m2 29.89 kg/m2 30.14 kg/m2       Patient Active Problem List   Diagnosis Code    Colitis, ulcerative ([facility] K51.90    Colon polyps K63.5    Left carotid artery occlusion I65.22    Hypovitaminosis D E55.9    Advance directive in chart Z78.9    Hypertension I11.9    Dyslipidemia E78.5    Coronary artery disease I25.10    Atrial fibrillation I48.91    Recurrent umbilical hernia K76.2    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 9) [id number]    History of prostate cancer Z85.46    Facet hypertrophy of lumbar region M47.816    Aortoiliac occlusive disease ([facility] [address] I74.09    PAD (peripheral artery disease) ([facility]) I73.9     REVIEW OF SYSTEMS: All Below are Negative except: See HPI   Constitutional: negative for fever, chills, and weight loss. Cardiovascular: negative for chest pain, claudication, leg swelling, SOB, DUONG   Gastrointestinal: Negative for pain, N/V/C/D, Blood in stool or urine, dysuria,  hematuria, incontinence, pelvic pain. Musculoskeletal: See HPI   Neurological: Negative for dizziness and weakness. Negative for headaches, Visual changes, confusion, seizures   Phychiatric/Behavioral: Negative for depression, memory loss, substance  abuse. Extremities: Negative for hair changes, rash, or skin lesion changes. Hematologic: Negative for bleeding problems, bruising, pallor or swollen lymph  nodes   Peripheral Vascular: No calf pain, no circulation deficits.     Social History     Socioeconomic History    Marital status:      Spouse name: Not on file    Number of children: Not on file    Years of education: Not on file    Highest education level: Not on file   Occupational History    Not on file   Social Needs    Financial resource strain: Not on file    Food insecurity     Worry: Not on file     Inability: Not on file    Transportation needs     Medical: Not on file     Non-medical: Not on file   Tobacco Use    Smoking status: Former Smoker     Packs/day: 1.50     Years: 25.00     Pack years: 37.50     Types: Cigarettes     Last attempt to quit: 2003     Years since quittin.5    Smokeless tobacco: Never Used   Substance and Sexual Activity    Alcohol use: Yes     Comment: RARELY    Drug use: No    Sexual activity: Yes     Partners: Female     Birth control/protection: None   Lifestyle    Physical activity     Days per week: Not on file Minutes per session: Not on file    Stress: Not on file   Relationships    Social connections     Talks on phone: Not on file     Gets together: Not on file     Attends Adventism service: Not on file     Active member of club or organization: Not on file     Attends meetings of clubs or organizations: Not on file     Relationship status: Not on file    Intimate partner violence     Fear of current or ex partner: Not on file     Emotionally abused: Not on file     Physically abused: Not on file     Forced sexual activity: Not on file   Other Topics Concern    Not on file   Social History Narrative    Not on file      Allergies   Allergen Reactions    Altace [Ramipril] Unknown (comments)     Pt does not remember reaction    Lipitor [Atorvastatin] Other (comments)     Muscle pain    Lisinopril Shortness of Breath and Other (comments)     Turns bright red    Other Medication Other (comments)     Vicryl suture on skin tends to be rejected with poor wound healing does better with monocryl    Procardia [Nifedipine] Other (comments)     Caused afib    Rosuvastatin Other (comments)     Muscle Pain        Current Outpatient Medications   Medication Sig    gabapentin (NEURONTIN) 300 mg capsule 1 cap QHS  Indications: neuropathic pain    methylPREDNISolone (Medrol, Ephraim,) 4 mg tablet Per dose pack instructions    LORazepam (Ativan) 1 mg tablet Take 1 Tab by mouth every eight (8) hours as needed for Anxiety.  icosapent ethyL (VASCEPA) 1 gram capsule Take 2 Caps by mouth two (2) times daily (with meals).  diclofenac (VOLTAREN) 1 % gel Apply 2-4 g to affected area daily as needed (Take 2-4 grams as needed for pain).  ezetimibe-simvastatin (VYTORIN) 10-40 mg per tablet TAKE 1 TABLET NIGHTLY    atenolol (TENORMIN) 25 mg tablet TAKE ONE TABLET BY MOUTH TWICE A DAY    clopidogrel (PLAVIX) 75 mg tab TAKE ONE TABLET BY MOUTH DAILY    colestipol (COLESTID) 1 gram tablet 1 g daily as needed.     losartan (COZAAR) 25 mg tablet TAKE ONE TABLET BY MOUTH TWICE A DAY    aspirin delayed-release 81 mg tablet Take 81 mg by mouth daily.  triamterene-hydrochlorothiazide (MAXZIDE) 37.5-25 mg per tablet TAKE ONE TABLET BY MOUTH DAILY    Cholecalciferol, Vitamin D3, 1,000 unit cap Take 1,000 Units by mouth daily.  MULTIVITAMIN PO Take  by mouth daily.  pantoprazole (PROTONIX) 40 mg tablet Take 40 mg by mouth daily.  coenzyme q10 200 mg Cap Take  by mouth daily. No current facility-administered medications for this visit.        PHYSICAL EXAMINATION:  Visit Vitals  /85   Pulse 64   Temp (!) 96 °F (35.6 °C) (Temporal)   Ht 6' (1.829 m)   Wt 216 lb (98 kg)   SpO2 96%   BMI 29.29 kg/m²      ORTHO EXAMINATION:  Examination Right knee Left knee   Skin Intact Intact   Range of motion 110-0 120-0   Effusion 3+ -   Medial joint line tenderness + -   Lateral joint line tenderness - -   Popliteal tenderness - -   Osteophytes palpable + -   Kylahs - -   Patella crepitus + -   Anterior drawer - -   Lateral laxity - -   Medial laxity - -   Varus deformity + -   Valgus deformity - -   Pretibial edema - -   Calf tenderness - -   Brisk capilary refill, palpable pulse    Examination Neck   Skin Intact   Tenderness +, left paracervical and trapezius   Tightness +, left paracervical and trapezius   Flexion Full   Extension Absent   Lateral bend left Normal   Lateral bend right Normal   Masses -   Biceps reflex Normal   Triceps reflex Normal   Brachioradialis reflex Normal     Chart reviewed for the following:   I, Josefa Rice MD, have reviewed the History, Physical and updated the Allergic reactions for [address] performed immediately prior to start of procedure:  Morales Theodore MD, have performed the following reviews on [facility] prior to the start of the procedure:            * Patient was identified by name and date of birth   * Agreement on procedure being performed was verified  * Risks and Benefits explained to the patient  * Procedure site verified and marked as necessary  * Patient was positioned for comfort  * Consent was obtained     Time: 8:59 AM    Date of procedure: 7/15/2020    Procedure performed by:  Grady Joya MD    Mr. Aldair Azul tolerated the procedure well with no complications. RADIOGRAPHS:  XR KLAUS HIP 11/7/18 SAWYER  IMPRESSION:  AP pelvis and two views - No fractures, moderate joint space narrowing, acetabular osteophytes present. Tonnis grade 2. Femoral acetabular impingement syndrome     XR RIGHT KNEE 4/13/18 SAWYER  IMPRESSION:  Three views - No fractures, no effusion, severe end stage with lateral subluxation joint space narrowing, + osteophytes present. IKDC Grade C. calcification of arteries    IMPRESSION:      ICD-10-CM ICD-9-CM    1. Primary osteoarthritis of right knee  M17.11 715.16 [place] DRAIN/INJECT LARGE JOINT/BURSA      EUFLEXXA INJECTION PER DOSE      sodium hyaluronate (SUPARTZ FX/HYALGAN/GENIVSC) 10 mg/mL syrg injection      diclofenac (Voltaren) 1 % gel   2. Chronic pain of right knee  M25.561 719.46 [place] DRAIN/INJECT LARGE JOINT/BURSA    G89.29 338.29 EUFLEXXA INJECTION PER DOSE      sodium hyaluronate (SUPARTZ FX/HYALGAN/GENIVSC) 10 mg/mL syrg injection      diclofenac (Voltaren) 1 % gel   3. Cervical pain  M54.2 723.1 REFERRAL TO SPINE SURGERY      betamethasone (Celestone Soluspan) 6 mg/mL injection      BETAMETHASONE ACETATE & SODIUM PHOSPHATE INJECTION 3 MG EA. INJECT TRIGGER POINT, 1 OR 2   4. Cervical radiculopathy  M54.12 723.4 REFERRAL TO SPINE SURGERY      betamethasone (Celestone Soluspan) 6 mg/mL injection      BETAMETHASONE ACETATE & SODIUM PHOSPHATE INJECTION 3 MG EA. INJECT TRIGGER POINT, 1 OR 2     PLAN:  Voltaren gel Rx provided. After timeout and under sterile conditions, right knee aspirated 55 cc of light yellow blood tinged fluid. The fluid was discarded.   After discussing treatment options, patient's right knee was injected with 2 cc Euflexxa and left paracervical region was injected with 4 cc Marcaine and 1/2 cc Celestone. There is no need for surgery at this time. He will follow up in 1 week for Euflexxa #2. He will follow up with Dr. Dagoberto Sosa for neck pain.     Scribed by Sowmya Evans ([address] [place] [address] [address]) as dictated by Smith Denis MD

## 2020-07-22 ENCOUNTER — OFFICE VISIT (OUTPATIENT)
Dept: ORTHOPEDIC SURGERY | Age: 75
End: 2020-07-22

## 2020-07-22 VITALS
TEMPERATURE: 96.6 F | DIASTOLIC BLOOD PRESSURE: 81 MMHG | BODY MASS INDEX: 29.88 KG/M2 | HEIGHT: 72 IN | HEART RATE: 67 BPM | RESPIRATION RATE: 20 BRPM | OXYGEN SATURATION: 98 % | WEIGHT: 220.6 LBS | SYSTOLIC BLOOD PRESSURE: 154 MMHG

## 2020-07-22 DIAGNOSIS — M17.11 PRIMARY OSTEOARTHRITIS OF RIGHT KNEE: Primary | ICD-10-CM

## 2020-07-22 DIAGNOSIS — G89.29 CHRONIC PAIN OF RIGHT KNEE: ICD-10-CM

## 2020-07-22 DIAGNOSIS — M25.561 CHRONIC PAIN OF RIGHT KNEE: ICD-10-CM

## 2020-07-22 DIAGNOSIS — M54.12 CERVICAL RADICULOPATHY: Primary | ICD-10-CM

## 2020-07-22 RX ORDER — HYALURONATE SODIUM 10 MG/ML
2 SYRINGE (ML) INTRAARTICULAR ONCE
Qty: 2 ML | Refills: 0
Start: 2020-07-22 | End: 2020-07-22

## 2020-07-22 NOTE — TELEPHONE ENCOUNTER
Last UDS 11/18/2019    Last Visit: 05/26/2020 with MD Chris Young  Next Appointment: 11/24/2020 with MD Chris Young  Previous Refill Encounter(s): 06/11/2020 per MD Chris Young #[id number]    Requested Prescriptions     Pending Prescriptions Disp Refills    HYDROcodone-acetaminophen (NORCO) 5-325 mg per tablet 180 Tab 0     Sig: Take 1 Tab by mouth every four (4) hours as needed for Pain for up to 30 days. Max Daily Amount: 6 Tabs.

## 2020-07-22 NOTE — PROGRESS NOTES
Patient: Bridget Menendez                MRN: [id number]       SSN: [id number]  YOB: 1945        AGE: 76 y.o. SEX: male  Body mass index is 29.92 kg/m². PCP: Lula Sandoval MD  07/22/20    No chief complaint on file. HISTORY:  Bridget Menendez is a 76 y.o. male who is seen for right knee pain. ICD-10-CM ICD-9-CM    1. Primary osteoarthritis of right knee  M17.11 715.16 NC DRAIN/INJECT LARGE JOINT/BURSA      EUFLEXXA INJECTION PER DOSE      sodium hyaluronate (SUPARTZ FX/HYALGAN/GENIVSC) 10 mg/mL syrg injection   2. Chronic pain of right knee  M25.561 719.46 NC DRAIN/INJECT LARGE JOINT/BURSA    G89.29 338.29 EUFLEXXA INJECTION PER DOSE      sodium hyaluronate (SUPARTZ FX/HYALGAN/GENIVSC) 10 mg/mL syrg injection       Chart reviewed for the following:   Katiana Dale MD, have reviewed the History, Physical and [facility] performed immediately prior to start of procedure:  Katiana Dale MD, have performed the following reviews on Bridget Menendez prior to the start of the procedure:            * Patient was identified by name and date of birth   * Agreement on procedure being performed was verified  * Risks and Benefits explained to the patient  * Procedure site verified and marked as necessary  * Patient was positioned for comfort  * Consent was obtained     Time: 9:27 AM    Date of procedure: 7/22/2020    Procedure performed by:  Michaela Galvez MD    Mr. Ángela Suarez tolerated the procedure well with no complications. PLAN:  After discussing treatment options, patient's right knee was injected with 2 cc of Euflexxa. Mr. Ángela Suarez will follow up in one week to complete his visco supplementation injection series.       Scribed by Johann Devine (Trent Rojo) as dictated by Michaela Galvez MD

## 2020-07-23 RX ORDER — HYDROCODONE BITARTRATE AND ACETAMINOPHEN 5; 325 MG/1; MG/1
1 TABLET ORAL
Qty: 180 TAB | Refills: 0 | Status: SHIPPED | OUTPATIENT
Start: 2020-07-23 | End: 2020-08-22

## 2020-07-29 ENCOUNTER — OFFICE VISIT (OUTPATIENT)
Dept: ORTHOPEDIC SURGERY | Age: 75
End: 2020-07-29

## 2020-07-29 VITALS
WEIGHT: 216.4 LBS | DIASTOLIC BLOOD PRESSURE: 70 MMHG | RESPIRATION RATE: 16 BRPM | BODY MASS INDEX: 29.31 KG/M2 | OXYGEN SATURATION: 98 % | TEMPERATURE: 97.1 F | SYSTOLIC BLOOD PRESSURE: 129 MMHG | HEIGHT: 72 IN | HEART RATE: 67 BPM

## 2020-07-29 DIAGNOSIS — G89.29 CHRONIC PAIN OF RIGHT KNEE: ICD-10-CM

## 2020-07-29 DIAGNOSIS — M17.11 PRIMARY OSTEOARTHRITIS OF RIGHT KNEE: Primary | ICD-10-CM

## 2020-07-29 DIAGNOSIS — M25.561 CHRONIC PAIN OF RIGHT KNEE: ICD-10-CM

## 2020-07-29 RX ORDER — HYALURONATE SODIUM 10 MG/ML
2 SYRINGE (ML) INTRAARTICULAR ONCE
Qty: 2 ML | Refills: 0
Start: 2020-07-29 | End: 2020-07-29

## 2020-07-29 NOTE — PROGRESS NOTES
Patient: Ankita Beltran                MRN: [id number]       SSN: [id number]  YOB: 1945        AGE: 76 y.o. SEX: male  Body mass index is 29.35 kg/m². PCP: Kevin Levine MD  07/29/20    Chief Complaint   Patient presents with    Knee Pain     right knee pain     HISTORY:  Ankita Beltran is a 76 y.o. male who is seen for right knee pain. TIME OUT performed immediately prior to start of procedure:  Andrea Wallace MD, have performed the following reviews on Ankita Beltran prior to the start of the procedure:            * Patient was identified by name and date of birth   * Agreement on procedure being performed was verified  * Risks and Benefits explained to the patient  * Procedure site verified and marked as necessary  * Patient was positioned for comfort  * Consent was obtained     Time: 9:04 AM    Date of procedure: 7/29/2020    Procedure performed by:  Renaldo Rahman MD    Mr. Mary Beth Hayes tolerated the procedure well with no complications      QSY-27-IE ICD-9-CM    1. Primary osteoarthritis of right knee  M17.11 715.16 UT DRAIN/INJECT LARGE JOINT/BURSA      EUFLEXXA INJECTION PER DOSE      sodium hyaluronate (SUPARTZ FX/HYALGAN/GENIVSC) 10 mg/mL syrg injection   2. Chronic pain of right knee  M25.561 719.46 UT DRAIN/INJECT LARGE JOINT/BURSA    G89.29 338.29 EUFLEXXA INJECTION PER DOSE      sodium hyaluronate (SUPARTZ FX/HYALGAN/GENIVSC) 10 mg/mL syrg injection     PLAN:  After timeout and under sterile conditions, right knee aspirated 50 cc of light yellow blood tinged fluid. The fluid was discarded. After discussing treatment options, patient's right knee was injected with 2 cc of Euflexxa. Mr. Mary Beth Hayes will follow up PRN now that he has completed his visco supplementation injection series.       Scribed by Kamille Smith ([address]) as dictated by Renaldo Rahman MD

## 2020-08-31 DIAGNOSIS — M54.12 CERVICAL RADICULOPATHY: Primary | ICD-10-CM

## 2020-08-31 RX ORDER — HYDROCODONE BITARTRATE AND ACETAMINOPHEN 5; 325 MG/1; MG/1
1 TABLET ORAL
Qty: 180 TAB | Refills: 0 | Status: SHIPPED | OUTPATIENT
Start: 2020-08-31 | End: 2020-09-30

## 2020-08-31 NOTE — TELEPHONE ENCOUNTER
[facility] Norco [facility] 7[facility]23/20 for a 30 d/s. UDS done 11/18/19  CSA signed 8/9/19    Last Visit: 5/26/20 with MD Ray Jacobson  Next Appointment: 11/24/20 with MD Ray Jacobson  Previous Refill Encounter(s): 7/23/20 #180    Requested Prescriptions     Pending Prescriptions Disp Refills    HYDROcodone-acetaminophen (NORCO) 5-325 mg per tablet 180 Tab 0     Sig: Take 1 Tab by mouth every four (4) hours as needed (chronic pain) for up to 30 days. Max Daily Amount: 6 Tabs.

## 2020-09-04 ENCOUNTER — TELEPHONE (OUTPATIENT)
Dept: VASCULAR SURGERY | Age: 75
End: 2020-09-04

## 2020-09-04 NOTE — TELEPHONE ENCOUNTER
Patient called complaining of tingling sensation from his knee down on the right leg, and the left leg has pain in the upper part. He's not scheduled to come back in until next year. Please advise.

## 2020-09-08 DIAGNOSIS — F41.9 ANXIETY: ICD-10-CM

## 2020-09-08 DIAGNOSIS — I11.9 BENIGN HYPERTENSIVE HEART DISEASE WITHOUT HEART FAILURE: ICD-10-CM

## 2020-09-09 RX ORDER — ATENOLOL 25 MG/1
TABLET ORAL
Qty: 180 TAB | Refills: 3 | Status: SHIPPED | OUTPATIENT
Start: 2020-09-09 | End: 2020-10-27

## 2020-09-10 ENCOUNTER — TELEPHONE (OUTPATIENT)
Dept: INTERNAL MEDICINE CLINIC | Age: 75
End: 2020-09-10

## 2020-09-10 NOTE — TELEPHONE ENCOUNTER
Pt states Dr Lori Carmichael stopped the Atenolol back in August so don't refill      Pt also needs a refill of Meclizine, last filled in 2017 he said

## 2020-09-10 NOTE — TELEPHONE ENCOUNTER
[facility] Ativan as 5/14/20 for a 16 d/s. Last Visit: 5/26/20 with MD Chance Cordon  Next Appointment: 11/24/20 with MD Chance Cordon  Previous Refill Encounter(s): 5/9/17 Antivert #30 with 1 refill, 5/14/20 Ativan #50    Requested Prescriptions     Pending Prescriptions Disp Refills    LORazepam (Ativan) 1 mg tablet 50 Tab 0     Sig: Take 1 Tab by mouth every eight (8) hours as needed for Anxiety.  meclizine (ANTIVERT) 25 mg tablet 30 Tab 1     Sig: Take 1 Tab by mouth three (3) times daily as needed for Dizziness for up to 10 days.      Signed Prescriptions Disp Refills    atenoloL (TENORMIN) 25 mg tablet 180 Tab 3     Sig: TAKE ONE TABLET BY MOUTH TWICE A DAY     Authorizing Provider: Ace Allen

## 2020-09-10 NOTE — TELEPHONE ENCOUNTER
Pt wanted to let RD know that he went to [facility] ER with high BP back in August and Dr Jana Chacon changed some of his meds, he is no longer taking ATenolo and now taking Cardevol(?)    Also went back to ER 9/10/20 with severe neck pain and is being referred to a neurologist, appt not yet scheduled.

## 2020-09-13 RX ORDER — MECLIZINE HYDROCHLORIDE 25 MG/1
25 TABLET ORAL
Qty: 30 TAB | Refills: 1 | Status: SHIPPED | OUTPATIENT
Start: 2020-09-13 | End: 2020-09-23

## 2020-09-13 RX ORDER — LORAZEPAM 1 MG/1
1 TABLET ORAL
Qty: 50 TAB | Refills: 0 | Status: SHIPPED | OUTPATIENT
Start: 2020-09-13 | End: 2021-03-01 | Stop reason: SDUPTHER

## 2020-09-23 RX ORDER — EZETIMIBE AND SIMVASTATIN 10; 40 MG/1; MG/1
TABLET ORAL
Qty: 90 TAB | Refills: 3 | Status: SHIPPED | OUTPATIENT
Start: 2020-09-23 | End: 2022-02-02

## 2020-10-07 ENCOUNTER — OFFICE VISIT (OUTPATIENT)
Dept: VASCULAR SURGERY | Age: 75
End: 2020-10-07
Payer: MEDICARE

## 2020-10-07 VITALS
HEART RATE: 57 BPM | OXYGEN SATURATION: 96 % | DIASTOLIC BLOOD PRESSURE: 70 MMHG | RESPIRATION RATE: 20 BRPM | SYSTOLIC BLOOD PRESSURE: 110 MMHG

## 2020-10-07 DIAGNOSIS — I70.219 ATHEROSCLEROSIS OF ARTERY OF EXTREMITY WITH INTERMITTENT CLAUDICATION (HCC): Primary | ICD-10-CM

## 2020-10-07 DIAGNOSIS — I74.09 AORTOILIAC OCCLUSIVE DISEASE (HCC): ICD-10-CM

## 2020-10-07 PROCEDURE — G8427 DOCREV CUR MEDS BY ELIG CLIN: HCPCS | Performed by: SURGERY

## 2020-10-07 PROCEDURE — 3017F COLORECTAL CA SCREEN DOC REV: CPT | Performed by: SURGERY

## 2020-10-07 PROCEDURE — 99212 OFFICE O/P EST SF 10 MIN: CPT | Performed by: SURGERY

## 2020-10-07 PROCEDURE — G8510 SCR DEP NEG, NO PLAN REQD: HCPCS | Performed by: SURGERY

## 2020-10-07 PROCEDURE — G8417 CALC BMI ABV UP PARAM F/U: HCPCS | Performed by: SURGERY

## 2020-10-07 PROCEDURE — 1101F PT FALLS ASSESS-DOCD LE1/YR: CPT | Performed by: SURGERY

## 2020-10-07 PROCEDURE — G8536 NO DOC ELDER MAL SCRN: HCPCS | Performed by: SURGERY

## 2020-10-07 RX ORDER — CARVEDILOL 25 MG/1
25 TABLET ORAL 2 TIMES DAILY WITH MEALS
COMMUNITY
End: 2021-09-03

## 2020-10-07 NOTE — PROGRESS NOTES
Rowena Adams    Chief Complaint   Patient presents with    Leg Pain    Carotid Artery Stenosis       History and Physical    Mr. Gwendolyn Sutherland returns to our office today before schedule appointment because of new onset bilateral lower extremity leg numbness and foot numbness. He states recently past few weeks he developing numbness in his legs and his feet that he did not have prior. He states that he noticed this began to occur after an adjustment of his blood pressure medications. He states he also has a history of chronic back pain is noticed that his back is been bothering him more recently. He also states that when he is mowing his lawn recently begins to develop some cramping in the back of his right calf that caused him to stop and rest for about 2 minutes before able to resume mowing his lawn again. He states that her after he had his surgery with Dr. Latosha Hays he did not have this cramping any longer. He denies any ulcerations or significant leg pain at this time.       Past Medical History:   Diagnosis Date    Adrenal adenoma 2009    LEFT 1 cm, no change 1/15, 2/16    Asbestosis     CT 1/19 showed pleural plaques    Atrial fibrillation     CHA2DS2-VAsc=3(age+, htn+, vasc dx+; estimated yearly stroke risk according to Lip et al. is 3,2%), Hasbled=2(estimated yearly bleeding risk according to pisters et al. is 1,88%); not anticoagulated due to h/o gi bleed    Atrial fibrillation ablation     10/08    Basal cell cancer     Dr Tracy Card; he's had >350 lesions removed    Carotid disease, bilateral ([address]     Cervical radiculopathy     MRI 9/11 showed C3-6 severe foraminal stenosis    Chronic pain     myofascial pain syndrome; seen in pain clinic in past    Colon polyp     Dr Willian Huddleston 9/15    Coronary artery disease     RCA - 3.0 x 16mm TAXUS 9/04, 3.0 x 16mm TAXUS 12/06    Dyslipidemia     Erectile dysfunction     GERD     GI bleed     Hearing loss 2014    Dr Gopi Gonzalez, umbilical     Hypertension     with component of white coat    Hypogonadism male     IFG (impaired fasting glucose) [id number]    Lumbar radiculopathy     Dr Donis Bess;  MRI 9/11 L4-5 disc bulging, annular tear, disc dessication    OAB (overactive bladder)     Osteoarthritis     Dr Stefania Ahmadi Overweight (BMI 25.0-29. 9)     IF 5/18 start weight 214 lbs, not doing    Peripheral vascular disease     50-60% R iliac; s/p R iliac stent and L femoral artery stent in past; R OLIVIA 0.76 (1/16)    Plantar fasciitis     bilateral     PPD positive     Prostate cancer     T1c Imelda 7(3+4), 70% in 1 core, GS 7 (3+4) in 4 cores, GS 6 (3+3) in 1 core; psa 5.28, TRUS 18 gm;  Dr Nataliia Guillen; s/p cryoablation 10/16    Recurrent umbilical hernia     Subclinical hypothyroidism     Tinnitus     Dr Ramesh Willard Ulcerative colitis     Venous insufficiency      Patient Active Problem List   Diagnosis Code    Colitis, ulcerative ([facility] [address] K51.90    Colon polyps K63.5    Left carotid artery occlusion I65.22    Hypovitaminosis D E55.9    Advance directive in chart Z78.9    Hypertension I11.9    Dyslipidemia E78.5    Coronary artery disease I25.10    Atrial fibrillation I48.91    Recurrent umbilical hernia O73.0    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 9) E66.3    History of prostate cancer Z85.46    Facet hypertrophy of lumbar region M47.816    Aortoiliac occlusive disease (HCC) I74.09    PAD (peripheral artery disease) (HCC) I73.9     Past Surgical History:   Procedure Laterality Date    CARDIAC SURG PROCEDURE UNLIST  04/2018    [address] Berna Mccollum; echo nl lv, ef 60%, dilated RV, biatrial enlargement, trace AI    CARDIAC SURG PROCEDURE UNLIST  04/2018    [address] [facility] [address] Dank Barahona; NST neg, ef 60%    HAND/FINGER SURGERY UNLISTED  2017    HX CAROTID ENDARTERECTOMY  01/2014    RIGHT    Fabian Aw HX COLONOSCOPY      Dr Chace Elmore 9/15 polyps    HX CRYOABLATION OF THE PROSTATE      10/16    HX HEMORRHOIDECTOMY      1979    [facility], 1975    HX ORTHOPAEDIC      RIGHT knee surgery    HX ORTHOPAEDIC  12/2017    Dr Hall Never; RIGHT CTS release    HX REFRACTIVE SURGERY      OD (hemoplasty to right eye on retina to avoid blindness)    HX TONSILLECTOMY      1955    [place] LAP, RECURRENT INCISIONAL HERNIA REPAIR,REDUCIBLE N/A 02/09/2017    Dr. Brayan Small HERNIA,5+Y/O,REDUCIBL  02/2016    Dr Teetee Barron VFZE,3+U/Y,DBYRP  02/2016    Dr. Melinda Persaud      1/16  R OLIVIA 0.76, L OLIVIA 1.02    VASCULAR SURGERY PROCEDURE UNLIST      LEFT fem artery and iliac stents (10/15); RCF endarterectomy w patch angioplasty/B CI artery stenting (7/19)     Current Outpatient Medications   Medication Sig Dispense Refill    carvediloL (Coreg) 25 mg tablet Take 25 mg by mouth two (2) times daily (with meals).  ezetimibe-simvastatin (VYTORIN) 10-40 mg per tablet TAKE 1 TABLET NIGHTLY 90 Tab 3    LORazepam (Ativan) 1 mg tablet Take 1 Tab by mouth every eight (8) hours as needed for Anxiety. 50 Tab 0    diclofenac (Voltaren) 1 % gel Apply 4 g to affected area four (4) times daily. Right knee 5 Each 5    gabapentin (NEURONTIN) 300 mg capsule 1 cap QHS  Indications: neuropathic pain 90 Cap 1    icosapent ethyL (VASCEPA) 1 gram capsule Take 2 Caps by mouth two (2) times daily (with meals). 180 Cap 3    diclofenac (VOLTAREN) 1 % gel Apply 2-4 g to affected area daily as needed (Take 2-4 grams as needed for pain). 200 g 2    clopidogrel (PLAVIX) 75 mg tab TAKE ONE TABLET BY MOUTH DAILY 90 Tab 5    colestipol (COLESTID) 1 gram tablet 1 g daily as needed.       losartan (COZAAR) 25 mg tablet TAKE ONE TABLET BY MOUTH TWICE A DAY (Patient taking differently: 50 mg.) 180 Tab 2    aspirin delayed-release 81 mg tablet Take 81 mg by mouth daily.  triamterene-hydrochlorothiazide (MAXZIDE) 37.5-25 mg per tablet TAKE ONE TABLET BY MOUTH DAILY 90 Tab 3    Cholecalciferol, Vitamin D3, 1,000 unit cap Take 1,000 Units by mouth daily.  MULTIVITAMIN PO Take  by mouth daily.  pantoprazole (PROTONIX) 40 mg tablet Take 40 mg by mouth daily.  coenzyme q10 200 mg Cap Take  by mouth daily.       atenoloL (TENORMIN) 25 mg tablet TAKE ONE TABLET BY MOUTH TWICE A  Tab 3    methylPREDNISolone (Medrol, Ephraim,) 4 mg tablet Per dose pack instructions 1 Dose Pack 0     Allergies   Allergen Reactions    Altace [Ramipril] Unknown (comments)     Pt does not remember reaction    Lipitor [Atorvastatin] Other (comments)     Muscle pain    Lisinopril Shortness of Breath and Other (comments)     Turns bright red    Other Medication Other (comments)     Vicryl suture on skin tends to be rejected with poor wound healing does better with monocryl    Procardia [Nifedipine] Other (comments)     Caused afib    Rosuvastatin Other (comments)     Muscle Pain       Social History     Socioeconomic History    Marital status:      Spouse name: Not on file    Number of children: Not on file    Years of education: Not on file    Highest education level: Not on file   Occupational History    Not on file   Social Needs    Financial resource strain: Not on file    Food insecurity     Worry: Not on file     Inability: Not on file    Transportation needs     Medical: Not on file     Non-medical: Not on file   Tobacco Use    Smoking status: Former Smoker     Packs/day: 1.50     Years: 25.00     Pack years: 37.50     Types: Cigarettes     Last attempt to quit: 2003     Years since quittin.7    Smokeless tobacco: Never Used   Substance and Sexual Activity    Alcohol use: Yes     Comment: RARELY  Drug use: No    Sexual activity: Yes     Partners: Female     Birth control/protection: None   Lifestyle    Physical activity     Days per week: Not on file     Minutes per session: Not on file    Stress: Not on file   Relationships    Social connections     Talks on phone: Not on file     Gets together: Not on file     Attends Faith service: Not on file     Active member of club or organization: Not on file     Attends meetings of clubs or organizations: Not on file     Relationship status: Not on file    Intimate partner violence     Fear of current or ex partner: Not on file     Emotionally abused: Not on file     Physically abused: Not on file     Forced sexual activity: Not on file   Other Topics Concern    Not on file   Social History Narrative    Not on file      Family History   Problem Relation Age of Onset    Heart Disease Mother     Hypertension Mother     Diabetes Mother     Arthritis-osteo Mother     Heart Disease Father     Stroke Father     Hypertension Father     Heart Disease Sister     Hypertension Sister        Review of Systems    Review of Systems   Constitutional: Negative for chills, diaphoresis, fever, malaise/fatigue and weight loss. HENT: Negative for hearing loss and sore throat. Eyes: Negative for blurred vision, photophobia and redness. Respiratory: Negative for cough, hemoptysis, shortness of breath and wheezing. Cardiovascular: Positive for claudication. Negative for chest pain, palpitations and orthopnea. Gastrointestinal: Negative for abdominal pain, blood in stool, constipation, diarrhea, heartburn, nausea and vomiting. Genitourinary: Negative for dysuria, frequency, hematuria and urgency. Musculoskeletal: Positive for back pain. Negative for myalgias. Skin: Negative for itching and rash. Neurological: Positive for speech change. Negative for dizziness, focal weakness, weakness and headaches.    Endo/Heme/Allergies: Does not bruise/bleed easily. Psychiatric/Behavioral: Negative for depression and suicidal ideas. Physical Exam:    Visit Vitals  /70 (BP 1 Location: Left arm, BP Patient Position: Sitting)   Pulse (!) 57   Resp 20   SpO2 96%      Physical Examination: General appearance - alert, well appearing, and in no distress  Mental status - alert, oriented to person, place, and time  Eyes - sclera anicteric, left eye normal, right eye normal  Ears - right ear normal, left ear normal  Nose - normal and patent, no erythema, discharge or polyps  Mouth - mucous membranes moist, pharynx normal without lesions  Extremities -bilateral lower extremities warm well perfused. Palpable dorsalis pedis pulse bilaterally. Biphasic signal dorsalis pedis. Impression and Plan:    ICD-10-CM ICD-9-CM    1. Atherosclerosis of artery of extremity with intermittent claudication (HCC)  I70.219 440.21 DUPLEX LOWER EXT ARTERY RIGHT   2. Aortoiliac occlusive disease (HCC)  I74.09 444.09 DUPLEX AORTA ILIAC GRAFT COMPLETE     Orders Placed This Encounter    carvediloL (Coreg) 25 mg tablet     I reviewed Mr. Collette's OLIVIA the toe pressure results with him. His ABIs are unreliable due to medial calcinosis but his toe pressures are in the normal range bilaterally. This in conjunction with his palpable pulses rule out arterial insufficiency as a cause of his foot and leg numbness. Is most like related to neuropathy. His cramping with mowing his lawn may in fact be related to peripheral arterial disease however we do not evaluate his stent as this was an evaluation prior to his regular scheduled appointment due to new onset of symptoms. We need to evaluate his stents in his right lower extremity circulation with a aortoiliac duplex in the right lower extremity ultrasound.   We will obtain both of these and I will bring him back in 2 weeks to go over these results to see if he is got a new stenosis developing in 1 of his stents or in his right lower extremity. I assured him that at this time he has adequate perfusion to not worry about ischemia or gangrene. The treatment plan was reviewed with the patient in detail. The patient voiced understanding of this plan and all questions and concerns were addressed. The patient agrees with this plan. We discussed the signs and symptoms that would require earlier attention or intervention. The patient was given educational material related to his/her visit and the patient has voiced understanding of the material.     I appreciate the opportunity to participate in the care of your patient. I will be sure to keep you informed of any subsequent changes in the treatment plan. If you have any questions or concerns, please feel free to contact me. Slime Diaz MD    PLEASE NOTE:  This document has been produced using voice recognition software. Unrecognized errors in transcription may be present.

## 2020-10-07 NOTE — PROGRESS NOTES
1. Have you been to the ER, urgent care clinic since your last visit? Hospitalized since your last visit? Yes Reason for visit: [facility] emergency dept x 2    2. Have you seen or consulted any other health care providers outside of the [address] [place] [address] since your last visit? Include any pap smears or colon screening.  Yes Reason for visit: gastroenterology, emergency dept physicians , cardiology         3 most recent [facility] 10/7/2020   PHQ Not Done -   Little interest or pleasure in doing things Not at all   Feeling down, depressed, irritable, or hopeless Not at all   Total Score PHQ 2 0

## 2020-10-12 DIAGNOSIS — M54.12 CERVICAL RADICULOPATHY: Primary | ICD-10-CM

## 2020-10-12 RX ORDER — HYDROCODONE BITARTRATE AND ACETAMINOPHEN 5; 325 MG/1; MG/1
1 TABLET ORAL
Qty: 180 TAB | Refills: 0 | Status: SHIPPED | OUTPATIENT
Start: 2020-10-12 | End: 2020-10-27

## 2020-10-12 NOTE — TELEPHONE ENCOUNTER
[facility] Norco [facility] 8[facility]31/20 for a 30 d/s. UDS done 11/18/19  CSA signed 8/9/19    Last Visit: 5/26/20 with MD Alfonso Nguyen  Next Appointment: 11/24/20 with MD Alfonso Nguyen  Previous Refill Encounter(s): 8/31/20 #180    Requested Prescriptions     Pending Prescriptions Disp Refills    HYDROcodone-acetaminophen (NORCO) 5-325 mg per tablet 180 Tab 0     Sig: Take 1 Tab by mouth every four (4) hours as needed (chronic pain) for up to 30 days. Max Daily Amount: 6 Tabs.

## 2020-10-21 DIAGNOSIS — I70.219 ATHEROSCLEROSIS OF ARTERY OF EXTREMITY WITH INTERMITTENT CLAUDICATION (HCC): ICD-10-CM

## 2020-10-21 DIAGNOSIS — I74.09 AORTOILIAC OCCLUSIVE DISEASE (HCC): ICD-10-CM

## 2020-10-23 ENCOUNTER — OFFICE VISIT (OUTPATIENT)
Dept: ORTHOPEDIC SURGERY | Age: 75
End: 2020-10-23
Payer: MEDICARE

## 2020-10-23 VITALS
HEIGHT: 72 IN | TEMPERATURE: 97.3 F | DIASTOLIC BLOOD PRESSURE: 60 MMHG | HEART RATE: 79 BPM | WEIGHT: 222.6 LBS | RESPIRATION RATE: 14 BRPM | SYSTOLIC BLOOD PRESSURE: 107 MMHG | OXYGEN SATURATION: 97 % | BODY MASS INDEX: 30.15 KG/M2

## 2020-10-23 DIAGNOSIS — G56.22 CUBITAL TUNNEL SYNDROME, LEFT: ICD-10-CM

## 2020-10-23 DIAGNOSIS — M65.341 TRIGGER RING FINGER OF RIGHT HAND: ICD-10-CM

## 2020-10-23 DIAGNOSIS — M65.322 TRIGGER INDEX FINGER OF LEFT HAND: ICD-10-CM

## 2020-10-23 DIAGNOSIS — M65.342 TRIGGER RING FINGER OF LEFT HAND: ICD-10-CM

## 2020-10-23 DIAGNOSIS — G56.02 CARPAL TUNNEL SYNDROME ON LEFT: Primary | ICD-10-CM

## 2020-10-23 PROCEDURE — G8417 CALC BMI ABV UP PARAM F/U: HCPCS | Performed by: ORTHOPAEDIC SURGERY

## 2020-10-23 PROCEDURE — 20550 NJX 1 TENDON SHEATH/LIGAMENT: CPT | Performed by: ORTHOPAEDIC SURGERY

## 2020-10-23 PROCEDURE — 3017F COLORECTAL CA SCREEN DOC REV: CPT | Performed by: ORTHOPAEDIC SURGERY

## 2020-10-23 PROCEDURE — G8510 SCR DEP NEG, NO PLAN REQD: HCPCS | Performed by: ORTHOPAEDIC SURGERY

## 2020-10-23 PROCEDURE — 1101F PT FALLS ASSESS-DOCD LE1/YR: CPT | Performed by: ORTHOPAEDIC SURGERY

## 2020-10-23 PROCEDURE — G8536 NO DOC ELDER MAL SCRN: HCPCS | Performed by: ORTHOPAEDIC SURGERY

## 2020-10-23 PROCEDURE — 99214 OFFICE O/P EST MOD 30 MIN: CPT | Performed by: ORTHOPAEDIC SURGERY

## 2020-10-23 PROCEDURE — G8427 DOCREV CUR MEDS BY ELIG CLIN: HCPCS | Performed by: ORTHOPAEDIC SURGERY

## 2020-10-23 PROCEDURE — 20526 THER INJECTION CARP TUNNEL: CPT | Performed by: ORTHOPAEDIC SURGERY

## 2020-10-23 RX ORDER — LOSARTAN POTASSIUM 50 MG/1
50 TABLET ORAL 2 TIMES DAILY
COMMUNITY
Start: 2020-08-25 | End: 2021-04-16

## 2020-10-23 NOTE — PROGRESS NOTES
Daniella Day is a 76 y.o. male right handed retiree. Worker's Compensation and legal considerations: not known. Vitals:    10/23/20 1344   BP: 107/60   Pulse: 79   Resp: 14   Temp: 97.3 °F (36.3 °C)   TempSrc: Temporal   SpO2: 97%   Weight: 222 lb 9.6 oz (101 kg)   Height: 6' (1.829 m)   PainSc:   8   PainLoc: Hand           Chief Complaint   Patient presents with    Hand Pain     bilateral    Follow-up         HPI: Patient comes in today for follow-up of his trigger fingers and carpal tunnel. He has a history of left carpal tunnel and left cubital tunnel. He previously had injections for trigger fingers. He reports to be having problem with his left ring and index finger locking as well as his right ring finger. He also reports numbness on the left side. He is still unsure when he can have surgery due to being on Plavix from his vascular surgeon. Previous HPI:  Returns requesting injections for Left CT and RIght Ring Trigger Finger. He is on plavix for the forseeable future and cannot get off yet so will have to delay surgery. Initial HPI: Patient comes in today as a referral from my partner. 1 month ago he had a left carpal tunnel injection as well as a ring finger trigger finger injection. He says the numbness and tingling in the left is improved. However he says the ring finger is still locking up. He reports having an EMG many years ago. He also has a history of a right sided carpal tunnel release by Lynnie Homans in December 2017. He also has a history of cervical spine arthritis that he has been seen at the spine center for. He reports a one-week history of burning and pain in the index finger at the level of the palm. He denies any injuries to this area. Date of onset:  Many years worsening since 2018 regarding the carpal tunnel syndrome on the left    Injury: No    Prior Treatment:  Yes: Comment: History of left carpal tunnel injections and multiple trigger finger injections    Numbness/ Tingling: Yes: Comment: Thumb index middle ring and small fingers on the left    ROS: Review of Systems - General ROS: negative  Respiratory ROS: no cough, shortness of breath, or wheezing  Cardiovascular ROS: no chest pain or dyspnea on exertion  Musculoskeletal ROS: positive for - pain in hand - bilateral  Neurological ROS: positive for - numbness/tingling  Dermatological ROS: negative    Past Medical History:   Diagnosis Date    Adrenal adenoma 2009    LEFT 1 cm, no change 1/15, 2/16    Asbestosis     CT 1/19 showed pleural plaques    Atrial fibrillation     CHA2DS2-VAsc=3(age+, htn+, vasc dx+; estimated yearly stroke risk according to Lip et al. is 3,2%), Hasbled=2(estimated yearly bleeding risk according to pisters et al. is 1,88%); not anticoagulated due to h/o gi bleed    Atrial fibrillation ablation     10/08    Basal cell cancer     Dr Fanta Reis; he's had >350 lesions removed    Carotid disease, bilateral ([address]     Cervical radiculopathy     MRI 9/11 showed C3-6 severe foraminal stenosis    Chronic pain     myofascial pain syndrome; seen in pain clinic in past    Colon polyp     [facility] 9/15    Coronary artery disease     RCA - 3.0 x 16mm TAXUS 9/04, 3.0 x 16mm TAXUS 12/06    Dyslipidemia     Erectile dysfunction     GERD     GI bleed     Hearing loss 2014    Dr Fernandez Santos, umbilical     Hypertension     with component of white coat    Hypogonadism male     IFG (impaired fasting glucose) [id number]    Lumbar radiculopathy     Dr Kennedy Nelson;  MRI 9/11 L4-5 disc bulging, annular tear, disc dessication    OAB (overactive bladder)     Osteoarthritis     Dr Giovanni Juares Overweight (BMI 25.0-29. 9)     IF 5/18 start weight 214 lbs, not doing    Peripheral vascular disease     50-60% R iliac; s/p R iliac stent and L femoral artery stent in past; R OLIVIA 0.76 (1/16)    Plantar fasciitis     bilateral     PPD positive     Prostate cancer     T1c Imelda 7(3+4), 70% in 1 core, GS 7 (3+4) in 4 cores, GS 6 (3+3) in 1 core; psa 5.28, TRUS 18 gm;  Dr Ellyn Rain; s/p cryoablation 10/16    Recurrent umbilical hernia     Subclinical hypothyroidism     Tinnitus     Dr Julia Hebert Ulcerative colitis     Venous insufficiency        Past Surgical History:   Procedure Laterality Date    CARDIAC SURG PROCEDURE UNLIST  04/2018    [facility] Dr Keli Anderson; echo nl lv, ef 60%, dilated RV, biatrial enlargement, trace AI    CARDIAC SURG PROCEDURE UNLIST  04/2018    [facility] Dr Keli Anderson; NST neg, ef 60%    HAND/FINGER SURGERY UNLISTED  2017    HX CAROTID ENDARTERECTOMY  01/2014    RIGHT    Dakota Cheek      Dr Ellyn Rain 9/15 polyps    HX CRYOABLATION OF THE PROSTATE      10/16    HX [address], 1975    HX ORTHOPAEDIC      RIGHT knee surgery    HX ORTHOPAEDIC  12/2017    Dr Inocente Fu; RIGHT CTS release    HX REFRACTIVE SURGERY      OD (hemoplasty to right eye on retina to avoid blindness)    HX TONSILLECTOMY      1955    SC LAP, RECURRENT INCISIONAL HERNIA REPAIR,REDUCIBLE N/A 02/09/2017    Dr. Hess Largo HERNIA,5+Y/O,REDUCIBL  02/2016    Dr Jhony Ceron AQGA,6+S/M,ZONSX  02/2016    Dr. Barak Lara      1/16  R OLIVIA 0.76, L OLIVIA 1.02    VASCULAR SURGERY PROCEDURE UNLIST      LEFT fem artery and iliac stents (10/15); RCF endarterectomy w patch angioplasty/B CI artery stenting (7/19)       Current Outpatient Medications   Medication Sig Dispense Refill    losartan (COZAAR) 50 mg tablet Take 50 mg by mouth two (2) times a day.  triamcinolone acetonide (KENALOG) 10 mg/mL injection 1 mL by Intra artICUlar route once for 1 dose. 1 Vial 0    HYDROcodone-acetaminophen (NORCO) 5-325 mg per tablet Take 1 Tab by mouth every four (4) hours as needed (chronic pain) for up to 30 days. Max Daily Amount: 6 Tabs.  180 Tab 0    carvediloL (Coreg) 25 mg tablet Take 25 mg by mouth two (2) times daily (with meals).  ezetimibe-simvastatin (VYTORIN) 10-40 mg per tablet TAKE 1 TABLET NIGHTLY 90 Tab 3    LORazepam (Ativan) 1 mg tablet Take 1 Tab by mouth every eight (8) hours as needed for Anxiety. 50 Tab 0    diclofenac (Voltaren) 1 % gel Apply 4 g to affected area four (4) times daily. Right knee 5 Each 5    gabapentin (NEURONTIN) 300 mg capsule 1 cap QHS  Indications: neuropathic pain 90 Cap 1    icosapent ethyL (VASCEPA) 1 gram capsule Take 2 Caps by mouth two (2) times daily (with meals). 180 Cap 3    clopidogrel (PLAVIX) 75 mg tab TAKE ONE TABLET BY MOUTH DAILY 90 Tab 5    colestipol (COLESTID) 1 gram tablet 1 g daily as needed.  aspirin delayed-release 81 mg tablet Take 81 mg by mouth daily.  triamterene-hydrochlorothiazide (MAXZIDE) 37.5-25 mg per tablet TAKE ONE TABLET BY MOUTH DAILY 90 Tab 3    Cholecalciferol, Vitamin D3, 1,000 unit cap Take 1,000 Units by mouth daily.  MULTIVITAMIN PO Take  by mouth daily.  pantoprazole (PROTONIX) 40 mg tablet Take 40 mg by mouth daily.  coenzyme q10 200 mg Cap Take  by mouth daily.  atenoloL (TENORMIN) 25 mg tablet TAKE ONE TABLET BY MOUTH TWICE A DAY (Patient not taking: Reported on 10/23/2020) 180 Tab 3       Allergies   Allergen Reactions    Altace [Ramipril] Unknown (comments)     Pt does not remember reaction    Lipitor [Atorvastatin] Other (comments)     Muscle pain    Lisinopril Shortness of Breath and Other (comments)     Turns bright red    Other Medication Other (comments)     Vicryl suture on skin tends to be rejected with poor wound healing does better with monocryl    Procardia [Nifedipine] Other (comments)     Caused afib    Rosuvastatin Other (comments)     Muscle Pain           PE:     Physical Exam  Constitutional:       General: He is not in acute distress. Appearance: Normal appearance. He is normal weight. He is not ill-appearing or toxic-appearing.    Eyes:      Pupils: Pupils are equal, round, and reactive to light. Neck:      Musculoskeletal: Normal range of motion. Cardiovascular:      Pulses: Normal pulses. Pulmonary:      Effort: Pulmonary effort is normal. No respiratory distress. Abdominal:      General: Abdomen is flat. Musculoskeletal: Normal range of motion. General: Swelling and tenderness present. Skin:     General: Skin is warm and dry. Capillary Refill: Capillary refill takes less than 2 seconds. Neurological:      General: No focal deficit present. Mental Status: He is alert and oriented to person, place, and time. Sensory: Sensory deficit present. Psychiatric:         Mood and Affect: Mood normal.         Behavior: Behavior normal.         NEUROVASCULAR    Examination L R Examination L R   Carpal Comp. + - Pronator Comp. - -   Carpal Tinel + - Pronator Tinel - -   Phalen's + - Pronator Stress - -   Cubital Comp. - - Guyon Comp. - -   Cubital Tinel - - Guyon Tinel - -   Elbow Hyperflexion - - Adson's - -   Spurling's - - SC Comp. - -   PCB Median abn - - SC Tinel - -   Radial Tinel - - IC Comp. - -   Digital Tinel - - IC Tinel - -   Radial 2-Pt WNL WNL Ulnar 2-Pt WNL WNL     Radial Pulse: 2+  Capillary Refill: < 2 sec  Robert: Not Performed  Digital Robert: Not Performed    Hand:    Examination L Digit(s) R Digit(s)   1st CMC Tenderness -  -    1st CMC Grind -  -    Pennie Nodes -  -    Heberden Nodes -  -    A1 Pulley Tenderness + RF, IF + RF   Triggering + RF, IF + RF   UCL Instability -  -    RCL Instability -  -    Lateral Stress Pain -  -    Palmar Cords -  -    Tabletop test -  -    Garrod's Pads -  -     Strength       Pinch Strength         ROM: Full      Imaging:     Plain films of the left ring finger are negative for any acute fracture dislocation. There are minimal degenerative changes.     2017 MRI of cervical spine  IMPRESSION:     1. Multilevel degenerative findings of cervical spine.  -Facet arthropathy more severe than disc pathology. -Multilevel severe foraminal stenoses from facet arthropathy as delineated  above. Similar distribution previously.  -No high-grade spinal stenosis. NCV & EMG Findings:  Evaluation of the left median motor nerve showed prolonged distal onset latency (5.2 ms) and decreased conduction velocity (Elbow-Wrist, 44 m/s). The left ulnar motor nerve showed decreased conduction velocity (A Elbow-B Elbow, 48 m/s). The left median sensory nerve showed prolonged distal peak latency (4.4 ms), reduced amplitude (5.0 µV), and decreased conduction velocity (Wrist-2nd Digit, 32 m/s). The left ulnar sensory nerve showed prolonged distal peak latency (3.9 ms), reduced amplitude (5.5 µV), and decreased conduction velocity (Wrist-5th Digit, 36 m/s). All remaining nerves (as indicated in the following tables) were within normal limits.       Needle evaluation of the left triceps muscle showed increased motor unit amplitude and slightly increased polyphasic potentials. The left pronator teres and the left Ext Digitorum muscles showed slightly increased polyphasic potentials. All remaining muscles (as indicated in the following table) showed no evidence of electrical instability.       INTERPRETATION  This is an abnormal electrodiagnostic examination. These findings may be consistent with:  1. 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 moderate carpal tunnel syndrome at the left wrist (carpal tunnel syndrome)  3. chronic C6/7 cervical radiculopathy on the left  4. mild signs of peripheral neuropathy      CLINICAL INTERPRETATION  His symptoms may multifactorial, related to any of these diagnoses, but most likely the carpal tunnel syndrome. ICD-10-CM ICD-9-CM    1. Carpal tunnel syndrome on left  G56.02 354.0 TRIAMCINOLONE ACETONIDE INJ      triamcinolone acetonide (KENALOG) 10 mg/mL injection      INJECT CARPAL TUNNEL   2.  Trigger ring finger of left hand  M65.342 727.03 TRIAMCINOLONE ACETONIDE INJ      triamcinolone acetonide (KENALOG) 10 mg/mL injection      INJECT TENDON SHEATH/LIGAMENT   3. Trigger ring finger of right hand  M65.341 727.03 TRIAMCINOLONE ACETONIDE INJ      triamcinolone acetonide (KENALOG) 10 mg/mL injection      INJECT TENDON SHEATH/LIGAMENT   4. Trigger index finger of left hand  M65.322 727.03 TRIAMCINOLONE ACETONIDE INJ      triamcinolone acetonide (KENALOG) 10 mg/mL injection      INJECT TENDON SHEATH/LIGAMENT   5. Cubital tunnel syndrome, left  G56.22 354.2        Plan:     Left carpal tunnel injection, bilateral ring finger A1 pulley injections, and left index finger A1 pulley injection    Follow-up and Dispositions    · Return in about 3 months (around 1/23/2021) for Reevaluation and possible surgical discussion. [facility]  OFFICE PROCEDURE PROGRESS NOTE        Chart reviewed for the following:   Ascencion YODER DO, have reviewed the History, Physical and updated the Allergic reactions for [address] performed immediately prior to start of procedure:   Ascencion YODER DO, have performed the following reviews on [facility] prior to the start of the procedure:            * Patient was identified by name and date of birth   * Agreement on procedure being performed was verified  * Risks and Benefits explained to the patient  * Procedure site verified and marked as necessary  * Patient was positioned for comfort  * Consent was signed and verified     Time: 14:22      Date of procedure: 10/23/2020    Procedure performed by: Maria T Rosario DO    Provider assisted by: Jovani Blas LPN    Patient assisted by: self    How tolerated by patient: tolerated the procedure well with no complications    Post Procedural Pain Scale: 0 - No Hurt    Comments: none    Procedure:  After consent was obtained, using sterile technique the carpal tunnel was prepped. Local anesthetic used: 1% lidocaine. Kenalog 5 mg X4 and was then injected and the needle withdrawn. The procedure was well tolerated. The patient is asked to continue to rest the area for a few more days before resuming regular activities. It may be more painful for the first 1-2 days. Watch for fever, or increased swelling or persistent pain in the joint. Call or return to clinic prn if such symptoms occur or there is failure to improve as anticipated.     Plan was reviewed with patient, who verbalized agreement and understanding of the plan

## 2020-10-29 ENCOUNTER — TELEPHONE (OUTPATIENT)
Dept: VASCULAR SURGERY | Age: 75
End: 2020-10-29

## 2020-10-29 NOTE — TELEPHONE ENCOUNTER
Alonso Keyes called from [facility] about his surgery clearance to hold his Plavix for 5 days, he is sched for 11/4/2020

## 2020-10-29 NOTE — TELEPHONE ENCOUNTER
Called and made tarah aware that pt is having studies on 11/03 and follow up on 11/09 with Felicia Arenas and possible angio after that , tarah will move out appt for colonoscopy until vascular issue is resolved.

## 2020-11-03 LAB
ABDOMINAL DIST AORTA VEL: 68.7 CM/S
ABDOMINAL MID AORTA VEL: 73.6 CM/S
ABDOMINAL PROX AORTA VEL: 103.4 CM/S
CELIAC PSV: 163.6 CM/S
DIST AORTIC AP: 2.09 CM
DIST AORTIC TRANS: 2.12 CM
LEFT DIST OUTFLOW PSV: 193.8 CM/S
LEFT EXT ILIAC DIST VEL RATIO: 0.98
LEFT EXT ILIAC DIST VEL: 140.8 CM/S
LEFT EXT ILIAC MID VEL: 144.1 CM/S
LEFT GRAFT 1 NAME: NORMAL
LEFT INFLOW ARTERY PSV: 68.7 CM/S
LEFT MID OUTFLOW PSV: 148.5 CM/S
LEFT OUTFLOW VESSEL EDV: 13.3 CM/S
LEFT OUTFLOW VESSEL PSV: 164.6 CM/S
LEFT PROX OUTFLOW PSV: 180.8 CM/S
LEFT RENAL PROX RAR: 0.98
LEFT RENAL PROX SYS: 101.8 CM/S
MID AORTIC AP: 2.03 CM
MID AORTIC TRANS: 2.2 CM
PROX AORTIC AP: 2.3 CM
PROX AORTIC TRANS: 2.3 CM
PROX SMA PSV: 147.1 CM/S
RIGHT CFA MID SYS PSV: 67.6 CM/S
RIGHT CFA VEL RATIO: 0.7
RIGHT DIST ATA VELOCITY: 14.6 CM/S
RIGHT DIST OUTFLOW PSV: 134.5 CM/S
RIGHT DIST PTA PSV: 44.2 CM/S
RIGHT EXT ILIAC DIST VEL RATIO: 1
RIGHT EXT ILIAC DIST VEL: 102.2 CM/S
RIGHT EXT ILIAC DIST VEL: 152.7 CM/S
RIGHT EXT ILIAC MID VEL RATIO: 1.1
RIGHT EXT ILIAC MID VEL: 152.7 CM/S
RIGHT EXT ILIAC PROX VEL: 139.7 CM/S
RIGHT GRAFT 1 NAME: NORMAL
RIGHT INFLOW ARTERY PSV: 68.7 CM/S
RIGHT MID ATA VELOCITY: 15 CM/S
RIGHT MID OUTFLOW PSV: 129.3 CM/S
RIGHT OUTFLOW VESSEL PSV: 139.7 CM/S
RIGHT PERONEAL DIST VELOCITY: 43.6 CM/S
RIGHT PERONEAL MID SYS PSV: 52.1 CM/S
RIGHT PERONEAL PROX SYS PSV: 21.5 CM/S
RIGHT POP A DIST VEL RATIO: 1.05
RIGHT POP A MID VEL RATIO: 0.51
RIGHT POP A PROX VEL RATIO: 0.68
RIGHT POPLITEAL DIST SYS PSV: 59.4 CM/S
RIGHT POPLITEAL MID SYS PSV: 56.6 CM/S
RIGHT POPLITEAL PROX SYS PSV: 110.9 CM/S
RIGHT PROX OUTFLOW PSV: 129.3 CM/S
RIGHT PROX PFA A PSV: 81.2 CM/S
RIGHT PROX PTA PSV: 36.5 CM/S
RIGHT PTA MID SYS PSV: 31.5 CM/S
RIGHT RENAL PROX RAR: 0.78
RIGHT RENAL PROX SYS: 81.1 CM/S
RIGHT SFA DIST VEL RATIO: 1.82
RIGHT SFA MID VEL RATIO: 1
RIGHT SFA PROX VEL RATIO: 1.3
RIGHT SUPER FEMORAL DIST SYS PSV: 163.2 CM/S
RIGHT SUPER FEMORAL MID SYS PSV: 89.6 CM/S
RIGHT SUPER FEMORAL PROX SYS PSV: 88.3 CM/S

## 2020-11-12 ENCOUNTER — OFFICE VISIT (OUTPATIENT)
Dept: VASCULAR SURGERY | Age: 75
End: 2020-11-12
Payer: MEDICARE

## 2020-11-12 VITALS
DIASTOLIC BLOOD PRESSURE: 84 MMHG | HEART RATE: 73 BPM | OXYGEN SATURATION: 98 % | HEIGHT: 72 IN | SYSTOLIC BLOOD PRESSURE: 142 MMHG | BODY MASS INDEX: 29.8 KG/M2 | WEIGHT: 220 LBS | RESPIRATION RATE: 18 BRPM

## 2020-11-12 DIAGNOSIS — M54.16 LUMBAR RADICULOPATHY: ICD-10-CM

## 2020-11-12 DIAGNOSIS — I65.22 LEFT CAROTID ARTERY OCCLUSION: ICD-10-CM

## 2020-11-12 DIAGNOSIS — I74.09 AORTOILIAC OCCLUSIVE DISEASE (HCC): ICD-10-CM

## 2020-11-12 DIAGNOSIS — I70.219 ATHEROSCLEROSIS OF ARTERY OF EXTREMITY WITH INTERMITTENT CLAUDICATION (HCC): Primary | ICD-10-CM

## 2020-11-12 PROCEDURE — 1101F PT FALLS ASSESS-DOCD LE1/YR: CPT | Performed by: PHYSICIAN ASSISTANT

## 2020-11-12 PROCEDURE — G8417 CALC BMI ABV UP PARAM F/U: HCPCS | Performed by: PHYSICIAN ASSISTANT

## 2020-11-12 PROCEDURE — G8536 NO DOC ELDER MAL SCRN: HCPCS | Performed by: PHYSICIAN ASSISTANT

## 2020-11-12 PROCEDURE — G8427 DOCREV CUR MEDS BY ELIG CLIN: HCPCS | Performed by: PHYSICIAN ASSISTANT

## 2020-11-12 PROCEDURE — 3017F COLORECTAL CA SCREEN DOC REV: CPT | Performed by: PHYSICIAN ASSISTANT

## 2020-11-12 PROCEDURE — G8432 DEP SCR NOT DOC, RNG: HCPCS | Performed by: PHYSICIAN ASSISTANT

## 2020-11-12 PROCEDURE — 99214 OFFICE O/P EST MOD 30 MIN: CPT | Performed by: PHYSICIAN ASSISTANT

## 2020-11-12 NOTE — PROGRESS NOTES
1. Have you been to an emergency room or urgent care clinic since your last visit?   no  Hospitalized since your last visit? If yes, where, when, and reason for visit?   no  2. Have you seen or consulted any other health care providers outside of the [facility] since your last visit including any procedures, health maintenance items.  If yes, where, when and reason for visit?   no

## 2020-11-12 NOTE — PROGRESS NOTES
Valery Rosas    Chief Complaint   Patient presents with    Leg Pain       History and Physical    Valery Rosas is a 76 y.o. male with known aortoiliac occlusive disease status post femoral endarterectomy and bilateral common iliac artery stenting. Patient also has known occlusion of the left internal carotid artery mild stenosis of the right internal carotid artery. He also has known PAD distally. Recently he has been experiencing worsening right lower extremity claudication. He has also been having worsening radiculopathy with some numbness and tingling in the lower extremities. He does have an appointment to see a spine doctor in the next couple of weeks he does have known lumbar spinal disease with radiculopathy. He also reports some pain that radiates from his left neck down the arm into his chest.  He sees cardiology next week. He has had multiple ED visits for this issue and has been told it is not his heart. He does have history of cervical spine problems and has had surgeries for this in the past.  He has been told that the symptoms are likely secondary to his spinal issues. He states that his right calf does cramp a little on him with ambulation but he is not walking much due to his nerve issues. His claudication does not seem to be lifestyle limiting and he is to be managing in this. He denies any skin changes or ulcers. He has no rest pain. He did have arterial studies recently which showed \"No evidence of aortic aneurysm. Bilateral common iliac artery stents are patent without significant stenosis. Bilateral external iliac arteries are patent without significant stenosis. Bilateral renal arteries are patent. Celiac and superior mesenteric artery are patent. Moderate arterial insufficiency right lower extremity with preserved multiphasic waveforms\". He also had a late heart done in September of this year which revealed ABIs are on reliable due to medial calcinosis.   Toe pressures were well within healing range with a toe pressure of 98 mmHg on the right and 86 mmHg on the left. Past Medical History:   Diagnosis Date    Adrenal adenoma 2009    LEFT 1 cm, no change 1/15, 2/16    Asbestosis     CT 1/19 showed pleural plaques    Atrial fibrillation     CHA2DS2-VAsc=3(age+, htn+, vasc dx+; estimated yearly stroke risk according to Lip et al. is 3,2%), Hasbled=2(estimated yearly bleeding risk according to pisters et al. is 1,88%); not anticoagulated due to h/o gi bleed    Atrial fibrillation ablation     10/08    Basal cell cancer     Dr Jackie Dahl; he's had >350 lesions removed    Carotid disease, bilateral ([address]     Cervical radiculopathy     MRI 9/11 showed C3-6 severe foraminal stenosis    Chronic pain     myofascial pain syndrome; seen in pain clinic in past    Colon polyp     Dr Erick Melendez 9/15    Coronary artery disease     RCA - 3.0 x 16mm TAXUS 9/04, 3.0 x 16mm TAXUS 12/06    Dyslipidemia     Erectile dysfunction     GERD     GI bleed     Hearing loss 2014    Dr Mckeon Artist, umbilical     Hypertension     with component of white coat    Hypogonadism male     IFG (impaired fasting glucose) [id number]    Lumbar radiculopathy     Dr Maggie Scruggs;  MRI 9/11 L4-5 disc bulging, annular tear, disc dessication    OAB (overactive bladder)     Osteoarthritis     Dr Iman Wasserman Overweight (BMI 25.0-29. 9)     IF 5/18 start weight 214 lbs, not doing    Peripheral vascular disease     50-60% R iliac; s/p R iliac stent and L femoral artery stent in past; R OLIVIA 0.76 (1/16)    Plantar fasciitis     bilateral     PPD positive     Prostate cancer     T1c Imelda 7(3+4), 70% in 1 core, GS 7 (3+4) in 4 cores, GS 6 (3+3) in 1 core; psa 5.28, TRUS 18 gm;  Dr Erick Melendez; s/p cryoablation 10/16    Recurrent umbilical hernia     Subclinical hypothyroidism     Tinnitus     Dr Flaca Padilla Ulcerative colitis     Venous insufficiency      Past Surgical History:   Procedure Laterality Date    CARDIAC SURG PROCEDURE UNLIST  04/2018    [facility] Dr Dylan Rico; echo nl lv, ef 60%, dilated RV, biatrial enlargement, trace AI    CARDIAC SURG PROCEDURE UNLIST  04/2018    [facility] Dr Dylan Rico; NST neg, ef 60%    HAND/FINGER SURGERY UNLISTED  2017    HX CAROTID ENDARTERECTOMY  01/2014    RIGHT    HX [place] HX COLONOSCOPY      Dr Willian Huddleston 9/15 polyps    HX CRYOABLATION OF THE PROSTATE      10/16    HX [facility] [address]    [facility] [address] HX ORTHOPAEDIC      RIGHT knee surgery    HX ORTHOPAEDIC  12/2017    Dr Amirah Ramos; RIGHT CTS release    HX REFRACTIVE SURGERY      OD (hemoplasty to right eye on retina to avoid blindness)    HX TONSILLECTOMY      1955    [place] LAP, RECURRENT INCISIONAL HERNIA REPAIR,REDUCIBLE N/A 02/09/2017    Dr. Hyacinth Sawant HERNIA,5+Y/O,REDUCIBL  02/2016    Dr Mar FERRARO,6+I/I,ZSSLI  02/2016    Dr. Lwarence Matthews      1/16  R OLIVIA 0.76, L OLIVIA 1.02    VASCULAR SURGERY PROCEDURE UNLIST      LEFT fem artery and iliac stents (10/15); RCF endarterectomy w patch angioplasty/B CI artery stenting (7/19)     Patient Active Problem List   Diagnosis Code    Colitis, ulcerative ([facility] K51.90    Colon polyps K63.5    Left carotid artery occlusion I65.22    Hypovitaminosis D E55.9    Advance directive in chart Z78.9    Hypertension I11.9    Dyslipidemia E78.5    Coronary artery disease I25.10    Atrial fibrillation I48.91    Recurrent umbilical hernia Z74.9    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 9) E66.3    History of prostate cancer Z85.46    Facet hypertrophy of lumbar region M47.816  Aortoiliac occlusive disease ([facility]) I74.09    PAD (peripheral artery disease) ([facility]) I73.9     Current Outpatient Medications   Medication Sig Dispense Refill    losartan (COZAAR) 50 mg tablet Take 50 mg by mouth two (2) times a day.  carvediloL (Coreg) 25 mg tablet Take 25 mg by mouth two (2) times daily (with meals).  ezetimibe-simvastatin (VYTORIN) 10-40 mg per tablet TAKE 1 TABLET NIGHTLY 90 Tab 3    LORazepam (Ativan) 1 mg tablet Take 1 Tab by mouth every eight (8) hours as needed for Anxiety. 50 Tab 0    diclofenac (Voltaren) 1 % gel Apply 4 g to affected area four (4) times daily. Right knee 5 Each 5    gabapentin (NEURONTIN) 300 mg capsule 1 cap QHS  Indications: neuropathic pain 90 Cap 1    icosapent ethyL (VASCEPA) 1 gram capsule Take 2 Caps by mouth two (2) times daily (with meals). 180 Cap 3    clopidogrel (PLAVIX) 75 mg tab TAKE ONE TABLET BY MOUTH DAILY 90 Tab 5    colestipol (COLESTID) 1 gram tablet 1 g daily as needed.  aspirin delayed-release 81 mg tablet Take 81 mg by mouth daily.  triamterene-hydrochlorothiazide (MAXZIDE) 37.5-25 mg per tablet TAKE ONE TABLET BY MOUTH DAILY 90 Tab 3    Cholecalciferol, Vitamin D3, 1,000 unit cap Take 1,000 Units by mouth daily.  MULTIVITAMIN PO Take  by mouth daily.  pantoprazole (PROTONIX) 40 mg tablet Take 40 mg by mouth daily.  coenzyme q10 200 mg Cap Take  by mouth daily.        Allergies   Allergen Reactions    Altace [Ramipril] Unknown (comments)     Pt does not remember reaction    Lipitor [Atorvastatin] Other (comments)     Muscle pain    Lisinopril Shortness of Breath and Other (comments)     Turns bright red    Other Medication Other (comments)     Vicryl suture on skin tends to be rejected with poor wound healing does better with monocryl    Procardia [Nifedipine] Other (comments)     Caused afib    Rosuvastatin Other (comments)     Muscle Pain       Social History     Socioeconomic History    Marital status:      Spouse name: Not on file    Number of children: Not on file    Years of education: Not on file    Highest education level: Not on file   Occupational History    Not on file   Social Needs    Financial resource strain: Not on file    Food insecurity     Worry: Not on file     Inability: Not on file    Transportation needs     Medical: Not on file     Non-medical: Not on file   Tobacco Use    Smoking status: Former Smoker     Packs/day: 1.50     Years: 25.00     Pack years: 37.50     Types: Cigarettes     Last attempt to quit: 2003     Years since quittin.8    Smokeless tobacco: Never Used   Substance and Sexual Activity    Alcohol use: Yes     Comment: RARELY    Drug use: No    Sexual activity: Yes     Partners: Female     Birth control/protection: None   Lifestyle    Physical activity     Days per week: Not on file     Minutes per session: Not on file    Stress: Not on file   Relationships    Social connections     Talks on phone: Not on file     Gets together: Not on file     Attends [employer] service: Not on file     Active member of club or organization: Not on file     Attends meetings of clubs or organizations: Not on file     Relationship status: Not on file    Intimate partner violence     Fear of current or ex partner: Not on file     Emotionally abused: Not on file     Physically abused: Not on file     Forced sexual activity: Not on file   Other Topics Concern    Not on file   Social History Narrative    Not on file      Family History   Problem Relation Age of Onset    Heart Disease Mother     Hypertension Mother     Diabetes Mother     Arthritis-osteo Mother     Heart Disease Father     Stroke Father     Hypertension Father     Heart Disease Sister     Hypertension Sister        Physical Exam:    Visit Vitals  BP (!) 142/84 (BP 1 Location: Left arm, BP Patient Position: Sitting)   Pulse 73   Resp 18   Ht 6' (1.829 m)   Wt 220 lb (99.8 kg)   SpO2 98% BMI 29.84 kg/m²      General: Well-appearing male in no acute distress  HEENT: EOMI no scleral icterus is noted  Pulmonary: No increased work of breathing is noted  Extremities: Warm and well-perfused bilaterally. no visible ulcerations are identified bilaterally. no gangrene. no edema is noted bilaterally  Neuro: Cranial nerves II through XII are grossly intact strength is 5 x 5 in upper and lower extremities bilaterally with normal gait normal mentation and speech    Impression and Plan:  Chris Ibarra is a 76 y.o. male with known carotid disease as well as peripheral arterial disease and aortoiliac occlusive disease. He has been experiencing some mild right lower extremity claudication but this does not seem to be lifestyle limiting at this current time. He has no rest pain. I reviewed all of his ultrasounds in clinic today as above. I discussed that I believe he is doing well from a vascular standpoint and his studies are stable. He does have some moderate stenosis in the right SFA but I would not recommend intervention with angiogram at this current time as his biggest complaints are that of his neurological issues and possibly cardiac problems. He does have follow-up studies in February with follow-up appointment. We will have him come back as scheduled and he is understanding to call the office sooner as needed. He is well educated on the signs and symptoms of worsening arterial insufficiency and will certainly call the office sooner as needed. He is also understanding that we are having a turnover of our vascular surgeons and he has been referred to Dr. Kin Tripathi by his cardiologist.  He will call the office if he decides to change his care to Dr. Kin Tripathi. He expresses understanding to all of this and agrees to the plan. We reviewed the plan with the patient and the patient understands. We also gave the patient appropriate instructions on their disease process and when to call back. Greater than 50% of this visit was spent with face to face discussion. [address] [place], [place]    PLEASE NOTE:  This document has been produced using voice recognition software. Unrecognized errors in transcription may be present.

## 2020-11-17 ENCOUNTER — APPOINTMENT (OUTPATIENT)
Dept: INTERNAL MEDICINE CLINIC | Age: 75
End: 2020-11-17

## 2020-11-17 ENCOUNTER — HOSPITAL ENCOUNTER (OUTPATIENT)
Dept: LAB | Age: 75
Discharge: HOME | End: 2020-11-17
Payer: MEDICARE

## 2020-11-17 DIAGNOSIS — R73.01 IFG (IMPAIRED FASTING GLUCOSE): ICD-10-CM

## 2020-11-17 LAB
ALBUMIN SERPL-MCNC: 3.7 G/DL (ref 3.4–5)
ALBUMIN/GLOB SERPL: 1.2 {RATIO} (ref 0.8–1.7)
ALP SERPL-CCNC: 54 U/L (ref 45–117)
ALT SERPL-CCNC: 29 U/L (ref 16–61)
ANION GAP SERPL CALC-SCNC: 3 MMOL/L (ref 3–18)
AST SERPL-CCNC: 20 U/L (ref 10–38)
BILIRUB SERPL-MCNC: 0.8 MG/DL (ref 0.2–1)
BUN SERPL-MCNC: 14 MG/DL (ref 7–18)
BUN/CREAT SERPL: 17 (ref 12–20)
CALCIUM SERPL-MCNC: 9.2 MG/DL (ref 8.5–10.1)
CHLORIDE SERPL-SCNC: 100 MMOL/L (ref 100–111)
CO2 SERPL-SCNC: 34 MMOL/L (ref 21–32)
CREAT SERPL-MCNC: 0.81 MG/DL (ref 0.6–1.3)
GLOBULIN SER CALC-MCNC: 3 G/DL (ref 2–4)
GLUCOSE SERPL-MCNC: 99 MG/DL (ref 74–99)
HBA1C MFR BLD: 5.4 % (ref 4.2–5.6)
POTASSIUM SERPL-SCNC: 3.8 MMOL/L (ref 3.5–5.5)
PROT SERPL-MCNC: 6.7 G/DL (ref 6.4–8.2)
SODIUM SERPL-SCNC: 137 MMOL/L (ref 136–145)

## 2020-11-17 PROCEDURE — 80053 COMPREHEN METABOLIC PANEL: CPT

## 2020-11-17 PROCEDURE — 83036 HEMOGLOBIN GLYCOSYLATED A1C: CPT

## 2020-11-17 PROCEDURE — 36415 COLL VENOUS BLD VENIPUNCTURE: CPT

## 2020-11-20 DIAGNOSIS — I11.9 BENIGN HYPERTENSIVE HEART DISEASE WITHOUT HEART FAILURE: ICD-10-CM

## 2020-11-20 DIAGNOSIS — M54.12 CERVICAL RADICULOPATHY: Primary | ICD-10-CM

## 2020-11-20 RX ORDER — HYDROCODONE BITARTRATE AND ACETAMINOPHEN 5; 325 MG/1; MG/1
1 TABLET ORAL
Qty: 180 TAB | Refills: 0 | Status: SHIPPED | OUTPATIENT
Start: 2020-11-20 | End: 2021-02-09 | Stop reason: SDUPTHER

## 2020-11-20 NOTE — TELEPHONE ENCOUNTER
Pt request refill:     HYDROcodone-acetaminophen (NORCO) 5-325 mg per table      [employer], 2017 [facility]

## 2020-11-20 NOTE — TELEPHONE ENCOUNTER
[facility] date [facility] Norco as 10/12/20 for a 30 d/s. UDS done 11/18/19  CSA signed 8/9/19    Last Visit: 5/26/20 with MD Shai Horan  Next Appointment: 11/24/20 with MD Shai Horan  Previous Refill Encounter(s): 10/12/20 #180    Requested Prescriptions     Pending Prescriptions Disp Refills    HYDROcodone-acetaminophen (NORCO) 5-325 mg per tablet 180 Tab 0     Sig: Take 1 Tab by mouth every four (4) hours as needed (chronic pain) for up to 30 days. Max Daily Amount: 6 Tabs.

## 2020-11-21 NOTE — PROGRESS NOTES
This is a subsequent Medicare Annual Wellness Exam     I have reviewed the patient's medical history in detail and updated the computerized patient record. History     Past Medical History:   Diagnosis Date    Adrenal adenoma 2009    LEFT 1 cm, no change 1/15, 2/16    Asbestosis     CT 1/19 showed pleural plaques    Atrial fibrillation     CHA2DS2-VAsc=3(age+, htn+, vasc dx+; estimated yearly stroke risk according to Lip et al. is 3,2%), Hasbled=2; not anticoagulated due to risk of gib    Atrial fibrillation ablation 10/2008    Basal cell cancer     Dr Jackie Dahl; he's had >350 lesions removed    Carotid disease, bilateral ([address]     Cervical radiculopathy     MRI 9/11 showed C3-6 severe foraminal stenosis    Chronic pain     myofascial pain syndrome; seen in pain clinic in past    Colon polyp     Dr Erick Melendez 9/15    Coronary artery disease     RCA - 3.0 x 16mm TAXUS 9/04, 3.0 x 16mm TAXUS 12/06    Dyslipidemia     Erectile dysfunction     GERD     Hearing loss 2014    Dr Lara Smith Hypertension     with component of white coat    Hypogonadism male     IFG (impaired fasting glucose) [id number]    Lumbar radiculopathy     Dr Maggie Scruggs;  MRI 9/11 L4-5 disc bulging, annular tear, disc dessication    OAB (overactive bladder)     Osteoarthritis     Dr Iman Wasserman Overweight (BMI 25.0-29. 9)     IF 5/18 start weight 214 lbs, not doing    Peripheral vascular disease     50-60% R iliac; s/p R iliac stent and L femoral artery stent in past; R OLIVIA 0.76 (1/16)    Plantar fasciitis     bilateral     PPD positive     Prostate cancer     T1c Imelda 7(3+4), 70% in 1 core, GS 7 (3+4) in 4 cores, GS 6 (3+3) in 1 core; psa 5.28, TRUS 18 gm;  Dr Erick Melendez; s/p cryoablation 10/16    Subclinical hypothyroidism     Tinnitus     Dr Flaca Padilla Ulcerative colitis     Venous insufficiency      Past Surgical History:   Procedure Laterality Date    CARDIAC SURG PROCEDURE UNLIST  04/2018    [facility] Dr Nehemias Alfaro; echo nl lv, ef 60%, dilated RV, biatrial enlargement, trace AI    CARDIAC SURG PROCEDURE UNLIST  2018    [address] Shelly Wright; NST neg, ef 60%    HAND/FINGER SURGERY UNLISTED  2017    HX CAROTID ENDARTERECTOMY Right 2014    HX CATARACT REMOVAL Bilateral     HX COLONOSCOPY      11 neg; Dr Facundo Garcia 9/15 polyps    HX CRYOABLATION OF THE PROSTATE  10/2016    HX HEMORRHOIDECTOMY  1979    HX [address] [facility]    HX ORTHOPAEDIC      RIGHT knee surgery    HX ORTHOPAEDIC  2017    Dr Rolando Dubon; RIGHT CTS release    HX REFRACTIVE SURGERY      OD (hemoplasty to right eye on retina to avoid blindness)    HX TONSILLECTOMY      [place] LAP, RECURRENT INCISIONAL HERNIA REPAIR,REDUCIBLE N/A 2017    Dr. J Luis Braga HERNIA,5+Y/O,REDUCIBL  2016    Dr Ezra Garcia GOAH,8+G/Z,LULIZ  2016    Dr. Barragan Camera        R OLIVIA 0.76, L LOIVIA 1.02    VASCULAR SURGERY PROCEDURE UNLIST      LEFT fem artery and iliac stents (10/15); RCF endarterectomy w patch angioplasty/B CI artery stenting ()     Social History     Socioeconomic History    Marital status:      Spouse name: Not on file    Number of children: Not on file    Years of education: Not on file    Highest education level: Not on file   Occupational History    Not on file   Social Needs    Financial resource strain: Not on file    Food insecurity     Worry: Not on file     Inability: Not on file   [employer] needs     Medical: Not on file     Non-medical: Not on file   Tobacco Use    Smoking status: Former Smoker     Packs/day: 1.50     Years: 25.00     Pack years: 37.50     Types: Cigarettes     Last attempt to quit: 2003     Years since quittin.9    Smokeless tobacco: Never Used   Substance and Sexual Activity    Alcohol use: Yes     Comment: RARELY    Drug use: No    Sexual activity: Yes     Partners: Female     Birth control/protection: None   Lifestyle    Physical activity Days per week: Not on file     Minutes per session: Not on file    Stress: Not on file   Relationships    Social connections     Talks on phone: Not on file     Gets together: Not on file     Attends [employer] service: Not on file     Active member of club or organization: Not on file     Attends meetings of clubs or organizations: Not on file     Relationship status: Not on file    Intimate partner violence     Fear of current or ex partner: Not on file     Emotionally abused: Not on file     Physically abused: Not on file     Forced sexual activity: Not on file   Other Topics Concern    Not on file   Social History Narrative    Not on file     Family History   Problem Relation Age of Onset    Heart Disease Mother     Hypertension Mother     Diabetes Mother     Arthritis-osteo Mother     Heart Disease Father     Stroke Father     Hypertension Father     Heart Disease Sister     Hypertension Sister      Current Outpatient Medications   Medication Sig    clopidogreL (PLAVIX) 75 mg tab Take 1 Tab by mouth daily.  HYDROcodone-acetaminophen (NORCO) 5-325 mg per tablet Take 1 Tab by mouth every four (4) hours as needed (chronic pain) for up to 30 days. Max Daily Amount: 6 Tabs.  losartan (COZAAR) 50 mg tablet Take 50 mg by mouth two (2) times a day.  carvediloL (Coreg) 25 mg tablet Take 25 mg by mouth two (2) times daily (with meals).  ezetimibe-simvastatin (VYTORIN) 10-40 mg per tablet TAKE 1 TABLET NIGHTLY    LORazepam (Ativan) 1 mg tablet Take 1 Tab by mouth every eight (8) hours as needed for Anxiety.  diclofenac (Voltaren) 1 % gel Apply 4 g to affected area four (4) times daily. Right knee    gabapentin (NEURONTIN) 300 mg capsule 1 cap QHS  Indications: neuropathic pain    icosapent ethyL (VASCEPA) 1 gram capsule Take 2 Caps by mouth two (2) times daily (with meals).  colestipol (COLESTID) 1 gram tablet 1 g daily as needed.     aspirin delayed-release 81 mg tablet Take 81 mg by mouth daily.  triamterene-hydrochlorothiazide (MAXZIDE) 37.5-25 mg per tablet TAKE ONE TABLET BY MOUTH DAILY    Cholecalciferol, Vitamin D3, 1,000 unit cap Take 1,000 Units by mouth daily.  MULTIVITAMIN PO Take  by mouth daily.  pantoprazole (PROTONIX) 40 mg tablet Take 40 mg by mouth daily.  coenzyme q10 200 mg Cap Take  by mouth daily. No current facility-administered medications for this visit. Allergies   Allergen Reactions    Altace [Ramipril] Unknown (comments)     Pt does not remember reaction    Lipitor [Atorvastatin] Other (comments)     Muscle pain    Lisinopril Shortness of Breath and Other (comments)     Turns bright red    Other Medication Other (comments)     Vicryl suture on skin tends to be rejected with poor wound healing does better with monocryl    Procardia [Nifedipine] Other (comments)     Caused afib    Rosuvastatin Other (comments)     Muscle Pain       Depression Risk Factor Screening:     3 most recent PHQ Screens 11/24/2020   PHQ Not Done -   Little interest or pleasure in doing things Not at all   Feeling down, depressed, irritable, or hopeless Not at all   Total Score PHQ 2 0     SCREENINGS  Colonoscopy last done 9/15 Dr Emily Flores  Prostate Dr Emily Flores 2020    Alcohol Risk Factor Screening: On any occasion during the past 3 months, have you had more than 4 drinks containing alcohol? No    Do you average more than 14 drinks per week? No    Functional Ability and Level of Safety:     Hearing Loss   normal-to-mild    Sees ophth    Activities of Daily Living   Self-care. Requires assistance with: no ADLs    Fall Risk     Fall Risk Assessment, last 12 mths 6/23/2016   Able to walk? Yes   Fall in past 12 months? Yes   Fall with injury? No   Number of falls in past 12 months 1   Fall Risk Score 1     Abuse Screen   Patient is not abused    Review of Systems   A comprehensive review of systems was negative except for that written in the HPI.     Physical Examination No exam data present    Evaluation of Cognitive Function:  Mood/affect:  neutral  Appearance: age appropriate, overweight, well dressed and within normal Limits  Family member/caregiver input: na    Visit Vitals  /76   Pulse 67   Temp 98.1 °F (36.7 °C) (Temporal)   Resp 14   Ht 6' (1.829 m)   Wt 221 lb (100.2 kg)   SpO2 98%   BMI 29.97 kg/m²       Patient Care Team:  Brianne Rico MD as PCP - General (Internal Medicine)  Winfred Gosselin, MD as Physician (Cardiology)  Misael Adamsma (Vascular Surgery)  Trudi Valdez MD as Physician (Vascular Surgery)  Josee Miranda RN as Ambulatory Care Navigator (Internal Medicine)  Sobia Conklin MD as Consulting Provider (Gastroenterology)  Geena Rogers RN as Ambulatory Care Navigator (Internal Medicine)  Diego Rush [place] (Physician Assistant)  Orly Riley MD (Orthopedic Surgery)    Advice/Referrals/Counseling   Education and counseling provided:  Are appropriate based on today's review and evaluation  End-of-Life planning (with patient's consent)  Colorectal cancer screening tests  Cardiovascular screening blood test  Diabetes screening test    Assessment/Plan     Diagnoses and all orders for this visit:    1. Left carotid artery occlusion    2. Hypertension    3. Atherosclerosis of native coronary artery of native heart without angina pectoris    4. Atrial fibrillation, unspecified type ([facility]    5. PAD (peripheral artery disease) ([facility] [address]    6. Ulcerative colitis with complication, unspecified location ([facility] [address]    7. Polyp of colon, unspecified part of colon, unspecified type    8. IFG (impaired fasting glucose)  -     METABOLIC PANEL, COMPREHENSIVE; Future  -     HEMOGLOBIN A1C W/O EAG; Future    9. Degeneration of cervical and lumbar spine, relative cervical stenosis    10. Dyslipidemia  -     CBC W/O DIFF; Future  -     LIPID PANEL; Future    11. History of prostate cancer    12.  Medicare annual wellness visit, subsequent    13. Advanced directives, counseling/discussion    14. Screening for alcoholism    15.  Screening for diabetes mellitus        Health Maintenance Due   Topic Date Due    GLAUCOMA SCREENING Q2Y  06/25/2019    Flu Vaccine (1) 09/01/2020    Medicare Yearly Exam  11/25/2020     [place] scheduled for early next year

## 2020-11-21 NOTE — PROGRESS NOTES
76 y.o. WHITE [place]  male who presents for evaluation. No cardiovascular complaints and he continues to see Dr Whit Goodman. No new recs at the last visit apparently. He tries to walk but the knees are limiting him at this time. His bp has been better controlled with the med changes back in August    He is changing to Dr Darek Black with Dr Estrellita Sol leaving. No claudication with adls    He is changing from [facility] to Dr Rahman Poster also    The UC has been stable     Denies polyuria, polydipsia, nocturia, vision change. Not checking sugars at this time. He sees Dr Talat Briscoe once yearly and new new developments    LAST MEDICARE WELLNESS EXAM: 6/23/16, 6/29/17, 11/20/18, 11/25/19, 11/23/20    Past Medical History:   Diagnosis Date    Adrenal adenoma 2009    LEFT 1 cm, no change 1/15, 2/16    Asbestosis     CT 1/19 showed pleural plaques    Atrial fibrillation     CHA2DS2-VAsc=3(age+, htn+, vasc dx+; estimated yearly stroke risk according to Lip et al. is 3,2%), Hasbled=2; not anticoagulated due to risk of gib    Atrial fibrillation ablation 10/2008    Basal cell cancer     Dr Roxana Garcia; he's had >350 lesions removed    Carotid disease, bilateral ([address]     Cervical radiculopathy     MRI 9/11 showed C3-6 severe foraminal stenosis    Chronic pain     myofascial pain syndrome; seen in pain clinic in past    Colon polyp     Dr Talat Briscoe 9/15    Coronary artery disease     RCA - 3.0 x 16mm TAXUS 9/04, 3.0 x 16mm TAXUS 12/06    Dyslipidemia     Erectile dysfunction     GERD     Hearing loss 2014    Dr Teena Valderrama Hypertension     with component of white coat    Hypogonadism male     IFG (impaired fasting glucose) [id number]    Lumbar radiculopathy     Dr Tien Pitts;  MRI 9/11 L4-5 disc bulging, annular tear, disc dessication    OAB (overactive bladder)     Osteoarthritis     Dr Luis Manuel Montana Overweight (BMI 25.0-29. 9)     IF 5/18 start weight 214 lbs, not doing    Peripheral vascular disease     50-60% R iliac; s/p R iliac stent and L femoral artery stent in past; R OLIVIA 0.76 (1/16)    Plantar fasciitis     bilateral     PPD positive     Prostate cancer     T1c Imelda 7(3+4), 70% in 1 core, GS 7 (3+4) in 4 cores, GS 6 (3+3) in 1 core; psa 5.28, TRUS 18 gm;  Dr Dorene Otero; s/p cryoablation 10/16    Subclinical hypothyroidism     Tinnitus     Dr Jovel Arm Ulcerative colitis     Venous insufficiency      Past Surgical History:   Procedure Laterality Date    CARDIAC SURG PROCEDURE UNLIST  04/2018    [facility] Dr Noel Cespedes; echo nl lv, ef 60%, dilated RV, biatrial enlargement, trace AI    CARDIAC SURG PROCEDURE UNLIST  04/2018    [facility] Dr Noel Cespedes; NST neg, ef 60%    HAND/FINGER SURGERY UNLISTED  2017    HX CAROTID ENDARTERECTOMY Right 01/2014    HX CATARACT REMOVAL Bilateral     HX COLONOSCOPY      2/2/11 neg; Dr Dorene Otero 9/15 polyps    HX CRYOABLATION OF THE PROSTATE  10/2016    HX HEMORRHOIDECTOMY  1979    [facility] [address] [facility] ORTHOPAEDIC      RIGHT knee surgery    HX ORTHOPAEDIC  12/2017    Dr Morales Favors; RIGHT CTS release    HX REFRACTIVE SURGERY      OD (hemoplasty to right eye on retina to avoid blindness)    HX TONSILLECTOMY      GA LAP, RECURRENT INCISIONAL HERNIA REPAIR,REDUCIBLE N/A 02/09/2017    Dr. Zahraa Romero HERNIA,5+Y/O,REDUCIBL  02/2016    Dr Tl Gaxiola CUGL,7+E/Q,COYUW  02/2016    Dr. Julita Aj      1/16  R OLIVIA 0.76, L OLIVIA 1.02    VASCULAR SURGERY PROCEDURE UNLIST      LEFT fem artery and iliac stents (10/15); RCF endarterectomy w patch angioplasty/B CI artery stenting (7/19)     Social History     Socioeconomic History    Marital status:      Spouse name: Not on file    Number of children: Not on file    Years of education: Not on file    Highest education level: Not on file   Occupational History    Not on file   Social Needs    Financial resource strain: Not on file    Food insecurity     Worry: Not on file Inability: Not on file    Transportation needs     Medical: Not on file     Non-medical: Not on file   Tobacco Use    Smoking status: Former Smoker     Packs/day: 1.50     Years: 25.00     Pack years: 37.50     Types: Cigarettes     Last attempt to quit: 2003     Years since quittin.9    Smokeless tobacco: Never Used   Substance and Sexual Activity    Alcohol use: Yes     Comment: RARELY    Drug use: No    Sexual activity: Yes     Partners: Female     Birth control/protection: None   Lifestyle    Physical activity     Days per week: Not on file     Minutes per session: Not on file    Stress: Not on file   Relationships    Social connections     Talks on phone: Not on file     Gets together: Not on file     Attends Alevism service: Not on file     Active member of club or organization: Not on file     Attends meetings of clubs or organizations: Not on file     Relationship status: Not on file    Intimate partner violence     Fear of current or ex partner: Not on file     Emotionally abused: Not on file     Physically abused: Not on file     Forced sexual activity: Not on file   Other Topics Concern    Not on file   Social History Narrative    Not on file     Current Outpatient Medications   Medication Sig    clopidogreL (PLAVIX) 75 mg tab Take 1 Tab by mouth daily.  HYDROcodone-acetaminophen (NORCO) 5-325 mg per tablet Take 1 Tab by mouth every four (4) hours as needed (chronic pain) for up to 30 days. Max Daily Amount: 6 Tabs.  losartan (COZAAR) 50 mg tablet Take 50 mg by mouth two (2) times a day.  carvediloL (Coreg) 25 mg tablet Take 25 mg by mouth two (2) times daily (with meals).  ezetimibe-simvastatin (VYTORIN) 10-40 mg per tablet TAKE 1 TABLET NIGHTLY    LORazepam (Ativan) 1 mg tablet Take 1 Tab by mouth every eight (8) hours as needed for Anxiety.  diclofenac (Voltaren) 1 % gel Apply 4 g to affected area four (4) times daily.  Right knee    gabapentin (NEURONTIN) 300 mg capsule 1 cap QHS  Indications: neuropathic pain    icosapent ethyL (VASCEPA) 1 gram capsule Take 2 Caps by mouth two (2) times daily (with meals).  colestipol (COLESTID) 1 gram tablet 1 g daily as needed.  aspirin delayed-release 81 mg tablet Take 81 mg by mouth daily.  triamterene-hydrochlorothiazide (MAXZIDE) 37.5-25 mg per tablet TAKE ONE TABLET BY MOUTH DAILY    Cholecalciferol, Vitamin D3, 1,000 unit cap Take 1,000 Units by mouth daily.  MULTIVITAMIN PO Take  by mouth daily.  pantoprazole (PROTONIX) 40 mg tablet Take 40 mg by mouth daily.  coenzyme q10 200 mg Cap Take  by mouth daily. No current facility-administered medications for this visit. Allergies   Allergen Reactions    Altace [Ramipril] Unknown (comments)     Pt does not remember reaction    Lipitor [Atorvastatin] Other (comments)     Muscle pain    Lisinopril Shortness of Breath and Other (comments)     Turns bright red    Other Medication Other (comments)     Vicryl suture on skin tends to be rejected with poor wound healing does better with monocryl    Procardia [Nifedipine] Other (comments)     Caused afib    Rosuvastatin Other (comments)     Muscle Pain       REVIEW OF SYSTEMS: colo 9/15 Dr Kelli Curtis, sees Dr Dutton Alt no vision change or eye pain  Oral  no mouth pain, tongue or tooth problems  Ears  no hearing loss, ear pain, fullness, no swallowing problems  Cardiac  no CP, PND, orthopnea, edema, palpitations or syncope  Chest  no breast masses  Resp  no wheezing, chronic coughing, dyspnea  GI  no heartburn, nausea, vomiting, change in bowel habits, bleeding, hemorrhoids  Urinary  no dysuria, hematuria, flank pain, urgency, frequency    Visit Vitals  /76   Pulse 67   Temp 98.1 °F (36.7 °C) (Temporal)   Resp 14   Ht 6' (1.829 m)   Wt 221 lb (100.2 kg)   SpO2 98%   BMI 29.97 kg/m²     A&O x3  Affect is appropriate. Mood stable  No apparent distress  Anicteric, no JVD, adenopathy or thyromegaly. No carotid bruits or radiated murmur  Lungs clear to auscultation, no wheezes or rales  Heart showed regular rate and rhythm. No murmur, rubs, gallops  Abdomen soft nontender, no hepatosplenomegaly or masses. Extremities without edema.   Pulses 1-2+ symmetrically    LABS  From 12/12 showed gluc 101, cr 0.77, gfr 94,   alt 26,           chol 182, tg 159, hdl 52, ldl-c 98, wbc 7.6, hb 15.4, plt 171, tsh 0.96, psa 3.20  From 7/13 showed                          test 263  From 1/14 showed   gluc 112, cr 0.78, gfr 107, alt 17,           chol 130, tg 129, hdl 37, ldl-c 67, wbc 6.8, hb 15.1, plt 186, tsh 0.64, psa 3.60, vit d 26.8  From 6/14 showed   gluc 101, cr 0.57, gfr>60,     hba1c 5.7, vit d 34.3  From 12/14 showed         hba1c 5.9, chol 141, tg 104, hdl 42, ldl-c 78, wbc 8.5, hb 14.8, plt 147, tsh 0.75, psa 5.60, vit d 31.8, ua neg  From 6/15 showed   gluc 104, cr 0.75, gfr>60,     hba1c 5.9  From 8/15 showed                       tsh 0.44, psa 5.28,         test 215, lh 6.1, prl 11.1, ft4 1.61  From 12/15 showed gluc 95,   cr 0.71, gfr>60,  alt 36, hba1c 5.9, chol 135, tg 105, hdl 44, ldl-c 70,            tsh 0.51,      vit d 29.1   From 1/16 showed   gluc 112, cr 0.72, gfr>60,          wbc 7.8, hb 15.1, plt 163,           ua neg  From 11/16 showed gluc 103, cr 0.70,           wbc 8.6, hb 14.5, plt 189, ck/trop neg  From 12/16 showed gluc 89,   cr 0.70, gfr>60,  alt 31, hba1c 5.9, chol 159, tg 124, hdl 58, ldl-c 76, wbc 9.0, hb 15.1, plt 185,       vit d 27.8  From 3/17 showed   gluc 100, cr 0.75, gfr>60,     hba1c 5.9,                tsh 0.78, psa 0.18, ft4 1.30  From 6/17 showed   gluc 96,   cr 0.74, gfr>60, alt 34,  hba1c 5.7, chol 135, tg 71,   hdl 53, ldl-c 68  From 1/18 showed   gluc 107, cr 0.65, gfr>60, alt 31,  hba1c 5.8, chol 147, tg 105, hdl 46, ldl-c 80  From 5/18 showed   gluc 100, cr 0.73, gfr>60, alt 38,  hba1c 5.9, chol 152, tg 59,   hdl 66, ldl-c 74  From 11/18 showed         hba1c 6.1, chol 146, tg 149, hdl 51, ldl-c 65, wbc 7.9, hb 15.4, plt 164  From 5/19 showed         hba1c 5.5  From 11/19 showed gluc 108, cr 0.77, gfr>60, alt 31,  hba1c 5.5, chol 128, tg 108, hdl 36, ldl-c 70  From 5/20 showed         hba1c 5.9, chol 142, tg 133, hdl 52, ldl-c 63, wbc 8.7, hb 14.1, plt 160    Results for orders placed or performed during the hospital encounter of 43/31/25   METABOLIC PANEL, COMPREHENSIVE   Result Value Ref Range    Sodium 137 136 - 145 mmol/L    Potassium 3.8 3.5 - 5.5 mmol/L    Chloride 100 100 - 111 mmol/L    CO2 34 (H) 21 - 32 mmol/L    Anion gap 3 3.0 - 18 mmol/L    Glucose 99 74 - 99 mg/dL    BUN 14 7.0 - 18 MG/DL    Creatinine 0.81 0.6 - 1.3 MG/DL    BUN/Creatinine ratio 17 12 - 20      GFR est AA >60 >60 ml/min/1.73m2    GFR est non-AA >60 >60 ml/min/1.73m2    Calcium 9.2 8.5 - 10.1 MG/DL    Bilirubin, total 0.8 0.2 - 1.0 MG/DL    ALT (SGPT) 29 16 - 61 U/L    AST (SGOT) 20 10 - 38 U/L    Alk. phosphatase 54 45 - 117 U/L    Protein, total 6.7 6.4 - 8.2 g/dL    Albumin 3.7 3.4 - 5.0 g/dL    Globulin 3.0 2.0 - 4.0 g/dL    A-G Ratio 1.2 0.8 - 1.7     HEMOGLOBIN A1C W/O EAG   Result Value Ref Range    Hemoglobin A1c 5.4 4.2 - 5.6 %     *Note: Due to a large number of results and/or encounters for the requested time period, some results have not been displayed. A complete set of results can be found in Results Review.      We reviewed the patient's labs from the last several visits to point out trends in the numbers          Patient Active Problem List   Diagnosis Code    [facility] ulcerative [facility] K51.90    Colon polyps K63.5    Left carotid artery occlusion I65.22    Hypovitaminosis D E55.9    Advance directive in chart Z78.9    Hypertension I11.9    Dyslipidemia E78.5    Coronary artery disease I25.10    Atrial fibrillation I48.91    Recurrent umbilical hernia K76.8    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 9) E66.3    History of prostate cancer Z85.46    Facet hypertrophy of lumbar region M47.816    Aortoiliac occlusive disease (HCC) I74.09    PAD (peripheral artery disease) ([facility]) I73.9     Assessment and plan:  1. Cardiac. Continue current regimen. F/U Dr Marla Stein  2. Vascular. He will see Dr Rukhsana Barros in Feb 2021  3. Dyslipidemia. Continue current regimen and doing well on vascepa  4. PreDM. Lifestyle and dietary measures, wt loss as he is trying to do  5. Hypovit d. Supplement  6. Colon polyp. Fiber, colo 2020  7. Prostate ca. Per Dr Gabriella Mitchell  8. Afib. Per Dr Marla Stein  9. Ortho. F/U Dr Khloe Joe  10. Spine. F/U Dr Dano Davis  11. Overweight. Lifestyle and dietary measures. Portion control         RTC 5/21    Above conditions discussed at length and patient vocalized understanding.   All questions answered to patient satisfaction

## 2020-11-23 RX ORDER — CLOPIDOGREL BISULFATE 75 MG/1
75 TABLET ORAL DAILY
Qty: 90 TAB | Refills: 3 | Status: SHIPPED | OUTPATIENT
Start: 2020-11-23

## 2020-11-23 RX ORDER — ATENOLOL 25 MG/1
TABLET ORAL
Qty: 180 TAB | Refills: 2 | OUTPATIENT
Start: 2020-11-23

## 2020-11-23 NOTE — TELEPHONE ENCOUNTER
Refill request for Plavix 75 mg tablet, take one tablet by mouth daily, quantity 90 with 3 refills sent to [address] [place] [address] per verbal order [place], [place].

## 2020-11-23 NOTE — TELEPHONE ENCOUNTER
Patient confirms he is not taking this medication. The medica ition was on auto refill. He spoke with the pharmacist over the weekend and informed them he is no longer taking the medication. I will call the pharmacy and confirm they removed it from his profile.

## 2020-11-24 ENCOUNTER — OFFICE VISIT (OUTPATIENT)
Dept: INTERNAL MEDICINE CLINIC | Age: 75
End: 2020-11-24
Payer: MEDICARE

## 2020-11-24 VITALS
DIASTOLIC BLOOD PRESSURE: 76 MMHG | RESPIRATION RATE: 14 BRPM | SYSTOLIC BLOOD PRESSURE: 136 MMHG | OXYGEN SATURATION: 98 % | HEART RATE: 67 BPM | WEIGHT: 221 LBS | HEIGHT: 72 IN | BODY MASS INDEX: 29.93 KG/M2 | TEMPERATURE: 98.1 F

## 2020-11-24 DIAGNOSIS — Z00.00 MEDICARE ANNUAL WELLNESS VISIT, SUBSEQUENT: Primary | ICD-10-CM

## 2020-11-24 DIAGNOSIS — I11.9 BENIGN HYPERTENSIVE HEART DISEASE WITHOUT HEART FAILURE: ICD-10-CM

## 2020-11-24 DIAGNOSIS — M50.30 DEGENERATION OF CERVICAL INTERVERTEBRAL DISC: ICD-10-CM

## 2020-11-24 DIAGNOSIS — K51.919 ULCERATIVE COLITIS WITH COMPLICATION, UNSPECIFIED LOCATION (HCC): ICD-10-CM

## 2020-11-24 DIAGNOSIS — Z71.89 ADVANCED DIRECTIVES, COUNSELING/DISCUSSION: ICD-10-CM

## 2020-11-24 DIAGNOSIS — I65.22 LEFT CAROTID ARTERY OCCLUSION: ICD-10-CM

## 2020-11-24 DIAGNOSIS — I48.91 ATRIAL FIBRILLATION, UNSPECIFIED TYPE (HCC): ICD-10-CM

## 2020-11-24 DIAGNOSIS — Z13.1 SCREENING FOR DIABETES MELLITUS: ICD-10-CM

## 2020-11-24 DIAGNOSIS — I73.9 PAD (PERIPHERAL ARTERY DISEASE) (HCC): ICD-10-CM

## 2020-11-24 DIAGNOSIS — Z13.39 SCREENING FOR ALCOHOLISM: ICD-10-CM

## 2020-11-24 DIAGNOSIS — K63.5 POLYP OF COLON, UNSPECIFIED PART OF COLON, UNSPECIFIED TYPE: ICD-10-CM

## 2020-11-24 DIAGNOSIS — Z85.46 HISTORY OF PROSTATE CANCER: ICD-10-CM

## 2020-11-24 DIAGNOSIS — I25.10 ATHEROSCLEROSIS OF NATIVE CORONARY ARTERY OF NATIVE HEART WITHOUT ANGINA PECTORIS: ICD-10-CM

## 2020-11-24 DIAGNOSIS — R73.01 IFG (IMPAIRED FASTING GLUCOSE): ICD-10-CM

## 2020-11-24 DIAGNOSIS — E78.5 DYSLIPIDEMIA: ICD-10-CM

## 2020-11-24 PROBLEM — M47.816 FACET HYPERTROPHY OF LUMBAR REGION: Status: RESOLVED | Noted: 2018-02-12 | Resolved: 2020-11-24

## 2020-11-24 PROCEDURE — G0463 HOSPITAL OUTPT CLINIC VISIT: HCPCS | Performed by: INTERNAL MEDICINE

## 2020-11-24 PROCEDURE — 3017F COLORECTAL CA SCREEN DOC REV: CPT | Performed by: INTERNAL MEDICINE

## 2020-11-24 PROCEDURE — G8510 SCR DEP NEG, NO PLAN REQD: HCPCS | Performed by: INTERNAL MEDICINE

## 2020-11-24 PROCEDURE — G8536 NO DOC ELDER MAL SCRN: HCPCS | Performed by: INTERNAL MEDICINE

## 2020-11-24 PROCEDURE — 1101F PT FALLS ASSESS-DOCD LE1/YR: CPT | Performed by: INTERNAL MEDICINE

## 2020-11-24 PROCEDURE — G8417 CALC BMI ABV UP PARAM F/U: HCPCS | Performed by: INTERNAL MEDICINE

## 2020-11-24 PROCEDURE — G8427 DOCREV CUR MEDS BY ELIG CLIN: HCPCS | Performed by: INTERNAL MEDICINE

## 2020-11-24 PROCEDURE — 99214 OFFICE O/P EST MOD 30 MIN: CPT | Performed by: INTERNAL MEDICINE

## 2020-11-24 PROCEDURE — 99497 ADVNCD CARE PLAN 30 MIN: CPT | Performed by: INTERNAL MEDICINE

## 2020-11-24 PROCEDURE — G0439 PPPS, SUBSEQ VISIT: HCPCS | Performed by: INTERNAL MEDICINE

## 2020-11-24 NOTE — PATIENT INSTRUCTIONS
Medicare Wellness Visit, Male The best way to live healthy is to have a lifestyle where you eat a well-balanced diet, exercise regularly, limit alcohol use, and quit all forms of tobacco/nicotine, if applicable. Regular preventive services are another way to keep healthy. Preventive services (vaccines, screening tests, monitoring & exams) can help personalize your care plan, which helps you manage your own care. Screening tests can find health problems at the earliest stages, when they are easiest to treat. [facility] follows the current, evidence-based guidelines published by the [facility] ([facility]) when recommending preventive services for our patients. Because we follow these guidelines, sometimes recommendations change over time as research supports it. (For example, a prostate screening blood test is no longer routinely recommended for men with no symptoms). Of course, you and your doctor may decide to screen more often for some diseases, based on your risk and co-morbidities (chronic disease you are already diagnosed with). Preventive services for you include: - Medicare offers their members a free annual wellness visit, which is time for you and your primary care provider to discuss and plan for your preventive service needs. Take advantage of this benefit every year! 
-All adults over age 72 should receive the recommended pneumonia vaccines. Current USPSTF guidelines recommend a series of two vaccines for the best pneumonia protection.  
-All adults should have a flu vaccine yearly and tetanus vaccine every 10 years. 
-All adults age 48 and older should receive the shingles vaccines (series of two vaccines).       
-All adults age 38-68 who are overweight should have a diabetes screening test once every three years.  
-Other screening tests & preventive services for persons with diabetes include: an eye exam to screen for diabetic retinopathy, a kidney function test, a foot exam, and stricter control over your cholesterol.  
-Cardiovascular screening for adults with routine risk involves an electrocardiogram (ECG) at intervals determined by the provider.  
-Colorectal cancer screening should be done for adults age 54-65 with no increased risk factors for colorectal cancer. There are a number of acceptable methods of screening for this type of cancer. Each test has its own benefits and drawbacks. Discuss with your provider what is most appropriate for you during your annual wellness visit. The different tests include: colonoscopy (considered the best screening method), a fecal occult blood test, a fecal DNA test, and sigmoidoscopy. 
-All adults born between [facility] should be screened once for Hepatitis C. 
-An Abdominal Aortic Aneurysm (AAA) Screening is recommended for men age 73-68 who has ever smoked in their lifetime. Here is a list of your current Health Maintenance items (your personalized list of preventive services) with a due date: 
Health Maintenance Due Topic Date Due  Glaucoma Screening   06/25/2019  Yearly Flu Vaccine (1) 09/01/2020 [address] [place] [address] Annual Well Visit  11/25/2020

## 2020-11-24 NOTE — ACP (ADVANCE CARE PLANNING)
Advance Care Planning       Advance Care Planning (ACP) Physician/NP/PA (Provider) Conversation        Date of ACP Conversation: 11/24/2020    Conversation Conducted with:   Patient with Decision Making Yung Benitez Maker:    Current Designated [address]:   (If there is a [address] named in the [address]" box in the ACP activity, but it is not visible above, be sure to open that field and then select the health care decision maker relationship (ie \"primary\") in the blank space to the right of the name.)    Note: Assess and validate information in current ACP documents, as indicated. If no Authorized Decision Maker has previously been identified, then patient chooses [address]:  \"Who would you like to name as your primary health care decision-maker? \"    Name: Dannial Scheuermann   Relationship: wife Phone number:   Dom Aguirre this person be reached easily? \" YES  \"Who would you like to name as your back-up decision maker? \"   Name:   Relationship:  Phone number:   \"Can this person be reached easily? \" NO    Note: If the relationship of these Decision-Makers to the patient does NOT follow your state's Next of Kin hierarchy, recommend that patient complete ACP document that meets state-specific requirements to allow them to act on the patient's behalf when appropriate. Care Preferences:    Hospitalization: \"If your health worsens and it becomes clear that your chance of recovery is unlikely, what would your preference be regarding hospitalization? \"  The patient would prefer hospitalization. Ventilation: \"If you were in your present state of health and suddenly became very ill and were unable to breathe on your own, what would your preference be about the use of a ventilator (breathing machine) if it was available to you? \"    The patient would desire the use of a ventilator.     \"If your health worsens and it becomes clear that your chance of recovery is unlikely, what would your preference be about the use of a ventilator (breathing machine) if it was available to you? \"   yes      Resuscitation:  \"CPR works best to restart the heart when there is a sudden event, like a heart attack, in someone who is otherwise healthy. Unfortunately, CPR does not typically restart the heart for people who have serious health conditions or who are very sick. \"    \"In the event your heart stopped as a result of an underlying serious health condition, would you want attempts to be made to restart your heart (answer \"yes\" for attempt to resuscitate) or would you prefer a natural death (answer \"no\" for do not attempt to resuscitate)? \"   Yes, attempt to resuscitate. NOTE: If the patient has a valid advance directive AND provides care preference(s) that are inconsistent with that prior directive, advise the patient to consider either: creating a new advance directive that complies with state-specific requirements; or, if that is not possible, orally revoking that prior directive in accordance with state-specific requirements, which must be documented in the EHR.     Conversation Outcomes / Follow-Up Plan:   ACP complete - no further action today      Length of Voluntary ACP Conversation in minutes:  16 minutes      Dianna Shaw MD

## 2020-11-24 NOTE — PROGRESS NOTES
Sasha Colni presents today for   Chief Complaint   Patient presents with   [facility] Annual Wellness Visit    Hypertension    Labs     11-17-20              Coordination of Care:  1. Have you been to the ER, urgent care clinic since your last visit? Hospitalized since your last visit? no    2. Have you seen or consulted any other health care providers outside of the [address] [place] [address] since your last visit? Include any pap smears or colon screening.  no

## 2020-12-03 ENCOUNTER — TELEPHONE (OUTPATIENT)
Dept: VASCULAR SURGERY | Age: 75
End: 2020-12-03

## 2020-12-03 NOTE — TELEPHONE ENCOUNTER
Patient called to get details of the stent that was put in because patient is having an MRI done on Monday, December 7. Also wanted to give the office a heads up that his doctor will be sending a request if he needs to stop taking his blood thinners for injection. Informed patient will try to get information and call him back.

## 2020-12-28 DIAGNOSIS — M54.12 CERVICAL RADICULOPATHY: Primary | ICD-10-CM

## 2020-12-28 NOTE — TELEPHONE ENCOUNTER
[facility] date for Norco as 11/20/20 for a 30 d/s. UDS done 11/18/19  CSA signed 8/9/19    Last Visit: 11/24/20 with MD Maria T Diaz  Next Appointment: 5/21/21 with MD Maria T Diaz  Previous Refill Encounter(s): 11/20/20 #180    Requested Prescriptions     Pending Prescriptions Disp Refills    HYDROcodone-acetaminophen (NORCO) 5-325 mg per tablet 180 Tab 0     Sig: Take 1 Tab by mouth every four (4) hours as needed (chronic pain) for up to 30 days. Max Daily Amount: 6 Tabs.

## 2020-12-30 RX ORDER — HYDROCODONE BITARTRATE AND ACETAMINOPHEN 5; 325 MG/1; MG/1
1 TABLET ORAL
Qty: 180 TAB | Refills: 0 | Status: SHIPPED | OUTPATIENT
Start: 2020-12-30 | End: 2021-01-29

## 2021-01-06 ENCOUNTER — OFFICE VISIT (OUTPATIENT)
Dept: ORTHOPEDIC SURGERY | Age: 76
End: 2021-01-06
Payer: MEDICARE

## 2021-01-06 VITALS
SYSTOLIC BLOOD PRESSURE: 121 MMHG | RESPIRATION RATE: 16 BRPM | HEIGHT: 72 IN | BODY MASS INDEX: 30.61 KG/M2 | OXYGEN SATURATION: 98 % | TEMPERATURE: 98.5 F | DIASTOLIC BLOOD PRESSURE: 67 MMHG | HEART RATE: 67 BPM | WEIGHT: 226 LBS

## 2021-01-06 DIAGNOSIS — G89.29 CHRONIC PAIN OF RIGHT KNEE: ICD-10-CM

## 2021-01-06 DIAGNOSIS — M17.11 PRIMARY OSTEOARTHRITIS OF RIGHT KNEE: Primary | ICD-10-CM

## 2021-01-06 DIAGNOSIS — M25.461 EFFUSION OF RIGHT KNEE: ICD-10-CM

## 2021-01-06 DIAGNOSIS — M25.561 CHRONIC PAIN OF RIGHT KNEE: ICD-10-CM

## 2021-01-06 PROCEDURE — 1101F PT FALLS ASSESS-DOCD LE1/YR: CPT | Performed by: SPECIALIST

## 2021-01-06 PROCEDURE — G8510 SCR DEP NEG, NO PLAN REQD: HCPCS | Performed by: SPECIALIST

## 2021-01-06 PROCEDURE — 3017F COLORECTAL CA SCREEN DOC REV: CPT | Performed by: SPECIALIST

## 2021-01-06 PROCEDURE — 20610 DRAIN/INJ JOINT/BURSA W/O US: CPT | Performed by: SPECIALIST

## 2021-01-06 PROCEDURE — G8427 DOCREV CUR MEDS BY ELIG CLIN: HCPCS | Performed by: SPECIALIST

## 2021-01-06 PROCEDURE — G8417 CALC BMI ABV UP PARAM F/U: HCPCS | Performed by: SPECIALIST

## 2021-01-06 PROCEDURE — 99213 OFFICE O/P EST LOW 20 MIN: CPT | Performed by: SPECIALIST

## 2021-01-06 PROCEDURE — G8536 NO DOC ELDER MAL SCRN: HCPCS | Performed by: SPECIALIST

## 2021-01-06 RX ORDER — BETAMETHASONE SODIUM PHOSPHATE AND BETAMETHASONE ACETATE 3; 3 MG/ML; MG/ML
3 INJECTION, SUSPENSION INTRA-ARTICULAR; INTRALESIONAL; INTRAMUSCULAR; SOFT TISSUE ONCE
Status: COMPLETED | OUTPATIENT
Start: 2021-01-06 | End: 2021-01-06

## 2021-01-06 RX ADMIN — BETAMETHASONE SODIUM PHOSPHATE AND BETAMETHASONE ACETATE 3 MG: 3; 3 INJECTION, SUSPENSION INTRA-ARTICULAR; INTRALESIONAL; INTRAMUSCULAR; SOFT TISSUE at 09:40

## 2021-01-06 NOTE — PROGRESS NOTES
Patient: Mt Atwood                MRN: [id number]       SSN: [id number]  YOB: 1945        AGE: 76 y.o. SEX: male    PCP: Verenice Elmore MD  01/06/21    CC: RIGHT KNEE PAIN AND EFFUSION    HISTORY:  Mt Atwood is a 76 y.o. male who is seen for increased right knee pain and swelling. He thinks he may have aggravated his right knee while walking on his treadmill recently. He has been experiencing increasing knee pain for the past few years. He is not exercising frequently because of his knee pain. He reports relief from previous cortisone and visco supplementation injections by Dr. Feliciano Geronimo in the past.  He says his knee has not been able to completely extend his leg since 1964. He injured his right knee playing football and underwent total meniscectomy and arthrotomy by Dr. Fely Gonsalez. He completed successful Euflexxa series on 10/14/19 and 7/29/20. He was previously seen for neck pain. His neck pain radiates into his left shoulder. He experiences neck and trapezius tightness and discomort. He does not recall any neck injury. He was previously seen for left hip pain. He is s/p right endarterectomy for circulation problems in July 2019 by Dr. Jono pichardo. He currently takes Plavix & a diuretic. He has a stent in his right femoral artery. He reports a blockage in his left [facility]. He was seen recently by Dr. Rico Grijalva for his left shoulder--responded to an injection. Pain Assessment  1/6/2021   Location of Pain Knee   Location Modifiers Right   Severity of Pain 7   Quality of Pain Aching; Sharp   Quality of Pain Comment -   Duration of Pain Persistent   Frequency of Pain Constant   Date Pain First Started -   Aggravating Factors Bending;Walking   Aggravating Factors Comment -   Limiting Behavior Yes   Relieving Factors Other (Comment)   Relieving Factors Comment gel and pain meds.    Result of Injury No   Work-Related Injury -   Type of Injury -   Type of Injury Comment -     Occupation, etc:  Mr. Metcalf he lives with his wife in [facility]. He has 2 sons not in the area. His youngest son lives in [place]. His middle son lives in [place] and is a [employer]  for [employer] and he is considering moving to [place]. His middle son's wife has Kin's disease. He has 7 grandchildren. He retired 15 years ago as a  for the [employer] He retired in 2013 from [employer]. 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 He states that he has respiratory problems due to asbestos exposure. He reports that he cut his right leg a few months ago. His wound took 2 months to heal. He enjoys doing water exercises. He has been walking a mile everyday. His eldest son passed away on 6/17/19 in [place]. He is unsure of the cause of his son's death. He is not diabetic. He has h/o peripheral vascular disease. Current weight is 226 pounds. He is 6' tall.       Lab Results   Component Value Date/Time    Hemoglobin A1c 5.4 11/17/2020 07:48 AM    Hemoglobin A1c (POC) 5.5 05/23/2019 09:05 AM     Weight Metrics 1/6/2021 11/24/2020 11/12/2020 10/27/2020 10/23/2020 7/29/2020 7/22/2020   Weight 226 lb 221 lb 220 lb 220 lb 222 lb 9.6 oz 216 lb 6.4 oz 220 lb 9.6 oz   BMI 30.65 kg/m2 29.97 kg/m2 29.84 kg/m2 29.84 kg/m2 30.19 kg/m2 29.35 kg/m2 29.92 kg/m2       Patient Active Problem List   Diagnosis Code    Colitis, ulcerative ([facility] [address] K51.90    Colon polyps K63.5    Left carotid artery occlusion I65.22    Hypovitaminosis D E55.9    Advance directive in chart Z78.9    Hypertension I11.9    Dyslipidemia E78.5    Coronary artery disease I25.10    Atrial fibrillation I48.91    Recurrent umbilical hernia B54.3    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 9) E66.3    History of prostate cancer Z85.46    Aortoiliac occlusive disease ([facility]) I74.09    PAD (peripheral artery disease) ([facility]) I73.9     REVIEW OF SYSTEMS: All Below are Negative except: See HPI   Constitutional: negative for fever, chills, and weight loss. Cardiovascular: negative for chest pain, claudication, leg swelling, SOB, DUONG   Gastrointestinal: Negative for pain, N/V/C/D, Blood in stool or urine, dysuria,  hematuria, incontinence, pelvic pain. Musculoskeletal: See HPI   Neurological: Negative for dizziness and weakness. Negative for headaches, Visual changes, confusion, seizures   Phychiatric/Behavioral: Negative for depression, memory loss, substance  abuse. Extremities: Negative for hair changes, rash, or skin lesion changes. Hematologic: Negative for bleeding problems, bruising, pallor or swollen lymph  nodes   Peripheral Vascular: No calf pain, no circulation deficits.     Social History     Socioeconomic History    Marital status:      Spouse name: Not on file    Number of children: Not on file    Years of education: Not on file    Highest education level: Not on file   Occupational History    Not on file   Social Needs    Financial resource strain: Not on file    Food insecurity     Worry: Not on file     Inability: Not on file    Transportation needs     Medical: Not on file     Non-medical: Not on file   Tobacco Use    Smoking status: Former Smoker     Packs/day: 1.50     Years: 25.00     Pack years: 37.50     Types: Cigarettes     Quit date: 2003     Years since quittin.0    Smokeless tobacco: Never Used   Substance and Sexual Activity    Alcohol use: Yes     Comment: RARELY    Drug use: No    Sexual activity: Yes     Partners: Female     Birth control/protection: None   Lifestyle    Physical activity     Days per week: Not on file     Minutes per session: Not on file    Stress: Not on file   Relationships    Social connections     Talks on phone: Not on file     Gets together: Not on file     Attends Orthodoxy service: Not on file     Active member of club or organization: Not on file     Attends meetings of clubs or organizations: Not on file     Relationship status: Not on file    Intimate partner violence     Fear of current or ex partner: Not on file     Emotionally abused: Not on file     Physically abused: Not on file     Forced sexual activity: Not on file   Other Topics Concern    Not on file   Social History Narrative    Not on file      Allergies   Allergen Reactions    Altace [Ramipril] Unknown (comments)     Pt does not remember reaction    Lipitor [Atorvastatin] Other (comments)     Muscle pain    Lisinopril Shortness of Breath and Other (comments)     Turns bright red    Other Medication Other (comments)     Vicryl suture on skin tends to be rejected with poor wound healing does better with monocryl    Procardia [Nifedipine] Other (comments)     Caused afib    Rosuvastatin Other (comments)     Muscle Pain        Current Outpatient Medications   Medication Sig    HYDROcodone-acetaminophen (NORCO) 5-325 mg per tablet Take 1 Tab by mouth every four (4) hours as needed (chronic pain) for up to 30 days. Max Daily Amount: 6 Tabs.  clopidogreL (PLAVIX) 75 mg tab Take 1 Tab by mouth daily.  losartan (COZAAR) 50 mg tablet Take 50 mg by mouth two (2) times a day.  carvediloL (Coreg) 25 mg tablet Take 25 mg by mouth two (2) times daily (with meals).  ezetimibe-simvastatin (VYTORIN) 10-40 mg per tablet TAKE 1 TABLET NIGHTLY    LORazepam (Ativan) 1 mg tablet Take 1 Tab by mouth every eight (8) hours as needed for Anxiety.  diclofenac (Voltaren) 1 % gel Apply 4 g to affected area four (4) times daily.  Right knee    gabapentin (NEURONTIN) 300 mg capsule 1 cap QHS  Indications: neuropathic pain  icosapent ethyL (VASCEPA) 1 gram capsule Take 2 Caps by mouth two (2) times daily (with meals).  colestipol (COLESTID) 1 gram tablet 1 g daily as needed.  aspirin delayed-release 81 mg tablet Take 81 mg by mouth daily.  triamterene-hydrochlorothiazide (MAXZIDE) 37.5-25 mg per tablet TAKE ONE TABLET BY MOUTH DAILY    Cholecalciferol, Vitamin D3, 1,000 unit cap Take 1,000 Units by mouth daily.  MULTIVITAMIN PO Take  by mouth daily.  pantoprazole (PROTONIX) 40 mg tablet Take 40 mg by mouth daily.  coenzyme q10 200 mg Cap Take  by mouth daily. No current facility-administered medications for this visit.        PHYSICAL EXAMINATION:  Visit Vitals  /67 (BP 1 Location: Right arm)   Pulse 67   Temp 98.5 °F (36.9 °C)   Resp 16   Ht 6' (1.829 m)   Wt 226 lb (102.5 kg)   SpO2 98%   BMI 30.65 kg/m²      ORTHO EXAMINATION:  Examination Right knee Left knee   Skin Intact Intact   Range of motion 110-0 120-0   Effusion 3+ -   Medial joint line tenderness + -   Lateral joint line tenderness - -   Popliteal tenderness - -   Osteophytes palpable + -   Kylahs - -   Patella crepitus + -   Anterior drawer - -   Lateral laxity - -   Medial laxity - -   Varus deformity + -   Valgus deformity - -   Pretibial edema - -   Calf tenderness - -   Brisk capilary refill, palpable pulse    Examination Neck   Skin Intact   Tenderness +, left paracervical and trapezius   Tightness +, left paracervical and trapezius   Flexion Full   Extension Absent   Lateral bend left Normal   Lateral bend right Normal   Masses -   Biceps reflex Normal   Triceps reflex Normal   Brachioradialis reflex Normal     Chart reviewed for the following:   IPrincess Denis MD, have reviewed the History, Physical and updated the Allergic reactions for [address] performed immediately prior to start of procedure:  Elaina Vega MD, have performed the following reviews on [facility] prior to the start of the procedure:            * Patient was identified by name and date of birth   * Agreement on procedure being performed was verified  * Risks and Benefits explained to the patient  * Procedure site verified and marked as necessary  * Patient was positioned for comfort  * Consent was obtained     Time: 9:29 AM     Date of procedure: 1/6/2021    Procedure performed by:  Sole Almonte MD    Mr. Devaughn Wilson tolerated the procedure well with no complications. RADIOGRAPHS:  XR KLAUS HIP 11/7/18 SAWYER  IMPRESSION:  AP pelvis and two views - No fractures, moderate joint space narrowing, acetabular osteophytes present. Tonnis grade 2. Femoral acetabular impingement syndrome     XR RIGHT KNEE 4/13/18 SAWYER  IMPRESSION:  Three views - No fractures, no effusion, severe end stage with lateral subluxation joint space narrowing, + osteophytes present. IKDC Grade C. calcification of arteries    IMPRESSION:      ICD-10-CM ICD-9-CM    1. Primary osteoarthritis of right knee  M17.11 715.16 betamethasone (CELESTONE) injection 3 mg      DRAIN/INJECT LARGE JOINT/BURSA      PROCEDURE AUTHORIZATION TO    2. Chronic pain of right knee  M25.561 719.46     G89.29 338.29    3. Effusion of right knee  M25.461 719.06      PLAN: Consider visco supplementation if pain continues. After timeout and under sterile conditions, right knee aspirated 70 cc of light yellow fluid. The fluid was discarded. After discussing treatment options, patient's right knee was injected with 4 cc Marcaine and 1/2 cc Celestone. There is no need for surgery at this time. He will follow up as needed.     Scribed by Bladimir Alfaro ([facility]) as dictated by Sole Almonte MD

## 2021-01-25 ENCOUNTER — PATIENT MESSAGE (OUTPATIENT)
Dept: INTERNAL MEDICINE CLINIC | Age: 76
End: 2021-01-25

## 2021-01-26 DIAGNOSIS — M50.30 DEGENERATION OF CERVICAL INTERVERTEBRAL DISC: ICD-10-CM

## 2021-01-26 DIAGNOSIS — M48.02 CERVICAL SPINAL STENOSIS: ICD-10-CM

## 2021-01-26 DIAGNOSIS — M47.812 FACET ARTHRITIS OF CERVICAL REGION: ICD-10-CM

## 2021-01-26 NOTE — TELEPHONE ENCOUNTER
----- Message from [address] [place] sent at 1/26/2021  9:55 AM EST -----  Regarding: RE: Prescription Question  Contact: [phone]  Please send the new Rx,s for decreased Gabapentin to [facility] on [address]. Pls have your office let me know I have heard nothing from [facility]. I am sorry if I was unclear in regards to narcotics, it was Dr. Laxmi Anderson who indicated they did not do narcotics.     Thank you Ebb Bence

## 2021-01-26 NOTE — TELEPHONE ENCOUNTER
Jt Garcia MD  to Vinh Rodas          10:18 AM  Forgive me but my records show that my office has been giving the narcotics     We can dec the gabapentin to 200 for 4 weeks then 100 for 2 weeks then stop it    Last read by Marlin Boyd at 9:53 AM on 1/26/2021.

## 2021-01-28 RX ORDER — GABAPENTIN 100 MG/1
CAPSULE ORAL
Qty: 75 CAP | Refills: 0 | Status: SHIPPED | OUTPATIENT
Start: 2021-01-28 | End: 2021-04-16

## 2021-01-28 NOTE — TELEPHONE ENCOUNTER
From: Ludger Hams [place]  To: Justyna Guzman MD  Sent: 1/25/2021 9:40 AM EST  Subject: Non-Urgent Medical Question    Please let me know if the Rx for the lower doses of Gabapentin has been sent. Also please let me know when you will have Covid 19 vaccine available, and when appointments might begin.     Thank [address] [place] [address]

## 2021-01-28 NOTE — TELEPHONE ENCOUNTER
Apologies for delay    Script sent    Call [phone] starting Monday FEB 1    The scheduling nurse will triage and attempt to get you on the vaccination schedule

## 2021-02-07 ENCOUNTER — PATIENT MESSAGE (OUTPATIENT)
Dept: VASCULAR SURGERY | Age: 76
End: 2021-02-07

## 2021-02-08 DIAGNOSIS — M54.12 CERVICAL RADICULOPATHY: ICD-10-CM

## 2021-02-08 NOTE — TELEPHONE ENCOUNTER
----- Message from [address] [place] sent at 2/7/2021  9:40 PM EST -----  Regarding: Prescription Question  Contact: [phone]  I need a refill for my hydrocodone/acetaminophen 5/325, 180 for chronic pain.     Thank [address] [place] [address]

## 2021-02-08 NOTE — TELEPHONE ENCOUNTER
From: Butch Staton  To: Ed Lewis, [address]  Sent: 2/7/2021 9:35 PM EST  Subject: Update Medical Information    Ly Paulson, I am scheduled for Covid vaccine on 10th. the CDC has a question about giving a shot intramuscular, they say if a physician familiar with the patients bleeding risk determines the vaccine can be administered Intramuscularly, with reasonable safety   I have been on Plavix and aspirin for for over two years and never had a bleeding problem, I have had my flu shot for this year and I have had fluid taken off of my knee with no bleeding or bruising issues. Would you please send me a note via My Chart or to my email stating it is safe for me to receive the injection. I do know that getting Covid would probably kill me, but I highly doubt the injected will cause me a bleeding issue. Meg More email is [email or web address]  Ashley Sorensen

## 2021-02-08 NOTE — TELEPHONE ENCOUNTER
To my knowledge there should not be any increased risk of bleeding with the COVID 19 vaccine as compared to any other intramuscular injection. If you have not had issues previously with IM injections then should be safe for COVID vaccine. Can also check with PCP if further questions or concerns.

## 2021-02-09 ENCOUNTER — PATIENT MESSAGE (OUTPATIENT)
Dept: INTERNAL MEDICINE CLINIC | Age: 76
End: 2021-02-09

## 2021-02-09 ENCOUNTER — TELEPHONE (OUTPATIENT)
Dept: INTERNAL MEDICINE CLINIC | Age: 76
End: 2021-02-09

## 2021-02-09 DIAGNOSIS — Z79.899 MEDICATION MANAGEMENT: ICD-10-CM

## 2021-02-09 DIAGNOSIS — M54.12 CERVICAL RADICULOPATHY: ICD-10-CM

## 2021-02-09 DIAGNOSIS — Z79.899 MEDICATION MANAGEMENT: Primary | ICD-10-CM

## 2021-02-09 RX ORDER — HYDROCODONE BITARTRATE AND ACETAMINOPHEN 5; 325 MG/1; MG/1
1 TABLET ORAL
Qty: 180 TAB | Refills: 0 | Status: SHIPPED | OUTPATIENT
Start: 2021-02-09 | End: 2021-03-11

## 2021-02-09 NOTE — TELEPHONE ENCOUNTER
----- Message from [address] [place] sent at 2/9/2021  6:12 AM EST -----  Regarding: Prescription Question  Contact: [phone]  I requested a refill on hydrocodone/acetaminophen a couple of days ago. I will be out of this medicine in two days. At times the pharmacy, has to order the meds because they do not have enough to fill, and will not do partial on narcotics, and it takes up to three or four days for them to get the medication in stock. Please send this in, if for some reason you are not going to refill this prescription, please contact me.     Respectfully  Wileen Carrel

## 2021-02-09 NOTE — TELEPHONE ENCOUNTER
Patient stating he ate a salad with poppyseed dressing on it 3 days ago. Wants to know if that will show up on UDS tomorrow.

## 2021-02-09 NOTE — TELEPHONE ENCOUNTER
Patient is aware per Dr. Theron Hall it is unlikely poppyseed with show in UDS tomorrow. Patient verbalizes understanding.

## 2021-02-09 NOTE — TELEPHONE ENCOUNTER
Last fill per  12/20/20  Last ov 11/24/2020  Last UDS 11/18/2019        Reviewed report generated by the [facility]. Does not demonstrate aberrancies or inconsistencies with regard to the prescribing of controlled medications to this patient by other providers.

## 2021-02-10 RX ORDER — HYDROCODONE BITARTRATE AND ACETAMINOPHEN 5; 325 MG/1; MG/1
1 TABLET ORAL
Qty: 180 TAB | Refills: 0 | OUTPATIENT
Start: 2021-02-10 | End: 2021-03-12

## 2021-02-11 ENCOUNTER — LAB ONLY (OUTPATIENT)
Dept: INTERNAL MEDICINE CLINIC | Age: 76
End: 2021-02-11

## 2021-02-11 ENCOUNTER — HOSPITAL ENCOUNTER (OUTPATIENT)
Dept: LAB | Age: 76
Discharge: HOME | End: 2021-02-11
Payer: MEDICARE

## 2021-02-11 DIAGNOSIS — I11.9 BENIGN HYPERTENSIVE HEART DISEASE WITHOUT HEART FAILURE: Primary | ICD-10-CM

## 2021-02-11 DIAGNOSIS — R73.01 IFG (IMPAIRED FASTING GLUCOSE): ICD-10-CM

## 2021-02-11 DIAGNOSIS — E78.5 DYSLIPIDEMIA: ICD-10-CM

## 2021-02-11 PROCEDURE — 80307 DRUG TEST PRSMV CHEM ANLYZR: CPT

## 2021-02-11 PROCEDURE — 80361 OPIATES 1 OR MORE: CPT

## 2021-02-16 LAB
AMPHETAMINES UR QL SCN: NEGATIVE NG/ML
BARBITURATES UR QL SCN: NEGATIVE NG/ML
BENZODIAZ UR QL SCN: NEGATIVE NG/ML
BZE UR QL SCN: NEGATIVE NG/ML
CANNABINOIDS UR QL SCN: NEGATIVE NG/ML
CODEINE, 737848: NEGATIVE
CREAT UR-MCNC: 44.2 MG/DL (ref 20–300)
FENTANYL+NORFENTANYL UR QL SCN: NEGATIVE PG/ML
HYDROCODONE CONFIRM, 737855: 529 NG/ML
HYDROCODONE, 737854: POSITIVE
HYDROMORPHONE, 737852: NEGATIVE
MEPERIDINE UR QL: NEGATIVE NG/ML
METHADONE UR QL SCN: NEGATIVE NG/ML
MORPHINE, 737850: NEGATIVE
OPIATES UR QL SCN: NORMAL NG/ML
OPIATES, 737847: POSITIVE NG/ML
OXYCODONE+OXYMORPHONE UR QL SCN: NEGATIVE NG/ML
PCP UR QL: NEGATIVE NG/ML
PH UR: 7 [PH] (ref 4.5–8.9)
PLEASE NOTE:, 733157: NORMAL
PROPOXYPH UR QL SCN: NEGATIVE NG/ML
SP GR UR: 1.01
TRAMADOL UR QL SCN: NEGATIVE NG/ML

## 2021-03-01 DIAGNOSIS — F41.9 ANXIETY: ICD-10-CM

## 2021-03-01 NOTE — TELEPHONE ENCOUNTER
PCP: Liza Le MD    Last appt: 5/26/2020  Future Appointments   Date Time Provider Mary Reyes   5/18/2021  8:00 AM [facility] NURSE VISIT [facility] BS AMB   5/25/2021  8:00 AM Liza Le MD [facility] BS AMB   10/19/2021  8:40 AM [facility] NURSE [facility] SHERMAN PALUMBO   10/26/2021  8:45 AM Sheryle Holster, MD [place]       Requested Prescriptions     Pending Prescriptions Disp Refills    LORazepam (Ativan) 1 mg tablet 50 Tab 0     Sig: Take 1 Tab by mouth every eight (8) hours as needed for Anxiety.       Reviewed  Last fill 9/14/2020 qty of 50 for 16 d/s  Last UDS: 2/11/2021  CSA: 8/16/2019

## 2021-03-05 RX ORDER — LORAZEPAM 1 MG/1
1 TABLET ORAL
Qty: 50 TAB | Refills: 0 | Status: SHIPPED | OUTPATIENT
Start: 2021-03-05 | End: 2021-04-23 | Stop reason: SDUPTHER

## 2021-03-16 DIAGNOSIS — M54.12 CERVICAL RADICULOPATHY: ICD-10-CM

## 2021-03-16 DIAGNOSIS — M48.02 CERVICAL SPINAL STENOSIS: Primary | ICD-10-CM

## 2021-03-16 RX ORDER — HYDROCODONE BITARTRATE AND ACETAMINOPHEN 5; 325 MG/1; MG/1
1 TABLET ORAL
Qty: 180 TAB | Refills: 0 | Status: SHIPPED | OUTPATIENT
Start: 2021-03-16 | End: 2021-04-16

## 2021-03-16 NOTE — TELEPHONE ENCOUNTER
[facility] Norco as 2/9/21 for a 30 d/s. UDS done 2/11/21  CSA signed 8/9/19    Last Visit: 11/24/20 with MD Gema Bedoya  Next Appointment: 5/25/21 with MD Gema Bedoya  Previous Refill Encounter(s): 2/9/21 #180    Requested Prescriptions     Pending Prescriptions Disp Refills    HYDROcodone-acetaminophen (NORCO) 5-325 mg per tablet 180 Tab 0     Sig: Take 1 Tab by mouth every four (4) hours as needed (chronic pain) for up to 30 days. Max Daily Amount: 6 Tabs.

## 2021-03-31 ENCOUNTER — OFFICE VISIT (OUTPATIENT)
Dept: ORTHOPEDIC SURGERY | Age: 76
End: 2021-03-31
Payer: MEDICARE

## 2021-03-31 VITALS
WEIGHT: 225.4 LBS | HEIGHT: 72 IN | TEMPERATURE: 97.5 F | HEART RATE: 57 BPM | RESPIRATION RATE: 16 BRPM | BODY MASS INDEX: 30.53 KG/M2 | OXYGEN SATURATION: 97 %

## 2021-03-31 DIAGNOSIS — M25.461 EFFUSION OF RIGHT KNEE: ICD-10-CM

## 2021-03-31 DIAGNOSIS — M17.11 PRIMARY OSTEOARTHRITIS OF RIGHT KNEE: Primary | ICD-10-CM

## 2021-03-31 DIAGNOSIS — G89.29 CHRONIC PAIN OF RIGHT KNEE: ICD-10-CM

## 2021-03-31 DIAGNOSIS — M25.561 CHRONIC PAIN OF RIGHT KNEE: ICD-10-CM

## 2021-03-31 PROCEDURE — G8427 DOCREV CUR MEDS BY ELIG CLIN: HCPCS | Performed by: SPECIALIST

## 2021-03-31 PROCEDURE — 99213 OFFICE O/P EST LOW 20 MIN: CPT | Performed by: SPECIALIST

## 2021-03-31 PROCEDURE — 1101F PT FALLS ASSESS-DOCD LE1/YR: CPT | Performed by: SPECIALIST

## 2021-03-31 PROCEDURE — G8432 DEP SCR NOT DOC, RNG: HCPCS | Performed by: SPECIALIST

## 2021-03-31 PROCEDURE — 3017F COLORECTAL CA SCREEN DOC REV: CPT | Performed by: SPECIALIST

## 2021-03-31 PROCEDURE — G8417 CALC BMI ABV UP PARAM F/U: HCPCS | Performed by: SPECIALIST

## 2021-03-31 PROCEDURE — 20610 DRAIN/INJ JOINT/BURSA W/O US: CPT | Performed by: SPECIALIST

## 2021-03-31 PROCEDURE — G8536 NO DOC ELDER MAL SCRN: HCPCS | Performed by: SPECIALIST

## 2021-03-31 NOTE — PROGRESS NOTES
Patient: Carey Velarde                MRN: [id number]       SSN: [id number]  YOB: 1945        AGE: 76 y.o. SEX: male    PCP: Ivan Pierre MD  03/31/21    CC: RIGHT KNEE PAIN AND EFFUSION    HISTORY:  Carey Velarde is a 76 y.o. male who is seen for increased right knee pain and swelling. He thinks he may have aggravated his right knee while walking on his treadmill recently. He has been experiencing increasing knee pain for the past few years. He is not exercising frequently because of his knee pain. He reports relief from previous cortisone and visco supplementation injections by Dr. Oanh Harrington in the past.  He says his knee has not been able to completely extend his leg since 1964. He injured his right knee playing football and underwent total meniscectomy and arthrotomy by Dr. Tianna Bustillo. He completed successful Euflexxa series on 10/14/19 and 7/29/20. He was is also seen for neck pain. His neck pain radiates into his left shoulder. He experiences neck and trapezius tightness and discomort. He does not recall any neck injury. He previously saw Dr. Tami Aquino. He is now seen at [facility] for his neck and back pain. He was previously seen for left hip pain. He is s/p right endarterectomy for circulation problems in July 2019 by Dr. Merced pichardo. He currently takes Plavix & a diuretic. He has a stent in his right femoral artery. He reports a blockage in his left [facility]. He was seen recently by Dr. Juana Azul for his left shoulder--responded to an injection.      Pain Assessment  3/31/2021   Location of Pain Knee   Location Modifiers Right   Severity of Pain 6   Quality of Pain Other (Comment)   Quality of Pain Comment stabbing   Duration of Pain Persistent   Frequency of Pain Constant   Date Pain First Started -   Aggravating Factors Walking;Bending   Aggravating Factors Comment -   Limiting Behavior Yes   Relieving Factors Other (Comment)   Relieving Factors Comment pain medication and gel   Result of Injury -   Work-Related Injury -   Type of Injury -   Type of Injury Comment -     Occupation, etc:  Mr. Jose Carlos Salinas he lives with his wife in [facility]. He has 2 sons not in the area. His youngest son lives in [facility]. His middle son lives in [place] and is a Gn[employer]ist  for [employer] and he is considering moving to [place]. His middle son's wife has Kin's disease. He has 7 grandchildren. He retired 15 years ago as a  for the [employer] He retired in 2013 from [employer]. 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 He states that he has respiratory problems due to asbestos exposure. He reports that he cut his right leg a few months ago. His wound took 2 months to heal. He enjoys doing water exercises. He has been walking a mile everyday. He recently planted trees in his yard. His eldest son passed away on 6/17/19 in [place]. He is unsure of the cause of his son's death. He is not diabetic. He has h/o peripheral vascular disease. Current weight is 225 pounds. He is 6' tall. He received his Covid vaccine.      Lab Results   Component Value Date/Time    Hemoglobin A1c 5.4 11/17/2020 07:48 AM    Hemoglobin A1c (POC) 5.5 05/23/2019 09:05 AM     Weight Metrics 3/31/2021 1/6/2021 11/24/2020 11/12/2020 10/27/2020 10/23/2020 7/29/2020   Weight 225 lb 6.4 oz 226 lb 221 lb 220 lb 220 lb 222 lb 9.6 oz 216 lb 6.4 oz   BMI 30.57 kg/m2 30.65 kg/m2 29.97 kg/m2 29.84 kg/m2 29.84 kg/m2 30.19 kg/m2 29.35 kg/m2       Patient Active Problem List   Diagnosis Code    Colitis, ulcerative (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 9) E66.3    History of prostate cancer Z85.46    Aortoiliac occlusive disease ([facility]) I74.09    PAD (peripheral artery disease) ([facility]) I73.9     REVIEW OF SYSTEMS: All Below are Negative except: See HPI   Constitutional: negative for fever, chills, and weight loss. Cardiovascular: negative for chest pain, claudication, leg swelling, SOB, DUONG   Gastrointestinal: Negative for pain, N/V/C/D, Blood in stool or urine, dysuria,  hematuria, incontinence, pelvic pain. Musculoskeletal: See HPI   Neurological: Negative for dizziness and weakness. Negative for headaches, Visual changes, confusion, seizures   Phychiatric/Behavioral: Negative for depression, memory loss, substance  abuse. Extremities: Negative for hair changes, rash, or skin lesion changes. Hematologic: Negative for bleeding problems, bruising, pallor or swollen lymph  nodes   Peripheral Vascular: No calf pain, no circulation deficits.     Social History     Socioeconomic History    Marital status:      Spouse name: Not on file    Number of children: Not on file    Years of education: Not on file    Highest education level: Not on file   Occupational History    Not on file   Social Needs    Financial resource strain: Not on file    Food insecurity     Worry: Not on file     Inability: Not on file    Transportation needs     Medical: Not on file     Non-medical: Not on file   Tobacco Use    Smoking status: Former Smoker     Packs/day: 1.50     Years: 25.00     Pack years: 37.50     Types: Cigarettes     Quit date: 2003     Years since quittin.2    Smokeless tobacco: Never Used   Substance and Sexual Activity    Alcohol use: Yes     Comment: RARELY    Drug use: No    Sexual activity: Yes     Partners: Female     Birth control/protection: None   Lifestyle    Physical activity     Days per week: Not on file     Minutes per session: Not on file    Stress: Not on file   Relationships    Social connections     Talks on phone: Not on file     Gets together: Not on file     Attends Restoration service: Not on file     Active member of club or organization: Not on file     Attends meetings of clubs or organizations: Not on file     Relationship status: Not on file    Intimate partner violence     Fear of current or ex partner: Not on file     Emotionally abused: Not on file     Physically abused: Not on file     Forced sexual activity: Not on file   Other Topics Concern    Not on file   Social History Narrative    Not on file      Allergies   Allergen Reactions    Altace [Ramipril] Unknown (comments)     Pt does not remember reaction    Lipitor [Atorvastatin] Other (comments)     Muscle pain    Lisinopril Shortness of Breath and Other (comments)     Turns bright red    Other Medication Other (comments)     Vicryl suture on skin tends to be rejected with poor wound healing does better with monocryl    Procardia [Nifedipine] Other (comments)     Caused afib    Rosuvastatin Other (comments)     Muscle Pain        Current Outpatient Medications   Medication Sig    HYDROcodone-acetaminophen (NORCO) 5-325 mg per tablet Take 1 Tab by mouth every four (4) hours as needed (chronic pain) for up to 30 days. Max Daily Amount: 6 Tabs.  LORazepam (Ativan) 1 mg tablet Take 1 Tab by mouth every eight (8) hours as needed for Anxiety.  clopidogreL (PLAVIX) 75 mg tab Take 1 Tab by mouth daily.  losartan (COZAAR) 50 mg tablet Take 50 mg by mouth two (2) times a day.  carvediloL (Coreg) 25 mg tablet Take 25 mg by mouth two (2) times daily (with meals).     ezetimibe-simvastatin (VYTORIN) 10-40 mg per tablet TAKE 1 TABLET NIGHTLY    diclofenac (Voltaren) 1 % gel Apply 4 g to affected area four (4) times daily. Right knee    icosapent ethyL (VASCEPA) 1 gram capsule Take 2 Caps by mouth two (2) times daily (with meals).  colestipol (COLESTID) 1 gram tablet 1 g daily as needed.  aspirin delayed-release 81 mg tablet Take 81 mg by mouth daily.  triamterene-hydrochlorothiazide (MAXZIDE) 37.5-25 mg per tablet TAKE ONE TABLET BY MOUTH DAILY    Cholecalciferol, Vitamin D3, 1,000 unit cap Take 1,000 Units by mouth daily.  MULTIVITAMIN PO Take  by mouth daily.  pantoprazole (PROTONIX) 40 mg tablet Take 40 mg by mouth daily.  coenzyme q10 200 mg Cap Take  by mouth daily.  gabapentin (NEURONTIN) 100 mg capsule 2 tabs at night for a month then 1 tab at night for 2 weeks then stop  Indications: neuropathic pain     No current facility-administered medications for this visit.        PHYSICAL EXAMINATION:  Visit Vitals  Pulse (!) 57   Temp 97.5 °F (36.4 °C)   Resp 16   Ht 6' (1.829 m)   Wt 225 lb 6.4 oz (102.2 kg)   SpO2 97%   BMI 30.57 kg/m²      ORTHO EXAMINATION:  Examination Right knee Left knee   Skin Intact Intact   Range of motion 110-0 120-0   Effusion 3+ -   Medial joint line tenderness + -   Lateral joint line tenderness - -   Popliteal tenderness - -   Osteophytes palpable + -   Kylahs - -   Patella crepitus + -   Anterior drawer - -   Lateral laxity - -   Medial laxity - -   Varus deformity + -   Valgus deformity - -   Pretibial edema - -   Calf tenderness - -   Brisk capilary refill, palpable pulse    Examination Neck   Skin Intact   Tenderness +, left paracervical and trapezius   Tightness +, left paracervical and trapezius   Flexion Full   Extension Absent   Lateral bend left Normal   Lateral bend right Normal   Masses -   Biceps reflex Normal   Triceps reflex Normal   Brachioradialis reflex Normal     Chart reviewed for the following:   Galina YODER MD, have reviewed the History, Physical and updated the Allergic reactions for [address] performed immediately prior to start of procedure:  Ronny Gonzalez MD, have performed the following reviews on [facility] prior to the start of the procedure:            * Patient was identified by name and date of birth   * Agreement on procedure being performed was verified  * Risks and Benefits explained to the patient  * Procedure site verified and marked as necessary  * Patient was positioned for comfort  * Consent was obtained     Time: 11:19 AM     Date of procedure: 3/31/2021    Procedure performed by:  Sharmila Galarza MD    Mr. Babatunde Gtz tolerated the procedure well with no complications. RADIOGRAPHS:  XR KLAUS HIP 11/7/18 SAWYER  IMPRESSION:  AP pelvis and two views - No fractures, moderate joint space narrowing, acetabular osteophytes present. Tonnis grade 2. Femoral acetabular impingement syndrome     XR RIGHT KNEE 4/13/18 SAWYER  IMPRESSION:  Three views - No fractures, no effusion, severe end stage with lateral subluxation joint space narrowing, + osteophytes present. IKDC Grade C. calcification of arteries    IMPRESSION:      ICD-10-CM ICD-9-CM    1. Primary osteoarthritis of right knee  M17.11 715.16 sodium hyaluronate (SUPARTZ FX/EUFLEXXA/HYALGAN) 10 mg/mL injection syrg 20 mg      DRAIN/INJECT LARGE JOINT/BURSA   2. Chronic pain of right knee  M25.561 719.46 sodium hyaluronate (SUPARTZ FX/EUFLEXXA/HYALGAN) 10 mg/mL injection syrg 20 mg    G89.29 338.29 DRAIN/INJECT LARGE JOINT/BURSA   3. Effusion of right knee  M25.461 719.06      PLAN: After timeout and under sterile conditions, right knee aspirated 55 cc of blood tinged fluid. The fluid was discarded. After discussing treatment options, patient's right knee was injected with 2 cc Euflexxa. There is no need for surgery at this time. He will follow up in 1 week for Euflexxa #2.     Scribed by Marciano Razo ([address]) as dictated by Sharmila Galarza MD

## 2021-04-07 ENCOUNTER — OFFICE VISIT (OUTPATIENT)
Dept: ORTHOPEDIC SURGERY | Age: 76
End: 2021-04-07
Payer: MEDICARE

## 2021-04-07 VITALS
HEART RATE: 66 BPM | OXYGEN SATURATION: 99 % | HEIGHT: 72 IN | TEMPERATURE: 97.3 F | RESPIRATION RATE: 16 BRPM | BODY MASS INDEX: 30.75 KG/M2 | WEIGHT: 227 LBS

## 2021-04-07 DIAGNOSIS — M25.561 CHRONIC PAIN OF RIGHT KNEE: ICD-10-CM

## 2021-04-07 DIAGNOSIS — G89.29 CHRONIC PAIN OF RIGHT KNEE: ICD-10-CM

## 2021-04-07 DIAGNOSIS — M17.11 PRIMARY OSTEOARTHRITIS OF RIGHT KNEE: Primary | ICD-10-CM

## 2021-04-07 PROCEDURE — 20610 DRAIN/INJ JOINT/BURSA W/O US: CPT | Performed by: SPECIALIST

## 2021-04-14 ENCOUNTER — OFFICE VISIT (OUTPATIENT)
Dept: ORTHOPEDIC SURGERY | Age: 76
End: 2021-04-14
Payer: MEDICARE

## 2021-04-14 VITALS — OXYGEN SATURATION: 97 % | WEIGHT: 226.4 LBS | HEART RATE: 65 BPM | HEIGHT: 72 IN | BODY MASS INDEX: 30.66 KG/M2

## 2021-04-14 DIAGNOSIS — G89.29 CHRONIC PAIN OF RIGHT KNEE: ICD-10-CM

## 2021-04-14 DIAGNOSIS — M17.11 PRIMARY OSTEOARTHRITIS OF RIGHT KNEE: Primary | ICD-10-CM

## 2021-04-14 DIAGNOSIS — M25.561 CHRONIC PAIN OF RIGHT KNEE: ICD-10-CM

## 2021-04-14 PROCEDURE — 20610 DRAIN/INJ JOINT/BURSA W/O US: CPT | Performed by: SPECIALIST

## 2021-04-14 NOTE — PROGRESS NOTES
Patient: Brenda Valverde                MRN: [id number]       SSN: [id number]  YOB: 1945        AGE: 76 y.o. SEX: male  Body mass index is 30.71 kg/m². PCP: Trina Sepulveda MD  21    Chief Complaint   Patient presents with    Knee Pain     right knee     HISTORY:  Brenda Valverde is a 76 y.o. male who is seen for right knee pain. TIME OUT performed immediately prior to start of procedure:  Devonte Razo MD, have performed the following reviews on Brenda Valverde prior to the start of the procedure:            * Patient was identified by name and date of birth   * Agreement on procedure being performed was verified  * Risks and Benefits explained to the patient  * Procedure site verified and marked as necessary  * Patient was positioned for comfort  * Consent was obtained     Time: 8:37 AM     Date of procedure: 2021    Procedure performed by:  Kevin Cunningham MD    Mr. Fadi Salmeron tolerated the procedure well with no complications      [id number] ICD-9-CM    1. Primary osteoarthritis of right knee  M17.11 715.16 sodium hyaluronate (SUPARTZ FX/EUFLEXXA/HYALGAN) 10 mg/mL injection syrg 20 mg      DRAIN/INJECT LARGE JOINT/BURSA   2. Chronic pain of right knee  M25.561 719.46 sodium hyaluronate (SUPARTZ FX/EUFLEXXA/HYALGAN) 10 mg/mL injection syrg 20 mg    G89.29 338.29 DRAIN/INJECT LARGE JOINT/BURSA     PLAN:  After timeout and under sterile conditions, right knee aspirated 65 cc of light yellow fluid. The fluid was discarded. After discussing treatment options, patient's right knee was injected with 2 cc of Euflexxa. Mr. Fadi Salmeron will follow up PRN now that he has completed his visco supplementation injection series.       Scribed by Ebony Dutta ([address]) as dictated by Kevin Cunningham MD

## 2021-04-15 ENCOUNTER — HOSPITAL ENCOUNTER (OUTPATIENT)
Dept: LAB | Age: 76
Discharge: HOME | End: 2021-04-15
Payer: MEDICARE

## 2021-04-15 PROCEDURE — U0003 INFECTIOUS AGENT DETECTION BY NUCLEIC ACID (DNA OR RNA); SEVERE ACUTE RESPIRATORY SYNDROME CORONAVIRUS 2 (SARS-COV-2) (CORONAVIRUS DISEASE [COVID-19]), AMPLIFIED PROBE TECHNIQUE, MAKING USE OF HIGH THROUGHPUT TECHNOLOGIES AS DESCRIBED BY CMS-2020-01-R: HCPCS

## 2021-04-16 LAB — SARS-COV-2, COV2NT: NOT DETECTED

## 2021-04-19 ENCOUNTER — ANESTHESIA EVENT (OUTPATIENT)
Dept: ENDOSCOPY | Age: 76
End: 2021-04-19
Payer: MEDICARE

## 2021-04-20 ENCOUNTER — HOSPITAL ENCOUNTER (OUTPATIENT)
Age: 76
Setting detail: OUTPATIENT SURGERY
Discharge: HOME | End: 2021-04-20
Attending: INTERNAL MEDICINE | Admitting: INTERNAL MEDICINE
Payer: MEDICARE

## 2021-04-20 ENCOUNTER — ANESTHESIA (OUTPATIENT)
Dept: ENDOSCOPY | Age: 76
End: 2021-04-20
Payer: MEDICARE

## 2021-04-20 VITALS
HEIGHT: 72 IN | DIASTOLIC BLOOD PRESSURE: 62 MMHG | WEIGHT: 220 LBS | TEMPERATURE: 98.1 F | RESPIRATION RATE: 16 BRPM | SYSTOLIC BLOOD PRESSURE: 106 MMHG | BODY MASS INDEX: 29.8 KG/M2 | HEART RATE: 65 BPM | OXYGEN SATURATION: 97 %

## 2021-04-20 PROCEDURE — 74011250636 HC RX REV CODE- 250/636: Performed by: NURSE ANESTHETIST, CERTIFIED REGISTERED

## 2021-04-20 PROCEDURE — 74011250637 HC RX REV CODE- 250/637: Performed by: NURSE ANESTHETIST, CERTIFIED REGISTERED

## 2021-04-20 PROCEDURE — 00812 ANES LWR INTST SCR COLSC: CPT | Performed by: NURSE ANESTHETIST, CERTIFIED REGISTERED

## 2021-04-20 PROCEDURE — 99100 ANES PT EXTEME AGE<1 YR&>70: CPT | Performed by: ANESTHESIOLOGY

## 2021-04-20 PROCEDURE — 2709999900 HC NON-CHARGEABLE SUPPLY: Performed by: INTERNAL MEDICINE

## 2021-04-20 PROCEDURE — 77030008565 HC TBNG SUC IRR ERBE -B: Performed by: INTERNAL MEDICINE

## 2021-04-20 PROCEDURE — 74011000250 HC RX REV CODE- 250: Performed by: NURSE ANESTHETIST, CERTIFIED REGISTERED

## 2021-04-20 PROCEDURE — 99100 ANES PT EXTEME AGE<1 YR&>70: CPT | Performed by: NURSE ANESTHETIST, CERTIFIED REGISTERED

## 2021-04-20 PROCEDURE — 00812 ANES LWR INTST SCR COLSC: CPT | Performed by: ANESTHESIOLOGY

## 2021-04-20 PROCEDURE — 77030040934 HC CATH DIAG DXTERITY MEDT -A: Performed by: INTERNAL MEDICINE

## 2021-04-20 PROCEDURE — 76040000019: Performed by: INTERNAL MEDICINE

## 2021-04-20 PROCEDURE — 88305 TISSUE EXAM BY PATHOLOGIST: CPT

## 2021-04-20 PROCEDURE — 76060000031 HC ANESTHESIA FIRST 0.5 HR: Performed by: INTERNAL MEDICINE

## 2021-04-20 RX ORDER — LIDOCAINE HYDROCHLORIDE 10 MG/ML
0.1 INJECTION, SOLUTION EPIDURAL; INFILTRATION; INTRACAUDAL; PERINEURAL AS NEEDED
Status: DISCONTINUED | OUTPATIENT
Start: 2021-04-20 | End: 2021-04-20 | Stop reason: HOSPADM

## 2021-04-20 RX ORDER — PROPOFOL 10 MG/ML
INJECTION, EMULSION INTRAVENOUS AS NEEDED
Status: DISCONTINUED | OUTPATIENT
Start: 2021-04-20 | End: 2021-04-20 | Stop reason: HOSPADM

## 2021-04-20 RX ORDER — SODIUM CHLORIDE, SODIUM LACTATE, POTASSIUM CHLORIDE, CALCIUM CHLORIDE 600; 310; 30; 20 MG/100ML; MG/100ML; MG/100ML; MG/100ML
50 INJECTION, SOLUTION INTRAVENOUS CONTINUOUS
Status: DISCONTINUED | OUTPATIENT
Start: 2021-04-20 | End: 2021-04-20 | Stop reason: HOSPADM

## 2021-04-20 RX ORDER — FAMOTIDINE 20 MG/1
20 TABLET, FILM COATED ORAL ONCE
Status: COMPLETED | OUTPATIENT
Start: 2021-04-20 | End: 2021-04-20

## 2021-04-20 RX ORDER — SODIUM CHLORIDE 0.9 % (FLUSH) 0.9 %
5-40 SYRINGE (ML) INJECTION AS NEEDED
Status: DISCONTINUED | OUTPATIENT
Start: 2021-04-20 | End: 2021-04-20 | Stop reason: HOSPADM

## 2021-04-20 RX ORDER — SODIUM CHLORIDE 0.9 % (FLUSH) 0.9 %
5-40 SYRINGE (ML) INJECTION EVERY 8 HOURS
Status: DISCONTINUED | OUTPATIENT
Start: 2021-04-20 | End: 2021-04-20 | Stop reason: HOSPADM

## 2021-04-20 RX ORDER — LIDOCAINE HYDROCHLORIDE 20 MG/ML
INJECTION, SOLUTION EPIDURAL; INFILTRATION; INTRACAUDAL; PERINEURAL AS NEEDED
Status: DISCONTINUED | OUTPATIENT
Start: 2021-04-20 | End: 2021-04-20 | Stop reason: HOSPADM

## 2021-04-20 RX ADMIN — PROPOFOL 20 MG: 10 INJECTION, EMULSION INTRAVENOUS at 08:25

## 2021-04-20 RX ADMIN — PROPOFOL 100 MG: 10 INJECTION, EMULSION INTRAVENOUS at 08:22

## 2021-04-20 RX ADMIN — FAMOTIDINE 20 MG: 20 TABLET, FILM COATED ORAL at 07:28

## 2021-04-20 RX ADMIN — LIDOCAINE HYDROCHLORIDE 60 MG: 20 INJECTION, SOLUTION EPIDURAL; INFILTRATION; INTRACAUDAL; PERINEURAL at 08:25

## 2021-04-20 RX ADMIN — SODIUM CHLORIDE, SODIUM LACTATE, POTASSIUM CHLORIDE, AND CALCIUM CHLORIDE 50 ML/HR: 600; 310; 30; 20 INJECTION, SOLUTION INTRAVENOUS at 07:29

## 2021-04-20 NOTE — ANESTHESIA POSTPROCEDURE EVALUATION
Procedure(s):  COLONOSCOPY w bx;s. MAC    Anesthesia Post Evaluation      Multimodal analgesia: multimodal analgesia used between 6 hours prior to anesthesia start to PACU discharge  Patient location during evaluation: bedside  Patient participation: complete - patient participated  Level of consciousness: awake  Pain management: adequate  Airway patency: patent  Anesthetic complications: no  Cardiovascular status: stable  Respiratory status: acceptable  Hydration status: acceptable  Post anesthesia nausea and vomiting:  controlled      INITIAL Post-op Vital signs:   Vitals Value Taken Time   /59 04/20/21 0900   Temp 36.9 °C (98.5 °F) 04/20/21 0850   Pulse 67 04/20/21 0901   Resp 13 04/20/21 0901   SpO2 98 % 04/20/21 0901   Vitals shown include unvalidated device data.

## 2021-04-20 NOTE — ANESTHESIA PREPROCEDURE EVALUATION
Anesthetic History               Review of Systems / Medical History  Patient summary reviewed, nursing notes reviewed and pertinent labs reviewed    Pulmonary                   Neuro/Psych              Cardiovascular    Hypertension        Dysrhythmias   CAD         GI/Hepatic/Renal     GERD: well controlled      PUD     Endo/Other      Hypothyroidism: well controlled  Arthritis     Other Findings              Physical Exam    Airway  Mallampati: II  TM Distance: > 6 cm  Neck ROM: normal range of motion   Mouth opening: Normal     Cardiovascular    Rhythm: regular  Rate: normal         Dental  No notable dental hx       Pulmonary  Breath sounds clear to auscultation               Abdominal  GI exam deferred       Other Findings            Anesthetic Plan    ASA: 3  Anesthesia type: MAC            Anesthetic plan and risks discussed with: Patient

## 2021-04-20 NOTE — DISCHARGE INSTRUCTIONS
Patient Education        Learning About Diverticulosis and Diverticulitis  What are diverticulosis and diverticulitis? In diverticulosis and diverticulitis, pouches called diverticula form in the wall of the large intestine, or colon. · In diverticulosis, the pouches do not cause any pain or other symptoms. · In diverticulitis, the pouches get inflamed or infected and cause symptoms. Doctors aren't sure what causes these pouches in the colon. But they think that a low-fiber diet may play a role. Without fiber to add bulk to the stool, the colon has to work harder than normal to push the stool forward. The pressure from this may cause pouches to form in weak spots along the colon. Some people with diverticulosis get diverticulitis. But experts don't know why this happens. What are the symptoms? · In diverticulosis, most people don't have symptoms. But pouches sometimes bleed. · In diverticulitis, symptoms may last from a few hours to a week or more. They include:  ? Belly pain. This is usually in the lower left side. It is sometimes worse when you move. This is the most common symptom. ? Fever and chills. ? Bloating and gas. ? Diarrhea or constipation. ? Nausea and sometimes vomiting.  ? Not feeling like eating. How can you prevent diverticulitis? You may be able to lower your chance of getting diverticulitis. You can do this by taking steps to prevent constipation. · Eat fruits, vegetables, beans, and whole grains every day. These foods are high in fiber. · Drink plenty of fluids. If you have kidney, heart, or liver disease and have to limit fluids, talk with your doctor before you increase the amount of fluids you drink. · Get at least 30 minutes of exercise on most days of the week. Walking is a good choice. You also may want to do other activities, such as running, swimming, cycling, or playing tennis or team sports.   · Take a fiber supplement, such as Citrucel or Metamucil, every day if needed. Read and follow all instructions on the label. · Schedule time each day for a bowel movement. Having a daily routine may help. Take your time and do not strain when having a bowel movement. Some people avoid nuts, seeds, berries, and popcorn. They believe that these foods might get trapped in the diverticula and cause pain. But there is no proof that these foods cause diverticulitis or make it worse. How are these problems treated? · The best way to treat diverticulosis is to avoid constipation. · Treatment for diverticulitis includes antibiotics. It often includes a change in your diet. You may need only liquids at first. Your doctor may suggest pain medicines for pain or belly cramps. In some cases, surgery may be needed. Follow-up care is a key part of your treatment and safety. Be sure to make and go to all appointments, and call your doctor if you are having problems. It's also a good idea to know your test results and keep a list of the medicines you take. Where can you learn more? Go to http://www.gray.com/  Enter M447 in the search box to learn more about \"Learning About Diverticulosis and Diverticulitis. \"  Current as of: April 15, 2020               Content Version: 12.8  © 2006-2021 [employer]. Care instructions adapted under license by [employer] (which disclaims liability or warranty for this information). If you have questions about a medical condition or this instruction, always ask your healthcare professional. [place] [address] any warranty or liability for your use of this information. Patient Education        High-Fiber Diet: Care Instructions  Your Care Instructions     A high-fiber diet may help you relieve constipation and feel less bloated. Your doctor and dietitian will help you make a high-fiber eating plan based on your personal needs. The plan will include the things you like to eat.  It will also make sure that you get 30 grams of fiber a day. Before you make changes to the way you eat, be sure to talk with your doctor or dietitian. Follow-up care is a key part of your treatment and safety. Be sure to make and go to all appointments, and call your doctor if you are having problems. It's also a good idea to know your test results and keep a list of the medicines you take. How can you care for yourself at home? · You can increase how much fiber you get if you eat more of certain foods. These foods include:  ? Whole-grain breads and cereals. ? Fruits, such as pears, apples, and peaches. Eat the skins, peels, and seeds, if you can.  ? Vegetables, such as broccoli, cabbage, spinach, carrots, asparagus, and squash. ? Starchy vegetables. These include potatoes with skins, kidney beans, and lima beans. · Take a fiber supplement every day if your doctor recommends it. Examples are Benefiber, Citrucel, FiberCon, and Metamucil. Ask your doctor how much to take. · Drink plenty of fluids. If you have kidney, heart, or liver disease and have to limit fluids, talk with your doctor before you increase the amount of fluids you drink. · Get some exercise every day. Exercise helps stool move through the colon. It also helps prevent constipation. · Keep a food diary. Try to notice and write down what foods cause gas, pain, or other symptoms. Then you can avoid these foods. Where can you learn more? Go to http://www.gray.com/  Enter W984 in the search box to learn more about \"High-Fiber Diet: Care Instructions. \"  Current as of: December 17, 2020               Content Version: 12.8  © 8180-1633 [employer]. Care instructions adapted under license by [employer] (which disclaims liability or warranty for this information).  If you have questions about a medical condition or this instruction, always ask your healthcare professional. [address] any warranty or liability for your use of this information. Patient Education        Colonoscopy: What to Expect at [address] [place] [address]  After a colonoscopy, you'll stay at the clinic for 1 to 2 hours until the medicines wear off. Then you can go home. But you'll need to arrange for a ride. Your doctor will tell you when you can eat and do your other usual activities. Your doctor will talk to you about when you'll need your next colonoscopy. Your doctor can help you decide how often you need to be checked. This will depend on the results of your test and your risk for colorectal cancer. After the test, you may be bloated or have gas pains. You may need to pass gas. If a biopsy was done or a polyp was removed, you may have streaks of blood in your stool (feces) for a few days. Problems such as heavy rectal bleeding may not occur until several weeks after the test. This isn't common. But it can happen after polyps are removed. This care sheet gives you a general idea about how long it will take for you to recover. But each person recovers at a different pace. Follow the steps below to get better as quickly as possible. How can you care for yourself at home? Activity    · Rest when you feel tired.     · You can do your normal activities when it feels okay to do so. Diet    · Follow your doctor's directions for eating.     · Unless your doctor has told you not to, drink plenty of fluids. This helps to replace the fluids that were lost during the colon prep.     · Do not drink alcohol. Medicines    · Your doctor will tell you if and when you can restart your medicines. He or she will also give you instructions about taking any new medicines.     · If you take aspirin or some other blood thinner, ask your doctor if and when to start taking it again.  Make sure that you understand exactly what your doctor wants you to do.     · If polyps were removed or a biopsy was done during the test, your doctor may tell you not to take aspirin or other anti-inflammatory medicines for a few days. These include ibuprofen (Advil, Motrin) and naproxen (Aleve). Other instructions    · For your safety, do not drive or operate machinery until the medicine wears off and you can think clearly. Your doctor may tell you not to drive or operate machinery until the day after your test.     · Do not sign legal documents or make major decisions until the medicine wears off and you can think clearly. The anesthesia can make it hard for you to fully understand what you are agreeing to. Follow-up care is a key part of your treatment and safety. Be sure to make and go to all appointments, and call your doctor if you are having problems. It's also a good idea to know your test results and keep a list of the medicines you take. When should you call for help? Call 911 anytime you think you may need emergency care. For example, call if:    · You passed out (lost consciousness).     · You pass maroon or bloody stools.     · You have trouble breathing. Call your doctor now or seek immediate medical care if:    · You have pain that does not get better after you take pain medicine.     · You are sick to your stomach or cannot drink fluids.     · You have new or worse belly pain.     · You have blood in your stools.     · You have a fever.     · You cannot pass stools or gas. Watch closely for changes in your health, and be sure to contact your doctor if you have any problems. Where can you learn more? Go to http://www.gray.com/  Enter E264 in the search box to learn more about \"Colonoscopy: What to Expect at Home. \"  Current as of: December 17, 2020               Content Version: 12.8  © 2493-2187 [employer]. Care instructions adapted under license by [employer] (which disclaims liability or warranty for this information).  If you have questions about a medical condition or this instruction, always ask your healthcare professional. [place] [address] any warranty or liability for your use of this information.     Patient armband removed and shredded

## 2021-04-20 NOTE — H&P
[facility]   [email or web address]      Impression:   1.distal UC      Plan:     1. colo      Chief Complaint:   Hx of UC    HPI:  Jerry Valerio is a 76 y.o. male who is being seen on consult for  The above. .Last colo > 5 yr ago. Mild Sxs     PMH:   Past Medical History:   Diagnosis Date    Adrenal adenoma 2009    LEFT 1 cm, no change 1/15, 2/16    Asbestosis     CT 1/19 showed pleural plaques    Atrial fibrillation 10/02/2009    Had Ablation. No At. Fib. since    Atrial fibrillation ablation 10/2008    Basal cell cancer     Dr Araceli Sandoval; he's had >350 lesions removed    Carotid disease, bilateral ([address]     Cervical radiculopathy     MRI 9/11 showed C3-6 severe foraminal stenosis    Chronic pain     myofascial pain syndrome; seen in pain clinic in past    Colitis     Colon polyp     Dr Aníbal Raymond 9/15    Coronary artery disease     RCA - 3.0 x 16mm TAXUS 9/04, 3.0 x 16mm TAXUS 12/06    Dyslipidemia     Erectile dysfunction     GERD     Hearing loss 2014    Dr Drake Ramirez Hypertension     with component of white coat    Hypogonadism male     IFG (impaired fasting glucose) [id number]    Lumbar radiculopathy     Dr Hardy Lara;  MRI 9/11 L4-5 disc bulging, annular tear, disc dessication    OAB (overactive bladder)     Osteoarthritis     Dr Moisés Pandey Overweight (BMI 25.0-29. 9)     IF 5/18 start weight 214 lbs, not doing    Peripheral vascular disease     50-60% R iliac; s/p R iliac stent and L femoral artery stent in past; R OLIVIA 0.76 (1/16)    Plantar fasciitis     bilateral     PPD positive     Prostate cancer     T1c [place] 7(3+4), 70% in 1 core, GS 7 (3+4) in 4 cores, GS 6 (3+3) in 1 core; psa 5.28, TRUS 18 gm;  Dr Aníbal Raymond; s/p cryoablation 10/16    Tinnitus     Dr Sergo Hampton Venous insufficiency        PSH:   Past Surgical History:   Procedure Laterality Date    HX CAROTID ENDARTERECTOMY Right 01/2014    HX CARPAL TUNNEL RELEASE Right     HX CATARACT REMOVAL Bilateral     HX COLONOSCOPY      11 neg; Dr Kay Romano 9/15 polyps    HX CRYOABLATION OF THE PROSTATE  10/2016    HX HEART CATHETERIZATION  ,    Stents RCA    HX HEMORRHOIDECTOMY  1979    HX HERNIA REPAIR  , 1975    x 4    HX KNEE ARTHROSCOPY Right 1963    RIGHT knee surgery    HX TONSILLECTOMY      NE REPAIR ING HERNIA,5+Y/O,REDUCIBL  2016    Dr Tanner Courser Right 2019    Rifht iliac    VASCULAR SURGERY PROCEDURE UNLIST Left     LEFT fem artery and iliac stents (10/15); RCF endarterectomy w patch angioplasty/B CI artery stenting ()       Social HX:   Social History     Socioeconomic History    Marital status:      Spouse name: Not on file    Number of children: Not on file    Years of education: Not on file    Highest education level: Not on file   Occupational History    Not on file   Social Needs    Financial resource strain: Not on file    Food insecurity     Worry: Not on file     Inability: Not on file    Transportation needs     Medical: Not on file     Non-medical: Not on file   Tobacco Use    Smoking status: Former Smoker     Packs/day: 1.50     Years: 25.00     Pack years: 37.50     Types: Cigarettes     Quit date: 2003     Years since quittin.3    Smokeless tobacco: Never Used   Substance and Sexual Activity    Alcohol use: Yes     Comment: RARELY    Drug use: Never    Sexual activity: Yes     Partners: Female     Birth control/protection: None   Lifestyle    Physical activity     Days per week: Not on file     Minutes per session: Not on file    Stress: Not on file   Relationships    Social connections     Talks on phone: Not on file     Gets together: Not on file     Attends Scientologist service: Not on file     Active member of club or organization: Not on file     Attends meetings of clubs or organizations: Not on file     Relationship status: Not on file    Intimate partner violence     Fear of current or ex partner: Not on file     Emotionally abused: Not on file     Physically abused: Not on file     Forced sexual activity: Not on file   Other Topics Concern    Not on file   Social History Narrative    Not on file       FHX:   Family History   Problem Relation Age of Onset    Heart Disease Mother     Hypertension Mother     Diabetes Mother     Arthritis-osteo Mother     Heart Disease Father     Stroke Father     Hypertension Father     Heart Disease Sister     Hypertension Sister        Allergy:   Allergies   Allergen Reactions    Altace [Ramipril] Unknown (comments)     Pt does not remember reaction    Lipitor [Atorvastatin] Other (comments)     Muscle pain    Lisinopril Shortness of Breath and Other (comments)     Turns bright red    Other Medication Other (comments)     Vicryl suture on skin tends to be rejected with poor wound healing does better with monocryl    Procardia [Nifedipine] Other (comments)     Caused afib    Rosuvastatin Other (comments)     Muscle Pain         Home Medications:     Medications Prior to Admission   Medication Sig    LORazepam (Ativan) 1 mg tablet Take 1 Tab by mouth every eight (8) hours as needed for Anxiety.  carvediloL (Coreg) 25 mg tablet Take 25 mg by mouth two (2) times daily (with meals).  ezetimibe-simvastatin (VYTORIN) 10-40 mg per tablet TAKE 1 TABLET NIGHTLY    diclofenac (Voltaren) 1 % gel Apply 4 g to affected area four (4) times daily. Right knee    icosapent ethyL (VASCEPA) 1 gram capsule Take 2 Caps by mouth two (2) times daily (with meals). (Patient taking differently: Take 1 Cap by mouth two (2) times daily (with meals). )    colestipol (COLESTID) 1 gram tablet 1 g daily as needed.  aspirin delayed-release 81 mg tablet Take 81 mg by mouth daily.     triamterene-hydrochlorothiazide (MAXZIDE) 37.5-25 mg per tablet TAKE ONE TABLET BY MOUTH DAILY    Cholecalciferol, Vitamin D3, 1,000 unit cap Take 1,000 Units by mouth daily.  MULTIVITAMIN PO Take  by mouth daily.  pantoprazole (PROTONIX) 40 mg tablet Take 40 mg by mouth daily.  coenzyme q10 200 mg Cap Take  by mouth daily.  clopidogreL (PLAVIX) 75 mg tab Take 1 Tab by mouth daily. Review of Systems:     Constitutional: No fevers, chills, weight loss, fatigue. Skin: No rashes, pruritis, jaundice, ulcerations, erythema. HENT: No headaches, nosebleeds, sinus pressure, rhinorrhea, sore throat. Eyes: No visual changes, blurred vision, eye pain, photophobia, jaundice. Cardiovascular: No chest pain, heart palpitations. Respiratory: No cough, SOB, wheezing, chest discomfort, orthopnea. Gastrointestinal: neg   Genitourinary: No dysuria, bleeding, discharge, pyuria. Musculoskeletal: No weakness, arthralgias, wasting. Endo: No sweats. Heme: No bruising, easy bleeding. Allergies: As noted. Neurological: Cranial nerves intact. Alert and oriented. Gait not assessed. Psychiatric:  No anxiety, depression, hallucinations. Visit Vitals  /80   Pulse 75   Temp 97.6 °F (36.4 °C)   Resp 18   Ht 6' (1.829 m)   Wt 99.8 kg (220 lb)   SpO2 97%   BMI 29.84 kg/m²       Physical Assessment:     constitutional: appearance: well developed, well nourished, normal habitus, no deformities, in no acute distress. skin: inspection: no rashes, ulcers, icterus or other lesions; no clubbing or telangiectasias. palpation: no induration or subcutaneos nodules. eyes: inspection: normal conjunctivae and lids; no jaundice pupils: symmetrical, normoreactive to light, normal accommodation and size. ENMT: mouth: normal oral mucosa,lips and gums; good dentition. oropharynx: normal tongue, hard and soft palate; posterior pharynx without erithema, exudate or lesions. neck: thyroid: normal size, consistency and position; no masses or tenderness.    respiratory: effort: normal chest excursion; no intercostal retraction or accessory muscle use.   cardiovascular: abdominal aorta: normal size and position; no bruits. palpation: PMI of normal size and position; normal rhythm; no thrill or murmurs. abdominal: abdomen: normal consistency; no tenderness or masses. hernias: no hernias appreciated. liver: normal size and consistency. spleen: not palpable. rectal: hemoccult/guaiac: not performed. musculoskeletal: digits and nails: no clubbing, cyanosis, petechiae or other inflammatory conditions. gait: normal gait and station head and neck: normal range of motion; no pain, crepitation or contracture. spine/ribs/pelvis: normal range of motion; no pain, deformity or contracture. lymphatic: axilae: not palpable. groin: not palpable. neck: within normal limits. other: not palpable. neurologic: cranial nerves: II-XII normal.   psychiatric: judgement/insight: within normal limits. memory: within normal limits for recent and remote events. mood and affect: no evidence of depression, anxiety or agitation. orientation: oriented to time, space and person. Basic Metabolic Profile   No results for input(s): NA, K, CL, CO2, BUN, GLU, CA, MG, PHOS in the last 72 hours. No lab exists for component: CREAT      CBC w/Diff    No results for input(s): WBC, RBC, HGB, HCT, MCV, MCH, MCHC, RDW, PLT, HGBEXT, HCTEXT, PLTEXT in the last 72 hours. No lab exists for component: MPV No results for input(s): GRANS, LYMPH, EOS, PRO, MYELO, METAS, BLAST in the last 72 hours. No lab exists for component: MONO, BASO     Hepatic Function   No results for input(s): ALB, TP, TBILI, AP, AML, LPSE in the last 72 hours. No lab exists for component: DBILI, GPT, SGOT       Zohreh Baker MD, M.D. [facility], [address] [place] [address]  [email or web address]. [facility]

## 2021-04-21 PROBLEM — K57.90 DIVERTICULOSIS: Status: ACTIVE | Noted: 2021-04-21

## 2021-04-22 DIAGNOSIS — I11.9 BENIGN HYPERTENSIVE HEART DISEASE WITHOUT HEART FAILURE: ICD-10-CM

## 2021-04-22 RX ORDER — TRIAMTERENE/HYDROCHLOROTHIAZID 37.5-25 MG
TABLET ORAL
Qty: 90 TAB | Refills: 3 | Status: SHIPPED
Start: 2021-04-22 | End: 2021-11-03

## 2021-04-23 DIAGNOSIS — M54.12 CERVICAL RADICULOPATHY: ICD-10-CM

## 2021-04-23 DIAGNOSIS — F41.9 ANXIETY: ICD-10-CM

## 2021-04-23 RX ORDER — HYDROCODONE BITARTRATE AND ACETAMINOPHEN 5; 325 MG/1; MG/1
1 TABLET ORAL
Qty: 180 TAB | Refills: 0 | Status: CANCELLED | OUTPATIENT
Start: 2021-04-23 | End: 2021-05-23

## 2021-04-26 DIAGNOSIS — M54.12 CERVICAL RADICULOPATHY: Primary | ICD-10-CM

## 2021-04-26 RX ORDER — HYDROCODONE BITARTRATE AND ACETAMINOPHEN 5; 325 MG/1; MG/1
1 TABLET ORAL
Qty: 180 TAB | Refills: 0 | Status: SHIPPED | OUTPATIENT
Start: 2021-04-26 | End: 2021-05-26

## 2021-04-26 RX ORDER — LORAZEPAM 1 MG/1
1 TABLET ORAL
Qty: 50 TAB | Refills: 0 | Status: SHIPPED | OUTPATIENT
Start: 2021-04-26 | End: 2021-09-08 | Stop reason: SDUPTHER

## 2021-04-26 NOTE — TELEPHONE ENCOUNTER
Roxie Walter is pending in another encounter    [facility]ivan as 3[facility]5/21 for a 16 d/s. Last Visit: 11/24/20 with MD Dalia Priest  Next Appointment: 5/25/21 with MD Dalia Priest  Previous Refill Encounter(s): 3/5/21 #50    Requested Prescriptions     Pending Prescriptions Disp Refills    LORazepam (Ativan) 1 mg tablet 50 Tab 0     Sig: Take 1 Tab by mouth every eight (8) hours as needed for Anxiety.

## 2021-04-26 NOTE — TELEPHONE ENCOUNTER
[facility] date for Norco as 3/17/21 for a 30 d/s. UDS done 2/11/21  CSA signed 8/9/19    Last Visit: 11/24/20 with MD Leesa Elmore  Next Appointment: 5/25/21 with MD Leesa Elmore  Previous Refill Encounter(s): 3/16/21 #180    Requested Prescriptions     Pending Prescriptions Disp Refills    HYDROcodone-acetaminophen (NORCO) 5-325 mg per tablet 180 Tab 0     Sig: Take 1 Tab by mouth every four (4) hours as needed (chronic pain) for up to 30 days. Max Daily Amount: 6 Tabs.

## 2021-05-17 NOTE — PROGRESS NOTES
76 y.o. WHITE male who presents for evaluation. No cardiovascular complaints and he continues to see Dr Arabella Hazel. No new recs at the last visit apparently. He tries to walk but limited with spine and knee issues    He keeps getting euflexxa through Dr Roberts Hope    He is now seeing Dr Alvino Chester and will have yearly f/u apparently    He changed to Dr Rayshawn Torres and was felt non surgical so referred to Dr Marino Logan and he's gotten significant relief from c spine epidurals. He received l spine shots as well    The UC has been stable, had recent colo by Dr Viviana eLpe as below     Denies polyuria, polydipsia, nocturia, vision change. Not checking sugars at this time. He sees Dr Viviana Lepe once yearly and new new developments    LAST MEDICARE WELLNESS EXAM: 6/23/16, 6/29/17, 11/20/18, 11/25/19, 11/23/20    Past Medical History:   Diagnosis Date    Adrenal adenoma 2009    LEFT 1 cm, no change 1/15, 2/16    Asbestosis     CT 1/19 showed pleural plaques    Atrial fibrillation 10/02/2009    Had Ablation. No At. Fib. since    Basal cell cancer     Dr Lula Mata; he's had >350 lesions removed    Carotid disease, bilateral ([address]     Chronic pain     myofascial pain syndrome; seen in pain clinic in past    Colitis     Colon polyp     Dr Viviana Lepe 9/15    Coronary artery disease     RCA - 3.0 x 16mm TAXUS 9/04, 3.0 x 16mm TAXUS 12/06    Degenerative arthritis of cervical spine     MRI 9/11 showed C3-6 severe foraminal stenosis w RADICULOPATHY    Degenerative arthritis of lumbar spine     Dr Paula Jimenez;  MRI 9/11 L4-5 disc bulging, annular tear, disc dessication w RADICULOPATHY    Diverticulosis 04/2021    Dr Viviana Lepe    Dyslipidemia     Erectile dysfunction     GERD     Hearing loss 2014    Dr Adrien Lane Hypertension     with component of white coat    Hypogonadism male     IFG (impaired fasting glucose) [id number]    OAB (overactive bladder)     Osteoarthritis     Dr Sherley Archer Overweight (BMI 25.0-29. 9)     IF 5/18 start weight 214 lbs, not doing    Peripheral vascular disease     50-60% R iliac; s/p R iliac stent and L femoral artery stent in past; R OLIVIA 0.76 ()    Plantar fasciitis     bilateral     PPD positive     Prostate cancer     T1c Imelda 7(3+4), 70% in 1 core, GS 7 (3+4) in 4 cores, GS 6 (3+3) in 1 core; psa 5.28, TRUS 18 gm;  Dr Yessenia Cazares; s/p cryoablation 10/16    Tinnitus     Dr Arvella Rubinstein Venous insufficiency      Past Surgical History:   Procedure Laterality Date    COLONOSCOPY N/A 2021 neg; Dr Yessenia Cazares 9/15 polyps; 21 divertics bx neg    HX CAROTID ENDARTERECTOMY Right 2014    HX CARPAL TUNNEL RELEASE Right 2017    HX CATARACT REMOVAL Bilateral     HX CRYOABLATION OF THE PROSTATE  10/2016    HX HEART CATHETERIZATION  ,    Stents RCA    HX HEMORRHOIDECTOMY  1979    HX HERNIA REPAIR  1970, 1975    x 4    HX HERNIA REPAIR  2016    Dr Graeme Salinas ARTHROSCOPY Right 1963    RIGHT knee surgery    HX TONSILLECTOMY      VASCULAR SURGERY PROCEDURE UNLIST Right 2019    Rifht iliac    VASCULAR SURGERY PROCEDURE UNLIST Left     LEFT fem artery and iliac stents (10/15); RCF endarterectomy w patch angioplasty/B CI artery stenting ()     Social History     Socioeconomic History    Marital status:      Spouse name: Not on file    Number of children: Not on file    Years of education: Not on file    Highest education level: Not on file   Occupational History    Not on file   Tobacco Use    Smoking status: Former Smoker     Packs/day: 1.50     Years: 25.00     Pack years: 37.50     Types: Cigarettes     Quit date: 2003     Years since quittin.4    Smokeless tobacco: Never Used   Vaping Use    Vaping Use: Never used   Substance and Sexual Activity    Alcohol use: Yes     Comment: RARELY    Drug use: Never    Sexual activity: Yes     Partners: Female     Birth control/protection: None   Other Topics Concern    Not on file   Social History Narrative  Not on file     Social Determinants of Health     Financial Resource Strain:     Difficulty of Paying Living Expenses:    Food Insecurity:     Worried About Running Out of Food in the Last Year:     [address] in the Last Year:    Transportation Needs:     Lack of Transportation (Medical):  Lack of Transportation (Non-Medical):    Physical Activity:     Days of Exercise per Week:     Minutes of Exercise per Session:    Stress:     Feeling of Stress :    Social Connections:     Frequency of Communication with Friends and Family:     Frequency of Social Gatherings with Friends and Family:     Attends Orthodoxy Services:     Active Member of Clubs or Organizations:     Attends Club or Organization Meetings:     Marital Status:    Intimate Partner Violence:     Fear of Current or Ex-Partner:     Emotionally Abused:     Physically Abused:     Sexually Abused:      Current Outpatient Medications   Medication Sig    ketoconazole (NIZORAL) 2 % shampoo     LORazepam (Ativan) 1 mg tablet Take 1 Tab by mouth every eight (8) hours as needed for Anxiety.  HYDROcodone-acetaminophen (NORCO) 5-325 mg per tablet Take 1 Tab by mouth every four (4) hours as needed (chronic pain) for up to 30 days. Max Daily Amount: 6 Tabs.  triamterene-hydroCHLOROthiazide (MAXZIDE) 37.5-25 mg per tablet TAKE ONE TABLET BY MOUTH DAILY    clopidogreL (PLAVIX) 75 mg tab Take 1 Tab by mouth daily.  carvediloL (Coreg) 25 mg tablet Take 25 mg by mouth two (2) times daily (with meals).  ezetimibe-simvastatin (VYTORIN) 10-40 mg per tablet TAKE 1 TABLET NIGHTLY    diclofenac (Voltaren) 1 % gel Apply 4 g to affected area four (4) times daily. Right knee    icosapent ethyL (VASCEPA) 1 gram capsule Take 2 Caps by mouth two (2) times daily (with meals). (Patient taking differently: Take 1 Cap by mouth two (2) times daily (with meals). )    aspirin delayed-release 81 mg tablet Take 81 mg by mouth daily.     Cholecalciferol, Vitamin D3, 1,000 unit cap Take 1,000 Units by mouth daily.  MULTIVITAMIN PO Take  by mouth daily.  pantoprazole (PROTONIX) 40 mg tablet Take 40 mg by mouth daily.  coenzyme q10 200 mg Cap Take  by mouth daily.  colestipol (COLESTID) 1 gram tablet 1 g daily as needed. (Patient not taking: Reported on 5/25/2021)     No current facility-administered medications for this visit. Allergies   Allergen Reactions    Altace [Ramipril] Unknown (comments)     Pt does not remember reaction    Lipitor [Atorvastatin] Other (comments)     Muscle pain    Lisinopril Shortness of Breath and Other (comments)     Turns bright red    Other Medication Other (comments)     Vicryl suture on skin tends to be rejected with poor wound healing does better with monocryl    Procardia [Nifedipine] Other (comments)     Caused afib    Rosuvastatin Other (comments)     Muscle Pain       REVIEW OF SYSTEMS: colo 9/15 Dr Cotter People, sees Dr Barbra Darling no vision change or eye pain  Oral  no mouth pain, tongue or tooth problems  Ears  no hearing loss, ear pain, fullness, no swallowing problems  Cardiac  no CP, PND, orthopnea, edema, palpitations or syncope  Chest  no breast masses  Resp  no wheezing, chronic coughing, dyspnea  GI  no heartburn, nausea, vomiting, change in bowel habits, bleeding, hemorrhoids  Urinary  no dysuria, hematuria, flank pain, urgency, frequency    Visit Vitals  /69   Pulse 67   Temp 98.6 °F (37 °C) (Temporal)   Resp 16   Ht 6' (1.829 m)   Wt 224 lb (101.6 kg)   SpO2 98%   BMI 30.38 kg/m²     A&O x3  Affect is appropriate. Mood stable  No apparent distress  Anicteric, no JVD, adenopathy or thyromegaly. No carotid bruits or radiated murmur  Lungs clear to auscultation, no wheezes or rales  Heart showed regular rate and rhythm. No murmur, rubs, gallops  Abdomen soft nontender, no hepatosplenomegaly or masses. Extremities without edema.   Pulses 1-2+ symmetrically    LABS  From 12/12 showed gluc 101, cr 0.77, gfr 94,   alt 26,           chol 182, tg 159, hdl 52, ldl-c 98, wbc 7.6, hb 15.4, plt 171, tsh 0.96, psa 3.20  From 7/13 showed                          test 263  From 1/14 showed   gluc 112, cr 0.78, gfr 107, alt 17,           chol 130, tg 129, hdl 37, ldl-c 67, wbc 6.8, hb 15.1, plt 186, tsh 0.64, psa 3.60, vit d 26.8  From 6/14 showed   gluc 101, cr 0.57, gfr>60,     hba1c 5.7, vit d 34.3  From 12/14 showed         hba1c 5.9, chol 141, tg 104, hdl 42, ldl-c 78, wbc 8.5, hb 14.8, plt 147, tsh 0.75, psa 5.60, vit d 31.8, ua neg  From 6/15 showed   gluc 104, cr 0.75, gfr>60,     hba1c 5.9  From 8/15 showed                       tsh 0.44, psa 5.28,         test 215, lh 6.1, prl 11.1, ft4 1.61  From 12/15 showed gluc 95,   cr 0.71, gfr>60,  alt 36, hba1c 5.9, chol 135, tg 105, hdl 44, ldl-c 70,            tsh 0.51,      vit d 29.1   From 1/16 showed   gluc 112, cr 0.72, gfr>60,          wbc 7.8, hb 15.1, plt 163,           ua neg  From 11/16 showed gluc 103, cr 0.70,           wbc 8.6, hb 14.5, plt 189, ck/trop neg  From 12/16 showed gluc 89,   cr 0.70, gfr>60,  alt 31, hba1c 5.9, chol 159, tg 124, hdl 58, ldl-c 76, wbc 9.0, hb 15.1, plt 185,       vit d 27.8  From 3/17 showed   gluc 100, cr 0.75, gfr>60,     hba1c 5.9,                tsh 0.78, psa 0.18, ft4 1.30  From 6/17 showed   gluc 96,   cr 0.74, gfr>60, alt 34,  hba1c 5.7, chol 135, tg 71,   hdl 53, ldl-c 68  From 1/18 showed   gluc 107, cr 0.65, gfr>60, alt 31,  hba1c 5.8, chol 147, tg 105, hdl 46, ldl-c 80  From 5/18 showed   gluc 100, cr 0.73, gfr>60, alt 38,  hba1c 5.9, chol 152, tg 59,   hdl 66, ldl-c 74  From 11/18 showed         hba1c 6.1, chol 146, tg 149, hdl 51, ldl-c 65, wbc 7.9, hb 15.4, plt 164  From 5/19 showed         hba1c 5.5  From 11/19 showed gluc 108, cr 0.77, gfr>60, alt 31,  hba1c 5.5, chol 128, tg 108, hdl 36, ldl-c 70  From 5/20 showed         hba1c 5.9, chol 142, tg 133, hdl 52, ldl-c 63, wbc 8.7, hb 14.1, plt 160  From 11/20 showed gluc 99,   cr 0.81, gfr>60, alt 29,  hba1c 5.4    Results for orders placed or performed during the hospital encounter of 05/18/21   HEMOGLOBIN A1C WITH EAG   Result Value Ref Range    Hemoglobin A1c 5.5 4.2 - 5.6 %    Est. average glucose 111 mg/dL   LIPID PANEL   Result Value Ref Range    LIPID PROFILE          Cholesterol, total 146 <200 MG/DL    Triglyceride 84 <150 MG/DL    HDL Cholesterol 55 40 - 60 MG/DL    LDL, calculated 74.2 0 - 100 MG/DL    VLDL, calculated 16.8 MG/DL    CHOL/HDL Ratio 2.7 0 - 5.0     METABOLIC PANEL, COMPREHENSIVE   Result Value Ref Range    Sodium 139 136 - 145 mmol/L    Potassium 3.4 (L) 3.5 - 5.5 mmol/L    Chloride 101 100 - 111 mmol/L    CO2 32 21 - 32 mmol/L    Anion gap 6 3.0 - 18 mmol/L    Glucose 103 (H) 74 - 99 mg/dL    BUN 13 7.0 - 18 MG/DL    Creatinine 0.71 0.6 - 1.3 MG/DL    BUN/Creatinine ratio 18 12 - 20      GFR est AA >60 >60 ml/min/1.73m2    GFR est non-AA >60 >60 ml/min/1.73m2    Calcium 9.2 8.5 - 10.1 MG/DL    Bilirubin, total 0.9 0.2 - 1.0 MG/DL    ALT (SGPT) 29 16 - 61 U/L    AST (SGOT) 23 10 - 38 U/L    Alk. phosphatase 52 45 - 117 U/L    Protein, total 6.6 6.4 - 8.2 g/dL    Albumin 3.8 3.4 - 5.0 g/dL    Globulin 2.8 2.0 - 4.0 g/dL    A-G Ratio 1.4 0.8 - 1.7     CBC W/O DIFF   Result Value Ref Range    WBC 7.3 4.6 - 13.2 K/uL    RBC 4.11 (L) 4.35 - 5.65 M/uL    HGB 13.9 13.0 - 16.0 g/dL    HCT 41.2 36.0 - 48.0 %    .2 (H) 74.0 - 97.0 FL    MCH 33.8 24.0 - 34.0 PG    MCHC 33.7 31.0 - 37.0 g/dL    RDW 12.7 11.6 - 14.5 %    PLATELET 217 666 - 969 K/uL    MPV 9.8 9.2 - 11.8 FL     *Note: Due to a large number of results and/or encounters for the requested time period, some results have not been displayed. A complete set of results can be found in Results Review.      We reviewed the patient's labs from the last several visits to point out trends in the numbers          Patient Active Problem List Diagnosis Code    Colitis, ulcerative ([facility]) K51.90    Colon polyps K63.5    Left carotid artery occlusion I65.22    Hypovitaminosis D E55.9    Advance directive in chart Z78.9    Hypertension I11.9    Dyslipidemia E78.5    Coronary artery disease I25.10    Atrial fibrillation I48.91    Recurrent umbilical hernia W41.1    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 9) E66.3    History of prostate cancer Z85.46    Aortoiliac occlusive disease ([facility]) I74.09    PAD (peripheral artery disease) ([facility]) I73.9    Diverticulosis K57.90     Assessment and plan:  1. Cardiac. Continue current regimen. F/U Dr Christiano Valderrama  2. Vascular. Per Dr Milli Berry  3. Dyslipidemia. Continue current regimen; we did discuss possibly adding on repatha but declined  4. IFG. Lifestyle and dietary measures, wt loss as he is trying to do  5. Hypovit d. Supplement  6. Colon polyp. Fiber, colo 2020  7. Prostate ca. Per Dr Stacey Hathaway  8. Obesity. See separate note  9. Ortho. F/U Dr Slim Ibanez  10. Spine. F/U Dr Sean Boyer and Dr Ce Vee      RT 11/21    Above conditions discussed at length and patient vocalized understanding.   All questions answered to patient satisfaction

## 2021-05-18 ENCOUNTER — APPOINTMENT (OUTPATIENT)
Dept: INTERNAL MEDICINE CLINIC | Age: 76
End: 2021-05-18

## 2021-05-18 ENCOUNTER — HOSPITAL ENCOUNTER (OUTPATIENT)
Dept: LAB | Age: 76
Discharge: HOME | End: 2021-05-18
Payer: MEDICARE

## 2021-05-18 DIAGNOSIS — I11.9 BENIGN HYPERTENSIVE HEART DISEASE WITHOUT HEART FAILURE: ICD-10-CM

## 2021-05-18 DIAGNOSIS — E78.5 DYSLIPIDEMIA: ICD-10-CM

## 2021-05-18 DIAGNOSIS — R73.01 IFG (IMPAIRED FASTING GLUCOSE): ICD-10-CM

## 2021-05-18 LAB
ALBUMIN SERPL-MCNC: 3.8 G/DL (ref 3.4–5)
ALBUMIN/GLOB SERPL: 1.4 {RATIO} (ref 0.8–1.7)
ALP SERPL-CCNC: 52 U/L (ref 45–117)
ALT SERPL-CCNC: 29 U/L (ref 16–61)
ANION GAP SERPL CALC-SCNC: 6 MMOL/L (ref 3–18)
AST SERPL-CCNC: 23 U/L (ref 10–38)
BILIRUB SERPL-MCNC: 0.9 MG/DL (ref 0.2–1)
BUN SERPL-MCNC: 13 MG/DL (ref 7–18)
BUN/CREAT SERPL: 18 (ref 12–20)
CALCIUM SERPL-MCNC: 9.2 MG/DL (ref 8.5–10.1)
CHLORIDE SERPL-SCNC: 101 MMOL/L (ref 100–111)
CHOLEST SERPL-MCNC: 146 MG/DL
CO2 SERPL-SCNC: 32 MMOL/L (ref 21–32)
CREAT SERPL-MCNC: 0.71 MG/DL (ref 0.6–1.3)
ERYTHROCYTE [DISTWIDTH] IN BLOOD BY AUTOMATED COUNT: 12.7 % (ref 11.6–14.5)
EST. AVERAGE GLUCOSE BLD GHB EST-MCNC: 111 MG/DL
GLOBULIN SER CALC-MCNC: 2.8 G/DL (ref 2–4)
GLUCOSE SERPL-MCNC: 103 MG/DL (ref 74–99)
HBA1C MFR BLD: 5.5 % (ref 4.2–5.6)
HCT VFR BLD AUTO: 41.2 % (ref 36–48)
HDLC SERPL-MCNC: 55 MG/DL (ref 40–60)
HDLC SERPL: 2.7 {RATIO} (ref 0–5)
HGB BLD-MCNC: 13.9 G/DL (ref 13–16)
LDLC SERPL CALC-MCNC: 74.2 MG/DL (ref 0–100)
LIPID PROFILE,FLP: NORMAL
MCH RBC QN AUTO: 33.8 PG (ref 24–34)
MCHC RBC AUTO-ENTMCNC: 33.7 G/DL (ref 31–37)
MCV RBC AUTO: 100.2 FL (ref 74–97)
PLATELET # BLD AUTO: 141 K/UL (ref 135–420)
PMV BLD AUTO: 9.8 FL (ref 9.2–11.8)
POTASSIUM SERPL-SCNC: 3.4 MMOL/L (ref 3.5–5.5)
PROT SERPL-MCNC: 6.6 G/DL (ref 6.4–8.2)
RBC # BLD AUTO: 4.11 M/UL (ref 4.35–5.65)
SODIUM SERPL-SCNC: 139 MMOL/L (ref 136–145)
TRIGL SERPL-MCNC: 84 MG/DL (ref ?–150)
VLDLC SERPL CALC-MCNC: 16.8 MG/DL
WBC # BLD AUTO: 7.3 K/UL (ref 4.6–13.2)

## 2021-05-18 PROCEDURE — 83036 HEMOGLOBIN GLYCOSYLATED A1C: CPT

## 2021-05-18 PROCEDURE — 80061 LIPID PANEL: CPT

## 2021-05-18 PROCEDURE — 80053 COMPREHEN METABOLIC PANEL: CPT

## 2021-05-18 PROCEDURE — 85027 COMPLETE CBC AUTOMATED: CPT

## 2021-05-20 NOTE — PROGRESS NOTES
Teena Stover presents today for   Chief Complaint   Patient presents with    Follow-up    Hypertension    Cholesterol Problem    Labs     5-18-21         Coordination of Care:  1. Have you been to the ER, urgent care clinic since your last visit? Hospitalized since your last visit? no    2. Have you seen or consulted any other health care providers outside of the [address] [place] [address] since your last visit? Include any pap smears or colon screening.  yes

## 2021-05-25 ENCOUNTER — OFFICE VISIT (OUTPATIENT)
Dept: INTERNAL MEDICINE CLINIC | Age: 76
End: 2021-05-25
Payer: MEDICARE

## 2021-05-25 VITALS
HEART RATE: 67 BPM | HEIGHT: 72 IN | TEMPERATURE: 98.6 F | BODY MASS INDEX: 30.34 KG/M2 | DIASTOLIC BLOOD PRESSURE: 69 MMHG | RESPIRATION RATE: 16 BRPM | SYSTOLIC BLOOD PRESSURE: 133 MMHG | OXYGEN SATURATION: 98 % | WEIGHT: 224 LBS

## 2021-05-25 DIAGNOSIS — R73.01 IFG (IMPAIRED FASTING GLUCOSE): ICD-10-CM

## 2021-05-25 DIAGNOSIS — I11.9 BENIGN HYPERTENSIVE HEART DISEASE WITHOUT HEART FAILURE: ICD-10-CM

## 2021-05-25 DIAGNOSIS — C61 PROSTATE CANCER (HCC): ICD-10-CM

## 2021-05-25 DIAGNOSIS — I25.10 ATHEROSCLEROSIS OF NATIVE CORONARY ARTERY OF NATIVE HEART WITHOUT ANGINA PECTORIS: ICD-10-CM

## 2021-05-25 DIAGNOSIS — M48.02 CERVICAL SPINAL STENOSIS: ICD-10-CM

## 2021-05-25 DIAGNOSIS — E78.5 DYSLIPIDEMIA: Primary | ICD-10-CM

## 2021-05-25 DIAGNOSIS — K51.919 ULCERATIVE COLITIS WITH COMPLICATION, UNSPECIFIED LOCATION (HCC): ICD-10-CM

## 2021-05-25 DIAGNOSIS — I48.91 ATRIAL FIBRILLATION, UNSPECIFIED TYPE (HCC): ICD-10-CM

## 2021-05-25 DIAGNOSIS — I73.9 PAD (PERIPHERAL ARTERY DISEASE) (HCC): ICD-10-CM

## 2021-05-25 DIAGNOSIS — I74.09 AORTOILIAC OCCLUSIVE DISEASE (HCC): ICD-10-CM

## 2021-05-25 PROCEDURE — 1101F PT FALLS ASSESS-DOCD LE1/YR: CPT | Performed by: INTERNAL MEDICINE

## 2021-05-25 PROCEDURE — 99214 OFFICE O/P EST MOD 30 MIN: CPT | Performed by: INTERNAL MEDICINE

## 2021-05-25 PROCEDURE — G8536 NO DOC ELDER MAL SCRN: HCPCS | Performed by: INTERNAL MEDICINE

## 2021-05-25 PROCEDURE — 3017F COLORECTAL CA SCREEN DOC REV: CPT | Performed by: INTERNAL MEDICINE

## 2021-05-25 PROCEDURE — G8417 CALC BMI ABV UP PARAM F/U: HCPCS | Performed by: INTERNAL MEDICINE

## 2021-05-25 PROCEDURE — G8510 SCR DEP NEG, NO PLAN REQD: HCPCS | Performed by: INTERNAL MEDICINE

## 2021-05-25 PROCEDURE — G0463 HOSPITAL OUTPT CLINIC VISIT: HCPCS | Performed by: INTERNAL MEDICINE

## 2021-05-25 PROCEDURE — G0447 BEHAVIOR COUNSEL OBESITY 15M: HCPCS | Performed by: INTERNAL MEDICINE

## 2021-05-25 PROCEDURE — G8427 DOCREV CUR MEDS BY ELIG CLIN: HCPCS | Performed by: INTERNAL MEDICINE

## 2021-05-25 RX ORDER — KETOCONAZOLE 20 MG/ML
SHAMPOO TOPICAL
COMMUNITY
Start: 2021-05-04 | End: 2021-11-03

## 2021-05-25 NOTE — PROGRESS NOTES
Discussion about weight loss    Body mass index is 30.38 kg/m². Vitals 5/25/2021   Weight 224 lb   SpO2 98   BSA 2.27 m2   BMI 30.38 kg/m2     Discussion about modifying behaviors regarding diet and exercise undertaken    Increase activity as tolerated   - limited with ortho/spine issues    Cut back carbs and fatty foods.     Calorie counting would be ideal   - trying to do better with above    Option of appetite suppressants discussed briefly and declined   - due to concerns about sfx and cost    Discussed sending to nutritionist for further teaching declined    Intermittent fasting discussed at length, concepts explained, risks and benefits elaborated upon   - admits he would have to drop down to 1 meal a day to make this work    We reiterated arget of up to 5% wt loss as being realistic over the next 6-12 months and possibly aiming for higher than that in the future     Will come back for reevaluation and further management at subsequent visits which will be determined by patient response    Total time 15 min

## 2021-05-26 DIAGNOSIS — I25.10 ATHEROSCLEROSIS OF NATIVE CORONARY ARTERY OF NATIVE HEART WITHOUT ANGINA PECTORIS: ICD-10-CM

## 2021-05-26 DIAGNOSIS — I73.9 PERIPHERAL VASCULAR DISEASE (HCC): ICD-10-CM

## 2021-05-26 NOTE — TELEPHONE ENCOUNTER
PCP: Felipe Kilgore MD    Last appt: 5/26/2020  Future Appointments   Date Time Provider Mary Reyes   10/19/2021  8:40 AM [facility] [place] [place] NURSE [facility]   10/26/2021  8:45 AM Jovanny Dougherty MD [facility]   11/19/2021  7:55 AM [facility] NURSE VISIT [facility]   11/26/2021  8:00 AM Blanca Webster MD [facility]       Requested Prescriptions     Pending Prescriptions Disp Refills    icosapent ethyL (VASCEPA) 1 gram capsule 180 Capsule 3     Sig: Take 2 Capsules by mouth two (2) times daily (with meals).

## 2021-05-28 RX ORDER — ICOSAPENT ETHYL 1000 MG/1
2 CAPSULE ORAL 2 TIMES DAILY WITH MEALS
Qty: 180 CAPSULE | Refills: 3 | Status: SHIPPED | OUTPATIENT
Start: 2021-05-28 | End: 2021-06-01 | Stop reason: SDUPTHER

## 2021-06-01 DIAGNOSIS — I73.9 PERIPHERAL VASCULAR DISEASE (HCC): ICD-10-CM

## 2021-06-01 DIAGNOSIS — I25.10 ATHEROSCLEROSIS OF NATIVE CORONARY ARTERY OF NATIVE HEART WITHOUT ANGINA PECTORIS: ICD-10-CM

## 2021-06-01 RX ORDER — ICOSAPENT ETHYL 1000 MG/1
2 CAPSULE ORAL 2 TIMES DAILY WITH MEALS
Qty: 360 CAPSULE | Refills: 3 | Status: SHIPPED | OUTPATIENT
Start: 2021-06-01

## 2021-06-02 DIAGNOSIS — M54.12 CERVICAL RADICULOPATHY: Primary | ICD-10-CM

## 2021-06-03 RX ORDER — HYDROCODONE BITARTRATE AND ACETAMINOPHEN 5; 325 MG/1; MG/1
1 TABLET ORAL
Qty: 180 TABLET | Refills: 0 | Status: SHIPPED | OUTPATIENT
Start: 2021-06-03 | End: 2021-07-03

## 2021-07-12 DIAGNOSIS — M54.12 CERVICAL RADICULOPATHY: Primary | ICD-10-CM

## 2021-07-12 RX ORDER — HYDROCODONE BITARTRATE AND ACETAMINOPHEN 5; 325 MG/1; MG/1
1 TABLET ORAL
Qty: 180 TABLET | Refills: 0 | Status: SHIPPED | OUTPATIENT
Start: 2021-07-12 | End: 2021-08-11

## 2021-07-12 NOTE — TELEPHONE ENCOUNTER
Pt request refill, not found on current medication list.       HYDROcodone-acetaminophen (NORCO) 5-325 mg per tablet    Pharmacy is [address] [place] [address]

## 2021-07-12 NOTE — TELEPHONE ENCOUNTER
[facility]  reports [facility] last [facility] date for Norco as 6/3/21 for a 30 d/s. UDS done 2/11/21  CSA signed 8/9/19    Last Visit: 5/25/21 with MD Gwen Mei  Next Appointment: 11/26/21 with MD Gwen Mei  Previous Refill Encounter(s): 6/3/21 #180    Requested Prescriptions     Pending Prescriptions Disp Refills    HYDROcodone-acetaminophen (NORCO) 5-325 mg per tablet 180 Tablet 0     Sig: Take 1 Tablet by mouth every four (4) hours as needed for Pain (chronic pain) for up to 30 days. Max Daily Amount: 6 Tablets.

## 2021-08-11 ENCOUNTER — OFFICE VISIT (OUTPATIENT)
Dept: ORTHOPEDIC SURGERY | Age: 76
End: 2021-08-11
Payer: MEDICARE

## 2021-08-11 VITALS
OXYGEN SATURATION: 99 % | HEIGHT: 72 IN | BODY MASS INDEX: 30.07 KG/M2 | TEMPERATURE: 96.8 F | WEIGHT: 222 LBS | RESPIRATION RATE: 15 BRPM | HEART RATE: 98 BPM

## 2021-08-11 DIAGNOSIS — M25.461 EFFUSION OF RIGHT KNEE: ICD-10-CM

## 2021-08-11 DIAGNOSIS — M17.11 PRIMARY OSTEOARTHRITIS OF RIGHT KNEE: Primary | ICD-10-CM

## 2021-08-11 DIAGNOSIS — G89.29 CHRONIC PAIN OF RIGHT KNEE: ICD-10-CM

## 2021-08-11 DIAGNOSIS — M25.561 CHRONIC PAIN OF RIGHT KNEE: ICD-10-CM

## 2021-08-11 PROCEDURE — G8510 SCR DEP NEG, NO PLAN REQD: HCPCS | Performed by: SPECIALIST

## 2021-08-11 PROCEDURE — 99213 OFFICE O/P EST LOW 20 MIN: CPT | Performed by: SPECIALIST

## 2021-08-11 PROCEDURE — 3017F COLORECTAL CA SCREEN DOC REV: CPT | Performed by: SPECIALIST

## 2021-08-11 PROCEDURE — G8417 CALC BMI ABV UP PARAM F/U: HCPCS | Performed by: SPECIALIST

## 2021-08-11 PROCEDURE — 1101F PT FALLS ASSESS-DOCD LE1/YR: CPT | Performed by: SPECIALIST

## 2021-08-11 PROCEDURE — 20610 DRAIN/INJ JOINT/BURSA W/O US: CPT | Performed by: SPECIALIST

## 2021-08-11 PROCEDURE — G8536 NO DOC ELDER MAL SCRN: HCPCS | Performed by: SPECIALIST

## 2021-08-11 PROCEDURE — G8427 DOCREV CUR MEDS BY ELIG CLIN: HCPCS | Performed by: SPECIALIST

## 2021-08-11 RX ORDER — BETAMETHASONE SODIUM PHOSPHATE AND BETAMETHASONE ACETATE 3; 3 MG/ML; MG/ML
3 INJECTION, SUSPENSION INTRA-ARTICULAR; INTRALESIONAL; INTRAMUSCULAR; SOFT TISSUE ONCE
Status: COMPLETED | OUTPATIENT
Start: 2021-08-11 | End: 2021-08-11

## 2021-08-11 RX ADMIN — BETAMETHASONE SODIUM PHOSPHATE AND BETAMETHASONE ACETATE 3 MG: 3; 3 INJECTION, SUSPENSION INTRA-ARTICULAR; INTRALESIONAL; INTRAMUSCULAR; SOFT TISSUE at 10:11

## 2021-08-11 NOTE — PROGRESS NOTES
Patient: Irwin Yanes                MRN: [id number]       SSN: [id number]  YOB: 1945        AGE: 76 y.o. SEX: male    PCP: Logan Kennedy MD  08/11/21     CC: RIGHT KNEE PAIN AND EFFUSION    HISTORY:  Irwin Yanes is a 76 y.o. male who is seen for increased right knee pain. He thinks he may have aggravated his knee recently walking while mowing his lawn. He has been experiencing increasing knee pain for the past few years. He is not exercising regularly due to his knee pain. He reports relief from previous cortisone and visco supplementation injections by Dr. Qi Warren in the past.  He says his knee extension has been restricted since 1964. He injured his right knee playing football and underwent total meniscectomy and arthrotomy by Dr. Reggie Hillman. He completed successful Euflexxa series on 10/14/19, 7/29/20, and 4/14/21. He was previously seen for neck pain. His neck pain radiates into his left shoulder. He experiences neck and trapezius tightness and discomort. He does not recall any neck injury. He previously saw Dr. Heather Bell. He is now seen at [facility] for his neck and back pain. He was previously seen for left hip pain. He is s/p right endarterectomy for circulation problems in July 2019 by Dr. Cory Lopez well. He currently takes Plavix & a diuretic. He has a stent in his right femoral artery. He reports a blockage in his left [address] of [facility]. He had a left groin pseudoaneursym from a recent catherization via the femoral artery. He was seen recently by Dr. Sravanthi Greer for his left shoulder--responded to an injection.      Pain Assessment  8/11/2021   Location of Pain Knee   Location Modifiers Right   Severity of Pain 8   Quality of Pain Aching   Quality of Pain Comment -   Duration of Pain -   Frequency of Pain Constant   Date Pain First Started -   Aggravating Factors Walking   Aggravating Factors Comment -   Limiting Behavior Yes Relieving Factors -   Relieving Factors Comment -   Result of Injury -   Work-Related Injury -   Type of Injury -   Type of Injury Comment -     Occupation, etc:  Mr. Haven Mock he lives with his wife in [facility] [place]. He has 2 sons not in the area. His youngest son lives in [place]. His middle son lives in [place] and is a [employer]  for 2 churches. His middle son's wife has Grawn's disease. He has 7 grandchildren. He retired 15 years ago as a  for the [employer] He retired in 2013 from [employer]. 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 He states that he has respiratory problems due to asbestos exposure. He reports that he cut his right leg a few months ago. His wound took 2 months to heal. He enjoys doing water exercises. He recently planted trees in his yard. His eldest son passed away on 6/17/19 in [place]. He is unsure of the cause of his son's death. He is not diabetic. He has h/o peripheral vascular disease. Current weight is 222 pounds. He is 6' tall. He received his Covid vaccine. He takes a statin and prescription fish oil for high cholesterol.      Lab Results   Component Value Date/Time    Hemoglobin A1c 5.5 05/18/2021 07:58 AM    Hemoglobin A1c (POC) 5.5 05/23/2019 09:05 AM     Weight Metrics 8/11/2021 5/25/2021 4/20/2021 4/14/2021 4/7/2021 3/31/2021 1/6/2021   Weight 222 lb 224 lb 220 lb 226 lb 6.4 oz 227 lb 225 lb 6.4 oz 226 lb   BMI 30.11 kg/m2 30.38 kg/m2 29.84 kg/m2 30.71 kg/m2 30.79 kg/m2 30.57 kg/m2 30.65 kg/m2       Patient Active Problem List   Diagnosis Code    Colitis, ulcerative ([facility] [address] K51.90    Colon polyps K63.5    Left carotid artery occlusion I65.22    Hypovitaminosis D E55.9    Advance directive in chart Z78.9    Hypertension I11.9    Dyslipidemia E78.5    Coronary artery disease I25.10    Atrial fibrillation I48.91    Recurrent umbilical hernia B74.7    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 9) E66.3    History of prostate cancer Z85.46    Aortoiliac occlusive disease ([facility]) I74.09    PAD (peripheral artery disease) ([facility]) I73.9    Diverticulosis K57.90     REVIEW OF SYSTEMS: All Below are Negative except: See HPI   Constitutional: negative for fever, chills, and weight loss. Cardiovascular: negative for chest pain, claudication, leg swelling, SOB, DUONG   Gastrointestinal: Negative for pain, N/V/C/D, Blood in stool or urine, dysuria,  hematuria, incontinence, pelvic pain. Musculoskeletal: See HPI   Neurological: Negative for dizziness and weakness. Negative for headaches, Visual changes, confusion, seizures   Phychiatric/Behavioral: Negative for depression, memory loss, substance  abuse. Extremities: Negative for hair changes, rash, or skin lesion changes. Hematologic: Negative for bleeding problems, bruising, pallor or swollen lymph  nodes   Peripheral Vascular: No calf pain, no circulation deficits.     Social History     Socioeconomic History    Marital status:      Spouse name: Not on file    Number of children: Not on file    Years of education: Not on file    Highest education level: Not on file   Occupational History    Not on file   Tobacco Use    Smoking status: Former Smoker     Packs/day: 1.50     Years: 25.00     Pack years: 37.50     Types: Cigarettes     Quit date: 2003     Years since quittin.6    Smokeless tobacco: Never Used   Vaping Use    Vaping Use: Never used   Substance and Sexual Activity    Alcohol use: Yes     Comment: RARELY    Drug use: Never    Sexual activity: Yes     Partners: Female     Birth control/protection: None   Other Topics Concern  Not on file   Social History Narrative    Not on file     Social Determinants of Health     Financial Resource Strain:     Difficulty of Paying Living Expenses:    Food Insecurity:     Worried About Running Out of Food in the Last Year:     [address] in the Last Year:    Transportation Needs:     Lack of Transportation (Medical):  Lack of Transportation (Non-Medical):    Physical Activity:     Days of Exercise per Week:     Minutes of Exercise per Session:    Stress:     Feeling of Stress :    Social Connections:     Frequency of Communication with Friends and Family:     Frequency of Social Gatherings with Friends and Family:     Attends Congregational Services:     Active Member of Clubs or Organizations:     Attends Club or Organization Meetings:     Marital Status:    Intimate Partner Violence:     Fear of Current or Ex-Partner:     Emotionally Abused:     Physically Abused:     Sexually Abused: Allergies   Allergen Reactions    Altace [Ramipril] Unknown (comments)     Pt does not remember reaction    Lipitor [Atorvastatin] Other (comments)     Muscle pain    Lisinopril Shortness of Breath and Other (comments)     Turns bright red    Other Medication Other (comments)     Vicryl suture on skin tends to be rejected with poor wound healing does better with monocryl    Procardia [Nifedipine] Other (comments)     Caused afib    Rosuvastatin Other (comments)     Muscle Pain        Current Outpatient Medications   Medication Sig    HYDROcodone-acetaminophen (NORCO) 5-325 mg per tablet Take 1 Tablet by mouth every four (4) hours as needed for Pain (chronic pain) for up to 30 days. Max Daily Amount: 6 Tablets.  icosapent ethyL (VASCEPA) 1 gram capsule Take 2 Capsules by mouth two (2) times daily (with meals).  ketoconazole (NIZORAL) 2 % shampoo     LORazepam (Ativan) 1 mg tablet Take 1 Tab by mouth every eight (8) hours as needed for Anxiety.     triamterene-hydroCHLOROthiazide (MAXZIDE) 37.5-25 mg per tablet TAKE ONE TABLET BY MOUTH DAILY    clopidogreL (PLAVIX) 75 mg tab Take 1 Tab by mouth daily.  carvediloL (Coreg) 25 mg tablet Take 25 mg by mouth two (2) times daily (with meals).  ezetimibe-simvastatin (VYTORIN) 10-40 mg per tablet TAKE 1 TABLET NIGHTLY    diclofenac (Voltaren) 1 % gel Apply 4 g to affected area four (4) times daily. Right knee    aspirin delayed-release 81 mg tablet Take 81 mg by mouth daily.  Cholecalciferol, Vitamin D3, 1,000 unit cap Take 1,000 Units by mouth daily.  MULTIVITAMIN PO Take  by mouth daily.  pantoprazole (PROTONIX) 40 mg tablet Take 40 mg by mouth daily.  coenzyme q10 200 mg Cap Take  by mouth daily.  colestipol (COLESTID) 1 gram tablet 1 g daily as needed. (Patient not taking: Reported on 5/25/2021)     No current facility-administered medications for this visit.       PHYSICAL EXAMINATION:  Visit Vitals  Pulse 98   Temp 96.8 °F (36 °C) (Temporal)   Resp 15   Ht 6' (1.829 m)   Wt 222 lb (100.7 kg)   SpO2 99%   BMI 30.11 kg/m²      ORTHO EXAMINATION:  Examination Right knee Left knee   Skin Intact Intact   Range of motion 110-0 120-0   Effusion 4+ -   Medial joint line tenderness + -   Lateral joint line tenderness - -   Popliteal tenderness - -   Osteophytes palpable + -   Kylahs - -   Patella crepitus + -   Anterior drawer - -   Lateral laxity - -   Medial laxity - -   Varus deformity + -   Valgus deformity - -   Pretibial edema - -   Calf tenderness - -   Brisk capilary refill, palpable pulse    Chart reviewed for the following:   I, Amaris Guzman MD, have reviewed the History, Physical and updated the Allergic reactions for [address] performed immediately prior to start of procedure:  Eliecer Gamez MD, have performed the following reviews on [facility] prior to the start of the procedure:            * Patient was identified by name and date of birth   * Agreement on procedure being performed was verified  * Risks and Benefits explained to the patient  * Procedure site verified and marked as necessary  * Patient was positioned for comfort  * Consent was obtained     Time: 9:41 AM     Date of procedure: 8/11/2021    Procedure performed by:  Rina Gregory MD    Mr. Jean Fu tolerated the procedure well with no complications. RADIOGRAPHS:  XR KLAUS HIP 11/7/18 SAWYER  IMPRESSION:  AP pelvis and two views - No fractures, moderate joint space narrowing, acetabular osteophytes present. Tonnis grade 2. Femoral acetabular impingement syndrome     XR RIGHT KNEE 4/13/18 SAWYER  IMPRESSION:  Three views - No fractures, no effusion, severe end stage with lateral subluxation joint space narrowing, + osteophytes present. IKDC Grade C. calcification of arteries    IMPRESSION:      ICD-10-CM ICD-9-CM    1. Primary osteoarthritis of right knee  M17.11 715.16 betamethasone (CELESTONE) injection 3 mg      DRAIN/INJECT LARGE JOINT/BURSA   2. Chronic pain of right knee  M25.561 719.46 betamethasone (CELESTONE) injection 3 mg    G89.29 338.29 DRAIN/INJECT LARGE JOINT/BURSA   3. Effusion of right knee  M25.461 719.06      PLAN: After timeout and under sterile conditions, right knee aspirated 60 cc of light yellow fluid. The fluid was discarded. After discussing treatment options, patient's right knee was injected with 4 cc Marcaine and 1/2 cc Celestone. We discussed possible need for arthroplasty at some time in the future. He will follow up as needed.      Scribed by Tiffanie Flores (Axel Dunn) as dictated by Rina Gregory MD

## 2021-08-17 DIAGNOSIS — M54.12 CERVICAL RADICULOPATHY: Primary | ICD-10-CM

## 2021-08-17 NOTE — TELEPHONE ENCOUNTER
Patient is requesting a medication refill for HYDROcodone 5 mg-acetaminophen 325 mg tablet ([facility]).  I did not see this on current medication list

## 2021-08-17 NOTE — TELEPHONE ENCOUNTER
[facility]  reports [facility] last fill date for Norco as 7/13/21 for a 30 d/s. UDS done 2/11/21  CSA signed 8/9/19    Last Visit: 5/25/21 with MD Nga Cevallos  Next Appointment: 11/26/21 with MD Nga Cevallos  Previous Refill Encounter(s): 7/12/21 #180    Requested Prescriptions     Pending Prescriptions Disp Refills    HYDROcodone-acetaminophen (NORCO) 5-325 mg per tablet 180 Tablet 0     Sig: Take 1 Tablet by mouth every four (4) hours as needed (chronic pain) for up to 30 days. Max Daily Amount: 6 Tablets.

## 2021-08-18 RX ORDER — HYDROCODONE BITARTRATE AND ACETAMINOPHEN 5; 325 MG/1; MG/1
1 TABLET ORAL
Qty: 180 TABLET | Refills: 0 | Status: SHIPPED | OUTPATIENT
Start: 2021-08-18 | End: 2021-09-17

## 2021-08-27 ENCOUNTER — OFFICE VISIT (OUTPATIENT)
Dept: ORTHOPEDIC SURGERY | Age: 76
End: 2021-08-27
Payer: MEDICARE

## 2021-08-27 VITALS — HEIGHT: 72 IN | RESPIRATION RATE: 18 BRPM | WEIGHT: 222 LBS | BODY MASS INDEX: 30.07 KG/M2

## 2021-08-27 DIAGNOSIS — M65.322 TRIGGER INDEX FINGER OF LEFT HAND: ICD-10-CM

## 2021-08-27 DIAGNOSIS — G56.02 CARPAL TUNNEL SYNDROME ON LEFT: Primary | ICD-10-CM

## 2021-08-27 PROCEDURE — 20526 THER INJECTION CARP TUNNEL: CPT | Performed by: ORTHOPAEDIC SURGERY

## 2021-08-27 PROCEDURE — G8427 DOCREV CUR MEDS BY ELIG CLIN: HCPCS | Performed by: ORTHOPAEDIC SURGERY

## 2021-08-27 PROCEDURE — 20550 NJX 1 TENDON SHEATH/LIGAMENT: CPT | Performed by: ORTHOPAEDIC SURGERY

## 2021-08-27 PROCEDURE — G8417 CALC BMI ABV UP PARAM F/U: HCPCS | Performed by: ORTHOPAEDIC SURGERY

## 2021-08-27 PROCEDURE — 1101F PT FALLS ASSESS-DOCD LE1/YR: CPT | Performed by: ORTHOPAEDIC SURGERY

## 2021-08-27 PROCEDURE — 99213 OFFICE O/P EST LOW 20 MIN: CPT | Performed by: ORTHOPAEDIC SURGERY

## 2021-08-27 PROCEDURE — G8432 DEP SCR NOT DOC, RNG: HCPCS | Performed by: ORTHOPAEDIC SURGERY

## 2021-08-27 PROCEDURE — G8536 NO DOC ELDER MAL SCRN: HCPCS | Performed by: ORTHOPAEDIC SURGERY

## 2021-08-27 NOTE — PROGRESS NOTES
Barby Mahajan is a 68 y.o. male right handed retiree. Worker's Compensation and legal considerations: not known. Vitals:    08/27/21 1202   Resp: 18   Weight: 222 lb (100.7 kg)   Height: 6' (1.829 m)   PainSc:   5   PainLoc: Wrist           Chief Complaint   Patient presents with    Wrist Pain     left     HPI: Patient returns today for follow-up of his left carpal tunnel syndrome as well as left index trigger finger as he is requesting injections today. We have previously discussed surgery in the past and he would like to set this up after the first as he has other medical issues that he is tending to later this year. 10/2020 HPI: Patient comes in today for follow-up of his trigger fingers and carpal tunnel. He has a history of left carpal tunnel and left cubital tunnel. He previously had injections for trigger fingers. He reports to be having problem with his left ring and index finger locking as well as his right ring finger. He also reports numbness on the left side. He is still unsure when he can have surgery due to being on Plavix from his vascular surgeon. 6/2020 HPI:  Returns requesting injections for Left CT and RIght Ring Trigger Finger. He is on plavix for the forseeable future and cannot get off yet so will have to delay surgery. Initial HPI: Patient comes in today as a referral from my partner. 1 month ago he had a left carpal tunnel injection as well as a ring finger trigger finger injection. He says the numbness and tingling in the left is improved. However he says the ring finger is still locking up. He reports having an EMG many years ago. He also has a history of a right sided carpal tunnel release by Avis Mcmahon in December 2017. He also has a history of cervical spine arthritis that he has been seen at the spine center for. He reports a one-week history of burning and pain in the index finger at the level of the palm.   He denies any injuries to this area.    Date of onset: Many years worsening since 2018 regarding the carpal tunnel syndrome on the left    Injury: No    Prior Treatment:  Yes: Comment: History of left carpal tunnel injections and multiple trigger finger injections    Numbness/ Tingling: Yes: Comment: Thumb index middle ring and small fingers on the left    ROS: Review of Systems - General ROS: negative  Respiratory ROS: no cough, shortness of breath, or wheezing  Cardiovascular ROS: no chest pain or dyspnea on exertion  Musculoskeletal ROS: positive for - pain in hand - bilateral  Neurological ROS: positive for - numbness/tingling  Dermatological ROS: negative    Past Medical History:   Diagnosis Date    Adrenal adenoma 2009    LEFT 1 cm, no change 1/15, 2/16    Asbestosis     CT 1/19 showed pleural plaques    Atrial fibrillation 10/02/2009    Had Ablation. No At. Fib. since    Basal cell cancer     Dr Mamie Coffey; he's had >350 lesions removed    Carotid disease, bilateral ([facility] [address]     Chronic pain     myofascial pain syndrome; seen in pain clinic in past    Colitis     Colon polyp     Dr Ju Coburn 9/15    Coronary artery disease     RCA - 3.0 x 16mm TAXUS 9/04, 3.0 x 16mm TAXUS 12/06    Degenerative arthritis of cervical spine     MRI 9/11 showed C3-6 severe foraminal stenosis w RADICULOPATHY    Degenerative arthritis of lumbar spine     Dr Drea Mckinney;  MRI 9/11 L4-5 disc bulging, annular tear, disc dessication w RADICULOPATHY    Diverticulosis 04/2021    Dr Ju Coburn    Dyslipidemia     Erectile dysfunction     GERD     Hearing loss 2014    Dr Jessica Lizama Hypertension     with component of white coat    Hypogonadism male     IFG (impaired fasting glucose) [id number]    OAB (overactive bladder)     Osteoarthritis     Dr Ruth Sensor Overweight (BMI 25.0-29. 9)     IF 5/18 start weight 214 lbs, not doing    Peripheral vascular disease     50-60% R iliac; s/p R iliac stent and L femoral artery stent in past; R OLIVIA 0.76 (1/16)    Plantar fasciitis     bilateral     PPD positive     Prostate cancer     T1c Oxford 7(3+4), 70% in 1 core, GS 7 (3+4) in 4 cores, GS 6 (3+3) in 1 core; psa 5.28, TRUS 18 gm;  Dr Juan C Saavedra; s/p cryoablation 10/16    Tinnitus     Dr Bina Stevens Venous insufficiency        Past Surgical History:   Procedure Laterality Date    COLONOSCOPY N/A 4/20/2021 2/2/11 neg; Dr Juan C Saavedra 9/15 polyps; 4/20/21 divertics bx neg    HX CAROTID ENDARTERECTOMY Right 01/2014    HX CARPAL TUNNEL RELEASE Right 2017    HX CATARACT REMOVAL Bilateral     HX CRYOABLATION OF THE PROSTATE  10/2016    HX HEART CATHETERIZATION  2004,2006    Stents RCA    HX HEMORRHOIDECTOMY  1979    HX HERNIA REPAIR  1970, 1975    x 4    HX HERNIA REPAIR  02/2016    Dr Gatha Sever ARTHROSCOPY Right 1963    RIGHT knee surgery    HX TONSILLECTOMY      VASCULAR SURGERY PROCEDURE UNLIST Right     RIGHT Iliac (7/19);  LEFT pseudoaneurysm repair (6/21) Dr Monique Appiah Left 2015    LEFT fem artery and iliac stents (10/15); RCF endarterectomy w patch angioplasty/B CI artery stenting (7/19)       Current Outpatient Medications   Medication Sig Dispense Refill    HYDROcodone-acetaminophen (NORCO) 5-325 mg per tablet Take 1 Tablet by mouth every four (4) hours as needed (chronic pain) for up to 30 days. Max Daily Amount: 6 Tablets. 180 Tablet 0    icosapent ethyL (VASCEPA) 1 gram capsule Take 2 Capsules by mouth two (2) times daily (with meals). 360 Capsule 3    ketoconazole (NIZORAL) 2 % shampoo       LORazepam (Ativan) 1 mg tablet Take 1 Tab by mouth every eight (8) hours as needed for Anxiety. 50 Tab 0    triamterene-hydroCHLOROthiazide (MAXZIDE) 37.5-25 mg per tablet TAKE ONE TABLET BY MOUTH DAILY 90 Tab 3    clopidogreL (PLAVIX) 75 mg tab Take 1 Tab by mouth daily. 90 Tab 3    carvediloL (Coreg) 25 mg tablet Take 25 mg by mouth two (2) times daily (with meals).       ezetimibe-simvastatin (VYTORIN) 10-40 mg per tablet TAKE 1 TABLET NIGHTLY 90 Tab 3    diclofenac (Voltaren) 1 % gel Apply 4 g to affected area four (4) times daily. Right knee 5 Each 5    colestipol (COLESTID) 1 gram tablet 1 g daily as needed. (Patient not taking: Reported on 5/25/2021)      aspirin delayed-release 81 mg tablet Take 81 mg by mouth daily.  Cholecalciferol, Vitamin D3, 1,000 unit cap Take 1,000 Units by mouth daily.  MULTIVITAMIN PO Take  by mouth daily.  pantoprazole (PROTONIX) 40 mg tablet Take 40 mg by mouth daily.  coenzyme q10 200 mg Cap Take  by mouth daily. Current Facility-Administered Medications   Medication Dose Route Frequency Provider Last Rate Last Admin    triamcinolone acetonide (KENALOG) 10 mg/mL injection 10 mg  10 mg Intra artICUlar ONCE Ascencion Cheema, DO           Allergies   Allergen Reactions    Altace [Ramipril] Unknown (comments)     Pt does not remember reaction    Lipitor [Atorvastatin] Other (comments)     Muscle pain    Lisinopril Shortness of Breath and Other (comments)     Turns bright red    Other Medication Other (comments)     Vicryl suture on skin tends to be rejected with poor wound healing does better with monocryl    Procardia [Nifedipine] Other (comments)     Caused afib    Rosuvastatin Other (comments)     Muscle Pain           PE:     Physical Exam  Constitutional:       General: He is not in acute distress. Appearance: Normal appearance. He is not ill-appearing. Cardiovascular:      Pulses: Normal pulses. Pulmonary:      Effort: Pulmonary effort is normal. No respiratory distress. Abdominal:      General: Abdomen is flat. There is no distension. Musculoskeletal:         General: Swelling and tenderness present. Normal range of motion. Cervical back: Normal range of motion. Skin:     General: Skin is warm and dry. Capillary Refill: Capillary refill takes less than 2 seconds. Neurological:      General: No focal deficit present.       Mental Status: He is alert and oriented to person, place, and time. Psychiatric:         Mood and Affect: Mood normal.         Behavior: Behavior normal.         NEUROVASCULAR    Examination L R Examination L R   Carpal Comp. + - Pronator Comp. - -   Carpal Tinel + - Pronator Tinel - -   Phalen's + - Pronator Stress - -   Cubital Comp. - - Guyon Comp. - -   Cubital Tinel - - Guyon Tinel - -   Elbow Hyperflexion - - Adson's - -   Spurling's - - SC Comp. - -   PCB Median abn - - SC Tinel - -   Radial Tinel - - IC Comp. - -   Digital Tinel - - IC Tinel - -   Radial 2-Pt WNL WNL Ulnar 2-Pt WNL WNL     Radial Pulse: 2+  Capillary Refill: < 2 sec  Robert: Not Performed  Digital Robert: Not Performed    Hand:    Examination L Digit(s) R Digit(s)   1st CMC Tenderness -  -    1st CMC Grind -  -    Pennie Nodes -  -    Heberden Nodes -  -    A1 Pulley Tenderness + RF, IF + RF   Triggering + RF, IF + RF   UCL Instability -  -    RCL Instability -  -    Lateral Stress Pain -  -    Palmar Cords -  -    Tabletop test -  -    Garrod's Pads -  -     Strength       Pinch Strength         ROM: Full      Imaging:     Plain films of the left ring finger are negative for any acute fracture dislocation. There are minimal degenerative changes. 2017 MRI of cervical spine  IMPRESSION:     1. Multilevel degenerative findings of cervical spine.  -Facet arthropathy more severe than disc pathology. -Multilevel severe foraminal stenoses from facet arthropathy as delineated  above. Similar distribution previously.  -No high-grade spinal stenosis. NCV & EMG Findings:  Evaluation of the left median motor nerve showed prolonged distal onset latency (5.2 ms) and decreased conduction velocity (Elbow-Wrist, 44 m/s). The left ulnar motor nerve showed decreased conduction velocity (A Elbow-B Elbow, 48 m/s).   The left median sensory nerve showed prolonged distal peak latency (4.4 ms), reduced amplitude (5.0 µV), and decreased conduction velocity (Wrist-2nd Digit, 32 m/s). The left ulnar sensory nerve showed prolonged distal peak latency (3.9 ms), reduced amplitude (5.5 µV), and decreased conduction velocity (Wrist-5th Digit, 36 m/s). All remaining nerves (as indicated in the following tables) were within normal limits.       Needle evaluation of the left triceps muscle showed increased motor unit amplitude and slightly increased polyphasic potentials. The left pronator teres and the left Ext Digitorum muscles showed slightly increased polyphasic potentials. All remaining muscles (as indicated in the following table) showed no evidence of electrical instability.       INTERPRETATION  This is an abnormal electrodiagnostic examination. These findings may be consistent with:  1. 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 moderate carpal tunnel syndrome at the left wrist (carpal tunnel syndrome)  3. chronic C6/7 cervical radiculopathy on the left  4. mild signs of peripheral neuropathy      CLINICAL INTERPRETATION  His symptoms may multifactorial, related to any of these diagnoses, but most likely the carpal tunnel syndrome. ICD-10-CM ICD-9-CM    1. Carpal tunnel syndrome on left  G56.02 354.0 INJECT CARPAL TUNNEL      triamcinolone acetonide (KENALOG) 10 mg/mL injection 10 mg   2. Trigger index finger of left hand  M65.322 727.03 INJECT TENDON SHEATH/LIGAMENT      triamcinolone acetonide (KENALOG) 10 mg/mL injection 10 mg       Plan:     Left carpal tunnel injection and left index finger A1 pulley injection. Follow-up and Dispositions    · Return if symptoms worsen or fail to improve, for surgical discussion.          [facility]  OFFICE PROCEDURE PROGRESS NOTE        Chart reviewed for the following:   Ascencion YODER DO, have reviewed the History, Physical and updated the Allergic reactions for Reta  13. performed immediately prior to start of procedure:   Ascencion YODER DO, have performed the following reviews on [facility] prior to the start of the procedure:            * Patient was identified by name and date of birth   * Agreement on procedure being performed was verified  * Risks and Benefits explained to the patient  * Procedure site verified and marked as necessary  * Patient was positioned for comfort  * Consent was signed and verified     Time: 12:07 PM      Date of procedure: 8/27/2021    Procedure performed by: Wu Christie DO    Provider assisted by: Alicia Michael LPN    Patient assisted by: self    How tolerated by patient: tolerated the procedure well with no complications    Post Procedural Pain Scale: 0 - No Hurt    Comments: none    Procedure:  After consent was obtained, using sterile technique the carpal tunnel was prepped. Local anesthetic used: 1% lidocaine. Kenalog 5 mg X2 and was then injected and the needle withdrawn. The procedure was well tolerated. The patient is asked to continue to rest the area for a few more days before resuming regular activities. It may be more painful for the first 1-2 days. Watch for fever, or increased swelling or persistent pain in the joint. Call or return to clinic prn if such symptoms occur or there is failure to improve as anticipated.     Plan was reviewed with patient, who verbalized agreement and understanding of the plan

## 2021-09-03 ENCOUNTER — PATIENT OUTREACH (OUTPATIENT)
Dept: CASE MANAGEMENT | Age: 76
End: 2021-09-03

## 2021-09-03 RX ORDER — GABAPENTIN 300 MG/1
300 CAPSULE ORAL DAILY
COMMUNITY
Start: 2021-06-01 | End: 2021-09-08 | Stop reason: SDUPTHER

## 2021-09-03 RX ORDER — POLYETHYLENE GLYCOL 3350 17 G/17G
17 POWDER, FOR SOLUTION ORAL AS NEEDED
COMMUNITY

## 2021-09-03 RX ORDER — LOSARTAN POTASSIUM 50 MG/1
50 TABLET ORAL DAILY
COMMUNITY
Start: 2021-06-11 | End: 2021-09-03 | Stop reason: SDUPTHER

## 2021-09-03 RX ORDER — LOSARTAN POTASSIUM 50 MG/1
50 TABLET ORAL 2 TIMES DAILY
COMMUNITY
Start: 2021-06-11

## 2021-09-03 RX ORDER — NEBIVOLOL 10 MG/1
5 TABLET ORAL 2 TIMES DAILY
COMMUNITY
Start: 2021-09-02 | End: 2022-09-21

## 2021-09-03 RX ORDER — ALCLOMETASONE DIPROPIONATE 0.5 MG/G
CREAM TOPICAL
COMMUNITY
Start: 2021-06-12 | End: 2021-09-21 | Stop reason: SDUPTHER

## 2021-09-03 RX ORDER — NITROGLYCERIN 0.4 MG/1
TABLET SUBLINGUAL
COMMUNITY
Start: 2021-06-15

## 2021-09-03 NOTE — PROGRESS NOTES
Care Transitions Initial Call    Call within 2 business days of discharge: Yes     Patient: Nick Byrd Patient :  MRN: [id number]     Was this an external facility discharge? Yes, 21-21 Discharge Facility: [facility]: Hypertensive Urgency    Challenges to be reviewed by the provider   Additional needs identified to be addressed with provider: no  none         Method of communication with provider : chart routing    Discussed COVID-19 related testing which was available at this time. Test results were negative. Patient informed of results, if available? yes     Advance Care Planning:   Does patient have an Advance Directive:  yes; reviewed and current     Inpatient Readmission Risk score: No data recorded  Was this a readmission? no   Patient stated reason for the admission: N/A    Patients top risk factors for readmission: medical condition-hypertensive urgency   Interventions to address risk factors: Obtained and reviewed discharge summary and/or continuity of care documents    Care Transition Nurse (CTN) contacted the patient by telephone to perform post hospital discharge assessment. Verified name and  with patient as identifiers. Provided introduction to self, and explanation of the CTN role. Patient reported doing well. Had BM last night. Denied diarrhea; stool was formed and without blood. CTN reviewed discharge instructions, medical action plan and red flags with patient who verbalized understanding. Were discharge instructions available to patient? yes. Reviewed appropriate site of care based on symptoms and resources available to patient including: PCP, Specialist, When to call 911 and CTN. Patient given an opportunity to ask questions and does not have any further questions or concerns at this time. The patient agrees to contact the PCP office for questions related to their healthcare.      Medication reconciliation was performed with patient, who verbalizes understanding of administration of home medications. Advised obtaining a 90-day supply of all daily and as-needed medications. Referral to Pharm D needed: no     Review of [facility] EMR indicated Isordil, one tab 3 times daily as needed was also prescribed by Dr. Rigo Farr. Patient picked up medication. BP this morning at 10:20 AM was 141/79. Patient was not provided with BP parameters however patient voiced he and Dr. Lc Donato have discussed it and he will know when he needs to take Isordil. Patient verbalized he will ask for a (BP) \"number\" from Dr. Micheal Wong at his follow up appt. Home Health/Outpatient orders at discharge: home health care  [address]: [facility] ([facility])  Date of initial visit: TBD     Covid Risk Education  Educated patient about risk for severe COVID-19 due to risk factors according to CDC guidelines. CTN reviewed discharge instructions, medical action plan and red flag symptoms with the patient who verbalized understanding. Discussed COVID vaccination status: yes. Patient verbalized he received both vaccines. Education provided on COVID-19 vaccination as appropriate. Discussed exposure protocols and quarantine with CDC Guidelines. Patient was given an opportunity to verbalize any questions and concerns and agrees to contact CTN or health care provider for questions related to their healthcare. Discussed follow-up appointments. If no appointment was previously scheduled, appointment scheduling offered: N/A. Is follow up appointment scheduled within 7 days of discharge? yes.    [address] follow up appointment(s):   Future Appointments   Date Time Provider Mary Reyes   9/7/2021  1:30 PM Rosana Swenson MD [facility] BS AMB   10/19/2021  8:40 AM [facility] NURSE [facility]   10/26/2021  8:45 AM Kumar Reese MD [facility]   11/19/2021  7:55 AM [facility] NURSE VISIT [facility]   11/26/2021  8:00 AM Rosana Swenson MD [facility] BS AMB     Non-[facility] follow up appointment(s): Dr. Lesa Jackson, cardiology 9/17/21 @ 8:15 AM    Plan for follow-up call in 7-10 days based on severity of symptoms and risk factors. Plan for next call: symptom management-assess for s/s of HTN  CTN provided contact information for future needs. Goals Addressed                 This Visit's Progress     Attends follow-up appointments as directed. Goal: Patient will attend all appointments scheduled within the next 30 days. Ensure provider appt is scheduled within 7 business days post-discharge; 9/3: [facility] appt scheduled within 7 days; 97 @ 1:30 PM. Cardiology f/up 9/17 @ 1:30 PM.    Confirm patient attended post-discharge provider appt   Complete post-visit call to confirm attendance and update care needs           Prevent complications post hospitalization. Goal: No admissions post 30 days from discharge of 9/2/21. Review/educate common or potential \"red flags\" of condition worsening  Evaluate adherence to medications and priority barriers to resolve      Instruct on adherence to medications as ordered and assess for therapeutic response and side-effects       Monitor lab results and symptoms, escalate to provider          Communicate visits and goals between multiple providers and services    Assess for health risk behaviors and educate patient/caregivers on reducing risk   Coordinate plan to treat at home when symptoms arise; 9/3: Contact cardiology.      Observe for trends in symptoms and measures, provide direction to patient, and notify provider as needed       Discuss and provide resources for ACP; 9/3: Reviewed and current

## 2021-09-07 ENCOUNTER — OFFICE VISIT (OUTPATIENT)
Dept: INTERNAL MEDICINE CLINIC | Age: 76
End: 2021-09-07
Payer: MEDICARE

## 2021-09-07 VITALS
RESPIRATION RATE: 18 BRPM | HEIGHT: 72 IN | DIASTOLIC BLOOD PRESSURE: 77 MMHG | TEMPERATURE: 99 F | SYSTOLIC BLOOD PRESSURE: 124 MMHG | OXYGEN SATURATION: 97 % | HEART RATE: 68 BPM | WEIGHT: 216.8 LBS | BODY MASS INDEX: 29.36 KG/M2

## 2021-09-07 DIAGNOSIS — M48.02 CERVICAL SPINAL STENOSIS: ICD-10-CM

## 2021-09-07 DIAGNOSIS — I73.9 PAD (PERIPHERAL ARTERY DISEASE) (HCC): ICD-10-CM

## 2021-09-07 DIAGNOSIS — I48.91 ATRIAL FIBRILLATION, UNSPECIFIED TYPE (HCC): Primary | ICD-10-CM

## 2021-09-07 DIAGNOSIS — I25.10 ATHEROSCLEROSIS OF NATIVE CORONARY ARTERY OF NATIVE HEART WITHOUT ANGINA PECTORIS: ICD-10-CM

## 2021-09-07 DIAGNOSIS — F41.9 ANXIETY: ICD-10-CM

## 2021-09-07 DIAGNOSIS — M54.16 LUMBAR RADICULOPATHY: ICD-10-CM

## 2021-09-07 DIAGNOSIS — M54.12 CERVICAL RADICULOPATHY: ICD-10-CM

## 2021-09-07 DIAGNOSIS — I11.9 BENIGN HYPERTENSIVE HEART DISEASE WITHOUT HEART FAILURE: ICD-10-CM

## 2021-09-07 PROCEDURE — 99496 TRANSJ CARE MGMT HIGH F2F 7D: CPT | Performed by: INTERNAL MEDICINE

## 2021-09-07 PROCEDURE — G8427 DOCREV CUR MEDS BY ELIG CLIN: HCPCS | Performed by: INTERNAL MEDICINE

## 2021-09-07 RX ORDER — ISOSORBIDE DINITRATE 10 MG/1
10 TABLET ORAL 3 TIMES DAILY
COMMUNITY
Start: 2021-09-02

## 2021-09-07 NOTE — PROGRESS NOTES
Depression Screening:  3 most recent PHQ Screens 9/7/2021   PHQ Not Done -   Little interest or pleasure in doing things Not at all   Feeling down, depressed, irritable, or hopeless Not at all   Total Score PHQ 2 0       Learning Assessment:  Learning Assessment 5/25/2021   PRIMARY LEARNER Patient   HIGHEST LEVEL OF EDUCATION - PRIMARY LEARNER  [id number] Samia Greco PRIMARY LEARNER NONE   CO-LEARNER CAREGIVER No   PRIMARY LANGUAGE ENGLISH   LEARNER PREFERENCE PRIMARY DEMONSTRATION     -     -     -     -   ANSWERED BY patient   RELATIONSHIP SELF       Abuse Screening:  Abuse Screening Questionnaire 5/25/2021   Do you ever feel afraid of your partner? N   Are you in a relationship with someone who physically or mentally threatens you? N   Is it safe for you to go home? Y       Fall Risk  Fall Risk Assessment, last 12 mths 9/7/2021   Able to walk? Yes   Fall in past 12 months? 0   Do you feel unsteady? -   Are you worried about falling -   Number of falls in past 12 months -   Fall with injury? -       ADL  No flowsheet data found. Travel Screening:    Travel Screening     Question   Response    In the last month, have you been in contact with someone who was confirmed or suspected to have Coronavirus / COVID-19? No / Unsure    Have you had a COVID-19 viral test in the last 14 days? Yes - Pending result    Do you have any of the following new or worsening symptoms? None of these    Have you traveled internationally or domestically in the last month? No      Travel History   Travel since 08/07/21     No documented travel since 08/07/21          Health Maintenance reviewed and discussed and ordered per Provider. Health Maintenance Due   Topic Date Due    Flu Vaccine (1) 09/01/2021   .     Steven Soriano presents today for   Chief Complaint   Patient presents with   [facility] Follow Up       Is someone accompanying this pt? no    Is the patient using any DME equipment during OV? no    Coordination of Care:  1. Have you been to the ER, urgent care clinic since your last visit? Hospitalized since your last visit? no    2. Have you seen or consulted any other health care providers outside of the [address] [place] [address] since your last visit? Include any pap smears or colon screening.  no

## 2021-09-08 ENCOUNTER — PATIENT MESSAGE (OUTPATIENT)
Dept: INTERNAL MEDICINE CLINIC | Age: 76
End: 2021-09-08

## 2021-09-08 DIAGNOSIS — M48.02 CERVICAL SPINAL STENOSIS: Primary | ICD-10-CM

## 2021-09-08 PROBLEM — M50.30 DEGENERATION OF CERVICAL INTERVERTEBRAL DISC: Status: RESOLVED | Noted: 2017-10-04 | Resolved: 2021-09-08

## 2021-09-08 RX ORDER — LORAZEPAM 1 MG/1
1 TABLET ORAL
Qty: 50 TABLET | Refills: 0 | Status: SHIPPED | OUTPATIENT
Start: 2021-09-08 | End: 2022-03-24 | Stop reason: SDUPTHER

## 2021-09-08 NOTE — TELEPHONE ENCOUNTER
This med is listed as historical. Please sign if appropriate. [facility] reports the last fill date for Neurinton as 6/1/21 for a 90 d/s. Last Visit: 9/7/21 with MD Umm Mcallister  Next Appointment: 11/26/21 with MD Umm Mcallister    Requested Prescriptions     Pending Prescriptions Disp Refills    gabapentin (NEURONTIN) 300 mg capsule 90 Capsule 1     Sig: Take 1 Capsule by mouth daily. Max Daily Amount: 300 mg.

## 2021-09-08 NOTE — TELEPHONE ENCOUNTER
----- Message from [address] [place] sent at 9/8/2021  8:30 AM EDT -----  Regarding: Prescription Question  Contact: [phone]  Please send a new prescription for Gabapentin to [facility], so I will have enough to work on taking more as suggested. Let me know what was sent please. Also I linked my records from Dr Charlene Mark on the MyChart epic and you should be able to see them. If not let me know and I can print them out an drop them off at your office. Thank you for the time you spent with me yesterday, hopefully this plan will work out.   Maria Ingram

## 2021-09-08 NOTE — PROGRESS NOTES
Patient being seen today for transitional care management    Patient was admitted to [facility] from 8/30/21 to 9/2/21              Initial interactive contact was done by nurse navigator     I thoroughly reviewed the discharge summary, notes, consults, labs and imaging studies in the electronic record. Pertinent details are summarized below and the record has been updated to reflect recent events    He presented to the ER with uncontrolled HTN and cp, reporting readings in the 619 range systolic. He had been working with his cardiologist, Dr Simeon Macario, to try to get the bp under control. biomarkers were neg, cxr showed pleural plaques, echo showed ef 55%, NST showed inferolat perf defect. He underwent cath by Dr Isabel Grace which showed patent stents and no new obst disease. He was changed from coreg to nebivolol and kept on losartan 50bid, given isordil tid prn elevated bp. After dc, he was getting hypotensive so the nebivolol was dec to 5qd but then he had to inc again to 5 bid as his pressure started going up. He has s found that he needs to use the isordil at night to control the bp  Has follow up with Dr Simeon Macario later this week    Also reports that the pain control for his spinal issues has been suboptimal and requesting to inc the med dosing. I told him that he would need to see pain mgmt if he wanted to inc the narc. He is scheduled for q4 norco but he reports trying to extend to q6-7 dosing. However, his gabapentin dosing is relatively low and willing to inc that as tolerated to see his response. Requesting ativan refill as well as feeling very stressed with the developments above    Finally, reports that he will be going for LLE angio by Dr True Dexter come November    Past Medical History:   Diagnosis Date    Adrenal adenoma 2009    LEFT 1 cm, no change 1/15, 2/16    Asbestosis     CT 1/19 showed pleural plaques    Atrial fibrillation 10/02/2009    Had Ablation. No At. Fib. since    Basal cell cancer     Dr Orellana Salvage; he's had >350 lesions removed    Carotid disease, bilateral (HCC)     Chronic pain     myofascial pain syndrome; seen in pain clinic in past    Colitis     Colon polyp     Dr Carlos Manuel Watson 9/15    Coronary artery disease     RCA - 3.0 x 16mm TAXUS 9/04, 3.0 x 16mm TAXUS 12/06    Degenerative arthritis of cervical spine     MRI 9/11 showed C3-6 severe foraminal stenosis w RADICULOPATHY    Degenerative arthritis of lumbar spine     Dr Yeni Llamas;  MRI 9/11 L4-5 disc bulging, annular tear, disc dessication w RADICULOPATHY    Diverticulosis 04/2021    Dr Carlos Manuel Watson    Dyslipidemia     Erectile dysfunction     GERD     H/O cardiac catheterization 08/2021    [facility] Dr Kae Coreas patent stents, no new obst dz    H/O cardiovascular stress test     [facility] mod reversible inferolat defect, no wma, ef 64%, no TID (8/21)    H/O echocardiogram     [facility] ef 55%, mild AI, tr MR (8/21)    Hearing loss 2014    Dr Sammy Gonzalez Hypertension     with component of white coat    Hypogonadism male     IFG (impaired fasting glucose) [id number]    OAB (overactive bladder)     Osteoarthritis     Dr Cande Dominguez Overweight (BMI 25.0-29. 9)     IF 5/18 start weight 214 lbs, not doing    Peripheral vascular disease     50-60% R iliac; s/p R iliac stent and L femoral artery stent in past; R OLIVIA 0.76 (1/16)    Plantar fasciitis     bilateral     PPD positive     Prostate cancer     T1c [place] 7(3+4), 70% in 1 core, GS 7 (3+4) in 4 cores, GS 6 (3+3) in 1 core; psa 5.28, TRUS 18 gm;  Dr Carlos Manuel Watson; s/p cryoablation 10/16    Tinnitus     Dr Tyesha Brar Venous insufficiency      Past Surgical History:   Procedure Laterality Date    COLONOSCOPY N/A 4/20/2021 2/2/11 neg; Dr Carlos Manuel Watson 9/15 polyps; 4/20/21 divertics bx neg    HX CAROTID ENDARTERECTOMY Right 01/2014    HX CARPAL TUNNEL RELEASE Right 2017    HX CATARACT REMOVAL Bilateral     HX CRYOABLATION OF THE PROSTATE  10/2016    HX HEART CATHETERIZATION  2004,2006    Stents RCA    HX HEMORRHOIDECTOMY [address]    x 4    HX HERNIA REPAIR  2016    Dr Kathleen Canas ARTHROSCOPY Right     RIGHT knee surgery    HX TONSILLECTOMY      VASCULAR SURGERY PROCEDURE UNLIST Right     RIGHT Iliac ();  LEFT pseudoaneurysm repair () Dr Ankit Montalvo Left     LEFT fem artery and iliac stents (10/15); RCF endarterectomy w patch angioplasty/B CI artery stenting ()     Social History     Socioeconomic History    Marital status:      Spouse name: Not on file    Number of children: Not on file    Years of education: Not on file    Highest education level: Not on file   Occupational History    Not on file   Tobacco Use    Smoking status: Former Smoker     Packs/day: 1.50     Years: 25.00     Pack years: 37.50     Types: Cigarettes     Quit date: 2003     Years since quittin.6    Smokeless tobacco: Never Used   Vaping Use    Vaping Use: Never used   Substance and Sexual Activity    Alcohol use: Yes     Comment: RARELY    Drug use: Never    Sexual activity: Yes     Partners: Female     Birth control/protection: None   Other Topics Concern    Not on file   Social History Narrative    Not on file     Social Determinants of Health     Financial Resource Strain:     Difficulty of Paying Living Expenses:    Food Insecurity:     Worried About Running Out of Food in the Last Year:     [address] in the Last Year:    Transportation Needs:     Lack of Transportation (Medical):      Lack of Transportation (Non-Medical):    Physical Activity:     Days of Exercise per Week:     Minutes of Exercise per Session:    Stress:     Feeling of Stress :    Social Connections:     Frequency of Communication with Friends and Family:     Frequency of Social Gatherings with Friends and Family:     Attends Orthodoxy Services:     Active Member of Clubs or Organizations:     Attends Club or Organization Meetings:     Marital Status:    Intimate Partner Violence:     Fear of Current or Ex-Partner:     Emotionally Abused:     Physically Abused:     Sexually Abused:      Current Outpatient Medications   Medication Sig    LORazepam (Ativan) 1 mg tablet Take 1 Tablet by mouth every eight (8) hours as needed for Anxiety.  isosorbide dinitrate (ISORDIL) 10 mg tablet Take 10 mg by mouth three (3) times daily. As needed    losartan (COZAAR) 50 mg tablet Take 50 mg by mouth two (2) times a day.  nebivoloL (Bystolic) 10 mg tablet Take 5 mg by mouth daily.  polyethylene glycol (MIRALAX) 17 gram/dose powder Take 17 g by mouth as needed.  nitroglycerin (NITROSTAT) 0.4 mg SL tablet     gabapentin (NEURONTIN) 300 mg capsule Take 300 mg by mouth daily.  alclometasone (ACLOVATE) 0.05 % topical cream     HYDROcodone-acetaminophen (NORCO) 5-325 mg per tablet Take 1 Tablet by mouth every four (4) hours as needed (chronic pain) for up to 30 days. Max Daily Amount: 6 Tablets.  icosapent ethyL (VASCEPA) 1 gram capsule Take 2 Capsules by mouth two (2) times daily (with meals).  ketoconazole (NIZORAL) 2 % shampoo     triamterene-hydroCHLOROthiazide (MAXZIDE) 37.5-25 mg per tablet TAKE ONE TABLET BY MOUTH DAILY    clopidogreL (PLAVIX) 75 mg tab Take 1 Tab by mouth daily.  ezetimibe-simvastatin (VYTORIN) 10-40 mg per tablet TAKE 1 TABLET NIGHTLY    diclofenac (Voltaren) 1 % gel Apply 4 g to affected area four (4) times daily. Right knee    colestipol (COLESTID) 1 gram tablet Take 1 g by mouth daily as needed.  aspirin delayed-release 81 mg tablet Take 81 mg by mouth daily.  Cholecalciferol, Vitamin D3, 1,000 unit cap Take 1,000 Units by mouth daily.  MULTIVITAMIN PO Take  by mouth daily.  pantoprazole (PROTONIX) 40 mg tablet Take 40 mg by mouth daily.  coenzyme q10 200 mg Cap Take  by mouth daily. No current facility-administered medications for this visit.      Allergies   Allergen Reactions    Altace [Ramipril] Unknown (comments)     Pt does not remember reaction    Lipitor [Atorvastatin] Other (comments)     Muscle pain    Lisinopril Shortness of Breath and Other (comments)     Turns bright red    Other Medication Other (comments)     Vicryl suture on skin tends to be rejected with poor wound healing does better with monocryl    Procardia [Nifedipine] Other (comments)     Caused afib    Rosuvastatin Other (comments)     Muscle Pain       Visit Vitals  /77 (BP 1 Location: Left upper arm, BP Patient Position: Sitting)   Pulse 68   Temp 99 °F (37.2 °C) (Temporal)   Resp 18   Ht 6' (1.829 m)   Wt 216 lb 12.8 oz (98.3 kg)   SpO2 97%   BMI 29.40 kg/m²   A&O x3  Affect is appropriate. Mood stable  No apparent distress  Anicteric, no JVD, adenopathy or thyromegaly. No carotid bruits or radiated murmur  Lungs clear to auscultation, no wheezes or rales  Heart showed regular rate and rhythm. No murmur, rubs, gallops  Abdomen soft nontender, no hepatosplenomegaly or masses. Extremities without edema. Pulses 1-2+ symmetrically    Assessment and plan:  1. HTN. Continue current for now and he will consult with Dr Sheri Daniel for further recs  2. CAD. Per Dr Екатерина Cast  3. PAD. Per Dr Andrew Olsen  4. Chronic pain. Inc gabapenting by 1 tab every 5-7 days until he gets relief or sfx. Above conditions discussed at length and patient vocalized understanding. All questions answered to patient satisfaction        ICD-10-CM ICD-9-CM    1. Atrial fibrillation, unspecified type ([facility] [address] [address]  I48.91 427.31    2. Atherosclerosis of native coronary artery of native heart without angina pectoris  I25.10 414.01    3. Hypertension  I11.9 402.10    4. PAD (peripheral artery disease) (HCC)  I73.9 443.9    5. Cervical radiculopathy  M54.12 723.4    6. Lumbar radiculopathy  M54.16 724.4    7. Cervical spinal stenosis  M48.02 723.0    8.  Anxiety  F41.9 300.00 LORazepam (Ativan) 1 mg tablet

## 2021-09-09 RX ORDER — GABAPENTIN 300 MG/1
300 CAPSULE ORAL DAILY
Qty: 90 CAPSULE | Refills: 1 | Status: SHIPPED | OUTPATIENT
Start: 2021-09-09 | End: 2021-11-26 | Stop reason: ALTCHOICE

## 2021-09-13 ENCOUNTER — PATIENT OUTREACH (OUTPATIENT)
Dept: CASE MANAGEMENT | Age: 76
End: 2021-09-13

## 2021-09-13 NOTE — PROGRESS NOTES
Care Transitions Follow Up Call    Challenges to be reviewed by the provider   Additional needs identified to be addressed with provider: no  none           Method of communication with provider : none    Care Transition Nurse (CTN) contacted the patient by telephone to follow up after admission on 8/30/21. No answer. Left HIPPA compliant message. Name, role and contact information provided. Requested return call. Will attempt to contact at a later time. Most recent PCP note reviewed. Patient taking Isordil at night to help control BP. Taking Nebivolol as prescribed. Patient to follow up with pain medication regarding increase in Norco. Per PCP note, patient to increase Gabapentin by 1 tab every 5-7 days until he gets relief. Advance Care Planning:   Does patient have an Advance Directive:  yes; reviewed and current       [facility] follow up appointment(s):   Future Appointments   Date Time Provider Mary Reyes   10/19/2021  8:40 AM Rajiv Berry   10/26/2021  8:45 AM MD Niles Grossman   11/19/2021  7:55 AM [facility] NURSE VISIT [facility]   11/26/2021  8:00 AM Jamal Madrid MD I[facility] BS AMB     Non[facility] follow up appointment(s): Dr. Jeniffer Walker , cardiology appt scheduled for 9/17 @ 1:30 PM      CTN provided contact information for future needs. Plan for follow-up call in 7-10 days based on severity of symptoms and risk factors. Plan for next call: symptom management-assess for HTN and follow up appointment-verify attendance of cardiology appt     Goals Addressed                 This Visit's Progress     Attends follow-up appointments as directed. Goal: Patient will attend all appointments scheduled within the next 30 days.       Ensure provider appt is scheduled within 7 business days post-discharge; 9/3: [facility] appt scheduled within 7 days; 9/7 @ 1:30 PM. Cardiology f/up 9/17 @ 1:30 PM.    Confirm patient attended post-discharge provider appt ; 9/13: Patient attended [facility] appt on 9/7. Complete post-visit call to confirm attendance and update care needs; 9/13: Attempted.

## 2021-09-21 ENCOUNTER — PATIENT OUTREACH (OUTPATIENT)
Dept: CASE MANAGEMENT | Age: 76
End: 2021-09-21

## 2021-09-21 RX ORDER — HYDROCODONE BITARTRATE AND ACETAMINOPHEN 5; 325 MG/1; MG/1
1 TABLET ORAL
COMMUNITY
End: 2021-09-28 | Stop reason: SDUPTHER

## 2021-09-21 RX ORDER — ALCLOMETASONE DIPROPIONATE 0.5 MG/G
CREAM TOPICAL DAILY PRN
COMMUNITY

## 2021-09-21 NOTE — PROGRESS NOTES
Care Transitions Follow Up Call    Challenges to be reviewed by the provider   Additional needs identified to be addressed with provider: no  none           Method of communication with provider : none    Care Transition Nurse (CTN) contacted the patient by telephone to follow up after admission on 21. Verified name and  with patient as identifiers. Patient voiced he was see, by Dr. Jana Brothers, cardiologist today. No new changes to meds. Was told to take Isordil of SBP >145 can take 0.5-1 tab up to 3 times daily as needed. Was taking Gabapentin 2 tabs daily but was causing blurry vision so is now taking one tab in the evening. Also taking 1 tab of  Norco 3-4 times daily. Patient asked if okay to take one tab 325 or 500 Tylenol along with Norco. Advised patient that would be fine and advised not to exceed 4000 mg of Tylenol daily. Patient voiced understanding. Addressed changes since last contact: none  Follow up appointment completed? yes. Was follow up appointment scheduled within 7 days of discharge? yes. Advance Care Planning:   Does patient have an Advance Directive:  yes; reviewed and current     CTN reviewed discharge instructions, medical action plan and red flags with patient and discussed any barriers to care and/or understanding of plan of care after discharge. Discussed appropriate site of care based on symptoms and resources available to patient including: PCP, Specialist, When to call 911 and CTN. The patient agrees to contact the PCP office for questions related to their healthcare.      Patients top risk factors for readmission: none   Interventions to address risk factors: none    [facility] follow up appointment(s):   Future Appointments   Date Time Provider Mary Reyes   10/19/2021  8:40 AM [facility]   10/26/2021  8:45 AM Jacki Alaniz MD [address]   2021  7:55 AM IO LAB VISIT IO BS AMB   2021  8:00 AM Davy Webster, MD CHAWLA BS [facility]     Non-BS follow up appointment(s): None    CTN provided contact information for future needs. Plan for follow-up call in 10-14 days based on severity of symptoms and risk factors. Plan for next call: symptom management-assess for s/s HTN/stroke     Goals Addressed                 This Visit's Progress     Attends follow-up appointments as directed. Goal: Patient will attend all appointments scheduled within the next 30 days. Ensure provider appt is scheduled within 7 business days post-discharge; 9/3: [facility] appt scheduled within 7 days; 9/7 @ 1:30 PM. Cardiology f/up 9/17 @ 1:30 PM.    Confirm patient attended post-discharge provider appt ; 9/13: [facility]21: Seen by cardiology today. Complete post-visit call to confirm attendance and update care needs; 9/13: Attempted. 9/21: Done.  Prevent complications post hospitalization. Goal: No admissions post 30 days from discharge of 9/2/21. Review/educate common or potential \"red flags\" of condition worsening; Reviewed s/s to monitor/report: headache, dizziness, nausea, CP/palpitations  Instruct on adherence to medications as ordered and assess for therapeutic response and side-effects          Communicate visits and goals between multiple providers and services    Assess for health risk behaviors and educate patient/caregivers on reducing risk   Coordinate plan to treat at home when symptoms arise; 9/3: Contact cardiology.      Observe for trends in symptoms and measures, provide direction to patient, and notify provider as needed       Discuss and provide resources for ACP; 9/3: Reviewed and current

## 2021-09-28 DIAGNOSIS — M54.12 CERVICAL RADICULOPATHY: ICD-10-CM

## 2021-09-28 DIAGNOSIS — M48.02 CERVICAL SPINAL STENOSIS: Primary | ICD-10-CM

## 2021-09-28 NOTE — TELEPHONE ENCOUNTER
[facility] Norco as 8/18/21 for a 30 d/s. UDS done 2/11/21  CSA signed 8/9/19    Last Visit: 9/7/21 with MD Janise Goodpasture  Next Appointment: 11/26/21 with MD Janise Goodpasture  Previous Refill Encounter(s): 8/18/21 #180    Requested Prescriptions     Pending Prescriptions Disp Refills    HYDROcodone-acetaminophen (Norco) 5-325 mg per tablet 180 Tablet 0     Sig: Take 1 Tablet by mouth every four (4) hours as needed (chronic pain) for up to 30 days. Max Daily Amount: 6 Tablets.

## 2021-09-30 RX ORDER — HYDROCODONE BITARTRATE AND ACETAMINOPHEN 5; 325 MG/1; MG/1
1 TABLET ORAL
Qty: 180 TABLET | Refills: 0 | Status: SHIPPED | OUTPATIENT
Start: 2021-09-30 | End: 2021-10-30

## 2021-10-04 ENCOUNTER — PATIENT OUTREACH (OUTPATIENT)
Dept: CASE MANAGEMENT | Age: 76
End: 2021-10-04

## 2021-10-04 NOTE — PROGRESS NOTES
Patient has graduated from the Transitions of Care Coordination  program on 10/4/21. Patient/family has the ability to self-manage at this time Care management goals have been completed. Patient was not referred to the [facility] team for further management. Goals Addressed                 This Visit's Progress     COMPLETED: Attends follow-up appointments as directed. Goal: Patient will attend all appointments scheduled within the next 30 days. Ensure provider appt is scheduled within 7 business days post-discharge; 9/3: [facility] appt scheduled within 7 days; 9/7 @ 1:30 PM. Cardiology f/up 9/17 @ 1:30 PM.    Confirm patient attended post-discharge provider appt ; 9[facility]13: [facility] 9/21: Seen by cardiology today. Complete post-visit call to confirm attendance and update care needs; 9/13: Attempted. 9/21: Done.  COMPLETED: Prevent complications post hospitalization. Goal: No admissions post 30 days from discharge of 9/2/21. Review/educate common or potential \"red flags\" of condition worsening; Reviewed s/s to monitor/report: headache, dizziness, nausea, CP/palpitations  Instruct on adherence to medications as ordered and assess for therapeutic response and side-effects          Communicate visits and goals between multiple providers and services    Assess for health risk behaviors and educate patient/caregivers on reducing risk   Coordinate plan to treat at home when symptoms arise; 9/3: Contact cardiology. Observe for trends in symptoms and measures, provide direction to patient, and notify provider as needed       Discuss and provide resources for ACP; 9/3: Reviewed and current                Patient has Care Transition Nurse's contact information for any further questions, concerns, or needs.   Patients upcoming visits:    Future Appointments   Date Time Provider Mary Reyes   10/19/2021  8:40 AM [facility] NURSE [facility] SHERMAN [place]   11/3/2021 10:15 AM MD Niles Franco   11/19/2021  7:55 AM IO LAB VISIT [facility] BS AMB   11/26/2021  8:00 AM Gerald Roca MD [facility] BS AMB

## 2021-10-18 RX ORDER — MECLIZINE HYDROCHLORIDE 25 MG/1
25 TABLET ORAL
Qty: 30 TABLET | Refills: 1 | Status: SHIPPED | OUTPATIENT
Start: 2021-10-18 | End: 2021-10-28

## 2021-10-18 NOTE — TELEPHONE ENCOUNTER
This patient contacted office for the following prescriptions to be filled:    Last office visit: 9/7/21  Follow up appointment: 11/26/21  Medication requested : neclizine 25 mg  PCP: Dr Belgica Guzman  Mail order or Local pharmacy name: [facility]

## 2021-10-18 NOTE — TELEPHONE ENCOUNTER
Last Visit: 9/7/21 with MD Paul Lozano  Next Appointment: 11/26/21 with MD Paul Lozano  Previous Refill Encounter(s): 9/13/20 #30 with 1 refill    Requested Prescriptions     Pending Prescriptions Disp Refills    meclizine (ANTIVERT) 25 mg tablet 30 Tablet 1     Sig: Take 1 Tablet by mouth three (3) times daily as needed for Dizziness for up to 10 days.

## 2021-11-13 DIAGNOSIS — M17.11 PRIMARY OSTEOARTHRITIS OF RIGHT KNEE: ICD-10-CM

## 2021-11-13 DIAGNOSIS — G89.29 CHRONIC PAIN OF RIGHT KNEE: ICD-10-CM

## 2021-11-13 DIAGNOSIS — M25.561 CHRONIC PAIN OF RIGHT KNEE: ICD-10-CM

## 2021-11-13 RX ORDER — DICLOFENAC SODIUM 10 MG/G
GEL TOPICAL
Qty: 500 G | Refills: 5 | Status: SHIPPED | OUTPATIENT
Start: 2021-11-13

## 2021-11-16 DIAGNOSIS — M54.12 CERVICAL RADICULOPATHY: Primary | ICD-10-CM

## 2021-11-16 NOTE — TELEPHONE ENCOUNTER
[facility] Norco as 9/30/21 for a 30 d/s. UDS done 2/11/21  CSA signed 8/9/19    Last Visit: 9/7/21 with MD Tez Moise  Next Appointment: 11/26/21 with MD Tez Moise  Previous Refill Encounter(s): 9/30/21 #180    Requested Prescriptions     Pending Prescriptions Disp Refills    HYDROcodone-acetaminophen (NORCO) 5-325 mg per tablet 180 Tablet 0     Sig: Take 1 Tablet by mouth every four (4) hours as needed (chronic pain) for up to 30 days. Max Daily Amount: 6 Tablets.

## 2021-11-17 ENCOUNTER — PATIENT MESSAGE (OUTPATIENT)
Dept: INTERNAL MEDICINE CLINIC | Age: 76
End: 2021-11-17

## 2021-11-17 NOTE — TELEPHONE ENCOUNTER
----- Message from [address] [place] sent at 11/17/2021  9:19 AM EST -----  Regarding: Norco refill  Just to be sure the phone message was received, I need to have my Hydrocodone-Acetaminophen refilled. Please take care of this, as sometimes the pharmacy need to order it. It was last filled on 09-3-2021 for a 30 day supply. If there are questions, please give me call.     Thank you Eleno Brewster

## 2021-11-18 RX ORDER — HYDROCODONE BITARTRATE AND ACETAMINOPHEN 5; 325 MG/1; MG/1
1 TABLET ORAL
Qty: 180 TABLET | Refills: 0 | Status: SHIPPED | OUTPATIENT
Start: 2021-11-18 | End: 2021-12-18

## 2021-11-19 ENCOUNTER — HOSPITAL ENCOUNTER (OUTPATIENT)
Dept: LAB | Age: 76
Discharge: HOME | End: 2021-11-19
Payer: MEDICARE

## 2021-11-19 ENCOUNTER — APPOINTMENT (OUTPATIENT)
Dept: INTERNAL MEDICINE CLINIC | Age: 76
End: 2021-11-19

## 2021-11-19 DIAGNOSIS — R73.01 IFG (IMPAIRED FASTING GLUCOSE): ICD-10-CM

## 2021-11-19 LAB
ANION GAP SERPL CALC-SCNC: 4 MMOL/L (ref 3–18)
BUN SERPL-MCNC: 14 MG/DL (ref 7–18)
BUN/CREAT SERPL: 19 (ref 12–20)
CALCIUM SERPL-MCNC: 9.5 MG/DL (ref 8.5–10.1)
CHLORIDE SERPL-SCNC: 101 MMOL/L (ref 100–111)
CO2 SERPL-SCNC: 34 MMOL/L (ref 21–32)
CREAT SERPL-MCNC: 0.72 MG/DL (ref 0.6–1.3)
EST. AVERAGE GLUCOSE BLD GHB EST-MCNC: 108 MG/DL
GLUCOSE SERPL-MCNC: 90 MG/DL (ref 74–99)
HBA1C MFR BLD: 5.4 % (ref 4.2–5.6)
POTASSIUM SERPL-SCNC: 4.1 MMOL/L (ref 3.5–5.5)
SODIUM SERPL-SCNC: 139 MMOL/L (ref 136–145)

## 2021-11-19 PROCEDURE — 36415 COLL VENOUS BLD VENIPUNCTURE: CPT

## 2021-11-19 PROCEDURE — 83036 HEMOGLOBIN GLYCOSYLATED A1C: CPT

## 2021-11-19 PROCEDURE — 80048 BASIC METABOLIC PNL TOTAL CA: CPT

## 2021-11-24 NOTE — PROGRESS NOTES
This is a subsequent Medicare Annual Wellness Exam     I have reviewed the patient's medical history in detail and updated the computerized patient record. Assessment/Plan   Education and counseling provided:  Are appropriate based on today's review and evaluation  Influenza Vaccine  Colorectal cancer screening tests  Cardiovascular screening blood test  Diabetes screening test    1. Other chronic pain  -     pregabalin (LYRICA) 50 mg capsule; Take 1 Capsule by mouth two (2) times a day. Max Daily Amount: 100 mg., Normal, Disp-30 Capsule, R-5  2. PAD (peripheral artery disease) ([facility]  3. Left carotid artery occlusion  4. Hypertension  5. Atherosclerosis of native coronary artery of native heart without angina pectoris  6. Atrial fibrillation, unspecified type ([facility]  7. Diverticulosis  8. IFG (impaired fasting glucose)  -     METABOLIC PANEL, COMPREHENSIVE; Future  -     CBC W/O DIFF; Future  -     HEMOGLOBIN A1C WITH EAG; Future  9. Dyslipidemia  10. Cervical spinal stenosis  11. Medicare annual wellness visit, subsequent       Depression Risk Factor Screening     3 most recent PHQ Screens 11/26/2021   PHQ Not Done -   Little interest or pleasure in doing things Not at all   Feeling down, depressed, irritable, or hopeless Not at all   Total Score PHQ 2 0       Alcohol Risk Screen    Do you average more than 1 drink per night or more than 7 drinks a week: No    In the past three months have you have had more than 4 drinks containing alcohol on one occasion: No        Functional Ability and Level of Safety    Hearing: The patient wears hearing aids. Activities of Daily Living: The home contains: no safety equipment. Patient does total self care      Ambulation: with no difficulty     Fall Risk:  Fall Risk Assessment, last 12 mths 11/26/2021   Able to walk? Yes   Fall in past 12 months? 0   Do you feel unsteady? 0   Are you worried about falling 0   Number of falls in past 12 months -   Fall with injury?  - Abuse Screen:  Patient is not abused       Cognitive Screening    Has your family/caregiver stated any concerns about your memory: no     Cognitive Screening: Normal - Mini Cog Test    Health Maintenance Due   There are no preventive care reminders to display for this patient. Patient Care Team   Patient Care Team:  Neftaly Tobar MD as PCP - General (Internal Medicine)  Neftaly Tobar MD as PCP - [address] [place], [place] [address]caleb Feliz Provider  Juan Barnett MD as Physician (Cardiology)  Qian Kwon MD as Consulting Provider (Gastroenterology)  Angelina Carmen DO as Physician (Hand Surgery)    History     Patient Active Problem List   Diagnosis Code    Colitis, ulcerative ([facility] K51.90    Colon polyps K63.5    Left carotid artery occlusion I65.22    Hypovitaminosis D E55.9    Advance directive in chart Z78.9    Hypertension I11.9    Dyslipidemia E78.5    Coronary artery disease I25.10    Atrial fibrillation I48.91    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 9) E66.3    History of prostate cancer Z85.46    PAD (peripheral artery disease) ([facility]) I73.9    Diverticulosis K57.90     Past Medical History:   Diagnosis Date    Adrenal adenoma 2009    LEFT 1 cm, no change 1/15, 2/16    Aortoiliac occlusive disease ([facility] 3/4/2019    Asbestosis     CT 1/19 showed pleural plaques    Atrial fibrillation 10/02/2009    Had Ablation. No At. Fib. since    Basal cell cancer     Dr Felisa Snell; he's had >350 lesions removed    Carotid disease, bilateral ([facility]     Chronic pain     myofascial pain syndrome; seen in pain clinic in past    Colitis     Colon polyp     Dr Sherri Cohen 9/15    Coronary artery disease     RCA - 3.0 x 16mm TAXUS 9/04, 3.0 x 16mm TAXUS 12/06    Degenerative arthritis of cervical spine     MRI 9/11 showed C3-6 severe foraminal stenosis w RADICULOPATHY  Degenerative arthritis of lumbar spine     Dr Irma Vanegas;  MRI 9/11 L4-5 disc bulging, annular tear, disc dessication w RADICULOPATHY    Diverticulosis 04/2021    Dr Derrell Dodson Dyslipidemia     Erectile dysfunction     GERD     H/O cardiac catheterization 08/2021    [facility] Dr Mamie Cantu patent stents, no new obst dz    H/O cardiovascular stress test     [facility] mod reversible inferolat defect, no wma, ef 64%, no TID (8/21)    H/O echocardiogram     [facility] ef 55%, mild AI, tr MR (8/21)    Hearing loss 2014    Dr Kathleen Valdez Hypertension     with component of white coat    Hypogonadism male     IFG (impaired fasting glucose) [id number]    OAB (overactive bladder)     Osteoarthritis     Dr Maxwell Aburto Overweight (BMI 25.0-29. 9)     IF 5/18 start weight 214 lbs, not doing    Peripheral vascular disease     50-60% R iliac; s/p R iliac stent and L femoral artery stent in past; R OLIVIA 0.76 (1/16)    Plantar fasciitis     bilateral     PPD positive     Prostate cancer     T1c [place] 7(3+4), 70% in 1 core, GS 7 (3+4) in 4 cores, GS 6 (3+3) in 1 core; psa 5.28, TRUS 18 gm;  Dr Ella Damon; s/p cryoablation 10/16    Recurrent umbilical hernia 50/65/7260    Tinnitus     Dr Wendy Rousseau    Ulnar neuritis, right 11/13/2017    Venous insufficiency       Past Surgical History:   Procedure Laterality Date    COLONOSCOPY N/A 4/20/2021 2/2/11 neg; Dr Ella Damon 9/15 polyps; 4/20/21 divertics bx neg    HX CAROTID ENDARTERECTOMY Right 01/2014    HX CARPAL TUNNEL RELEASE Right 2017    HX CATARACT REMOVAL Bilateral     HX CRYOABLATION OF THE PROSTATE  10/2016    HX HEART CATHETERIZATION  2004,2006    Stents RCA    HX HEMORRHOIDECTOMY  1979    HX HERNIA REPAIR  1970, 1975    x 4    HX HERNIA REPAIR  02/2016    Dr Rickie Norwood ARTHROSCOPY Right 1963    RIGHT knee surgery    HX TONSILLECTOMY      VASCULAR SURGERY PROCEDURE UNLIST Right     RIGHT Iliac (7/19);  LEFT pseudoaneurysm repair (6/21) Dr Virginia Johnson PROCEDURE UNLIST Left 2015    LEFT fem artery and iliac stents (10/15); RCF endarterectomy w patch angioplasty/B CI artery stenting (7/19)     Current Outpatient Medications   Medication Sig Dispense Refill    Lactobacillus acidophilus (PROBIOTIC PO) Take  by mouth.  Methylcellulose, with Sugar, (Citrucel, sucrose,) powd Take  by mouth.  pregabalin (LYRICA) 50 mg capsule Take 1 Capsule by mouth two (2) times a day. Max Daily Amount: 100 mg. 30 Capsule 5    HYDROcodone-acetaminophen (NORCO) 5-325 mg per tablet Take 1 Tablet by mouth every four (4) hours as needed (chronic pain) for up to 30 days. Max Daily Amount: 6 Tablets. 180 Tablet 0    diclofenac (VOLTAREN) 1 % gel APPLY 4 GRAMS TO THE AFFECTED AREA ON RIGHT KNEE FOUR TIMES DAILY 500 g 5    fluorouraciL (EFUDEX) 5 % chemo cream       alclometasone (ACLOVATE) 0.05 % topical cream Apply  to affected area daily as needed.  LORazepam (Ativan) 1 mg tablet Take 1 Tablet by mouth every eight (8) hours as needed for Anxiety. 50 Tablet 0    isosorbide dinitrate (ISORDIL) 10 mg tablet Take 10 mg by mouth three (3) times daily. As needed      losartan (COZAAR) 50 mg tablet Take 50 mg by mouth two (2) times a day.  nebivoloL (Bystolic) 10 mg tablet Take 5 mg by mouth two (2) times a day.  polyethylene glycol (MIRALAX) 17 gram/dose powder Take 17 g by mouth as needed.  nitroglycerin (NITROSTAT) 0.4 mg SL tablet       icosapent ethyL (VASCEPA) 1 gram capsule Take 2 Capsules by mouth two (2) times daily (with meals). 360 Capsule 3    clopidogreL (PLAVIX) 75 mg tab Take 1 Tab by mouth daily. 90 Tab 3    ezetimibe-simvastatin (VYTORIN) 10-40 mg per tablet TAKE 1 TABLET NIGHTLY 90 Tab 3    colestipol (COLESTID) 1 gram tablet Take 1 g by mouth daily as needed.  aspirin delayed-release 81 mg tablet Take 81 mg by mouth daily.  Cholecalciferol, Vitamin D3, 1,000 unit cap Take 1,000 Units by mouth daily.       MULTIVITAMIN PO Take  by mouth daily.  pantoprazole (PROTONIX) 40 mg tablet Take 40 mg by mouth daily.  coenzyme q10 200 mg Cap Take  by mouth daily.        Allergies   Allergen Reactions    Altace [Ramipril] Unknown (comments)     Pt does not remember reaction    Gabapentin Other (comments)     Vision issues      Lipitor [Atorvastatin] Other (comments)     Muscle pain    Lisinopril Shortness of Breath and Other (comments)     Turns bright red    Other Medication Other (comments)     Vicryl suture on skin tends to be rejected with poor wound healing does better with monocryl    Procardia [Nifedipine] Other (comments)     Caused afib    Rosuvastatin Other (comments)     Muscle Pain         Family History   Problem Relation Age of Onset    Heart Disease Mother     Hypertension Mother    [facility] Diabetes Mother     Arthritis-osteo Mother     Heart Disease Father     Stroke Father     Hypertension Father     Heart Disease Sister     Hypertension Sister      Social History     Tobacco Use    Smoking status: Former Smoker     Packs/day: 1.50     Years: 25.00     Pack years: 37.50     Types: Cigarettes     Quit date: 2003     Years since quittin.9    Smokeless tobacco: Never Used   Substance Use Topics    Alcohol use: Yes     Comment: Oscar Mcguire MD

## 2021-11-26 ENCOUNTER — OFFICE VISIT (OUTPATIENT)
Dept: INTERNAL MEDICINE CLINIC | Age: 76
End: 2021-11-26
Payer: MEDICARE

## 2021-11-26 DIAGNOSIS — I48.91 ATRIAL FIBRILLATION, UNSPECIFIED TYPE (HCC): ICD-10-CM

## 2021-11-26 DIAGNOSIS — I11.9 BENIGN HYPERTENSIVE HEART DISEASE WITHOUT HEART FAILURE: ICD-10-CM

## 2021-11-26 DIAGNOSIS — Z00.00 MEDICARE ANNUAL WELLNESS VISIT, SUBSEQUENT: Primary | ICD-10-CM

## 2021-11-26 DIAGNOSIS — M48.02 CERVICAL SPINAL STENOSIS: ICD-10-CM

## 2021-11-26 DIAGNOSIS — K57.90 DIVERTICULOSIS: ICD-10-CM

## 2021-11-26 DIAGNOSIS — I73.9 PAD (PERIPHERAL ARTERY DISEASE) (HCC): ICD-10-CM

## 2021-11-26 DIAGNOSIS — G89.29 OTHER CHRONIC PAIN: ICD-10-CM

## 2021-11-26 DIAGNOSIS — R73.01 IFG (IMPAIRED FASTING GLUCOSE): ICD-10-CM

## 2021-11-26 DIAGNOSIS — I65.22 LEFT CAROTID ARTERY OCCLUSION: ICD-10-CM

## 2021-11-26 DIAGNOSIS — E78.5 DYSLIPIDEMIA: ICD-10-CM

## 2021-11-26 DIAGNOSIS — I25.10 ATHEROSCLEROSIS OF NATIVE CORONARY ARTERY OF NATIVE HEART WITHOUT ANGINA PECTORIS: ICD-10-CM

## 2021-11-26 PROBLEM — I74.09 AORTOILIAC OCCLUSIVE DISEASE (HCC): Status: RESOLVED | Noted: 2019-03-04 | Resolved: 2021-11-26

## 2021-11-26 PROBLEM — G56.21 ULNAR NEURITIS, RIGHT: Status: RESOLVED | Noted: 2017-11-13 | Resolved: 2021-11-26

## 2021-11-26 PROBLEM — M54.12 CERVICAL RADICULOPATHY: Status: RESOLVED | Noted: 2017-08-30 | Resolved: 2021-11-26

## 2021-11-26 PROCEDURE — G8510 SCR DEP NEG, NO PLAN REQD: HCPCS | Performed by: INTERNAL MEDICINE

## 2021-11-26 PROCEDURE — G0463 HOSPITAL OUTPT CLINIC VISIT: HCPCS | Performed by: INTERNAL MEDICINE

## 2021-11-26 PROCEDURE — 99214 OFFICE O/P EST MOD 30 MIN: CPT | Performed by: INTERNAL MEDICINE

## 2021-11-26 PROCEDURE — G0439 PPPS, SUBSEQ VISIT: HCPCS | Performed by: INTERNAL MEDICINE

## 2021-11-26 PROCEDURE — G8536 NO DOC ELDER MAL SCRN: HCPCS | Performed by: INTERNAL MEDICINE

## 2021-11-26 PROCEDURE — 1101F PT FALLS ASSESS-DOCD LE1/YR: CPT | Performed by: INTERNAL MEDICINE

## 2021-11-26 PROCEDURE — G8417 CALC BMI ABV UP PARAM F/U: HCPCS | Performed by: INTERNAL MEDICINE

## 2021-11-26 PROCEDURE — G8427 DOCREV CUR MEDS BY ELIG CLIN: HCPCS | Performed by: INTERNAL MEDICINE

## 2021-11-26 RX ORDER — PREGABALIN 50 MG/1
50 CAPSULE ORAL 2 TIMES DAILY
Qty: 30 CAPSULE | Refills: 5 | Status: SHIPPED | OUTPATIENT
Start: 2021-11-26 | End: 2021-12-06 | Stop reason: ALTCHOICE

## 2021-11-26 RX ORDER — METHYLCELLULOSE (WITH SUGAR)
POWDER IN PACKET (EA) ORAL
COMMUNITY

## 2021-11-26 NOTE — PROGRESS NOTES
Chief Complaint   Patient presents with   [facility] Annual Wellness Visit         1. \"Have you been to the ER, urgent care clinic since your last visit? Hospitalized since your last visit? \" Yes, ER 8/29 chest tightness, etc    2. \"Have you seen or consulted any other health care providers outside of the [address] [place] [address] since your last visit? \" yes. 3. For patients aged 39-70: Has the patient had a colonoscopy?  yes

## 2021-11-29 VITALS
DIASTOLIC BLOOD PRESSURE: 72 MMHG | HEIGHT: 72 IN | TEMPERATURE: 97.3 F | BODY MASS INDEX: 31.21 KG/M2 | RESPIRATION RATE: 16 BRPM | HEART RATE: 65 BPM | WEIGHT: 230.4 LBS | SYSTOLIC BLOOD PRESSURE: 137 MMHG | OXYGEN SATURATION: 99 %

## 2021-11-29 NOTE — PATIENT INSTRUCTIONS
Medicare Wellness Visit, Male    The best way to live healthy is to have a lifestyle where you eat a well-balanced diet, exercise regularly, limit alcohol use, and quit all forms of tobacco/nicotine, if applicable. Regular preventive services are another way to keep healthy. Preventive services (vaccines, screening tests, monitoring & exams) can help personalize your care plan, which helps you manage your own care. Screening tests can find health problems at the earliest stages, when they are easiest to treat. [facility] follows the current, evidence-based guidelines published by the [facility] ([facility]) when recommending preventive services for our patients. Because we follow these guidelines, sometimes recommendations change over time as research supports it. (For example, a prostate screening blood test is no longer routinely recommended for men with no symptoms). Of course, you and your doctor may decide to screen more often for some diseases, based on your risk and co-morbidities (chronic disease you are already diagnosed with). Preventive services for you include:  - Medicare offers their members a free annual wellness visit, which is time for you and your primary care provider to discuss and plan for your preventive service needs. Take advantage of this benefit every year!  -All adults over age 72 should receive the recommended pneumonia vaccines. Current USPSTF guidelines recommend a series of two vaccines for the best pneumonia protection.   -All adults should have a flu vaccine yearly and tetanus vaccine every 10 years.  -All adults age 48 and older should receive the shingles vaccines (series of two vaccines).        -All adults age 38-68 who are overweight should have a diabetes screening test once every three years.   -Other screening tests & preventive services for persons with diabetes include: an eye exam to screen for diabetic retinopathy, a kidney function test, a foot exam, and stricter control over your cholesterol.   -Cardiovascular screening for adults with routine risk involves an electrocardiogram (ECG) at intervals determined by the provider.   -Colorectal cancer screening should be done for adults age 54-65 with no increased risk factors for colorectal cancer. There are a number of acceptable methods of screening for this type of cancer. Each test has its own benefits and drawbacks. Discuss with your provider what is most appropriate for you during your annual wellness visit. The different tests include: colonoscopy (considered the best screening method), a fecal occult blood test, a fecal DNA test, and sigmoidoscopy.  -All adults born between [facility] should be screened once for Hepatitis C.  -An Abdominal Aortic Aneurysm (AAA) Screening is recommended for men age 73-68 who has ever smoked in their lifetime. Here is a list of your current Health Maintenance items (your personalized list of preventive services) with a due date: There are no preventive care reminders to display for this patient.

## 2022-01-01 NOTE — PROGRESS NOTES
PT DAILY TREATMENT NOTE - [facility]     Patient Name: Cathryn Pai Collette  Date:3/29/2018  : 1945  [x]  Patient  Verified  Payor: Moises Posada / Plan: [place] MEDICARE PART A & B / Product Type: Medicare /    In WFTO:0985  Out time:917  Total Treatment Time (min): 50  Total Timed Codes (min): 40  1:1 Treatment Time ( [address] only): 30   Visit #: 3 of 10    Treatment Area: Low back pain [M54.5]  Cervicalgia [M54.2]    SUBJECTIVE  Pain Level (0-10 scale): 4  Any medication changes, allergies to medications, adverse drug reactions, diagnosis change, or new procedure performed?: [x] No    [] Yes (see summary sheet for update)  Subjective functional status/changes:   [] No changes reported  Patient reports at present pain radiating into the left scapula without radiation distally into the left UE with patient reporting right LE radicular symptoms of worse severity today.     OBJECTIVE    Modality rationale: decrease pain and increase tissue extensibility to improve the patients ability to improve ease with sleep   Min Type Additional Details   10 []  Ice     [x]  heat  []  Ice massage Position: Reclined  Location: Cervical, Post-Tx     12 min Therapeutic Exercise:  [x] See flow sheet : Emphasis placed on improving available shoulder, cervical, lumbar and hip AROM and strength of the shoulder and hip musculature   Rationale: increase ROM and increase strength to improve the patients ability to improve ease with self-care ADLs      20 min Neuromuscular Re-education:  [x]  See flow sheet : Emphasis placed on improving activation and recruitment of the deep cervical musculature and cervical and pelvic proprioceptive awareness   Rationale: increase ROM, increase strength and increase proprioception  to improve the patients ability to improve ease with checking blindspots while driving      8 min Manual Therapy:    L Sidelying, Lumbopelvic Gapping Mobilization with R LE SLR Positioning   Rationale: decrease pain, increase ROM and increase tissue extensibility to improve ease with overhead functional lifting. With   [] TE   [] TA   [] neuro   [] other: Patient Education: [x] Review HEP    [] Progressed/Changed HEP based on:   [] positioning   [] body mechanics   [] transfers   [] heat/ice application    [] other:      Other Objective/Functional Measures: /82 mmHg     Pain Level (0-10 scale) post treatment: 3-4    ASSESSMENT/Changes in Function: Secondary to patient presenting with a (+) right SLR greater emphasis placed on lumbar spine treatment in order to improve peripheral neurodynamic mobility. Continue to question contribution of chronic right knee pain with known arthritis to right antalgic gait pattern and chronic low back pain. Improved scapulothoracic recruitment demonstrated with patient with ability to perform combined retraction/depression without compensation nor cuing required. Patient will continue to benefit from skilled PT services to modify and progress therapeutic interventions, address functional mobility deficits, address ROM deficits, address strength deficits and analyze and address soft tissue restrictions to attain remaining goals. []  See Plan of Care  []  See progress note/recertification  []  See Discharge Summary         Progress towards goals / Updated goals:    Short Term Goals: To be accomplished in 3 weeks:  1. Patient will demonstrate full compliance with prescribed HEP. At PN: Met, HEP performance reported as prescribed, 2/23/2018  2. Patient will demonstrate a 25% improvement in lumbar flexion AROM in order to improve ease with household ADLs. At PN: Goal met: Lumbar flexion WNL without pain, only a pulling feeling. 2/27/18  3. Patient will demonstrate a 10 degree improvement in cervical extension AROM to improve ease with overhead ADLs. At PN: Goal met: Cervical AROM ext 35 deg. 3/6/18      Long Term Goals: To be accomplished in 6 weeks:  1.  Patient will demonstrate a significant functional improvement as demonstrated by a score of >/= 60 on lumbar FOTO and >/=60 on neck FOTO. At PN: Goal met: Neck FOTO= 61, lumbar FOTO = 60 3/9/18  2. Patient will demonstrate a (-) L UE ULTT, median nerve bias, and (-) R SLR in order to improve ease with activity tolerance. At PN: Not met: (+) L UE ULTT median nerve bias, (-) R SLR 3/20/18  Current: Remains, (+) L UE ULTT Median Nerve Bias, (+) R SLR, 3/27/2018  3. Patient will demonstrate L/R SLS >/=25 seconds in order to improve ease with ambulation on uneven ground. At PN: NOT MET. R SLS= 10 sec.  3/14/18    PLAN  [x]  Upgrade activities as tolerated     [x]  Continue plan of care  []  Update interventions per flow sheet       []  Discharge due to:_  []  Other:_      Dora Alves, PT 3/29/2018  7:37 AM    Future Appointments  Date Time Provider Mary Zhangi   3/29/2018 8:30 AM Dora Alves, PT [facility]   3/30/2018 8:30 AM [facility]   4/12/2018 8:45 AM Good Herrmann MD [id number]   5/8/2018 8:00 AM [facility] NURSE VISIT [facility]   5/14/2018 10:10 AM Les Diaz NP [facility] SCHED   5/15/2018 8:40 AM Drew Chin MD [facility] SCHED   2/27/2019 8:00 AM [facility] SCHED   2/27/2019 9:00 AM [facility]   2/27/2019 10:00  Se Isela Villalobos   3/4/2019 9:00 AM [facility]  Statement Selected

## 2022-01-04 DIAGNOSIS — M48.02 CERVICAL SPINAL STENOSIS: Primary | ICD-10-CM

## 2022-01-04 DIAGNOSIS — M54.12 CERVICAL RADICULOPATHY: ICD-10-CM

## 2022-01-04 NOTE — TELEPHONE ENCOUNTER
[facility] Norco as 11/19/21 for a 30 d/s. UDS done 2/11/21  CSA signed 8/9/19    Last Visit: 11/26/21 with MD Gi Parmar  Next Appointment: 6/6/22 with MD Gi Parmar  Previous Refill Encounter(s): 11/18/21 #180    Requested Prescriptions     Pending Prescriptions Disp Refills    HYDROcodone-acetaminophen (NORCO) 5-325 mg per tablet 180 Tablet 0     Sig: Take 1 Tablet by mouth every four (4) hours as needed (chronic pain) for up to 30 days. Max Daily Amount: 6 Tablets.

## 2022-01-05 RX ORDER — HYDROCODONE BITARTRATE AND ACETAMINOPHEN 5; 325 MG/1; MG/1
1 TABLET ORAL
Qty: 120 TABLET | Refills: 0 | Status: SHIPPED | OUTPATIENT
Start: 2022-01-05 | End: 2022-02-04

## 2022-02-01 RX ORDER — EZETIMIBE AND SIMVASTATIN 10; 40 MG/1; MG/1
1 TABLET ORAL
Qty: 90 TABLET | Refills: 3 | Status: CANCELLED | OUTPATIENT
Start: 2022-02-01

## 2022-02-02 RX ORDER — EZETIMIBE AND SIMVASTATIN 10; 40 MG/1; MG/1
TABLET ORAL
Qty: 90 TABLET | Refills: 3 | Status: SHIPPED | OUTPATIENT
Start: 2022-02-02

## 2022-02-02 NOTE — TELEPHONE ENCOUNTER
Last Visit: 11/26/21 with MD Waldo Moralez  Next Appointment: 6/6/22 with MD Waldo Moralez  Previous Refill Encounter(s): 9/23/20 #90 with 3 refills    Requested Prescriptions     Pending Prescriptions Disp Refills    ezetimibe-simvastatin (VYTORIN) 10-40 mg per tablet [Pharmacy Med Name: EZETIM/SIMVA TAB 10-40MG] 90 Tablet 3     Sig: TAKE 1 TABLET NIGHTLY

## 2022-02-11 ENCOUNTER — PATIENT MESSAGE (OUTPATIENT)
Dept: INTERNAL MEDICINE CLINIC | Age: 77
End: 2022-02-11

## 2022-02-11 ENCOUNTER — TELEPHONE (OUTPATIENT)
Dept: INTERNAL MEDICINE CLINIC | Age: 77
End: 2022-02-11

## 2022-02-11 DIAGNOSIS — M54.12 CERVICAL RADICULOPATHY: Primary | ICD-10-CM

## 2022-02-11 NOTE — TELEPHONE ENCOUNTER
From: Randall Heaps Collette  To: Dylan Peters MD  Sent: 2/11/2022 8:04 AM EST  Subject: Hydrocodone-Acetaminophen refill    Please refill the Hydrocodone-Acetaminophen 5-325. Please refill for the same amount as last time, 120, one every 6 hours as needed if that is acceptable. I am having a hard time stretching to 8 hours between doses every time. If you have questions, please call me at [phone]  Thank you  Precious Parra may have received a partial message earlier by error.

## 2022-02-11 NOTE — TELEPHONE ENCOUNTER
norco q 120    [facility] Pain Refusal Text: I offered to prescribe pain medication but the patient refused to take this medication.

## 2022-02-11 NOTE — TELEPHONE ENCOUNTER
UDS done 2/11/21  CSA signed 8/9/19    Last Visit: 11/26/21 with MD Gian Daniels  Next Appointment: 6/6/22 with MD Gian Daniels  Previous Refill Encounter(s): 1/5/22 #120    Requested Prescriptions     Pending Prescriptions Disp Refills    HYDROcodone-acetaminophen (NORCO) 5-325 mg per tablet 120 Tablet 0     Sig: Take 1 Tablet by mouth every six (6) hours as needed (chronic pain) for up to 30 days. Max Daily Amount: 4 Tablets.

## 2022-02-12 ENCOUNTER — PATIENT MESSAGE (OUTPATIENT)
Dept: INTERNAL MEDICINE CLINIC | Age: 77
End: 2022-02-12

## 2022-02-12 DIAGNOSIS — M54.12 CERVICAL RADICULOPATHY: Primary | ICD-10-CM

## 2022-02-14 RX ORDER — HYDROCODONE BITARTRATE AND ACETAMINOPHEN 5; 325 MG/1; MG/1
1 TABLET ORAL
Qty: 120 TABLET | Refills: 0 | Status: SHIPPED | OUTPATIENT
Start: 2022-02-14 | End: 2022-03-14 | Stop reason: SDUPTHER

## 2022-02-14 RX ORDER — HYDROCODONE BITARTRATE AND ACETAMINOPHEN 5; 325 MG/1; MG/1
1 TABLET ORAL
Qty: 120 TABLET | Refills: 0 | Status: CANCELLED | OUTPATIENT
Start: 2022-02-14 | End: 2022-03-16

## 2022-02-14 NOTE — TELEPHONE ENCOUNTER
UDS done 2/11/21    Last Visit: 11/26/21 with MD Ace Sanz  Next Appointment: 6/6/22 with MD Ace Sanz  Previous Refill Encounter(s): 1/5/22 #120    Requested Prescriptions     Pending Prescriptions Disp Refills    HYDROcodone-acetaminophen (NORCO) 5-325 mg per tablet 120 Tablet 0     Sig: Take 1 Tablet by mouth every six (6) hours as needed (chronic pain) for up to 30 days. Max Daily Amount: 4 Tablets.

## 2022-02-14 NOTE — TELEPHONE ENCOUNTER
From: Debarah Jones Collette  To: Addie Buenrostro MD  Sent: 2/12/2022 5:08 PM EST  Subject: Gabapentin      Again, after taking the 300mg twice a day for over a week, my ankles and feet started to swell. I stopped taken the 300mg twice a day. After being off for over a week, my ankles and feet were back to normal for about four days now. It is apparent to me that I cannot tolerate this more than once a day. when I take it at night it does seem to help with my feet. I did give it an honest try. So, if you find it acceptable, please continue to refill the Hydrocodone .   Salima Bundy

## 2022-03-01 ENCOUNTER — OFFICE VISIT (OUTPATIENT)
Dept: INTERNAL MEDICINE CLINIC | Age: 77
End: 2022-03-01
Payer: MEDICARE

## 2022-03-01 ENCOUNTER — TELEPHONE (OUTPATIENT)
Dept: INTERNAL MEDICINE CLINIC | Age: 77
End: 2022-03-01

## 2022-03-01 VITALS — TEMPERATURE: 98.1 F | BODY MASS INDEX: 31.19 KG/M2 | WEIGHT: 230 LBS

## 2022-03-01 DIAGNOSIS — M79.10 MUSCULAR PAIN: Primary | ICD-10-CM

## 2022-03-01 DIAGNOSIS — M62.838 MUSCLE SPASM: ICD-10-CM

## 2022-03-01 PROBLEM — I16.0 HYPERTENSIVE URGENCY: Status: ACTIVE | Noted: 2021-08-31

## 2022-03-01 PROBLEM — K21.9 GERD (GASTROESOPHAGEAL REFLUX DISEASE): Status: ACTIVE | Noted: 2022-03-01

## 2022-03-01 PROBLEM — E03.8 SUBCLINICAL HYPOTHYROIDISM: Status: ACTIVE | Noted: 2022-03-01

## 2022-03-01 PROBLEM — J61 ASBESTOSIS (HCC): Status: ACTIVE | Noted: 2022-03-01

## 2022-03-01 PROBLEM — E29.1 HYPOGONADISM MALE: Status: ACTIVE | Noted: 2022-03-01

## 2022-03-01 PROBLEM — C61 PROSTATE CANCER (HCC): Status: ACTIVE | Noted: 2022-03-01

## 2022-03-01 PROBLEM — H93.19 TINNITUS: Status: ACTIVE | Noted: 2022-03-01

## 2022-03-01 PROBLEM — M19.90 OSTEOARTHROSIS: Status: ACTIVE | Noted: 2022-03-01

## 2022-03-01 PROBLEM — K92.2 GI BLEED: Status: ACTIVE | Noted: 2022-03-01

## 2022-03-01 PROBLEM — R07.9 INTERMITTENT CHEST PAIN: Status: ACTIVE | Noted: 2020-08-17

## 2022-03-01 PROBLEM — R76.11 PPD POSITIVE: Status: ACTIVE | Noted: 2022-03-01

## 2022-03-01 PROBLEM — M72.2 PLANTAR FASCIITIS: Status: ACTIVE | Noted: 2022-03-01

## 2022-03-01 PROCEDURE — G8536 NO DOC ELDER MAL SCRN: HCPCS | Performed by: INTERNAL MEDICINE

## 2022-03-01 PROCEDURE — 1101F PT FALLS ASSESS-DOCD LE1/YR: CPT | Performed by: INTERNAL MEDICINE

## 2022-03-01 PROCEDURE — G8510 SCR DEP NEG, NO PLAN REQD: HCPCS | Performed by: INTERNAL MEDICINE

## 2022-03-01 PROCEDURE — G8427 DOCREV CUR MEDS BY ELIG CLIN: HCPCS | Performed by: INTERNAL MEDICINE

## 2022-03-01 PROCEDURE — 99213 OFFICE O/P EST LOW 20 MIN: CPT | Performed by: INTERNAL MEDICINE

## 2022-03-01 PROCEDURE — G0463 HOSPITAL OUTPT CLINIC VISIT: HCPCS | Performed by: INTERNAL MEDICINE

## 2022-03-01 PROCEDURE — G8417 CALC BMI ABV UP PARAM F/U: HCPCS | Performed by: INTERNAL MEDICINE

## 2022-03-01 RX ORDER — TRIAMTERENE AND HYDROCHLOROTHIAZIDE 37.5; 25 MG/1; MG/1
CAPSULE ORAL
COMMUNITY
Start: 2022-02-05

## 2022-03-01 RX ORDER — TIZANIDINE 4 MG/1
4 TABLET ORAL
Qty: 30 TABLET | Refills: 0 | Status: SHIPPED | OUTPATIENT
Start: 2022-03-01 | End: 2022-09-21

## 2022-03-01 RX ORDER — CELECOXIB 200 MG/1
200 CAPSULE ORAL
Qty: 20 CAPSULE | Refills: 1 | Status: SHIPPED | OUTPATIENT
Start: 2022-03-01

## 2022-03-01 NOTE — TELEPHONE ENCOUNTER
Patient wants to be sure his prescriptions from today are sent to [facility] on [address]

## 2022-03-01 NOTE — PROGRESS NOTES
Denver Spanner presents with   Chief Complaint   Patient presents with    Abdominal Pain     X 11 days, patient states he is having pain under his shoulder blades            1. \"Have you been to the ER, urgent care clinic since your last visit? Hospitalized since your last visit? \" NO    2. \"Have you seen or consulted any other health care providers outside of the [address] [place] [address] since your last visit? \" YES, [place] (mult on chart)    3. For patients aged 39-70: Has the patient had a colonoscopy? Yes - no Care Gap present     If the patient is female:    4. For patients aged 41-77: Has the patient had a mammogram within the past 2 years? NA - based on age    11. For patients aged 21-65: Has the patient had a pap smear?  NA - based on age

## 2022-03-01 NOTE — PROGRESS NOTES
68 y.o. WHITE/NON- male who presents for evaluation. Starting around 2/10/2022, he noted onset of pain in the back just medial to the bilateral shoulder blades. Describes it as a sharp pain particular when he is moving his torso and arms, turns dull when he just sits around. No known injuries. Of note, he recently got C 4-6 epidural along with L4-5 epidural through the spine specialist which have helped his neck and back symptoms. Sometimes the pain \"goes around to the front\". He does not think it is cardiac. It is not related to eating, no nausea, vomiting, dyspepsia, associated urinary complaints but he has not had any trauma, bruising, rash.     Past Medical History:   Diagnosis Date    Adrenal adenoma 2009    LEFT 1 cm, no change 1/15, 2/16    Aortoiliac occlusive disease ([address] 3/4/2019    Asbestosis     CT 1/19 showed pleural plaques    Atrial fibrillation 10/02/2009    s/p Afib ablation 2008; no oac due to prior gi bleed    Basal cell cancer     Dr Zuluaga Estimable; he's had >350 lesions removed    Carotid disease, bilateral ([address]     Chronic pain     myofascial pain syndrome; seen in pain clinic in past    Colitis     Colon polyp     Dr Brittney Laurent 9/15    Coronary artery disease     RCA - 3.0 x 16mm TAXUS (9/04); 3.0 x 16mm TAXUS (12/06)    Degenerative arthritis of cervical spine     MRI 9/11 showed C3-6 severe foraminal stenosis w RADICULOPATHY    Degenerative arthritis of lumbar spine     Dr Floyd Counts;  MRI 9/11 L4-5 disc bulging, annular tear, disc dessication w RADICULOPATHY    Diverticulosis 04/2021    Dr Brittney Laurent    Dyslipidemia     Erectile dysfunction     GERD     H/O cardiac catheterization 08/2021    [facility] Dr Amanda Garay patent mid RCA (12/06) and distal RCA (9/04) stents    H/O cardiovascular stress test     [facility] mod reversible inferolat defect, no wma, ef 64%, no TID (8/21)    H/O echocardiogram     [facility] ef 55%, mild AI, tr MR (8/21)    Hearing loss 2014    Dr Denilson Rachel Hypertension with component of white coat    Hypogonadism male     IFG (impaired fasting glucose) [id number]    OAB (overactive bladder)     Osteoarthritis     Dr Jonathan Neil Overweight (BMI 25.0-29. 9)     IF  start weight 214 lbs, not doing    Peripheral vascular disease     50-60% R iliac; s/p R iliac stent and L femoral artery stent in past; R OLIVIA 0.76 ()    Plantar fasciitis     bilateral     PPD positive     Prostate cancer     T1c Imelda 7(3+4), 70% in 1 core, GS 7 (3+4) in 4 cores, GS 6 (3+3) in 1 core; psa 5.28, TRUS 18 gm;  Dr Maria Luisa Lam; s/p cryoablation 10/16    Recurrent umbilical hernia     Tinnitus     Dr Gonzalez Hainita    Ulnar neuritis, right 2017    Venous insufficiency      Past Surgical History:   Procedure Laterality Date    COLONOSCOPY N/A 2021 neg; Dr Maria Luisa Lam 9/15 polyps; 21 divertics bx neg    HX CAROTID ENDARTERECTOMY Right 2014    HX CARPAL TUNNEL RELEASE Right 2017    HX CATARACT REMOVAL Bilateral     HX CRYOABLATION OF THE PROSTATE  10/2016    HX HEMORRHOIDECTOMY  1979    HX HERNIA REPAIR  1970, 1975    x 4    HX HERNIA REPAIR  2016    Dr Catherine Stringer ARTHROSCOPY Right     HX TONSILLECTOMY      VASCULAR SURGERY PROCEDURE UNLIST Right     RIGHT Iliac ();  LEFT pseudoaneurysm repair () Dr Jing Mark Left     LEFT fem artery and iliac stents (10/15); RCF endarterectomy w patch angioplasty/B CI artery stenting ()     Social History     Socioeconomic History    Marital status:      Spouse name: Not on file    Number of children: Not on file    Years of education: Not on file    Highest education level: Not on file   Occupational History    Not on file   Tobacco Use    Smoking status: Former Smoker     Packs/day: 1.50     Years: 25.00     Pack years: 37.50     Types: Cigarettes     Quit date: 2003     Years since quittin.1    Smokeless tobacco: Never Used   Vaping Use    Vaping Use: Never used   Substance and Sexual Activity    Alcohol use: Yes     Comment: RARELY    Drug use: Never    Sexual activity: Yes     Partners: Female     Birth control/protection: None   Other Topics Concern    Not on file   Social History Narrative    Not on file     Social Determinants of Health     Financial Resource Strain:     Difficulty of Paying Living Expenses: Not on file   Food Insecurity:     Worried About Running Out of Food in the Last Year: Not on file    Evan of Food in the Last Year: Not on file   Transportation Needs:     Lack of Transportation (Medical): Not on file    Lack of Transportation (Non-Medical): Not on file   Physical Activity:     Days of Exercise per Week: Not on file    Minutes of Exercise per Session: Not on file   Stress:     Feeling of Stress : Not on file   Social Connections:     Frequency of Communication with Friends and Family: Not on file    Frequency of Social Gatherings with Friends and Family: Not on file    Attends Amish Services: Not on file    Active Member of [address] [place] [address] or Organizations: Not on file    Attends Club or Organization Meetings: Not on file    Marital Status: Not on file   Intimate Partner Violence:     Fear of Current or Ex-Partner: Not on file    Emotionally Abused: Not on file    Physically Abused: Not on file    Sexually Abused: Not on file   Housing Stability:     Unable to Pay for Housing in the Last Year: Not on file    Number of [place] in the Last Year: Not on file    Unstable Housing in the Last Year: Not on file     Current Outpatient Medications   Medication Sig    triamterene-hydroCHLOROthiazide (DYAZIDE) 37.5-25 mg per capsule     HYDROcodone-acetaminophen (NORCO) 5-325 mg per tablet Take 1 Tablet by mouth every six (6) hours as needed (chronic pain) for up to 30 days. Max Daily Amount: 4 Tablets.     ezetimibe-simvastatin (VYTORIN) 10-40 mg per tablet TAKE 1 TABLET NIGHTLY    gabapentin (NEURONTIN) 300 mg capsule Take 1 Capsule by mouth three (3) times daily. Max Daily Amount: 900 mg. (Patient taking differently: Take 300 mg by mouth three (3) times daily. Takes one nightly)    Lactobacillus acidophilus (PROBIOTIC PO) Take  by mouth.  Methylcellulose, with Sugar, (Citrucel, sucrose,) powd Take  by mouth.  diclofenac (VOLTAREN) 1 % gel APPLY 4 GRAMS TO THE AFFECTED AREA ON RIGHT KNEE FOUR TIMES DAILY    alclometasone (ACLOVATE) 0.05 % topical cream Apply  to affected area daily as needed.  LORazepam (Ativan) 1 mg tablet Take 1 Tablet by mouth every eight (8) hours as needed for Anxiety.  isosorbide dinitrate (ISORDIL) 10 mg tablet Take 10 mg by mouth three (3) times daily. As needed    losartan (COZAAR) 50 mg tablet Take 50 mg by mouth two (2) times a day.  nebivoloL (Bystolic) 10 mg tablet Take 5 mg by mouth two (2) times a day.  polyethylene glycol (MIRALAX) 17 gram/dose powder Take 17 g by mouth as needed.  nitroglycerin (NITROSTAT) 0.4 mg SL tablet     icosapent ethyL (VASCEPA) 1 gram capsule Take 2 Capsules by mouth two (2) times daily (with meals).  clopidogreL (PLAVIX) 75 mg tab Take 1 Tab by mouth daily.  colestipol (COLESTID) 1 gram tablet Take 1 g by mouth daily as needed.  aspirin delayed-release 81 mg tablet Take 81 mg by mouth daily.  Cholecalciferol, Vitamin D3, 1,000 unit cap Take 1,000 Units by mouth two (2) times a day.  MULTIVITAMIN PO Take  by mouth daily.  pantoprazole (PROTONIX) 40 mg tablet Take 40 mg by mouth daily.  coenzyme q10 200 mg Cap Take  by mouth daily.  fluorouraciL (EFUDEX) 5 % chemo cream  (Patient not taking: Reported on 3/1/2022)     No current facility-administered medications for this visit.      Allergies   Allergen Reactions    Ace Inhibitors Anaphylaxis    Atorvastatin Other (comments) and Unknown (comments)     Muscle pain    Gabapentin Other (comments)     Vision issues      Lisinopril Shortness of Breath and Other (comments)     Turns bright red  Other reaction(s): Unknown    Nifedipine Other (comments)     Caused afib  Other reaction(s): Unknown    Other Medication Other (comments)     Vicryl suture on skin tends to be rejected with poor wound healing does better with monocryl    Pregabalin Other (comments)    Ramipril Unknown (comments)     Pt does not remember reaction  Other reaction(s): Unknown    Rosuvastatin Other (comments)     Muscle Pain       Visit Vitals  Temp 98.1 °F (36.7 °C) (Temporal)   Wt 230 lb (104.3 kg)   BMI 31.19 kg/m²   No clear midline tenderness. There is marked tenderness in the rhomboids right worse than left. Paraspinal muscles also worse on the right side. We could trigger the pain by having him do a hugging motion. There is no bony tenderness in the scapula. Strength, sensation, reflexes intact in both upper extremities. Lungs are clear. Heart showed regular rate rhythm. Abdomen soft and nontender    Assessment and plan:  1. Muscular pain and spasm. He has done okay with Celebrex short courses (5 days) in the past, will give him Zanaflex as well. He is aware of side effects.   Call with an update

## 2022-03-14 DIAGNOSIS — M54.12 CERVICAL RADICULOPATHY: ICD-10-CM

## 2022-03-14 RX ORDER — HYDROCODONE BITARTRATE AND ACETAMINOPHEN 5; 325 MG/1; MG/1
1 TABLET ORAL
Qty: 120 TABLET | Refills: 0 | Status: SHIPPED | OUTPATIENT
Start: 2022-03-14 | End: 2022-04-13

## 2022-03-14 NOTE — TELEPHONE ENCOUNTER
[facility] Norco [facility]14/22 for a 30 d/s. UDS done 2/11/21  CSA signed 8/9/19    Last Visit: 3/1/22 with MD Waldo Moralez  Next Appointment: 6/6/22 with MD Waldo Moralez  Previous Refill Encounter(s): 2/14/22 #120    Requested Prescriptions     Pending Prescriptions Disp Refills    HYDROcodone-acetaminophen (NORCO) 5-325 mg per tablet 120 Tablet 0     Sig: Take 1 Tablet by mouth every six (6) hours as needed (chronic pain) for up to 30 days. Max Daily Amount: 4 Tablets.

## 2022-03-18 PROBLEM — Z85.46 HISTORY OF PROSTATE CANCER: Status: ACTIVE | Noted: 2018-01-09

## 2022-03-18 PROBLEM — I73.9 PAD (PERIPHERAL ARTERY DISEASE) (HCC): Status: ACTIVE | Noted: 2019-07-02

## 2022-03-18 PROBLEM — R07.9 INTERMITTENT CHEST PAIN: Status: ACTIVE | Noted: 2020-08-17

## 2022-03-18 PROBLEM — M54.16 LUMBAR RADICULOPATHY: Status: ACTIVE | Noted: 2017-08-30

## 2022-03-18 PROBLEM — N52.9 ED (ERECTILE DYSFUNCTION) OF ORGANIC ORIGIN: Status: ACTIVE | Noted: 2017-04-14

## 2022-03-18 PROBLEM — M19.90 OSTEOARTHROSIS: Status: ACTIVE | Noted: 2022-03-01

## 2022-03-18 PROBLEM — E29.1 HYPOGONADISM MALE: Status: ACTIVE | Noted: 2022-03-01

## 2022-03-18 PROBLEM — E66.3 OVERWEIGHT (BMI 25.0-29.9): Status: ACTIVE | Noted: 2018-01-09

## 2022-03-19 PROBLEM — M48.02 CERVICAL SPINAL STENOSIS: Status: ACTIVE | Noted: 2017-10-04

## 2022-03-19 PROBLEM — K21.9 GERD (GASTROESOPHAGEAL REFLUX DISEASE): Status: ACTIVE | Noted: 2022-03-01

## 2022-03-19 PROBLEM — M72.2 PLANTAR FASCIITIS: Status: ACTIVE | Noted: 2022-03-01

## 2022-03-19 PROBLEM — R76.11 PPD POSITIVE: Status: ACTIVE | Noted: 2022-03-01

## 2022-03-19 PROBLEM — K92.2 GI BLEED: Status: ACTIVE | Noted: 2022-03-01

## 2022-03-19 PROBLEM — I16.0 HYPERTENSIVE URGENCY: Status: ACTIVE | Noted: 2021-08-31

## 2022-03-19 PROBLEM — J61 ASBESTOSIS (HCC): Status: ACTIVE | Noted: 2022-03-01

## 2022-03-19 PROBLEM — E03.8 SUBCLINICAL HYPOTHYROIDISM: Status: ACTIVE | Noted: 2022-03-01

## 2022-03-20 PROBLEM — H93.19 TINNITUS: Status: ACTIVE | Noted: 2022-03-01

## 2022-03-20 PROBLEM — C61 PROSTATE CANCER (HCC): Status: ACTIVE | Noted: 2022-03-01

## 2022-03-20 PROBLEM — R73.01 IFG (IMPAIRED FASTING GLUCOSE): Status: ACTIVE | Noted: 2017-06-29

## 2022-03-20 PROBLEM — K57.90 DIVERTICULOSIS: Status: ACTIVE | Noted: 2021-04-21

## 2022-03-24 DIAGNOSIS — F41.9 ANXIETY: ICD-10-CM

## 2022-03-24 RX ORDER — LORAZEPAM 1 MG/1
1 TABLET ORAL
Qty: 50 TABLET | Refills: 0 | Status: SHIPPED | OUTPATIENT
Start: 2022-03-24 | End: 2022-10-17 | Stop reason: SDUPTHER

## 2022-03-24 NOTE — TELEPHONE ENCOUNTER
[facility] reports the last fill date for Ativan as 9/8/21 for a 16 d/s. Last Visit: 3/1/22 with MD Ace Sanz  Next Appointment: 6/6/22 with MD Ace Sanz  Previous Refill Encounter(s): 9/8/21 #50    Requested Prescriptions     Pending Prescriptions Disp Refills    LORazepam (Ativan) 1 mg tablet 50 Tablet 0     Sig: Take 1 Tablet by mouth every eight (8) hours as needed for Anxiety.

## 2022-04-06 ENCOUNTER — OFFICE VISIT (OUTPATIENT)
Dept: ORTHOPEDIC SURGERY | Age: 77
End: 2022-04-06
Payer: MEDICARE

## 2022-04-06 VITALS — BODY MASS INDEX: 30.88 KG/M2 | HEART RATE: 63 BPM | HEIGHT: 72 IN | WEIGHT: 228 LBS | OXYGEN SATURATION: 98 %

## 2022-04-06 DIAGNOSIS — M25.561 CHRONIC PAIN OF RIGHT KNEE: ICD-10-CM

## 2022-04-06 DIAGNOSIS — M17.12 PRIMARY OSTEOARTHRITIS OF LEFT KNEE: ICD-10-CM

## 2022-04-06 DIAGNOSIS — M17.11 PRIMARY OSTEOARTHRITIS OF RIGHT KNEE: ICD-10-CM

## 2022-04-06 DIAGNOSIS — G89.29 CHRONIC PAIN OF LEFT KNEE: Primary | ICD-10-CM

## 2022-04-06 DIAGNOSIS — G89.29 CHRONIC PAIN OF RIGHT KNEE: ICD-10-CM

## 2022-04-06 DIAGNOSIS — M25.562 CHRONIC PAIN OF LEFT KNEE: Primary | ICD-10-CM

## 2022-04-06 PROCEDURE — 99213 OFFICE O/P EST LOW 20 MIN: CPT | Performed by: SPECIALIST

## 2022-04-06 PROCEDURE — 1101F PT FALLS ASSESS-DOCD LE1/YR: CPT | Performed by: SPECIALIST

## 2022-04-06 PROCEDURE — G8427 DOCREV CUR MEDS BY ELIG CLIN: HCPCS | Performed by: SPECIALIST

## 2022-04-06 PROCEDURE — G8536 NO DOC ELDER MAL SCRN: HCPCS | Performed by: SPECIALIST

## 2022-04-06 PROCEDURE — G8417 CALC BMI ABV UP PARAM F/U: HCPCS | Performed by: SPECIALIST

## 2022-04-06 PROCEDURE — G8432 DEP SCR NOT DOC, RNG: HCPCS | Performed by: SPECIALIST

## 2022-04-06 PROCEDURE — 20610 DRAIN/INJ JOINT/BURSA W/O US: CPT | Performed by: SPECIALIST

## 2022-04-06 RX ORDER — BETAMETHASONE SODIUM PHOSPHATE AND BETAMETHASONE ACETATE 3; 3 MG/ML; MG/ML
6 INJECTION, SUSPENSION INTRA-ARTICULAR; INTRALESIONAL; INTRAMUSCULAR; SOFT TISSUE ONCE
Status: COMPLETED | OUTPATIENT
Start: 2022-04-06 | End: 2022-04-06

## 2022-04-06 RX ADMIN — BETAMETHASONE SODIUM PHOSPHATE AND BETAMETHASONE ACETATE 6 MG: 3; 3 INJECTION, SUSPENSION INTRA-ARTICULAR; INTRALESIONAL; INTRAMUSCULAR; SOFT TISSUE at 09:54

## 2022-04-06 NOTE — PROGRESS NOTES
Patient: Anna Hobbs                MRN: [id number]       SSN: [id number]  YOB: 1945        AGE: 68 y.o. SEX: male    PCP: Benjamin Shearer MD  04/06/22     CC: BILATERAL KNEE PAIN L>R    HISTORY:  Anna Hobbs is a 68 y.o. male who is seen for bilateral knee pain L>R. He has been experiencing increasing knee pain for the past few years. He is not exercising regularly due to his knee pain. He feels pain with standing, walking and stair climbing. He experiences startup pain after sitting. He reports relief from previous cortisone and visco supplementation injections by Dr. Mariana Davila in the past.  He says his knee extension has been restricted since 1964. He injured his right knee playing football and underwent total meniscectomy and arthrotomy by Dr. David Bar. He completed successful Euflexxa series on 10/14/19, 7/29/20, and 4/14/21. He was previously seen for neck & left hip pains. He is s/p right endarterectomy for circulation problems in July 2019 by Dr. Kolton pichardo. He currently takes Plavix & a diuretic. He has a stent in his right femoral artery. He [facility] He had a left groin pseudoaneursym from a recent catherization via the femoral artery. He has been seen by Dr. Carlie Talley for his left shoulder--responded to an injection. Pain Assessment  4/6/2022   Location of Pain Knee   Location Modifiers Left;Right   Severity of Pain 5   Quality of Pain (No Data)   Quality of Pain Comment stabbing   Duration of Pain -   Frequency of Pain -   Date Pain First Started -   Aggravating Factors -   Aggravating Factors Comment -   Limiting Behavior -   Relieving Factors -   Relieving Factors Comment -   Result of Injury -   Work-Related Injury -   Type of Injury -   Type of Injury Comment -     Occupation, etc:  Mr. Slime Burnham he lives with his wife in [place]. He has 2 sons not in the area.  His youngest son lives in [place]. His middle son lives in [place] and is a Zoroastrianism  for 2 churches. His middle son's wife has Nyack's disease. He has 7 grandchildren. He retired 15 years ago as a  for the [employer] He retired in 2013 from [employer]. 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 He states that he has respiratory problems due to asbestos exposure. He reports that he cut his right leg a few years ago. His wound took 2 months to heal. He enjoys doing water exercises. He recently planted trees in his yard. His eldest son passed away on 6/17/19 in [place]. He is unsure of the cause of his son's death. He is not diabetic. He has h/o severe peripheral vascular disease. He is followed for claudication in his left calf. Current weight is 228 pounds. He is 6' tall. He received his Covid vaccine. He takes a statin and prescription fish oil for high cholesterol. Lab Results   Component Value Date/Time    Hemoglobin A1c 5.4 11/19/2021 08:02 AM    Hemoglobin A1c (POC) 5.5 05/23/2019 09:05 AM     Weight Metrics 4/6/2022 3/1/2022 11/26/2021 11/3/2021 9/7/2021 8/27/2021 8/11/2021   Weight 228 lb 230 lb 230 lb 6.4 oz 218 lb 216 lb 12.8 oz 222 lb 222 lb   BMI 30.92 kg/m2 31.19 kg/m2 31.25 kg/m2 29.57 kg/m2 29.4 kg/m2 30.11 kg/m2 30.11 kg/m2       Patient Active Problem List   Diagnosis Code    Colitis, ulcerative ([facility] K51.90    Colon polyps K63.5    Left carotid artery occlusion I65.22    Hypovitaminosis D E55.9    Advance directive in chart Z78.9    Hypertension I11.9    Dyslipidemia E78.5    Coronary artery disease I25.10    Atrial fibrillation I48.91    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 9) E66.3    History of prostate cancer Z85.46    PAD (peripheral artery disease) (HCC) I73.9    Diverticulosis K57.90    Asbestosis ([facility] J61    Plantar fasciitis M72.2    Intermittent chest pain R07.9    Osteoarthrosis M19.90    GERD (gastroesophageal reflux disease) K21.9    Hypertensive urgency I16.0    GI bleed K92.2    Impacted cerumen H61.20    Hypogonadism male E29.1    Prostate cancer ([facility] C61    PPD positive R76.11    Sudden hearing loss H91.20    Subclinical hypothyroidism E03.8    Tinnitus H93.19    Vertiginous syndrome H81.90     REVIEW OF SYSTEMS: All Below are Negative except: See HPI   Constitutional: negative for fever, chills, and weight loss. Cardiovascular: negative for chest pain, claudication, leg swelling, SOB, DUONG   Gastrointestinal: Negative for pain, N/V/C/D, Blood in stool or urine, dysuria,  hematuria, incontinence, pelvic pain. Musculoskeletal: See HPI   Neurological: Negative for dizziness and weakness. Negative for headaches, Visual changes, confusion, seizures   Phychiatric/Behavioral: Negative for depression, memory loss, substance  abuse. Extremities: Negative for hair changes, rash, or skin lesion changes. Hematologic: Negative for bleeding problems, bruising, pallor or swollen lymph  nodes   Peripheral Vascular: No calf pain, no circulation deficits.     Social History     Socioeconomic History    Marital status:      Spouse name: Not on file    Number of children: Not on file    Years of education: Not on file    Highest education level: Not on file   Occupational History    Not on file   Tobacco Use    Smoking status: Former Smoker     Packs/day: 1.50     Years: 25.00     Pack years: 37.50     Types: Cigarettes     Quit date: 2003     Years since quittin.2    Smokeless tobacco: Never Used   Vaping Use    Vaping Use: Never used   Substance and Sexual Activity    Alcohol use: Yes     Comment: RARELY    Drug use: Never    Sexual activity: Yes     Partners: Female     Birth control/protection: None   Other Topics Concern    Not on file   Social History Narrative    Not on file     Social Determinants of Health     Financial Resource Strain:     Difficulty of Paying Living Expenses: Not on file   Food Insecurity:     Worried About Running Out of Food in the Last Year: Not on file    Evan of Food in the Last Year: Not on file   Transportation Needs:     Lack of Transportation (Medical): Not on file    Lack of Transportation (Non-Medical):  Not on file   Physical Activity:     Days of Exercise per Week: Not on file    Minutes of Exercise per Session: Not on file   Stress:     Feeling of Stress : Not on file   Social Connections:     Frequency of Communication with Friends and Family: Not on file    Frequency of Social Gatherings with Friends and Family: Not on file    Attends Evangelical Services: Not on file    Active Member of [address] [place] [address] [employer] or Organizations: Not on file    Attends Club or Organization Meetings: Not on file    Marital Status: Not on file   Intimate Partner Violence:     Fear of Current or Ex-Partner: Not on file    Emotionally Abused: Not on file    Physically Abused: Not on file    Sexually Abused: Not on file   Housing Stability:     Unable to Pay for Housing in the Last Year: Not on file    Number of [place] in the Last Year: Not on file    Unstable Housing in the Last Year: Not on file      Allergies   Allergen Reactions    Ace Inhibitors Anaphylaxis    Atorvastatin Other (comments) and Unknown (comments)     Muscle pain    Gabapentin Other (comments)     Vision issues      Lisinopril Shortness of Breath and Other (comments)     Turns bright red  Other reaction(s): Unknown    Nifedipine Other (comments)     Caused afib  Other reaction(s): Unknown    Other Medication Other (comments)     Vicryl suture on skin tends to be rejected with poor wound healing does better with monocryl    Pregabalin Other (comments)    Ramipril Unknown (comments)     Pt does not remember reaction  Other reaction(s): Unknown    Rosuvastatin Other (comments)     Muscle Pain        Current Outpatient Medications   Medication Sig    LORazepam (Ativan) 1 mg tablet Take 1 Tablet by mouth every eight (8) hours as needed for Anxiety.  HYDROcodone-acetaminophen (NORCO) 5-325 mg per tablet Take 1 Tablet by mouth every six (6) hours as needed (chronic pain) for up to 30 days. Max Daily Amount: 4 Tablets.  triamterene-hydroCHLOROthiazide (DYAZIDE) 37.5-25 mg per capsule     celecoxib (CELEBREX) 200 mg capsule Take 1 Capsule by mouth daily as needed for Pain.  ezetimibe-simvastatin (VYTORIN) 10-40 mg per tablet TAKE 1 TABLET NIGHTLY    gabapentin (NEURONTIN) 300 mg capsule Take 1 Capsule by mouth three (3) times daily. Max Daily Amount: 900 mg. (Patient taking differently: Take 300 mg by mouth three (3) times daily. Takes one nightly)    Lactobacillus acidophilus (PROBIOTIC PO) Take  by mouth.  Methylcellulose, with Sugar, (Citrucel, sucrose,) powd Take  by mouth.  diclofenac (VOLTAREN) 1 % gel APPLY 4 GRAMS TO THE AFFECTED AREA ON RIGHT KNEE FOUR TIMES DAILY    alclometasone (ACLOVATE) 0.05 % topical cream Apply  to affected area daily as needed.  isosorbide dinitrate (ISORDIL) 10 mg tablet Take 10 mg by mouth three (3) times daily. As needed    losartan (COZAAR) 50 mg tablet Take 50 mg by mouth two (2) times a day.  nebivoloL (Bystolic) 10 mg tablet Take 5 mg by mouth two (2) times a day.  polyethylene glycol (MIRALAX) 17 gram/dose powder Take 17 g by mouth as needed.  nitroglycerin (NITROSTAT) 0.4 mg SL tablet     icosapent ethyL (VASCEPA) 1 gram capsule Take 2 Capsules by mouth two (2) times daily (with meals).  clopidogreL (PLAVIX) 75 mg tab Take 1 Tab by mouth daily.  colestipol (COLESTID) 1 gram tablet Take 1 g by mouth daily as needed.  aspirin delayed-release 81 mg tablet Take 81 mg by mouth daily.     Cholecalciferol, Vitamin D3, 1,000 unit cap Take 1,000 Units by mouth two (2) times a day.  MULTIVITAMIN PO Take  by mouth daily.  pantoprazole (PROTONIX) 40 mg tablet Take 40 mg by mouth daily.  coenzyme q10 200 mg Cap Take  by mouth daily.  tiZANidine (ZANAFLEX) 4 mg tablet Take 1 Tablet by mouth three (3) times daily as needed for Muscle Spasm(s). (Patient not taking: Reported on 4/6/2022)    fluorouraciL (EFUDEX) 5 % chemo cream  (Patient not taking: Reported on 3/1/2022)     No current facility-administered medications for this visit. PHYSICAL EXAMINATION:  Visit Vitals  Pulse 63   Ht 6' (1.829 m)   Wt 228 lb (103.4 kg)   SpO2 98%   BMI 30.92 kg/m²      ORTHO EXAMINATION:  Examination Right knee Left knee   Skin Intact Intact   Range of motion 110-0 120-0   Effusion + -   Medial joint line tenderness + +   Lateral joint line tenderness - -   Popliteal tenderness - -   Osteophytes palpable + +   Kylahs - -   Patella crepitus + -   Anterior drawer - -   Lateral laxity - -   Medial laxity - -   Varus deformity + -   Valgus deformity - -   Pretibial edema - -   Calf tenderness - -   Brisk capilary refill, palpable pulse    Chart reviewed for the following:   I, Lina Frank MD, have reviewed the History, Physical and updated the Allergic reactions for [address] performed immediately prior to start of procedure:  Blaine Orr MD, have performed the following reviews on [facility] prior to the start of the procedure:            * Patient was identified by name and date of birth   * Agreement on procedure being performed was verified  * Risks and Benefits explained to the patient  * Procedure site verified and marked as necessary  * Patient was positioned for comfort  * Consent was obtained     Time: 9:41 AM     Date of procedure: 4/6/2022    Procedure performed by:  Lina Frank MD    Mr. Winston Castillo tolerated the procedure well with no complications.     RADIOGRAPHS:  XR LEFT KNEE 4/6/22 SAWYER  IMPRESSION: Three views with bilateral knees on AP view - No fractures, no effusion, severe right and moderately severe left joint space narrowing, + osteophytes present. Kellgren Jori grade 4, arterial calcification      XR KLAUS HIP 11/7/18 SAWYER  IMPRESSION:  AP pelvis and two views - No fractures, moderate joint space narrowing, acetabular osteophytes present. Tonnis grade 2. Femoral acetabular impingement syndrome     XR RIGHT KNEE 4/13/18 SAWYER  IMPRESSION:  Three views - No fractures, no effusion, severe end stage with lateral subluxation joint space narrowing, + osteophytes present. IKDC Grade C. calcification of arteries     IMPRESSION:      ICD-10-CM ICD-9-CM    1. Chronic pain of left knee  M25.562 719.46 AMB POC X-RAY KNEE 3 VIEW    G89.29 338.29 betamethasone (CELESTONE) injection 6 mg      MI DRAIN/INJECT LARGE JOINT/BURSA      PROCEDURE AUTHORIZATION TO    2. Primary osteoarthritis of left knee  M17.12 715.16 betamethasone (CELESTONE) injection 6 mg      MI DRAIN/INJECT LARGE JOINT/BURSA      PROCEDURE AUTHORIZATION TO    3. Primary osteoarthritis of right knee  M17.11 715.16 betamethasone (CELESTONE) injection 6 mg      MI DRAIN/INJECT LARGE JOINT/BURSA      PROCEDURE AUTHORIZATION TO    4. Chronic pain of right knee  M25.561 719.46 betamethasone (CELESTONE) injection 6 mg    G89.29 338.29 MI DRAIN/INJECT LARGE JOINT/BURSA      PROCEDURE AUTHORIZATION TO      PLAN: Consider visco supplementation if pain continues. After discussing treatment options, patient's knees were injected with 4 cc Marcaine and 1/2 cc Celestone. There is no need for surgery at this time. He will follow up as needed.      Scribed by MD Rahul Monroy) as dictated by Noemy Umana MD

## 2022-04-20 ENCOUNTER — PATIENT MESSAGE (OUTPATIENT)
Dept: INTERNAL MEDICINE CLINIC | Age: 77
End: 2022-04-20

## 2022-04-20 DIAGNOSIS — M54.12 CERVICAL RADICULOPATHY: Primary | ICD-10-CM

## 2022-04-20 RX ORDER — HYDROCODONE BITARTRATE AND ACETAMINOPHEN 5; 325 MG/1; MG/1
1 TABLET ORAL
Qty: 120 TABLET | Refills: 0 | Status: SHIPPED | OUTPATIENT
Start: 2022-04-20 | End: 2022-05-19 | Stop reason: SDUPTHER

## 2022-04-20 NOTE — TELEPHONE ENCOUNTER
[facility] Norco [facility]14/22 for a 30 d/s. Last Visit: 3/1/22 with MD Daxa Marinelli  Next Appointment: 6/6/22 with MD Daxa Marinelli  Previous Refill Encounter(s): 3/14/22 #120    Requested Prescriptions     Pending Prescriptions Disp Refills    HYDROcodone-acetaminophen (NORCO) 5-325 mg per tablet 120 Tablet 0     Sig: Take 1 Tablet by mouth every six (6) hours as needed (chronic pain) for up to 30 days. Max Daily Amount: 4 Tablets.

## 2022-04-20 NOTE — TELEPHONE ENCOUNTER
Pt also calling.  He request refill of:     HYDROcodone-acetaminophen (NORCO) 5-325 mg per tablet [[id number]]     Order Details  Dose: 1 Tablet Route: Oral Frequency: EVERY 4 HOURS AS NEEDED for chronic pain   Dispense Quantity: 180 Tablet       Pharmacy is [facility]

## 2022-05-04 ENCOUNTER — OFFICE VISIT (OUTPATIENT)
Dept: ORTHOPEDIC SURGERY | Age: 77
End: 2022-05-04
Payer: MEDICARE

## 2022-05-04 VITALS — HEIGHT: 72 IN | BODY MASS INDEX: 30.48 KG/M2 | OXYGEN SATURATION: 96 % | HEART RATE: 85 BPM | WEIGHT: 225 LBS

## 2022-05-04 DIAGNOSIS — M17.11 PRIMARY OSTEOARTHRITIS OF RIGHT KNEE: ICD-10-CM

## 2022-05-04 DIAGNOSIS — M25.461 EFFUSION OF RIGHT KNEE: ICD-10-CM

## 2022-05-04 DIAGNOSIS — M25.561 CHRONIC PAIN OF RIGHT KNEE: ICD-10-CM

## 2022-05-04 DIAGNOSIS — G89.29 CHRONIC PAIN OF LEFT KNEE: ICD-10-CM

## 2022-05-04 DIAGNOSIS — M25.562 CHRONIC PAIN OF LEFT KNEE: ICD-10-CM

## 2022-05-04 DIAGNOSIS — G89.29 CHRONIC PAIN OF RIGHT KNEE: ICD-10-CM

## 2022-05-04 DIAGNOSIS — M17.12 PRIMARY OSTEOARTHRITIS OF LEFT KNEE: Primary | ICD-10-CM

## 2022-05-04 PROCEDURE — G8417 CALC BMI ABV UP PARAM F/U: HCPCS | Performed by: SPECIALIST

## 2022-05-04 PROCEDURE — 1101F PT FALLS ASSESS-DOCD LE1/YR: CPT | Performed by: SPECIALIST

## 2022-05-04 PROCEDURE — G8432 DEP SCR NOT DOC, RNG: HCPCS | Performed by: SPECIALIST

## 2022-05-04 PROCEDURE — G8427 DOCREV CUR MEDS BY ELIG CLIN: HCPCS | Performed by: SPECIALIST

## 2022-05-04 PROCEDURE — 99213 OFFICE O/P EST LOW 20 MIN: CPT | Performed by: SPECIALIST

## 2022-05-04 PROCEDURE — G8536 NO DOC ELDER MAL SCRN: HCPCS | Performed by: SPECIALIST

## 2022-05-04 PROCEDURE — 20610 DRAIN/INJ JOINT/BURSA W/O US: CPT | Performed by: SPECIALIST

## 2022-05-04 NOTE — PROGRESS NOTES
Patient: Vinh Bryant                MRN: [id number]       SSN: [id number]  YOB: 1945        AGE: 68 y.o. SEX: male    PCP: Marimar Clarke MD  05/05/22     CC: BILATERAL KNEE PAIN L>R AND RIGHT KNEE SWELLING    HISTORY:  Vinh Bryant is a 68 y.o. male who is seen for bilateral knee pain L>R and right knee swelling. He has been experiencing increasing knee pain for the past few years. He is not exercising regularly due to his knee pain. He feels pain with standing, walking and stair climbing. He experiences startup pain after sitting. He reports relief from previous cortisone and visco supplementation injections by Dr. Zhane Jones in the past.  He says his knee extension has been restricted since 1964. He injured his right knee playing football and underwent total meniscectomy and arthrotomy by Dr. Suellen Thorne. He completed successful Euflexxa series on 10/14/19, 7/29/20, and 4/14/21. He was previously seen for neck & left hip pains. He has a h/o back pain. He underwent radiofrequency ablation with Dr. Gloria Da Silva. He is s/p right endarterectomy for circulation problems in July 2019 by Dr. Kitty pichardo. He currently takes Plavix & a diuretic. He has a stent in his right femoral artery. He reports a blockage in his left [facility]. He had a left groin pseudoaneursym from a recent catherization via the femoral artery. He is having a left leg angiogram in a few weeks. He has been seen by Dr. Charly Hoff for his left shoulder--responded to an injection.      Pain Assessment  5/4/2022   Location of Pain Knee   Location Modifiers Left;Right   Severity of Pain 4   Quality of Pain Aching   Quality of Pain Comment swelling   Duration of Pain -   Frequency of Pain Intermittent   Date Pain First Started -   Aggravating Factors Walking;Standing   Aggravating Factors Comment -   Limiting Behavior -   Relieving Factors -   Relieving Factors Comment -   Result of Injury -   Work-Related Injury -   Type of Injury -   Type of Injury Comment -     Occupation, etc:  Mr. Delonte Pope he lives with his wife in [facility]. He has 2 sons not in the area. His youngest son lives in [place]. His middle son lives in [place] and is a Yazdanism  for 2 churches. His middle son's wife has [place]'s disease. He has 7 grandchildren. He retired 15 years ago as a  for the [employer] He retired in 2013 from [employer]. 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 He states that he has respiratory problems due to asbestos exposure. He reports that he cut his right leg a few years ago. His wound took 2 months to heal. He enjoys doing water exercises. He planted a crepe [employer] last year. His eldest son passed away on 6/17/19 in [place]. He is unsure of the cause of his son's death. His middle son moved to [place] as his daughter-in-law's family lives in [place]. His middle son is a . His mother was a RN for 40 years at [employer] [facility] [employer]. He is not diabetic. He has h/o severe peripheral vascular disease. He is followed for claudication in his left calf. Current weight is 225 pounds. He is 6' tall. He received his Covid vaccine. He takes a statin and prescription fish oil for high cholesterol.      Lab Results   Component Value Date/Time    Hemoglobin A1c 5.4 11/19/2021 08:02 AM    Hemoglobin A1c (POC) 5.5 05/23/2019 09:05 AM     Weight Metrics 5/4/2022 4/6/2022 3/1/2022 11/26/2021 11/3/2021 9/7/2021 8/27/2021   Weight 225 lb 228 lb 230 lb 230 lb 6.4 oz 218 lb 216 lb 12.8 oz 222 lb   BMI 30.52 kg/m2 30.92 kg/m2 31.19 kg/m2 31.25 kg/m2 29.57 kg/m2 29.4 kg/m2 30.11 kg/m2       Patient Active Problem List   Diagnosis Code    Colitis, ulcerative ([facility] K51.90    Colon polyps K63.5    Left carotid artery occlusion I65.22    Hypovitaminosis D E55.9    Advance directive in chart Z78.9    Hypertension I11.9    Dyslipidemia E78.5  Coronary artery disease I25.10    Atrial fibrillation I48.91    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 9) E66.3    History of prostate cancer Z85.46    PAD (peripheral artery disease) ([facility]) I73.9    Diverticulosis K57.90    Asbestosis ([facility] J61    Plantar fasciitis M72.2    Intermittent chest pain R07.9    Osteoarthrosis M19.90    GERD (gastroesophageal reflux disease) K21.9    Hypertensive urgency I16.0    GI bleed K92.2    Impacted cerumen H61.20    Hypogonadism male E29.1    Prostate cancer ([facility] C61    PPD positive R76.11    Sudden hearing loss H91.20    Subclinical hypothyroidism E03.8    Tinnitus H93.19    Vertiginous syndrome H81.90     REVIEW OF SYSTEMS: All Below are Negative except: See HPI   Constitutional: negative for fever, chills, and weight loss. Cardiovascular: negative for chest pain, claudication, leg swelling, SOB, DUONG   Gastrointestinal: Negative for pain, N/V/C/D, Blood in stool or urine, dysuria,  hematuria, incontinence, pelvic pain. Musculoskeletal: See HPI   Neurological: Negative for dizziness and weakness. Negative for headaches, Visual changes, confusion, seizures   Phychiatric/Behavioral: Negative for depression, memory loss, substance  abuse. Extremities: Negative for hair changes, rash, or skin lesion changes. Hematologic: Negative for bleeding problems, bruising, pallor or swollen lymph  nodes   Peripheral Vascular: No calf pain, no circulation deficits.     Social History     Socioeconomic History    Marital status:      Spouse name: Not on file    Number of children: Not on file    Years of education: Not on file    Highest education level: Not on file   Occupational History    Not on file   Tobacco Use    Smoking status: Former Smoker     Packs/day: 1.50     Years: 25.00     Pack years: 37. 50     Types: Cigarettes     Quit date: 2003     Years since quittin.3    Smokeless tobacco: Never Used   Vaping Use    Vaping Use: Never used   Substance and Sexual Activity    Alcohol use: Yes     Comment: RARELY    Drug use: Never    Sexual activity: Yes     Partners: Female     Birth control/protection: None   Other Topics Concern    Not on file   Social History Narrative    Not on file     Social Determinants of Health     Financial Resource Strain:     Difficulty of Paying Living Expenses: Not on file   Food Insecurity:     Worried About Running Out of Food in the Last Year: Not on file    Evan of Food in the Last Year: Not on file   Transportation Needs:     Lack of Transportation (Medical): Not on file    Lack of Transportation (Non-Medical):  Not on file   Physical Activity:     Days of Exercise per Week: Not on file    Minutes of Exercise per Session: Not on file   Stress:     Feeling of Stress : Not on file   Social Connections:     Frequency of Communication with Friends and Family: Not on file    Frequency of Social Gatherings with Friends and Family: Not on file    Attends Mandaeism Services: Not on file    Active Member of [address] [place] [address] [employer] or Organizations: Not on file    Attends Club or Organization Meetings: Not on file    Marital Status: Not on file   Intimate Partner Violence:     Fear of Current or Ex-Partner: Not on file    Emotionally Abused: Not on file    Physically Abused: Not on file    Sexually Abused: Not on file   Housing Stability:     Unable to Pay for Housing in the Last Year: Not on file    Number of [place] in the Last Year: Not on file    Unstable Housing in the Last Year: Not on file      Allergies   Allergen Reactions    Ace Inhibitors Anaphylaxis    Atorvastatin Other (comments) and Unknown (comments)     Muscle pain    Gabapentin Other (comments)     Vision issues      Lisinopril Shortness of Breath and Other (comments)     Turns bright red  Other reaction(s): Unknown    Nifedipine Other (comments)     Caused afib  Other reaction(s): Unknown    Other Medication Other (comments)     Vicryl suture on skin tends to be rejected with poor wound healing does better with monocryl    Pregabalin Other (comments)    Ramipril Unknown (comments)     Pt does not remember reaction  Other reaction(s): Unknown    Rosuvastatin Other (comments)     Muscle Pain        Current Outpatient Medications   Medication Sig    HYDROcodone-acetaminophen (NORCO) 5-325 mg per tablet Take 1 Tablet by mouth every six (6) hours as needed (chronic pain) for up to 30 days. Max Daily Amount: 4 Tablets.  LORazepam (Ativan) 1 mg tablet Take 1 Tablet by mouth every eight (8) hours as needed for Anxiety.  triamterene-hydroCHLOROthiazide (DYAZIDE) 37.5-25 mg per capsule     celecoxib (CELEBREX) 200 mg capsule Take 1 Capsule by mouth daily as needed for Pain.  tiZANidine (ZANAFLEX) 4 mg tablet Take 1 Tablet by mouth three (3) times daily as needed for Muscle Spasm(s).  ezetimibe-simvastatin (VYTORIN) 10-40 mg per tablet TAKE 1 TABLET NIGHTLY    gabapentin (NEURONTIN) 300 mg capsule Take 1 Capsule by mouth three (3) times daily. Max Daily Amount: 900 mg. (Patient taking differently: Take 300 mg by mouth three (3) times daily. Takes one nightly)    Lactobacillus acidophilus (PROBIOTIC PO) Take  by mouth.  Methylcellulose, with Sugar, (Citrucel, sucrose,) powd Take  by mouth.  diclofenac (VOLTAREN) 1 % gel APPLY 4 GRAMS TO THE AFFECTED AREA ON RIGHT KNEE FOUR TIMES DAILY    fluorouraciL (EFUDEX) 5 % chemo cream     alclometasone (ACLOVATE) 0.05 % topical cream Apply  to affected area daily as needed.  isosorbide dinitrate (ISORDIL) 10 mg tablet Take 10 mg by mouth three (3) times daily. As needed    losartan (COZAAR) 50 mg tablet Take 50 mg by mouth two (2) times a day.  nebivoloL (Bystolic) 10 mg tablet Take 5 mg by mouth two (2) times a day.     polyethylene glycol (MIRALAX) 17 gram/dose powder Take 17 g by mouth as needed.  nitroglycerin (NITROSTAT) 0.4 mg SL tablet     icosapent ethyL (VASCEPA) 1 gram capsule Take 2 Capsules by mouth two (2) times daily (with meals).  clopidogreL (PLAVIX) 75 mg tab Take 1 Tab by mouth daily.  colestipol (COLESTID) 1 gram tablet Take 1 g by mouth daily as needed.  aspirin delayed-release 81 mg tablet Take 81 mg by mouth daily.  Cholecalciferol, Vitamin D3, 1,000 unit cap Take 1,000 Units by mouth two (2) times a day.  MULTIVITAMIN PO Take  by mouth daily.  pantoprazole (PROTONIX) 40 mg tablet Take 40 mg by mouth daily.  coenzyme q10 200 mg Cap Take  by mouth daily. No current facility-administered medications for this visit.       PHYSICAL EXAMINATION:  Visit Vitals  Pulse 85   Ht 6' (1.829 m)   Wt 225 lb (102.1 kg)   SpO2 96%   BMI 30.52 kg/m²      ORTHO EXAMINATION:  Examination Right knee Left knee   Skin Intact Intact   Range of motion 110-0 120-0   Effusion ++++ -   Medial joint line tenderness + +   Lateral joint line tenderness - -   Popliteal tenderness - -   Osteophytes palpable + +   Kylahs - -   Patella crepitus + -   Anterior drawer - -   Lateral laxity - -   Medial laxity - -   Varus deformity + -   Valgus deformity - -   Pretibial edema - -   Calf tenderness - -   Brisk capilary refill, palpable pulse    Chart reviewed for the following:   IMingo MD, have reviewed the History, Physical and updated the Allergic reactions for [address] performed immediately prior to start of procedure:  Annia Lizarraga MD, have performed the following reviews on [facility] prior to the start of the procedure:            * Patient was identified by name and date of birth   * Agreement on procedure being performed was verified  * Risks and Benefits explained to the patient  * Procedure site verified and marked as necessary  * Patient was positioned for comfort  * Consent was obtained     Time: 8:41 AM      Date of procedure: 5/5/2022    Procedure performed by:  Carla Codrova MD    Mr. Sarah Phipps tolerated the procedure well with no complications. RADIOGRAPHS:  XR LEFT KNEE 4/6/22 SAWYER  IMPRESSION:  Three views with bilateral knees on AP view - No fractures, no effusion, severe right and moderately severe left joint space narrowing, + osteophytes present. Kellgren Jori grade 4, arterial calcification      XR KLAUS HIP 11/7/18 SAWYER  IMPRESSION:  AP pelvis and two views - No fractures, moderate joint space narrowing, acetabular osteophytes present. Tonnis grade 2. Femoral acetabular impingement syndrome     XR RIGHT KNEE 4/13/18 SAWYER  IMPRESSION:  Three views - No fractures, no effusion, severe end stage with lateral subluxation joint space narrowing, + osteophytes present. IKDC Grade C. calcification of arteries     IMPRESSION:      ICD-10-CM ICD-9-CM    1. Primary osteoarthritis of left knee  M17.12 715.16 sodium hyaluronate (SUPARTZ FX/EUFLEXXA/HYALGAN) 10 mg/mL injection syrg 20 mg      AK DRAIN/INJECT LARGE JOINT/BURSA   2. Chronic pain of left knee  M25.562 719.46 sodium hyaluronate (SUPARTZ FX/EUFLEXXA/HYALGAN) 10 mg/mL injection syrg 20 mg    G89.29 338.29 AK DRAIN/INJECT LARGE JOINT/BURSA   3. Primary osteoarthritis of right knee  M17.11 715.16 sodium hyaluronate (SUPARTZ FX/EUFLEXXA/HYALGAN) 10 mg/mL injection syrg 20 mg      AK DRAIN/INJECT LARGE JOINT/BURSA   4. Chronic pain of right knee  M25.561 719.46 sodium hyaluronate (SUPARTZ FX/EUFLEXXA/HYALGAN) 10 mg/mL injection syrg 20 mg    G89.29 338.29 AK DRAIN/INJECT LARGE JOINT/BURSA   5. Effusion of right knee  M25.461 719.06      PLAN: After timeout and under sterile conditions, right knee aspirated 70 cc of light yellow fluid. The fluid was discarded. After discussing treatment options, patient's knees were injected with 2 cc Euflexxa. There is no need for surgery.  He will follow up in 1 week for Euflexxa #2.       Scribed by MD Margaret Meyer) as dictated by Austin Hoffman MD

## 2022-05-11 ENCOUNTER — OFFICE VISIT (OUTPATIENT)
Dept: ORTHOPEDIC SURGERY | Age: 77
End: 2022-05-11
Payer: MEDICARE

## 2022-05-11 VITALS — TEMPERATURE: 96.2 F | HEIGHT: 73 IN | BODY MASS INDEX: 29.82 KG/M2 | WEIGHT: 225 LBS

## 2022-05-11 DIAGNOSIS — M25.562 CHRONIC PAIN OF LEFT KNEE: ICD-10-CM

## 2022-05-11 DIAGNOSIS — M17.12 PRIMARY OSTEOARTHRITIS OF LEFT KNEE: Primary | ICD-10-CM

## 2022-05-11 DIAGNOSIS — M17.11 PRIMARY OSTEOARTHRITIS OF RIGHT KNEE: ICD-10-CM

## 2022-05-11 DIAGNOSIS — G89.29 CHRONIC PAIN OF LEFT KNEE: ICD-10-CM

## 2022-05-11 DIAGNOSIS — G89.29 CHRONIC PAIN OF RIGHT KNEE: ICD-10-CM

## 2022-05-11 DIAGNOSIS — M25.561 CHRONIC PAIN OF RIGHT KNEE: ICD-10-CM

## 2022-05-11 PROCEDURE — 20610 DRAIN/INJ JOINT/BURSA W/O US: CPT | Performed by: SPECIALIST

## 2022-05-11 NOTE — PROGRESS NOTES
Patient: Amy Reddy                MRN: [id number]       SSN: [id number]  YOB: 1945        AGE: 68 y.o. SEX: male  Body mass index is 29.69 kg/m². PCP: Tammy Holland MD  05/11/22    CC: BILATERAL KNEE PAIN    HISTORY:  Amy Reddy is a 68 y.o. male who is seen for bilateral knee pain. ICD-10-CM ICD-9-CM    1. Primary osteoarthritis of left knee  M17.12 715.16 sodium hyaluronate (SUPARTZ FX/EUFLEXXA/HYALGAN) 10 mg/mL injection syrg 20 mg      NE DRAIN/INJECT LARGE JOINT/BURSA   2. Chronic pain of left knee  M25.562 719.46 sodium hyaluronate (SUPARTZ FX/EUFLEXXA/HYALGAN) 10 mg/mL injection syrg 20 mg    G89.29 338.29 NE DRAIN/INJECT LARGE JOINT/BURSA   3. Primary osteoarthritis of right knee  M17.11 715.16 sodium hyaluronate (SUPARTZ FX/EUFLEXXA/HYALGAN) 10 mg/mL injection syrg 20 mg      NE DRAIN/INJECT LARGE JOINT/BURSA   4. Chronic pain of right knee  M25.561 719.46 sodium hyaluronate (SUPARTZ FX/EUFLEXXA/HYALGAN) 10 mg/mL injection syrg 20 mg    G89.29 338.29 NE DRAIN/INJECT LARGE JOINT/BURSA       Chart reviewed for the following:   Ro Downey MD, have reviewed the History, Physical and updated the Allergic reactions for [address] performed immediately prior to start of procedure:  Ro Downey MD, have performed the following reviews on Amy Reddy prior to the start of the procedure:            * Patient was identified by name and date of birth   * Agreement on procedure being performed was verified  * Risks and Benefits explained to the patient  * Procedure site verified and marked as necessary  * Patient was positioned for comfort  * Consent was obtained     Time: 8:31 AM      Date of procedure: 5/11/2022    Procedure performed by:  Anna Colmenares MD    Mr. Agustin Marshall tolerated the procedure well with no complications.     PLAN:  After discussing treatment options, patient's knees were injected with 2 cc of Euflexxa. Mr. Julio Colon will follow up in one week to complete his visco supplementation injection series.       Scribed by Ankita Durham MD [address]) as dictated by Ankita Durham MD

## 2022-05-18 ENCOUNTER — OFFICE VISIT (OUTPATIENT)
Dept: ORTHOPEDIC SURGERY | Age: 77
End: 2022-05-18
Payer: MEDICARE

## 2022-05-18 VITALS — HEART RATE: 90 BPM | HEIGHT: 73 IN | OXYGEN SATURATION: 99 % | BODY MASS INDEX: 29.82 KG/M2 | WEIGHT: 225 LBS

## 2022-05-18 DIAGNOSIS — G89.29 CHRONIC PAIN OF LEFT KNEE: ICD-10-CM

## 2022-05-18 DIAGNOSIS — M25.561 CHRONIC PAIN OF RIGHT KNEE: ICD-10-CM

## 2022-05-18 DIAGNOSIS — M17.11 PRIMARY OSTEOARTHRITIS OF RIGHT KNEE: ICD-10-CM

## 2022-05-18 DIAGNOSIS — G89.29 CHRONIC PAIN OF RIGHT KNEE: ICD-10-CM

## 2022-05-18 DIAGNOSIS — M25.562 CHRONIC PAIN OF LEFT KNEE: ICD-10-CM

## 2022-05-18 DIAGNOSIS — M17.12 PRIMARY OSTEOARTHRITIS OF LEFT KNEE: Primary | ICD-10-CM

## 2022-05-18 DIAGNOSIS — M25.461 EFFUSION OF RIGHT KNEE: ICD-10-CM

## 2022-05-18 PROCEDURE — 20610 DRAIN/INJ JOINT/BURSA W/O US: CPT | Performed by: SPECIALIST

## 2022-05-18 NOTE — PROGRESS NOTES
Patient: Anna Hobbs                MRN: [id number]       SSN: [id number]  YOB: 1945        AGE: 68 y.o. SEX: male  Body mass index is 29.69 kg/m². PCP: Benjamin Shearer MD  05/18/22    Chief Complaint   Patient presents with    Knee Pain     both knees inj     HISTORY:  Anna Hobbs is a 68 y.o. male who is seen for bilateral knee pain and right knee swelling. TIME OUT performed immediately prior to start of procedure:  Shanel Juan MD, have performed the following reviews on Anna Hobbs prior to the start of the procedure:            * Patient was identified by name and date of birth   * Agreement on procedure being performed was verified  * Risks and Benefits explained to the patient  * Procedure site verified and marked as necessary  * Patient was positioned for comfort  * Consent ws obtained     Time: 8:43 AM     Date of procedure: 5/18/2022    Procedure performed by:  Shania Colon MD    Mr. Slime Burnham tolerated the procedure well with no complications      [id number] ICD-9-CM    1. Primary osteoarthritis of left knee  M17.12 715.16 sodium hyaluronate (SUPARTZ FX/EUFLEXXA/HYALGAN) 10 mg/mL injection syrg 20 mg      NC DRAIN/INJECT LARGE JOINT/BURSA   2. Chronic pain of left knee  M25.562 719.46 sodium hyaluronate (SUPARTZ FX/EUFLEXXA/HYALGAN) 10 mg/mL injection syrg 20 mg    G89.29 338.29 NC DRAIN/INJECT LARGE JOINT/BURSA   3. Primary osteoarthritis of right knee  M17.11 715.16 sodium hyaluronate (SUPARTZ FX/EUFLEXXA/HYALGAN) 10 mg/mL injection syrg 20 mg      NC DRAIN/INJECT LARGE JOINT/BURSA   4. Chronic pain of right knee  M25.561 719.46 sodium hyaluronate (SUPARTZ FX/EUFLEXXA/HYALGAN) 10 mg/mL injection syrg 20 mg    G89.29 338.29 NC DRAIN/INJECT LARGE JOINT/BURSA   5. Effusion of right knee  M25.461 719.06      PLAN: After timeout and under sterile conditions, right knee aspirated 65 cc of light yellow fluid.  The fluid was discarded. After discussing treatment options, patient's knees were injected with 2 cc of Euflexxa. Mr. Ana Mariee will follow up PRN now that he has completed his visco supplementation injection series.       Scribed by Daksha Hess MD [address]) as dictated by Daksha Hess MD

## 2022-05-19 ENCOUNTER — PATIENT MESSAGE (OUTPATIENT)
Dept: INTERNAL MEDICINE CLINIC | Age: 77
End: 2022-05-19

## 2022-05-19 DIAGNOSIS — M54.12 CERVICAL RADICULOPATHY: ICD-10-CM

## 2022-05-19 RX ORDER — HYDROCODONE BITARTRATE AND ACETAMINOPHEN 5; 325 MG/1; MG/1
1 TABLET ORAL
Qty: 120 TABLET | Refills: 0 | Status: SHIPPED | OUTPATIENT
Start: 2022-05-19 | End: 2022-06-18

## 2022-05-19 NOTE — TELEPHONE ENCOUNTER
[facility] Norco [facility]20/22 for a 30 d/s. Last Visit: 3/1/22 with MD Madonna Bloch  Next Appointment: 6/6/22 with MD Madonna Bloch  Previous Refill Encounter(s): 4/20/22 #120    Requested Prescriptions     Pending Prescriptions Disp Refills    HYDROcodone-acetaminophen (NORCO) 5-325 mg per tablet 120 Tablet 0     Sig: Take 1 Tablet by mouth every six (6) hours as needed (chronic pain) for up to 30 days. Max Daily Amount: 4 Tablets.

## 2022-05-19 NOTE — TELEPHONE ENCOUNTER
From: Patrcia Lamp [place]  To: Zoey Mcmanus MD  Sent: 5/19/2022 9:18 AM EDT  Subject: [place]    Please send a refill for my [place] to [facility]. same as last time for number and strength. Please call is there are any questions.    thank you  Keri Campbell  [phone]

## 2022-06-20 ENCOUNTER — PATIENT MESSAGE (OUTPATIENT)
Dept: INTERNAL MEDICINE CLINIC | Age: 77
End: 2022-06-20

## 2022-06-20 DIAGNOSIS — M54.12 CERVICAL RADICULOPATHY: Primary | ICD-10-CM

## 2022-06-20 NOTE — TELEPHONE ENCOUNTER
Regarding: Norco refill  ----- Message from [address] [place] [address] sent at 6/20/2022  1:09 PM EDT -----       ----- Message from [place] to Seven Stark MD sent at 6/20/2022 12:21 PM -----   Please send a refill for my [place] to [facility]. same as last time for number and strength. Please call is there are any questions.    thank you  Arben Tubbs  [phone]

## 2022-06-21 ENCOUNTER — APPOINTMENT (OUTPATIENT)
Dept: INTERNAL MEDICINE CLINIC | Age: 77
End: 2022-06-21

## 2022-06-21 ENCOUNTER — PATIENT MESSAGE (OUTPATIENT)
Dept: INTERNAL MEDICINE CLINIC | Age: 77
End: 2022-06-21

## 2022-06-21 ENCOUNTER — HOSPITAL ENCOUNTER (OUTPATIENT)
Dept: LAB | Age: 77
Discharge: HOME | End: 2022-06-21
Payer: MEDICARE

## 2022-06-21 DIAGNOSIS — R73.01 IFG (IMPAIRED FASTING GLUCOSE): ICD-10-CM

## 2022-06-21 LAB
ALBUMIN SERPL-MCNC: 4 G/DL (ref 3.4–5)
ALBUMIN/GLOB SERPL: 1.3 {RATIO} (ref 0.8–1.7)
ALP SERPL-CCNC: 50 U/L (ref 45–117)
ALT SERPL-CCNC: 28 U/L (ref 16–61)
ANION GAP SERPL CALC-SCNC: 6 MMOL/L (ref 3–18)
AST SERPL-CCNC: 25 U/L (ref 10–38)
BILIRUB SERPL-MCNC: 0.9 MG/DL (ref 0.2–1)
BUN SERPL-MCNC: 13 MG/DL (ref 7–18)
BUN/CREAT SERPL: 16 (ref 12–20)
CALCIUM SERPL-MCNC: 9.9 MG/DL (ref 8.5–10.1)
CHLORIDE SERPL-SCNC: 99 MMOL/L (ref 100–111)
CO2 SERPL-SCNC: 33 MMOL/L (ref 21–32)
CREAT SERPL-MCNC: 0.79 MG/DL (ref 0.6–1.3)
ERYTHROCYTE [DISTWIDTH] IN BLOOD BY AUTOMATED COUNT: 12.3 % (ref 11.6–14.5)
EST. AVERAGE GLUCOSE BLD GHB EST-MCNC: ABNORMAL MG/DL
GLOBULIN SER CALC-MCNC: 3.2 G/DL (ref 2–4)
GLUCOSE SERPL-MCNC: 93 MG/DL (ref 74–99)
HBA1C MFR BLD: <3.8 % (ref 4.2–5.6)
HCT VFR BLD AUTO: 43.4 % (ref 36–48)
HGB BLD-MCNC: 14.5 G/DL (ref 13–16)
MCH RBC QN AUTO: 34.3 PG (ref 24–34)
MCHC RBC AUTO-ENTMCNC: 33.4 G/DL (ref 31–37)
MCV RBC AUTO: 102.6 FL (ref 78–100)
NRBC # BLD: 0 K/UL (ref 0–0.01)
NRBC BLD-RTO: 0 PER 100 WBC
PLATELET # BLD AUTO: 162 K/UL (ref 135–420)
PMV BLD AUTO: 9.7 FL (ref 9.2–11.8)
POTASSIUM SERPL-SCNC: 4.2 MMOL/L (ref 3.5–5.5)
PROT SERPL-MCNC: 7.2 G/DL (ref 6.4–8.2)
RBC # BLD AUTO: 4.23 M/UL (ref 4.35–5.65)
SODIUM SERPL-SCNC: 138 MMOL/L (ref 136–145)
WBC # BLD AUTO: 6.3 K/UL (ref 4.6–13.2)

## 2022-06-21 PROCEDURE — 80053 COMPREHEN METABOLIC PANEL: CPT

## 2022-06-21 PROCEDURE — 36415 COLL VENOUS BLD VENIPUNCTURE: CPT

## 2022-06-21 PROCEDURE — 85027 COMPLETE CBC AUTOMATED: CPT

## 2022-06-21 PROCEDURE — 83036 HEMOGLOBIN GLYCOSYLATED A1C: CPT

## 2022-06-21 RX ORDER — HYDROCODONE BITARTRATE AND ACETAMINOPHEN 5; 325 MG/1; MG/1
1 TABLET ORAL
Qty: 180 TABLET | Refills: 0 | Status: SHIPPED | OUTPATIENT
Start: 2022-06-21 | End: 2022-07-21

## 2022-06-21 NOTE — TELEPHONE ENCOUNTER
New Rx for [place] sent to [facility] on 6/3/21 for #180. This does not show as filled per . Patient is requesting this [facility] to [facility] Norco [facility]19/22 for a 30 d/s. Last Visit: 3/1/22 with MD Radha Ochoa  Next Appointment: 7/5/22 with MD Radha Ochoa    Requested Prescriptions     Pending Prescriptions Disp Refills    HYDROcodone-acetaminophen (NORCO) 5-325 mg per tablet 180 Tablet 0     Sig: Take 1 Tablet by mouth every four (4) hours as needed (chronic pain) for up to 30 days. Max Daily Amount: 6 Tablets.          For Rajiv Wilson in place:    Recommendation Provided To:    Intervention Detail: New Rx: 1, reason: Patient Preference   Gap Closed?:    Intervention Accepted By:   Consuelo Time Spent (min): 5

## 2022-06-21 NOTE — TELEPHONE ENCOUNTER
----- Message from [address] [place] sent at 6/21/2022 12:53 PM EDT -----  Regarding: Norco refill  i sent a message yesterday to have my Norco refilled, I have not seen anything from my pharmacy. I am not trying to cause an issue, however the last time this was filled for a month's supply was on the 19th of June. I currently have enough for about two days but after that I will be out. I Dr Gunnar Strickland is not in the office, but I still need to have this refilled before I run out.   Please let me know  Lew Gonzalez   [phone]

## 2022-07-01 NOTE — PROGRESS NOTES
68 y.o. WHITE/NON- male who presents for evaluation. No cardiovascular complaints and he continues to see Dr Gamal Mccollum. Exercise is limited by spine and knee issues along with vascular issues below. Getting 120s over 70s at home when he checks    He changed to Dr Christina Reis and was felt non surgical so referred to Dr Ahmet Mancini and he's gotten significant relief from c spine and l spine epidurals. He;s also gotten RFA in the lumbar area. Could not tolerate the lyrica so just taking the kit at night. Averaging 3-4 norco/d (120# monthly)    He underwent 'shockwave' treatment for his left leg by Dr Brayan Rubalcava although they entered on the right groin and there was failure of the perclose so a lot of bruising there. Also on xarelto short term? The UC has been stable, had recent colo by Dr Augustine Alvarez as below     Denies polyuria, polydipsia, nocturia, vision change. Not checking sugars at this time.      He sees Dr Augustine Alvarez once yearly and new new developments    LAST MEDICARE WELLNESS EXAM: 6/23/16, 6/29/17, 11/20/18, 11/25/19, 11/23/20, 11/26/21    Past Medical History:   Diagnosis Date    Adrenal adenoma 2009    LEFT 1 cm, no change 1/15, 2/16    Aortoiliac occlusive disease ([facility] 3/4/2019    Asbestosis     CT 1/19 showed pleural plaques    Atrial fibrillation 10/02/2009    s/p Afib ablation 2008; no oac due to prior gi bleed    BCC (basal cell carcinoma of skin)     Dr Cielo Elmore; he's had >350 lesions removed    CAD (coronary artery disease)     RCA - 3.0 x 16mm TAXUS (9/04); 3.0 x 16mm TAXUS (12/06)    Carotid disease, bilateral (HCC)     Chronic pain     myofascial pain syndrome; seen in pain clinic in past    Colitis     Colon polyp     Dr Augustine Alvarez 9/15    Degenerative arthritis of cervical spine     MRI 9/11 showed C3-6 severe foraminal stenosis w RADICULOPATHY    Degenerative arthritis of lumbar spine     Dr Chary Plummer;  MRI 9/11 L4-5 disc bulging, annular tear, disc dessication w RADICULOPATHY; intol lyrica, using kit qhs; saw Dr Larry Ochoa; now Dr Ember Andino doing RFA 2021    Diverticulosis 04/2021    Dr Quintin Michael Dyslipidemia     ED (erectile dysfunction)     GERD     H/O cardiac catheterization 08/2021    [facility] Dr Jamie Ramirez patent mid RCA (12/06) and distal RCA (9/04) stents    H/O cardiovascular stress test     [facility] mod reversible inferolat defect, no wma, ef 64%, no TID (8/21)    H/O echocardiogram     [facility] ef 55%, mild AI, tr MR (8/21)    Hearing loss 2014    Dr Geoffrey Watson Hypertension     with component of white coat    Hypogonadism male     IFG (impaired fasting glucose) [id number]    OAB (overactive bladder)     Osteoarthritis     Dr Yarbrough Skill Overweight (BMI 25.0-29. 9)     IF 5/18 start weight 214 lbs, not doing    Peripheral vascular disease     50-60% R iliac; s/p R iliac stent and L femoral artery stent in past; R OLIVIA 0.76 (1/16); PTA LEFT popliteal/SFA/prox peroneal/TP and atherectomy TP  Dr Vianney Yadav (6/22)    Plantar fasciitis     bilateral     PPD positive     Prostate cancer     T1c Imelda 7(3+4), 70% in 1 core, GS 7 (3+4) in 4 cores, GS 6 (3+3) in 1 core; psa 5.28, TRUS 18 gm;  Dr Nir Weaver; s/p cryoablation 10/16    Recurrent umbilical hernia 57/95/0553    Tinnitus     Dr Jaun Carlos    Ulnar neuritis, right 11/13/2017    Venous insufficiency      Past Surgical History:   Procedure Laterality Date    COLONOSCOPY N/A 4/20/2021 2/2/11 neg; Dr Nir Weaver 9/15 polyps; 4/20/21 divertics bx neg    HX CAROTID ENDARTERECTOMY Right 01/2014    HX CARPAL TUNNEL RELEASE Right 2017    HX CATARACT REMOVAL Bilateral     HX CRYOABLATION OF THE PROSTATE  10/2016    HX HEMORRHOIDECTOMY  1979    HX HERNIA REPAIR  1970, 1975    x 4    HX HERNIA REPAIR  02/2016    Dr Mckenzie Asper ARTHROSCOPY Right 1963    HX TONSILLECTOMY      VASCULAR SURGERY PROCEDURE UNLIST Right     RIGHT Iliac (7/19);  LEFT pseudoaneurysm repair (6/21) Dr Melinda Carlson Left 2015    LEFT fem artery and iliac stents (10/15); RCF endarterectomy w patch angioplasty/B CI artery stenting (); LEFT 'shockwave' tx Dr Jigna Peace ()     Social History     Socioeconomic History    Marital status:      Spouse name: Not on file    Number of children: Not on file    Years of education: Not on file    Highest education level: Not on file   Occupational History    Not on file   Tobacco Use    Smoking status: Former Smoker     Packs/day: 1.50     Years: 25.00     Pack years: 37.50     Types: Cigarettes     Quit date: 2003     Years since quittin.5    Smokeless tobacco: Never Used   Vaping Use    Vaping Use: Never used   Substance and Sexual Activity    Alcohol use: Yes     Comment: RARELY    Drug use: Never    Sexual activity: Yes     Partners: Female     Birth control/protection: None   Other Topics Concern    Not on file   Social History Narrative    Not on file     Social Determinants of Health     Financial Resource Strain:     Difficulty of Paying Living Expenses: Not on file   Food Insecurity:     Worried About [address] in the Last Year: Not on file    Evan of Food in the Last Year: Not on file   Transportation Needs:     Lack of Transportation (Medical): Not on file    Lack of Transportation (Non-Medical):  Not on file   Physical Activity:     Days of Exercise per Week: Not on file    Minutes of Exercise per Session: Not on file   Stress:     Feeling of Stress : Not on file   Social Connections:     Frequency of Communication with Friends and Family: Not on file    Frequency of Social Gatherings with Friends and Family: Not on file    Attends Rastafarian Services: Not on file    Active Member of Clubs or Organizations: Not on file    Attends Club or Organization Meetings: Not on file    Marital Status: Not on file   Intimate Partner Violence:     Fear of Current or Ex-Partner: Not on file    Emotionally Abused: Not on file    Physically Abused: Not on file   Teresa Dickerson Sexually Abused: Not on file   Housing Stability:     Unable to Pay for Housing in the Last Year: Not on file    Number of Places Lived in the Last Year: Not on file    Unstable Housing in the Last Year: Not on file     Current Outpatient Medications   Medication Sig    nebivoloL (BYSTOLIC) 5 mg tablet     Xarelto 2.5 mg tablet     gabapentin (NEURONTIN) 300 mg capsule Take 1 Capsule by mouth nightly. Max Daily Amount: 300 mg.    HYDROcodone-acetaminophen (NORCO) 5-325 mg per tablet Take 1 Tablet by mouth every four (4) hours as needed (chronic pain) for up to 30 days. Max Daily Amount: 6 Tablets.  LORazepam (Ativan) 1 mg tablet Take 1 Tablet by mouth every eight (8) hours as needed for Anxiety.  triamterene-hydroCHLOROthiazide (DYAZIDE) 37.5-25 mg per capsule     celecoxib (CELEBREX) 200 mg capsule Take 1 Capsule by mouth daily as needed for Pain.  tiZANidine (ZANAFLEX) 4 mg tablet Take 1 Tablet by mouth three (3) times daily as needed for Muscle Spasm(s).  ezetimibe-simvastatin (VYTORIN) 10-40 mg per tablet TAKE 1 TABLET NIGHTLY    Lactobacillus acidophilus (PROBIOTIC PO) Take  by mouth.  Methylcellulose, with Sugar, (Citrucel, sucrose,) powd Take  by mouth.  diclofenac (VOLTAREN) 1 % gel APPLY 4 GRAMS TO THE AFFECTED AREA ON RIGHT KNEE FOUR TIMES DAILY    fluorouraciL (EFUDEX) 5 % chemo cream     alclometasone (ACLOVATE) 0.05 % topical cream Apply  to affected area daily as needed.  isosorbide dinitrate (ISORDIL) 10 mg tablet Take 10 mg by mouth three (3) times daily. As needed    losartan (COZAAR) 50 mg tablet Take 50 mg by mouth two (2) times a day.  nebivoloL (Bystolic) 10 mg tablet Take 5 mg by mouth two (2) times a day.  polyethylene glycol (MIRALAX) 17 gram/dose powder Take 17 g by mouth as needed.  nitroglycerin (NITROSTAT) 0.4 mg SL tablet     icosapent ethyL (VASCEPA) 1 gram capsule Take 2 Capsules by mouth two (2) times daily (with meals).     clopidogreL (PLAVIX) 75 mg tab Take 1 Tab by mouth daily.  colestipol (COLESTID) 1 gram tablet Take 1 g by mouth daily as needed.  aspirin delayed-release 81 mg tablet Take 81 mg by mouth daily.  Cholecalciferol, Vitamin D3, 1,000 unit cap Take 1,000 Units by mouth two (2) times a day.  MULTIVITAMIN PO Take  by mouth daily.  pantoprazole (PROTONIX) 40 mg tablet Take 40 mg by mouth daily.  coenzyme q10 200 mg Cap Take  by mouth daily. No current facility-administered medications for this visit. Allergies   Allergen Reactions    Ace Inhibitors Anaphylaxis    Atorvastatin Other (comments) and Unknown (comments)     Muscle pain    Lisinopril Shortness of Breath and Other (comments)     Turns bright red  Other reaction(s): Unknown    Nifedipine Other (comments)     Caused afib  Other reaction(s): Unknown    Other Medication Other (comments)     Vicryl suture on skin tends to be rejected with poor wound healing does better with monocryl    Pregabalin Other (comments)    Ramipril Unknown (comments)     Pt does not remember reaction  Other reaction(s): Unknown    Rosuvastatin Other (comments)     Muscle Pain       REVIEW OF SYSTEMS: colo 9/15 Dr Mainor Benítez, sees Dr Jamila Pelaez  Ophtho - no vision change or eye pain  Oral - no mouth pain, tongue or tooth problems  Ears - no hearing loss, ear pain, fullness, no swallowing problems  Cardiac - no CP, PND, orthopnea, edema, palpitations or syncope  Chest - no breast masses  Resp - no wheezing, chronic coughing, dyspnea  GI - no heartburn, nausea, vomiting, change in bowel habits, bleeding, hemorrhoids  Urinary - no dysuria, hematuria, flank pain, urgency, frequency    Visit Vitals  BP (!) 145/69   Pulse 65   Temp 97.2 °F (36.2 °C) (Temporal)   Resp 16   Ht 6' 1\" (1.854 m)   Wt 225 lb (102.1 kg)   SpO2 99%   BMI 29.69 kg/m²     A&O x3  Affect is appropriate. Mood stable  No apparent distress  Anicteric, no JVD, adenopathy or thyromegaly.    No carotid bruits or radiated murmur  Lungs clear to auscultation, no wheezes or rales  Heart showed irregular rhythm. No murmur, rubs, gallops  Abdomen soft nontender, no hepatosplenomegaly or masses. Extremities without edema.   Pulses 0-1+ symmetrically    LABS  From 12/12 showed gluc 101, cr 0.77, gfr 94,   alt 26,           chol 182, tg 159, hdl 52, ldl-c 98, wbc 7.6, hb 15.4, plt 171, tsh 0.96, psa 3.20  From 7/13 showed                          test 263  From 1/14 showed   gluc 112, cr 0.78, gfr 107, alt 17,           chol 130, tg 129, hdl 37, ldl-c 67, wbc 6.8, hb 15.1, plt 186, tsh 0.64, psa 3.60, vit d 26.8  From 6/14 showed   gluc 101, cr 0.57, gfr>60,     hba1c 5.7, vit d 34.3  From 12/14 showed         hba1c 5.9, chol 141, tg 104, hdl 42, ldl-c 78, wbc 8.5, hb 14.8, plt 147, tsh 0.75, psa 5.60, vit d 31.8, ua neg  From 6/15 showed   gluc 104, cr 0.75, gfr>60,     hba1c 5.9  From 8/15 showed                       tsh 0.44, psa 5.28,         test 215, lh 6.1, prl 11.1, ft4 1.61  From 12/15 showed gluc 95,   cr 0.71, gfr>60,  alt 36, hba1c 5.9, chol 135, tg 105, hdl 44, ldl-c 70,            tsh 0.51,      vit d 29.1   From 1/16 showed   gluc 112, cr 0.72, gfr>60,          wbc 7.8, hb 15.1, plt 163,           ua neg  From 11/16 showed gluc 103, cr 0.70,           wbc 8.6, hb 14.5, plt 189, ck/trop neg  From 12/16 showed gluc 89,   cr 0.70, gfr>60,  alt 31, hba1c 5.9, chol 159, tg 124, hdl 58, ldl-c 76, wbc 9.0, hb 15.1, plt 185,       vit d 27.8  From 3/17 showed   gluc 100, cr 0.75, gfr>60,     hba1c 5.9,                tsh 0.78, psa 0.18, ft4 1.30  From 6/17 showed   gluc 96,   cr 0.74, gfr>60, alt 34,  hba1c 5.7, chol 135, tg 71,   hdl 53, ldl-c 68  From 1/18 showed   gluc 107, cr 0.65, gfr>60, alt 31,  hba1c 5.8, chol 147, tg 105, hdl 46, ldl-c 80  From 5/18 showed   gluc 100, cr 0.73, gfr>60, alt 38,  hba1c 5.9, chol 152, tg 59,   hdl 66, ldl-c 74  From 11/18 showed         hba1c 6.1, chol 146, tg 149, hdl 51, ldl-c 65, wbc 7.9, hb 15.4, plt 164  From 5/19 showed         hba1c 5.5  From 11/19 showed gluc 108, cr 0.77, gfr>60, alt 31,  hba1c 5.5, chol 128, tg 108, hdl 36, ldl-c 70  From 5/20 showed         hba1c 5.9, chol 142, tg 133, hdl 52, ldl-c 63, wbc 8.7, hb 14.1, plt 160  From 11/20 showed gluc 99,   cr 0.81, gfr>60, alt 29,  hba1c 5.4  From 5/21 showed   gluc 103, cr 0.71, gfr>60, alt 29,  hba1c 5.5, chol 146, tg 84,   hdl 55, ldl-c 74, wbc 7.3, hb 13.9, plt 141  From 11/21 showed gluc 90,   cr 0.72, gfr>60,     hba1c 5.4,    Results for orders placed or performed during the hospital encounter of 19/40/92   METABOLIC PANEL, COMPREHENSIVE   Result Value Ref Range    Sodium 138 136 - 145 mmol/L    Potassium 4.2 3.5 - 5.5 mmol/L    Chloride 99 (L) 100 - 111 mmol/L    CO2 33 (H) 21 - 32 mmol/L    Anion gap 6 3.0 - 18 mmol/L    Glucose 93 74 - 99 mg/dL    BUN 13 7.0 - 18 MG/DL    Creatinine 0.79 0.6 - 1.3 MG/DL    BUN/Creatinine ratio 16 12 - 20      GFR est AA >60 >60 ml/min/1.73m2    GFR est non-AA >60 >60 ml/min/1.73m2    Calcium 9.9 8.5 - 10.1 MG/DL    Bilirubin, total 0.9 0.2 - 1.0 MG/DL    ALT (SGPT) 28 16 - 61 U/L    AST (SGOT) 25 10 - 38 U/L    Alk.  phosphatase 50 45 - 117 U/L    Protein, total 7.2 6.4 - 8.2 g/dL    Albumin 4.0 3.4 - 5.0 g/dL    Globulin 3.2 2.0 - 4.0 g/dL    A-G Ratio 1.3 0.8 - 1.7     CBC W/O DIFF   Result Value Ref Range    WBC 6.3 4.6 - 13.2 K/uL    RBC 4.23 (L) 4.35 - 5.65 M/uL    HGB 14.5 13.0 - 16.0 g/dL    HCT 43.4 36.0 - 48.0 %    .6 (H) 78.0 - 100.0 FL    MCH 34.3 (H) 24.0 - 34.0 PG    MCHC 33.4 31.0 - 37.0 g/dL    RDW 12.3 11.6 - 14.5 %    PLATELET 844 073 - 907 K/uL    MPV 9.7 9.2 - 11.8 FL    NRBC 0.0 0  WBC    ABSOLUTE NRBC 0.00 0.00 - 0.01 K/uL   HEMOGLOBIN A1C WITH EAG   Result Value Ref Range    Hemoglobin A1c <3.8 (L) 4.2 - 5.6 %    Est. average glucose Cannot be calculated mg/dL     *Note: Due to a large number of results and/or encounters for the requested time period, some results have not been displayed. A complete set of results can be found in Results Review. We reviewed the patient's labs from the last several visits to point out trends in the numbers          Patient Active Problem List   Diagnosis Code    Colitis, ulcerative ([facility] K51.90    Colon polyps K63.5    Left carotid artery occlusion I65.22    Hypovitaminosis D E55.9    Advance directive in chart Z78.9    Hypertension I11.9    Dyslipidemia E78.5    Coronary artery disease I25.10    Atrial fibrillation I48.91    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 9) E66.3    History of prostate cancer Z85.46    PAD (peripheral artery disease) ([facility]) I73.9    Diverticulosis K57.90    Asbestosis ([facility] J61    Osteoarthrosis M19.90    GERD (gastroesophageal reflux disease) K21.9    Subclinical hypothyroidism E03.8     Assessment and plan:  1. Cardiac. Continue current regimen. F/U Dr Munir Meng  2. Vascular. Follow up Dr Paige De Guzman  3. Dyslipidemia. Continue current  4. IFG. Lifestyle and dietary measures, wt loss as he is trying to do  5. Hypovit d. Supplement  6. Colon polyp. Fiber, colo beyond age  9. Prostate ca. Per Dr Kavitha Gerardo  8. Obesity. See separate note  9. Ortho. F/U Dr Fatmata Schmitz  10. Spine. F/U Dr Criselda Leach and Dr Belita Closs; continue kit; limit to #120 norco monthly      RTC 1/23    Above conditions discussed at length and patient vocalized understanding. All questions answered to patient satisfaction      ICD-10-CM ICD-9-CM    1. Hypertension  I11.9 402.10    2. Atrial fibrillation, unspecified type ([facility]  I48.91 427.31    3. Polyp of colon, unspecified part of colon, unspecified type  K63.5 211.3    4. Atherosclerosis of native coronary artery of native heart without angina pectoris  I25.10 414.01    5. Dyslipidemia  E78.5 272.4 LIPID PANEL   6.  History of prostate cancer  Z85.46 V10.46    7. IFG (impaired fasting glucose)  X21.82 112.67 METABOLIC PANEL, BASIC      CANCELED: HEMOGLOBIN A1C WITH EAG   8. PAD (peripheral artery disease) (HCC)  I73.9 443.9    9.  Subclinical hypothyroidism  E03.8 244.8 TSH 3RD GENERATION      T4, FREE   10. Cervical spinal stenosis  M48.02 723.0 gabapentin (NEURONTIN) 300 mg capsule

## 2022-07-05 ENCOUNTER — OFFICE VISIT (OUTPATIENT)
Dept: INTERNAL MEDICINE CLINIC | Age: 77
End: 2022-07-05
Payer: MEDICARE

## 2022-07-05 VITALS
RESPIRATION RATE: 16 BRPM | TEMPERATURE: 97.2 F | SYSTOLIC BLOOD PRESSURE: 145 MMHG | OXYGEN SATURATION: 99 % | WEIGHT: 225 LBS | HEART RATE: 65 BPM | HEIGHT: 73 IN | DIASTOLIC BLOOD PRESSURE: 69 MMHG | BODY MASS INDEX: 29.82 KG/M2

## 2022-07-05 DIAGNOSIS — R73.01 IFG (IMPAIRED FASTING GLUCOSE): ICD-10-CM

## 2022-07-05 DIAGNOSIS — K63.5 POLYP OF COLON, UNSPECIFIED PART OF COLON, UNSPECIFIED TYPE: ICD-10-CM

## 2022-07-05 DIAGNOSIS — I48.91 ATRIAL FIBRILLATION, UNSPECIFIED TYPE (HCC): ICD-10-CM

## 2022-07-05 DIAGNOSIS — I25.10 ATHEROSCLEROSIS OF NATIVE CORONARY ARTERY OF NATIVE HEART WITHOUT ANGINA PECTORIS: ICD-10-CM

## 2022-07-05 DIAGNOSIS — Z85.46 HISTORY OF PROSTATE CANCER: ICD-10-CM

## 2022-07-05 DIAGNOSIS — I11.9 BENIGN HYPERTENSIVE HEART DISEASE WITHOUT HEART FAILURE: Primary | ICD-10-CM

## 2022-07-05 DIAGNOSIS — I73.9 PAD (PERIPHERAL ARTERY DISEASE) (HCC): ICD-10-CM

## 2022-07-05 DIAGNOSIS — M48.02 CERVICAL SPINAL STENOSIS: ICD-10-CM

## 2022-07-05 DIAGNOSIS — E03.8 SUBCLINICAL HYPOTHYROIDISM: ICD-10-CM

## 2022-07-05 DIAGNOSIS — E78.5 DYSLIPIDEMIA: ICD-10-CM

## 2022-07-05 PROBLEM — M72.2 PLANTAR FASCIITIS: Status: RESOLVED | Noted: 2022-03-01 | Resolved: 2022-07-05

## 2022-07-05 PROBLEM — I16.0 HYPERTENSIVE URGENCY: Status: RESOLVED | Noted: 2021-08-31 | Resolved: 2022-07-05

## 2022-07-05 PROBLEM — K92.2 GI BLEED: Status: RESOLVED | Noted: 2022-03-01 | Resolved: 2022-07-05

## 2022-07-05 PROBLEM — H93.19 TINNITUS: Status: RESOLVED | Noted: 2022-03-01 | Resolved: 2022-07-05

## 2022-07-05 PROBLEM — R76.11 PPD POSITIVE: Status: RESOLVED | Noted: 2022-03-01 | Resolved: 2022-07-05

## 2022-07-05 PROBLEM — R07.9 INTERMITTENT CHEST PAIN: Status: RESOLVED | Noted: 2020-08-17 | Resolved: 2022-07-05

## 2022-07-05 PROBLEM — E29.1 HYPOGONADISM MALE: Status: RESOLVED | Noted: 2022-03-01 | Resolved: 2022-07-05

## 2022-07-05 PROBLEM — C61 PROSTATE CANCER (HCC): Status: RESOLVED | Noted: 2022-03-01 | Resolved: 2022-07-05

## 2022-07-05 PROCEDURE — G8427 DOCREV CUR MEDS BY ELIG CLIN: HCPCS | Performed by: INTERNAL MEDICINE

## 2022-07-05 PROCEDURE — G0463 HOSPITAL OUTPT CLINIC VISIT: HCPCS | Performed by: INTERNAL MEDICINE

## 2022-07-05 PROCEDURE — G8510 SCR DEP NEG, NO PLAN REQD: HCPCS | Performed by: INTERNAL MEDICINE

## 2022-07-05 PROCEDURE — 99214 OFFICE O/P EST MOD 30 MIN: CPT | Performed by: INTERNAL MEDICINE

## 2022-07-05 PROCEDURE — 1123F ACP DISCUSS/DSCN MKR DOCD: CPT | Performed by: INTERNAL MEDICINE

## 2022-07-05 PROCEDURE — G8417 CALC BMI ABV UP PARAM F/U: HCPCS | Performed by: INTERNAL MEDICINE

## 2022-07-05 PROCEDURE — 1101F PT FALLS ASSESS-DOCD LE1/YR: CPT | Performed by: INTERNAL MEDICINE

## 2022-07-05 PROCEDURE — G8536 NO DOC ELDER MAL SCRN: HCPCS | Performed by: INTERNAL MEDICINE

## 2022-07-05 RX ORDER — NEBIVOLOL 5 MG/1
TABLET ORAL
COMMUNITY
Start: 2022-05-02 | End: 2022-09-21

## 2022-07-05 RX ORDER — DIAZEPAM 5 MG/1
TABLET ORAL
COMMUNITY
End: 2022-07-05 | Stop reason: ALTCHOICE

## 2022-07-05 RX ORDER — RIVAROXABAN 2.5 MG/1
TABLET, FILM COATED ORAL
COMMUNITY
Start: 2022-06-24

## 2022-07-05 RX ORDER — GABAPENTIN 300 MG/1
300 CAPSULE ORAL
Qty: 30 CAPSULE | Refills: 5 | Status: SHIPPED | OUTPATIENT
Start: 2022-07-05

## 2022-07-05 NOTE — PROGRESS NOTES
Rawland Foot presents with   Chief Complaint   Patient presents with    Follow-up     6 month    Cholesterol Problem    Hypertension    Labs     6-21-22                1. \"Have you been to the ER, urgent care clinic since your last visit? Hospitalized since your last visit? \" Norah Grider on 6-23-22    2. \"Have you seen or consulted any other health care providers outside of the [address] [place] [address] since your last visit? \" Dacia Valdez for Cardiology[facility]     3. For patients aged 39-70: Has the patient had a colonoscopy / FIT/ Cologuard?  NA - based on age

## 2022-07-13 NOTE — TELEPHONE ENCOUNTER
Patient refusing care and further testing ED provider at bedside.  Will hold further testing until family can be reached. Refusing BP.   [facility] for Norco as 4/26/21 for a 30 d/s. UDS done 2/11/21  CSA signed 8/9/19    Last Visit: 5/25/21 with MD Nathaniel Francois  Next Appointment: 11/26/21 with MD Nathaniel Francois  Previous Refill Encounter(s): 4/26/21 #180    Requested Prescriptions     Pending Prescriptions Disp Refills    HYDROcodone-acetaminophen (NORCO) 5-325 mg per tablet 180 Tablet 0     Sig: Take 1 Tablet by mouth every four (4) hours as needed (chronic pain) for up to 30 days. Max Daily Amount: 6 Tablets.

## 2022-08-11 DIAGNOSIS — M54.12 CERVICAL RADICULOPATHY: Primary | ICD-10-CM

## 2022-08-11 RX ORDER — HYDROCODONE BITARTRATE AND ACETAMINOPHEN 5; 325 MG/1; MG/1
1 TABLET ORAL
Qty: 120 TABLET | Refills: 0 | Status: SHIPPED | OUTPATIENT
Start: 2022-08-11 | End: 2022-09-10

## 2022-08-11 NOTE — TELEPHONE ENCOUNTER
Patient is requesting refill of:  - HYDROcodone-acetaminophen (NORCO) 5-325 mg per tablet    He states that last time it was sent for 180 tablets but he is requesting it to be sent for 120.     Please advise, thank you

## 2022-08-11 NOTE — TELEPHONE ENCOUNTER
Patient is requesting #120 tablets. [facility] for Norco as 6/21/22 for a 30 d/s. Last Visit: 7/5/22 with MD Mulu Odom  Next Appointment: 1/17/23 with MD Mulu Odom  Previous Refill Encounter(s): 6/21/22 #180    Requested Prescriptions     Pending Prescriptions Disp Refills    HYDROcodone-acetaminophen (NORCO) 5-325 mg per tablet 120 Tablet 0     Sig: Take 1 Tablet by mouth every four (4) hours as needed (chronic pain) for up to 30 days. Max Daily Amount: 6 Tablets. For [facility] in place:   Recommendation Provided To:    Intervention Detail: New Rx: 1, reason: Patient Preference  Gap Closed?:   Intervention Accepted By:   Time Spent (min): 5

## 2022-08-26 ENCOUNTER — OFFICE VISIT (OUTPATIENT)
Dept: ORTHOPEDIC SURGERY | Age: 77
End: 2022-08-26
Payer: MEDICARE

## 2022-08-26 VITALS — WEIGHT: 231.5 LBS | BODY MASS INDEX: 30.68 KG/M2 | HEIGHT: 73 IN | TEMPERATURE: 97.6 F

## 2022-08-26 DIAGNOSIS — G89.29 CHRONIC PAIN OF RIGHT KNEE: ICD-10-CM

## 2022-08-26 DIAGNOSIS — M71.21 BAKER'S CYST OF KNEE, RIGHT: Primary | ICD-10-CM

## 2022-08-26 DIAGNOSIS — M17.11 PRIMARY OSTEOARTHRITIS OF RIGHT KNEE: ICD-10-CM

## 2022-08-26 DIAGNOSIS — M25.461 EFFUSION OF RIGHT KNEE: ICD-10-CM

## 2022-08-26 DIAGNOSIS — M25.561 CHRONIC PAIN OF RIGHT KNEE: ICD-10-CM

## 2022-08-26 PROCEDURE — 20610 DRAIN/INJ JOINT/BURSA W/O US: CPT | Performed by: SPECIALIST

## 2022-08-26 RX ORDER — BETAMETHASONE SODIUM PHOSPHATE AND BETAMETHASONE ACETATE 3; 3 MG/ML; MG/ML
3 INJECTION, SUSPENSION INTRA-ARTICULAR; INTRALESIONAL; INTRAMUSCULAR; SOFT TISSUE ONCE
Status: COMPLETED | OUTPATIENT
Start: 2022-08-26 | End: 2022-08-26

## 2022-08-26 RX ADMIN — BETAMETHASONE SODIUM PHOSPHATE AND BETAMETHASONE ACETATE 3 MG: 3; 3 INJECTION, SUSPENSION INTRA-ARTICULAR; INTRALESIONAL; INTRAMUSCULAR; SOFT TISSUE at 09:25

## 2022-08-26 NOTE — PROGRESS NOTES
Patient: Rea Lim                MRN: [id number]       SSN: [id number]  YOB: 1945        AGE: 68 y.o. SEX: male    PCP: Cl Daniels MD  08/26/22     CC: BILATERAL KNEE PAIN L>R AND RIGHT KNEE SWELLING    HISTORY:  Rea Lim is a 68 y.o. male who is seen for increased right knee pain & swelling. He has been experiencing increasing knee pain for the past few years. He is not exercising regularly due to his knee pain. He feels pain with standing, walking and stair climbing. He experiences startup pain after sitting. He reports relief from previous cortisone and visco supplementation injections by Dr. Tequila Baldwin in the past.  He says his knee extension has been restricted. He injured his right knee in 1964 playing football. He underwent total meniscectomy and arthrotomy by Dr. Kami Batista. He completed successful Euflexxa series on 10/14/19, 7/29/20, and 4/14/21. He was previously seen for neck & left hip pains. He has a h/o back pain. He underwent radiofrequency ablation with Dr. Suze Blount. He is s/p right endarterectomy for circulation problems in July 2019 by Dr. Darryl pichardo. He currently takes Plavix & a diuretic. He has a stent in his right femoral artery. He reports a blockage in his left [address] of [place]. He had a left groin pseudoaneursym from a recent catherization via the femoral artery. He is having a left leg angiogram in a few weeks. He has been seen by Dr. Claudia Mari for his left shoulder--responded to an injection.      Pain Assessment  8/26/2022   Location of Pain Knee   Location Modifiers Right   Severity of Pain 4   Quality of Pain Aching;Dull   Quality of Pain Comment -   Duration of Pain Persistent   Frequency of Pain Constant   Date Pain First Started (No Data)   Date Pain First Started Comment f.u   Aggravating Factors Bending;Stretching;Walking;Standing   Aggravating Factors Comment -   Limiting Behavior Yes   Relieving Factors Nothing   Relieving Factors Comment -   Result of Injury No   Work-Related Injury -   Type of Injury -   Type of Injury Comment -     Occupation, etc:  Mr. Carmelita Mcdowell he lives with his wife in [facility]. He has 2 sons not in the area. His youngest son lives in [place]. His middle son lives in [place] and is a Buddhist  for 2 churches. His middle son's wife has Kin's disease. He has 7 grandchildren. He retired 15 years ago as a  for the [employer] He retired in 2013 from [employer]. 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 He states that he has respiratory problems due to asbestos exposure. He reports that he cut his right leg a few years ago. His wound took 2 months to heal. He enjoys doing water exercises. He planted a crepe [employer] last year. His eldest son passed away on 6/17/19 in [place]. He is unsure of the cause of his son's death. His middle son moved to [place] as his daughter-in-law's family lives in [place]. His middle son is a . His mother was a RN for 40 years at [employer] [facility] [employer] [facility]. He is not diabetic. He has h/o severe peripheral vascular disease. He is followed for claudication in his left calf. Current weight is 225 pounds. He is 6' tall. He received his Covid vaccine. He takes a statin and prescription fish oil for high cholesterol. He is going to [place] soon to visit family.     Lab Results   Component Value Date/Time    Hemoglobin A1c <3.8 (L) 06/21/2022 09:29 AM    Hemoglobin A1c (POC) 5.5 05/23/2019 09:05 AM     Weight Metrics 8/26/2022 7/5/2022 5/18/2022 5/11/2022 5/4/2022 4/6/2022 3/1/2022   Weight 231 lb 8 oz 225 lb 225 lb 225 lb 225 lb 228 lb 230 lb   BMI 30.54 kg/m2 29.69 kg/m2 29.69 kg/m2 29.69 kg/m2 30.52 kg/m2 30.92 kg/m2 31.19 kg/m2       Patient Active Problem List   Diagnosis Code    Colitis, ulcerative ([facility] K51.90    Colon polyps K63.5    Left carotid artery occlusion I65.22 Hypovitaminosis D E55.9    Advance directive in chart Z78.9    Hypertension I11.9    Dyslipidemia E78.5    Coronary artery disease I25.10    Atrial fibrillation I48.91    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 9) E66.3    History of prostate cancer Z85.46    PAD (peripheral artery disease) ([facility]) I73.9    Diverticulosis K57.90    Asbestosis ([address] J61    Osteoarthrosis M19.90    GERD (gastroesophageal reflux disease) K21.9    Subclinical hypothyroidism E03.8     REVIEW OF SYSTEMS: All Below are Negative except: See HPI   Constitutional: negative for fever, chills, and weight loss. Cardiovascular: negative for chest pain, claudication, leg swelling, SOB, DUONG   Gastrointestinal: Negative for pain, N/V/C/D, Blood in stool or urine, dysuria,  hematuria, incontinence, pelvic pain. Musculoskeletal: See HPI   Neurological: Negative for dizziness and weakness. Negative for headaches, Visual changes, confusion, seizures   Phychiatric/Behavioral: Negative for depression, memory loss, substance  abuse. Extremities: Negative for hair changes, rash, or skin lesion changes. Hematologic: Negative for bleeding problems, bruising, pallor or swollen lymph  nodes   Peripheral Vascular: No calf pain, no circulation deficits.     Social History     Socioeconomic History    Marital status:      Spouse name: Not on file    Number of children: Not on file    Years of education: Not on file    Highest education level: Not on file   Occupational History    Not on file   Tobacco Use    Smoking status: Former     Packs/day: 1.50     Years: 25.00     Pack years: 37.50     Types: Cigarettes     Quit date: 2003     Years since quittin.6    Smokeless tobacco: Never   Vaping Use    Vaping Use: Never used   Substance and Sexual Activity    Alcohol use: Yes     Comment: RARELY    Drug use: Never    Sexual activity: Yes     Partners: Female     Birth control/protection: None   Other Topics Concern    Not on file   Social History Narrative    Not on file     Social Determinants of Health     Financial Resource Strain: Not on file   Food Insecurity: Not on file   Transportation Needs: Not on file   Physical Activity: Not on file   Stress: Not on file   Social Connections: Not on file   Intimate Partner Violence: Not on file   Housing Stability: Not on file      Allergies   Allergen Reactions    Ace Inhibitors Anaphylaxis    Atorvastatin Other (comments) and Unknown (comments)     Muscle pain    Lisinopril Shortness of Breath and Other (comments)     Turns bright red  Other reaction(s): Unknown    Nifedipine Other (comments)     Caused afib  Other reaction(s): Unknown    Other Medication Other (comments)     Vicryl suture on skin tends to be rejected with poor wound healing does better with monocryl    Pregabalin Other (comments)    Ramipril Unknown (comments)     Pt does not remember reaction  Other reaction(s): Unknown    Rosuvastatin Other (comments)     Muscle Pain        Current Outpatient Medications   Medication Sig    HYDROcodone-acetaminophen (NORCO) 5-325 mg per tablet Take 1 Tablet by mouth every four (4) hours as needed (chronic pain) for up to 30 days. Max Daily Amount: 6 Tablets. nebivoloL (BYSTOLIC) 5 mg tablet     Xarelto 2.5 mg tablet     gabapentin (NEURONTIN) 300 mg capsule Take 1 Capsule by mouth nightly. Max Daily Amount: 300 mg. LORazepam (Ativan) 1 mg tablet Take 1 Tablet by mouth every eight (8) hours as needed for Anxiety. triamterene-hydroCHLOROthiazide (DYAZIDE) 37.5-25 mg per capsule     celecoxib (CELEBREX) 200 mg capsule Take 1 Capsule by mouth daily as needed for Pain. tiZANidine (ZANAFLEX) 4 mg tablet Take 1 Tablet by mouth three (3) times daily as needed for Muscle Spasm(s).     ezetimibe-simvastatin (VYTORIN) 10-40 mg per tablet TAKE 1 TABLET NIGHTLY Lactobacillus acidophilus (PROBIOTIC PO) Take  by mouth. Methylcellulose, with Sugar, (Citrucel, sucrose,) powd Take  by mouth. diclofenac (VOLTAREN) 1 % gel APPLY 4 GRAMS TO THE AFFECTED AREA ON RIGHT KNEE FOUR TIMES DAILY    fluorouraciL (EFUDEX) 5 % chemo cream     alclometasone (ACLOVATE) 0.05 % topical cream Apply  to affected area daily as needed. isosorbide dinitrate (ISORDIL) 10 mg tablet Take 10 mg by mouth three (3) times daily. As needed    losartan (COZAAR) 50 mg tablet Take 50 mg by mouth two (2) times a day. nebivoloL (BYSTOLIC) 10 mg tablet Take 5 mg by mouth two (2) times a day. polyethylene glycol (MIRALAX) 17 gram/dose powder Take 17 g by mouth as needed. nitroglycerin (NITROSTAT) 0.4 mg SL tablet     icosapent ethyL (VASCEPA) 1 gram capsule Take 2 Capsules by mouth two (2) times daily (with meals). clopidogreL (PLAVIX) 75 mg tab Take 1 Tab by mouth daily. colestipol (COLESTID) 1 gram tablet Take 1 g by mouth daily as needed. aspirin delayed-release 81 mg tablet Take 81 mg by mouth daily. Cholecalciferol, Vitamin D3, 1,000 unit cap Take 1,000 Units by mouth two (2) times a day. MULTIVITAMIN PO Take  by mouth daily. pantoprazole (PROTONIX) 40 mg tablet Take 40 mg by mouth daily. coenzyme q10 200 mg Cap Take  by mouth daily.      Current Facility-Administered Medications   Medication Dose Route Frequency    betamethasone (CELESTONE) injection 3 mg  3 mg Intra artICUlar ONCE      PHYSICAL EXAMINATION:  Visit Vitals  Temp 97.6 °F (36.4 °C) (Temporal)   Ht 6' 1\" (1.854 m)   Wt 231 lb 8 oz (105 kg)   BMI 30.54 kg/m²      ORTHO EXAMINATION:  Examination Right knee Left knee   Skin Intact Intact   Range of motion 110-0 120-0   Effusion ++++ -   Medial joint line tenderness + +   Lateral joint line tenderness - -   Popliteal tenderness - -   Osteophytes palpable + +   Kylahs - -   Patella crepitus + -   Anterior drawer - -   Lateral laxity - -   Medial laxity - - Varus deformity + -   Valgus deformity - -   Pretibial edema - -   Calf tenderness - -   Brisk capilary refill, palpable pulse    Chart reviewed for the following:   I, Dequan Mejias MD, have reviewed the History, Physical and updated the Allergic reactions for [address] performed immediately prior to start of procedure:  Renate Castro MD, have performed the following reviews on [facility] prior to the start of the procedure:            * Patient was identified by name and date of birth   * Agreement on procedure being performed was verified  * Risks and Benefits explained to the patient  * Procedure site verified and marked as necessary  * Patient was positioned for comfort  * Consent was obtained     Time: 9:08 AM    Date of procedure: 8/26/2022    Procedure performed by:  Dequan Mejias MD    Mr. Joes Carlos Salinas tolerated the procedure well with no complications. RADIOGRAPHS:  XR LEFT KNEE 4/6/22 [facility]  IMPRESSION:  Three views with bilateral knees on AP view - No fractures, no effusion, severe right and moderately severe left joint space narrowing, + osteophytes present. Kellgren Jori grade 4, arterial calcification      XR KLAUS HIP 11/7/18 [facility]WYER  IMPRESSION:  AP pelvis and two views - No fractures, moderate joint space narrowing, acetabular osteophytes present. Tonnis grade 2. Femoral acetabular impingement syndrome     XR RIGHT KNEE 4/13/18 [facility]ER  IMPRESSION:  Three views - No fractures, no effusion, severe end stage with lateral subluxation joint space narrowing, + osteophytes present. IKDC Grade C. calcification of arteries     IMPRESSION:      ICD-10-CM ICD-9-CM    1. Baker's cyst of knee, right  M71.21 727.51 DRAIN/INJECT LARGE JOINT/BURSA      betamethasone (CELESTONE) injection 3 mg      2. Primary osteoarthritis of right knee  M17.11 715.16 DRAIN/INJECT LARGE JOINT/BURSA      betamethasone (CELESTONE) injection 3 mg      3.  Chronic pain of right knee  M25.561 719.46 DRAIN/INJECT LARGE JOINT/BURSA    G89.29 338.29 betamethasone (CELESTONE) injection 3 mg      4. Effusion of right knee  M25.461 719.06 DRAIN/INJECT LARGE JOINT/BURSA      betamethasone (CELESTONE) injection 3 mg        PLAN: After timeout and under sterile conditions, right knee aspirated 65 cc of blood tinged fluid. The fluid was discarded. There is no need for surgery at this time. He will follow up as needed.           Scribed by Quoc Kilpatrick ([address]) as dictated by Kanu Haynes MD

## 2022-09-13 ENCOUNTER — PATIENT MESSAGE (OUTPATIENT)
Dept: INTERNAL MEDICINE CLINIC | Age: 77
End: 2022-09-13

## 2022-09-13 DIAGNOSIS — M54.12 CERVICAL RADICULOPATHY: Primary | ICD-10-CM

## 2022-09-14 NOTE — TELEPHONE ENCOUNTER
From: Harrietta Sprung [place]  To: Johnathon Mohs, MD  Sent: 9/13/2022 7:41 PM EDT  Subject: refill of Norco    Please refill my Hydrocodone/Acetaminophen 5-325 tablets. that is for 120 tablets and should go to [facility], corner of [address] and [address]. Same pharmacy as last time.   Thank you, Nick Zarate  [phone]

## 2022-09-14 NOTE — TELEPHONE ENCOUNTER
[facility] date for Norco as 8/11/22 for a 20 d/s. Last Visit: 7/5/22 with MD Jovanny Vincent  Next Appointment: 1/17/23 with MD Jovanny Vincent  Previous Refill Encounter(s): 8/11/22 #120    Requested Prescriptions     Pending Prescriptions Disp Refills    HYDROcodone-acetaminophen (NORCO) 5-325 mg per tablet 120 Tablet 0     Sig: Take 1 Tablet by mouth every four (4) hours as needed (chronic pain) for up to 30 days. Max Daily Amount: 6 Tablets. For [address] [facility] in place:   Recommendation Provided To:    Intervention Detail: New Rx: 1, reason: Patient Preference  Gap Closed?:   Intervention Accepted By:   Time Spent (min): 5

## 2022-09-15 ENCOUNTER — PATIENT MESSAGE (OUTPATIENT)
Dept: INTERNAL MEDICINE CLINIC | Age: 77
End: 2022-09-15

## 2022-09-15 NOTE — TELEPHONE ENCOUNTER
[address] [facility] is already pending in another encounter    Duplicate request      For Pharmacy [address] [facility] [address]:   Recommendation Provided To:    Intervention Detail: Discontinued Rx: 1, reason: Duplicate Therapy  Gap Closed?:   Intervention Accepted By:   Time Spent (min): 5

## 2022-09-15 NOTE — TELEPHONE ENCOUNTER
----- Message from [address] [place] sent at 9/15/2022  9:26 AM EDT -----  Regarding: Norco refill  My [id number] may have gotten lost since I sent more than one message, about different things. Please refill my Hydrocodone/acetaminophen 5-325 tablets. send it to [facility] on [address] and [address].      thank you  Deni Vásquez

## 2022-09-16 ENCOUNTER — PATIENT MESSAGE (OUTPATIENT)
Dept: INTERNAL MEDICINE CLINIC | Age: 77
End: 2022-09-16

## 2022-09-16 ENCOUNTER — TELEPHONE (OUTPATIENT)
Dept: INTERNAL MEDICINE CLINIC | Age: 77
End: 2022-09-16

## 2022-09-16 RX ORDER — HYDROCODONE BITARTRATE AND ACETAMINOPHEN 5; 325 MG/1; MG/1
1 TABLET ORAL
Qty: 120 TABLET | Refills: 0 | Status: SHIPPED | OUTPATIENT
Start: 2022-09-16 | End: 2022-10-17 | Stop reason: SDUPTHER

## 2022-09-16 NOTE — TELEPHONE ENCOUNTER
Duplicate      For Pharmacy [address] [facility] in place:   Recommendation Provided To:    Intervention Detail: Discontinued Rx: 1, reason: Duplicate Therapy  Gap Closed?:   Intervention Accepted By:   Time Spent (min): 5

## 2022-09-16 NOTE — TELEPHONE ENCOUNTER
Pt called stating  he put a refill request for norco in , he is questioning why it has not been refilled , he only has 3 pills left  and asking if it could be filled before the weekend .  If there are any issues please call him

## 2022-09-21 ENCOUNTER — OFFICE VISIT (OUTPATIENT)
Dept: ORTHOPEDIC SURGERY | Age: 77
End: 2022-09-21
Payer: MEDICARE

## 2022-09-21 VITALS — HEIGHT: 72 IN | BODY MASS INDEX: 29.8 KG/M2 | WEIGHT: 220 LBS

## 2022-09-21 DIAGNOSIS — M19.041 ARTHRITIS OF FINGER OF RIGHT HAND: Primary | ICD-10-CM

## 2022-09-21 DIAGNOSIS — M17.12 PRIMARY OSTEOARTHRITIS OF LEFT KNEE: ICD-10-CM

## 2022-09-21 DIAGNOSIS — G89.29 CHRONIC PAIN OF LEFT KNEE: ICD-10-CM

## 2022-09-21 DIAGNOSIS — M25.562 CHRONIC PAIN OF LEFT KNEE: ICD-10-CM

## 2022-09-21 PROCEDURE — 20610 DRAIN/INJ JOINT/BURSA W/O US: CPT | Performed by: SPECIALIST

## 2022-09-21 PROCEDURE — 99213 OFFICE O/P EST LOW 20 MIN: CPT | Performed by: SPECIALIST

## 2022-09-21 PROCEDURE — G8427 DOCREV CUR MEDS BY ELIG CLIN: HCPCS | Performed by: SPECIALIST

## 2022-09-21 PROCEDURE — G8432 DEP SCR NOT DOC, RNG: HCPCS | Performed by: SPECIALIST

## 2022-09-21 PROCEDURE — 1101F PT FALLS ASSESS-DOCD LE1/YR: CPT | Performed by: SPECIALIST

## 2022-09-21 PROCEDURE — G8417 CALC BMI ABV UP PARAM F/U: HCPCS | Performed by: SPECIALIST

## 2022-09-21 PROCEDURE — 1123F ACP DISCUSS/DSCN MKR DOCD: CPT | Performed by: SPECIALIST

## 2022-09-21 PROCEDURE — G8536 NO DOC ELDER MAL SCRN: HCPCS | Performed by: SPECIALIST

## 2022-09-21 RX ORDER — DICLOFENAC SODIUM 10 MG/G
GEL TOPICAL 4 TIMES DAILY
Qty: 5 EACH | Refills: 5 | Status: SHIPPED | OUTPATIENT
Start: 2022-09-21

## 2022-09-21 RX ORDER — BETAMETHASONE SODIUM PHOSPHATE AND BETAMETHASONE ACETATE 3; 3 MG/ML; MG/ML
3 INJECTION, SUSPENSION INTRA-ARTICULAR; INTRALESIONAL; INTRAMUSCULAR; SOFT TISSUE ONCE
Status: COMPLETED | OUTPATIENT
Start: 2022-09-21 | End: 2022-09-21

## 2022-09-21 RX ADMIN — BETAMETHASONE SODIUM PHOSPHATE AND BETAMETHASONE ACETATE 3 MG: 3; 3 INJECTION, SUSPENSION INTRA-ARTICULAR; INTRALESIONAL; INTRAMUSCULAR; SOFT TISSUE at 09:31

## 2022-09-21 NOTE — PROGRESS NOTES
Patient: Chris Sheikh                MRN: [id number]       SSN: [id number]  YOB: 1945        AGE: 68 y.o. SEX: male    PCP: Delroy Stewart MD  09/21/22     CC: LEFT KNEE PAIN    HISTORY:  Chris Sheikh is a 68 y.o. male who is seen for increased left knee pain. He has been experiencing left knee pain for the past several months. He does not recall any injury. He feels pain with standing, walking and stair climbing. He experiences  startup pain after sitting. He was previously seen for  increased right knee pain & swelling. He injured his right knee in 1964 playing football. He underwent total meniscectomy and arthrotomy by Dr. Davion Mccarthy. He completed successful Euflexxa series on 10/14/19, 7/29/20, and 4/14/21. He was previously seen for neck & left hip pains. He has a h/o back pain. He underwent radiofrequency ablation with Dr. Leatha Green. He is s/p right endarterectomy for circulation problems in July 2019 by Dr. Verna Casey. He currently takes Plavix & a diuretic. He has a stent in his right femoral artery. He reports a blockage in his left [facility] [address] [facility]. He had a left groin pseudoaneursym from a recent catherization via the femoral artery. He has been seen by Dr. Shasta Loco for his left shoulder--responded to an injection. Pain Assessment  9/21/2022   Location of Pain Knee   Location Modifiers Left   Severity of Pain 4   Quality of Pain Sharp; Other (Comment)   Quality of Pain Comment Stabbing, Shooting   Duration of Pain Persistent   Frequency of Pain Intermittent   Date Pain First Started -   Date Pain First Started Comment -   Aggravating Factors Exercise;Standing;Walking   Aggravating Factors Comment -   Limiting Behavior Yes   Relieving Factors Nothing;Rest   Relieving Factors Comment -   Result of Injury No   Work-Related Injury -   Type of Injury -   Type of Injury Comment -     Occupation, etc:  Mr. Devaughn Wilson he lives with his wife in [place]. He has 2 sons not in the area. His youngest son lives in [place]. His middle son lives in [place] and is a [employer]  for 2 churches. His middle son's wife has [place]'s disease. He has 7 grandchildren. He retired 15 years ago as a  for the [employer] He retired in 2013 from [employer]. 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 He states that he has respiratory problems due to asbestos exposure. He reports that he cut his right leg a few years ago. His wound took 2 months to heal. He enjoys doing water exercises. He planted a crepe [employer] last year. His eldest son passed away on 6/17/19 in [place]. He is unsure of the cause of his son's death. His middle son moved to [place] as his daughter-in-law's family lives in [place]. His middle son is a . His mother was a RN for 40 years at [employer]. He is not diabetic. He has h/o severe peripheral vascular disease. He is followed for claudication in his left calf. Current weight is 225 pounds. He is 6' tall. He received his Covid vaccine. He takes a statin and prescription fish oil for high cholesterol. He is going to [place] soon to visit family.     Lab Results   Component Value Date/Time    Hemoglobin A1c <3.8 (L) 06/21/2022 09:29 AM    Hemoglobin A1c (POC) 5.5 05/23/2019 09:05 AM     Weight Metrics 9/21/2022 8/26/2022 7/5/2022 5/18/2022 5/11/2022 5/4/2022 4/6/2022   Weight 220 lb 231 lb 8 oz 225 lb 225 lb 225 lb 225 lb 228 lb   BMI 29.84 kg/m2 30.54 kg/m2 29.69 kg/m2 29.69 kg/m2 29.69 kg/m2 30.52 kg/m2 30.92 kg/m2       Patient Active Problem List   Diagnosis Code    Colitis, ulcerative ([facility] K51.90    Colon polyps K63.5    Left carotid artery occlusion I65.22    Hypovitaminosis D E55.9    Advance directive in chart Z78.9    Hypertension I11.9    Dyslipidemia E78.5    Coronary artery disease I25.10    Atrial fibrillation I48.91    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 9) E66.3    History of prostate cancer Z85.46    PAD (peripheral artery disease) ([facility]) I73.9    Diverticulosis K57.90    Asbestosis ([address] J61    Osteoarthrosis M19.90    GERD (gastroesophageal reflux disease) K21.9    Subclinical hypothyroidism E03.8     REVIEW OF SYSTEMS: All Below are Negative except: See HPI   Constitutional: negative for fever, chills, and weight loss. Cardiovascular: negative for chest pain, claudication, leg swelling, SOB, DUONG   Gastrointestinal: Negative for pain, N/V/C/D, Blood in stool or urine, dysuria,  hematuria, incontinence, pelvic pain. Musculoskeletal: See HPI   Neurological: Negative for dizziness and weakness. Negative for headaches, Visual changes, confusion, seizures   Phychiatric/Behavioral: Negative for depression, memory loss, substance  abuse. Extremities: Negative for hair changes, rash, or skin lesion changes. Hematologic: Negative for bleeding problems, bruising, pallor or swollen lymph  nodes   Peripheral Vascular: No calf pain, no circulation deficits.     Social History     Socioeconomic History    Marital status:      Spouse name: Not on file    Number of children: Not on file    Years of education: Not on file    Highest education level: Not on file   Occupational History    Not on file   Tobacco Use    Smoking status: Former     Packs/day: 1.50     Years: 25.00     Pack years: 37.50     Types: Cigarettes     Quit date: 2003     Years since quittin.7    Smokeless tobacco: Never   Vaping Use    Vaping Use: Never used   Substance and Sexual Activity    Alcohol use: Yes     Comment: RARELY    Drug use: Never    Sexual activity: Yes     Partners: Female     Birth control/protection: None   Other Topics Concern    Not on file   Social History Narrative    Not on file     Social Determinants of Health     Financial Resource Strain: Not on file   Food Insecurity: Not on file   Transportation Needs: Not on file   Physical Activity: Not on file   Stress: Not on file   Social Connections: Not on file   Intimate Partner Violence: Not on file   Housing Stability: Not on file      Allergies   Allergen Reactions    Ace Inhibitors Anaphylaxis    Atorvastatin Other (comments) and Unknown (comments)     Muscle pain    Lisinopril Shortness of Breath and Other (comments)     Turns bright red  Other reaction(s): Unknown    Nifedipine Other (comments)     Caused afib  Other reaction(s): Unknown    Other Medication Other (comments)     Vicryl suture on skin tends to be rejected with poor wound healing does better with monocryl    Pregabalin Other (comments)    Ramipril Unknown (comments)     Pt does not remember reaction  Other reaction(s): Unknown    Rosuvastatin Other (comments)     Muscle Pain        Current Outpatient Medications   Medication Sig    diclofenac (VOLTAREN) 1 % gel Apply  to affected area four (4) times daily. Apply 4g to affected area up to four times daily. HYDROcodone-acetaminophen (NORCO) 5-325 mg per tablet Take 1 Tablet by mouth every four (4) hours as needed (chronic pain) for up to 30 days. Max Daily Amount: 6 Tablets. Xarelto 2.5 mg tablet     gabapentin (NEURONTIN) 300 mg capsule Take 1 Capsule by mouth nightly. Max Daily Amount: 300 mg. LORazepam (Ativan) 1 mg tablet Take 1 Tablet by mouth every eight (8) hours as needed for Anxiety. triamterene-hydroCHLOROthiazide (DYAZIDE) 37.5-25 mg per capsule     celecoxib (CELEBREX) 200 mg capsule Take 1 Capsule by mouth daily as needed for Pain.    ezetimibe-simvastatin (VYTORIN) 10-40 mg per tablet TAKE 1 TABLET NIGHTLY    Lactobacillus acidophilus (PROBIOTIC PO) Take  by mouth. Methylcellulose, with Sugar, (Citrucel, sucrose,) powd Take  by mouth.     diclofenac (VOLTAREN) 1 % gel APPLY 4 GRAMS TO THE AFFECTED AREA ON RIGHT KNEE FOUR TIMES DAILY    fluorouraciL (EFUDEX) 5 % chemo cream     alclometasone (ACLOVATE) 0.05 % topical cream Apply  to affected area daily as needed. isosorbide dinitrate (ISORDIL) 10 mg tablet Take 10 mg by mouth three (3) times daily. As needed    losartan (COZAAR) 50 mg tablet Take 50 mg by mouth two (2) times a day. polyethylene glycol (MIRALAX) 17 gram/dose powder Take 17 g by mouth as needed. nitroglycerin (NITROSTAT) 0.4 mg SL tablet     icosapent ethyL (VASCEPA) 1 gram capsule Take 2 Capsules by mouth two (2) times daily (with meals). clopidogreL (PLAVIX) 75 mg tab Take 1 Tab by mouth daily. colestipol (COLESTID) 1 gram tablet Take 1 g by mouth daily as needed. aspirin delayed-release 81 mg tablet Take 81 mg by mouth daily. Cholecalciferol, Vitamin D3, 1,000 unit cap Take 1,000 Units by mouth two (2) times a day. MULTIVITAMIN PO Take  by mouth daily. pantoprazole (PROTONIX) 40 mg tablet Take 40 mg by mouth daily. coenzyme q10 200 mg Cap Take  by mouth daily. No current facility-administered medications for this visit.       PHYSICAL EXAMINATION:  Visit Vitals  Ht 6' (1.829 m)   Wt 220 lb (99.8 kg)   BMI 29.84 kg/m²        ORTHO EXAMINATION:  Examination Right Hand Left Hand   Skin Intact Intact   Deformity - -   Swelling - -   Tenderness - -   Finger flexion Full Full   Finger extension Full Full   Sensation Normal Normal   Capillary refill Normal Normal   Heberden's nodes - -   Dupuytren's - -       Examination Right knee Left knee   Skin Intact Intact   Range of motion 110-0 120-0   Effusion ++++ -   Medial joint line tenderness + +   Lateral joint line tenderness - -   Popliteal tenderness - -   Osteophytes palpable + +   Kylahs - -   Patella crepitus + -   Anterior drawer - -   Lateral laxity - -   Medial laxity - -   Varus deformity + -   Valgus deformity - -   Pretibial edema - -   Calf tenderness - -   Brisk capilary refill, palpable pulse    Chart reviewed for the following:   Annalisa Sin MD, have reviewed the History, Physical and updated the Allergic reactions for [address] performed immediately prior to start of procedure:  Annalisa Sin MD, have performed the following reviews on [facility] prior to the start of the procedure:            * Patient was identified by name and date of birth   * Agreement on procedure being performed was verified  * Risks and Benefits explained to the patient  * Procedure site verified and marked as necessary  * Patient was positioned for comfort  * Consent was obtained     Time: 9:22 AM    Date of procedure: 9/21/2022    Procedure performed by:  Leanne Gonzalez MD    Mr. Joni Alvarez tolerated the procedure well with no complications. DUPLEX LOWER EXT ARTERY RIGHT 11/3/20  IMPRESSION   Moderate arterial insufficiency right lower extremity with preserved multiphasic waveforms. RADIOGRAPHS:  XR LEFT KNEE 4/6/22 [facility]  IMPRESSION:  Three views with bilateral knees on AP view - No fractures, no effusion, severe right and moderately severe left joint space narrowing, + osteophytes present. Kellgren Jori grade 4, arterial calcification      XR KLAUS HIP 11/7/18 [facility]WYER  IMPRESSION:  AP pelvis and two views - No fractures, moderate joint space narrowing, acetabular osteophytes present. Tonnis grade 2. Femoral acetabular impingement syndrome     XR RIGHT KNEE 4/13/18 [facility]  IMPRESSION:  Three views - No fractures, no effusion, severe end stage with lateral subluxation joint space narrowing, + osteophytes present. IKDC Grade C. calcification of arteries     IMPRESSION:      ICD-10-CM ICD-9-CM    1. Arthritis of finger of right hand  M19.041 716.94 diclofenac (VOLTAREN) 1 % gel      2.  Chronic pain of left knee  M25.562 719.46 betamethasone (CELESTONE) injection 3 mg    G89.29 338.29 DRAIN/INJECT LARGE JOINT/BURSA      PROCEDURE AUTHORIZATION TO       diclofenac (VOLTAREN) 1 % gel 3. Primary osteoarthritis of left knee  M17.12 715.16 betamethasone (CELESTONE) injection 3 mg      DRAIN/INJECT LARGE JOINT/BURSA      PROCEDURE AUTHORIZATION TO       diclofenac (VOLTAREN) 1 % gel            PLAN: A prescription for Voltaren Gel was provided. After discussing treatment options, patient's left knee was injected with 4 cc Marcaine and 1/2 cc Celestone. Consider visco supplementation if pain continues. There is no need for surgery at this time. He will follow up as needed.       Scribed by Jeannine Martins ([address]) as dictated by Jewell Akbar MD

## 2022-10-17 ENCOUNTER — PATIENT MESSAGE (OUTPATIENT)
Dept: INTERNAL MEDICINE CLINIC | Age: 77
End: 2022-10-17

## 2022-10-17 DIAGNOSIS — M54.12 CERVICAL RADICULOPATHY: ICD-10-CM

## 2022-10-17 DIAGNOSIS — F41.9 ANXIETY: ICD-10-CM

## 2022-10-17 NOTE — TELEPHONE ENCOUNTER
From: Lynford Staple Collette  To: Stephanie Vigil MD  Sent: 10/17/2022 10:02 AM EDT  Subject: Refill of Norco    Please authorize a refill of my Hydrocodone//Acetaminophen 5/325. Ordinarily I would wait until later this week. However, I am going to [place] the first of next week and need this refilled prior to Friday. it was last filled on 09/16/22. I will be in [place] until the second week of November. Also, if it permissible I would like to have my Lorazepam refilled also.     Thank you  Yan Pruitt  If there are question, I may be reached at [phone]

## 2022-10-19 RX ORDER — HYDROCODONE BITARTRATE AND ACETAMINOPHEN 5; 325 MG/1; MG/1
1 TABLET ORAL
Qty: 120 TABLET | Refills: 0 | Status: SHIPPED | OUTPATIENT
Start: 2022-10-19 | End: 2022-11-18

## 2022-10-19 RX ORDER — LORAZEPAM 1 MG/1
1 TABLET ORAL
Qty: 50 TABLET | Refills: 0 | Status: SHIPPED | OUTPATIENT
Start: 2022-10-19

## 2022-11-22 ENCOUNTER — PATIENT MESSAGE (OUTPATIENT)
Dept: INTERNAL MEDICINE CLINIC | Age: 77
End: 2022-11-22

## 2022-11-22 DIAGNOSIS — Z51.81 MEDICATION MONITORING ENCOUNTER: ICD-10-CM

## 2022-11-22 DIAGNOSIS — M48.02 CERVICAL SPINAL STENOSIS: Primary | ICD-10-CM

## 2022-11-22 NOTE — LETTER
CONTROLLED SUBSTANCE MEDICATION AGREEMENT  Patient Name: Oralia Kelly  Patient YOB: 1945     I understand, that controlled substance medications may be used to help better manage my symptoms and to improve my ability to function at home, work and in social settings. However, I also understand that these medications do have risks, which have been discussed with me, including possible development of physical or psychological dependence. I understand that successful treatment requires mutual trust and honesty between me and my provider. I understand and agree that following this Medication Agreement is necessary in continuing my provider-patient relationship and the success of my treatment plan. Explanation from my Provider: Benefits and Goals of Controlled Substance Medications: There are two potential goals for your treatment: (1) decreased pain and suffering (2) improved daily life functions. There are many possible treatments for your chronic condition(s). Alternatives such as physical therapy, yoga, massage, home daily exercise, meditation, relaxation techniques, injections, chiropractic manipulations, surgery, cognitive therapy, hypnosis and many medications that are not habit-forming may be used. Use of controlled substance medications may be helpful, but they are unlikely to resolve all symptoms or restore all function. Explanation from my Provider: Risks of Controlled Substance Medications:   Opioid pain medications: These medications can lead to problems such as addiction/dependence, sedation, lightheadedness/dizziness, memory issues, falls, constipation, nausea, or vomiting. They may also impair the ability to drive or operate machinery. Additionally, these medications may lower testosterone levels, leading to loss of bone strength, stamina and sex drive.   They may cause problems with breathing, sleep apnea and reduced coughing, which is especially dangerous for patients with lung disease. Overdose or dangerous interactions with alcohol and other medications may occur, leading to death. Hyperalgesia may develop, which means patients receiving opioids for the treatment of pain may become more sensitive to certain painful stimuli, and in some cases, experience pain from ordinarily non-painful stimuli. Women between the ages of 14-53 who could become pregnant should carefully weigh the risks and benefits of opioids with their physicians, as these medications increase the risk of pregnancy complications, including miscarriage,  delivery and stillbirth. It is also possible for babies to be born addicted to opioids. Opioid dependence withdrawal symptoms may include; feelings of uneasiness, increased pain, irritability, belly pain, diarrhea, sweats and goose-flesh. Testosterone replacement therapy:  Potential side effects include increased risk of stroke and heart attack, blood clots, increased blood pressure, increased cholesterol, enlarged prostate, sleep apnea, irritability/aggression and other mood disorders, and decreased fertility. Fayne Flowers Collette ([id number])             Page 1 of 4    Initials:_______    Benzodiazepines and non-benzodiazepine sleep medications: These medications can lead to problems such as addiction/dependence, sedation, fatigue, lightheadedness, dizziness, incoordination, falls, depression, hallucinations, and impaired judgment, memory and concentration. The ability to drive and operate machinery may also be affected. Abnormal sleep-related behaviors have been reported, including sleepwalking, driving, making telephone calls, eating, or having sex while not fully awake. These medications can suppress breathing and worsen sleep apnea, particularly when combined with alcohol or other sedating medications, potentially leading to death.  Dependence withdrawal symptoms may include tremors, anxiety, hallucinations and seizures. Stimulants:  Common adverse effects include addiction/dependence, increased blood pressure and heart rate, decreased appetite, nausea, involuntary weight loss, insomnia,  irritability, and headaches. These risks may increase when these medications are combined with other stimulants, such as caffeine pills or energy drinks, certain weight loss supplements and oral decongestants. Dependence withdrawal symptoms may include depressed mood, loss of interest, suicidal thoughts, anxiety, fatigue, appetite changes and agitation. I agree and understand that I and my prescriber have the following rights and responsibilities regarding my treatment plan:   1. MY RIGHTS:  To be informed of my treatment and medication plan. To be an active participant in my health and wellbeing. 2. MY RESPONSIBILITY AND UNDERSTANDING FOR USE OF MEDICATIONS   I will take medications at the dose and frequency as directed. For my safety, I will not increase or change how I take my medications without the recommendation of my healthcare provider.  I will actively participate in any program recommended by my provider which may improve function, including social, physical, psychological programs.  I will not take my medications with alcohol or other drugs not prescribed to me. I understand that drinking alcohol with my medications increases the chances of side effects, including reduced breathing rate and could lead to personal injury when operating machinery.  I understand that if I have a history of substance use disorders, including alcohol or other illicit drugs, that I may be at increased risk of addiction to my medications.  I agree to notify my provider immediately if I should become pregnant so that my treatment plan can be adjusted.    I agree and understand that I shall only receive controlled substance medications from the prescriber that signed this agreement unless there is written agreement among other prescribers of controlled substances outlining the responsibility of the medications being prescribed.  I understand that the if the controlled medication is not helping to achieve goals, the dosage may be tapered and no longer prescribed. 3. MY RESPONSIBILITY FOR COMMUNICATION / PRESCRIPTION RENEWALS   I agree that all controlled substance medications that I take will be prescribed only by my provider. If another healthcare provider prescribes me medication in an emergency, I will notify my provider within seventy-two (72) hours. Felice Becket Collette (4/27/2122)             Page 2 of 4    Initials:_______  Theodoro Milder I will arrange for refills at the prescribed interval ONLY during regular office hours. I will not ask for refills earlier than agreed, after-hours, on holidays or weekends. Refills may take up to 72 hours for processing and prescriptions to reach the pharmacy.  I will inform my other health care providers that I am taking these medications and of the existence of this [place] [id number]. In the event of an emergency, I will provide the same information to the emergency department prescribers.  I will keep my provider updated on the pharmacy I am using for controlled medication prescription filling. 4. MY RESPONSIBILITY FOR PROTECTING MEDICATIONS   I will protect my prescriptions and medications. I understand that lost or misplaced prescriptions will not be replaced.  I will keep medications only for my own use and will not share them with others. I will keep all medications away from children.  I agree that if my medications are adjusted or discontinued, I will properly dispose of any remaining medications. I understand that I will be required to dispose of any remaining controlled medications as, directed by my prescriber, prior to being provided with any prescriptions for other controlled medications.   Medication drop box locations can be found at: [employer]  5. MY RESPONSIBILITY WITH ILLEGAL DRUGS    I will not use illegal or street drugs or another person's prescription medications not prescribed to me.  If there are identified addiction type symptoms, then referral to a program may be provided by my provider and I agree to follow through with this recommendation. 6. MY RESPONSIBILITY FOR COOPERATION WITH INVESTIGATIONS   I understand that my provider will comply with any applicable law and may discuss my use and/or possible misuse/abuse of controlled substances and alcohol, as appropriate, with any health care provider involved in my care, pharmacist, or legal authority.  I authorize my provider and pharmacy to cooperate fully with law enforcement agencies (as permitted by law) in the investigation of any possible misuse, sale, or other diversion of my controlled substances.  I agree to waive any applicable privilege or right of privacy or confidentiality with respect to these authorizations. 7. PROVIDERS RIGHT TO MONITOR FOR SAFETY: PRESCRIPTION MONITORING / DRUG TESTING   I consent to drug/toxicology screening and will submit to a drug screen upon my providers request to assure I am only taking the prescribed drugs for my safety monitoring. I understand that a drug screen is a laboratory test in which a sample of my urine, blood or saliva is checked to see what drugs I have been taking. This may entail an observed urine specimen, which means that a nurse or other health care provider may watch me provide urine, and I will cooperate if I am asked to provide an observed specimen. Lela Corners Collette ([id number]/38/[id number])             Page 3 of 4    Initials:_______  Iris Chairez I understand that my provider will check a copy of my State Prescription Monitoring Program () Report in order to safely prescribe medications.      Pill Counts: I consent to pill counts when requested. I may be asked to bring all my prescribed controlled substance medications, in their original bottles, to all of my scheduled appointments. In addition, my provider may ask me to come to the practice at any time for a random pill count. 8. TERMINATION OF THIS AGREEMENT   For my safety, my prescriber has the right to stop prescribing controlled substance medications and may end this agreement.  Conditions that may result in termination of this agreement:  a. I do not show any improvement in pain, or my activity has not improved. b. I develop rapid tolerance or loss of improvement, as described in my treatment plan.  c. I develop significant side effects from the medication. d. My behavior is not consistent with the responsibilities outlined above, thereby causing safety concerns to continue prescribing controlled substance medications. e. I fail to follow the terms of this agreement. f. Other:____________________________     UNDERSTANDING THIS MEDICATION AGREEMENT:    I have read the above and have had all my questions answered. For chronic disease management, I know that my symptoms can be managed with many types of treatments. A chronic medication trial may be part of my treatment, but I must be an active participant in my care. Medication therapy is only one part of my symptom management plan. In some cases, there may be limited scientific evidence to support the chronic use of certain medications to improve symptoms and daily function. Furthermore, in certain circumstances, there may be scientific information that suggests that the use of chronic controlled substances may worsen my symptoms and increase my risk of unintentional death directly related to this medication therapy.   I know that if my provider feels my risk from controlled medications is greater than my benefit, I will have my controlled substance medication(s) compassionately lowered or removed altogether. I further agree to allow this office to contact my HIPAA contact if there are concerns about my safety and use of the controlled medications. I have agreed to use the prescribed controlled substance medications to me as instructed by my provider and as stated in this Medication Agreement. My initial on each page and my signature below shows that I have read each page and I have had the opportunity to ask questions with answers provided by my provider.       Patient Name (Printed): _____________________________________    Patient Signature:  ______________________   Date: _____________      Prescriber Name (Printed): ___________________________________    Prescriber Signature: _____________________  Date: _____________     Pravin Faust ([id number]/12/[id number])             Page 4 of 4

## 2022-11-23 RX ORDER — HYDROCODONE BITARTRATE AND ACETAMINOPHEN 5; 325 MG/1; MG/1
1 TABLET ORAL
Refills: 0 | Status: CANCELLED | OUTPATIENT
Start: 2022-11-23 | End: 2022-11-26

## 2022-11-23 NOTE — TELEPHONE ENCOUNTER
----- Message from [address] [place] sent at 11/22/2022 10:42 PM EST -----  Regarding:  Refill Hydrocodone/Acetaminophen   I sent a request to have this refilled, on Monday and I have not seen anything indicating that the message was received. Please let me know when this refill might be processed, it has been over a month since the last time it was filled. Please let me know.   Rekha Jauregui

## 2022-11-23 NOTE — TELEPHONE ENCOUNTER
[facility]  report reviewed 11/23/2022    The last fill date for hydrocodone-acetaminophen 5-325 mg was 10/19/2022 for a 20 d/s qty 120      Last UDS: Not on file    CSA Last signed: Not on file        PCP: Porfirio Sepulveda MD    Last appt: 7/5/2022  Future Appointments   Date Time Provider Mary Reyes   11/28/2022  2:00 PM [facility]   12/6/2022  1:30 PM Raven Snyder MD [id number]   1/10/2023  8:15 AM IO LAB VISIT [facility] BS AMB   1/17/2023  9:20 AM Lainey Webster MD [facility]       Requested Prescriptions     Pending Prescriptions Disp Refills    HYDROcodone-acetaminophen (NORCO) 5-325 mg per tablet  0     Sig: Take 1 Tablet by mouth every four (4) hours as needed for Pain for up to 3 days. Max Daily Amount: 6 Tablets.

## 2022-11-25 RX ORDER — HYDROCODONE BITARTRATE AND ACETAMINOPHEN 5; 325 MG/1; MG/1
1 TABLET ORAL
Qty: 120 TABLET | Refills: 0 | Status: SHIPPED | OUTPATIENT
Start: 2022-11-25 | End: 2022-12-25

## 2022-11-28 ENCOUNTER — APPOINTMENT (OUTPATIENT)
Dept: INTERNAL MEDICINE CLINIC | Age: 77
End: 2022-11-28

## 2022-11-28 ENCOUNTER — HOSPITAL ENCOUNTER (OUTPATIENT)
Dept: LAB | Age: 77
Discharge: HOME | End: 2022-11-28
Payer: MEDICARE

## 2022-11-28 DIAGNOSIS — Z51.81 MEDICATION MONITORING ENCOUNTER: ICD-10-CM

## 2022-11-28 PROCEDURE — 80307 DRUG TEST PRSMV CHEM ANLYZR: CPT

## 2022-11-29 LAB
AMPHETAMINES UR QL SCN: NEGATIVE NG/ML
BARBITURATES UR QL SCN: NEGATIVE NG/ML
BENZODIAZ UR QL SCN: NEGATIVE NG/ML
BUPRENORPHINE UR QL: NEGATIVE NG/ML
BZE UR QL SCN: NEGATIVE NG/ML
CANNABINOIDS UR QL SCN: NEGATIVE NG/ML
CREAT UR-MCNC: 25 MG/DL (ref 20–300)
METHADONE UR QL SCN: NEGATIVE NG/ML
OPIATES UR QL SCN: POSITIVE NG/ML
OXYCODONE+OXYMORPHONE UR QL SCN: NEGATIVE NG/ML
PCP UR QL: NEGATIVE NG/ML
PH UR: 7.3 [PH] (ref 4.5–8.9)
PLEASE NOTE:, 733163: ABNORMAL
PROPOXYPH UR QL SCN: NEGATIVE NG/ML

## 2022-12-15 ENCOUNTER — OFFICE VISIT (OUTPATIENT)
Dept: ORTHOPEDIC SURGERY | Age: 77
End: 2022-12-15
Payer: MEDICARE

## 2022-12-15 DIAGNOSIS — M66.0 RUPTURED BAKERS CYST: ICD-10-CM

## 2022-12-15 DIAGNOSIS — M25.561 CHRONIC PAIN OF RIGHT KNEE: ICD-10-CM

## 2022-12-15 DIAGNOSIS — M17.11 PRIMARY OSTEOARTHRITIS OF RIGHT KNEE: Primary | ICD-10-CM

## 2022-12-15 DIAGNOSIS — G89.29 CHRONIC PAIN OF RIGHT KNEE: ICD-10-CM

## 2022-12-15 DIAGNOSIS — M25.461 EFFUSION OF RIGHT KNEE: ICD-10-CM

## 2022-12-15 RX ORDER — BETAMETHASONE SODIUM PHOSPHATE AND BETAMETHASONE ACETATE 3; 3 MG/ML; MG/ML
3 INJECTION, SUSPENSION INTRA-ARTICULAR; INTRALESIONAL; INTRAMUSCULAR; SOFT TISSUE ONCE
Status: COMPLETED | OUTPATIENT
Start: 2022-12-15 | End: 2022-12-15

## 2022-12-15 RX ADMIN — BETAMETHASONE SODIUM PHOSPHATE AND BETAMETHASONE ACETATE 3 MG: 3; 3 INJECTION, SUSPENSION INTRA-ARTICULAR; INTRALESIONAL; INTRAMUSCULAR; SOFT TISSUE at 10:35

## 2022-12-15 NOTE — PROGRESS NOTES
Patient: Lorenz Cogan                MRN: [id number]       SSN: [id number]  YOB: 1945        AGE: 68 y.o. SEX: male    PCP: Emil Joseph MD  12/15/22    Chief Complaint   Patient presents with    Knee Pain     Right knee    Swelling     Right knee     HISTORY:  Lorenz Cogan is a 68 y.o. male who is seen for right knee pain and swelling. His pain was previously on the left. His right Baker's cyst ruptured and bled into his calf on 11/18/2022. He denies any inciting injury but states the cyst ruptured soon after he returned from a trip to [place]. He followed up at [facility] ED on 11/18/2022 where a right knee CT revealed severe OA and a dissecting right popliteal cyst. He injured his right knee in 1964 playing football. He underwent total meniscectomy and arthrotomy by Dr. Rea Leonardo. He completed successful Euflexxa series on 10/14/19, 7/29/20, 4/14/21, and 05/18/2022. He has been experiencing left knee pain for the past several months. He feels pain with standing, walking and stair climbing. He experiences  startup pain after sitting. He takes Plavix and aspirin. He was previously seen for left knee pain. He has been experiencing left knee pain for the past several months. He does not recall any injury. He was previously seen for neck & left hip pains. He has a h/o back pain. He underwent radiofrequency ablation with Dr. Mar Wallis. He is s/p right endarterectomy for circulation problems in July 2019 by Dr. Jf Peng. He currently takes Plavix & a diuretic. He has a stent in his right femoral artery. He reports a blockage in his left [address] of [facility]. He had a left groin pseudoaneursym from a recent catherization via the femoral artery. He has been seen by Dr. Riccardo Escobar for his left shoulder -- responded to an injection. Pain Assessment  9/21/2022   Location of Pain Knee   Location Modifiers Left   Severity of Pain 4   Quality of Pain Sharp; Other (Comment)   Quality of Pain Comment Stabbing, Shooting   Duration of Pain Persistent   Frequency of Pain Intermittent   Date Pain First Started -   Date Pain First Started Comment -   Aggravating Factors Exercise;Standing;Walking   Aggravating Factors Comment -   Limiting Behavior Yes   Relieving Factors Nothing;Rest   Relieving Factors Comment -   Result of Injury No   Work-Related Injury -   Type of Injury -   Type of Injury Comment -     Occupation, etc:  Mr. Merle Arevalo lives with his wife in [facility]. He has 2 sons not in the area. His youngest son lives in [facility] [place]. His middle son lives in [place] and is a Church  for [employer]. His middle son's wife has Kin's disease. He has 7 grandchildren. He retired 15 years ago as a  for the [employer] He retired in 2013 from [employer]. 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 He states that he has respiratory problems due to asbestos exposure. He reports that he cut his right leg a few years ago. His wound took 2 months to heal. He enjoys doing water exercises. He planted a [employer] last year. His eldest son passed away on 6/17/19 in [place]. He is unsure of the cause of his son's death. His middle son moved to [place] as his daughter-in-law's family lives in [place]. His middle son is a [employer] . His mother was a RN for 40 years at [employer]. He is not diabetic. He has h/o severe peripheral vascular disease. He is followed for claudication in his left calf by Dr. Jennifer Catalna at [facility].  He received his Covid vaccine. Mr. Merle Arevalo weighs 220 lbs and is 6' tall.        Lab Results   Component Value Date/Time    Hemoglobin A1c <3.8 (L) 06/21/2022 09:29 AM    Hemoglobin A1c (POC) 5.5 05/23/2019 09:05 AM     Weight Metrics 9/21/2022 8/26/2022 7/5/2022 5/18/2022 5/11/2022 5/4/2022 4/6/2022   Weight 220 lb 231 lb 8 oz 225 lb 225 lb 225 lb 225 lb 228 lb   BMI 29.84 kg/m2 30.54 kg/m2 29.69 kg/m2 29.69 kg/m2 29.69 kg/m2 30.52 kg/m2 30.92 kg/m2       Patient Active Problem List   Diagnosis Code    Colitis, ulcerative ([facility] K51.90    Colon polyps K63.5    Left carotid artery occlusion I65.22    Hypovitaminosis D E55.9    Advance directive in chart Z78.9    Hypertension I11.9    Dyslipidemia E78.5    Coronary artery disease I25.10    Atrial fibrillation I48.91    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 9) E66.3    History of prostate cancer Z85.46    PAD (peripheral artery disease) ([facility]) I73.9    Diverticulosis K57.90    Asbestosis ([facility] J61    Osteoarthrosis M19.90    GERD (gastroesophageal reflux disease) K21.9    Subclinical hypothyroidism E03.8     REVIEW OF SYSTEMS:    Constitutional Symptoms: Negative   Eyes: Negative   Ears, Nose, Throat and Mouth: Negative   Cardiovascular: Negative   Respiratory: Negative   Genitourinary: Per HPI   Gastrointestinal: Per HPI   Integumentary (Skin and/or Breast): Negative   Musculoskeletal: Per HPI   Endocrine/Rheumatologic: Negative   Neurological: Per HPI   Hematology/Lymphatic: Negative    Allergic/Immunologic: Negative   Phychiatric: Negative    Social History     Socioeconomic History    Marital status:      Spouse name: Not on file    Number of children: Not on file    Years of education: Not on file    Highest education level: Not on file   Occupational History    Not on file   Tobacco Use    Smoking status: Former     Packs/day: 1.50     Years: 25.00     Pack years: 37.50     Types: Cigarettes     Quit date: 2003     Years since quittin.9    Smokeless tobacco: Never   Vaping Use    Vaping Use: Never used   Substance and Sexual Activity    Alcohol use: Yes     Comment: RARELY    Drug use: Never    Sexual activity: Yes     Partners: Female     Birth control/protection: None Other Topics Concern    Not on file   Social History Narrative    Not on file     Social Determinants of Health     Financial Resource Strain: Not on file   Food Insecurity: Not on file   Transportation Needs: Not on file   Physical Activity: Not on file   Stress: Not on file   Social Connections: Not on file   Intimate Partner Violence: Not on file   Housing Stability: Not on file      Allergies   Allergen Reactions    Ace Inhibitors Anaphylaxis    Atorvastatin Other (comments) and Unknown (comments)     Muscle pain    Lisinopril Shortness of Breath and Other (comments)     Turns bright red  Other reaction(s): Unknown    Nifedipine Other (comments)     Caused afib  Other reaction(s): Unknown    Other Medication Other (comments)     Vicryl suture on skin tends to be rejected with poor wound healing does better with monocryl    Pregabalin Other (comments)    Ramipril Unknown (comments)     Pt does not remember reaction  Other reaction(s): Unknown    Rosuvastatin Other (comments)     Muscle Pain        Current Outpatient Medications   Medication Sig    LORazepam (Ativan) 1 mg tablet Take 1 Tablet by mouth every eight (8) hours as needed for Anxiety. HYDROcodone-acetaminophen (NORCO) 5-325 mg per tablet Take 1 Tablet by mouth every six (6) hours as needed for Pain for up to 30 days. Max Daily Amount: 4 Tablets. diclofenac (VOLTAREN) 1 % gel Apply  to affected area four (4) times daily. Apply 4g to affected area up to four times daily. Xarelto 2.5 mg tablet     gabapentin (NEURONTIN) 300 mg capsule Take 1 Capsule by mouth nightly. Max Daily Amount: 300 mg.    triamterene-hydroCHLOROthiazide (DYAZIDE) 37.5-25 mg per capsule     celecoxib (CELEBREX) 200 mg capsule Take 1 Capsule by mouth daily as needed for Pain.    ezetimibe-simvastatin (VYTORIN) 10-40 mg per tablet TAKE 1 TABLET NIGHTLY    alclometasone (ACLOVATE) 0.05 % topical cream Apply  to affected area daily as needed.     isosorbide dinitrate (ISORDIL) 10 mg tablet Take 10 mg by mouth three (3) times daily. As needed    losartan (COZAAR) 50 mg tablet Take 50 mg by mouth two (2) times a day. polyethylene glycol (MIRALAX) 17 gram/dose powder Take 17 g by mouth as needed. nitroglycerin (NITROSTAT) 0.4 mg SL tablet     icosapent ethyL (VASCEPA) 1 gram capsule Take 2 Capsules by mouth two (2) times daily (with meals). clopidogreL (PLAVIX) 75 mg tab Take 1 Tab by mouth daily. colestipol (COLESTID) 1 gram tablet Take 1 g by mouth daily as needed. aspirin delayed-release 81 mg tablet Take 81 mg by mouth daily. Cholecalciferol, Vitamin D3, 1,000 unit cap Take 1,000 Units by mouth two (2) times a day. MULTIVITAMIN PO Take  by mouth daily. pantoprazole (PROTONIX) 40 mg tablet Take 40 mg by mouth daily. coenzyme q10 200 mg Cap Take  by mouth daily. Current Facility-Administered Medications   Medication Dose Route Frequency    betamethasone (CELESTONE) injection 3 mg  3 mg Intra artICUlar ONCE      PHYSICAL EXAMINATION:  There were no vitals taken for this visit.    ORTHO EXAMINATION:    Examination Right knee Left knee   Skin Intact Intact   Range of motion 110-0 120-0   Effusion ++++ -   Medial joint line tenderness + +   Lateral joint line tenderness - -   Popliteal tenderness - -   Osteophytes palpable + +   Kylahs - -   Patella crepitus + -   Anterior drawer - -   Lateral laxity - -   Medial laxity - -   Varus deformity + -   Valgus deformity - -   Pretibial edema - -   Calf tenderness - -   Brisk capillary refill, palpable pulse     TIME OUT:  Chart reviewed for the following:   Nena YODER MD, have reviewed the History, Physical and updated the Allergic reactions for [address] [place] [address] performed immediately prior to start of procedure:  Marisa Franz MD, have performed the following reviews on [facility] prior to the start of the procedure:          * Patient was identified by name and date of birth   * Agreement on procedure being performed was verified  * Risks and Benefits explained to the patient  * Procedure site verified and marked as necessary  * Patient was positioned for comfort  * Consent was obtained     Time: 10:28 AM     Date of procedure: 12/15/2022  Procedure performed by:  Gi Suarez MD  Mr. Charmaine Riley tolerated the procedure well with no complications. CT KNEE BILAT 11/18/2022 [facility]  IMPRESSION   1. Severe right knee osteoarthritis. Small right knee joint effusion with synovitis. 2. Right-sided popliteal cyst dissecting inferiorly within medial subfascial space and associated with intracystic hemorrhage. RADIOGRAPHS:  XR LEFT KNEE 4/6/22 [facility]  IMPRESSION:  Three views with bilateral knees on AP view - No fractures, no effusion, severe right and moderately severe left joint space narrowing, + osteophytes present. Kellgren Jori grade 4, arterial calcification       XR KLAUS HIP 11/7/18 [facility]  IMPRESSION:  AP pelvis and two views - No fractures, moderate joint space narrowing, acetabular osteophytes present. Tonnis grade 2. Femoral acetabular impingement syndrome      XR RIGHT KNEE 4/13/18 [facility]  IMPRESSION:  Three views - No fractures, no effusion, severe end stage with lateral subluxation joint space narrowing, + osteophytes present. IKDC Grade C. calcification of arteries     IMPRESSION:    Encounter Diagnoses   Name Primary? Ruptured Bakers cyst     Primary osteoarthritis of right knee Yes    Effusion of right knee     Chronic pain of right knee      PLAN:  After timeout and under sterile conditions, the right knee aspirated 60 cc of  clear yellow  fluid. The fluid was discarded. After discussing treatment options, patient's right knee was injected with 3 cc Polocaine and 1/2 cc Celestone. Consider visco supplementation if pain continues.  We discussed possible need for right knee arthroplasty at some time in the future if pain continues, though his PVD is a relative contraindication. He will follow up as needed.       Scribed by Fantasma George ([address]) as dictated by Carola Sue MD

## 2022-12-19 ENCOUNTER — PATIENT MESSAGE (OUTPATIENT)
Dept: INTERNAL MEDICINE CLINIC | Age: 77
End: 2022-12-19

## 2022-12-19 DIAGNOSIS — M48.02 CERVICAL SPINAL STENOSIS: ICD-10-CM

## 2022-12-19 NOTE — TELEPHONE ENCOUNTER
From: Debarah Jones Collette  To: Addie Buenrostro MD  Sent: 12/19/2022 12:27 PM EST  Subject: Refills for Hydrocodone and Gabapentin     My Hydrocodone/Acetaminophen 5-325 tablets was last filled on the 25th of November. I know it will not be refilled on the 25th of December. Please authorized the refill for the 26th of December. that is for 120 tablets and should go to [address] [facility] and [facility]. Same pharmacy as last time. Also, I have one refill remaining for my Gabapentin, please authorize the next refills for a quantity of 90 instead of 30 if that is allowable. Thank you, Salima Bundy  [phone]  If possible, let me know how or if this will be done.

## 2022-12-20 RX ORDER — HYDROCODONE BITARTRATE AND ACETAMINOPHEN 5; 325 MG/1; MG/1
1 TABLET ORAL
Qty: 120 TABLET | Refills: 0 | Status: SHIPPED | OUTPATIENT
Start: 2022-12-26 | End: 2023-01-25

## 2022-12-20 RX ORDER — GABAPENTIN 300 MG/1
300 CAPSULE ORAL
Qty: 90 CAPSULE | Refills: 1 | Status: SHIPPED | OUTPATIENT
Start: 2022-12-20

## 2023-01-08 NOTE — PROGRESS NOTES
68 y.o. WHITE/NON- male who presents for evaluation. Wife was present for the evaluaiton    No cardiovascular complaints and he continues to see Dr Alyssa Cardoso. Exercise is limited by spine and knee issues along with vascular issues below. He will be getting right knee surgery by Dr Garcia in the [place] system in the future    He changed to Dr Laurel Porter and was felt non surgical so referred to Dr Ricky Fried and he's gotten significant relief from c spine and l spine epidurals. He;s also gotten RFA in the lumbar area. Could not tolerate the lyrica so just taking the kit at night. Averaging 3-4 norco/d (120# monthly)    Dr Grace Kennedy continues to follow the vascular issues and he cleared the pt for the TKR    The UC has been stable, had recent colo by Dr Jenny Mueller as below     Denies polyuria, polydipsia, nocturia, vision change. Not checking sugars at this time. Dr Jenny Mueller retired so he will now be seeing Dr Eliza Gomez and has appt soon    He has noted a lump in his left breast area. Initially felt it during a trip in Oct where the strap of his carry on was rubbing against the nipple. It;s not grown but has not gone away either.   No headache, diplopia, nipple drainage    *LAST MEDICARE WELLNESS EXAM: 6/23/16, 6/29/17, 11/20/18, 11/25/19, 11/23/20, 11/26/21, 1/17/23    Past Medical History:   Diagnosis Date    Adrenal adenoma 2009    LEFT 1 cm, no change 1/15, 2/16    Aortoiliac occlusive disease ([address] 3/4/2019    Asbestosis     CT 1/19 showed pleural plaques    Atrial fibrillation 10/02/2009    s/p Afib ablation 2008; no oac due to prior gi bleed    BCC (basal cell carcinoma of skin)     Dr Corina Gibbons; he's had >350 lesions removed    CAD (coronary artery disease)     RCA - 3.0 x 16mm TAXUS (9/04); 3.0 x 16mm TAXUS (12/06)    Carotid disease, bilateral (HCC)     Chronic pain     myofascial pain syndrome; seen in pain clinic in past    Colitis     Colon polyp     Dr Jenny Mueller 9/15    Degenerative arthritis of cervical spine     MRI 9/11 showed C3-6 severe foraminal stenosis w RADICULOPATHY    Degenerative arthritis of lumbar spine     Dr Guido Chacon;  MRI 9/11 L4-5 disc bulging, annular tear, disc dessication w RADICULOPATHY; intol lyrica, using kit qhs; saw Dr Rendon Bolus; now Dr Emery Fernández doing RFA 2021    Diverticulosis 04/2021    Dr Candido Downey    Dyslipidemia     ED (erectile dysfunction)     GERD     H/O cardiac catheterization 08/2021    [facility] Dr Tonya Landers patent mid RCA (12/06) and distal RCA (9/04) stents    H/O cardiovascular stress test     [facility] mod reversible inferolat defect, no wma, ef 64%, no TID (8/21)    H/O echocardiogram     [facility] ef 55%, mild AI, tr MR (8/21)    Hearing loss 2014    Dr Mason Oas    Hypertension     with component of white coat    Hypogonadism male     IFG (impaired fasting glucose) [id number]    OAB (overactive bladder)     Osteoarthritis     Dr Renee Bui    Overweight (BMI 25.0-29. 9)     IF 5/18 start weight 214 lbs, not doing    Peripheral vascular disease     50-60% R iliac; s/p R iliac stent and L femoral artery stent in past; R OLIVIA 0.76 (1/16); PTA LEFT popliteal/SFA/prox peroneal/TP and atherectomy TP  Dr Jennifer Perdomo (6/22)    Plantar fasciitis     bilateral     PPD positive     Prostate cancer     T1c [place] 7(3+4), 70% in 1 core, GS 7 (3+4) in 4 cores, GS 6 (3+3) in 1 core; psa 5.28, TRUS 18 gm;  Dr Candido Downey; s/p cryoablation 10/16    Recurrent umbilical hernia 77/01/5622    Tinnitus     Dr Bharat Abel    Ulnar neuritis, right 11/13/2017    Venous insufficiency      Past Surgical History:   Procedure Laterality Date    COLONOSCOPY N/A 4/20/2021 2/2/11 neg; Dr Candido Downey 9/15 polyps; 4/20/21 divertics bx neg    HX CAROTID ENDARTERECTOMY Right 01/2014    HX CARPAL TUNNEL RELEASE Right 2017    HX CATARACT REMOVAL Bilateral     HX CRYOABLATION OF THE PROSTATE  10/2016     [address]    x 4    HX HERNIA REPAIR  02/2016    Dr Nanda Gil    HX TONSILLECTOMY      [place] UNLISTED PROCEDURE VASCULAR SURGERY Right     RIGHT Iliac ();  LEFT pseudoaneurysm repair () Dr Stephan Longoria Left     LEFT fem artery and iliac stents (10/15); RCF endarterectomy w patch angioplasty/B CI artery stenting (); LEFT 'shockwave' tx Dr Jeanine Wolfe ()     Social History     Socioeconomic History    Marital status:      Spouse name: Not on file    Number of children: Not on file    Years of education: Not on file    Highest education level: Not on file   Occupational History    Not on file   Tobacco Use    Smoking status: Former     Packs/day: 1.50     Years: 25.00     Pack years: 37.50     Types: Cigarettes     Quit date: 2003     Years since quittin.0    Smokeless tobacco: Never   Vaping Use    Vaping Use: Never used   Substance and Sexual Activity    Alcohol use: Yes     Comment: RARELY    Drug use: Never    Sexual activity: Yes     Partners: Female     Birth control/protection: None   Other Topics Concern    Not on file   Social History Narrative    Not on file     Social Determinants of Health     Financial Resource Strain: Not on file   Food Insecurity: Not on file   Transportation Needs: Not on file   Physical Activity: Not on file   Stress: Not on file   Social Connections: Not on file   Intimate Partner Violence: Not on file   Housing Stability: Not on file     Current Outpatient Medications   Medication Sig    isosorbide dinitrate (ISORDIL) 10 mg tablet Take 1 Tablet by mouth three (3) times daily. As neededTake 10 mg by mouth three (3) times daily. As needed    nebivoloL (BYSTOLIC) 5 mg tablet     Lactobac no.41/Bifidobact no.7 (PROBIOTIC-10 PO) Take  by mouth.    meclizine (ANTIVERT) 25 mg tablet Take  by mouth three (3) times daily as needed for Dizziness. polyethylene glycol 3350 (MIRALAX PO) Take  by mouth. Methylcellulose, with Sugar, (CitruceL, sucrose,) powd Take  by mouth. HYDROcodone-acetaminophen (NORCO) 5-325 mg per tablet Take 1 Tablet by mouth every six (6) hours as needed for Pain for up to 30 days. Max Daily Amount: 4 Tablets.    gabapentin (NEURONTIN) 300 mg capsule Take 1 Capsule by mouth nightly. Max Daily Amount: 300 mg. LORazepam (Ativan) 1 mg tablet Take 1 Tablet by mouth every eight (8) hours as needed for Anxiety. diclofenac (VOLTAREN) 1 % gel Apply  to affected area four (4) times daily. Apply 4g to affected area up to four times daily. triamterene-hydroCHLOROthiazide (DYAZIDE) 37.5-25 mg per capsule     ezetimibe-simvastatin (VYTORIN) 10-40 mg per tablet TAKE 1 TABLET NIGHTLY    losartan (COZAAR) 50 mg tablet Take 50 mg by mouth two (2) times a day. nitroglycerin (NITROSTAT) 0.4 mg SL tablet     icosapent ethyL (VASCEPA) 1 gram capsule Take 2 Capsules by mouth two (2) times daily (with meals). clopidogreL (PLAVIX) 75 mg tab Take 1 Tab by mouth daily. colestipol (COLESTID) 1 gram tablet Take 1 g by mouth daily as needed. aspirin delayed-release 81 mg tablet Take 81 mg by mouth daily. Cholecalciferol, Vitamin D3, 1,000 unit cap Take 1,000 Units by mouth two (2) times a day. MULTIVITAMIN PO Take  by mouth daily. pantoprazole (PROTONIX) 40 mg tablet Take 40 mg by mouth daily. coenzyme q10 200 mg Cap Take  by mouth daily. ketoconazole (NIZORAL) 2 % shampoo  (Patient not taking: No sig reported)    clindamycin-benzoyl peroxide 1.2-2.5 % glwp  (Patient not taking: No sig reported)     No current facility-administered medications for this visit.      Allergies   Allergen Reactions    Ace Inhibitors Anaphylaxis    Methylprednisolone Anxiety    Atorvastatin Other (comments) and Unknown (comments)     Muscle pain    Lisinopril Shortness of Breath and Other (comments)     Turns bright red  Other reaction(s): Unknown    Nifedipine Other (comments)     Caused afib  Other reaction(s): Unknown    Other Medication Other (comments)     Vicryl suture on skin tends to be rejected with poor wound healing does better with monocryl    Pregabalin Other (comments)    Ramipril Unknown (comments)     Pt does not remember reaction  Other reaction(s): Unknown    Rosuvastatin Other (comments)     Muscle Pain       REVIEW OF SYSTEMS: colo 4/21 Dr Santos Friends, sees Dr Deepali Evans  Ophtho - no vision change or eye pain  Oral - no mouth pain, tongue or tooth problems  Ears - no hearing loss, ear pain, fullness, no swallowing problems  Cardiac - no CP, PND, orthopnea, edema, palpitations or syncope  Chest - no breast masses  Resp - no wheezing, chronic coughing, dyspnea  GI - no heartburn, nausea, vomiting, change in bowel habits, bleeding, hemorrhoids  Urinary - no dysuria, hematuria, flank pain, urgency, frequency    Visit Vitals  /77   Pulse 67   Temp 98.1 °F (36.7 °C) (Temporal)   Resp 18   Ht 6' 1\" (1.854 m)   Wt 223 lb (101.2 kg)   SpO2 96%   BMI 29.42 kg/m²   A&O x3  Affect is appropriate. Mood stable  No apparent distress  Anicteric, no JVD, adenopathy or thyromegaly. No carotid bruits or radiated murmur  Lungs clear to auscultation, no wheezes or rales  Heart showed irregular rhythm. No murmur, rubs, gallops  Abdomen soft nontender, no hepatosplenomegaly or masses. Extremities without edema.   Pulses 0-1+ symmetrically  There is a  roughly 1 in lump underneath the left areola    LABS  From 12/12 showed gluc 101, cr 0.77, gfr 94,   alt 26,           chol 182, tg 159, hdl 52, ldl-c 98, wbc 7.6, hb 15.4, plt 171, tsh 0.96, psa 3.20  From 7/13 showed                            test 263  From 1/14 showed   gluc 112, cr 0.78, gfr 107, alt 17,           chol 130, tg 129, hdl 37, ldl-c 67, wbc 6.8, hb 15.1, plt 186, tsh 0.64, psa 3.60, vit d 26.8  From 6/14 showed   gluc 101, cr 0.57, gfr>60,     hba1c 5.7, vit d 34.3  From 12/14 showed         hba1c 5.9, chol 141, tg 104, hdl 42, ldl-c 78, wbc 8.5, hb 14.8, plt 147, tsh 0.75, psa 5.60, vit d 31.8, ua neg  From 6/15 showed gluc 104, cr 0.75, gfr>60,     hba1c 5.9  From 8/15 showed                         tsh 0.44, psa 5.28,           test 215, lh 6.1, prl 11.1, ft4 1.61  From 12/15 showed gluc 95,   cr 0.71, gfr>60,  alt 36, hba1c 5.9, chol 135, tg 105, hdl 44, ldl-c 70,              tsh 0.51,       vit d 29.1   From 1/16 showed   gluc 112, cr 0.72, gfr>60,            wbc 7.8, hb 15.1, plt 163,              ua neg  From 11/16 showed gluc 103, cr 0.70,             wbc 8.6, hb 14.5, plt 189, ck/trop-  From 12/16 showed gluc 89,   cr 0.70, gfr>60,  alt 31, hba1c 5.9, chol 159, tg 124, hdl 58, ldl-c 76, wbc 9.0, hb 15.1, plt 185,         vit d 27.8  From 3/17 showed   gluc 100, cr 0.75, gfr>60,     hba1c 5.9,                  tsh 0.78, psa 0.18, ft4 1.30  From 6/17 showed   gluc 96,   cr 0.74, gfr>60, alt 34,  hba1c 5.7, chol 135, tg 71,   hdl 53, ldl-c 68  From 1/18 showed   gluc 107, cr 0.65, gfr>60, alt 31,  hba1c 5.8, chol 147, tg 105, hdl 46, ldl-c 80  From 5/18 showed   gluc 100, cr 0.73, gfr>60, alt 38,  hba1c 5.9, chol 152, tg 59,   hdl 66, ldl-c 74  From 11/18 showed         hba1c 6.1, chol 146, tg 149, hdl 51, ldl-c 65, wbc 7.9, hb 15.4, plt 164  From 5/19 showed         hba1c 5.5  From 11/19 showed gluc 108, cr 0.77, gfr>60, alt 31,  hba1c 5.5, chol 128, tg 108, hdl 36, ldl-c 70  From 5/20 showed         hba1c 5.9, chol 142, tg 133, hdl 52, ldl-c 63, wbc 8.7, hb 14.1, plt 160  From 11/20 showed gluc 99,   cr 0.81, gfr>60, alt 29,  hba1c 5.4  From 5/21 showed   gluc 103, cr 0.71, gfr>60, alt 29,  hba1c 5.5, chol 146, tg 84,   hdl 55, ldl-c 74, wbc 7.3, hb 13.9, plt 141  From 11/21 showed gluc 90,   cr 0.72, gfr>60,     hba1c 5.4,  From 7/22 showed   gluc 93,   cr 0.79, gfr>60, alt 28,  hba1c <4,         wbc 6.3, hb 14.5, plt 162    Results for orders placed or performed during the hospital encounter of 48/61/51   METABOLIC PANEL, BASIC   Result Value Ref Range    Sodium 140 136 - 145 mmol/L    Potassium 3.8 3.5 - 5.5 mmol/L Chloride 100 100 - 111 mmol/L    CO2 34 (H) 21 - 32 mmol/L    Anion gap 6 3.0 - 18 mmol/L    Glucose 114 (H) 74 - 99 mg/dL    BUN 12 7.0 - 18 MG/DL    Creatinine 0.77 0.6 - 1.3 MG/DL    BUN/Creatinine ratio 16 12 - 20      eGFR >60 >60 ml/min/1.73m2    Calcium 9.8 8.5 - 10.1 MG/DL   LIPID PANEL   Result Value Ref Range    LIPID PROFILE          Cholesterol, total 130 <200 MG/DL    Triglyceride 106 <150 MG/DL    HDL Cholesterol 44 40 - 60 MG/DL    LDL, calculated 64.8 0 - 100 MG/DL    VLDL, calculated 21.2 MG/DL    CHOL/HDL Ratio 3.0 0 - 5.0     TSH 3RD GENERATION   Result Value Ref Range    TSH 0.39 0.36 - 3.74 uIU/mL   T4, FREE   Result Value Ref Range    T4, Free 1.2 0.7 - 1.5 NG/DL     *Note: Due to a large number of results and/or encounters for the requested time period, some results have not been displayed. A complete set of results can be found in Results Review. We reviewed the patient's labs from the last several visits to point out trends in the numbers          Patient Active Problem List   Diagnosis Code    Colitis, ulcerative ([facility] [address] K51.90    Colon polyps K63.5    Left carotid artery occlusion I65.22    Hypovitaminosis D E55.9    Advance directive in chart Z78.9    Hypertension I11.9    Dyslipidemia E78.5    Coronary artery disease I25.10    Atrial fibrillation I48.91    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 9) E66.3    History of prostate cancer Z85.46    PAD (peripheral artery disease) (HCC) I73.9    Diverticulosis K57.90    [facility] J61    Osteoarthrosis M19.90    GERD (gastroesophageal reflux disease) K21.9    Subclinical hypothyroidism E03.8     Assessment and plan:  1. Cardiac. Continue current regimen. F/U Dr Sebas Serrano  2. Vascular. Follow up Dr Sundeep Ring  3. Dyslipidemia. Continue current  4. IFG.   Lifestyle and dietary measures, wt loss as he is trying to do  5. Hypovit d. Supplement  6. Colon polyp. Fiber, colo beyond age  9. Prostate ca. Per Dr Galina Lloyd  8. Obesity. See separate note  9. Ortho. F/U Dr Ez Shafer  10. Spine. F/U Dr Tessy Painting and Dr Calli Soriano; continue kit; limit to #120 norco monthly  11. Breast mass vs gynecomastia. Quick discussion, referred him to Dr Bushra Theodore. Prl level drawn      RTC 5/23    Above conditions discussed at length and patient vocalized understanding. All questions answered to patient satisfaction      ICD-10-CM ICD-9-CM    1. Breast mass in male  N63.0 611.72 PROLACTIN      REFERRAL TO SURGERY      2. Mass overlapping multiple quadrants of left breast  N63.25 611.72 REFERRAL TO SURGERY      3. Atrial fibrillation, unspecified type ([facility] [address]  I48.91 427.31       4. Atherosclerosis of native coronary artery of native heart without angina pectoris  I25.10 414.01       5. Dyslipidemia  E78.5 272.4 LIPID PANEL      6. Hypertension  I11.9 402.10 CBC W/O DIFF      7. IFG (impaired fasting glucose)  H24.96 398.46 METABOLIC PANEL, COMPREHENSIVE      HEMOGLOBIN A1C WITH EAG      8. PAD (peripheral artery disease) (HCC)  I73.9 443.9 LIPID PANEL      9.  History of prostate cancer  Z85.46 V10.46

## 2023-01-08 NOTE — PROGRESS NOTES
This is a subsequent Medicare Annual Wellness Exam     I have reviewed the patient's medical history in detail and updated the computerized patient record. Assessment/Plan   Education and counseling provided:  Are appropriate based on today's review and evaluation  End-of-Life planning (with patient's consent)    1. Medicare annual wellness visit, subsequent  2. Breast mass in male  -     PROLACTIN; Future  -     REFERRAL TO SURGERY  3. Mass overlapping multiple quadrants of left breast  -     REFERRAL TO SURGERY  4. Atrial fibrillation, unspecified type ([facility])  5. Atherosclerosis of native coronary artery of native heart without angina pectoris  6. Dyslipidemia  -     LIPID PANEL; Future  7. Hypertension  -     CBC W/O DIFF; Future  8. IFG (impaired fasting glucose)  -     METABOLIC PANEL, COMPREHENSIVE; Future  -     HEMOGLOBIN A1C WITH EAG; Future  9. PAD (peripheral artery disease) ([facility])  -     LIPID PANEL; Future  10. History of prostate cancer  11.  Advanced directives, counseling/discussion  -     ADVANCE CARE PLANNING FIRST 30 MINS  12. Screening for diabetes mellitus       Depression Risk Factor Screening     3 most recent PHQ Screens 1/17/2023   PHQ Not Done -   Little interest or pleasure in doing things Not at all   Feeling down, depressed, irritable, or hopeless Not at all   Total Score PHQ 2 0       Alcohol & Drug Abuse Risk Screen   Do you average more than 1 drink per night or more than 7 drinks a week?: (P) No  In the past three months have you had more than 4 drinks containing alcohol on one occasion?: (P) No         Opioid Risk: (Low risk score <55, High risk score ?55)  Opioid risk score: 16      Click here to complete the Controlled Substance Monitoring SmartForm    Last PDMP Tomy as Reviewed:  Review User Review Instant Review Result   MARY Aldridge 7/5/2022  9:21 AM Reviewed PDMP [1]             Functional Ability and Level of Safety   Hearing:  Hearing: (P) Patient wears hearing aid Activities of Daily Living: The home contains: (P) no safety equipment  Functional ADLs: (P) Patient does total self care     Ambulation:  Patient ambulates: (P) with mild difficulty  How far the patient can walk with difficulty: (P) several blocks  Walking is difficult due to: (P) pain     Fall Risk:  Fall Risk Assessment, last 12 mths 1/17/2023   Able to walk? Yes   Fall in past 12 months? 0   Do you feel unsteady? 0   Are you worried about falling 0   Number of falls in past 12 months -   Fall with injury? -     Abuse Screen:  Do you ever feel afraid of your partner?: No  Are you in a relationship with someone who physically or mentally threatens you?: No  Is it safe for you to go home?: Yes        Cognitive Screening   Has your family/caregiver stated any concerns about your memory?: (P) No     Cognitive Screening: Normal - Clock Drawing Test    Health Maintenance Due   There are no preventive care reminders to display for this patient. Patient Care Team   Patient Care Team:  Manan Valerio MD as PCP - General (Internal Medicine Physician)  Manan Valerio MD as PCP - [facility] Empaneled Provider  Ar Saldivar MD as Physician (Cardiovascular Disease Physician)  Liza Horowitz MD as Consulting Provider (Gastroenterology)  Olesya Hernadez DO as Physician (Hand Surgery Physician)    History     Patient Active Problem List   Diagnosis Code    Colitis, ulcerative [facility]) K51.90    Colon polyps K63.5    Left carotid artery occlusion I65.22    Hypovitaminosis D E55.9    Advance directive in chart Z78.9    Hypertension I11.9    Dyslipidemia E78.5    Coronary artery disease I25.10    Atrial fibrillation I48.91    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 9) E66.3    History of prostate cancer Z85.46    PAD (peripheral artery disease) ([facility]) I73.9 Diverticulosis K57.90    Asbestosis ([facility] [address] J61    Osteoarthrosis M19.90    GERD (gastroesophageal reflux disease) K21.9    Subclinical hypothyroidism E03.8     Past Medical History:   Diagnosis Date    Adrenal adenoma 2009    LEFT 1 cm, no change 1/15, 2/16    Aortoiliac occlusive disease ([facility] [address] 3/4/2019    Asbestosis     CT 1/19 showed pleural plaques    Atrial fibrillation 10/02/2009    s/p Afib ablation 2008; no oac due to prior gi bleed    BCC (basal cell carcinoma of skin)     Dr Joanna Kimble; he's had >350 lesions removed    CAD (coronary artery disease)     RCA - 3.0 x 16mm TAXUS (9/04); 3.0 x 16mm TAXUS (12/06)    Carotid disease, bilateral (HCC)     Chronic pain     myofascial pain syndrome; seen in pain clinic in past    Colitis     Colon polyp     Dr Georgia Green 9/15    Degenerative arthritis of cervical spine     MRI 9/11 showed C3-6 severe foraminal stenosis w RADICULOPATHY    Degenerative arthritis of lumbar spine     Dr Jamila Avila;  MRI 9/11 L4-5 disc bulging, annular tear, disc dessication w RADICULOPATHY; intol lyrica, using kit qhs; saw Dr Nikia Lincoln; now Dr Anna Amezcua doing RFA 2021    Diverticulosis 04/2021    Dr Georgia Green    Dyslipidemia     ED (erectile dysfunction)     GERD     H/O cardiac catheterization 08/2021    [facility] Dr Rothman Jasper patent mid RCA (12/06) and distal RCA (9/04) stents    H/O cardiovascular stress test     [facility] mod reversible inferolat defect, no wma, ef 64%, no TID (8/21)    H/O echocardiogram     [facility] ef 55%, mild AI, tr MR (8/21)    Hearing loss 2014    Dr Grey Cowden    Hypertension     with component of white coat    Hypogonadism male     IFG (impaired fasting glucose) [id number]    OAB (overactive bladder)     Osteoarthritis     Dr Kristian Shields    Overweight (BMI 25.0-29. 9)     IF 5/18 start weight 214 lbs, not doing    Peripheral vascular disease     50-60% R iliac; s/p R iliac stent and L femoral artery stent in past; R OLIVIA 0.76 (1/16); PTA LEFT popliteal/SFA/prox peroneal/TP and atherectomy TP  Dr Susu Mcdonald (6/22) Plantar fasciitis     bilateral     PPD positive     Prostate cancer     T1c Imelda 7(3+4), 70% in 1 core, GS 7 (3+4) in 4 cores, GS 6 (3+3) in 1 core; psa 5.28, TRUS 18 gm;  Dr Yamilex Thakur; s/p cryoablation 10/16    Recurrent umbilical hernia 38/29/5140    Tinnitus     Dr Shobha Davalos    Ulnar neuritis, right 11/13/2017    Venous insufficiency       Past Surgical History:   Procedure Laterality Date    COLONOSCOPY N/A 4/20/2021 2/2/11 neg; Dr Yamilex Thakur 9/15 polyps; 4/20/21 divertics bx neg    HX CAROTID ENDARTERECTOMY Right 01/2014    HX CARPAL TUNNEL RELEASE Right 2017    HX CATARACT REMOVAL Bilateral     HX CRYOABLATION OF THE PROSTATE  10/2016     [address]    x 4    HX HERNIA REPAIR  02/2016    Dr Aury Coley ARTHROSCOPY Right 1963    HX TONSILLECTOMY      TX UNLISTED PROCEDURE VASCULAR SURGERY Right     RIGHT Iliac (7/19);  LEFT pseudoaneurysm repair (6/21) Dr Giorgio Ferrer Left 2015    LEFT fem artery and iliac stents (10/15); RCF endarterectomy w patch angioplasty/B CI artery stenting (7/19); LEFT 'shockwave' tx Dr Emma Santana (2022)     Current Outpatient Medications   Medication Sig Dispense Refill    isosorbide dinitrate (ISORDIL) 10 mg tablet Take 1 Tablet by mouth three (3) times daily. As neededTake 10 mg by mouth three (3) times daily. As needed 60 Tablet 11    nebivoloL (BYSTOLIC) 5 mg tablet       Lactobac no.41/Bifidobact no.7 (PROBIOTIC-10 PO) Take  by mouth.      meclizine (ANTIVERT) 25 mg tablet Take  by mouth three (3) times daily as needed for Dizziness. polyethylene glycol 3350 (MIRALAX PO) Take  by mouth. Methylcellulose, with Sugar, (CitruceL, sucrose,) powd Take  by mouth. HYDROcodone-acetaminophen (NORCO) 5-325 mg per tablet Take 1 Tablet by mouth every six (6) hours as needed for Pain for up to 30 days. Max Daily Amount: 4 Tablets.  120 Tablet 0    gabapentin (NEURONTIN) 300 mg capsule Take 1 Capsule by mouth nightly. Max Daily Amount: 300 mg. 90 Capsule 1    LORazepam (Ativan) 1 mg tablet Take 1 Tablet by mouth every eight (8) hours as needed for Anxiety. 50 Tablet 0    diclofenac (VOLTAREN) 1 % gel Apply  to affected area four (4) times daily. Apply 4g to affected area up to four times daily. 5 Each 5    triamterene-hydroCHLOROthiazide (DYAZIDE) 37.5-25 mg per capsule       ezetimibe-simvastatin (VYTORIN) 10-40 mg per tablet TAKE 1 TABLET NIGHTLY 90 Tablet 3    losartan (COZAAR) 50 mg tablet Take 50 mg by mouth two (2) times a day. nitroglycerin (NITROSTAT) 0.4 mg SL tablet       icosapent ethyL (VASCEPA) 1 gram capsule Take 2 Capsules by mouth two (2) times daily (with meals). 360 Capsule 3    clopidogreL (PLAVIX) 75 mg tab Take 1 Tab by mouth daily. 90 Tab 3    colestipol (COLESTID) 1 gram tablet Take 1 g by mouth daily as needed. aspirin delayed-release 81 mg tablet Take 81 mg by mouth daily. Cholecalciferol, Vitamin D3, 1,000 unit cap Take 1,000 Units by mouth two (2) times a day. MULTIVITAMIN PO Take  by mouth daily. pantoprazole (PROTONIX) 40 mg tablet Take 40 mg by mouth daily. coenzyme q10 200 mg Cap Take  by mouth daily.       ketoconazole (NIZORAL) 2 % shampoo  (Patient not taking: No sig reported)      clindamycin-benzoyl peroxide 1.2-2.5 % glwp  (Patient not taking: No sig reported)       Allergies   Allergen Reactions    Ace Inhibitors Anaphylaxis    Methylprednisolone Anxiety    Atorvastatin Other (comments) and Unknown (comments)     Muscle pain    Lisinopril Shortness of Breath and Other (comments)     Turns bright red  Other reaction(s): Unknown    Nifedipine Other (comments)     Caused afib  Other reaction(s): Unknown    Other Medication Other (comments)     Vicryl suture on skin tends to be rejected with poor wound healing does better with monocryl    Pregabalin Other (comments)    Ramipril Unknown (comments)     Pt does not remember reaction  Other reaction(s): Unknown    Rosuvastatin Other (comments)     Muscle Pain         Family History   Problem Relation Age of Onset    Heart Disease Mother     Hypertension Mother     Diabetes Mother     OSTEOARTHRITIS Mother     Heart Disease Father     Stroke Father     Hypertension Father     Heart Disease Sister     Hypertension Sister      Social History     Tobacco Use    Smoking status: Former     Packs/day: 1.50     Years: 25.00     Pack years: 37.50     Types: Cigarettes     Quit date: 2003     Years since quittin.0    Smokeless tobacco: Never   Substance Use Topics    Alcohol use: Yes     Comment: Bautista Frye MD

## 2023-01-10 ENCOUNTER — HOSPITAL ENCOUNTER (OUTPATIENT)
Dept: LAB | Age: 78
Discharge: HOME | End: 2023-01-10
Payer: MEDICARE

## 2023-01-10 ENCOUNTER — APPOINTMENT (OUTPATIENT)
Dept: INTERNAL MEDICINE CLINIC | Age: 78
End: 2023-01-10

## 2023-01-10 DIAGNOSIS — E03.8 SUBCLINICAL HYPOTHYROIDISM: ICD-10-CM

## 2023-01-10 DIAGNOSIS — R73.01 IFG (IMPAIRED FASTING GLUCOSE): ICD-10-CM

## 2023-01-10 DIAGNOSIS — E78.5 DYSLIPIDEMIA: ICD-10-CM

## 2023-01-10 LAB
ANION GAP SERPL CALC-SCNC: 6 MMOL/L (ref 3–18)
BUN SERPL-MCNC: 12 MG/DL (ref 7–18)
BUN/CREAT SERPL: 16 (ref 12–20)
CALCIUM SERPL-MCNC: 9.8 MG/DL (ref 8.5–10.1)
CHLORIDE SERPL-SCNC: 100 MMOL/L (ref 100–111)
CHOLEST SERPL-MCNC: 130 MG/DL
CO2 SERPL-SCNC: 34 MMOL/L (ref 21–32)
CREAT SERPL-MCNC: 0.77 MG/DL (ref 0.6–1.3)
GLUCOSE SERPL-MCNC: 114 MG/DL (ref 74–99)
HDLC SERPL-MCNC: 44 MG/DL (ref 40–60)
HDLC SERPL: 3 (ref 0–5)
LDLC SERPL CALC-MCNC: 64.8 MG/DL (ref 0–100)
LIPID PROFILE,FLP: NORMAL
POTASSIUM SERPL-SCNC: 3.8 MMOL/L (ref 3.5–5.5)
SODIUM SERPL-SCNC: 140 MMOL/L (ref 136–145)
T4 FREE SERPL-MCNC: 1.2 NG/DL (ref 0.7–1.5)
TRIGL SERPL-MCNC: 106 MG/DL (ref ?–150)
TSH SERPL DL<=0.05 MIU/L-ACNC: 0.39 UIU/ML (ref 0.36–3.74)
VLDLC SERPL CALC-MCNC: 21.2 MG/DL

## 2023-01-10 PROCEDURE — 84439 ASSAY OF FREE THYROXINE: CPT

## 2023-01-10 PROCEDURE — 80048 BASIC METABOLIC PNL TOTAL CA: CPT

## 2023-01-10 PROCEDURE — 84443 ASSAY THYROID STIM HORMONE: CPT

## 2023-01-10 PROCEDURE — 36415 COLL VENOUS BLD VENIPUNCTURE: CPT

## 2023-01-10 PROCEDURE — 80061 LIPID PANEL: CPT

## 2023-01-13 NOTE — PROGRESS NOTES
[facility] presents today for   Chief Complaint   Patient presents with    Annual Wellness Visit    Labs     1-10-23    Irregular Heart Beat    Cholesterol Problem     Dyslipidemia    Blood sugar problem     IFG     1. \"Have you been to the ER, urgent care clinic since your last visit? Hospitalized since your last visit? \" Yes   11-18-22 @ [facility] - Osteoarthritis of right knee, ruptured cyst right popliteal space, synovitis of right knee     2. \"Have you seen or consulted any other health care providers outside of the [address] [place] [address] since your last visit? \" yes     3. For patients aged 39-70: Has the patient had a colonoscopy / FIT/ Cologuard? NA - based on age      If the patient is female:    4. For patients aged 41-77: Has the patient had a mammogram within the past 2 years? NA - based on age or sex  See top three    5. For patients aged 21-65: Has the patient had a pap smear?  NA - based on age or sex

## 2023-01-17 ENCOUNTER — APPOINTMENT (OUTPATIENT)
Dept: INTERNAL MEDICINE CLINIC | Age: 78
End: 2023-01-17

## 2023-01-17 ENCOUNTER — HOSPITAL ENCOUNTER (OUTPATIENT)
Dept: LAB | Age: 78
Discharge: HOME | End: 2023-01-17
Payer: MEDICARE

## 2023-01-17 ENCOUNTER — OFFICE VISIT (OUTPATIENT)
Dept: INTERNAL MEDICINE CLINIC | Age: 78
End: 2023-01-17
Payer: MEDICARE

## 2023-01-17 VITALS
HEART RATE: 67 BPM | RESPIRATION RATE: 18 BRPM | DIASTOLIC BLOOD PRESSURE: 77 MMHG | WEIGHT: 223 LBS | BODY MASS INDEX: 29.55 KG/M2 | OXYGEN SATURATION: 96 % | TEMPERATURE: 98.1 F | SYSTOLIC BLOOD PRESSURE: 132 MMHG | HEIGHT: 73 IN

## 2023-01-17 DIAGNOSIS — N63.0 BREAST MASS IN MALE: ICD-10-CM

## 2023-01-17 DIAGNOSIS — I11.9 BENIGN HYPERTENSIVE HEART DISEASE WITHOUT HEART FAILURE: ICD-10-CM

## 2023-01-17 DIAGNOSIS — I25.10 ATHEROSCLEROSIS OF NATIVE CORONARY ARTERY OF NATIVE HEART WITHOUT ANGINA PECTORIS: ICD-10-CM

## 2023-01-17 DIAGNOSIS — N63.25 MASS OVERLAPPING MULTIPLE QUADRANTS OF LEFT BREAST: ICD-10-CM

## 2023-01-17 DIAGNOSIS — Z13.1 SCREENING FOR DIABETES MELLITUS: ICD-10-CM

## 2023-01-17 DIAGNOSIS — E78.5 DYSLIPIDEMIA: ICD-10-CM

## 2023-01-17 DIAGNOSIS — I48.91 ATRIAL FIBRILLATION, UNSPECIFIED TYPE (HCC): ICD-10-CM

## 2023-01-17 DIAGNOSIS — Z00.00 MEDICARE ANNUAL WELLNESS VISIT, SUBSEQUENT: Primary | ICD-10-CM

## 2023-01-17 DIAGNOSIS — Z71.89 ADVANCED DIRECTIVES, COUNSELING/DISCUSSION: ICD-10-CM

## 2023-01-17 DIAGNOSIS — I73.9 PAD (PERIPHERAL ARTERY DISEASE) (HCC): ICD-10-CM

## 2023-01-17 DIAGNOSIS — R73.01 IFG (IMPAIRED FASTING GLUCOSE): ICD-10-CM

## 2023-01-17 DIAGNOSIS — Z85.46 HISTORY OF PROSTATE CANCER: ICD-10-CM

## 2023-01-17 PROCEDURE — 99214 OFFICE O/P EST MOD 30 MIN: CPT | Performed by: INTERNAL MEDICINE

## 2023-01-17 PROCEDURE — G0463 HOSPITAL OUTPT CLINIC VISIT: HCPCS | Performed by: INTERNAL MEDICINE

## 2023-01-17 PROCEDURE — G8427 DOCREV CUR MEDS BY ELIG CLIN: HCPCS | Performed by: INTERNAL MEDICINE

## 2023-01-17 PROCEDURE — G0439 PPPS, SUBSEQ VISIT: HCPCS | Performed by: INTERNAL MEDICINE

## 2023-01-17 PROCEDURE — 99497 ADVNCD CARE PLAN 30 MIN: CPT | Performed by: INTERNAL MEDICINE

## 2023-01-17 PROCEDURE — G8536 NO DOC ELDER MAL SCRN: HCPCS | Performed by: INTERNAL MEDICINE

## 2023-01-17 PROCEDURE — 84146 ASSAY OF PROLACTIN: CPT

## 2023-01-17 PROCEDURE — 1123F ACP DISCUSS/DSCN MKR DOCD: CPT | Performed by: INTERNAL MEDICINE

## 2023-01-17 PROCEDURE — 36415 COLL VENOUS BLD VENIPUNCTURE: CPT

## 2023-01-17 PROCEDURE — G8510 SCR DEP NEG, NO PLAN REQD: HCPCS | Performed by: INTERNAL MEDICINE

## 2023-01-17 PROCEDURE — 1101F PT FALLS ASSESS-DOCD LE1/YR: CPT | Performed by: INTERNAL MEDICINE

## 2023-01-17 PROCEDURE — G8417 CALC BMI ABV UP PARAM F/U: HCPCS | Performed by: INTERNAL MEDICINE

## 2023-01-17 NOTE — PATIENT INSTRUCTIONS
Medicare Wellness Visit, Male    The best way to live healthy is to have a lifestyle where you eat a well-balanced diet, exercise regularly, limit alcohol use, and quit all forms of tobacco/nicotine, if applicable. Regular preventive services are another way to keep healthy. Preventive services (vaccines, screening tests, monitoring & exams) can help personalize your care plan, which helps you manage your own care. Screening tests can find health problems at the earliest stages, when they are easiest to treat. [facility] follows the current, evidence-based guidelines published by the [facility] ([facility]) when recommending preventive services for our patients. Because we follow these guidelines, sometimes recommendations change over time as research supports it. (For example, a prostate screening blood test is no longer routinely recommended for men with no symptoms). Of course, you and your doctor may decide to screen more often for some diseases, based on your risk and co-morbidities (chronic disease you are already diagnosed with). Preventive services for you include:  - Medicare offers their members a free annual wellness visit, which is time for you and your primary care provider to discuss and plan for your preventive service needs.  Take advantage of this benefit every year!    -Over the age of 72 should receive the recommended pneumonia vaccines.   -All adults should have a flu vaccine yearly.  -All adults should receive a tetanus vaccine every 10 years.  -Over the age of 48 should receive the shingles vaccines.    -All adults should be screened once for Hepatitis C.  -All adults age 38-68 who are overweight should have a diabetes screening test once every three years.   -Other screening tests & preventive services for persons with diabetes include: an eye exam to screen for diabetic retinopathy, a kidney function test, a foot exam, and stricter control over your cholesterol.   -Cardiovascular screening for adults with routine risk involves an electrocardiogram (ECG) at intervals determined by the provider.     -Colorectal cancer screening should be done for adults age 43-69 with no increased risk factors for colorectal cancer. There are a number of acceptable methods of screening for this type of cancer. Each test has its own benefits and drawbacks. Discuss with your provider what is most appropriate for you during your annual wellness visit. The different tests include: colonoscopy (considered the best screening method), a fecal occult blood test, a fecal DNA test, and sigmoidoscopy.    -Lung cancer screening is recommended annually with a low dose CT scan for adults between age 54 and 68, who have smoked at least 30 pack years (equivalent of 1 pack per day for 30 days), and who is a current smoker or quit less than 15 years ago. -An Abdominal Aortic Aneurysm (AAA) Screening is recommended for men age 73-68 who has ever smoked in their lifetime. Here is a list of your current Health Maintenance items (your personalized list of preventive services) with a due date: There are no preventive care reminders to display for this patient.

## 2023-01-17 NOTE — ACP (ADVANCE CARE PLANNING)
Advance Care Planning     Advance Care Planning (ACP) Physician/NP/PA Conversation      Date of Conversation: 1/17/2023  Conducted with: Patient with Decision Making Capacity    Healthcare Decision Maker:     Primary Decision Maker: Collette,Teresa  and surgery update - Spouse - [phone]  Click here to complete [address] including selection of the Healthcare Decision Maker Relationship (ie \"Primary\")      Today we documented Decision Maker(s) consistent with Legal Next of Kin hierarchy. Care Preferences:    Hospitalization: \"If your health worsens and it becomes clear that your chance of recovery is unlikely, what would be your preference regarding hospitalization? \"  The patient would prefer hospitalization. Ventilation: \"If you were unable to breathe on your own and your chance of recovery was unlikely, what would be your preference about the use of a ventilator (breathing machine) if it was available to you? \"   The patient would desire the use of a ventilator. Resuscitation: \"In the event your heart stopped as a result of an underlying serious health condition, would you want attempts to be made to restart your heart, or would you prefer a natural death? \"   Yes, attempt to resuscitate.     Additional topics discussed: ventilation preferences, hospitalization preferences, and resuscitation preferences    Conversation Outcomes / Follow-Up Plan:   ACP in process - information provided, considering goals and options  Reviewed DNR/DNI and patient elects Full Code (Attempt Resuscitation)     Length of Voluntary ACP Conversation in minutes:  16 minutes    Annie Barkley MD

## 2023-01-18 LAB — PROLACTIN SERPL-MCNC: 6 NG/ML

## 2023-01-19 ENCOUNTER — PATIENT MESSAGE (OUTPATIENT)
Dept: INTERNAL MEDICINE CLINIC | Age: 78
End: 2023-01-19

## 2023-01-19 DIAGNOSIS — I25.10 ATHEROSCLEROSIS OF NATIVE CORONARY ARTERY OF NATIVE HEART WITHOUT ANGINA PECTORIS: ICD-10-CM

## 2023-01-19 DIAGNOSIS — I73.9 PERIPHERAL VASCULAR DISEASE (HCC): ICD-10-CM

## 2023-01-20 RX ORDER — ICOSAPENT ETHYL 1000 MG/1
2 CAPSULE ORAL 2 TIMES DAILY WITH MEALS
Qty: 360 CAPSULE | Refills: 3 | Status: SHIPPED | OUTPATIENT
Start: 2023-01-20

## 2023-01-20 NOTE — TELEPHONE ENCOUNTER
From: Tom Parmar  Sent: 1/19/2023 5:07 PM EST  To: [facility]  Subject: Refill for Icosapent Ethyl Capsules Generic    Additionally, my wife was with me, and she also remembered, that we were told that DR Gamez's office would call me.

## 2023-01-24 ENCOUNTER — PATIENT MESSAGE (OUTPATIENT)
Dept: INTERNAL MEDICINE CLINIC | Age: 78
End: 2023-01-24

## 2023-01-24 DIAGNOSIS — M54.12 CERVICAL RADICULOPATHY: ICD-10-CM

## 2023-01-25 NOTE — TELEPHONE ENCOUNTER
PCP: Johnson Cruz MD    Last appt: 1/17/2023  Future Appointments   Date Time Provider Mary Reyes   1/26/2023  8:30 AM Juan Thayer DO [facility] BS AMB   7/3/2023  9:05 AM [facility] LAB VISIT [facility] AMB   7/17/2023  8:40 AM Nathan Webster MD [facility] BS AMB       Requested Prescriptions     Pending Prescriptions Disp Refills    HYDROcodone-acetaminophen (NORCO) 5-325 mg per tablet 120 Tablet 0     Sig: Take 1 Tablet by mouth every four (4) hours as needed (chronic pain) for up to 30 days. Max Daily Amount: 6 Tablets.

## 2023-01-26 ENCOUNTER — OFFICE VISIT (OUTPATIENT)
Dept: SURGERY | Age: 78
End: 2023-01-26
Payer: MEDICARE

## 2023-01-26 VITALS
DIASTOLIC BLOOD PRESSURE: 80 MMHG | HEIGHT: 72 IN | RESPIRATION RATE: 18 BRPM | WEIGHT: 226 LBS | TEMPERATURE: 97.8 F | SYSTOLIC BLOOD PRESSURE: 138 MMHG | HEART RATE: 64 BPM | OXYGEN SATURATION: 97 % | BODY MASS INDEX: 30.61 KG/M2

## 2023-01-26 DIAGNOSIS — N63.20 MASS OF LEFT BREAST, UNSPECIFIED QUADRANT: Primary | ICD-10-CM

## 2023-01-26 PROCEDURE — G8427 DOCREV CUR MEDS BY ELIG CLIN: HCPCS | Performed by: SURGERY

## 2023-01-26 PROCEDURE — G8536 NO DOC ELDER MAL SCRN: HCPCS | Performed by: SURGERY

## 2023-01-26 PROCEDURE — 1123F ACP DISCUSS/DSCN MKR DOCD: CPT | Performed by: SURGERY

## 2023-01-26 PROCEDURE — 99203 OFFICE O/P NEW LOW 30 MIN: CPT | Performed by: SURGERY

## 2023-01-26 PROCEDURE — G8417 CALC BMI ABV UP PARAM F/U: HCPCS | Performed by: SURGERY

## 2023-01-26 PROCEDURE — G8432 DEP SCR NOT DOC, RNG: HCPCS | Performed by: SURGERY

## 2023-01-26 PROCEDURE — 1101F PT FALLS ASSESS-DOCD LE1/YR: CPT | Performed by: SURGERY

## 2023-01-26 RX ORDER — HYDROCODONE BITARTRATE AND ACETAMINOPHEN 5; 325 MG/1; MG/1
TABLET ORAL
COMMUNITY

## 2023-01-26 NOTE — LETTER
1/26/2023    Patient: Patti Landers   YOB: 1945   Date of Visit: 1/26/2023     Monse Restrepo MD  [address] [facility]  [address]  [facility]    Dear Monse Restrepo MD,      Thank you for referring Mr. Ramila Storm to [address] for evaluation. My notes for this consultation are attached. If you have questions, please do not hesitate to call me. I look forward to following your patient along with you.       Sincerely,    Sonya Krueger, [address]

## 2023-01-26 NOTE — PROGRESS NOTES
CC:   Chief Complaint   Patient presents with    Advice Only     Mass left breast first noticed in nov 2022        Assessment:    ICD-10-CM ICD-9-CM    1. Mass of left breast, unspecified quadrant  N63.20 611.72 ANYI MAMMO LT DX INCL CAD      US BREAST LT LIMITED=<3 QUAD          Plan: We discussed the possibility of benign and malignant causes for breast mass which the most likely being gynecomastia. We will set him up for diagnostic imaging of the bilateral breast as well as left breast ultrasound. I will call him with results. If this is gynecomastia surgical options can be done or this could be monitored as this is a benign condition. If there is any suspicious on imaging then he will need to undergo biopsy. He agrees with this plan. HPI:    He has noted a lump in his left breast area. Initially felt it during a trip in Oct where the strap of his carry on was rubbing against the nipple. Discomfort then went away and he has noticed a lump behind the left nipple. He has not noticed anything on the right. He is not sure if there has been any increase in size. No headache, diplopia, nipple drainage. No family history of breast cancer. He does have a history of prostate cancer and states he has no testosterone but could not be put on any replacement medication for this due to the cancer. He reports no changes to any medications during this time. Weight is stable. He did undergo labs with his [facility] which were unremarkable. Allergies:   Allergies   Allergen Reactions    Ace Inhibitors Anaphylaxis    Methylprednisolone Anxiety    Atorvastatin Other (comments) and Unknown (comments)     Muscle pain    Lisinopril Shortness of Breath and Other (comments)     Turns bright red  Other reaction(s): Unknown    Nifedipine Other (comments)     Caused afib  Other reaction(s): Unknown    Other Medication Other (comments)     Vicryl suture on skin tends to be rejected with poor wound healing does better with monocryl    Pregabalin Other (comments)    Ramipril Unknown (comments)     Pt does not remember reaction  Other reaction(s): Unknown    Rosuvastatin Other (comments)     Muscle Pain         Medication Review:  Current Outpatient Medications on File Prior to Visit   Medication Sig Dispense Refill    HYDROcodone-acetaminophen (Norco) 5-325 mg per tablet Take  by mouth. icosapent ethyL (VASCEPA) 1 gram capsule Take 2 Capsules by mouth two (2) times daily (with meals). 360 Capsule 3    isosorbide dinitrate (ISORDIL) 10 mg tablet Take 1 Tablet by mouth three (3) times daily. As neededTake 10 mg by mouth three (3) times daily. As needed 60 Tablet 11    nebivoloL (BYSTOLIC) 5 mg tablet 5 mg. Based on blood pressure readings per pt      ketoconazole (NIZORAL) 2 % shampoo       clindamycin-benzoyl peroxide 1.2-2.5 % glwp       Lactobac no.41/Bifidobact no.7 (PROBIOTIC-10 PO) Take  by mouth.      gabapentin (NEURONTIN) 300 mg capsule Take 1 Capsule by mouth nightly. Max Daily Amount: 300 mg. 90 Capsule 1    LORazepam (Ativan) 1 mg tablet Take 1 Tablet by mouth every eight (8) hours as needed for Anxiety. 50 Tablet 0    diclofenac (VOLTAREN) 1 % gel Apply  to affected area four (4) times daily. Apply 4g to affected area up to four times daily. 5 Each 5    triamterene-hydroCHLOROthiazide (DYAZIDE) 37.5-25 mg per capsule       ezetimibe-simvastatin (VYTORIN) 10-40 mg per tablet TAKE 1 TABLET NIGHTLY 90 Tablet 3    losartan (COZAAR) 50 mg tablet Take 50 mg by mouth two (2) times a day. Dependent on blood pressure readings      clopidogreL (PLAVIX) 75 mg tab Take 1 Tab by mouth daily. 90 Tab 3    colestipol (COLESTID) 1 gram tablet Take 1 g by mouth daily as needed. aspirin delayed-release 81 mg tablet Take 81 mg by mouth daily. Cholecalciferol, Vitamin D3, 1,000 unit cap Take 1,000 Units by mouth two (2) times a day. MULTIVITAMIN PO Take  by mouth daily.       pantoprazole (PROTONIX) 40 mg tablet Take 40 mg by mouth daily. coenzyme q10 200 mg Cap Take  by mouth daily. meclizine (ANTIVERT) 25 mg tablet Take  by mouth three (3) times daily as needed for Dizziness. (Patient not taking: Reported on 2023)      Methylcellulose, with Sugar, (CitruceL, sucrose,) powd Take  by mouth. (Patient not taking: Reported on 2023)      [] HYDROcodone-acetaminophen (NORCO) 5-325 mg per tablet Take 1 Tablet by mouth every six (6) hours as needed for Pain for up to 30 days. Max Daily Amount: 4 Tablets. 120 Tablet 0    nitroglycerin (NITROSTAT) 0.4 mg SL tablet  (Patient not taking: Reported on 2023)       No current facility-administered medications on file prior to visit. Systems Review:  Review of Systems   Constitutional:  Negative for fatigue, fever and unexpected weight change. Respiratory:  Negative for chest tightness and shortness of breath. Cardiovascular:  Negative for chest pain and palpitations. Skin:  Negative for color change, pallor, rash and wound. Hematological:  Negative for adenopathy. Does not bruise/bleed easily.      PMH:  Past Medical History:   Diagnosis Date    Adrenal adenoma     LEFT 1 cm, no change 1/15,     Aortoiliac occlusive disease ([facility] [address] 2019    Asbestosis     CT  showed pleural plaques    Atrial fibrillation 10/02/2009    s/p Afib ablation ; no oac due to prior gi bleed    BCC (basal cell carcinoma of skin)     Dr Good Lal; he's had >350 lesions removed    CAD (coronary artery disease)     RCA - 3.0 x 16mm TAXUS (); 3.0 x 16mm TAXUS ()    Carotid disease, bilateral (HCC)     Chronic pain     myofascial pain syndrome; seen in pain clinic in past    Colitis     Colon polyp     Dr Gumaro Hanson 9/15    Degenerative arthritis of cervical spine     MRI  showed C3-6 severe foraminal stenosis w RADICULOPATHY    Degenerative arthritis of lumbar spine     Dr Arnel Almonte;  MRI  L4-5 disc bulging, annular tear, disc dessication w RADICULOPATHY; intol lyrica, using kit qhs; saw Dr Odella Snellen; now Dr Mirian Ellison doing RFA 2021    Diverticulosis 04/2021    Dr Varun Jones    Dyslipidemia     ED (erectile dysfunction)     GERD     H/O cardiac catheterization 08/2021    [facility] Dr Candido Blanton patent mid RCA (12/06) and distal RCA (9/04) stents    H/O cardiovascular stress test     [facility] mod reversible inferolat defect, no wma, ef 64%, no TID (8/21)    H/O echocardiogram     [facility] ef 55%, mild AI, tr MR (8/21)    Hearing loss 2014    Dr Cole Reyna    Hypertension     with component of white coat    Hypogonadism male     IFG (impaired fasting glucose) [id number]    OAB (overactive bladder)     Osteoarthritis     Dr Joey Lou    Overweight (BMI 25.0-29. 9)     IF 5/18 start weight 214 lbs, not doing    PAD (peripheral artery disease) (HCC)     Peripheral vascular disease     50-60% R iliac; s/p R iliac stent and L femoral artery stent in past; R OLIVIA 0.76 (1/16); PTA LEFT popliteal/SFA/prox peroneal/TP and atherectomy TP  Dr Mamie Batista (6/22)    Plantar fasciitis     bilateral     PPD positive     Prostate cancer     T1c Imelda 7(3+4), 70% in 1 core, GS 7 (3+4) in 4 cores, GS 6 (3+3) in 1 core; psa 5.28, TRUS 18 gm;  Dr Varun Jones; s/p cryoablation 10/16    Recurrent umbilical hernia 59/00/2139    Stool color black     ulcerative colitis    Tinnitus     Dr Karen Schmitz    Ulnar neuritis, right 11/13/2017    Venous insufficiency        Surgical History:  Past Surgical History:   Procedure Laterality Date    COLONOSCOPY N/A 04/20/2021 2/2/11 neg; Dr Varun Jones 9/15 polyps; 4/20/21 divertics bx neg    HX CAROTID ENDARTERECTOMY Right 01/2014    HX CARPAL TUNNEL RELEASE Right 2017    HX CATARACT REMOVAL Bilateral     HX CRYOABLATION OF THE PROSTATE  10/2016     [address]    x 4    HX HERNIA REPAIR  02/2016    Dr Nelia Shepard    HX [address]      ablation and cardiac stents    ME UNLISTED PROCEDURE VASCULAR SURGERY Right     RIGHT Iliac ();  LEFT pseudoaneurysm repair () Dr Kusum Patel Left 2015    LEFT fem artery and iliac stents (10/15); RCF endarterectomy w patch angioplasty/B CI artery stenting (); LEFT 'shockwave' tx Dr Danya Kyle ()       Social History:  Social History     Socioeconomic History    Marital status:    Tobacco Use    Smoking status: Former     Packs/day: 1.50     Years: 25.00     Pack years: 37.50     Types: Cigarettes     Quit date: 2003     Years since quittin.0    Smokeless tobacco: Never   Vaping Use    Vaping Use: Never used   Substance and Sexual Activity    Alcohol use: Yes     Comment: RARELY    Drug use: Never    Sexual activity: Yes     Partners: Female     Birth control/protection: None       Family History:  Family History   Problem Relation Age of Onset    Heart Disease Mother     Hypertension Mother     Diabetes Mother     OSTEOARTHRITIS Mother     Heart Disease Father     Stroke Father     Hypertension Father     Heart Disease Sister     Hypertension Sister        Hospital Outpatient Visit on 2023   Component Date Value Ref Range Status    Prolactin 2023 6.0  ng/mL Final    Males:            2.1-17.7    ng/mL   Hospital Outpatient Visit on 01/10/2023   Component Date Value Ref Range Status    Sodium 01/10/2023 140  136 - 145 mmol/L Final    Potassium 01/10/2023 3.8  3.5 - 5.5 mmol/L Final    Chloride 01/10/2023 100  100 - 111 mmol/L Final    CO2 01/10/2023 34 (A)  21 - 32 mmol/L Final    Anion gap 01/10/2023 6  3.0 - 18 mmol/L Final    Glucose 01/10/2023 114 (A)  74 - 99 mg/dL Final    BUN 01/10/2023 12  7.0 - 18 MG/DL Final    Creatinine 01/10/2023 0.77  0.6 - 1.3 MG/DL Final    BUN/Creatinine ratio 01/10/2023 16  12 - 20   Final    eGFR 01/10/2023 >60  >60 ml/min/1.73m2 Final    Comment:      Pediatric calculator link: Claudio.at. org/professionals/kdoqi/gfr_calculatorped       These results are not intended for use in patients <25years of age. eGFR results are calculated without a race factor using  the 2021 CKD-EPI equation. Careful clinical correlation is recommended, particularly when comparing to results calculated using previous equations. The CKD-EPI equation is less accurate in patients with extremes of muscle mass, extra-renal metabolism of creatinine, excessive creatine ingestion, or following therapy that affects renal tubular secretion. Calcium 01/10/2023 9.8  8.5 - 10.1 MG/DL Final    LIPID PROFILE 01/10/2023        Final    Cholesterol, total 01/10/2023 130  <200 MG/DL Final    Triglyceride 01/10/2023 106  <150 MG/DL Final    Comment: The drugs N-acetylcysteine (NAC) and  Metamiszole have been found to cause falsely  low results in this chemical assay. Please  be sure to submit blood samples obtained  BEFORE administration of either of these  drugs to assure correct results.       HDL Cholesterol 01/10/2023 44  40 - 60 MG/DL Final    LDL, calculated 01/10/2023 64.8  0 - 100 MG/DL Final    VLDL, calculated 01/10/2023 21.2  MG/DL Final    CHOL/HDL Ratio 01/10/2023 3.0  0 - 5.0   Final    TSH 01/10/2023 0.39  0.36 - 3.74 uIU/mL Final    T4, Free 01/10/2023 1.2  0.7 - 1.5 NG/DL Final   Office Visit on 12/22/2022   Component Date Value Ref Range Status    Color (UA POC) 12/22/2022 Yellow   Final    Clarity (UA POC) 12/22/2022 Clear   Final    Glucose (UA POC) 12/22/2022 Negative  Negative Final    Bilirubin (UA POC) 12/22/2022 Negative  Negative Final    Ketones (UA POC) 12/22/2022 Negative  Negative Final    Specific gravity (UA POC) 12/22/2022 1.020  1.001 - 1.035 Final    Blood (UA POC) 12/22/2022 Negative  Negative Final    pH (UA POC) 12/22/2022 5.5  4.6 - 8.0 Final    Protein (UA POC) 12/22/2022 Negative  Negative Final    Urobilinogen (UA POC) 12/22/2022 1 mg/dL  0.2 - 1 Final    Nitrites (UA POC) 12/22/2022 Negative  Negative Final    Leukocyte esterase (UA POC) 12/22/2022 Negative  Negative Final   Hospital Outpatient Visit on 11/28/2022   Component Date Value Ref Range Status    Amphetamine Screen, urine 11/28/2022 Negative  Cyqwyk=1311 ng/mL Final    Barbiturates Screen, urine 11/28/2022 Negative  Vcupdf=342 ng/mL Final    Benzodiazepines Screen, urine 11/28/2022 Negative  Dpdrpd=956 ng/mL Final    Cannabinoid Screen, urine 11/28/2022 Negative  Cutoff=20 ng/mL Final    Cocaine Metab. Screen, urine 11/28/2022 Negative  Slgtqw=746 ng/mL Final    Opiate Screen, urine 11/28/2022 Positive (A)  Zlzbqs=277 ng/mL Final    Comment: (NOTE)  Opiate test includes Codeine, Morphine, Hydromorphone, Hydrocodone. Oxycodone/Oxymorphone, urine 11/28/2022 Negative  Zfgyxy=766 ng/mL Final    Comment: (NOTE)  Test includes Oxycodone and Oxymorphone      Phencyclidine Screen, urine 11/28/2022 Negative  Cutoff=25 ng/mL Final    Methadone Screen, urine 11/28/2022 Negative  Kfixyh=133 ng/mL Final    Propoxyphene Screen, urine 11/28/2022 Negative  Hhvdwt=635 ng/mL Final    Buprenorphine, urine 11/28/2022 Negative  Cutoff=10 ng/mL Final    Creatinine, urine 11/28/2022 25.0  20.0 - 300.0 mg/dL Final    pH, urine 11/28/2022 7.3  4.5 - 8.9   Final    Please note: 11/28/2022 Comment    Final    Comment: (NOTE)  This assay provides a preliminary unconfirmed analytical test  result that may be suitable for clinical management of patients  in certain situations. Drug-test results should be interpreted  in the context of clinical information. Patient metabolic  variables, specific drug chemistry, and specimen  characteristics can affect test outcome.  Technical consultation  is available if a test result is inconsistent with an expected  outcome. ([email or web address] or call toll-free  [phone])  Performed At: [facility] [address] [address]  Bere Cueva PhD YS:[id number]     Clinical Support on 11/28/2022   Component Date Value Ref Range Status    WBC 11/28/2022 8.5  3.4 - 10.8 x10E3/uL Final    RBC 11/28/2022 4.16  4.14 - 5.80 x10E6/uL Final    HGB 11/28/2022 14.5  13.0 - 17.7 g/dL Final    HCT 11/28/2022 40.6  37.5 - 51.0 % Final    MCV 11/28/2022 98 (A)  79 - 97 fL Final    MCH 11/28/2022 34.9 (A)  26.6 - 33.0 pg Final    MCHC 11/28/2022 35.7  31.5 - 35.7 g/dL Final    RDW 11/28/2022 12.5  11.6 - 15.4 % Final    PLATELET 01/74/7518 504  150 - 450 x10E3/uL Final    Glucose 11/28/2022 99  70 - 99 mg/dL Final    BUN 11/28/2022 12  8 - 27 mg/dL Final    Creatinine 11/28/2022 0.72 (A)  0.76 - 1.27 mg/dL Final    eGFR 11/28/2022 94  >59 mL/min/1.73 Final    BUN/Creatinine ratio 11/28/2022 17  10 - 24 Final    Sodium 11/28/2022 140  134 - 144 mmol/L Final    Potassium 11/28/2022 4.1  3.5 - 5.2 mmol/L Final    Chloride 11/28/2022 98  96 - 106 mmol/L Final    CO2 11/28/2022 27  20 - 29 mmol/L Final    Calcium 11/28/2022 9.4  8.6 - 10.2 mg/dL Final    Protein, total 11/28/2022 6.8  6.0 - 8.5 g/dL Final    Albumin 11/28/2022 4.3  3.7 - 4.7 g/dL Final    GLOBULIN, TOTAL 11/28/2022 2.5  1.5 - 4.5 g/dL Final    A-G Ratio 11/28/2022 1.7  1.2 - 2.2 Final    Bilirubin, total 11/28/2022 0.6  0.0 - 1.2 mg/dL Final    Alk. phosphatase 11/28/2022 59  44 - 121 IU/L Final    AST (SGOT) 11/28/2022 22  0 - 40 IU/L Final    ALT (SGPT) 11/28/2022 18  0 - 44 IU/L Final    Prostate Specific Ag 11/28/2022 0.3  0.0 - 4.0 ng/mL Final    Comment: Roche ECLIA methodology. According to the American Urological Association, Serum PSA should  decrease and remain at undetectable levels after radical  prostatectomy. The AUA defines biochemical recurrence as an initial  PSA value 0.2 ng/mL or greater followed by a subsequent confirmatory  PSA value 0.2 ng/mL or greater. Values obtained with different assay methods or kits cannot be used  interchangeably. Results cannot be interpreted as absolute evidence  of the presence or absence of malignant disease.       Testosterone 11/28/2022 236 A) 821 - 916 ng/dL Final    Comment: Adult male reference interval is based on a population of  healthy nonobese males (BMI <30) between 23and 44years old. [facility] [place] [address] 8541,124;[id number]. PMID: [id number]. VITAMIN D, 25-HYDROXY 11/28/2022 40.2  30.0 - 100.0 ng/mL Final    Comment: Vitamin D deficiency has been defined by the [address] practice guideline as a  level of serum 25-OH vitamin D less than 20 ng/mL (1,2). The Endocrine Society went on to further define vitamin D  insufficiency as a level between 21 and 29 ng/mL (2). 1. IOM ([place] of Medicine). 2010. Dietary reference     intakes for calcium and D. [facility]: The     [place]. 2. Ana MF, Archie GREENBERG, Jose Guadalupe GARCIA, et al.     Evaluation, treatment, and prevention of vitamin D     deficiency: an Endocrine Society clinical practice     guideline. JCEM. 2011 Jul; 96(7):1911-30. DUPLEX AORTA ILIAC GRAFT COMPLETE  No evidence of aortic aneurysm. Bilateral common iliac artery stents are patent without significant   stenosis. Bilateral external iliac arteries are patent without significant stenosis. Bilateral renal arteries are patent. Celiac and superior mesenteric artery are patent. DUPLEX LOWER EXT ARTERY RIGHT  Moderate arterial insufficiency right lower extremity with preserved   multiphasic waveforms         Physical Exam:  Visit Vitals  /80   Pulse 64   Temp 97.8 °F (36.6 °C)   Resp 18   Ht 6' (1.829 m)   Wt 102.5 kg (226 lb)   SpO2 97%   BMI 30.65 kg/m²    BMI: Body mass index is 30.65 kg/m². Physical Exam  Constitutional:       Appearance: Normal appearance. He is normal weight. Cardiovascular:      Rate and Rhythm: Normal rate. Pulmonary:      Effort: Pulmonary effort is normal.   Chest:   Breasts:     Right: Normal.      Left: Mass present. No nipple discharge, skin change or tenderness.           Comments: Rubbery nodularity posterior to nipple  Skin:     General: Skin is warm and dry. Neurological:      General: No focal deficit present. Mental Status: He is alert and oriented to person, place, and time. Mental status is at baseline. Psychiatric:         Mood and Affect: Mood normal.         Behavior: Behavior normal.         Thought Content: Thought content normal.         Judgment: Judgment normal.       I have reviewed the information entered by the clinical staff and/or patient and verified it as accurate or edited where necessary.      Electronically signed by:    Frankie Petty DO, MPH

## 2023-01-27 ENCOUNTER — PATIENT MESSAGE (OUTPATIENT)
Dept: INTERNAL MEDICINE CLINIC | Age: 78
End: 2023-01-27

## 2023-01-27 DIAGNOSIS — M54.12 CERVICAL RADICULOPATHY: ICD-10-CM

## 2023-01-29 ENCOUNTER — PATIENT MESSAGE (OUTPATIENT)
Dept: INTERNAL MEDICINE CLINIC | Age: 78
End: 2023-01-29

## 2023-01-30 ENCOUNTER — PATIENT MESSAGE (OUTPATIENT)
Dept: INTERNAL MEDICINE CLINIC | Age: 78
End: 2023-01-30

## 2023-01-30 DIAGNOSIS — M54.12 CERVICAL RADICULOPATHY: ICD-10-CM

## 2023-01-30 RX ORDER — HYDROCODONE BITARTRATE AND ACETAMINOPHEN 5; 325 MG/1; MG/1
TABLET ORAL
Status: CANCELLED | OUTPATIENT
Start: 2023-01-30

## 2023-01-30 RX ORDER — HYDROCODONE BITARTRATE AND ACETAMINOPHEN 5; 325 MG/1; MG/1
1 TABLET ORAL
Qty: 120 TABLET | Refills: 0 | Status: SHIPPED | OUTPATIENT
Start: 2023-01-30 | End: 2023-01-30 | Stop reason: SDUPTHER

## 2023-01-30 RX ORDER — HYDROCODONE BITARTRATE AND ACETAMINOPHEN 5; 325 MG/1; MG/1
1 TABLET ORAL
Qty: 120 TABLET | Refills: 0 | Status: SHIPPED | OUTPATIENT
Start: 2023-01-30 | End: 2023-01-31

## 2023-01-30 NOTE — TELEPHONE ENCOUNTER
From: Libra Harkins  To: Deshawn Perdue MD  Sent: 1/27/2023 8:17 PM EST  Subject: Norco Refill    I see nothing about my refill for Stephanie Ley being approved or sent to the pharmacy. I will be out of this medicine by the first of the week. I requested the refill on Tuesday of this week, and I have not received any feedback. the pharmacy has received nothing, this is very frustrating that each month I have to contract the office at least two or three times. Tomorrow I will call whoever is on call and see if I can get it done, otherwise I stretch the meds I have remaining and supplement with Acetaminophen. I would like for someone to explain to me why this is so difficult. Jeanine Mccurdy.

## 2023-01-30 NOTE — TELEPHONE ENCOUNTER
Pt called stating his Rx for norco is suppose to be sent to [facility] on [address] in [place] , he said it was sent to wrong pharmacy . and now he is out asking for to have it sent today and asking if someone could notify him when its sent .

## 2023-01-30 NOTE — TELEPHONE ENCOUNTER
Patient is calling back again stating that his prescription needs to be sent to [facility] on [address] in [place]. He said this needs to be done today  This has be going on to long and he is all out of his meds. Please Advise

## 2023-01-30 NOTE — TELEPHONE ENCOUNTER
Patient is calling again stating this needs to be taken care of today because he is out of the medication.

## 2023-01-30 NOTE — TELEPHONE ENCOUNTER
Requested Prescriptions     Pending Prescriptions Disp Refills    HYDROcodone-acetaminophen (Norco) 5-325 mg per tablet       Sig: Take  by mouth. Per pt he requested this last week. He is now down to 2 pills. He ask please process today.     Send to [facility]

## 2023-01-30 NOTE — TELEPHONE ENCOUNTER
From: Lue Pickett Collette  To: Wilian Willson MD  Sent: 1/29/2023 7:51 AM EST  Subject: Norco refill    I did not call anyone this weekend; I feel there must be some logical explanation as to why the Rx has not been authorized. If for some reason the Rx will not be filled, I would certainly want to know the reason. As far as I know I have not done anything to cause the Rx to not be filled. I will call the office first thing in the morning (Monday).   Please respond    José Miguel Gaytan

## 2023-01-31 RX ORDER — HYDROCODONE BITARTRATE AND ACETAMINOPHEN 5; 325 MG/1; MG/1
1 TABLET ORAL
Qty: 30 TABLET | Refills: 0 | Status: SHIPPED | OUTPATIENT
Start: 2023-01-31 | End: 2023-02-05

## 2023-01-31 NOTE — TELEPHONE ENCOUNTER
Pt calling says local pharmacy won't fill because mail order did. Mail order says it was mailed today but he won't get it for 10 days. He is out of the med because of this. Wants to speak to a nurse to see what else can be done for him.  [phone]

## 2023-01-31 NOTE — TELEPHONE ENCOUNTER
Contacted pharmacy. Pharmacist stated if you fill for a smaller dose of norco and put in prescriber comments \"OK to fill 1-31-23 until mail-order arrives\" He can fill it. Send to [facility] on [address].

## 2023-01-31 NOTE — TELEPHONE ENCOUNTER
Group Topic: BH Process Group     Date: 10/22/2021  Start Time:  1:00 PM  End Time:  2:00 PM  Facilitators: TYSHAWN Loera    Focus: Problem Solving Individual Concerns  Number in attendance: 8      Patient shared she is feeling \"great.\"  She discussed her weekend plans with her grandchildren, which include going to a fall festival.  Patient was heard offering supportive feedback to group members.    Method: Group  Attendance: Present  Participation: Moderate  Patient Response: Appropriate feedback, Attentive, Awareness of social/physical boundaries, Good eye contact and Interactive  Mood: Appropriate  Affect: Type: Appropriate   Range: Full (normal)   Congruency: Congruent   Stability: Stable  Behavior/Socialization: Appropriate to group, Cooperative, Engaged and Supportive  Thought Process: Focused  Task Performance: Follows directions  Patient Evaluation: Independent - full participation         [facility] will not fill the [place] because the mail order pharmacy has already filled and shipped. However, the medication won't be in until Feb 2 -7, 2023. Patient is asking if anything else can be prescribed to hold him until the medication arrives. Patient is also concerned someone will steal the [place] from his mail.

## 2023-01-31 NOTE — TELEPHONE ENCOUNTER
PCP: Maria Guadalupe Gannon MD    Last appt: 1/17/2023  Future Appointments   Date Time Provider Mary Reyes   2/3/2023  8:30 AM [facility] ANYI RM 3 3D [facility]AM [facility]   2/3/2023  9:00 AM [facility] ANYI US RM 1 [facility]US [facility]       Requested Prescriptions     Pending Prescriptions Disp Refills    HYDROcodone-acetaminophen (NORCO) 5-325 mg per tablet 30 Tablet 0     Sig: Take 1 Tablet by mouth every four (4) hours as needed (chronic pain) for up to 5 days. Max Daily Amount: 6 Tablets.  Indications: pain

## 2023-02-01 ENCOUNTER — DOCUMENTATION ONLY (OUTPATIENT)
Dept: ORTHOPEDIC SURGERY | Age: 78
End: 2023-02-01

## 2023-02-02 ENCOUNTER — TELEPHONE (OUTPATIENT)
Dept: INTERNAL MEDICINE CLINIC | Age: 78
End: 2023-02-02

## 2023-02-02 NOTE — TELEPHONE ENCOUNTER
Patient called concerning his pain medication sent to mail order. Patient stated the medication arrived in 2 bottles. Hydrocodone bitartrate, acetaminophen; 5mg/325mg   And   HYDROcodone-acetaminophen (Norco) 5-325 mg     Patient contacted [facility] pharmacy and was advised the two were the same medication. However, he is asking for reassurance from our office. Please advise.

## 2023-02-03 ENCOUNTER — HOSPITAL ENCOUNTER (OUTPATIENT)
Dept: ULTRASOUND IMAGING | Age: 78
End: 2023-02-03
Attending: SURGERY
Payer: MEDICARE

## 2023-02-03 ENCOUNTER — HOSPITAL ENCOUNTER (OUTPATIENT)
Dept: MAMMOGRAPHY | Age: 78
End: 2023-02-03
Attending: SURGERY
Payer: MEDICARE

## 2023-02-03 DIAGNOSIS — N63.20 MASS OF LEFT BREAST, UNSPECIFIED QUADRANT: ICD-10-CM

## 2023-02-03 DIAGNOSIS — N63.0 LUMP OR MASS IN BREAST: ICD-10-CM

## 2023-02-03 PROCEDURE — 77067 SCR MAMMO BI INCL CAD: CPT

## 2023-02-03 PROCEDURE — 76642 ULTRASOUND BREAST LIMITED: CPT

## 2023-02-03 PROCEDURE — 77066 DX MAMMO INCL CAD BI: CPT

## 2023-02-05 DIAGNOSIS — R73.01 IFG (IMPAIRED FASTING GLUCOSE): Primary | ICD-10-CM

## 2023-02-05 DIAGNOSIS — I73.9 PAD (PERIPHERAL ARTERY DISEASE) (HCC): ICD-10-CM

## 2023-02-05 DIAGNOSIS — K22.70 BARRETT'S ESOPHAGUS WITHOUT DYSPLASIA: Primary | ICD-10-CM

## 2023-02-05 DIAGNOSIS — I11.9 BENIGN HYPERTENSIVE HEART DISEASE WITHOUT HEART FAILURE: Primary | ICD-10-CM

## 2023-02-05 DIAGNOSIS — E78.5 DYSLIPIDEMIA: Primary | ICD-10-CM

## 2023-03-07 RX ORDER — MAGNESIUM OXIDE 400 MG/1
250 TABLET ORAL DAILY
COMMUNITY

## 2023-03-07 RX ORDER — METHYLCELLULOSE 500 MG/1
1 TABLET ORAL
COMMUNITY

## 2023-03-07 RX ORDER — HYDROCODONE BITARTRATE AND ACETAMINOPHEN 5; 325 MG/1; MG/1
1 TABLET ORAL EVERY 6 HOURS PRN
COMMUNITY

## 2023-03-07 NOTE — PERIOP NOTE
PRE-SURGICAL INSTRUCTIONS        Patient's Name:  Rainer GERBER Date:  3/7/2023            Covid Testing Date and Time:    Surgery Date:  03/09/2023                Do NOT eat or drink anything, including candy, gum, or ice chips after midnight on 03/08, unless you have specific instructions from your surgeon or anesthesia provider to do so. You may brush your teeth before coming to the hospital.  No smoking 24 hours prior to the day of surgery. No alcohol 24 hours prior to the day of surgery. No recreational drugs for one week prior to the day of surgery. Leave all valuables, including money/purse, at home. Remove all jewelry, nail polish, acrylic nails, and makeup (including mascara); no lotions powders, deodorant, or perfume/cologne/after shave on the skin. Follow instruction for Hibiclens washes and CHG wipes from surgeon's office. Glasses/contact lenses and dentures may be worn to the hospital.  They will be removed prior to surgery. Call your doctor if symptoms of a cold or illness develop within 24-48 hours prior to your surgery. 11.  If you are having an outpatient procedure, please make arrangements for a responsible ADULT TO [address] [place] [address] and stay with you for 24 hours after your surgery. 12. ONE VISITOR in the hospital at this time for outpatient procedures. Exceptions may be made for surgical admissions, per nursing unit guidelines      Special Instructions:      Bring list of CURRENT medications. Bring any pertinent legal medical records. Take these medications the morning of surgery with a sip of water:  BP medications  Follow physician instructions about stopping anticoagulants. On the day of surgery, come in the main entrance of [facility]. Let the  at the desk know you are there for surgery. A staff member will come escort you to the surgical area on the second floor.     If you have any questions or concerns, please do not hesitate to call:     (Prior to the day of surgery) [facility] department:  [phone]   (Day of surgery) Pre-Op department:  [phone]    These surgical instructions were reviewed with Monica Kimball during the [facility] phone call.

## 2023-03-08 ENCOUNTER — ANESTHESIA EVENT (OUTPATIENT)
Facility: HOSPITAL | Age: 78
End: 2023-03-08
Payer: MEDICARE

## 2023-03-09 ENCOUNTER — HOSPITAL ENCOUNTER (OUTPATIENT)
Facility: HOSPITAL | Age: 78
Setting detail: OUTPATIENT SURGERY
Discharge: HOME | End: 2023-03-09
Attending: INTERNAL MEDICINE | Admitting: INTERNAL MEDICINE
Payer: MEDICARE

## 2023-03-09 ENCOUNTER — ANESTHESIA (OUTPATIENT)
Facility: HOSPITAL | Age: 78
End: 2023-03-09
Payer: MEDICARE

## 2023-03-09 VITALS
BODY MASS INDEX: 29.66 KG/M2 | WEIGHT: 219 LBS | HEIGHT: 72 IN | HEART RATE: 62 BPM | OXYGEN SATURATION: 95 % | RESPIRATION RATE: 18 BRPM | DIASTOLIC BLOOD PRESSURE: 77 MMHG | SYSTOLIC BLOOD PRESSURE: 124 MMHG | TEMPERATURE: 97.7 F

## 2023-03-09 PROCEDURE — 7100000000 HC PACU RECOVERY - FIRST 15 MIN: Performed by: INTERNAL MEDICINE

## 2023-03-09 PROCEDURE — 3600007502: Performed by: INTERNAL MEDICINE

## 2023-03-09 PROCEDURE — 2709999900 HC NON-CHARGEABLE SUPPLY: Performed by: INTERNAL MEDICINE

## 2023-03-09 PROCEDURE — 2500000003 HC RX 250 WO HCPCS: Performed by: NURSE ANESTHETIST, CERTIFIED REGISTERED

## 2023-03-09 PROCEDURE — 88305 TISSUE EXAM BY PATHOLOGIST: CPT

## 2023-03-09 PROCEDURE — 2580000003 HC RX 258: Performed by: NURSE ANESTHETIST, CERTIFIED REGISTERED

## 2023-03-09 PROCEDURE — 3700000001 HC ADD 15 MINUTES (ANESTHESIA): Performed by: INTERNAL MEDICINE

## 2023-03-09 PROCEDURE — 7100000001 HC PACU RECOVERY - ADDTL 15 MIN: Performed by: INTERNAL MEDICINE

## 2023-03-09 PROCEDURE — A4216 STERILE WATER/SALINE, 10 ML: HCPCS | Performed by: NURSE ANESTHETIST, CERTIFIED REGISTERED

## 2023-03-09 PROCEDURE — 6360000002 HC RX W HCPCS: Performed by: ANESTHESIOLOGY

## 2023-03-09 PROCEDURE — 7100000010 HC PHASE II RECOVERY - FIRST 15 MIN: Performed by: INTERNAL MEDICINE

## 2023-03-09 PROCEDURE — 3700000000 HC ANESTHESIA ATTENDED CARE: Performed by: INTERNAL MEDICINE

## 2023-03-09 PROCEDURE — 3600007512: Performed by: INTERNAL MEDICINE

## 2023-03-09 RX ORDER — SODIUM CHLORIDE 0.9 % (FLUSH) 0.9 %
5-40 SYRINGE (ML) INJECTION EVERY 12 HOURS SCHEDULED
Status: DISCONTINUED | OUTPATIENT
Start: 2023-03-09 | End: 2023-03-09 | Stop reason: HOSPADM

## 2023-03-09 RX ORDER — SODIUM CHLORIDE, SODIUM LACTATE, POTASSIUM CHLORIDE, CALCIUM CHLORIDE 600; 310; 30; 20 MG/100ML; MG/100ML; MG/100ML; MG/100ML
INJECTION, SOLUTION INTRAVENOUS CONTINUOUS
Status: DISCONTINUED | OUTPATIENT
Start: 2023-03-09 | End: 2023-03-09 | Stop reason: HOSPADM

## 2023-03-09 RX ORDER — PROPOFOL 10 MG/ML
INJECTION, EMULSION INTRAVENOUS PRN
Status: DISCONTINUED | OUTPATIENT
Start: 2023-03-09 | End: 2023-03-09 | Stop reason: SDUPTHER

## 2023-03-09 RX ORDER — SODIUM CHLORIDE 9 MG/ML
INJECTION, SOLUTION INTRAVENOUS PRN
Status: DISCONTINUED | OUTPATIENT
Start: 2023-03-09 | End: 2023-03-09 | Stop reason: HOSPADM

## 2023-03-09 RX ORDER — SODIUM CHLORIDE 0.9 % (FLUSH) 0.9 %
5-40 SYRINGE (ML) INJECTION PRN
Status: DISCONTINUED | OUTPATIENT
Start: 2023-03-09 | End: 2023-03-09 | Stop reason: HOSPADM

## 2023-03-09 RX ADMIN — SODIUM CHLORIDE, POTASSIUM CHLORIDE, SODIUM LACTATE AND CALCIUM CHLORIDE: 600; 310; 30; 20 INJECTION, SOLUTION INTRAVENOUS at 08:39

## 2023-03-09 RX ADMIN — FAMOTIDINE 20 MG: 10 INJECTION, SOLUTION INTRAVENOUS at 08:39

## 2023-03-09 RX ADMIN — PROPOFOL 100 MG: 10 INJECTION, EMULSION INTRAVENOUS at 08:52

## 2023-03-09 ASSESSMENT — PAIN SCALES - GENERAL
PAINLEVEL_OUTOF10: 0

## 2023-03-09 ASSESSMENT — PAIN - FUNCTIONAL ASSESSMENT: PAIN_FUNCTIONAL_ASSESSMENT: 0-10

## 2023-03-09 NOTE — ANESTHESIA PRE PROCEDURE
Department of Anesthesiology  Preprocedure Note       Name:  Jazmin Donato   Age:  68 y.o.  :  1945                                          MRN:  [id number]         Date:  3/9/2023      Surgeon: Rosario Joshua):  Andrew Smith MD    Procedure: Procedure(s):  EGD ESOPHAGOGASTRODUODENOSCOPY    Medications prior to admission:   Prior to Admission medications    Medication Sig Start Date End Date Taking? Authorizing Provider   HYDROcodone-acetaminophen (NORCO) 5-325 MG per tablet Take 1 tablet by mouth every 6 hours as needed for Pain.    Yes Historical Provider, MD   Probiotic Product (PROBIOTIC ADVANCED PO) Take 1 capsule by mouth daily   Yes Historical Provider, MD   magnesium oxide (MAG-OX) 400 MG tablet Take 250 mg by mouth daily   Yes Historical Provider, MD   methylcellulose (CITRUCEL) 500 MG TABS Take 1 tablet by mouth nightly   Yes Historical Provider, MD   Multiple Vitamin (MULTIVITAMIN PO) Take by mouth daily 11   Ar Automatic Reconciliation   alclomethasone (ACLOVATE) 0.05 % cream Apply topically daily as needed    Ar Automatic Reconciliation   aspirin 81 MG EC tablet Take 81 mg by mouth daily    Ar Automatic Reconciliation   celecoxib (CELEBREX) 200 MG capsule Take 200 mg by mouth daily as needed 3/1/22   Ar Automatic Reconciliation   vitamin D 25 MCG (1000 UT) CAPS Take 1,000 Units by mouth 2 times daily    Ar Automatic Reconciliation   Clindamycin Phos-Benzoyl Perox 1.2-2.5 % GEL ceived the following from [facility]: Outside name: clindamycin-benzoyl peroxide 1.2-2.5 % glwp 22   Ar Automatic Reconciliation   clopidogrel (PLAVIX) 75 MG tablet Take 75 mg by mouth daily 20   Ar Automatic Reconciliation   coenzyme Q10 200 MG CAPS capsule Take by mouth daily    Ar Automatic Reconciliation   colestipol (COLESTID) 1 g tablet Take 1 g by mouth daily as needed 3/9/19   Ar Automatic Reconciliation   diclofenac sodium (VOLTAREN) 1 % GEL Apply topically 4 times daily as needed 9/21/22   Ar Automatic Reconciliation   ezetimibe-simvastatin (VYTORIN) 10-40 MG per tablet TAKE 1 TABLET NIGHTLY 2/2/22   Ar Automatic Reconciliation   gabapentin (NEURONTIN) 300 MG capsule Take 300 mg by mouth nightly. 12/20/22   Ar Automatic Reconciliation   Icosapent Ethyl (VASCEPA) 1 g CAPS capsule Take 2 capsules by mouth 2 times daily (with meals) 6/1/21   Ar Automatic Reconciliation   isosorbide dinitrate (ISORDIL) 10 MG tablet Take 10 mg by mouth 3 times daily as needed 9/2/21   Ar Automatic Reconciliation   ketoconazole (NIZORAL) 2 % shampoo ceived the following from [facility]: Outside name: ketoconazole (NIZORAL) 2 % shampoo 12/22/22   Ar Automatic Reconciliation   LORazepam (ATIVAN) 1 MG tablet Take 1 mg by mouth every 8 hours as needed.   Patient not taking: Reported on 3/7/2023 10/19/22   Ar Automatic Reconciliation   losartan (COZAAR) 50 MG tablet Take 50 mg by mouth 2 times daily 6/11/21   Ar Automatic Reconciliation   nebivolol (BYSTOLIC) 5 MG tablet Take 5 mg by mouth daily 12/1/22   Ar Automatic Reconciliation   nitroGLYCERIN (NITROSTAT) 0.4 MG SL tablet ceived the following from [facility]: Outside name: nitroglycerin (NITROSTAT) 0.4 mg SL tablet 6/15/21   Ar Automatic Reconciliation   pantoprazole (PROTONIX) 40 MG tablet Take 40 mg by mouth daily    Ar Automatic Reconciliation   triamterene-hydroCHLOROthiazide (DYAZIDE) 37.5-25 MG per capsule ceived the following from [facility]: Outside name: triamterene-hydroCHLOROthiazide (DYAZIDE) 37.5-25 mg per capsule 2/5/22   Ar Automatic Reconciliation       Current medications:    Current Facility-Administered Medications   Medication Dose Route Frequency Provider Last Rate Last Admin    lactated ringers IV soln infusion   IntraVENous Continuous Clista Exon, APRN - CRNA        sodium chloride flush 0.9 % injection 5-40 mL  5-40 mL IntraVENous 2 times per day Clista Exon, APRN - CRNA  sodium chloride flush 0.9 % injection 5-40 mL  5-40 mL IntraVENous PRN Loretta Jacintoer, APRN - CRNA        0.9 % sodium chloride infusion   IntraVENous PRN Loretta Bella, APRN - CRNA        famotidine (PEPCID) 20 mg in sodium chloride (PF) 0.9 % 10 mL injection  20 mg IntraVENous Once Loretta Bella APRN - CRNA           Allergies: Allergies   Allergen Reactions    Ace Inhibitors Anaphylaxis, Other (See Comments) and Shortness Of Breath     Turns bright red  Other reaction(s): Unknown      Methylprednisolone Anxiety    Atorvastatin Other (See Comments)     Other reaction(s): Unknown (comments)  Muscle pain    Nifedipine Other (See Comments)     Caused afib  Other reaction(s): Unknown    Pregabalin Other (See Comments)    Ramipril      Other reaction(s): Unknown (comments)  Pt does not remember reaction  Other reaction(s): Unknown    Rosuvastatin Other (See Comments)     Muscle Pain       Problem List:    Patient Active Problem List   Diagnosis Code    Osteoarthrosis M19.90    Atrial fibrillation (HCC) I48.91    Lumbar radiculopathy M54.16    History of prostate cancer Z85.46    ED (erectile dysfunction) of organic origin N52.9    Overweight (BMI 25.0-29. 9) E66.3    PAD (peripheral artery disease) ([facility]) I73.9    Colitis, ulcerative ([facility] K51.90    Benign hypertensive heart disease without heart failure I11.9    Dyslipidemia E78.5    Coronary atherosclerosis of native coronary artery I25.10    Colon polyps K63.5    Hypovitaminosis D E55.9    Subclinical hypothyroidism E03.8    Advance directive in chart Z78.9    GERD (gastroesophageal reflux disease) K21.9    Asbestosis ([facility] J61    Cervical spinal stenosis M48.02    IFG (impaired fasting glucose) R73.01    Diverticulosis K57.90    Left carotid artery occlusion I65.22       Past Medical History:        Diagnosis Date    Adrenal adenoma 2009    LEFT 1 cm, no change 1/15, 2/16    Aortoiliac occlusive disease ([facility] 03/04/2019  Asbestosis(501)     CT 1/19 showed pleural plaques    Atrial fibrillation ([address] 10/02/2009    s/p Afib ablation 2008; no oac due to prior gi bleed    BCC (basal cell carcinoma of skin)     Dr Noemí Zhong; he's had >350 lesions removed    CAD (coronary artery disease)     RCA - 3.0 x 16mm TAXUS (9/04); 3.0 x 16mm TAXUS (12/06)    Carotid disease, bilateral (HCC)     Chronic pain     myofascial pain syndrome; seen in pain clinic in past    Colitis     Colon polyp     Dr Margo Newell 9/15    Degenerative arthritis of cervical spine     MRI 9/11 showed C3-6 severe foraminal stenosis w RADICULOPATHY    Degenerative arthritis of lumbar spine     Dr Corrine Villa;  MRI 9/11 L4-5 disc bulging, annular tear, disc dessication w RADICULOPATHY; intol lyrica, using elen qhs; saw Dr Iveth Schultz; now Dr Leopold Friedman doing RFA 2021    Diverticulosis 04/2021    Dr Jeanice Holstein Dyslipidemia     ED (erectile dysfunction)     GERD (gastroesophageal reflux disease)     H/O cardiac catheterization 08/2021    [facility] Dr Chu Geller patent mid RCA (12/06) and distal RCA (9/04) stents    H/O cardiovascular stress test     [facility] mod reversible inferolat defect, no wma, ef 64%, no TID (8/21)    H/O echocardiogram     [facility] ef 55%, mild AI, tr MR (8/21)    Hearing loss, bilateral 2014    Dr Kait Luis Hypertension     with component of white coat    OAB (overactive bladder)     Osteoarthritis     Dr Moseley Just Overweight (BMI 25.0-29. 9)     IF 5/18 start weight 214 lbs, not doing    PAD (peripheral artery disease) ([address]     Peripheral vascular disease (HCC)     50-60% R iliac; s/p R iliac stent and L femoral artery stent in past; R AIDE 0.76 (1/16); PTA LEFT popliteal/SFA/prox peroneal/TP and atherectomy TP  Dr Ludy Smith (6/22)    PPD positive     Prostate cancer ([address]     T1c Pippa 7(3+4), 70% in 1 core, GS 7 (3+4) in 4 cores, GS 6 (3+3) in 1 core; psa 5.28, TRUS 18 gm;  Dr Margo Newell; s/p cryoablation 10/16    [facility]     Recurrent umbilical hernia     Stool color black     ulcerative colitis    Venous insufficiency        Past Surgical History:        Procedure Laterality Date    CAROTID ENDARTERECTOMY Right 2014    CARPAL TUNNEL RELEASE Right 2017    CATARACT REMOVAL Bilateral     COLONOSCOPY N/A 2021 neg; Dr Lisa Ruffin 9/15 polyps; 21 divertics bx neg   [address]  2016    Dr Milagros Valles, 1975    x 4    KNEE ARTHROSCOPY Right     [place] UNLISTED PROCEDURE CARDIAC SURGERY      ablation and cardiac stents    PROSTATE CRYOABLATION  10/2016    TONSILLECTOMY      VASCULAR SURGERY Left     LEFT fem artery and iliac stents (10/15); RCF endarterectomy w patch angioplasty/B CI artery stenting (); LEFT 'shockwave' tx Dr Alessio Strange ()    VASCULAR SURGERY Right     RIGHT Iliac ();  LEFT pseudoaneurysm repair () Dr Alessio Strange       Social History:    Social History     Tobacco Use    Smoking status: Former     Packs/day: 1.50     Types: Cigarettes     Quit date: 2003     Years since quittin.1    Smokeless tobacco: Never   Substance Use Topics    Alcohol use: Not Currently                                Counseling given: Not Answered      Vital Signs (Current):   Vitals:    23 1123   Weight: 219 lb (99.3 kg)   Height: 6' (1.829 m)                                              BP Readings from Last 3 Encounters:   23 138/80   23 132/77   22 (!) 145/69       NPO Status:                                                                                 BMI:   Wt Readings from Last 3 Encounters:   23 219 lb (99.3 kg)   23 226 lb (102.5 kg)   23 223 lb (101.2 kg)     Body mass index is 29.7 kg/m².     CBC:   Lab Results   Component Value Date/Time    WBC 8.5 2022 01:25 PM    RBC 4.16 2022 01:25 PM    HGB 14.5 2022 01:25 PM    HCT 40.6 2022 01:25 PM    MCV 98 11/28/2022 01:25 PM    RDW 12.5 11/28/2022 01:25 PM     11/28/2022 01:25 PM       CMP:   Lab Results   Component Value Date/Time     01/10/2023 08:18 AM    K 3.8 01/10/2023 08:18 AM     01/10/2023 08:18 AM    CO2 34 01/10/2023 08:18 AM    BUN 12 01/10/2023 08:18 AM    CREATININE 0.77 01/10/2023 08:18 AM    GFRAA >60 06/21/2022 09:29 AM    AGRATIO 1.7 11/28/2022 01:25 PM    LABGLOM 94 11/28/2022 01:25 PM    GLUCOSE 114 01/10/2023 08:18 AM    PROT 6.8 11/28/2022 01:25 PM    CALCIUM 9.8 01/10/2023 08:18 AM    BILITOT 0.6 11/28/2022 01:25 PM    ALKPHOS 59 11/28/2022 01:25 PM    AST 22 11/28/2022 01:25 PM    ALT 18 11/28/2022 01:25 PM       POC Tests: No results for input(s): POCGLU, POCNA, POCK, POCCL, POCBUN, POCHEMO, POCHCT in the last 72 hours.     Coags:   Lab Results   Component Value Date/Time    PROTIME 13.8 06/24/2019 11:00 AM    INR 1.1 06/24/2019 11:00 AM       HCG (If Applicable): No results found for: PREGTESTUR, PREGSERUM, HCG, HCGQUANT     ABGs: No results found for: PHART, PO2ART, SSP0GFW, EBA7LSQ, BEART, O2BATMUE     Type & Screen (If Applicable):  No results found for: LABABO, LABRH    Drug/Infectious Status (If Applicable):  Lab Results   Component Value Date/Time    HEPCAB 0.04 12/16/2015 07:48 AM    HEPCAB NEGATIVE 12/16/2015 07:48 AM       COVID-19 Screening (If Applicable):   Lab Results   Component Value Date/Time    COVID19 Not Detected 04/15/2021 08:25 AM           Anesthesia Evaluation  Patient summary reviewed and Nursing notes reviewed  Airway: Mallampati: II          Dental: normal exam         Pulmonary:Negative Pulmonary ROS breath sounds clear to auscultation                             Cardiovascular:  Exercise tolerance: good (>4 METS),   (+) hypertension:, CAD:, hyperlipidemia          Rate: normal                    Neuro/Psych:   (+) neuromuscular disease:,             GI/Hepatic/Renal:   (+) GERD:, PUD,           Endo/Other:    (+) hypothyroidism::., .                 Abdominal:             Vascular: Other Findings:           Anesthesia Plan      MAC     ASA 3       Induction: intravenous. Anesthetic plan and risks discussed with patient.                         Yaa Degroot MD   3/9/2023

## 2023-03-09 NOTE — BRIEF OP NOTE
Brief Postoperative Note      Patient: Charles Thomas Collette  YOB: 1945  MRN: [id number]    Date of Procedure: 3/9/2023    Pre-Op Diagnosis: Diarrhea, unspecified type [R19.7]  Gastroesophageal reflux disease, unspecified whether esophagitis present [K21.9]  Polyp of sigmoid colon, unspecified type [K63.5]    Post-Op Diagnosis:  Russo's        Procedure(s):  EGD ESOPHAGOGASTRODUODENOSCOPY with barrette's esophagus bx's    Surgeon(s):  Marvin Francisco MD    Assistant:  * No surgical staff found *    Anesthesia: Monitor Anesthesia Care    Estimated Blood Loss (mL): minimal     Complications: None    Specimens:   ID Type Source Tests Collected by Time Destination   A : Barrette's esophagus bx's Tissue Esophagus SURGICAL PATHOLOGY Marvin Francisco MD 3/9/2023 0853        Implants:  * No implants in log *      Drains: * No LDAs found *    Findings: short segment Russo's    Electronically signed by Marvin Francisco MD on 3/9/2023 at 9:22 AM

## 2023-03-09 NOTE — DISCHARGE INSTRUCTIONS
Upper GI Endoscopy: What to Expect at [address] had an upper GI endoscopy. Your doctor used a thin, lighted tube that bends to look at the inside of your esophagus, your stomach, and the first part of the small intestine, called the duodenum. After you have an endoscopy, you will stay at the hospital or clinic for 1 to 2 hours. This will allow the medicine to wear off. You will be able to go home after your doctor or nurse checks to make sure that you're not having any problems. You may have to stay overnight if you had treatment during the test. You may have a sore throat for a day or two after the test.  This care sheet gives you a general idea about what to expect after the test.  How can you care for yourself at home? Activity   Rest as much as you need to after you go home. You should be able to go back to your usual activities the day after the test.  Diet   Follow your doctor's directions for eating after the test.  Drink plenty of fluids (unless your doctor has told you not to). Medications   If you have a sore throat the day after the test, use an over-the-counter spray to numb your throat. Follow-up care is a key part of your treatment and safety. Be sure to make and go to all appointments, and call your doctor if you are having problems. It's also a good idea to know your test results and keep a list of the medicines you take. When should you call for help? Call 911 anytime you think you may need emergency care. For example, call if:    You passed out (lost consciousness). You have trouble breathing. You pass maroon or bloody stools. Call your doctor now or seek immediate medical care if:    You have pain that does not get better after your take pain medicine. You have new or worse belly pain. You have blood in your stools. You are sick to your stomach and cannot keep fluids down. You have a fever. You cannot pass stools or gas.    Watch closely for changes in your health, and be sure to contact your doctor if:    Your throat still hurts after a day or two. You do not get better as expected. Where can you learn more? Go to http://www.boudreaux.com/  Enter J454 in the search box to learn more about \"Upper GI Endoscopy: What to Expect at Home. \"  Current as of: September 8, 2021               Content Version: 13.2  © 2006-2022 [employer]. Care instructions adapted under license by [employer] (which disclaims liability or warranty for this information). If you have questions about a medical condition or this instruction, always ask your healthcare professional. [place] [address] any warranty or liability for your use of this information. DISCHARGE SUMMARY from Nurse     POST-PROCEDURE INSTRUCTIONS:    Call your Physician if you:  Observe any excess bleeding. Develop a temperature over 100.5o F. Experience abdominal, shoulder or chest pain. Notice any signs of decreased circulation or nerve impairment to an extremity such as a change in color, persistent numbness, tingling, coldness or increase in pain. Vomit blood or you have nausea and vomiting lasting longer than 4 hours. Are unable to take medications. Are unable to urinate within 8 hours after discharge following general anesthesia or intravenous sedation. For the next 24 hours after receiving general anesthesia or intravenous sedation, or while taking prescription Narcotics, limit your activities:  Do NOT drive a motor vehicle, operate hazard machinery or power tools, or perform tasks that require coordination. The medication you received during your procedure may have some effect on your mental awareness. Do NOT make important personal or business decisions. The medication you received during your procedure may have some effect on your mental awareness. Do NOT drink alcoholic beverages.   These drinks do not mix well with the medications that have been given to you. Upon discharge from the hospital, you must be accompanied by a responsible adult. Resume your diet as directed by your physician. Resume medications as your physician has prescribed. Please give a list of your current medications to your Primary Care Provider. Please update this list whenever your medications are discontinued, doses are changed, or new medications (including over-the-counter products) are added. Please carry medication information at all times in case of emergency situations. These are general instructions for a healthy lifestyle:    No smoking/ No tobacco products/ Avoid exposure to second hand smoke. Surgeon General's Warning:  Quitting smoking now greatly reduces serious risk to your health. Obesity, smoking, and a sedentary lifestyle greatly increase your risk for illness. A healthy diet, regular physical exercise & weight monitoring are important for maintaining a healthy lifestyle  You may be retaining fluid if you have a history of heart failure or if you experience any of the following symptoms:  Weight gain of 3 pounds or more overnight or 5 pounds in a week, increased swelling in our hands or feet or shortness of breath while lying flat in bed. Please call your doctor as soon as you notice any of these symptoms; do not wait until your next office visit. Recognize signs and symptoms of STROKE:  F  -  Face looks uneven  A  -  Arms unable to move or move unevenly  S  -  Speech slurred or non-existent  T  -  Time to call 911 - as soon as signs and symptoms begin - DO NOT go back to bed or wait to see If you get better - TIME IS BRAIN. Colorectal Screening  Colorectal cancer almost always develops from precancerous polyps (abnormal growths) in the colon or rectum. Screening tests can find precancerous polyps, so that they can be removed before they turn into cancer.  Screening tests can also find colorectal cancer early, when treatment works best.  Speak with your physician about when you should begin screening and how often you should be tested. [email or web address] Activation    Thank you for requesting access to [employer]. Please follow the instructions below to securely access and download your online medical record. [employer] allows you to send messages to your doctor, view your test results, renew your prescriptions, schedule appointments, and more. How Do I Sign Up? In your internet browser, go to [email or web address]. Click on the First Time User? Click Here link in the Sign In box. You will see the New Member Sign Up page. Enter your [employer] Access Code exactly as it appears below. You will not need to use this code after youve completed the sign-up process. If you do not sign up before the expiration date, you must request a new code. [employer] Access Code: Activation code not generated  Current Acompli Status: Active (This is the date your [employer]i access code will )    Enter the last four digits of your Social Security Number (xxxx) and Date of Birth (mm/dd/yyyy) as indicated and click Submit. You will be taken to the next sign-up page. Create a [employer] ID. This will be your A[employer]i login ID and cannot be changed, so think of one that is secure and easy to remember. Create a [employer]i password. You can change your password at any time. Enter your Password Reset Question and Answer. This can be used at a later time if you forget your password. Enter your e-mail address. You will receive e-mail notification when new information is available in [address] Click Sign Up. You can now view and download portions of your medical record. Click the [place] link to download a portable copy of your medical information. Additional Information    If you have questions, please call [phone]. Remember, [employer] is NOT to be used for urgent needs.  For medical emergencies, dial 911. Educational references and/or instructions provided during this visit included:    See Attached      APPOINTMENTS:    Per MD Instruction    Discharge information has been reviewed with the patient. The patient verbalized understanding.      Patient armband removed and shredded

## 2023-03-09 NOTE — H&P
Chief Complaint:    chronic UC , MICHELL     History of Present Illness: This patient is a 45-year-old gentleman seen for follow-up of the above he has chronic ulcerative colitis. Colonoscopy a year and a half ago revealed quiescent disease without evidence of inflammation. Biopsies showed no evidence of dysplasia. He has chronic reflux that is controlled by a proton pump inhibitor which he is used for more than 20 years. I note that he is also on Plavix 75 mg daily he also uses colestipol as needed for diarrhea. More than 20 years ago he had upper endoscopy revealing esophagitis and a stricture that was dilated up to #50 [place] dilator. He has not had endoscopy since then.   Past Medical History  Medical Conditions:   Acid Reflux  Acute MI/Heart Attack  Arthritis  Atrial Fibrilation  Back Pain (chronic )  Carotid artery disease  Colitis  Deafness  Elevated cholesterol  Hemorrhoids  High Blood Pressure  High Triglycerides  Irregular Heart Beat  Neuropathy  Peripheral Vascular Disease  Pnuemonia  Skin Cancer  TB Skin Test Positive  Surgical Procedures:   Hemorrhoidectomy, 1979  Tonsillectomy, 1955  Coronary Stent Right Artery, 9-22-04/ 12-12-06  Right eye Surgery, 8-17  Prostate Biopsy, 6-7-16  Cataract surgery - Left, 6-26-17  Cryoablation for Prostate Cancer, 6-44-30  Umbilical hernia Repair, 2-18-16/ revision with mesh 2-09-17  Left Inguinal Hernia Repair, 2-18-16  Groin Hernia right side x 2, 1970/1975  cartilage right knee, 12-64  vascular surgery stents-L femoral / R Iliac, 10-8-17  Cardiac Surgery, 10-  Cartoid Endarterectomy right side, 01-  Skin Cancer - Basil cell , Squamous, and Actinic Keratosis  Colon Polyp removed  Skin Cancer Removed  12-2-21 angiogram  Dx Studies:   Abdominal ultrasound, 11/18/2022  Colonoscopy, 4-  CT Abd, Pelvis, Chest, 12/2018  CT Scan Abdomen/Pelvis, 11/18/2022  Endoscopy  Have you been seen in an ER in the past six months?, [facility] 12/2018  Radiology: CT Head, 9/9/2020  Radiology: CTA Head, 9/9/2020  Seen a gastroenterologist in past 3 years? ramu cox, 11/18/2022- knee/leg  Medications:   Adult Low Dose Aspirin 81 mg Take 1 tablet by mouth once a day  Align 4 mg Take 2 capsule by mouth once a day  Bystolic 5 mg Take 1 tablet by mouth twice a day  Citrucel 500 mg Take 1 tablet by mouth once a day  clopidogrel 75 mg Take 1 tablet by mouth once a day  Co Q-10 200 mg Take 1 capsule by mouth once a day  colestipol 1 gram Take 1/2 tablet by mouth three times a day as needed  Complete Ultra Men's Health Take 1 tablet by mouth once a day  gabapentin 300 mg Take 1 capsule by mouth at bedtime  hydrocodone-acetaminophen 5-325 mg Take 1 tablet by mouth three times a day as needed  hyoscyamine sulfate 0.125 mg Take 1 tablet under tongue four times a day as needed  Lorazepam 1 mg Take 1 tablet by mouth once a day  meclizine 25 mg Take 1 tablet by mouth three to four times a day up to 10 days  Miralax 17 gram Dissolve 1 packet dissolved in water as needed  Nitrostat 0.4 mg Take 1 tablet by mouth single dose as directed and as needed  Protonix 40 mg TAKE 1 TABLET ONCE DAILY  Vascepa 1 gram Take 1 capsule by mouth once a day  Vitamin D3 1,000 unit Take 1 tablet by mouth every morning  Voltaren 1% Apply a small amount as needed  Vytorin 10-40 10-40 mg Take 1 tablet by mouth every other day  Allergies:   Altace - lowers blood pressure to quickly  Lipitor - muscle pain, cramps  Lisinopril - lowers blood pressure to qiuckly  Procardia - lowers blood pressure to quickly  Immunizations:   Influenza, seasonal, injectable, 09/26/2022  Flu vaccine, 09/20/019  Pneumonia, 12-  SARS-CoV-2, 2021 x5  Social History  Alcohol:   Wine Occasionally. Tobacco:   Former smoker  Cigarettes 1 pack a day, quit 2001. Drugs:   None  Exercise:   walking. Occasionally. Caffeine:   Coffee. 1-2 in the morning.   Marital Status:         Occupation:    Retired     [facility] History   Mother: ; Diagnosed with Arthritis, Cancer, Heart Trouble, High Blood Pressure, Thyroid Disease, Arthritis, Diabetes, Heart Trouble, High Blood Pressure, Thyroid Disease; Father: ; Diagnosed with Heart Trouble, High Blood Pressure, Lung Disease, Stroke, Heart Trouble, High Blood Pressure, Stroke; Other: ; Sister: Healthy; Diagnosed with Diabetes, Heart Trouble, High Blood Pressure; Other: Diagnosed with Birth Defect; Review of Systems:  Allergic/Immunologic: Denies allergic reactions, current infections. Cardiovascular: Denies chest pain, irregular heart beat, rapid heart rate/palpitations, ankle swelling. Constitutional: Denies fever, loss of appetite, weight loss. ENMT: Denies nose bleeds, loss of vision, hoarseness, mouth sores. Endocrine: Denies excessive thirst, heat or cold intolerance. Gastrointestinal: Denies abdominal pain, abdominal swelling, change in bowel habits, constipation, diarrhea, gas, heartburn, nausea, rectal bleeding, stomach cramps, vomiting, difficulty swallowing, yellowing of skin. Genitourinary: Denies blood in urine, recent darkening of urine. Hematologic/Lymphatic: Denies easy bruising, anemia. Integumentary: Denies itching, rashes, rashes/hives. Musculoskeletal: Denies back pain, joint pain/arthritis. Neurological: Denies dizziness, fainting, frequent headaches, vertigo, memory loss/confusion. Psychiatric: Denies depression, anxiety/panic attacks. Respiratory: Denies wheezing, frequent cough, shortness of breath when at rest.  Vital Signs:  BP  (mmHg)  Pulse  (bpm) Rhythm Weight (lbs/oz) Height (ft/in) BMI  142/78 67 Regular 233 / 6 / 0 31.6  SPO2  (%)  94  Physical Exam:  Constitutional:  Appearance: Well developed, well nourished in no acute distress. Skin:  Inspection: No rash or obvious lesion. No spider angioma noted,Not obviously jaundiced. Palpation: No obvious induration or nodules.   Eyes:  Conjunctivae/lids: Conjunctiva normal, non-icteric. Pupils/irises: PERRLA. ENMT:  External: Normal appearance of external ears, nose and mouth. Oropharynx: Normal oropharynx mucosa. Neck:  Neck: Supple with no obvious mass, asymmetry, or JVD. Thyroid: No thyromegaly noted. Respiratory:  Effort: Normal respiratory effort, with no obvious accessory muscle use. Auscultation: No rhonchi, wheezes, or rales bilaterally. Chest:  Inspection: No obvious deformities. Palpation: Costal margin and xyphoid process nontender. Cardiovascular: Auscultation: RRR, without murmur noted. Peripheral: Normal peripheral pulses bilaterally. Gastrointestinal/Abdomen:  Abdomen: Normoactive bowel sounds present,Nondistended, nontender, no rebound or guarding, no mass, no fullness. Liver/Spleen: No hepatosplenomegaly noted. Hernias: none. Extremities:  RLE: No cyanosis or edema noted. LLE: No cyanosis or edema noted. Psychiatric:  Orientation: Alert and oriented to person, place, and time. Mood and affect: Mood and affect appropriate for the situation.   Assessment:   Diarrhea -controlled w/ Colestid   GERD -R/O Russo's   Polyp in the sigmoid colon  Plan:   EGD Performed by:  Dr Suma Bui at  [facility]  High Fiber Diet  Follow-up with PCP  Follow-up as needed  Educational Handout: GERD

## 2023-03-09 NOTE — ANESTHESIA POSTPROCEDURE EVALUATION
Department of Anesthesiology  Postprocedure Note    Patient: Cesar Marie  MRN: [id number]  YOB: 1945  Date of evaluation: 3/9/2023      Procedure Summary     Date: 03/09/23 Room / Location: [facility] ENDO 03 / [facility] ENDOSCOPY    Anesthesia Start: 0841 Anesthesia Stop: [place]    Procedure: EGD ESOPHAGOGASTRODUODENOSCOPY (Upper GI Region) Diagnosis:       Diarrhea, unspecified type      Gastroesophageal reflux disease, unspecified whether esophagitis present      Polyp of sigmoid colon, unspecified type      (Diarrhea, unspecified type [R19.7])      (Gastroesophageal reflux disease, unspecified whether esophagitis present [K21.9])      (Polyp of sigmoid colon, unspecified type [K63.5])    Surgeons: Gabbi Hernandez MD Responsible Provider: Sudhakar Fritz MD    Anesthesia Type: MAC ASA Status: 3          Anesthesia Type: MAC    Jori Phase I: Jori Score: 10    Jori Phase II:        Anesthesia Post Evaluation    Patient location during evaluation: bedside  Patient participation: complete - patient participated  Level of consciousness: awake  Airway patency: patent  Nausea & Vomiting: no nausea  Complications: no  Cardiovascular status: hemodynamically stable  Respiratory status: acceptable  Hydration status: euvolemic  Multimodal analgesia pain management approach

## 2023-03-14 ENCOUNTER — PATIENT MESSAGE (OUTPATIENT)
Age: 78
End: 2023-03-14

## 2023-03-14 DIAGNOSIS — Z51.81 ENCOUNTER FOR THERAPEUTIC DRUG MONITORING: ICD-10-CM

## 2023-03-14 DIAGNOSIS — M48.02 SPINAL STENOSIS, CERVICAL REGION: Primary | ICD-10-CM

## 2023-03-14 DIAGNOSIS — G89.4 CHRONIC PAIN SYNDROME: ICD-10-CM

## 2023-03-14 RX ORDER — HYDROCODONE BITARTRATE AND ACETAMINOPHEN 5; 325 MG/1; MG/1
1 TABLET ORAL EVERY 6 HOURS PRN
Qty: 120 TABLET | Refills: 0 | Status: SHIPPED | OUTPATIENT
Start: 2023-03-14 | End: 2023-04-13

## 2023-03-14 NOTE — LETTER
March 14, 2023      Santy Wolfe, [address] [place] [address]      Patient: Marlon Alan   MR Number: [id number]   YOB: 1945   Date of Visit: 3/14/2023               Santy Wolfe MD

## 2023-03-14 NOTE — LETTER
March, 16, 2023     Mr.  [address] [place]    [address] [facility] [address]                                                        [facility] Carlos Klein   [address] [place] [address]  Phone: [phone]  Fax: [phone]

## 2023-03-14 NOTE — TELEPHONE ENCOUNTER
[facility]      The last fill date was 01- for a 5 d/s qty 30        Last UDS: 2-11-21     CSA Last signed: 8-9-19      PCP: Shira Rivera MD    Last appt: [unfilled]  Future Appointments   Date Time Provider Bam Kolb   3/15/2023 10:50 AM Fredy Melara MD [facility]   3/22/2023  8:50 AM Fredy Melara MD [facility]   3/29/2023  8:50 AM Fredy Melara MD [facility]   7/3/2023  9:05 AM [facility] LAB VISIT [facility]   7/17/2023  8:40 AM Krysten Paz MD [facility]       Requested Prescriptions     Pending Prescriptions Disp Refills    HYDROcodone-acetaminophen (NORCO) 5-325 MG per tablet 120 tablet 0     Sig: Take 1 tablet by mouth every 6 hours as needed for Pain for up to 30 days.  Max Daily Amount: 4 tablets

## 2023-03-14 NOTE — LETTER
CONTROLLED SUBSTANCE MEDICATION AGREEMENT     Patient Name: America Hamilton  Patient YOB: 1945   I understand, that controlled substance medications may be used to help better manage my symptoms and to improve my ability to function at home, work and in social settings. However, I also understand that these medications do have risks, which have been discussed with me, including possible development of physical or psychological dependence. I understand that successful treatment requires mutual trust and honesty between me and my provider. I understand and agree that following this Medication Agreement is necessary in continuing my provider-patient relationship and the success of my treatment plan. Explanation from my Provider: Benefits and Goals of Controlled Substance Medications: There are two potential goals for your treatment: (1) decreased pain and suffering (2) improved daily life functions. There are many possible treatments for your chronic condition(s). Alternatives such as physical therapy, yoga, massage, home daily exercise, meditation, relaxation techniques, injections, chiropractic manipulations, surgery, cognitive therapy, hypnosis and many medications that are not habit-forming may be used. Use of controlled substance medications may be helpful, but they are unlikely to resolve all symptoms or restore all function. Explanation from my Provider: Risks of Controlled Substance Medications:  Opioid pain medications: These medications can lead to problems such as addiction/dependence, sedation, lightheadedness/dizziness, memory issues, falls, constipation, nausea, or vomiting. They may also impair the ability to drive or operate machinery. Additionally, these medications may lower testosterone levels, leading to loss of bone strength, stamina and sex drive.   They may cause problems with breathing, sleep apnea and reduced coughing, which is especially dangerous for patients with lung disease. Overdose or dangerous interactions with alcohol and other medications may occur, leading to death. Hyperalgesia may develop, which means patients receiving opioids for the treatment of pain may become more sensitive to certain painful stimuli, and in some cases, experience pain from ordinarily non-painful stimuli. Women between the ages of 14-53 who could become pregnant should carefully weigh the risks and benefits of opioids with their physicians, as these medications increase the risk of pregnancy complications, including miscarriage,  delivery and stillbirth. It is also possible for babies to be born addicted to opioids. Opioid dependence withdrawal symptoms may include; feelings of uneasiness, increased pain, irritability, belly pain, diarrhea, sweats and goose-flesh. Benzodiazepines and non-benzodiazepine sleep medications: These medications can lead to problems such as addiction/dependence, sedation, fatigue, lightheadedness, dizziness, incoordination, falls, depression, hallucinations, and impaired judgment, memory and concentration. The ability to drive and operate machinery may also be affected. Abnormal sleep-related behaviors have been reported, including sleepwalking, driving, making telephone calls, eating, or having sex while not fully awake. These medications can suppress breathing and worsen sleep apnea, particularly when combined with alcohol or other sedating medications, potentially leading to death. Dependence withdrawal symptoms may include tremors, anxiety, hallucinations and seizures. Stimulants:  Common adverse effects include addiction/dependence, increased blood  pressure and heart rate, decreased appetite, nausea, involuntary weight loss, insomnia,                                                                                                                     Initials:_______   irritability, and headaches.   These risks may increase when these medications are combined with other stimulants, such as caffeine pills or energy drinks, certain weight loss supplements and oral decongestants. Dependence withdrawal symptoms may include depressed mood, loss of interest, suicidal thoughts, anxiety, fatigue, appetite changes and agitation. Testosterone replacement therapy:  Potential side effects include increased risk of stroke and heart attack, blood clots, increased blood pressure, increased cholesterol, enlarged prostate, sleep apnea, irritability/aggression and other mood disorders, and decreased fertility. I agree and understand that I and my prescriber have the following rights and responsibilities regarding my treatment plan:     1. MY RIGHTS:  To be informed of my treatment and medication plan. To be an active participant in my health and wellbeing. 2. MY RESPONSIBILITY AND UNDERSTANDING FOR USE OF MEDICATIONS   I will take medications at the dose and frequency as directed. For my safety, I will not increase or change how I take my medications without the recommendation of my healthcare provider.  I will actively participate in any program recommended by my provider which may improve function, including social, physical, psychological programs.  I will not take my medications with alcohol or other drugs not prescribed to me. I understand that drinking alcohol with my medications increases the chances of side effects, including reduced breathing rate and could lead to personal injury when operating machinery.  I understand that if I have a history of substance use disorders, including alcohol or other illicit drugs, that I may be at increased risk of addiction to my medications.  I agree to notify my provider immediately if I should become pregnant so that my treatment plan can be adjusted.    I agree and understand that I shall only receive controlled substance medications from the prescriber that signed this agreement unless there is written agreement among other prescribers of controlled substances outlining the responsibility of the medications being prescribed.  I understand that the if the controlled medication is not helping to achieve goals, the dosage may be tapered and no longer prescribed. 3. MY RESPONSIBILITY FOR COMMUNICATION / PRESCRIPTION RENEWALS   I agree that all controlled substance medications that I take will be prescribed only by my provider. If another healthcare provider prescribes me medication in an emergency, I will notify my provider within seventy-two (72) hours.  I will arrange for refills at the prescribed interval ONLY during regular office hours. I will not ask for refills earlier than agreed, after-hours, on holidays or weekends. Refills may take up to 72 hours for processing and prescriptions to reach the pharmacy.  I will inform my other health care providers that I am taking these medications and of the existence of this [id number]. In the event of an emergency, I will provide the same information to the emergency department prescribers.  I will keep my provider updated on the pharmacy I am using for controlled medication prescription filling. Initials:_______  4. MY RESPONSIBILITY FOR PROTECTING MEDICATIONS   I will protect my prescriptions and medications. I understand that lost or misplaced prescriptions will not be replaced.  I will keep medications only for my own use and will not share them with others. I will keep all medications away from children.  I agree that if my medications are adjusted or discontinued, I will properly dispose of any remaining medications. I understand that I will be required to dispose of any remaining controlled medications as, directed by my prescriber, prior to being provided with any prescriptions for other controlled medications.   Medication drop box locations can be found at: [employer]    5. MY RESPONSIBILITY WITH ILLEGAL DRUGS    I will not use illegal or street drugs or another person's prescription medications not prescribed to me.  If there are identified addiction type symptoms, then referral to a program may be provided by my provider and I agree to follow through with this recommendation. 6. MY RESPONSIBILITY FOR COOPERATION WITH INVESTIGATIONS   I understand that my provider will comply with any applicable law and may discuss my use and/or possible misuse/abuse of controlled substances and alcohol, as appropriate, with any health care provider involved in my care, pharmacist, or legal authority.  I authorize my provider and pharmacy to cooperate fully with law enforcement agencies (as permitted by law) in the investigation of any possible misuse, sale, or other diversion of my controlled substances.  I agree to waive any applicable privilege or right of privacy or confidentiality with respect to these authorizations. 7. PROVIDERS RIGHT TO MONITOR FOR SAFETY: PRESCRIPTION MONITORING / DRUG TESTING   I consent to drug/toxicology screening and will submit to a drug screen upon my providers request to assure I am only taking the prescribed drugs for my safety monitoring. I understand that a drug screen is a laboratory test in which a sample of my urine, blood or saliva is checked to see what drugs I have been taking. This may entail an observed urine specimen, which means that a nurse or other health care provider may watch me provide urine, and I will cooperate if I am asked to provide an observed specimen.  I understand that my provider will check a copy of my State Prescription Monitoring Program () Report in order to safely prescribe medications.  Pill Counts: I consent to pill counts when requested.   I may be asked to bring all my prescribed controlled substance medications, in their original bottles, to all of my scheduled appointments. In addition, my provider may ask me to come to the practice at any time for a random pill count. 8. TERMINATION OF THIS AGREEMENT  For my safety, my prescriber has the right to stop prescribing controlled substance medications and may end this agreement. Initials:_______   Conditions that may result in termination of this agreement:  a. I do not show any improvement in pain, or my activity has not improved. b. I develop rapid tolerance or loss of improvement, as described in my treatment plan.  c. I develop significant side effects from the medication. d. My behavior is not consistent with the responsibilities outlined above, thereby causing safety concerns to continue prescribing controlled substance medications. e. I fail to follow the terms of this agreement. f. Other:____________________________       UNDERSTANDING THIS MEDICATION AGREEMENT:    I have read the above and have had all my questions answered. For chronic disease management, I know that my symptoms can be managed with many types of treatments. A chronic medication trial may be part of my treatment, but I must be an active participant in my care. Medication therapy is only one part of my symptom management plan. In some cases, there may be limited scientific evidence to support the chronic use of certain medications to improve symptoms and daily function. Furthermore, in certain circumstances, there may be scientific information that suggests that the use of chronic controlled substances may worsen my symptoms and increase my risk of unintentional death directly related to this medication therapy. I know that if my provider feels my risk from controlled medications is greater than my benefit, I will have my controlled substance medication(s) compassionately lowered or removed altogether.      I further agree to allow this office to contact my HIPAA contact if there are concerns about my safety and use of the controlled medications. I have agreed to use the prescribed controlled substance medications to me as instructed by my provider and as stated in this Medication Agreement. My initial on each page and my signature below shows that I have read each page and I have had the opportunity to ask questions with answers provided by my provider.     Patient Name (Printed): _____________________________________  Patient Signature:  ______________________   Date: _____________    Prescriber Name (Printed): ___________________________________  Prescriber Signature: _____________________  Date: _____________

## 2023-03-14 NOTE — TELEPHONE ENCOUNTER
Pt also calling for this medication (Solomon Star). Stated please process today. He is almost out of medication. He stated again, please send to [facility].

## 2023-03-14 NOTE — TELEPHONE ENCOUNTER
Needs UDS and CSA updated. Will approve refill but please contact patient to update prior to next refill.

## 2023-03-15 ENCOUNTER — OFFICE VISIT (OUTPATIENT)
Age: 78
End: 2023-03-15

## 2023-03-15 ENCOUNTER — HOSPITAL ENCOUNTER (OUTPATIENT)
Facility: HOSPITAL | Age: 78
Setting detail: SPECIMEN
Discharge: HOME | End: 2023-03-18
Payer: MEDICARE

## 2023-03-15 VITALS — WEIGHT: 219 LBS | HEIGHT: 73 IN | BODY MASS INDEX: 29.03 KG/M2

## 2023-03-15 DIAGNOSIS — Z51.81 ENCOUNTER FOR THERAPEUTIC DRUG MONITORING: ICD-10-CM

## 2023-03-15 DIAGNOSIS — M17.11 UNILATERAL PRIMARY OSTEOARTHRITIS, RIGHT KNEE: Primary | ICD-10-CM

## 2023-03-15 LAB
AMPHET UR QL SCN: NEGATIVE
BARBITURATES UR QL SCN: NEGATIVE
BENZODIAZ UR QL: NEGATIVE
CANNABINOIDS UR QL SCN: NEGATIVE
COCAINE UR QL SCN: NEGATIVE
Lab: ABNORMAL
METHADONE UR QL: NEGATIVE
OPIATES UR QL: POSITIVE
PCP UR QL: NEGATIVE

## 2023-03-15 PROCEDURE — 80307 DRUG TEST PRSMV CHEM ANLYZR: CPT

## 2023-03-15 RX ORDER — HYALURONATE SODIUM 10 MG/ML
20 SYRINGE (ML) INTRAARTICULAR ONCE
Status: COMPLETED | OUTPATIENT
Start: 2023-03-15 | End: 2023-03-15

## 2023-03-15 RX ADMIN — Medication 20 MG: at 11:06

## 2023-03-15 NOTE — PROGRESS NOTES
Patient: Archana Jones                MRN: [id number]       SSN:   YOB: 1945        AGE: 68 y.o. SEX: male      PCP: Ivan Arzola MD  03/15/23    Chief Complaint   Patient presents with    Injections     Rt knee euflexxa #1   RIGHT KNEE EFFUSION    HISTORY:  Archana Jones is a 68 y.o. male who is seen for increased pain and swelling. He responded to a right knee aspiration and cortisone injection at last OV but his pain and swelling have returned. He presents with recurrent right knee swelling in the office today. His right Santos's cyst ruptured and bled into his calf on 11/18/2022. He denies any inciting injury but states the cyst ruptured soon after he returned from a trip to [place]. He followed up at [facility]  ED on 11/18/2022 where a right knee CT revealed severe OA and a dissecting right popliteal cyst. He injured his right knee in 1964 playing football. He underwent total meniscectomy and arthrotomy by Dr. Brayden Cruz. He completed successful Euflexxa series  on 10/14/19, 7/29/20, 4/14/21, and 05/18/2022. He has been experiencing left knee pain for the past several months. He feels pain with standing, walking and stair climbing. He experiences  startup pain after sitting. He takes  Plavix and aspirin. He was previously seen for left knee pain. He has been experiencing left knee pain for the past several months. He does not recall any injury. He was previously seen for neck & left hip pains. He has a h/o back pain. He underwent radiofrequency ablation with Dr. Bull Perry. He is s/p right endarterectomy for circulation problems in July 2019 by Dr. Monse Garcia. He currently takes Plavix & a diuretic. He has a stent in his right femoral artery. He reports a blockage in his  left [place] [facility]. He had a left groin pseudoaneursym from a recent catherization via the femoral artery.          He has been seen by Dr. Ayo Elkins for his left shoulder -- responded to an injection. Occupation, etc: Mr. Aleksandar Blackmon lives with his wife in [facility]. He has 2 sons not in the area. His youngest son lives in [place]. His middle son lives in [place] and is a Rastafarian  for [employer]. His middle son's wife has Reading's  disease. He has 7 grandchildren. His youngest granddaughter is 15years old and plays softball. He retired 15 years ago as a  for the [employer] He retired in 2013 from [employer]. 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 He states that he has respiratory  problems due to asbestos exposure. He reports that he cut his right leg a few years ago. His wound took 2 months to heal. He enjoys doing water exercises. He planted a crepe [employer] last year. His eldest son passed away on 6/17/19 in [place]. He  is unsure of the cause of his son's death. His middle son moved to [place] as his daughter-in-law's family lives in [place]. His middle son is a [employer] . His mother was a RN for 40 years at [employer]. He is not diabetic. He has h/o severe peripheral vascular disease. He is followed for claudication in his left calf by Dr. Jessy Calero at [facility].  He received his Covid vaccine. Mr. Aleksandar Blackmon weighs 219 lbs and is  6' tall. Wt Readings from Last 3 Encounters:   03/15/23 219 lb (99.3 kg)   03/07/23 219 lb (99.3 kg)   01/26/23 226 lb (102.5 kg)      Body mass index is 28.89 kg/m². Patient Active Problem List   Diagnosis    Osteoarthrosis    Atrial fibrillation ([address]    Lumbar radiculopathy    History of prostate cancer    ED (erectile dysfunction) of organic origin    Overweight (BMI 25.0-29. 9)    PAD (peripheral artery disease) (HCC)    Colitis, ulcerative (HCC)    Benign hypertensive heart disease without heart failure    Dyslipidemia    Coronary atherosclerosis of native coronary artery    Colon polyps    Hypovitaminosis D    Subclinical hypothyroidism    Advance directive in chart    GERD (gastroesophageal reflux disease)    Asbestosis (HCC)    Cervical spinal stenosis    IFG (impaired fasting glucose)    Diverticulosis    Left carotid artery occlusion       Social History     Tobacco Use    Smoking status: Former     Packs/day: 1.50     Types: Cigarettes     Quit date: 2003     Years since quittin.2    Smokeless tobacco: Never   Vaping Use    Vaping Use: Never used   Substance Use Topics    Alcohol use: Not Currently    Drug use: Never        Allergies   Allergen Reactions    Ace Inhibitors Anaphylaxis, Other (See Comments) and Shortness Of Breath     Turns bright red  Other reaction(s): Unknown      Methylprednisolone Anxiety    Atorvastatin Other (See Comments)     Other reaction(s): Unknown (comments)  Muscle pain    Nifedipine Other (See Comments)     Caused afib  Other reaction(s): Unknown    Pregabalin Other (See Comments)    Ramipril      Other reaction(s): Unknown (comments)  Pt does not remember reaction  Other reaction(s): Unknown    Rosuvastatin Other (See Comments)     Muscle Pain        Current Outpatient Medications   Medication Sig    HYDROcodone-acetaminophen (NORCO) 5-325 MG per tablet Take 1 tablet by mouth every 6 hours as needed for Pain for up to 30 days. Max Daily Amount: 4 tablets    Probiotic Product (PROBIOTIC ADVANCED PO) Take 1 capsule by mouth daily    magnesium oxide (MAG-OX) 400 MG tablet Take 250 mg by mouth daily    methylcellulose (CITRUCEL) 500 MG TABS Take 1 tablet by mouth nightly    Multiple Vitamin (MULTIVITAMIN PO) Take by mouth daily    alclomethasone (ACLOVATE) 0.05 % cream Apply topically daily as needed    aspirin 81 MG EC tablet Take 81 mg by mouth daily    celecoxib (CELEBREX) 200 MG capsule Take 200 mg by mouth daily as needed    vitamin D 25 MCG (1000 UT) CAPS Take 1,000 Units by mouth 2 times daily    Clindamycin Phos-Benzoyl Perox 1.2-2.5 % GEL ceived the following from [facility]: Outside name: clindamycin-benzoyl peroxide 1.2-2.5 % glwp    clopidogrel (PLAVIX) 75 MG tablet Take 75 mg by mouth daily    coenzyme Q10 200 MG CAPS capsule Take by mouth daily    colestipol (COLESTID) 1 g tablet Take 1 g by mouth daily as needed    diclofenac sodium (VOLTAREN) 1 % GEL Apply topically 4 times daily as needed    ezetimibe-simvastatin (VYTORIN) 10-40 MG per tablet TAKE 1 TABLET NIGHTLY    gabapentin (NEURONTIN) 300 MG capsule Take 300 mg by mouth nightly. Icosapent Ethyl (VASCEPA) 1 g CAPS capsule Take 2 capsules by mouth 2 times daily (with meals)    isosorbide dinitrate (ISORDIL) 10 MG tablet Take 10 mg by mouth 3 times daily as needed    ketoconazole (NIZORAL) 2 % shampoo ceived the following from [facility]: Outside name: ketoconazole (NIZORAL) 2 % shampoo    LORazepam (ATIVAN) 1 MG tablet Take 1 mg by mouth every 8 hours as needed. losartan (COZAAR) 50 MG tablet Take 50 mg by mouth 2 times daily    nebivolol (BYSTOLIC) 5 MG tablet Take 5 mg by mouth daily    nitroGLYCERIN (NITROSTAT) 0.4 MG SL tablet ceived the following from [facility]: Outside name: nitroglycerin (NITROSTAT) 0.4 mg SL tablet    pantoprazole (PROTONIX) 40 MG tablet Take 40 mg by mouth daily    triamterene-hydroCHLOROthiazide (DYAZIDE) 37.5-25 MG per capsule ceived the following from [facility]: Outside name: triamterene-hydroCHLOROthiazide (DYAZIDE) 37.5-25 mg per capsule     No current facility-administered medications for this visit.         PHYSICAL EXAMINATION:  Ht 6' 1\" (1.854 m)   Wt 219 lb (99.3 kg)   BMI 28.89 kg/m²      ORTHO EXAMINATION:  Examination  Right knee  Left knee         Skin  Intact  Intact         Range of motion  110-0  120-0         Effusion  ++++  -         Medial joint line tenderness  +  +     Lateral joint line tenderness  -  -     Popliteal tenderness  -  -     Osteophytes palpable  +  +     Sher's  -  -     Patella crepitus  +  - Anterior drawer  -  -     Lateral laxity  -  -     Medial laxity  -  -     Varus deformity  +  -         Valgus deformity  -  -         Pretibial edema  -  -         Calf tenderness  -  -     Brisk capillary refill, palpable pulse    PROCEDURE:     Chart reviewed for the following:   Ninoska EARL MD, have reviewed the History, Physical and updated the Allergic reactions for [address] performed immediately prior to start of procedure:  Miguelito yL MD, have performed the following reviews on [facility] prior to the start of the procedure:            * Patient was identified by name and date of birth   * Agreement on procedure being performed was verified  * Risks and Benefits explained to the patient  * Procedure site verified and marked as necessary  * Patient was positioned for comfort  * Consent was obtained  Time: 11:01 AM  Date of procedure: 3/15/2023    Procedure performed by:  Dr. Yash Jacinto    Mr. Collette tolerated the procedure well with no complications. CT KNEE BILAT 11/18/2022 [facility]   IMPRESSION   1. Severe right knee osteoarthritis. Small right knee joint effusion with synovitis. 2. Right-sided popliteal cyst dissecting inferiorly within medial subfascial space and  associated with intracystic hemorrhage. RADIOGRAPHS:    XR LEFT KNEE 4/6/22 [facility]   IMPRESSION:  Three views with bilateral knees on AP view - No fractures, no effusion, severe right and moderately severe left joint space narrowing, + osteophytes present. Kellgren Ozzy grade 4,  arterial calcification         XR ALEXANDRA HIP 11/7/18 [facility]   IMPRESSION:  AP pelvis and two views - No fractures, moderate joint space narrowing, acetabular osteophytes present. Tonnis grade 2.  Femoral acetabular impingement syndrome        XR RIGHT KNEE 4/13/18 [facility]   IMPRESSION:  Three views - No fractures, no effusion, severe end stage with lateral subluxation joint space narrowing, + osteophytes present. IKDC Grade C. calcification of arteries     IMPRESSION:      ICD-10-CM    1. Unilateral primary osteoarthritis, right knee  M17.11           PLAN:  After timeout and under sterile conditions, patient's right knee aspirated 50 cc of  bloody tinged  fluid. The fluid was discarded. Patient's right knee was injected with 2 cc of Euflexxa. I will see him back next week to continue visco-supplementation injection series.    Scribed by Sonia Snyder) as dictated by Chava Pardo MD

## 2023-03-15 NOTE — TELEPHONE ENCOUNTER
From: Paulo Huffman  To: Dr. Angie Tadeo: 3/14/2023 10:53 AM EDT  Subject: Tdap     My chart keeps reminding me that i am overdue for Tdap. Is this the case, and if so, where do I get the shot? Please let me. know. Also please let me know when the refill for my Marybel Justice has been sent to the pharmacy.   Thank [address]

## 2023-03-17 PROBLEM — K22.70 BARRETT'S ESOPHAGUS: Status: ACTIVE | Noted: 2023-02-01

## 2023-03-22 ENCOUNTER — OFFICE VISIT (OUTPATIENT)
Age: 78
End: 2023-03-22

## 2023-03-22 VITALS — WEIGHT: 219 LBS | BODY MASS INDEX: 29.03 KG/M2 | HEIGHT: 73 IN

## 2023-03-22 DIAGNOSIS — G89.29 CHRONIC PAIN OF RIGHT KNEE: ICD-10-CM

## 2023-03-22 DIAGNOSIS — M25.561 CHRONIC PAIN OF RIGHT KNEE: ICD-10-CM

## 2023-03-22 DIAGNOSIS — M17.11 UNILATERAL PRIMARY OSTEOARTHRITIS, RIGHT KNEE: Primary | ICD-10-CM

## 2023-03-22 RX ORDER — HYALURONATE SODIUM 10 MG/ML
20 SYRINGE (ML) INTRAARTICULAR ONCE
Status: COMPLETED | OUTPATIENT
Start: 2023-03-22 | End: 2023-03-22

## 2023-03-22 RX ADMIN — Medication 20 MG: at 08:44

## 2023-03-22 NOTE — PROGRESS NOTES
Patient: Eddie Mason                MRN: [id number]       SSN: [id number]  YOB: 1945        AGE: 68 y.o. SEX: male  Body mass index is 28.89 kg/m². PCP: Jeanette Billy MD  03/22/23    Chief Complaint   Patient presents with    Injections     Rt knee euflexxa #2          HISTORY:  Eddie Mason is a 68 y.o. male who is seen for knee pain. ICD-10-CM    1. Unilateral primary osteoarthritis, right knee  M17.11 DRAIN/INJECT LARGE JOINT/BURSA     sodium hyaluronate (EUFLEXXA, HYALGAN) injection 20 mg          PROCEDURE:  Mr. Collette's right knee was injected with 2 cc of Euflexxa. Chart reviewed for the following:   Rocío Marino MD, have reviewed the History, Physical and updated the Allergic reactions for [facility] 13. performed immediately prior to start of procedure:  Rocío Marino MD, have performed the following reviews on Eddie Mason prior to the start of the procedure:            * Patient was identified by name and date of birth   * Agreement on procedure being performed was verified  * Risks and Benefits explained to the patient  * Procedure site verified and marked as necessary  * Patient was positioned for comfort  * Consent was obtained     Time: 8:44 AM     Date of procedure: 3/22/2023    Procedure performed by:  Kristian Ramirez MD    Mr. Vincenzo Mcdonald tolerated the procedure well with no complications. PLAN:  Mr. Vincenzo Mcdonald will follow up in one week to complete his visco supplementation injection series.       Scribed by Abbey Mann) as dictated by Kristian Ramirez MD

## 2023-03-29 ENCOUNTER — OFFICE VISIT (OUTPATIENT)
Age: 78
End: 2023-03-29

## 2023-03-29 VITALS — WEIGHT: 229 LBS | HEIGHT: 73 IN | TEMPERATURE: 97.8 F | BODY MASS INDEX: 30.35 KG/M2

## 2023-03-29 DIAGNOSIS — M17.11 UNILATERAL PRIMARY OSTEOARTHRITIS, RIGHT KNEE: Primary | ICD-10-CM

## 2023-03-29 DIAGNOSIS — M25.461 EFFUSION, RIGHT KNEE: ICD-10-CM

## 2023-03-29 DIAGNOSIS — M25.561 CHRONIC PAIN OF RIGHT KNEE: ICD-10-CM

## 2023-03-29 DIAGNOSIS — G89.29 CHRONIC PAIN OF RIGHT KNEE: ICD-10-CM

## 2023-03-29 RX ORDER — HYALURONATE SODIUM 10 MG/ML
20 SYRINGE (ML) INTRAARTICULAR ONCE
Status: COMPLETED | OUTPATIENT
Start: 2023-03-29 | End: 2023-03-29

## 2023-03-29 RX ADMIN — Medication 20 MG: at 09:52

## 2023-03-29 NOTE — PROGRESS NOTES
Patient: Tasia Limon                MRN: [id number]       SSN: [id number]  YOB: 1945        AGE: 68 y.o. SEX: male  Body mass index is 30.21 kg/m². PCP: Ronaldo Lopez MD  03/29/23    Chief Complaint   Patient presents with    Injections     Rt knee Zelda Other #3       HISTORY:  Tasia Limon is a 68 y.o. male who is seen for knee pain. PROCEDURE:  Mr. Collette's right knee were injected with 2 cc of Euflexxa. TIME OUT performed immediately prior to start of procedure:  Chiquita Arzola MD, have performed the following reviews on Tasia Limon prior to the start of the procedure:            * Patient was identified by name and date of birth   * Agreement on procedure being performed was verified  * Risks and Benefits explained to the patient  * Procedure site verified and marked as necessary  * Patient was positioned for comfort  * Consent was obtained     Time: 9:50 AM     Date of procedure: 3/29/2023    Procedure performed by:  Brinda Serra MD    Mr. Monse Krishnamurthy tolerated the procedure well with no complications    Encounter Diagnoses   Name Primary? Unilateral primary osteoarthritis, right knee Yes    Chronic pain of right knee     Effusion, right knee           PLAN:  Mr. Monse Krishnamurthy will follow up PRN now that he has completed his visco supplementation injection series. Patient, after timeout and under sterile conditions, had his right knee aspirated. 40 cc of clear yellow fluid was obtained. The fluid was discarded.      Scribed by Indra Garrison ([facility]), as dictated by Maxime Madden MD

## 2023-04-16 ENCOUNTER — PATIENT MESSAGE (OUTPATIENT)
Age: 78
End: 2023-04-16

## 2023-04-16 DIAGNOSIS — G89.4 CHRONIC PAIN SYNDROME: ICD-10-CM

## 2023-04-16 DIAGNOSIS — M48.02 SPINAL STENOSIS, CERVICAL REGION: ICD-10-CM

## 2023-04-17 NOTE — TELEPHONE ENCOUNTER
From: Sarah Mera  To: Dr. Antunez Concmiko: 4/16/2023 10:19 PM EDT  Subject: Ava Graft    Please refill my Norco Rx, hydrocodone acetaminophen, 5/325. If you have any questions, please call me at   [phone].   thank you  Christie Sun

## 2023-04-18 ENCOUNTER — TELEPHONE (OUTPATIENT)
Age: 78
End: 2023-04-18

## 2023-04-18 RX ORDER — HYDROCODONE BITARTRATE AND ACETAMINOPHEN 5; 325 MG/1; MG/1
1 TABLET ORAL EVERY 6 HOURS PRN
Qty: 120 TABLET | Refills: 0 | Status: SHIPPED | OUTPATIENT
Start: 2023-04-18 | End: 2023-05-18

## 2023-04-18 NOTE — TELEPHONE ENCOUNTER
----- Message from [address] Collette sent at 4/17/2023  5:53 PM EDT -----  Regarding: Anant Bond Refill  Contact: [phone]  ATTENTION: MAKE SURE THIS RX IS SENT TO [facility], ON THE CORNER OF [address] [place] [address], [address]. NOT TO MAIL ORDER    ALSO, I ONLY HAVE ENOUGH MEDICATION FOR THREE MORE DAYS, I WILL BE OUT BEFORE THE WEEKEND.   Buster

## 2023-05-18 ENCOUNTER — PATIENT MESSAGE (OUTPATIENT)
Age: 78
End: 2023-05-18

## 2023-05-19 DIAGNOSIS — M48.02 SPINAL STENOSIS, CERVICAL REGION: ICD-10-CM

## 2023-05-19 DIAGNOSIS — G89.4 CHRONIC PAIN SYNDROME: ICD-10-CM

## 2023-05-19 RX ORDER — HYDROCODONE BITARTRATE AND ACETAMINOPHEN 5; 325 MG/1; MG/1
1 TABLET ORAL EVERY 6 HOURS PRN
Qty: 120 TABLET | Refills: 0 | Status: SHIPPED | OUTPATIENT
Start: 2023-05-19 | End: 2023-06-18

## 2023-05-19 NOTE — TELEPHONE ENCOUNTER
[facility]      The last fill date was 04/18/2023 for a 30 d/s qty 120        Last UDS: 03-15-23     CSA Last signed: 03-24-23      PCP: Ivan Arzola MD    Last appt: [unfilled]  Future Appointments   Date Time Provider Bam Kobl   7/3/2023  9:05 AM [facility] LAB VISIT [facility] BS AMB   7/17/2023  8:40 AM Oliverio aMi MD [facility] AMB       Requested Prescriptions     Pending Prescriptions Disp Refills    HYDROcodone-acetaminophen (NORCO) 5-325 MG per tablet 120 tablet 0     Sig: Take 1 tablet by mouth every 6 hours as needed for Pain for up to 30 days.  Max Daily Amount: 4 tablets

## 2023-05-19 NOTE — TELEPHONE ENCOUNTER
From: Joanne Hope  To: Dr. Randa Self: 5/18/2023 10:27 PM EDT  Subject: Manjula Pereira refill    Please refill my Norco Hydrocodone/Acetaminophen 5/325. Please send the Rx to [facility] on [address] [facility] [address] [facility] [address] [facility].     If any questions please call me  [phone]  Thank you  Temo Livingston

## 2023-05-22 ENCOUNTER — PATIENT MESSAGE (OUTPATIENT)
Age: 78
End: 2023-05-22

## 2023-05-22 NOTE — TELEPHONE ENCOUNTER
From: Johnnie Carias  To: Dr. Sanford Peters: 5/22/2023 9:16 AM EDT  Subject: TDAP    I got the TDAP injection at [place] around 4-. They were supposed to send you the info.    Paola Willis

## 2023-06-19 ENCOUNTER — PATIENT MESSAGE (OUTPATIENT)
Age: 78
End: 2023-06-19

## 2023-06-19 DIAGNOSIS — M54.16 LUMBAR RADICULOPATHY: Primary | ICD-10-CM

## 2023-06-19 DIAGNOSIS — F41.9 ANXIETY: ICD-10-CM

## 2023-06-19 NOTE — TELEPHONE ENCOUNTER
Previous refill per chart    LORazepam (Ativan) 1 mg tablet 50 Tablet 0 10/19/2022     Sig - Route: Take 1 Tablet by mouth every eight (8) hours as needed for Anxiety. - Oral    Sent to pharmacy as: LORazepam 1 mg tablet (Ativan)    E-Prescribing Status: Receipt confirmed by pharmacy (10/19/2022  2:59 PM EDT)      gabapentin (NEURONTIN) 300 mg capsule 90 Capsule 1 12/20/2022     Sig - Route: Take 1 Capsule by mouth nightly.  Max Daily Amount: 300 mg. - Oral    Sent to pharmacy as: gabapentin 300 mg capsule (NEURONTIN)    E-Prescribing Status: Receipt confirmed by pharmacy (12/20/2022 11:59 AM EST)      Last UDS: 03-15-23    CSA Last signed: 03-24-23

## 2023-06-19 NOTE — TELEPHONE ENCOUNTER
From: Milly Browning  To: Dr. Eva Grayson: 6/19/2023 9:53 AM EDT  Subject: refills for Gabapentin and Lorazepam    Please send refills for Gabapentin 300mg 90 and for Lorazepam 1mg 50 to [facility] on the corner of [address] in [place]. Let me know when this if done if you don't mind. If there are questions, please call me [phone],  thank you.   Araceli Calderon

## 2023-06-20 RX ORDER — GABAPENTIN 300 MG/1
300 CAPSULE ORAL
Qty: 90 CAPSULE | Refills: 1 | Status: SHIPPED | OUTPATIENT
Start: 2023-06-20 | End: 2023-12-17

## 2023-06-20 RX ORDER — LORAZEPAM 1 MG/1
1 TABLET ORAL EVERY 8 HOURS PRN
Qty: 90 TABLET | Refills: 0 | Status: SHIPPED | OUTPATIENT
Start: 2023-06-20 | End: 2023-07-20

## 2023-06-22 ENCOUNTER — PATIENT MESSAGE (OUTPATIENT)
Age: 78
End: 2023-06-22

## 2023-06-22 DIAGNOSIS — G89.4 CHRONIC PAIN SYNDROME: ICD-10-CM

## 2023-06-22 DIAGNOSIS — M48.02 SPINAL STENOSIS, CERVICAL REGION: ICD-10-CM

## 2023-06-25 ENCOUNTER — TELEPHONE (OUTPATIENT)
Age: 78
End: 2023-06-25

## 2023-06-26 RX ORDER — HYDROCODONE BITARTRATE AND ACETAMINOPHEN 5; 325 MG/1; MG/1
1 TABLET ORAL EVERY 6 HOURS PRN
Qty: 120 TABLET | Refills: 0 | Status: SHIPPED | OUTPATIENT
Start: 2023-06-26 | End: 2023-07-26

## 2023-07-05 ENCOUNTER — HOSPITAL ENCOUNTER (OUTPATIENT)
Facility: HOSPITAL | Age: 78
Discharge: HOME | End: 2023-07-08
Payer: MEDICARE

## 2023-07-05 DIAGNOSIS — R73.01 IFG (IMPAIRED FASTING GLUCOSE): ICD-10-CM

## 2023-07-05 DIAGNOSIS — E78.5 DYSLIPIDEMIA: ICD-10-CM

## 2023-07-05 DIAGNOSIS — I73.9 PAD (PERIPHERAL ARTERY DISEASE) (HCC): ICD-10-CM

## 2023-07-05 DIAGNOSIS — I11.9 BENIGN HYPERTENSIVE HEART DISEASE WITHOUT HEART FAILURE: ICD-10-CM

## 2023-07-05 LAB
ALBUMIN SERPL-MCNC: 3.8 G/DL (ref 3.4–5)
ALBUMIN/GLOB SERPL: 1.3 (ref 0.8–1.7)
ALP SERPL-CCNC: 50 U/L (ref 45–117)
ALT SERPL-CCNC: 24 U/L (ref 16–61)
ANION GAP SERPL CALC-SCNC: 2 MMOL/L (ref 3–18)
AST SERPL-CCNC: 20 U/L (ref 10–38)
BILIRUB SERPL-MCNC: 0.7 MG/DL (ref 0.2–1)
BUN SERPL-MCNC: 12 MG/DL (ref 7–18)
BUN/CREAT SERPL: 17 (ref 12–20)
CALCIUM SERPL-MCNC: 9.4 MG/DL (ref 8.5–10.1)
CHLORIDE SERPL-SCNC: 101 MMOL/L (ref 100–111)
CHOLEST SERPL-MCNC: 125 MG/DL
CO2 SERPL-SCNC: 35 MMOL/L (ref 21–32)
CREAT SERPL-MCNC: 0.7 MG/DL (ref 0.6–1.3)
ERYTHROCYTE [DISTWIDTH] IN BLOOD BY AUTOMATED COUNT: 12.3 % (ref 11.6–14.5)
GLOBULIN SER CALC-MCNC: 2.9 G/DL (ref 2–4)
GLUCOSE SERPL-MCNC: 108 MG/DL (ref 74–99)
HCT VFR BLD AUTO: 43.4 % (ref 36–48)
HDLC SERPL-MCNC: 48 MG/DL (ref 40–60)
HDLC SERPL: 2.6 (ref 0–5)
HGB BLD-MCNC: 14.6 G/DL (ref 13–16)
LDLC SERPL CALC-MCNC: 61 MG/DL (ref 0–100)
LIPID PANEL: NORMAL
MCH RBC QN AUTO: 34.4 PG (ref 24–34)
MCHC RBC AUTO-ENTMCNC: 33.6 G/DL (ref 31–37)
MCV RBC AUTO: 102.4 FL (ref 78–100)
NRBC # BLD: 0 K/UL (ref 0–0.01)
NRBC BLD-RTO: 0 PER 100 WBC
PLATELET # BLD AUTO: 159 K/UL (ref 135–420)
PMV BLD AUTO: 10 FL (ref 9.2–11.8)
POTASSIUM SERPL-SCNC: 4.1 MMOL/L (ref 3.5–5.5)
PROT SERPL-MCNC: 6.7 G/DL (ref 6.4–8.2)
RBC # BLD AUTO: 4.24 M/UL (ref 4.35–5.65)
SODIUM SERPL-SCNC: 138 MMOL/L (ref 136–145)
TRIGL SERPL-MCNC: 80 MG/DL
VLDLC SERPL CALC-MCNC: 16 MG/DL
WBC # BLD AUTO: 6.2 K/UL (ref 4.6–13.2)

## 2023-07-05 PROCEDURE — 80061 LIPID PANEL: CPT

## 2023-07-05 PROCEDURE — 85027 COMPLETE CBC AUTOMATED: CPT

## 2023-07-05 PROCEDURE — 80053 COMPREHEN METABOLIC PANEL: CPT

## 2023-07-05 PROCEDURE — 36415 COLL VENOUS BLD VENIPUNCTURE: CPT

## 2023-07-14 SDOH — ECONOMIC STABILITY: TRANSPORTATION INSECURITY
IN THE PAST 12 MONTHS, HAS LACK OF TRANSPORTATION KEPT YOU FROM MEETINGS, WORK, OR FROM GETTING THINGS NEEDED FOR DAILY LIVING?: YES

## 2023-07-14 SDOH — ECONOMIC STABILITY: FOOD INSECURITY: WITHIN THE PAST 12 MONTHS, THE FOOD YOU BOUGHT JUST DIDN'T LAST AND YOU DIDN'T HAVE MONEY TO GET MORE.: NEVER TRUE

## 2023-07-14 SDOH — ECONOMIC STABILITY: INCOME INSECURITY: HOW HARD IS IT FOR YOU TO PAY FOR THE VERY BASICS LIKE FOOD, HOUSING, MEDICAL CARE, AND HEATING?: NOT HARD AT ALL

## 2023-07-14 SDOH — ECONOMIC STABILITY: HOUSING INSECURITY
IN THE LAST 12 MONTHS, WAS THERE A TIME WHEN YOU DID NOT HAVE A STEADY PLACE TO SLEEP OR SLEPT IN A SHELTER (INCLUDING NOW)?: YES

## 2023-07-14 SDOH — ECONOMIC STABILITY: FOOD INSECURITY: WITHIN THE PAST 12 MONTHS, YOU WORRIED THAT YOUR FOOD WOULD RUN OUT BEFORE YOU GOT MONEY TO BUY MORE.: NEVER TRUE

## 2023-07-17 ENCOUNTER — OFFICE VISIT (OUTPATIENT)
Age: 78
End: 2023-07-17
Payer: MEDICARE

## 2023-07-17 VITALS
RESPIRATION RATE: 18 BRPM | HEART RATE: 67 BPM | TEMPERATURE: 98.6 F | WEIGHT: 217 LBS | DIASTOLIC BLOOD PRESSURE: 82 MMHG | SYSTOLIC BLOOD PRESSURE: 145 MMHG | OXYGEN SATURATION: 98 % | BODY MASS INDEX: 28.76 KG/M2 | HEIGHT: 73 IN

## 2023-07-17 DIAGNOSIS — I10 PRIMARY HYPERTENSION: ICD-10-CM

## 2023-07-17 DIAGNOSIS — E03.8 SUBCLINICAL HYPOTHYROIDISM: ICD-10-CM

## 2023-07-17 DIAGNOSIS — Z85.46 HISTORY OF PROSTATE CANCER: ICD-10-CM

## 2023-07-17 DIAGNOSIS — I73.9 PAD (PERIPHERAL ARTERY DISEASE) (HCC): ICD-10-CM

## 2023-07-17 DIAGNOSIS — I25.10 ATHEROSCLEROSIS OF NATIVE CORONARY ARTERY OF NATIVE HEART WITHOUT ANGINA PECTORIS: ICD-10-CM

## 2023-07-17 DIAGNOSIS — M48.02 CERVICAL SPINAL STENOSIS: ICD-10-CM

## 2023-07-17 DIAGNOSIS — E78.5 DYSLIPIDEMIA: ICD-10-CM

## 2023-07-17 DIAGNOSIS — K63.5 POLYP OF COLON, UNSPECIFIED PART OF COLON, UNSPECIFIED TYPE: ICD-10-CM

## 2023-07-17 DIAGNOSIS — R73.01 IFG (IMPAIRED FASTING GLUCOSE): Primary | ICD-10-CM

## 2023-07-17 DIAGNOSIS — E66.3 OVERWEIGHT (BMI 25.0-29.9): ICD-10-CM

## 2023-07-17 LAB — HBA1C MFR BLD: 5.5 %

## 2023-07-17 PROCEDURE — 3074F SYST BP LT 130 MM HG: CPT | Performed by: INTERNAL MEDICINE

## 2023-07-17 PROCEDURE — 99214 OFFICE O/P EST MOD 30 MIN: CPT | Performed by: INTERNAL MEDICINE

## 2023-07-17 PROCEDURE — G8417 CALC BMI ABV UP PARAM F/U: HCPCS | Performed by: INTERNAL MEDICINE

## 2023-07-17 PROCEDURE — 1036F TOBACCO NON-USER: CPT | Performed by: INTERNAL MEDICINE

## 2023-07-17 PROCEDURE — 3078F DIAST BP <80 MM HG: CPT | Performed by: INTERNAL MEDICINE

## 2023-07-17 PROCEDURE — PBSHW AMB POC HEMOGLOBIN A1C: Performed by: INTERNAL MEDICINE

## 2023-07-17 PROCEDURE — 1123F ACP DISCUSS/DSCN MKR DOCD: CPT | Performed by: INTERNAL MEDICINE

## 2023-07-17 PROCEDURE — G8427 DOCREV CUR MEDS BY ELIG CLIN: HCPCS | Performed by: INTERNAL MEDICINE

## 2023-07-17 PROCEDURE — 99211 OFF/OP EST MAY X REQ PHY/QHP: CPT | Performed by: INTERNAL MEDICINE

## 2023-07-17 PROCEDURE — 83036 HEMOGLOBIN GLYCOSYLATED A1C: CPT | Performed by: INTERNAL MEDICINE

## 2023-07-17 RX ORDER — FLUOROURACIL 50 MG/G
CREAM TOPICAL
COMMUNITY
Start: 2023-07-11

## 2023-07-17 SDOH — ECONOMIC STABILITY: FOOD INSECURITY: WITHIN THE PAST 12 MONTHS, THE FOOD YOU BOUGHT JUST DIDN'T LAST AND YOU DIDN'T HAVE MONEY TO GET MORE.: NEVER TRUE

## 2023-07-17 SDOH — ECONOMIC STABILITY: HOUSING INSECURITY
IN THE LAST 12 MONTHS, WAS THERE A TIME WHEN YOU DID NOT HAVE A STEADY PLACE TO SLEEP OR SLEPT IN A SHELTER (INCLUDING NOW)?: NO

## 2023-07-17 SDOH — ECONOMIC STABILITY: FOOD INSECURITY: WITHIN THE PAST 12 MONTHS, YOU WORRIED THAT YOUR FOOD WOULD RUN OUT BEFORE YOU GOT MONEY TO BUY MORE.: NEVER TRUE

## 2023-07-17 SDOH — ECONOMIC STABILITY: INCOME INSECURITY: HOW HARD IS IT FOR YOU TO PAY FOR THE VERY BASICS LIKE FOOD, HOUSING, MEDICAL CARE, AND HEATING?: NOT HARD AT ALL

## 2023-07-17 ASSESSMENT — PATIENT HEALTH QUESTIONNAIRE - PHQ9
SUM OF ALL RESPONSES TO PHQ QUESTIONS 1-9: 0
2. FEELING DOWN, DEPRESSED OR HOPELESS: 0
SUM OF ALL RESPONSES TO PHQ QUESTIONS 1-9: 0
SUM OF ALL RESPONSES TO PHQ QUESTIONS 1-9: 0
SUM OF ALL RESPONSES TO PHQ9 QUESTIONS 1 & 2: 0
1. LITTLE INTEREST OR PLEASURE IN DOING THINGS: 0
SUM OF ALL RESPONSES TO PHQ QUESTIONS 1-9: 0

## 2023-07-26 ENCOUNTER — PATIENT MESSAGE (OUTPATIENT)
Age: 78
End: 2023-07-26

## 2023-07-26 DIAGNOSIS — M48.02 SPINAL STENOSIS, CERVICAL REGION: ICD-10-CM

## 2023-07-26 DIAGNOSIS — G89.4 CHRONIC PAIN SYNDROME: ICD-10-CM

## 2023-07-27 NOTE — TELEPHONE ENCOUNTER
PCP: Uche Hunter MD    Previous refill per chart  HYDROcodone-acetaminophen (NORCO) 5-325 MG per tablet 120 tablet 0 6/26/2023 7/26/2023    Sig - Route: Take 1 tablet by mouth every 6 hours as needed for Pain for up to 30 days.  Max Daily Amount: 4 tablets - Oral    Sent to pharmacy as: HYDROcodone-Acetaminophen 5-325 MG Oral Tablet (Uche Hunter)    Earliest Fill Date: 6/26/2023    Notes to Pharmacy: Reduce doses taken as pain becomes manageable    E-Prescribing Status: Receipt confirmed by pharmacy (6/26/2023  8:38 AM EDT)      UDS- 3/15/23  CSA- on file       Future Appointments   Date Time Provider [address]   12/26/2023 10:10 AM [facility] NURSE Anurag Herrera Sched   1/2/2024  9:30 AM MD Anurag Brink Sched   1/9/2024  8:45 AM [facility] LAB VISIT [facility] BS AMB   1/16/2024  9:00 AM Uche Hunter MD [facility] BS AMB

## 2023-07-27 NOTE — TELEPHONE ENCOUNTER
From: Syd Ingram  To: Dr. Bentley Bel: 7/26/2023 5:36 PM EDT  Subject: Bhupendra Quiñones    Please refill my Rx for Ranier acetaminophen, 5/325. 120 tabs. Please send the refill to the [facility] on the corner of [address] in [facility].      Thank you very much,  If you need to reach me my phone is [phone]

## 2023-07-30 RX ORDER — HYDROCODONE BITARTRATE AND ACETAMINOPHEN 5; 325 MG/1; MG/1
1 TABLET ORAL EVERY 6 HOURS PRN
Qty: 120 TABLET | Refills: 0 | Status: SHIPPED | OUTPATIENT
Start: 2023-07-30 | End: 2023-08-29

## 2023-08-22 ENCOUNTER — PATIENT MESSAGE (OUTPATIENT)
Age: 78
End: 2023-08-22

## 2023-08-22 DIAGNOSIS — G89.4 CHRONIC PAIN SYNDROME: ICD-10-CM

## 2023-08-22 DIAGNOSIS — M48.02 SPINAL STENOSIS, CERVICAL REGION: ICD-10-CM

## 2023-08-22 RX ORDER — HYDROCODONE BITARTRATE AND ACETAMINOPHEN 5; 325 MG/1; MG/1
1 TABLET ORAL EVERY 6 HOURS PRN
Qty: 120 TABLET | Refills: 0 | Status: SHIPPED | OUTPATIENT
Start: 2023-08-22 | End: 2023-09-21

## 2023-08-22 NOTE — TELEPHONE ENCOUNTER
From: Jarek Stephens  To: Dr. Ole Hernandez: 8/22/2023 11:20 AM EDT  Subject: Bertis Plants    As you suggested, I am requesting the refill for my Hydrocodone/Acetaminophen 5/325 at the beginning of a week and not at the last moment. So please submit the refill to the pharmacy, even though they will not fill it before it is due.     Thank you  Padmaja Mahoney

## 2023-08-28 ENCOUNTER — PATIENT MESSAGE (OUTPATIENT)
Age: 78
End: 2023-08-28

## 2023-08-28 DIAGNOSIS — M48.02 SPINAL STENOSIS, CERVICAL REGION: ICD-10-CM

## 2023-08-28 DIAGNOSIS — G89.4 CHRONIC PAIN SYNDROME: ICD-10-CM

## 2023-08-28 NOTE — TELEPHONE ENCOUNTER
----- Message from [facility]. Collette sent at 8/28/2023  9:01 AM EDT -----  Regarding: [place] Refill  Contact: [phone]  Please refill my Hydrocodone/Acetaminophen 5/325, this week. It was last filled on the 30th of July. Again, I am sending this so it will get to you in a timely manner. Thank you.   Amira Julian

## 2023-08-28 NOTE — TELEPHONE ENCOUNTER
[facility]  report [facility] 8/28/2023    The last fill date for Hydrocodone-Acetamin 5-325 Mg was 7/31/2023 for a 30 d/s qty 120      Last UDS: 3/15/2023    CSA Last signed: 7/6/2023        PCP: Deysi Wilson MD    Last appt: 7/17/2023  Future Appointments   Date Time Provider [address]   12/26/2023 10:10 AM [facility] NURSE Melva Olivo Sched   1/2/2024  9:30 AM MD Melva Sommer Sched   1/9/2024  8:45 AM [facility] LAB VISIT [facility] BS AMB   1/16/2024  9:00 AM Deysi Wilson MD [facility] BS AMB       Requested Prescriptions     Pending Prescriptions Disp Refills    HYDROcodone-acetaminophen (NORCO) 5-325 MG per tablet 120 tablet 0     Sig: Take 1 tablet by mouth every 6 hours as needed for Pain for up to 30 days.  Max Daily Amount: 4 tablets

## 2023-08-29 RX ORDER — HYDROCODONE BITARTRATE AND ACETAMINOPHEN 5; 325 MG/1; MG/1
1 TABLET ORAL EVERY 6 HOURS PRN
Qty: 120 TABLET | Refills: 0 | Status: CANCELLED | OUTPATIENT
Start: 2023-08-29 | End: 2023-09-28

## 2023-09-13 ENCOUNTER — TELEPHONE (OUTPATIENT)
Age: 78
End: 2023-09-13

## 2023-09-13 NOTE — TELEPHONE ENCOUNTER
Flu and new covid booster are recommended  Rsv is also rec although not sure about insurance coverage for that - could be expensive

## 2023-09-13 NOTE — PROGRESS NOTES
Continued Stay SW/CM Assessment/Plan of Care Note       Active Substitute Decision Maker (SDM)     GABRYLEWICZ, ROBERT [facility] Agent 1 - Son   Primary Phone: [phone] (Mobile)  Home Phone: [phone]                   Progress note:  No response from [place] at time of this entry. SW met with SDM on this date, introduced SDM to Kassie [employer] ( a resource provided by writer)  in  110. SDM to [facility] w/Kassie. Insurance denied and peer to peer was requested. SDM updated. Home care remains recommendation. Attending updated and agreeable to secure a safe discharge plan. Barrier to discharge is secured discharge plan.       Disposition Recommendations:  Preliminary discharge destination: Planned Discharge Destination: Assisted living  SW/CM recommendation for discharge:      Discharge Plan/Needs:     Continued Care and Services - Admitted Since 9/10/2023     Destination      Service Provider Request Status Selected Services Address Phone Fax    [facility] - SN[facility] PAN Pending - Request Sent N/A [address] [place] [address] [phone]              Prior To Hospitalization:    Living Situation: Spouse and residing at House    .  Support Systems: Children   Home Devices/Equipment: None ,   ,    ,      Mobility Assist Devices: None   Type of Service Prior to Hospitalization: None ,   ,   ,   ,    ,       Patient/Family discharge goal (s):        Resources provided:           Therapy Recommendations for Discharge:   PT:      Last Filed Values       Value Time User    PT Discharge Needs  therapy 1-3 times per week 9/12/2023 11:17 AM Rigoberto Juarez PTA        OT:       Last Filed Values       Value Time User    OT Discharge Needs  therapy 1-3 times per week 9/11/2023 10:57 AM Isabella Alberto OT        SLP:    Last Filed Values     None          Mobility Equipment Recommended for Discharge: none      Barriers to Discharge  Identified Barriers  68 y.o. WHITE/NON- male who presents for evaluation. No cardiovascular complaints and he continues to see Dr Ky Burrell. Exercise is limited by spine and knee issues    He changed to Dr Lino Cardenas and was felt non surgical so referred to Dr Suze Blount and he's gotten significant relief from c spine and l spine epidurals. We tried gabapentin but it caused him to have some vision issues(?). Willing to try lyrica    He is now seeing Dr Rebeka Rodriguez and the right leg angio was done with the left leg planned for later in Dec    The UC has been stable, had recent colo by Dr Gumaro Hanson as below     Denies polyuria, polydipsia, nocturia, vision change. Not checking sugars at this time. He sees Dr Gumaro Hanson once yearly and new new developments    LAST MEDICARE WELLNESS EXAM: 6/23/16, 6/29/17, 11/20/18, 11/25/19, 11/23/20, 11/26/21    Past Medical History:   Diagnosis Date    Adrenal adenoma 2009    LEFT 1 cm, no change 1/15, 2/16    Aortoiliac occlusive disease ([address] 3/4/2019    Asbestosis     CT 1/19 showed pleural plaques    Atrial fibrillation 10/02/2009    Had Ablation. No At. Fib. since    Basal cell cancer     Dr Good Lal; he's had >350 lesions removed    Carotid disease, bilateral ([address]     Chronic pain     myofascial pain syndrome; seen in pain clinic in past    Colitis     Colon polyp     Dr Gumaro Hanson 9/15    Coronary artery disease     RCA - 3.0 x 16mm TAXUS 9/04, 3.0 x 16mm TAXUS 12/06    Degenerative arthritis of cervical spine     MRI 9/11 showed C3-6 severe foraminal stenosis w RADICULOPATHY    Degenerative arthritis of lumbar spine     Dr Arnel Almonte;  MRI 9/11 L4-5 disc bulging, annular tear, disc dessication w RADICULOPATHY    Diverticulosis 04/2021    Dr Gumaro Hanson    Dyslipidemia     Erectile dysfunction     GERD     H/O cardiac catheterization 08/2021    [address] Torres Escort patent stents, no new obst dz    H/O cardiovascular stress test     [address] mod reversible inferolat defect, no wma, ef 64%, no TID (8/21)    H/O echocardiogram     [facility] ef 55%, mild AI, tr MR (8/21)    Hearing loss 2014    Dr Silvano Blackburn Hypertension     with component of white coat    Hypogonadism male     IFG (impaired fasting glucose) [id number]    OAB (overactive bladder)     Osteoarthritis     Dr Chip Arnett Overweight (BMI 25.0-29. 9)     IF 5/18 start weight 214 lbs, not doing    Peripheral vascular disease     50-60% R iliac; s/p R iliac stent and L femoral artery stent in past; R OLIVIA 0.76 (1/16)    Plantar fasciitis     bilateral     PPD positive     Prostate cancer     T1c Imelda 7(3+4), 70% in 1 core, GS 7 (3+4) in 4 cores, GS 6 (3+3) in 1 core; psa 5.28, TRUS 18 gm;  Dr Argenis Edwards; s/p cryoablation 10/16    Recurrent umbilical hernia 05/76/3066    Tinnitus     Dr Ruiz Johnson    Ulnar neuritis, right 11/13/2017    Venous insufficiency      Past Surgical History:   Procedure Laterality Date    COLONOSCOPY N/A 4/20/2021 2/2/11 neg; Dr Argenis Edwards 9/15 polyps; 4/20/21 divertics bx neg    HX CAROTID ENDARTERECTOMY Right 01/2014    HX CARPAL TUNNEL RELEASE Right 2017    HX CATARACT REMOVAL Bilateral     HX CRYOABLATION OF THE PROSTATE  10/2016    HX HEART CATHETERIZATION  2004,2006    Stents RCA    HX HEMORRHOIDECTOMY  1979    HX HERNIA REPAIR  1970, 1975    x 4    HX HERNIA REPAIR  02/2016    Dr Nic Barraza ARTHROSCOPY Right 1963    RIGHT knee surgery    HX TONSILLECTOMY      VASCULAR SURGERY PROCEDURE UNLIST Right     RIGHT Iliac (7/19);  LEFT pseudoaneurysm repair (6/21) Dr Rody Gatica Left 2015    LEFT fem artery and iliac stents (10/15); RCF endarterectomy w patch angioplasty/B CI artery stenting (7/19)     Social History     Socioeconomic History    Marital status:      Spouse name: Not on file    Number of children: Not on file    Years of education: Not on file    Highest education level: Not on file   Occupational History    Not on file   Tobacco Use    Smoking status: to Discharge/Transition Planning: Other (unsafe to return home)   Family/Patient Barriers      Suma Mayer, MSW, APSW  IP   [facility], NICU, Med 1, Med 2, Med 4         Former Smoker     Packs/day: 1.50     Years: 25.00     Pack years: 37.50     Types: Cigarettes     Quit date: 2003     Years since quittin.9    Smokeless tobacco: Never Used   Vaping Use    Vaping Use: Never used   Substance and Sexual Activity    Alcohol use: Yes     Comment: RARELY    Drug use: Never    Sexual activity: Yes     Partners: Female     Birth control/protection: None   Other Topics Concern    Not on file   Social History Narrative    Not on file     Social Determinants of Health     Financial Resource Strain:     Difficulty of Paying Living Expenses: Not on file   Food Insecurity:     Worried About Running Out of Food in the Last Year: Not on file    Evan of Food in the Last Year: Not on file   Transportation Needs:     Lack of Transportation (Medical): Not on file    Lack of Transportation (Non-Medical): Not on file   Physical Activity:     Days of Exercise per Week: Not on file    Minutes of Exercise per Session: Not on file   Stress:     Feeling of Stress : Not on file   Social Connections:     Frequency of Communication with Friends and Family: Not on file    Frequency of Social Gatherings with Friends and Family: Not on file    Attends [employer] Services: Not on file    Active Member of [address] [place] [address] [employer] or Organizations: Not on file    Attends Club or Organization Meetings: Not on file    Marital Status: Not on file   Intimate Partner Violence:     Fear of Current or Ex-Partner: Not on file    Emotionally Abused: Not on file    Physically Abused: Not on file    Sexually Abused: Not on file   Housing Stability:     Unable to Pay for Housing in the Last Year: Not on file    Number of [place] in the Last Year: Not on file    Unstable Housing in the Last Year: Not on file     Current Outpatient Medications   Medication Sig    Lactobacillus acidophilus (PROBIOTIC PO) Take  by mouth.  Methylcellulose, with Sugar, (Citrucel, sucrose,) powd Take  by mouth.     pregabalin (LYRICA) 50 mg capsule Take 1 Capsule by mouth two (2) times a day. Max Daily Amount: 100 mg.    HYDROcodone-acetaminophen (NORCO) 5-325 mg per tablet Take 1 Tablet by mouth every four (4) hours as needed (chronic pain) for up to 30 days. Max Daily Amount: 6 Tablets.  diclofenac (VOLTAREN) 1 % gel APPLY 4 GRAMS TO THE AFFECTED AREA ON RIGHT KNEE FOUR TIMES DAILY    fluorouraciL (EFUDEX) 5 % chemo cream     alclometasone (ACLOVATE) 0.05 % topical cream Apply  to affected area daily as needed.  LORazepam (Ativan) 1 mg tablet Take 1 Tablet by mouth every eight (8) hours as needed for Anxiety.  isosorbide dinitrate (ISORDIL) 10 mg tablet Take 10 mg by mouth three (3) times daily. As needed    losartan (COZAAR) 50 mg tablet Take 50 mg by mouth two (2) times a day.  nebivoloL (Bystolic) 10 mg tablet Take 5 mg by mouth two (2) times a day.  polyethylene glycol (MIRALAX) 17 gram/dose powder Take 17 g by mouth as needed.  nitroglycerin (NITROSTAT) 0.4 mg SL tablet     icosapent ethyL (VASCEPA) 1 gram capsule Take 2 Capsules by mouth two (2) times daily (with meals).  clopidogreL (PLAVIX) 75 mg tab Take 1 Tab by mouth daily.  ezetimibe-simvastatin (VYTORIN) 10-40 mg per tablet TAKE 1 TABLET NIGHTLY    colestipol (COLESTID) 1 gram tablet Take 1 g by mouth daily as needed.  aspirin delayed-release 81 mg tablet Take 81 mg by mouth daily.  Cholecalciferol, Vitamin D3, 1,000 unit cap Take 1,000 Units by mouth daily.  MULTIVITAMIN PO Take  by mouth daily.  pantoprazole (PROTONIX) 40 mg tablet Take 40 mg by mouth daily.  coenzyme q10 200 mg Cap Take  by mouth daily. No current facility-administered medications for this visit.      Allergies   Allergen Reactions    Altace [Ramipril] Unknown (comments)     Pt does not remember reaction    Gabapentin Other (comments)     Vision issues      Lipitor [Atorvastatin] Other (comments)     Muscle pain    Lisinopril Shortness of Breath and Other (comments)     Turns bright red    Other Medication Other (comments)     Vicryl suture on skin tends to be rejected with poor wound healing does better with monocryl    Procardia [Nifedipine] Other (comments)     Caused afib    Rosuvastatin Other (comments)     Muscle Pain       REVIEW OF SYSTEMS: colo 9/15 Dr Emma Stevens, sees Dr Nicole Peres  Ophtho - no vision change or eye pain  Oral - no mouth pain, tongue or tooth problems  Ears - no hearing loss, ear pain, fullness, no swallowing problems  Cardiac - no CP, PND, orthopnea, edema, palpitations or syncope  Chest - no breast masses  Resp - no wheezing, chronic coughing, dyspnea  GI - no heartburn, nausea, vomiting, change in bowel habits, bleeding, hemorrhoids  Urinary - no dysuria, hematuria, flank pain, urgency, frequency    Visit Vitals  /72   Pulse 65   Temp 97.3 °F (36.3 °C) (Temporal)   Resp 16   Ht 6' (1.829 m)   Wt 230 lb 6.4 oz (104.5 kg)   SpO2 99%   BMI 31.25 kg/m²     A&O x3  Affect is appropriate. Mood stable  No apparent distress  Anicteric, no JVD, adenopathy or thyromegaly. No carotid bruits or radiated murmur  Lungs clear to auscultation, no wheezes or rales  Heart showed irregular rhythm. No murmur, rubs, gallops  Abdomen soft nontender, no hepatosplenomegaly or masses. Extremities without edema.   Pulses 0-1+ symmetrically    LABS  From 12/12 showed gluc 101, cr 0.77, gfr 94,   alt 26,           chol 182, tg 159, hdl 52, ldl-c 98, wbc 7.6, hb 15.4, plt 171, tsh 0.96, psa 3.20  From 7/13 showed                          test 263  From 1/14 showed   gluc 112, cr 0.78, gfr 107, alt 17,           chol 130, tg 129, hdl 37, ldl-c 67, wbc 6.8, hb 15.1, plt 186, tsh 0.64, psa 3.60, vit d 26.8  From 6/14 showed   gluc 101, cr 0.57, gfr>60,     hba1c 5.7, vit d 34.3  From 12/14 showed         hba1c 5.9, chol 141, tg 104, hdl 42, ldl-c 78, wbc 8.5, hb 14.8, plt 147, tsh 0.75, psa 5.60, vit d 31.8, ua neg  From 6/15 showed   gluc 104, cr 0.75, gfr>60,     hba1c 5.9  From 8/15 showed                       tsh 0.44, psa 5.28,         test 215, lh 6.1, prl 11.1, ft4 1.61  From 12/15 showed gluc 95,   cr 0.71, gfr>60,  alt 36, hba1c 5.9, chol 135, tg 105, hdl 44, ldl-c 70,            tsh 0.51,      vit d 29.1   From 1/16 showed   gluc 112, cr 0.72, gfr>60,          wbc 7.8, hb 15.1, plt 163,           ua neg  From 11/16 showed gluc 103, cr 0.70,           wbc 8.6, hb 14.5, plt 189, ck/trop neg  From 12/16 showed gluc 89,   cr 0.70, gfr>60,  alt 31, hba1c 5.9, chol 159, tg 124, hdl 58, ldl-c 76, wbc 9.0, hb 15.1, plt 185,       vit d 27.8  From 3/17 showed   gluc 100, cr 0.75, gfr>60,     hba1c 5.9,                tsh 0.78, psa 0.18, ft4 1.30  From 6/17 showed   gluc 96,   cr 0.74, gfr>60, alt 34,  hba1c 5.7, chol 135, tg 71,   hdl 53, ldl-c 68  From 1/18 showed   gluc 107, cr 0.65, gfr>60, alt 31,  hba1c 5.8, chol 147, tg 105, hdl 46, ldl-c 80  From 5/18 showed   gluc 100, cr 0.73, gfr>60, alt 38,  hba1c 5.9, chol 152, tg 59,   hdl 66, ldl-c 74  From 11/18 showed         hba1c 6.1, chol 146, tg 149, hdl 51, ldl-c 65, wbc 7.9, hb 15.4, plt 164  From 5/19 showed         hba1c 5.5  From 11/19 showed gluc 108, cr 0.77, gfr>60, alt 31,  hba1c 5.5, chol 128, tg 108, hdl 36, ldl-c 70  From 5/20 showed         hba1c 5.9, chol 142, tg 133, hdl 52, ldl-c 63, wbc 8.7, hb 14.1, plt 160  From 11/20 showed gluc 99,   cr 0.81, gfr>60, alt 29,  hba1c 5.4  From 5/21 showed   gluc 103, cr 0.71, gfr>60, alt 29,  hba1c 5.5, chol 146, tg 84,   hdl 55, ldl-c 74, wbc 7.3, hb 13.9, plt 141    Results for orders placed or performed during the hospital encounter of 38/77/79   METABOLIC PANEL, BASIC   Result Value Ref Range    Sodium 139 136 - 145 mmol/L    Potassium 4.1 3.5 - 5.5 mmol/L    Chloride 101 100 - 111 mmol/L    CO2 34 (H) 21 - 32 mmol/L    Anion gap 4 3.0 - 18 mmol/L    Glucose 90 74 - 99 mg/dL    BUN 14 7.0 - 18 MG/DL    Creatinine 0.72 0.6 - 1.3 MG/DL BUN/Creatinine ratio 19 12 - 20      GFR est AA >60 >60 ml/min/1.73m2    GFR est non-AA >60 >60 ml/min/1.73m2    Calcium 9.5 8.5 - 10.1 MG/DL   HEMOGLOBIN A1C WITH EAG   Result Value Ref Range    Hemoglobin A1c 5.4 4.2 - 5.6 %    Est. average glucose 108 mg/dL     *Note: Due to a large number of results and/or encounters for the requested time period, some results have not been displayed. A complete set of results can be found in Results Review. We reviewed the patient's labs from the last several visits to point out trends in the numbers          Patient Active Problem List   Diagnosis Code    Colitis, ulcerative ([facility] K51.90    Colon polyps K63.5    Left carotid artery occlusion I65.22    Hypovitaminosis D E55.9    Advance directive in chart Z78.9    Hypertension I11.9    Dyslipidemia E78.5    Coronary artery disease I25.10    Atrial fibrillation I48.91    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 9) E66.3    History of prostate cancer Z85.46    PAD (peripheral artery disease) ([facility]) I73.9    Diverticulosis K57.90     Assessment and plan:  1. Cardiac. Continue current regimen. F/U Dr Deana Oneill  2. Vascular. Will get records Dr Mayela Medina  3. Dyslipidemia. Continue current  4. IFG. Lifestyle and dietary measures, wt loss as he is trying to do  5. Hypovit d. Supplement  6. Colon polyp. Fiber, colo beyond age  9. Prostate ca. Per Dr Hector Mcneil  8. Obesity. See separate note  9. Ortho. F/U Dr Ronnie Carlos  10. Spine. F/U Dr Lee English and Dr Marissa Dhaliwal; trial of lyrica for chronic pain      RTC 5/22    Above conditions discussed at length and patient vocalized understanding. All questions answered to patient satisfaction      ICD-10-CM ICD-9-CM    1. Other chronic pain  G89.29 338.29 pregabalin (LYRICA) 50 mg capsule   2.  PAD (peripheral artery disease) ([facility])  I73.9 443.9 3. Left carotid artery occlusion  I65.22 433.10    4. Hypertension  I11.9 402.10    5. Atherosclerosis of native coronary artery of native heart without angina pectoris  I25.10 414.01    6. Atrial fibrillation, unspecified type ([facility]  I48.91 427.31    7. Diverticulosis  K57.90 562.10    8. IFG (impaired fasting glucose)  O00.60 738.53 METABOLIC PANEL, COMPREHENSIVE      CBC W/O DIFF      HEMOGLOBIN A1C WITH EAG   9. Dyslipidemia  E78.5 272.4    10.  Cervical spinal stenosis  M48.02 723.0

## 2023-09-13 NOTE — TELEPHONE ENCOUNTER
Patient wants to know if there is any reason for him NOT to get RSV, Covid booster, or the Flu vaccine?

## 2023-09-13 NOTE — TELEPHONE ENCOUNTER
----- Message from [address] [place] [address]. Collette sent at 9/13/2023  9:47 AM EDT -----  Regarding: Covid 19 booster, flu, RSV vaccines   Contact: [phone]  I did not see the vaccines list in the message.  They are the Covid 19 Booster, Flu and RSV

## 2023-10-03 ENCOUNTER — TELEPHONE (OUTPATIENT)
Age: 78
End: 2023-10-03

## 2023-10-03 DIAGNOSIS — M54.16 LUMBAR RADICULOPATHY: Primary | ICD-10-CM

## 2023-10-03 RX ORDER — HYDROCODONE BITARTRATE AND ACETAMINOPHEN 5; 325 MG/1; MG/1
1 TABLET ORAL EVERY 6 HOURS PRN
Qty: 120 TABLET | Refills: 0 | Status: SHIPPED | OUTPATIENT
Start: 2023-10-03 | End: 2023-11-02

## 2023-10-03 NOTE — TELEPHONE ENCOUNTER
[facility] reviewed 110/3/2023    The last fill date for Hydrocodone-Acetamin 5-325 Mg was 8/28/2023 for a 30 d/s qty 120      Last UDS: 3/15/2023    CSA Last signed: 11/29/2022        PCP: Matt Sorensen MD      Future Appointments   Date Time Provider [address]   12/26/2023 10:10 AM [facility] NURSE [facility] [place] Sched   1/2/2024  9:30 AM MD Roshni [place] Sched   1/9/2024  8:45 AM [facility] VISIT [facility] AMB   1/16/2024  9:00 AM Matt Sorensen MD [facility] AMB

## 2023-10-03 NOTE — TELEPHONE ENCOUNTER
----- Message from [address]. Collette sent at 10/1/2023 12:49 PM EDT -----  Regarding: [place] refill  Contact: [phone]  Please refill my Rx for hydrocodone acetaminophen, 5/325. 120 tabs. Please send the refill to the [facility] on the corner of [address] in [facility]. Thank you very much,  If you need to reach me my phone is [phone]  If you have time, send me a reply to let me know this has been done.     thank you  Rice Yordy

## 2023-10-30 ENCOUNTER — PATIENT MESSAGE (OUTPATIENT)
Age: 78
End: 2023-10-30

## 2023-10-30 DIAGNOSIS — M54.16 LUMBAR RADICULOPATHY: ICD-10-CM

## 2023-10-31 ENCOUNTER — TELEPHONE (OUTPATIENT)
Age: 78
End: 2023-10-31

## 2023-10-31 RX ORDER — HYDROCODONE BITARTRATE AND ACETAMINOPHEN 5; 325 MG/1; MG/1
1 TABLET ORAL EVERY 6 HOURS PRN
Qty: 120 TABLET | Refills: 0 | Status: SHIPPED | OUTPATIENT
Start: 2023-10-31 | End: 2023-11-30

## 2023-10-31 NOTE — TELEPHONE ENCOUNTER
12:45 Tomorrow at [facility] Please

## 2023-10-31 NOTE — TELEPHONE ENCOUNTER
Patient was seen at [facility] ED yesterday 10/30 for a left hand/ring finger injury. He says it's a fracture and half of his nail is detached from the nail bed. He states that they just wrapped it up and referred him to a doctor in [place] and the appt is on Friday 11/03 but he doesn't want to go to that doctor, he's requesting to see Dr. Dalton Yao because he's seen him before. Please advise if he can be seen and how soon, patient can be reached at [phone].

## 2023-11-01 ENCOUNTER — OFFICE VISIT (OUTPATIENT)
Age: 78
End: 2023-11-01

## 2023-11-01 VITALS
HEIGHT: 73 IN | WEIGHT: 219 LBS | BODY MASS INDEX: 29.03 KG/M2 | DIASTOLIC BLOOD PRESSURE: 69 MMHG | TEMPERATURE: 97 F | SYSTOLIC BLOOD PRESSURE: 121 MMHG

## 2023-11-01 DIAGNOSIS — S62.665B OPEN NONDISPLACED FRACTURE OF DISTAL PHALANX OF LEFT RING FINGER, INITIAL ENCOUNTER: ICD-10-CM

## 2023-11-01 DIAGNOSIS — S61.309A AVULSION OF FINGERNAIL OF LEFT HAND: ICD-10-CM

## 2023-11-01 DIAGNOSIS — Z01.818 PREOP EXAMINATION: Primary | ICD-10-CM

## 2023-11-01 DIAGNOSIS — S61.309A AVULSION OF FINGERNAIL OF LEFT HAND: Primary | ICD-10-CM

## 2023-11-01 NOTE — PROGRESS NOTES
Jazmin Salinas is a 66 y.o. male right handed retiree. Worker's Compensation and legal considerations: none    Chief Complaint   Patient presents with    Finger Pain     Left ring finger     Pain Score:   6    HPI: Patient presents today with an injury to his left ring finger a few days ago. He was seen in the emergency department and started on antibiotics however this caused severe diarrhea. He has been in a dressing. 8/27/2021 HPI: Patient returns today for follow-up of his left carpal tunnel syndrome as well as left index trigger finger as he is requesting injections today. We have previously discussed surgery in the past and he would like to set this up after the first as he has other medical issues that he is tending to later this year. Initial (2020) HPI: Patient comes in today as a referral from my partner. 1 month ago he had a left carpal tunnel injection as well as a ring finger trigger finger injection. He says the numbness and tingling in the left is improved. However he says the ring finger is still locking up. He reports having an EMG many years ago. He also has a history of a right sided carpal tunnel release by Bruno Starks in December 2017. He also has a history of cervical spine arthritis that he has been seen at the spine center for. He reports a one-week history of burning and pain in the index finger at the level of the palm. He denies any injuries to this area.     Date of onset: 10/30/2023  Injury: Yes: Comment: Left ring finger  Prior Treatment:  Yes: Comment: ED    ROS: Review of Systems - General ROS: negative except HPI    Past Medical History:   Diagnosis Date    Adrenal adenoma 2009    LEFT 1 cm, no change 1/15, 2/16    Aortoiliac occlusive disease ([address]) 03/04/2019    Asbestosis(501)     CT 1/19 showed pleural plaques    Atrial fibrillation ([address]) 10/02/2009    s/p Afib ablation 2008; no oac due to prior gi bleed    Russo's esophagus 02/2023    Dr Bradley Chang

## 2023-11-03 NOTE — PERIOP NOTE
Instructions for your surgery at [address] [place] [address] Date:  11/3/2023      Patient's Name:  Giacomo Mariee           Surgery Date:  11/7/2023              Please enter the main entrance of the hospital and check-in at the  [facility]. Once checked in at the , you will take the elevators to the second floor, and report to the waiting room on the left. The room will say Procedure Registration. Do NOT eat or drink anything, including candy, gum, or ice chips after midnight prior to your surgery, unless you have specific instructions from your surgeon or anesthesia provider to do so. Brush your teeth before coming to the hospital. You may swish with water, but do not swallow. No smoking/Vaping/E-Cigarettes 24 hours prior to the day of surgery. No alcohol 24 hours prior to the day of surgery. No recreational drugs for one week prior to the day of surgery. Bring Photo ID, Insurance information, and Co-pay if required on day of surgery. Bring in pertinent legal documents, such as, Medical Power of VASHTI ALEMAN, DNR, Advance Directive, etc.  Leave all valuables, including money/purse, at home. Remove all jewelry, including ALL body piercings, nail polish, acrylic nails, and makeup (including mascara); no lotions, powders, deodorant, or perfume/cologne/after shave on the skin. Follow instruction for Hibiclens washes and CHG wipes from surgeon's office. Glasses and dentures may be worn to the hospital. They must be removed prior to surgery. Please bring case/container for glasses or dentures. Contact lenses should not be worn on day of surgery. Call your doctor's office if symptoms of a cold or illness develop within 24-48 hours prior to your surgery. Call your doctor's office if you have any questions concerning insurance or co-pays. 15. AN ADULT (relative or friend 25 years or older) [address]   16. Please make

## 2023-11-06 ENCOUNTER — ANESTHESIA EVENT (OUTPATIENT)
Facility: HOSPITAL | Age: 78
End: 2023-11-06
Payer: MEDICARE

## 2023-11-07 ENCOUNTER — HOSPITAL ENCOUNTER (OUTPATIENT)
Facility: HOSPITAL | Age: 78
Setting detail: OUTPATIENT SURGERY
Discharge: HOME | End: 2023-11-07
Attending: ORTHOPAEDIC SURGERY | Admitting: ORTHOPAEDIC SURGERY
Payer: MEDICARE

## 2023-11-07 ENCOUNTER — ANESTHESIA (OUTPATIENT)
Facility: HOSPITAL | Age: 78
End: 2023-11-07
Payer: MEDICARE

## 2023-11-07 VITALS
HEART RATE: 75 BPM | OXYGEN SATURATION: 96 % | DIASTOLIC BLOOD PRESSURE: 62 MMHG | SYSTOLIC BLOOD PRESSURE: 145 MMHG | BODY MASS INDEX: 28.94 KG/M2 | RESPIRATION RATE: 18 BRPM | TEMPERATURE: 97.8 F | WEIGHT: 218.4 LBS | HEIGHT: 73 IN

## 2023-11-07 PROBLEM — S61.309A: Status: ACTIVE | Noted: 2023-11-07

## 2023-11-07 PROCEDURE — 7100000001 HC PACU RECOVERY - ADDTL 15 MIN: Performed by: ORTHOPAEDIC SURGERY

## 2023-11-07 PROCEDURE — 3600000012 HC SURGERY LEVEL 2 ADDTL 15MIN: Performed by: ORTHOPAEDIC SURGERY

## 2023-11-07 PROCEDURE — 3700000001 HC ADD 15 MINUTES (ANESTHESIA): Performed by: ORTHOPAEDIC SURGERY

## 2023-11-07 PROCEDURE — 7100000010 HC PHASE II RECOVERY - FIRST 15 MIN: Performed by: ORTHOPAEDIC SURGERY

## 2023-11-07 PROCEDURE — 7100000000 HC PACU RECOVERY - FIRST 15 MIN: Performed by: ORTHOPAEDIC SURGERY

## 2023-11-07 PROCEDURE — 2580000003 HC RX 258: Performed by: ORTHOPAEDIC SURGERY

## 2023-11-07 PROCEDURE — 7100000011 HC PHASE II RECOVERY - ADDTL 15 MIN: Performed by: ORTHOPAEDIC SURGERY

## 2023-11-07 PROCEDURE — 3700000000 HC ANESTHESIA ATTENDED CARE: Performed by: ORTHOPAEDIC SURGERY

## 2023-11-07 PROCEDURE — 6370000000 HC RX 637 (ALT 250 FOR IP): Performed by: NURSE ANESTHETIST, CERTIFIED REGISTERED

## 2023-11-07 PROCEDURE — 6360000002 HC RX W HCPCS: Performed by: ORTHOPAEDIC SURGERY

## 2023-11-07 PROCEDURE — 6370000000 HC RX 637 (ALT 250 FOR IP): Performed by: ORTHOPAEDIC SURGERY

## 2023-11-07 PROCEDURE — 2500000003 HC RX 250 WO HCPCS: Performed by: NURSE ANESTHETIST, CERTIFIED REGISTERED

## 2023-11-07 PROCEDURE — 2500000003 HC RX 250 WO HCPCS: Performed by: ORTHOPAEDIC SURGERY

## 2023-11-07 PROCEDURE — A4217 STERILE WATER/SALINE, 500 ML: HCPCS | Performed by: ORTHOPAEDIC SURGERY

## 2023-11-07 PROCEDURE — 2580000003 HC RX 258: Performed by: NURSE ANESTHETIST, CERTIFIED REGISTERED

## 2023-11-07 PROCEDURE — 6360000002 HC RX W HCPCS: Performed by: NURSE ANESTHETIST, CERTIFIED REGISTERED

## 2023-11-07 PROCEDURE — 3600000002 HC SURGERY LEVEL 2 BASE: Performed by: ORTHOPAEDIC SURGERY

## 2023-11-07 PROCEDURE — 2709999900 HC NON-CHARGEABLE SUPPLY: Performed by: ORTHOPAEDIC SURGERY

## 2023-11-07 RX ORDER — FENTANYL CITRATE 50 UG/ML
INJECTION, SOLUTION INTRAMUSCULAR; INTRAVENOUS PRN
Status: DISCONTINUED | OUTPATIENT
Start: 2023-11-07 | End: 2023-11-07 | Stop reason: SDUPTHER

## 2023-11-07 RX ORDER — PHENYLEPHRINE HCL IN 0.9% NACL 1 MG/10 ML
SYRINGE (ML) INTRAVENOUS PRN
Status: DISCONTINUED | OUTPATIENT
Start: 2023-11-07 | End: 2023-11-07

## 2023-11-07 RX ORDER — SODIUM CHLORIDE 0.9 % (FLUSH) 0.9 %
5-40 SYRINGE (ML) INJECTION PRN
Status: DISCONTINUED | OUTPATIENT
Start: 2023-11-07 | End: 2023-11-07 | Stop reason: HOSPADM

## 2023-11-07 RX ORDER — CLINDAMYCIN PHOSPHATE 900 MG/50ML
900 INJECTION, SOLUTION INTRAVENOUS
Status: COMPLETED | OUTPATIENT
Start: 2023-11-07 | End: 2023-11-07

## 2023-11-07 RX ORDER — FENTANYL CITRATE 50 UG/ML
25 INJECTION, SOLUTION INTRAMUSCULAR; INTRAVENOUS EVERY 5 MIN PRN
Status: DISCONTINUED | OUTPATIENT
Start: 2023-11-07 | End: 2023-11-07 | Stop reason: HOSPADM

## 2023-11-07 RX ORDER — PROPOFOL 10 MG/ML
INJECTION, EMULSION INTRAVENOUS PRN
Status: DISCONTINUED | OUTPATIENT
Start: 2023-11-07 | End: 2023-11-07 | Stop reason: SDUPTHER

## 2023-11-07 RX ORDER — LIDOCAINE HYDROCHLORIDE 20 MG/ML
INJECTION, SOLUTION EPIDURAL; INFILTRATION; INTRACAUDAL; PERINEURAL PRN
Status: DISCONTINUED | OUTPATIENT
Start: 2023-11-07 | End: 2023-11-07 | Stop reason: SDUPTHER

## 2023-11-07 RX ORDER — SODIUM CHLORIDE 0.9 % (FLUSH) 0.9 %
5-40 SYRINGE (ML) INJECTION EVERY 12 HOURS SCHEDULED
Status: DISCONTINUED | OUTPATIENT
Start: 2023-11-07 | End: 2023-11-07 | Stop reason: HOSPADM

## 2023-11-07 RX ORDER — SODIUM CHLORIDE, SODIUM LACTATE, POTASSIUM CHLORIDE, CALCIUM CHLORIDE 600; 310; 30; 20 MG/100ML; MG/100ML; MG/100ML; MG/100ML
INJECTION, SOLUTION INTRAVENOUS CONTINUOUS
Status: DISCONTINUED | OUTPATIENT
Start: 2023-11-07 | End: 2023-11-07 | Stop reason: HOSPADM

## 2023-11-07 RX ORDER — ONDANSETRON 2 MG/ML
INJECTION INTRAMUSCULAR; INTRAVENOUS PRN
Status: DISCONTINUED | OUTPATIENT
Start: 2023-11-07 | End: 2023-11-07 | Stop reason: SDUPTHER

## 2023-11-07 RX ORDER — LIDOCAINE HYDROCHLORIDE 10 MG/ML
1 INJECTION, SOLUTION EPIDURAL; INFILTRATION; INTRACAUDAL; PERINEURAL
Status: DISCONTINUED | OUTPATIENT
Start: 2023-11-07 | End: 2023-11-07 | Stop reason: HOSPADM

## 2023-11-07 RX ORDER — EPHEDRINE SULFATE/0.9% NACL/PF 50 MG/5 ML
SYRINGE (ML) INTRAVENOUS PRN
Status: DISCONTINUED | OUTPATIENT
Start: 2023-11-07 | End: 2023-11-07 | Stop reason: SDUPTHER

## 2023-11-07 RX ORDER — SODIUM CHLORIDE 9 MG/ML
INJECTION, SOLUTION INTRAVENOUS PRN
Status: DISCONTINUED | OUTPATIENT
Start: 2023-11-07 | End: 2023-11-07 | Stop reason: HOSPADM

## 2023-11-07 RX ORDER — ONDANSETRON 2 MG/ML
4 INJECTION INTRAMUSCULAR; INTRAVENOUS
Status: DISCONTINUED | OUTPATIENT
Start: 2023-11-07 | End: 2023-11-07 | Stop reason: HOSPADM

## 2023-11-07 RX ORDER — HYDROCODONE BITARTRATE AND ACETAMINOPHEN 5; 325 MG/1; MG/1
1 TABLET ORAL
Status: COMPLETED | OUTPATIENT
Start: 2023-11-07 | End: 2023-11-07

## 2023-11-07 RX ORDER — FAMOTIDINE 20 MG/1
20 TABLET, FILM COATED ORAL ONCE
Status: COMPLETED | OUTPATIENT
Start: 2023-11-07 | End: 2023-11-07

## 2023-11-07 RX ADMIN — FAMOTIDINE 20 MG: 20 TABLET, FILM COATED ORAL at 11:19

## 2023-11-07 RX ADMIN — PHENYLEPHRINE HYDROCHLORIDE 100 MCG: 10 INJECTION INTRAVENOUS at 13:20

## 2023-11-07 RX ADMIN — PHENYLEPHRINE HYDROCHLORIDE 100 MCG: 10 INJECTION INTRAVENOUS at 13:16

## 2023-11-07 RX ADMIN — LIDOCAINE HYDROCHLORIDE 100 MG: 20 INJECTION, SOLUTION EPIDURAL; INFILTRATION; INTRACAUDAL; PERINEURAL at 12:43

## 2023-11-07 RX ADMIN — PROPOFOL 50 MG: 10 INJECTION, EMULSION INTRAVENOUS at 12:49

## 2023-11-07 RX ADMIN — HYDROCODONE BITARTRATE AND ACETAMINOPHEN 1 TABLET: 5; 325 TABLET ORAL at 14:20

## 2023-11-07 RX ADMIN — PROPOFOL 40 MG: 10 INJECTION, EMULSION INTRAVENOUS at 13:20

## 2023-11-07 RX ADMIN — TRANEXAMIC ACID 1 G: 100 INJECTION, SOLUTION INTRAVENOUS at 13:15

## 2023-11-07 RX ADMIN — ONDANSETRON 4 MG: 2 INJECTION INTRAMUSCULAR; INTRAVENOUS at 13:05

## 2023-11-07 RX ADMIN — PROPOFOL 20 MG: 10 INJECTION, EMULSION INTRAVENOUS at 13:05

## 2023-11-07 RX ADMIN — Medication 10 MG: at 13:16

## 2023-11-07 RX ADMIN — Medication 20 MG: at 13:22

## 2023-11-07 RX ADMIN — PHENYLEPHRINE HYDROCHLORIDE 100 MCG: 10 INJECTION INTRAVENOUS at 13:05

## 2023-11-07 RX ADMIN — PROPOFOL 50 MG: 10 INJECTION, EMULSION INTRAVENOUS at 13:00

## 2023-11-07 RX ADMIN — PROPOFOL 20 MG: 10 INJECTION, EMULSION INTRAVENOUS at 12:55

## 2023-11-07 RX ADMIN — FENTANYL CITRATE 25 MCG: 50 INJECTION INTRAMUSCULAR; INTRAVENOUS at 12:47

## 2023-11-07 RX ADMIN — FENTANYL CITRATE 25 MCG: 50 INJECTION INTRAMUSCULAR; INTRAVENOUS at 12:40

## 2023-11-07 RX ADMIN — SODIUM CHLORIDE, SODIUM LACTATE, POTASSIUM CHLORIDE, AND CALCIUM CHLORIDE: 600; 310; 30; 20 INJECTION, SOLUTION INTRAVENOUS at 13:25

## 2023-11-07 RX ADMIN — FENTANYL CITRATE 25 MCG: 50 INJECTION INTRAMUSCULAR; INTRAVENOUS at 13:10

## 2023-11-07 RX ADMIN — PHENYLEPHRINE HYDROCHLORIDE 100 MCG: 10 INJECTION INTRAVENOUS at 12:58

## 2023-11-07 RX ADMIN — PROPOFOL 50 MG: 10 INJECTION, EMULSION INTRAVENOUS at 12:46

## 2023-11-07 RX ADMIN — PROPOFOL 20 MG: 10 INJECTION, EMULSION INTRAVENOUS at 13:10

## 2023-11-07 RX ADMIN — CLINDAMYCIN PHOSPHATE 900 MG: 900 INJECTION, SOLUTION INTRAVENOUS at 12:50

## 2023-11-07 RX ADMIN — SODIUM CHLORIDE, SODIUM LACTATE, POTASSIUM CHLORIDE, AND CALCIUM CHLORIDE: 600; 310; 30; 20 INJECTION, SOLUTION INTRAVENOUS at 11:18

## 2023-11-07 RX ADMIN — PROPOFOL 50 MG: 10 INJECTION, EMULSION INTRAVENOUS at 12:43

## 2023-11-07 RX ADMIN — PROPOFOL 50 MG: 10 INJECTION, EMULSION INTRAVENOUS at 13:15

## 2023-11-07 RX ADMIN — PROPOFOL 50 MG: 10 INJECTION, EMULSION INTRAVENOUS at 12:52

## 2023-11-07 RX ADMIN — PHENYLEPHRINE HYDROCHLORIDE 100 MCG: 10 INJECTION INTRAVENOUS at 13:11

## 2023-11-07 ASSESSMENT — PAIN - FUNCTIONAL ASSESSMENT: PAIN_FUNCTIONAL_ASSESSMENT: 0-10

## 2023-11-07 ASSESSMENT — PAIN DESCRIPTION - ORIENTATION: ORIENTATION: LEFT

## 2023-11-07 ASSESSMENT — PAIN DESCRIPTION - LOCATION: LOCATION: FINGER (COMMENT WHICH ONE)

## 2023-11-07 ASSESSMENT — PAIN SCALES - GENERAL: PAINLEVEL_OUTOF10: 8

## 2023-11-07 NOTE — BRIEF OP NOTE
Brief Postoperative Note      Patient: Nabila Williamson  YOB: 1945  MRN: [id number]    Date of Procedure: 11/7/2023    Pre-Op Diagnosis Codes:     * Avulsion of fingernail of left hand [S61.309A]    Post-Op Diagnosis: Same       Procedure(s):  LEFT RING FINGER NAIL REMOVAL AND NAILBED REPAIR    Surgeon(s):   Sixto Muhammad DO    Assistant:  Surgical Assistant: Catalina Anderson    Anesthesia: Monitor Anesthesia Care    Estimated Blood Loss (mL): Minimal    Complications: None    Specimens:   * No specimens in log *    Implants:  * No implants in log *      Drains: * No LDAs found *    Findings: nail bed laceration      Electronically signed by Sixto Muhammad DO on 11/7/2023 at 1:45 PM

## 2023-11-07 NOTE — ANESTHESIA POSTPROCEDURE EVALUATION
Department of Anesthesiology  Postprocedure Note    Patient: Vasile Sullivan  MRN: [id number]  Birthdate: [birth year]  Date of evaluation: 11/7/2023      Procedure Summary     Date: 11/07/23 Room / Location: [facility] MAIN 03 / [facility] MAIN OR    Anesthesia Start: 5320 Anesthesia Stop: 9717    Procedure: LEFT RING FINGER NAIL REMOVAL AND NAILBED REPAIR (Left: Fingers) Diagnosis:       Avulsion of fingernail of left hand      (Avulsion of fingernail of left hand [S61.309A])    Surgeons:  Carlo Estrada DO Responsible Provider: Yara Cabrera MD    Anesthesia Type: MAC ASA Status: 3          Anesthesia Type: MAC    Jori Phase I: Jori Score: 10    Jori Phase II:        Anesthesia Post Evaluation    Patient location during evaluation: bedside  Patient participation: complete - patient participated  Level of consciousness: responsive to verbal stimuli  Airway patency: patent  Nausea & Vomiting: no nausea  Complications: no  Respiratory status: acceptable  Hydration status: euvolemic

## 2023-11-07 NOTE — ANESTHESIA PRE PROCEDURE
Department of Anesthesiology  Preprocedure Note       Name:  Violeta Coronado   Age:  66 y.o.  :  1945                                          MRN:  [id number]         Date:  2023      Surgeon: Chloe Cabrera): Ye Mendenhall DO    Procedure: Procedure(s):  LEFT RING FINGER NAIL REMOVAL AND POSSIBLE NAILBED REPAIR    Medications prior to admission:   Prior to Admission medications    Medication Sig Start Date End Date Taking? Authorizing Provider   HYDROcodone-acetaminophen (NORCO) 5-325 MG per tablet Take 1 tablet by mouth every 6 hours as needed for Pain for up to 30 days. Take lowest dose possible to manage pain Max Daily Amount: 4 tablets 10/31/23 11/30/23  Karine Hernandez MD   fluorouracil (EFUDEX) 5 % cream  23   Adilene Mcneill MD   gabapentin (NEURONTIN) 300 MG capsule Take 1 capsule by mouth nightly for 180 days. Max Daily Amount: 300 mg  Patient taking differently: Take 1 capsule by mouth nightly.  23  Karine Hernandez MD   diclofenac sodium (VOLTAREN) 1 % GEL Apply 4 g topically 4 times daily  Patient not taking: Reported on 11/3/2023 3/22/23   Olga Morris MD   Probiotic Product (PROBIOTIC ADVANCED PO) Take 1 capsule by mouth daily Align Brand    ProviderAdilene MD   magnesium oxide (MAG-OX) 400 MG tablet Take 250 mg by mouth daily    Adilene Mcneill MD   methylcellulose (CITRUCEL) 500 MG TABS Take 1 tablet by mouth nightly    ProviderAdilene MD   Multiple Vitamin (MULTIVITAMIN PO) Take by mouth daily 11   Automatic Reconciliation, Ar   alclomethasone (ACLOVATE) 0.05 % cream Apply topically daily as needed    Automatic Reconciliation, Ar   aspirin 81 MG EC tablet Take 1 tablet by mouth daily    Automatic Reconciliation, Ar   celecoxib (CELEBREX) 200 MG capsule Take 1 capsule by mouth daily as needed  Patient not taking: Reported on 11/3/2023 3/1/22   Automatic Reconciliation, Ar   vitamin D 25 MCG (1000 UT) CAPS Take 1 capsule

## 2023-11-07 NOTE — H&P
Update History & Physical    The patient's History and Physical of November 1, 2023 was reviewed with the patient and I examined the patient. There was no change. The surgical site was confirmed by the patient and me. Plan: The risks, benefits, expected outcome, and alternative to the recommended procedure have been discussed with the patient. Patient understands and wants to proceed with the procedure.      Electronically signed by Ac Cuellar DO on 11/7/2023 at 10:51 AM

## 2023-11-07 NOTE — DISCHARGE INSTRUCTIONS
Ice and Elevate operative wound/dressing. Begin moving fingers immediately after surgery. Keep dressing clean and dry. Cover for showering. Do not remove dressing. Resume Plavix and Aspirin tomorrow    DISCHARGE SUMMARY from Nurse    PATIENT INSTRUCTIONS:    After general anesthesia or intravenous sedation, for 24 hours or while taking prescription Narcotics:  Limit your activities  Do not drive and operate hazardous machinery  Do not make important personal or business decisions  Do  not drink alcoholic beverages  If you have not urinated within 8 hours after discharge, please contact your surgeon on call. Report the following to your surgeon:  Excessive pain, swelling, redness or odor of or around the surgical area  Temperature over 100.5  Nausea and vomiting lasting longer than 4 hours or if unable to take medications  Any signs of decreased circulation or nerve impairment to extremity: change in color, persistent  numbness, tingling, coldness or increase pain  Any questions    These are general instructions for a healthy lifestyle:    No smoking/ No tobacco products/ Avoid exposure to second hand smoke  Surgeon General's Warning:  Quitting smoking now greatly reduces serious risk to your health. Obesity, smoking, and sedentary lifestyle greatly increases your risk for illness    A healthy diet, regular physical exercise & weight monitoring are important for maintaining a healthy lifestyle    You may be retaining fluid if you have a history of heart failure or if you experience any of the following symptoms:  Weight gain of 3 pounds or more overnight or 5 pounds in a week, increased swelling in our hands or feet or shortness of breath while lying flat in bed. Please call your doctor as soon as you notice any of these symptoms; do not wait until your next office visit. The discharge information has been reviewed with the patient and friend/family member.   The patient and the

## 2023-11-08 NOTE — OP NOTE
Operative Note      Patient: Bradley Perry  YOB: 1945  MRN: [id number]    Date of Procedure: 11/7/2023    Pre-Op Diagnosis Codes:     * Avulsion of fingernail of left hand [S61.309A]    Post-Op Diagnosis: Same       Procedure(s):  LEFT RING FINGER NAIL REMOVAL AND NAILBED REPAIR    Surgeon(s): Alisha Titus DO    Assistant:   Surgical Assistant: Catalina Anderson    Anesthesia: Monitor Anesthesia Care    Estimated Blood Loss (mL): Minimal    Complications: None    Specimens:   * No specimens in log *    Implants:  * No implants in log *      Drains: * No LDAs found *    Findings: large L-shaped laceration of nailbed      Detailed Description of Procedure: Indications for procedure been outlined in the perioperative documentation, most notably being not amenable to conservative treatment. Informed consent was obtained from the patient. The risks and benefits of the procedure were discussed with the patient. They include but are not limited to neurovascular injury, tendon/ligamentous injury, blood loss, infection, nail deformity, nail growth cessation, need for further surgery, hematoma, neuroma, seroma, chronic pain, chronic stiffness, complications from anesthesia including death, and the possibility of nithya Covid. After informed consent was obtained, the patient was taken back to the operative suite. The operative extremity and it was prepped and draped in the normal sterile fashion. A sterile finger tourniquet was applied to the ring finger. Attention was turned to the left ring finger where the nail plate was elevated off the nail bed. The nail plate was subsequently removed. The nail bed was inspected and found to have an L-shaped laceration that extended proximal to the eponychial fold. Radial and ulnar side incisions were made adjacent to the eponychial fold which was retracted.   The wound was copiously irrigated and overall the sterile matrix was found to be

## 2023-11-20 ENCOUNTER — OFFICE VISIT (OUTPATIENT)
Age: 78
End: 2023-11-20

## 2023-11-20 VITALS — TEMPERATURE: 97 F | WEIGHT: 221 LBS | BODY MASS INDEX: 29.29 KG/M2 | HEIGHT: 73 IN

## 2023-11-20 DIAGNOSIS — S61.309A AVULSION OF FINGERNAIL OF LEFT HAND: Primary | ICD-10-CM

## 2023-11-20 PROCEDURE — 99024 POSTOP FOLLOW-UP VISIT: CPT

## 2023-11-20 NOTE — PROGRESS NOTES
and understanding of the plan    Note: This note was completed using voice recognition software.   Any typographical/name errors or mistakes are unintentional.

## 2023-11-28 ENCOUNTER — PATIENT MESSAGE (OUTPATIENT)
Age: 78
End: 2023-11-28

## 2023-11-28 ENCOUNTER — OFFICE VISIT (OUTPATIENT)
Age: 78
End: 2023-11-28

## 2023-11-28 VITALS — HEIGHT: 73 IN | WEIGHT: 221 LBS | BODY MASS INDEX: 29.29 KG/M2

## 2023-11-28 DIAGNOSIS — S61.309A AVULSION OF FINGERNAIL OF LEFT HAND: Primary | ICD-10-CM

## 2023-11-28 DIAGNOSIS — M48.02 CERVICAL SPINAL STENOSIS: Primary | ICD-10-CM

## 2023-11-28 PROCEDURE — 99024 POSTOP FOLLOW-UP VISIT: CPT

## 2023-11-28 NOTE — PROGRESS NOTES
Jeffrey Diego is a 66 y.o. male right handed retiree. Worker's Compensation and legal considerations: none    Chief Complaint   Patient presents with    Post-Op Check     Left ring finger nail removal     Pain Score:   3    11/28/2023 HPI: Patient presents today for postoperative visit 1 month status post left ring finger nail removal and nailbed repair. He is concerned that the stitches are catching on things. He has been keeping it covered with a band-aid but sutures are still catching. He has minimal pain. He has been washing with Hibiclens. 11/7/2023 HPI: Patient presents today for a postoperative visit 2 weeks status post left ring finger nail removal and nailbed repair. Initial HPI: Patient presents today with an injury to his left ring finger a few days ago. He was seen in the emergency department and started on antibiotics however this caused severe diarrhea. He has been in a dressing.     Date of onset: 10/30/2023  Injury: Yes: Comment: Left ring finger  Prior Treatment:  left ring finger nail removal and nail bed    ROS: Review of Systems - General ROS: negative except HPI    Past Medical History:   Diagnosis Date    Adrenal adenoma 2009    LEFT 1 cm, no change 1/15, 2/16    Aortoiliac occlusive disease ([address]) 03/04/2019    Asbestosis(501)     CT 1/19 showed pleural plaques    Atrial fibrillation ([address]) 10/02/2009    s/p Afib ablation 2008; no oac due to prior gi bleed    Russo's esophagus 02/2023    Dr Jessy Dancer    [facility] (basal cell carcinoma of skin)     Dr Gaudencio Huang; he's had >350 lesions removed    CAD (coronary artery disease)     RCA - 3.0 x 16mm TAXUS (9/04); 3.0 x 16mm TAXUS (12/06)    Carotid disease, bilateral (HCC)     Chronic pain     myofascial pain syndrome; seen in pain clinic in past    Colon polyp     Dr Jessy Dancer 9/15    Degenerative arthritis of cervical spine     MRI 9/11 showed C3-6 severe foraminal stenosis w RADICULOPATHY    Degenerative arthritis of lumbar spine

## 2023-11-29 ENCOUNTER — TELEPHONE (OUTPATIENT)
Age: 78
End: 2023-11-29

## 2023-11-29 DIAGNOSIS — U07.1 COVID-19 VIRUS INFECTION: Primary | ICD-10-CM

## 2023-11-29 NOTE — TELEPHONE ENCOUNTER
Patient stating he tested + for COVID this morning. He started feeling bad last night. Headache, body aches and fever 100.5     Please advise.

## 2023-12-01 RX ORDER — HYDROCODONE BITARTRATE AND ACETAMINOPHEN 7.5; 325 MG/1; MG/1
1 TABLET ORAL EVERY 6 HOURS PRN
Qty: 120 TABLET | Refills: 0 | Status: SHIPPED | OUTPATIENT
Start: 2023-12-01 | End: 2023-12-31

## 2023-12-01 NOTE — TELEPHONE ENCOUNTER
From: Manuela Freedman  To: Dr. Lee Ann Ahmadi: 11/28/2023 4:23 PM EST  Subject: [place] Refill    Please refill my Rx for hydrocodone acetaminophen, 5/325. 120 tabs. Please send the refill to the [facility] on the corner of [email or web address] in [facility]. This was last filled on 10-30- 23, and Nov 30 falls very close to the weekend, so I am sending this now. If possible, please reply so I will know this was received, since My Chart is being changed.      Thank you very much,  If you need to reach me my phone is [address]

## 2023-12-04 ENCOUNTER — TELEPHONE (OUTPATIENT)
Age: 78
End: 2023-12-04

## 2023-12-04 NOTE — TELEPHONE ENCOUNTER
Patient took last dose of paxloivd and would like to know when can he started back on his Vytorin 10-40 mg. Patient is still ing having slight headaches and a little sore throat but no other symptoms. He would like to know is there anything else he needs to take or do. Please Advise

## 2023-12-11 DIAGNOSIS — M54.16 LUMBAR RADICULOPATHY: ICD-10-CM

## 2023-12-12 RX ORDER — GABAPENTIN 300 MG/1
300 CAPSULE ORAL
Qty: 90 CAPSULE | Refills: 1 | Status: SHIPPED | OUTPATIENT
Start: 2023-12-12 | End: 2024-06-09

## 2023-12-24 DIAGNOSIS — G89.29 CHRONIC PAIN OF RIGHT KNEE: ICD-10-CM

## 2023-12-24 DIAGNOSIS — M25.561 CHRONIC PAIN OF RIGHT KNEE: ICD-10-CM

## 2023-12-24 DIAGNOSIS — M17.11 UNILATERAL PRIMARY OSTEOARTHRITIS, RIGHT KNEE: ICD-10-CM

## 2023-12-27 NOTE — TELEPHONE ENCOUNTER
LEVOTHYROXINE 0.075MG (75MCG) TABS          Will file in chart as: LEVOTHYROXINE 75 MCG Oral Tab    Sig: TAKE 1 TABLET(75 MCG) BY MOUTH BEFORE BREAKFAST    Disp: 90 tablet    Refills: 1 (Pharmacy requested: Not specified)    Start: 12/26/2023    Class: Normal    Non-formulary For: Other specified hypothyroidism    Last ordered: 6 months ago (6/21/2023) by Shelby Rihcter MD    Last refill: 9/19/2023    Rx #: [id number]    Thyroid Supplements Protocol [place]9512/26/2023 04:38 PM   Protocol Details TSH test in past 12 months    TSH value between 0.350 and 5.500 IU/ml    Appointment in past 12 or next 3 months      LOV 9/26/2023  Filled 6/21/23 90 tabs 1 refill   Last labs 9/20/23  Repeat lab due as of 12/20/23  Future Appointments   Date Time Provider Department Center   2/14/2024 12:30 PM Kd Burroughs, DPM [id number]        Pt calling, cut his leg yesterday and had to go to er due to bleeding from blood thinners. They told him to see doctor tomorrow to change bandage which is an anti clotting bandage per patient. Wants to know if RD can fit him in or if a nurse can change it. He said they didn't tell him how and he is afraid he will start bleeding again if he does it.

## 2023-12-28 ENCOUNTER — TELEPHONE (OUTPATIENT)
Age: 78
End: 2023-12-28

## 2023-12-28 DIAGNOSIS — M48.02 CERVICAL SPINAL STENOSIS: ICD-10-CM

## 2023-12-28 RX ORDER — HYDROCODONE BITARTRATE AND ACETAMINOPHEN 7.5; 325 MG/1; MG/1
1 TABLET ORAL EVERY 6 HOURS PRN
Qty: 120 TABLET | Refills: 0 | Status: CANCELLED | OUTPATIENT
Start: 2023-12-28 | End: 2024-01-27

## 2023-12-28 NOTE — TELEPHONE ENCOUNTER
----- Message from Charles T. Collette sent at 12/25/2023 11:10 AM EST -----  Regarding: Hydrocodone acetaminophen  Contact: [phone]  Please refill my Rx for hydrocodone acetaminophen, 5/325. 120 tabs this week.  Please send the refill to the [facility] on the corner of [facility] and [facility] in [place]. This was last filled on 12-01-23    Please let me know if you need any additional info, and when this is sent.    Thank you  Charles Collette  [phone]

## 2023-12-28 NOTE — TELEPHONE ENCOUNTER
----- Message from Charles T. Collette sent at 12/25/2023 11:10 AM EST -----  Regarding: Hydrocodone acetaminophen  Contact: [phone]  Please refill my Rx for hydrocodone acetaminophen, 5/325. 120 tabs this week.  Please send the refill to the [facility] on the corner of [facility] [address] [facility] and [facility] in [place]. This was last filled on 12-01-23    Please let me know if you need any additional info, and when this is sent.    Thank you  Charles Collette  [phone]

## 2024-01-09 ENCOUNTER — HOSPITAL ENCOUNTER (OUTPATIENT)
Facility: HOSPITAL | Age: 79
Setting detail: SPECIMEN
Discharge: HOME | End: 2024-01-12
Payer: MEDICARE

## 2024-01-09 DIAGNOSIS — R73.01 IFG (IMPAIRED FASTING GLUCOSE): ICD-10-CM

## 2024-01-09 DIAGNOSIS — E78.5 DYSLIPIDEMIA: ICD-10-CM

## 2024-01-09 LAB
ANION GAP SERPL CALC-SCNC: 4 MMOL/L (ref 3–18)
BUN SERPL-MCNC: 19 MG/DL (ref 7–18)
BUN/CREAT SERPL: 23 (ref 12–20)
CALCIUM SERPL-MCNC: 9.4 MG/DL (ref 8.5–10.1)
CHLORIDE SERPL-SCNC: 102 MMOL/L (ref 100–111)
CHOLEST SERPL-MCNC: 130 MG/DL
CO2 SERPL-SCNC: 34 MMOL/L (ref 21–32)
CREAT SERPL-MCNC: 0.81 MG/DL (ref 0.6–1.3)
GLUCOSE SERPL-MCNC: 101 MG/DL (ref 74–99)
HBA1C MFR BLD: 5.7 % (ref 4.2–5.6)
HDLC SERPL-MCNC: 53 MG/DL (ref 40–60)
HDLC SERPL: 2.5 (ref 0–5)
LDLC SERPL CALC-MCNC: 64.2 MG/DL (ref 0–100)
LIPID PANEL: NORMAL
POTASSIUM SERPL-SCNC: 4 MMOL/L (ref 3.5–5.5)
SODIUM SERPL-SCNC: 140 MMOL/L (ref 136–145)
TRIGL SERPL-MCNC: 64 MG/DL
VLDLC SERPL CALC-MCNC: 12.8 MG/DL

## 2024-01-09 PROCEDURE — 80048 BASIC METABOLIC PNL TOTAL CA: CPT

## 2024-01-09 PROCEDURE — 83036 HEMOGLOBIN GLYCOSYLATED A1C: CPT

## 2024-01-09 PROCEDURE — 80061 LIPID PANEL: CPT

## 2024-01-09 PROCEDURE — 36415 COLL VENOUS BLD VENIPUNCTURE: CPT

## 2024-01-16 ENCOUNTER — OFFICE VISIT (OUTPATIENT)
Age: 79
End: 2024-01-16
Payer: MEDICARE

## 2024-01-16 VITALS
HEIGHT: 73 IN | HEART RATE: 75 BPM | SYSTOLIC BLOOD PRESSURE: 126 MMHG | WEIGHT: 210 LBS | RESPIRATION RATE: 16 BRPM | TEMPERATURE: 97.3 F | BODY MASS INDEX: 27.83 KG/M2 | OXYGEN SATURATION: 97 % | DIASTOLIC BLOOD PRESSURE: 80 MMHG

## 2024-01-16 DIAGNOSIS — C61 MALIGNANT NEOPLASM OF PROSTATE (HCC): ICD-10-CM

## 2024-01-16 DIAGNOSIS — I10 PRIMARY HYPERTENSION: ICD-10-CM

## 2024-01-16 DIAGNOSIS — Z85.46 HISTORY OF PROSTATE CANCER: ICD-10-CM

## 2024-01-16 DIAGNOSIS — K51.919 ULCERATIVE COLITIS WITH COMPLICATION, UNSPECIFIED LOCATION (HCC): ICD-10-CM

## 2024-01-16 DIAGNOSIS — E78.5 DYSLIPIDEMIA: ICD-10-CM

## 2024-01-16 DIAGNOSIS — I73.9 PAD (PERIPHERAL ARTERY DISEASE) (HCC): ICD-10-CM

## 2024-01-16 DIAGNOSIS — Z00.00 MEDICARE ANNUAL WELLNESS VISIT, SUBSEQUENT: Primary | ICD-10-CM

## 2024-01-16 DIAGNOSIS — I48.91 ATRIAL FIBRILLATION, UNSPECIFIED TYPE (HCC): ICD-10-CM

## 2024-01-16 DIAGNOSIS — R73.01 IFG (IMPAIRED FASTING GLUCOSE): ICD-10-CM

## 2024-01-16 DIAGNOSIS — I25.10 ATHEROSCLEROSIS OF NATIVE CORONARY ARTERY OF NATIVE HEART WITHOUT ANGINA PECTORIS: ICD-10-CM

## 2024-01-16 DIAGNOSIS — Z71.89 ADVANCED CARE PLANNING/COUNSELING DISCUSSION: ICD-10-CM

## 2024-01-16 DIAGNOSIS — D75.89 MACROCYTOSIS: ICD-10-CM

## 2024-01-16 PROCEDURE — 1123F ACP DISCUSS/DSCN MKR DOCD: CPT | Performed by: INTERNAL MEDICINE

## 2024-01-16 PROCEDURE — 99497 ADVNCD CARE PLAN 30 MIN: CPT | Performed by: INTERNAL MEDICINE

## 2024-01-16 PROCEDURE — G0439 PPPS, SUBSEQ VISIT: HCPCS | Performed by: INTERNAL MEDICINE

## 2024-01-16 PROCEDURE — G8484 FLU IMMUNIZE NO ADMIN: HCPCS | Performed by: INTERNAL MEDICINE

## 2024-01-16 PROCEDURE — G8427 DOCREV CUR MEDS BY ELIG CLIN: HCPCS | Performed by: INTERNAL MEDICINE

## 2024-01-16 PROCEDURE — 3079F DIAST BP 80-89 MM HG: CPT | Performed by: INTERNAL MEDICINE

## 2024-01-16 PROCEDURE — 99214 OFFICE O/P EST MOD 30 MIN: CPT | Performed by: INTERNAL MEDICINE

## 2024-01-16 PROCEDURE — 1036F TOBACCO NON-USER: CPT | Performed by: INTERNAL MEDICINE

## 2024-01-16 PROCEDURE — 3074F SYST BP LT 130 MM HG: CPT | Performed by: INTERNAL MEDICINE

## 2024-01-16 PROCEDURE — G8417 CALC BMI ABV UP PARAM F/U: HCPCS | Performed by: INTERNAL MEDICINE

## 2024-01-16 ASSESSMENT — PATIENT HEALTH QUESTIONNAIRE - PHQ9
SUM OF ALL RESPONSES TO PHQ9 QUESTIONS 1 & 2: 0
SUM OF ALL RESPONSES TO PHQ QUESTIONS 1-9: 0
2. FEELING DOWN, DEPRESSED OR HOPELESS: 0
1. LITTLE INTEREST OR PLEASURE IN DOING THINGS: 0
SUM OF ALL RESPONSES TO PHQ QUESTIONS 1-9: 0

## 2024-01-16 ASSESSMENT — LIFESTYLE VARIABLES
HOW MANY STANDARD DRINKS CONTAINING ALCOHOL DO YOU HAVE ON A TYPICAL DAY: PATIENT DOES NOT DRINK
HOW OFTEN DO YOU HAVE A DRINK CONTAINING ALCOHOL: NEVER

## 2024-01-17 NOTE — PROGRESS NOTES
Medicare Annual Wellness Visit    Charles Thomas Collette is here for Medicare AWV    Assessment & Plan   Macrocytosis  -     CBC with Auto Differential; Future  -     Vitamin B12 & Folate; Future  -     Methylmalonic Acid, Serum; Future  Primary hypertension  PAD (peripheral artery disease) (HCC)  Atherosclerosis of native coronary artery of native heart without angina pectoris  Atrial fibrillation, unspecified type (HCC)  Ulcerative colitis with complication, unspecified location (HCC)  IFG (impaired fasting glucose)  -     Comprehensive Metabolic Panel; Future  -     HEMOGLOBIN A1C W/O EAG; Future  History of prostate cancer  Dyslipidemia  Advanced care planning/counseling discussion  Malignant neoplasm of prostate (HCC)  Medicare annual wellness visit, subsequent    Recommendations for Preventive Services Due: see orders and patient instructions/AVS.  Recommended screening schedule for the next 5-10 years is provided to the patient in written form: see Patient Instructions/AVS.     Return for Medicare Annual Wellness Visit in 1 year.     Subjective       Patient's complete Health Risk Assessment and screening values have been reviewed and are found in Flowsheets. The following problems were reviewed today and where indicated follow up appointments were made and/or referrals ordered.    Positive Risk Factor Screenings with Interventions:       Cognitive:      Words recalled: 2 Words Recalled     Total Score Interpretation: Abnormal Mini-Cog  Interventions:  Patient declines any further evaluation or treatment        Controlled Medication Review:      Today's Pain Level: Pain Score: SIX     Opioid Risk: (Low risk score <55) Opioid risk score: 23    Patient is low risk for opioid use disorder or overdose.    Last PDMP Dorian as Reviewed:  Review User Review Instant Review Result                   Activity, Diet, and Weight:  On average, how many days per week do you engage in moderate to strenuous exercise (like 
Shoaib Ayush Collette presents today for   Chief Complaint   Patient presents with    Medicare AWV       1. \"Have you been to the ER, urgent care clinic since your last visit?  Hospitalized since your last visit?\" Yes    2. \"Have you seen or consulted any other health care providers outside of the [facility] since your last visit?\" Yes     3. For patients aged 45-75: Has the patient had a colonoscopy / FIT/ Cologuard? NA - based on age      If the patient is female:    4. For patients aged 40-74: Has the patient had a mammogram within the past 2 years? NA - based on age or sex      5. For patients aged 21-65: Has the patient had a pap smear? NA - based on age or sex     
prostate (HCC)

## 2024-01-17 NOTE — PATIENT INSTRUCTIONS
than less active. All activity done in each category counts toward your weekly total. You'd be surprised how daily things like carrying groceries, keeping up with grandchildren, and taking the stairs can add up.  What keeps you from being active?  If you've had a hard time being more active, you're not alone. Maybe you remember being able to do more. Or maybe you've never thought of yourself as being active. It's frustrating when you can't do the things you want. Being more active can help. What's holding you back?  Getting started.  Have a goal, but break it into easy tasks. Small steps build into big accomplishments.  Staying motivated.  If you feel like skipping your activity, remember your goal. Maybe you want to move better and stay independent. Every activity gets you one step closer.  Not feeling your best.  Start with 5 minutes of an activity you enjoy. Prove to yourself you can do it. As you get comfortable, increase your time.  You may not be where you want to be. But you're in the process of getting there. Everyone starts somewhere.  How can you find safe ways to stay active?  Talk with your doctor about any physical challenges you're facing. Make a plan with your doctor if you have a health problem or aren't sure how to get started with activity.  If you're already active, ask your doctor if there is anything you should change to stay safe as your body and health change.  If you tend to feel dizzy after you take medicine, avoid activity at that time. Try being active before you take your medicine. This will reduce your risk of falls.  If you plan to be active at home, make sure to clear your space before you get started. Remove things like TV cords, coffee tables, and throw rugs. It's safest to have plenty of space to move freely.  The key to getting more active is to take it slow and steady. Try to improve only a little bit at a time. Pick just one area to improve on at first. And if an activity hurts,

## 2024-01-17 NOTE — ACP (ADVANCE CARE PLANNING)
Advance Care Planning     Advance Care Planning (ACP) Physician/NP/PA Conversation    Date of Conversation: 1/16/2024  Conducted with: Patient with Decision Making Capacity    Healthcare Decision Maker:      Primary Decision Maker: Collette,Teresa  And Surgery Update - Spouse - [phone]    Click here to complete Healthcare Decision Makers including selection of the Healthcare Decision Maker Relationship (ie \"Primary\")  Today we documented Decision Maker(s) consistent with Legal Next of Kin hierarchy.    Care Preferences:    Hospitalization:  \"If your health worsens and it becomes clear that your chance of recovery is unlikely, what would be your preference regarding hospitalization?\"  The patient would prefer hospitalization.    Ventilation:  \"If you were unable to breath on your own and your chance of recovery was unlikely, what would be your preference about the use of a ventilator (breathing machine) if it was available to you?\"  The patient would desire the use of a ventilator.    Resuscitation:  \"In the event your heart stopped as a result of an underlying serious health condition, would you want attempts made to restart your heart, or would you prefer a natural death?\"  Yes, attempt to resuscitate.    ventilation preferences, hospitalization preferences, and resuscitation preferences    Conversation Outcomes / Follow-Up Plan:  ACP in process - information provided, considering goals and options  Reviewed DNR/DNI and patient elects Full Code (Attempt Resuscitation)    Length of Voluntary ACP Conversation in minutes:  16 minutes    GEOFF ALDRICH MD

## 2024-01-29 ENCOUNTER — PATIENT MESSAGE (OUTPATIENT)
Age: 79
End: 2024-01-29

## 2024-01-29 DIAGNOSIS — M19.90 OSTEOARTHRITIS, UNSPECIFIED OSTEOARTHRITIS TYPE, UNSPECIFIED SITE: Primary | ICD-10-CM

## 2024-01-29 NOTE — TELEPHONE ENCOUNTER
From: Charles Thomas Collette  To: Dr. Dixon Templeton  Sent: 1/29/2024 10:21 AM EST  Subject: NORCO refill 5/325    Please refill my Rx for hydrocodone acetaminophen, 5/325. 120 tabs this week.  Please send the refill to the [facility] on the corner of [facility] and [facility] in [place]. This was last filled on 12-29-23.    If you have any questions, please contact me at [phone]    Charles Collette

## 2024-01-30 RX ORDER — HYDROCODONE BITARTRATE AND ACETAMINOPHEN 7.5; 325 MG/1; MG/1
1 TABLET ORAL EVERY 6 HOURS PRN
Qty: 120 TABLET | Refills: 0 | Status: SHIPPED | OUTPATIENT
Start: 2024-01-30 | End: 2024-02-29

## 2024-02-14 ENCOUNTER — OFFICE VISIT (OUTPATIENT)
Age: 79
End: 2024-02-14

## 2024-02-14 VITALS — BODY MASS INDEX: 27.44 KG/M2 | HEIGHT: 73 IN

## 2024-02-14 DIAGNOSIS — M18.11 ARTHRITIS OF CARPOMETACARPAL (CMC) JOINT OF RIGHT THUMB: Primary | ICD-10-CM

## 2024-02-14 DIAGNOSIS — M65.322 TRIGGER INDEX FINGER OF LEFT HAND: ICD-10-CM

## 2024-02-14 RX ORDER — LIDOCAINE HYDROCHLORIDE 10 MG/ML
1 INJECTION, SOLUTION INFILTRATION; PERINEURAL ONCE
Status: COMPLETED | OUTPATIENT
Start: 2024-02-14 | End: 2024-02-14

## 2024-02-14 RX ADMIN — LIDOCAINE HYDROCHLORIDE 1 ML: 10 INJECTION, SOLUTION INFILTRATION; PERINEURAL at 09:42

## 2024-02-14 NOTE — PROGRESS NOTES
Left voicemail message to call [phone] to schedule follow up appointment.   Dr Francisco 9/15    COVID-19 virus infection 11/2023    paxlovid    Degenerative arthritis of cervical spine     MRI 9/11 showed C3-6 severe foraminal stenosis w RADICULOPATHY    Degenerative arthritis of lumbar spine     Dr Boone;  MRI 9/11 L4-5 disc bulging, annular tear, disc dessication w RADICULOPATHY; intol lyrica, using elen qhs; saw Dr Rojas; now Dr Harvey doing RFA 2021    Diverticulosis 04/2021    Dr Francisco    Dyslipidemia     ED (erectile dysfunction)     GERD (gastroesophageal reflux disease)     Gynecomastia 2023    LEFT Dr Machado    H/O cardiac catheterization 08/2021    S[facility] Dr Vital patent mid RCA (12/06) and distal RCA (9/04) stents    H/O cardiovascular stress test     SOH mod reversible inferolat defect, no wma, ef 64%, no TID (8/21)    H/O echocardiogram     SOH ef 55%, mild AI, tr MR (8/21)    Hearing loss, bilateral 2014    Dr Medellin BILAT    Hypertension     with component of white coat    OAB (overactive bladder)     Osteoarthritis     Dr Mitchell    Overweight (BMI 25.0-29.9)     IF 5/18 start weight 214 lbs, not doing    PAD (peripheral artery disease) (HCC)     50-60% R iliac; s/p R iliac stent and L femoral artery stent in past; R AIDE 0.76 (1/16); PTA LEFT popliteal/SFA/prox peroneal/TP and atherectomy TP  Dr Perry (6/22)    PPD positive     Prostate cancer (HCC)     T1c Pippa 7(3+4), 70% in 1 core, GS 7 (3+4) in 4 cores, GS 6 (3+3) in 1 core; psa 5.28, TRUS 18 gm;  Dr Francisco; s/p cryoablation 10/16    PVD (peripheral vascular disease) (HCC)     Recurrent umbilical hernia 10/14/2016    Venous insufficiency        Past Surgical History:   Procedure Laterality Date    CAROTID ENDARTERECTOMY Right 01/2014    CARPAL TUNNEL RELEASE Right 2017    CATARACT REMOVAL Bilateral     COLONOSCOPY N/A     (2/2/11) neg; Dr Francisco (9/15) polyps; (4/20/21) divertics bx neg    HEMORRHOID SURGERY  1979    HERNIA REPAIR  1970, 1975    x 4    INGUINAL HERNIA REPAIR Left 02/2016    Dr Novak    KNEE

## 2024-02-29 NOTE — TELEPHONE ENCOUNTER
[facility] 2/29/2024    The last fill date for Hydrocodone-Acetamin 7.5-325  was 1/30/2024 for a 30 d/s qty 120      Last UDS: 3/15/2023    CSA Last signed: 11/29/2022        PCP: Dixon Templeton MD    Last appt: 1/16/2023  Future Appointments   Date Time Provider Department Center   7/18/2024  7:45 AM IOC LAB VISIT  [facility]   7/25/2024  8:00 AM Dixon Templeton MD  [facility]   1/20/2025  8:30 AM Jamal Polo MD [facility] Sched       Requested Prescriptions     Pending Prescriptions Disp Refills    triamterene-hydroCHLOROthiazide (DYAZIDE) 37.5-25 MG per capsule 90 capsule 3     Sig: Take 1 capsule by mouth daily

## 2024-02-29 NOTE — TELEPHONE ENCOUNTER
----- Message from Charles T. Collette sent at 2/29/2024  9:51 AM EST -----  Regarding: [place] refill  Contact: [phone]  Please refill my Rx for hydrocodone acetaminophen, 5/325. 120 tabs this week.  [facility] corner of [facility] and [facility] in [place].    If questions, call me at [phone]   Charles Collette

## 2024-03-01 RX ORDER — TRIAMTERENE AND HYDROCHLOROTHIAZIDE 37.5; 25 MG/1; MG/1
1 CAPSULE ORAL DAILY
Qty: 90 CAPSULE | Refills: 3 | Status: SHIPPED | OUTPATIENT
Start: 2024-03-01

## 2024-03-05 ENCOUNTER — TELEPHONE (OUTPATIENT)
Age: 79
End: 2024-03-05

## 2024-03-05 DIAGNOSIS — M54.16 LUMBAR RADICULOPATHY: Primary | ICD-10-CM

## 2024-03-05 RX ORDER — HYDROCODONE BITARTRATE AND ACETAMINOPHEN 7.5; 325 MG/1; MG/1
1 TABLET ORAL EVERY 6 HOURS PRN
Qty: 120 TABLET | Refills: 0 | Status: SHIPPED | OUTPATIENT
Start: 2024-03-05 | End: 2024-04-04

## 2024-03-05 NOTE — TELEPHONE ENCOUNTER
----- Message from Charles T. Collette sent at 3/5/2024  3:26 PM EST -----  Regarding: [place] refill  Contact: [phone]  I sent the original mail on Friday the 29th of February. Please refill my Rx for Hydrocodone Acetaminiophen, 5/325. It was last filled on Janurary the 30th 2024.  Please let me know whren this is done.  Thank you  Charles Collette

## 2024-03-11 NOTE — PROGRESS NOTES
LARGE JOINT/BURSA      4. Venous insufficiency of right leg  I87.2         PLAN:  After discussing treatment options, patient's right knee was aspirated 55 cc clear yellow fluid, followed by injection with 4 cc Marcaine and 1/2 cc Celestone. Consider viscosupplementation if pain continues.  Follow up PRN.    Documentation by thee Hi, as documented by Chava Pardo MD.

## 2024-03-13 ENCOUNTER — OFFICE VISIT (OUTPATIENT)
Age: 79
End: 2024-03-13
Payer: MEDICARE

## 2024-03-13 VITALS — HEIGHT: 73 IN | BODY MASS INDEX: 27.7 KG/M2 | TEMPERATURE: 97.9 F | WEIGHT: 209 LBS

## 2024-03-13 DIAGNOSIS — G89.29 CHRONIC PAIN OF RIGHT KNEE: Primary | ICD-10-CM

## 2024-03-13 DIAGNOSIS — M17.11 UNILATERAL PRIMARY OSTEOARTHRITIS, RIGHT KNEE: ICD-10-CM

## 2024-03-13 DIAGNOSIS — M25.561 CHRONIC PAIN OF RIGHT KNEE: Primary | ICD-10-CM

## 2024-03-13 DIAGNOSIS — M25.461 EFFUSION, RIGHT KNEE: ICD-10-CM

## 2024-03-13 DIAGNOSIS — I87.2 VENOUS INSUFFICIENCY OF RIGHT LEG: ICD-10-CM

## 2024-03-13 PROCEDURE — 20610 DRAIN/INJ JOINT/BURSA W/O US: CPT | Performed by: SPECIALIST

## 2024-03-13 PROCEDURE — G8417 CALC BMI ABV UP PARAM F/U: HCPCS | Performed by: SPECIALIST

## 2024-03-13 PROCEDURE — 99213 OFFICE O/P EST LOW 20 MIN: CPT | Performed by: SPECIALIST

## 2024-03-13 PROCEDURE — 1123F ACP DISCUSS/DSCN MKR DOCD: CPT | Performed by: SPECIALIST

## 2024-03-13 PROCEDURE — G8427 DOCREV CUR MEDS BY ELIG CLIN: HCPCS | Performed by: SPECIALIST

## 2024-03-13 PROCEDURE — G8484 FLU IMMUNIZE NO ADMIN: HCPCS | Performed by: SPECIALIST

## 2024-03-13 PROCEDURE — 1036F TOBACCO NON-USER: CPT | Performed by: SPECIALIST

## 2024-03-13 RX ORDER — BUPIVACAINE HYDROCHLORIDE 5 MG/ML
4 INJECTION, SOLUTION PERINEURAL ONCE
Status: COMPLETED | OUTPATIENT
Start: 2024-03-13 | End: 2024-03-13

## 2024-03-13 RX ORDER — BETAMETHASONE SODIUM PHOSPHATE AND BETAMETHASONE ACETATE 3; 3 MG/ML; MG/ML
3 INJECTION, SUSPENSION INTRA-ARTICULAR; INTRALESIONAL; INTRAMUSCULAR; SOFT TISSUE ONCE
Status: COMPLETED | OUTPATIENT
Start: 2024-03-13 | End: 2024-03-13

## 2024-03-13 RX ADMIN — BETAMETHASONE SODIUM PHOSPHATE AND BETAMETHASONE ACETATE 3 MG: 3; 3 INJECTION, SUSPENSION INTRA-ARTICULAR; INTRALESIONAL; INTRAMUSCULAR; SOFT TISSUE at 10:49

## 2024-03-13 RX ADMIN — BUPIVACAINE HYDROCHLORIDE 20 MG: 5 INJECTION, SOLUTION PERINEURAL at 10:49

## 2024-03-22 DIAGNOSIS — M25.561 CHRONIC PAIN OF RIGHT KNEE: ICD-10-CM

## 2024-03-22 DIAGNOSIS — M17.11 UNILATERAL PRIMARY OSTEOARTHRITIS, RIGHT KNEE: ICD-10-CM

## 2024-03-22 DIAGNOSIS — G89.29 CHRONIC PAIN OF RIGHT KNEE: ICD-10-CM

## 2024-03-28 NOTE — PROGRESS NOTES
7.5-325 MG per tablet Take 1 tablet by mouth every 6 hours as needed for Pain for up to 30 days. Intended supply: 30 days Max Daily Amount: 4 tablets    triamterene-hydroCHLOROthiazide (DYAZIDE) 37.5-25 MG per capsule Take 1 capsule by mouth daily    tadalafil (CIALIS) 5 MG tablet Take 1 tablet by mouth daily    albuterol sulfate HFA (PROVENTIL;VENTOLIN;PROAIR) 108 (90 Base) MCG/ACT inhaler 2 puffs as needed Inhalation every 4 hrs for 30 days    cephALEXin (KEFLEX) 500 MG capsule     diazePAM (VALIUM) 5 MG tablet Take 1 hour prior to test and repeat 30 minutes prior if needed    furosemide (LASIX) 20 MG tablet     potassium chloride (KLOR-CON M) 20 MEQ extended release tablet     silodosin (RAPAFLO) 4 MG CAPS capsule Take 1 capsule by mouth every evening    gabapentin (NEURONTIN) 300 MG capsule Take 1 capsule by mouth nightly for 180 days. Max Daily Amount: 300 mg    Probiotic Product (PROBIOTIC ADVANCED PO) Take 1 capsule by mouth daily Align Brand    magnesium oxide (MAG-OX) 400 MG tablet Take 250 mg by mouth daily    methylcellulose (CITRUCEL) 500 MG TABS Take 1 tablet by mouth nightly    Multiple Vitamin (MULTIVITAMIN PO) Take by mouth daily    alclomethasone (ACLOVATE) 0.05 % cream Apply topically daily as needed    aspirin 81 MG EC tablet Take 1 tablet by mouth daily    vitamin D 25 MCG (1000 UT) CAPS Take 1 capsule by mouth 2 times daily    clopidogrel (PLAVIX) 75 MG tablet Take 1 tablet by mouth as needed    coenzyme Q10 200 MG CAPS capsule Take by mouth daily    colestipol (COLESTID) 1 g tablet Take 1 tablet by mouth daily as needed    ezetimibe-simvastatin (VYTORIN) 10-40 MG per tablet TAKE 1 TABLET NIGHTLY    Icosapent Ethyl (VASCEPA) 1 g CAPS capsule Take 2 capsules by mouth 2 times daily (with meals)    isosorbide dinitrate (ISORDIL) 10 MG tablet Take 1 tablet by mouth 3 times daily as needed    ketoconazole (NIZORAL) 2 % shampoo ceived the following from [facility]: Outside name:  No

## 2024-04-03 ENCOUNTER — OFFICE VISIT (OUTPATIENT)
Age: 79
End: 2024-04-03
Payer: MEDICARE

## 2024-04-03 VITALS — TEMPERATURE: 97.3 F | HEIGHT: 73 IN | BODY MASS INDEX: 28.89 KG/M2 | WEIGHT: 218 LBS

## 2024-04-03 DIAGNOSIS — M25.461 EFFUSION, RIGHT KNEE: ICD-10-CM

## 2024-04-03 DIAGNOSIS — M17.11 UNILATERAL PRIMARY OSTEOARTHRITIS, RIGHT KNEE: Primary | ICD-10-CM

## 2024-04-03 PROCEDURE — 20610 DRAIN/INJ JOINT/BURSA W/O US: CPT | Performed by: SPECIALIST

## 2024-04-03 RX ORDER — HYALURONATE SODIUM 10 MG/ML
20 SYRINGE (ML) INTRAARTICULAR ONCE
Status: COMPLETED | OUTPATIENT
Start: 2024-04-03 | End: 2024-04-03

## 2024-04-03 RX ADMIN — Medication 20 MG: at 11:23

## 2024-04-04 NOTE — PROGRESS NOTES
Patient: Charles Thomas Collette                MRN: [id number]       SSN: [id number]  YOB: 1945        AGE: 78 y.o.        SEX: male  Body mass index is 28.76 kg/m².    PCP: Dixon Templeton MD  04/10/24    Chief Complaint   Patient presents with    Knee Pain     Right knee pain      HISTORY:  Charles Thomas Collette is a 78 y.o. male who is seen for right knee pain. He presents today for his second injection in the Euflexxa visco supplementation series.      ICD-10-CM    1. Unilateral primary osteoarthritis, right knee  M17.11 DRAIN/INJECT LARGE JOINT/BURSA     sodium hyaluronate (EUFLEXXA, HYALGAN) injection 20 mg         PROCEDURE:  Mr. Collette's right knee injected with 2 cc of Euflexxa.     Chart reviewed for the following:   Chava EARL MD, have reviewed the History, Physical and updated the Allergic reactions for Charles Thomas Collette     TIME OUT performed immediately prior to start of procedure:  Chava EARL MD, have performed the following reviews on Charles Thomas Collette prior to the start of the procedure:            * Patient was identified by name and date of birth   * Agreement on procedure being performed was verified  * Risks and Benefits explained to the patient  * Procedure site verified and marked as necessary  * Patient was positioned for comfort  * Consent was obtained     Time: 10:09 AM     Date of procedure: 4/10/2024    Procedure performed by:  Chava Pardo MD    Mr. Collette tolerated the procedure well with no complications.    PLAN: Mr. Collette's right knee injected with 2 cc of Euflexxa. Mr. Collette will follow up in one week to complete his visco supplementation injection series.    Documentation by thee Hi, as documented by Chava Pardo MD.

## 2024-04-08 ENCOUNTER — PATIENT MESSAGE (OUTPATIENT)
Age: 79
End: 2024-04-08

## 2024-04-10 ENCOUNTER — OFFICE VISIT (OUTPATIENT)
Age: 79
End: 2024-04-10
Payer: MEDICARE

## 2024-04-10 VITALS — WEIGHT: 218 LBS | TEMPERATURE: 97 F | HEIGHT: 73 IN | BODY MASS INDEX: 28.89 KG/M2

## 2024-04-10 DIAGNOSIS — M54.16 LUMBAR RADICULOPATHY: Primary | ICD-10-CM

## 2024-04-10 DIAGNOSIS — M17.11 UNILATERAL PRIMARY OSTEOARTHRITIS, RIGHT KNEE: Primary | ICD-10-CM

## 2024-04-10 PROCEDURE — 20610 DRAIN/INJ JOINT/BURSA W/O US: CPT | Performed by: SPECIALIST

## 2024-04-10 RX ORDER — HYALURONATE SODIUM 10 MG/ML
20 SYRINGE (ML) INTRAARTICULAR ONCE
Status: COMPLETED | OUTPATIENT
Start: 2024-04-10 | End: 2024-04-10

## 2024-04-10 RX ORDER — HYDROCODONE BITARTRATE AND ACETAMINOPHEN 5; 325 MG/1; MG/1
1 TABLET ORAL EVERY 6 HOURS PRN
Qty: 120 TABLET | Refills: 0 | Status: SHIPPED | OUTPATIENT
Start: 2024-04-10 | End: 2024-05-10

## 2024-04-10 RX ADMIN — Medication 20 MG: at 10:09

## 2024-04-10 NOTE — TELEPHONE ENCOUNTER
After researching medication it looks like it was changed in December but there are no notes to why when requested it was changed to the Hydrocodone-acetaminophen 7.5-325 mg. Patient has been given this dosage since December and would like to return to the previous Hydrocodone-acetaminophen 5-325 mg dosage.     [facility]    The last fill date for Hydrocodone-Acetaminophen was 03/05/24 for a 30 d/s qty 120      Last UDS: 03/15/23    CSA Last signed: 11/29/22        PCP: Dixon Templeton MD    Last appt: [unfilled]  Future Appointments   Date Time Provider Department Center   4/17/2024 10:10 AM Chava Pardo MD [facility]   7/18/2024  7:45 AM IOC LAB VISIT [facility]   7/25/2024  8:00 AM Dixon Templeton MD [facility]   1/20/2025  8:30 AM Jamal Pool MD [facility] Sched       Requested Prescriptions     Pending Prescriptions Disp Refills    HYDROcodone-acetaminophen (NORCO) 5-325 MG per tablet 120 tablet 0     Sig: Take 1 tablet by mouth every 6 hours as needed for Pain for up to 30 days. Max Daily Amount: 4 tablets

## 2024-04-10 NOTE — TELEPHONE ENCOUNTER
From: Charles Thomas Collette  To: Dr. Dixon Templeton  Sent: 4/8/2024 8:40 AM EDT  Subject: Norco Refill    Please refill my Rx for hydrocodone acetaminophen, 5/325. 120 tabs this week. For some reason, the pharmacy filled it with 7.5/325. If at all possible, PLEASE SEND THE REFILL FOR 5/325.     Please send the refill to the [facility] on the corner of [address] and [address] [facility] in [place].     If questions, call me at [phone]  Charles Collette

## 2024-04-10 NOTE — TELEPHONE ENCOUNTER
Charles Thomas Collette [facility] Clinical Staff (supporting Dixon Tmepleton MD)1 hour ago (2:10 PM)       Up until the last three, I was only getting the 5/325. I do not know what happened, because I did not request the 7.5/325. When I saw Dr. Templeton on the 16th of January, I told him about receiving the 7.5/325 and with discussion, I thought it would go back to 5/325. So I got 7.5/325 again in Feb and the again in March. This is the reason I am now requesting to go back to what it was.   I don't know how I ended up with the 7.5/325 initially in January, and when I requested the refills for Feb and March, I requested 5/325.  So, if this is really a big issue then I guess you can leave it at 7.5/325, even though I did not request a higher dose than the 5/325.  So at any rate fill the Rx one way or the other.  Please advise on what is going to be done.

## 2024-04-10 NOTE — TELEPHONE ENCOUNTER
Mr. Collette called in today to check on the status of his medication for he did not check his my chart message to respond.     I relayed Marley's message to him and he states he's been on this medication for years and he originally started with the 5-325 and somehow a few months ago it was changed without his ok.    He states the 5-325 works perfectly fine by easing the pain without causing him any fatigue, tiredness or sleepiness when taking it.    Mr. Collette also states he would like to stay at the 5-325 dosage because he doesn't want to experience addiction or have to keep racking up the dosage.    He states he previously spoke to Dr. Templeton about this.    Please advise.  CB#: [phone]

## 2024-04-11 NOTE — PROGRESS NOTES
Patient: Charles Thomas Collette                MRN: [id number]       SSN: [id number]  YOB: 1945        AGE: 78 y.o.        SEX: male  Body mass index is 28.76 kg/m².    PCP: Dixon Templeton MD  04/17/24    Chief Complaint   Patient presents with    Knee Pain     right     HISTORY:  Charles Thomas Collette is a 78 y.o. male who is seen for right knee pain. He presents today for his third injection in the Euflexxa visco supplementation series.    PROCEDURE:  Mr. Collette's right knee injected with 2 cc of Euflexxa.     TIME OUT performed immediately prior to start of procedure:  I, Chava Pardo MD, have performed the following reviews on Charles Thomas Collette prior to the start of the procedure:            * Patient was identified by name and date of birth   * Agreement on procedure being performed was verified  * Risks and Benefits explained to the patient  * Procedure site verified and marked as necessary  * Patient was positioned for comfort  * Consent was obtained     Time: 10:43 AM     Date of procedure: 4/17/2024    Procedure performed by:  Chava Pardo MD    Mr. Collette tolerated the procedure well with no complications      ICD-10-CM    1. Unilateral primary osteoarthritis, right knee  M17.11 DRAIN/INJECT LARGE JOINT/BURSA     sodium hyaluronate (EUFLEXXA, HYALGAN) injection 20 mg        PLAN:  Mr. Collette's right knee injected with 2 cc of Euflexxa. Mr. Collette will follow up PRN now that he has completed his visco supplementation injection series.     Documentation by thee Hi, as documented by Chava Pardo MD.

## 2024-04-17 ENCOUNTER — OFFICE VISIT (OUTPATIENT)
Age: 79
End: 2024-04-17
Payer: MEDICARE

## 2024-04-17 VITALS — BODY MASS INDEX: 28.89 KG/M2 | WEIGHT: 218 LBS | TEMPERATURE: 97.1 F | HEIGHT: 73 IN

## 2024-04-17 DIAGNOSIS — M17.11 UNILATERAL PRIMARY OSTEOARTHRITIS, RIGHT KNEE: Primary | ICD-10-CM

## 2024-04-17 PROCEDURE — 20610 DRAIN/INJ JOINT/BURSA W/O US: CPT | Performed by: SPECIALIST

## 2024-04-17 RX ORDER — HYALURONATE SODIUM 10 MG/ML
20 SYRINGE (ML) INTRAARTICULAR ONCE
Status: COMPLETED | OUTPATIENT
Start: 2024-04-17 | End: 2024-04-17

## 2024-04-17 RX ADMIN — Medication 20 MG: at 10:55

## 2024-04-24 ENCOUNTER — OFFICE VISIT (OUTPATIENT)
Age: 79
End: 2024-04-24
Payer: MEDICARE

## 2024-04-24 VITALS
BODY MASS INDEX: 28.49 KG/M2 | HEART RATE: 69 BPM | WEIGHT: 215 LBS | RESPIRATION RATE: 16 BRPM | HEIGHT: 73 IN | OXYGEN SATURATION: 97 % | SYSTOLIC BLOOD PRESSURE: 128 MMHG | DIASTOLIC BLOOD PRESSURE: 68 MMHG | TEMPERATURE: 99.3 F

## 2024-04-24 DIAGNOSIS — I10 PRIMARY HYPERTENSION: ICD-10-CM

## 2024-04-24 DIAGNOSIS — I25.10 ATHEROSCLEROSIS OF NATIVE CORONARY ARTERY OF NATIVE HEART WITHOUT ANGINA PECTORIS: ICD-10-CM

## 2024-04-24 DIAGNOSIS — I48.91 ATRIAL FIBRILLATION, UNSPECIFIED TYPE (HCC): Primary | ICD-10-CM

## 2024-04-24 PROCEDURE — 3074F SYST BP LT 130 MM HG: CPT | Performed by: INTERNAL MEDICINE

## 2024-04-24 PROCEDURE — G8427 DOCREV CUR MEDS BY ELIG CLIN: HCPCS | Performed by: INTERNAL MEDICINE

## 2024-04-24 PROCEDURE — 99215 OFFICE O/P EST HI 40 MIN: CPT | Performed by: INTERNAL MEDICINE

## 2024-04-24 PROCEDURE — 1123F ACP DISCUSS/DSCN MKR DOCD: CPT | Performed by: INTERNAL MEDICINE

## 2024-04-24 PROCEDURE — G8417 CALC BMI ABV UP PARAM F/U: HCPCS | Performed by: INTERNAL MEDICINE

## 2024-04-24 PROCEDURE — 1036F TOBACCO NON-USER: CPT | Performed by: INTERNAL MEDICINE

## 2024-04-24 PROCEDURE — 3078F DIAST BP <80 MM HG: CPT | Performed by: INTERNAL MEDICINE

## 2024-04-24 NOTE — PROGRESS NOTES
Patient being seen today for transitional care management    Patient was admitted to [facility] from 4/20-22/24              I thoroughly reviewed the discharge summary, notes, consults, labs and imaging studies in the electronic record.  Pertinent details are summarized below and the record has been updated to reflect recent events    He presented to the ER complaining of palpitations and chest pain.  He was noted to be in atrial fibrillation with RVR.  In retrospect, he had been having what he thought was A-fib for the last 3 to 4 weeks, lasting maybe 1 or 2 minutes at most so he did not think much of it.  Of note, he had prior ablation 2008 timeframe.  Ruled out by enzymes, echo done with results below showing mild AS.  Stress test was low risk.  He saw Dr. Neal and is scheduled for another ablation sometime in June timeframe.  Currently, he feels fine, now on Eliquis.    Past Medical History:   Diagnosis Date    Adrenal adenoma 2009    LEFT 1 cm, no change 1/15, 2/16    Aortoiliac occlusive disease (HCC) 03/04/2019    Asbestosis(501)     CT 1/19 showed pleural plaques    Atrial fibrillation (HCC) 10/02/2009    s/p Afib ablation 2008; no oac due to prior gi bleed; recurrent 4/24    Russo's esophagus 02/2023    Dr Francisco    BCC (basal cell carcinoma of skin)     Dr Collazo; he's had >350 lesions removed    CAD (coronary artery disease)     RCA - 3.0 x 16mm TAXUS (9/04); 3.0 x 16mm TAXUS (12/06)    Carotid disease, bilateral (HCC)     Chronic pain     myofascial pain syndrome; seen in pain clinic in past    Colon polyp     Dr Francisco 9/15    COVID-19 virus infection 11/2023    paxlovid    Degenerative arthritis of cervical spine     MRI 9/11 showed C3-6 severe foraminal stenosis w RADICULOPATHY    Degenerative arthritis of lumbar spine     Dr Boone;  MRI 9/11 L4-5 disc bulging, annular tear, disc dessication w RADICULOPATHY; intol lyrica, using elen qhs; saw Dr Rojas; now Dr Harvey doing RFA 2021

## 2024-04-24 NOTE — PROGRESS NOTES
Charles Thomas Collette presents today for   Chief Complaint   Patient presents with    Follow-Up from [facility]     [facility] gaurang; 04/20-4/22       \"Have you been to the ER, urgent care clinic since your last visit?  Hospitalized since your last visit?\"    YES - When: approximately 2 days ago.  Where and Why: [facility], atrial fibrillation.    “Have you seen or consulted any other health care providers outside of [facility] since your last visit?”    YES - When: approximately 1  weeks ago.  Where and Why: [facility], knee pain.

## 2024-05-10 ENCOUNTER — PATIENT MESSAGE (OUTPATIENT)
Age: 79
End: 2024-05-10

## 2024-05-10 DIAGNOSIS — M54.16 LUMBAR RADICULOPATHY: ICD-10-CM

## 2024-05-10 DIAGNOSIS — F41.9 ANXIETY: Primary | ICD-10-CM

## 2024-05-10 RX ORDER — LORAZEPAM 1 MG/1
1 TABLET ORAL EVERY 8 HOURS PRN
Qty: 90 TABLET | Refills: 0 | Status: SHIPPED | OUTPATIENT
Start: 2024-05-10 | End: 2024-06-09

## 2024-05-10 RX ORDER — HYDROCODONE BITARTRATE AND ACETAMINOPHEN 5; 325 MG/1; MG/1
1 TABLET ORAL EVERY 6 HOURS PRN
Qty: 120 TABLET | Refills: 0 | Status: SHIPPED | OUTPATIENT
Start: 2024-05-10 | End: 2024-06-09

## 2024-05-10 NOTE — TELEPHONE ENCOUNTER
[facility]  report reviewed    The last fill date for Hydrocodone-acetaminophen was 04/11/24 for a 30 d/s qty 120  The last fill date for Lorazepam was 06/20/23 for a 30 d/s qty 90    Last UDS: Not on file    CSA Last signed: 03/24/23    PCP: Dixon Templeton MD    Last appt: [unfilled]  Future Appointments   Date Time Provider Department Center   7/18/2024  7:45 AM IOC LAB VISIT [facility] AMB   7/25/2024  8:00 AM Dixon Templeton MD [facility] AMB   1/20/2025  8:30 AM Jamal Pool MD [facility] Sched       Requested Prescriptions     Pending Prescriptions Disp Refills    HYDROcodone-acetaminophen (NORCO) 5-325 MG per tablet 120 tablet 0     Sig: Take 1 tablet by mouth every 6 hours as needed for Pain for up to 30 days. Max Daily Amount: 4 tablets    LORazepam (ATIVAN) 1 MG tablet 90 tablet 0     Sig: Take 1 tablet by mouth every 8 hours as needed for Anxiety for up to 30 days. Max Daily Amount: 3 mg

## 2024-05-10 NOTE — TELEPHONE ENCOUNTER
[employer] message sent to patient to schedule an appointment to complete the Controlled Substance Agreement and provide a urine specimen.    No further action required.

## 2024-05-10 NOTE — TELEPHONE ENCOUNTER
Pls call pt to have csa signed and get uds     Diagnosis Orders   1. Anxiety  LORazepam (ATIVAN) 1 MG tablet      2. Lumbar radiculopathy  HYDROcodone-acetaminophen (NORCO) 5-325 MG per tablet

## 2024-05-10 NOTE — TELEPHONE ENCOUNTER
From: Charles Thomas Collette  To: Dr. Dixon Templeton  Sent: 5/10/2024 9:10 AM EDT  Subject: refills    Please refill my Rx for hydrocodone acetaminophen, 5/325. 120 tabs.  Please send the refill to the [facility] on the corner of [address] in [place].  Also, if possible, send a refill for Lorazepam 1mg also to [facility].  And one last question, is there an allergy med that I can take that is not Benadryl and will last for 24 hrs. and not affect my BP or other meds.     If you need to contact me my phone is [phone],  Thank you   evelyn Edmondson

## 2024-05-22 ENCOUNTER — TELEPHONE (OUTPATIENT)
Age: 79
End: 2024-05-22

## 2024-05-22 NOTE — PROGRESS NOTES
78 y.o. male who presents for evaluation.     He presented to the ER in Apr complaining of palpitations and chest pain and noted to be in atrial fibrillation with RVR. Ruled out by enzymes, echo done with results below showing mild AS.  Stress test was low risk.  He saw Dr. Neal and had ablation 5/24.  Currently, he feels fine, now on Eliquis, has appt with EKG in the near future.  He's been getting 130s over 80s when he checks at home    *LAST MEDICARE WELLNESS EXAM: 6/23/16, 6/29/17, 11/20/18, 11/25/19, 11/23/20, 11/26/21, 1/17/23, 1/16/24    Past Medical History:   Diagnosis Date    Adrenal adenoma 2009    LEFT 1 cm, no change 1/15, 2/16    Aortoiliac occlusive disease (HCC) 03/04/2019    Asbestosis(501)     CT 1/19 showed pleural plaques    Atrial fibrillation (HCC) 10/02/2009    s/p Afib ablation (2008); no oac due to prior gi bleed untl 2024; recurrent s/p ablation (5/24) Dr Martinez    Russo's esophagus 02/2023    Dr Francisco    BCC (basal cell carcinoma of skin)     Dr Collazo; he's had >350 lesions removed    CAD (coronary artery disease)     RCA - 3.0 x 16mm TAXUS (9/04); 3.0 x 16mm TAXUS (12/06)    Carotid disease, bilateral (HCC)     Chronic pain     myofascial pain syndrome; seen in pain clinic in past    Colon polyp     Dr Francisco 9/15    COVID-19 virus infection 11/2023    paxlovid    Degenerative arthritis of cervical spine     MRI 9/11 showed C3-6 severe foraminal stenosis w RADICULOPATHY    Degenerative arthritis of lumbar spine     Dr Boone;  MRI 9/11 L4-5 disc bulging, annular tear, disc dessication w RADICULOPATHY; intol lyrica, using elen qhs; saw Dr Rojas; now Dr Harvey doing RFA 2021    Diverticulosis 04/2021    Dr Francisco    Dyslipidemia     ED (erectile dysfunction)     GERD (gastroesophageal reflux disease)     Gynecomastia 2023    LEFT Dr Machado    H/O cardiac catheterization 08/2021    S[facility] Dr Vital patent mid RCA (12/06) and distal RCA (9/04) stents    H/O cardiovascular

## 2024-05-22 NOTE — TELEPHONE ENCOUNTER
Pt has a question concerning medication   Pt was put on eliquis after being in hosp for AFIB, he is asking if it is ok if it is ok he Vascepa while taking eliquis   Please advise

## 2024-05-28 ENCOUNTER — HOSPITAL ENCOUNTER (OUTPATIENT)
Facility: HOSPITAL | Age: 79
Setting detail: SPECIMEN
Discharge: HOME | End: 2024-05-31
Payer: MEDICARE

## 2024-05-28 ENCOUNTER — OFFICE VISIT (OUTPATIENT)
Age: 79
End: 2024-05-28
Payer: MEDICARE

## 2024-05-28 VITALS
TEMPERATURE: 99.1 F | WEIGHT: 216 LBS | HEART RATE: 65 BPM | OXYGEN SATURATION: 96 % | RESPIRATION RATE: 16 BRPM | SYSTOLIC BLOOD PRESSURE: 138 MMHG | DIASTOLIC BLOOD PRESSURE: 72 MMHG | BODY MASS INDEX: 28.63 KG/M2 | HEIGHT: 73 IN

## 2024-05-28 DIAGNOSIS — M54.16 LUMBAR RADICULOPATHY: Primary | ICD-10-CM

## 2024-05-28 DIAGNOSIS — E78.5 DYSLIPIDEMIA: ICD-10-CM

## 2024-05-28 DIAGNOSIS — I10 PRIMARY HYPERTENSION: ICD-10-CM

## 2024-05-28 DIAGNOSIS — I73.9 PAD (PERIPHERAL ARTERY DISEASE) (HCC): ICD-10-CM

## 2024-05-28 DIAGNOSIS — I48.91 ATRIAL FIBRILLATION, UNSPECIFIED TYPE (HCC): ICD-10-CM

## 2024-05-28 DIAGNOSIS — I25.10 ATHEROSCLEROSIS OF NATIVE CORONARY ARTERY OF NATIVE HEART WITHOUT ANGINA PECTORIS: ICD-10-CM

## 2024-05-28 DIAGNOSIS — R73.01 IFG (IMPAIRED FASTING GLUCOSE): ICD-10-CM

## 2024-05-28 PROCEDURE — G2211 COMPLEX E/M VISIT ADD ON: HCPCS | Performed by: INTERNAL MEDICINE

## 2024-05-28 PROCEDURE — G8417 CALC BMI ABV UP PARAM F/U: HCPCS | Performed by: INTERNAL MEDICINE

## 2024-05-28 PROCEDURE — G8427 DOCREV CUR MEDS BY ELIG CLIN: HCPCS | Performed by: INTERNAL MEDICINE

## 2024-05-28 PROCEDURE — 3078F DIAST BP <80 MM HG: CPT | Performed by: INTERNAL MEDICINE

## 2024-05-28 PROCEDURE — 1123F ACP DISCUSS/DSCN MKR DOCD: CPT | Performed by: INTERNAL MEDICINE

## 2024-05-28 PROCEDURE — 80307 DRUG TEST PRSMV CHEM ANLYZR: CPT

## 2024-05-28 PROCEDURE — 3075F SYST BP GE 130 - 139MM HG: CPT | Performed by: INTERNAL MEDICINE

## 2024-05-28 PROCEDURE — 1036F TOBACCO NON-USER: CPT | Performed by: INTERNAL MEDICINE

## 2024-05-28 PROCEDURE — 99214 OFFICE O/P EST MOD 30 MIN: CPT | Performed by: INTERNAL MEDICINE

## 2024-05-28 NOTE — PROGRESS NOTES
Charles Thomas Collette presents today for   Chief Complaint   Patient presents with    Follow-up     1 month       \"Have you been to the ER, urgent care clinic since your last visit?  Hospitalized since your last visit?\"    YES - When: approximately 8 days ago.  Where and Why: [facility] ED, heart palpitations.    “Have you seen or consulted any other health care providers outside of [facility] since your last visit?”    YES - When: approximately 7 days ago.  Where and Why: [facility], atrial fibrillation.

## 2024-06-07 DIAGNOSIS — M54.16 LUMBAR RADICULOPATHY: ICD-10-CM

## 2024-06-12 RX ORDER — GABAPENTIN 300 MG/1
CAPSULE ORAL
Qty: 90 CAPSULE | Refills: 1 | Status: SHIPPED | OUTPATIENT
Start: 2024-06-12 | End: 2024-12-09

## 2024-07-15 ENCOUNTER — TELEPHONE (OUTPATIENT)
Facility: CLINIC | Age: 79
End: 2024-07-15

## 2024-07-15 NOTE — TELEPHONE ENCOUNTER
----- Message from Hakan Card sent at 7/15/2024 12:22 PM EDT -----  Regarding: ECC Appointment Request  ECC Appointment Request    Patient needs appointment for ECC Appointment Type: Hospital Follow Up.    Patient Requested Dates(s): any date  Patient Requested Time: anytime  Provider Name: Dixon Templeton    Reason for Appointment Request: Other practice not answering the call for warm transfer  Additional info: patient need to boon an appointment for hospital follow up. Patient was admitted July 13 2024 and discharge July 15 2024  --------------------------------------------------------------------------------------------------------------------------    Relationship to Patient: Other      Call Back Information: OK to leave message on voicemail  Preferred Call Back Number: Phone [phone]

## 2024-07-17 ENCOUNTER — PATIENT MESSAGE (OUTPATIENT)
Facility: CLINIC | Age: 79
End: 2024-07-17

## 2024-07-17 DIAGNOSIS — M54.16 LUMBAR RADICULOPATHY: Primary | ICD-10-CM

## 2024-07-17 NOTE — TELEPHONE ENCOUNTER
From: Charles Thomas Collette  To: Dr. Dixon Templeton  Sent: 7/17/2024 8:54 AM EDT  Subject: Norco    Please refill my Rx for hydrocodone acetaminophen, 5/325. 120 tabs.    [facility] corner [address] [facility] in [place].     Please let me know when this is done. Thank YOU  Charles Collette

## 2024-07-17 NOTE — TELEPHONE ENCOUNTER
[facility]  report reviewed    The last fill date for Hydrocodone-acetaminophen was 06/12/2024 for a 30 d/s with qty 120    Last UDS: 05/28/2024    CSA Last signed: 05/28/2024    PCP: Dixon Templeton MD    Last appointment: 05/28/2024  Future Appointments   Date Time Provider Department Center   7/18/2024  7:45 AM IOC LAB VISIT [facility] AMB   7/25/2024  8:00 AM Dixon Templeton MD [facility] BS AMB   1/20/2025  8:30 AM Jamal Pool MD [facility] Sched       Requested Prescriptions     Pending Prescriptions Disp Refills    HYDROcodone-acetaminophen (NORCO) 5-325 MG per tablet 120 tablet 0     Sig: Take 1 tablet by mouth every 6 hours as needed for Pain for up to 30 days. Max Daily Amount: 4 tablets

## 2024-07-18 ENCOUNTER — HOSPITAL ENCOUNTER (OUTPATIENT)
Facility: HOSPITAL | Age: 79
Setting detail: SPECIMEN
Discharge: HOME | End: 2024-07-18
Payer: MEDICARE

## 2024-07-18 DIAGNOSIS — D75.89 MACROCYTOSIS: ICD-10-CM

## 2024-07-18 DIAGNOSIS — R73.01 IFG (IMPAIRED FASTING GLUCOSE): ICD-10-CM

## 2024-07-18 DIAGNOSIS — C61 MALIGNANT NEOPLASM OF PROSTATE (HCC): ICD-10-CM

## 2024-07-18 LAB
ALBUMIN SERPL-MCNC: 3.8 G/DL (ref 3.4–5)
ALBUMIN/GLOB SERPL: 1.2 (ref 0.8–1.7)
ALP SERPL-CCNC: 56 U/L (ref 45–117)
ALT SERPL-CCNC: 27 U/L (ref 16–61)
ANION GAP SERPL CALC-SCNC: 3 MMOL/L (ref 3–18)
AST SERPL-CCNC: 24 U/L (ref 10–38)
BASOPHILS # BLD: 0 K/UL (ref 0–0.1)
BASOPHILS NFR BLD: 1 % (ref 0–2)
BILIRUB SERPL-MCNC: 0.9 MG/DL (ref 0.2–1)
BUN SERPL-MCNC: 16 MG/DL (ref 7–18)
BUN/CREAT SERPL: 22 (ref 12–20)
CALCIUM SERPL-MCNC: 9.4 MG/DL (ref 8.5–10.1)
CHLORIDE SERPL-SCNC: 100 MMOL/L (ref 100–111)
CO2 SERPL-SCNC: 32 MMOL/L (ref 21–32)
CREAT SERPL-MCNC: 0.73 MG/DL (ref 0.6–1.3)
DIFFERENTIAL METHOD BLD: ABNORMAL
EOSINOPHIL # BLD: 0.2 K/UL (ref 0–0.4)
EOSINOPHIL NFR BLD: 3 % (ref 0–5)
ERYTHROCYTE [DISTWIDTH] IN BLOOD BY AUTOMATED COUNT: 12.5 % (ref 11.6–14.5)
FOLATE SERPL-MCNC: >20 NG/ML (ref 3.1–17.5)
GLOBULIN SER CALC-MCNC: 3.2 G/DL (ref 2–4)
GLUCOSE SERPL-MCNC: 108 MG/DL (ref 74–99)
HBA1C MFR BLD: 5.4 % (ref 4.2–5.6)
HCT VFR BLD AUTO: 43 % (ref 36–48)
HGB BLD-MCNC: 14 G/DL (ref 13–16)
IMM GRANULOCYTES # BLD AUTO: 0 K/UL (ref 0–0.04)
IMM GRANULOCYTES NFR BLD AUTO: 0 % (ref 0–0.5)
LYMPHOCYTES # BLD: 1.7 K/UL (ref 0.9–3.6)
LYMPHOCYTES NFR BLD: 25 % (ref 21–52)
MCH RBC QN AUTO: 33.9 PG (ref 24–34)
MCHC RBC AUTO-ENTMCNC: 32.6 G/DL (ref 31–37)
MCV RBC AUTO: 104.1 FL (ref 78–100)
MONOCYTES # BLD: 0.7 K/UL (ref 0.05–1.2)
MONOCYTES NFR BLD: 10 % (ref 3–10)
NEUTS SEG # BLD: 4.3 K/UL (ref 1.8–8)
NEUTS SEG NFR BLD: 61 % (ref 40–73)
NRBC # BLD: 0 K/UL (ref 0–0.01)
NRBC BLD-RTO: 0 PER 100 WBC
PLATELET # BLD AUTO: 160 K/UL (ref 135–420)
PMV BLD AUTO: 10.3 FL (ref 9.2–11.8)
POTASSIUM SERPL-SCNC: 3.8 MMOL/L (ref 3.5–5.5)
PROT SERPL-MCNC: 7 G/DL (ref 6.4–8.2)
PSA SERPL-MCNC: 1.9 NG/ML (ref 0–4)
RBC # BLD AUTO: 4.13 M/UL (ref 4.35–5.65)
SODIUM SERPL-SCNC: 135 MMOL/L (ref 136–145)
VIT B12 SERPL-MCNC: 512 PG/ML (ref 211–911)
WBC # BLD AUTO: 7 K/UL (ref 4.6–13.2)

## 2024-07-18 PROCEDURE — 85025 COMPLETE CBC W/AUTO DIFF WBC: CPT

## 2024-07-18 PROCEDURE — 84153 ASSAY OF PSA TOTAL: CPT

## 2024-07-18 PROCEDURE — 80053 COMPREHEN METABOLIC PANEL: CPT

## 2024-07-18 PROCEDURE — 82746 ASSAY OF FOLIC ACID SERUM: CPT

## 2024-07-18 PROCEDURE — 36415 COLL VENOUS BLD VENIPUNCTURE: CPT

## 2024-07-18 PROCEDURE — 82607 VITAMIN B-12: CPT

## 2024-07-18 PROCEDURE — 83036 HEMOGLOBIN GLYCOSYLATED A1C: CPT

## 2024-07-18 PROCEDURE — 83921 ORGANIC ACID SINGLE QUANT: CPT

## 2024-07-18 RX ORDER — HYDROCODONE BITARTRATE AND ACETAMINOPHEN 5; 325 MG/1; MG/1
1 TABLET ORAL EVERY 6 HOURS PRN
Qty: 120 TABLET | Refills: 0 | Status: SHIPPED | OUTPATIENT
Start: 2024-07-18 | End: 2024-08-17

## 2024-07-18 NOTE — RESULT ENCOUNTER NOTE
PSA continues to rise with doubling time now likely less than 6 months.    Need to discuss workup with PSMA PET to evaluate for recurrence and metastatic disease.    Pham, please arrange for first available follow-up appointment with me to discuss PSA.  Okay for telehealth.  Okay to overbook 8:15 AM or 12:45 PM, if necessary.

## 2024-07-22 LAB — METHYLMALONATE SERPL-SCNC: 250 NMOL/L (ref 0–378)

## 2024-07-25 ENCOUNTER — OFFICE VISIT (OUTPATIENT)
Facility: CLINIC | Age: 79
End: 2024-07-25
Payer: MEDICARE

## 2024-07-25 VITALS
TEMPERATURE: 98.2 F | OXYGEN SATURATION: 97 % | BODY MASS INDEX: 27.96 KG/M2 | DIASTOLIC BLOOD PRESSURE: 69 MMHG | HEART RATE: 66 BPM | WEIGHT: 211 LBS | HEIGHT: 73 IN | RESPIRATION RATE: 16 BRPM | SYSTOLIC BLOOD PRESSURE: 135 MMHG

## 2024-07-25 DIAGNOSIS — E03.8 SUBCLINICAL HYPOTHYROIDISM: ICD-10-CM

## 2024-07-25 DIAGNOSIS — I48.91 ATRIAL FIBRILLATION, UNSPECIFIED TYPE (HCC): Primary | ICD-10-CM

## 2024-07-25 DIAGNOSIS — R73.01 IFG (IMPAIRED FASTING GLUCOSE): ICD-10-CM

## 2024-07-25 DIAGNOSIS — I25.10 ATHEROSCLEROSIS OF NATIVE CORONARY ARTERY OF NATIVE HEART WITHOUT ANGINA PECTORIS: ICD-10-CM

## 2024-07-25 DIAGNOSIS — E78.5 DYSLIPIDEMIA: ICD-10-CM

## 2024-07-25 DIAGNOSIS — K51.919 ULCERATIVE COLITIS WITH COMPLICATION, UNSPECIFIED LOCATION (HCC): ICD-10-CM

## 2024-07-25 DIAGNOSIS — I10 PRIMARY HYPERTENSION: ICD-10-CM

## 2024-07-25 PROBLEM — K21.9 GERD (GASTROESOPHAGEAL REFLUX DISEASE): Status: RESOLVED | Noted: 2022-03-01 | Resolved: 2024-07-25

## 2024-07-25 PROCEDURE — 3078F DIAST BP <80 MM HG: CPT | Performed by: INTERNAL MEDICINE

## 2024-07-25 PROCEDURE — 99214 OFFICE O/P EST MOD 30 MIN: CPT | Performed by: INTERNAL MEDICINE

## 2024-07-25 PROCEDURE — G8417 CALC BMI ABV UP PARAM F/U: HCPCS | Performed by: INTERNAL MEDICINE

## 2024-07-25 PROCEDURE — 1036F TOBACCO NON-USER: CPT | Performed by: INTERNAL MEDICINE

## 2024-07-25 PROCEDURE — G8427 DOCREV CUR MEDS BY ELIG CLIN: HCPCS | Performed by: INTERNAL MEDICINE

## 2024-07-25 PROCEDURE — 1123F ACP DISCUSS/DSCN MKR DOCD: CPT | Performed by: INTERNAL MEDICINE

## 2024-07-25 PROCEDURE — G2211 COMPLEX E/M VISIT ADD ON: HCPCS | Performed by: INTERNAL MEDICINE

## 2024-07-25 PROCEDURE — 3075F SYST BP GE 130 - 139MM HG: CPT | Performed by: INTERNAL MEDICINE

## 2024-07-25 NOTE — PROGRESS NOTES
Charles Thomas Collette presents today for   Chief Complaint   Patient presents with    6 Month Follow-Up    Hypertension       \"Have you been to the ER, urgent care clinic since your last visit?  Hospitalized since your last visit?\"    YES - When: approximately 1  weeks ago.  Where and Why: [facility], chest pain.    “Have you seen or consulted any other health care providers outside of [facility] since your last visit?”    YES - When: approximately 1  weeks ago.  Where and Why: [facility], Afib ablation.             
10 3 - 10 %    Eosinophils % 3 0 - 5 %    Basophils % 1 0 - 2 %    Immature Granulocytes % 0 0.0 - 0.5 %    Neutrophils Absolute 4.3 1.8 - 8.0 K/UL    Lymphocytes Absolute 1.7 0.9 - 3.6 K/UL    Monocytes Absolute 0.7 0.05 - 1.2 K/UL    Eosinophils Absolute 0.2 0.0 - 0.4 K/UL    Basophils Absolute 0.0 0.0 - 0.1 K/UL    Immature Granulocytes Absolute 0.0 0.00 - 0.04 K/UL    Differential Type AUTOMATED     Comprehensive Metabolic Panel   Result Value Ref Range    Sodium 135 (L) 136 - 145 mmol/L    Potassium 3.8 3.5 - 5.5 mmol/L    Chloride 100 100 - 111 mmol/L    CO2 32 21 - 32 mmol/L    Anion Gap 3 3.0 - 18 mmol/L    Glucose 108 (H) 74 - 99 mg/dL    BUN 16 7.0 - 18 MG/DL    Creatinine 0.73 0.6 - 1.3 MG/DL    BUN/Creatinine Ratio 22 (H) 12 - 20      Est, Glom Filt Rate >90 >60 ml/min/1.73m2    Calcium 9.4 8.5 - 10.1 MG/DL    Total Bilirubin 0.9 0.2 - 1.0 MG/DL    ALT 27 16 - 61 U/L    AST 24 10 - 38 U/L    Alk Phosphatase 56 45 - 117 U/L    Total Protein 7.0 6.4 - 8.2 g/dL    Albumin 3.8 3.4 - 5.0 g/dL    Globulin 3.2 2.0 - 4.0 g/dL    Albumin/Globulin Ratio 1.2 0.8 - 1.7     HEMOGLOBIN A1C W/O EAG   Result Value Ref Range    Hemoglobin A1C 5.4 4.2 - 5.6 %   Methylmalonic Acid, Serum   Result Value Ref Range    Methylmalonic Acid 250 0 - 378 nmol/L   PSA, Diagnostic   Result Value Ref Range    PSA 1.9 0.0 - 4.0 ng/mL   Vitamin B12 & Folate   Result Value Ref Range    Vitamin B-12 512 211 - 911 pg/mL    Folate >20.0 (H) 3.10 - 17.50 ng/mL   Results for orders placed or performed during the hospital encounter of 05/28/24 (from the past 2160 hour(s))   Urine Drug Screen   Result Value Ref Range    Benzodiazepines, Urine Negative NEG      Barbiturates, Urine Negative NEG      THC, TH-Cannabinol, Urine Negative NEG      Opiates, Urine Positive (A) NEG      Phencyclidine, Urine Negative NEG      Cocaine, Urine Negative NEG      Amphetamine, Urine Negative NEG      Methadone, Urine Negative NEG      Comments: (NOTE)        We

## 2024-08-19 ENCOUNTER — PATIENT MESSAGE (OUTPATIENT)
Facility: CLINIC | Age: 79
End: 2024-08-19

## 2024-08-19 DIAGNOSIS — M54.16 LUMBAR RADICULOPATHY: Primary | ICD-10-CM

## 2024-08-19 NOTE — TELEPHONE ENCOUNTER
[facility]  report reviewed    The last fill date for Hydrocodone-acetaminophen was 07/18/2024 for a 30 d/s with qty 120    Last UDS: 05/28/2024    CSA Last signed: 05/28/2024    PCP: Dixon Templeton MD    Last appointment: 07/25/2024  Future Appointments   Date Time Provider Department Center   11/6/2024  2:00 PM Ac Milan MD [facility] Sched   1/14/2025  9:30 AM IOC LAB VISIT [facility] ECC DEP   1/20/2025  8:30 AM Jamal Pool MD [facility] Sched   1/23/2025  1:20 PM Dixon Templeton MD [facility] DEP       Requested Prescriptions     Pending Prescriptions Disp Refills    HYDROcodone-acetaminophen (NORCO) 5-325 MG per tablet 120 tablet 0     Sig: Take 1 tablet by mouth every 6 hours as needed for Pain for up to 30 days. Max Daily Amount: 4 tablets

## 2024-08-20 RX ORDER — HYDROCODONE BITARTRATE AND ACETAMINOPHEN 5; 325 MG/1; MG/1
1 TABLET ORAL EVERY 6 HOURS PRN
Qty: 120 TABLET | Refills: 0 | Status: SHIPPED | OUTPATIENT
Start: 2024-08-20 | End: 2024-09-19

## 2024-08-30 NOTE — PROGRESS NOTES
Left FAS aspirated and pulled @ 0950, by JT. Pressure held for 20 min. Sterile hemostatic dressing applied. No bleeding or swelling. Safety instructions reviewed with the patient.
cough sob

## 2024-09-03 DIAGNOSIS — M17.11 UNILATERAL PRIMARY OSTEOARTHRITIS, RIGHT KNEE: ICD-10-CM

## 2024-09-03 DIAGNOSIS — M25.561 CHRONIC PAIN OF RIGHT KNEE: ICD-10-CM

## 2024-09-03 DIAGNOSIS — G89.29 CHRONIC PAIN OF RIGHT KNEE: ICD-10-CM

## 2024-09-12 PROBLEM — I48.91 ATRIAL FIBRILLATION (HCC): Status: ACTIVE | Noted: 2024-09-12

## 2024-09-12 PROBLEM — K11.20 SIALOADENITIS: Status: ACTIVE | Noted: 2024-09-12

## 2024-09-12 PROBLEM — I73.9 PERIPHERAL VASCULAR DISEASE (HCC): Status: ACTIVE | Noted: 2019-07-02

## 2024-09-12 PROBLEM — R73.01 IMPAIRED FASTING GLUCOSE: Status: ACTIVE | Noted: 2017-06-29

## 2024-09-12 PROBLEM — Q18.1 EAR CYSTS: Status: ACTIVE | Noted: 2024-09-12

## 2024-09-12 PROBLEM — I11.9 HYPERTENSIVE CARDIOVASCULAR DISEASE: Status: ACTIVE | Noted: 2024-09-12

## 2024-09-12 PROBLEM — I25.10 CORONARY ATHEROSCLEROSIS: Status: ACTIVE | Noted: 2024-09-12

## 2024-09-19 ENCOUNTER — TELEPHONE (OUTPATIENT)
Facility: CLINIC | Age: 79
End: 2024-09-19

## 2024-09-19 DIAGNOSIS — M54.16 LUMBAR RADICULOPATHY: ICD-10-CM

## 2024-09-19 RX ORDER — HYDROCODONE BITARTRATE AND ACETAMINOPHEN 5; 325 MG/1; MG/1
1 TABLET ORAL EVERY 6 HOURS PRN
Qty: 120 TABLET | Refills: 0 | Status: SHIPPED | OUTPATIENT
Start: 2024-09-19 | End: 2024-10-19

## 2024-09-23 ENCOUNTER — HOSPITAL ENCOUNTER (OUTPATIENT)
Facility: HOSPITAL | Age: 79
Setting detail: RECURRING SERIES
Discharge: HOME | End: 2024-09-26
Payer: MEDICARE

## 2024-09-23 PROCEDURE — 99205 OFFICE O/P NEW HI 60 MIN: CPT | Performed by: RADIOLOGY

## 2024-10-11 NOTE — PROGRESS NOTES
Goals Addressed                 This Visit's Progress     Attends follow-up appointments as directed. On track     7/5: Patient aware of upcoming appts with vascular. Patient to contact ortho for right knee pain. 7/12: Has appt with ortho and vascular on 7/15/19  7/23: Patient attended ortho appt in 7/15 and was seen again on 7/19. Patient aware of PCP appt on 7/31/19.   7/31: Patient attended PCP appt today as scheduled.
No

## 2024-10-21 ENCOUNTER — TELEPHONE (OUTPATIENT)
Facility: CLINIC | Age: 79
End: 2024-10-21

## 2024-10-21 DIAGNOSIS — M54.16 LUMBAR RADICULOPATHY: Primary | ICD-10-CM

## 2024-10-21 RX ORDER — HYDROCODONE BITARTRATE AND ACETAMINOPHEN 5; 325 MG/1; MG/1
1 TABLET ORAL EVERY 6 HOURS PRN
Qty: 120 TABLET | Refills: 0 | Status: SHIPPED | OUTPATIENT
Start: 2024-10-21 | End: 2024-11-20

## 2024-10-21 NOTE — TELEPHONE ENCOUNTER
Pt requested a refill for   HYDROcodone-acetaminophen (NORCO) 5-325 MG per tablet to be sent to [facility] on [address] and he hadn't heard anything. Pt will be out of this medication in the next couple of days and he needs this refilled asap. Pt wanted to let dr know that he's recently been diagnosed with cancer and has been put on 2 different meds. One of the side effects of one of the medications is joint pain and he's been experiencing this. Please advise.

## 2024-11-08 ENCOUNTER — PATIENT MESSAGE (OUTPATIENT)
Facility: CLINIC | Age: 79
End: 2024-11-08

## 2024-11-08 RX ORDER — ICOSAPENT ETHYL 1 G/1
2 CAPSULE ORAL 2 TIMES DAILY WITH MEALS
Qty: 360 CAPSULE | Refills: 3 | Status: SHIPPED | OUTPATIENT
Start: 2024-11-08

## 2024-11-20 ENCOUNTER — PATIENT MESSAGE (OUTPATIENT)
Facility: CLINIC | Age: 79
End: 2024-11-20

## 2024-11-20 DIAGNOSIS — M54.16 LUMBAR RADICULOPATHY: ICD-10-CM

## 2024-11-20 NOTE — TELEPHONE ENCOUNTER
[facility]  report reviewed    The last fill date for Hydrocodone-acetaminophen was 10/21/2024 for a 30 d/s with qty 120    Last UDS: 05/28/2024   CSA Last signed: 05/28/2024     PCP: Dixon Templeton MD    Last appointment: 07/25/2024  Future Appointments   Date Time Provider Department Center   1/14/2025  9:30 AM I[facility] LAB VISIT [facility] DEP   1/23/2025  1:20 PM Dixon Templeton MD [facility] DEP   2/12/2025  1:45 PM Ac Milan MD [facility] Sched   4/25/2025 11:50 AM [facility] NURSE [facility] [place]       Requested Prescriptions     Pending Prescriptions Disp Refills    HYDROcodone-acetaminophen (NORCO) 5-325 MG per tablet 120 tablet 0     Sig: Take 1 tablet by mouth every 6 hours as needed for Pain for up to 30 days. Take lowest dose possible to manage pain Max Daily Amount: 4 tablets

## 2024-11-22 RX ORDER — HYDROCODONE BITARTRATE AND ACETAMINOPHEN 5; 325 MG/1; MG/1
1 TABLET ORAL EVERY 6 HOURS PRN
Qty: 120 TABLET | Refills: 0 | Status: SHIPPED | OUTPATIENT
Start: 2024-11-22 | End: 2024-12-22

## 2024-12-17 DIAGNOSIS — M54.16 LUMBAR RADICULOPATHY: ICD-10-CM

## 2024-12-18 ENCOUNTER — PATIENT MESSAGE (OUTPATIENT)
Facility: CLINIC | Age: 79
End: 2024-12-18

## 2024-12-19 RX ORDER — HYDROCODONE BITARTRATE AND ACETAMINOPHEN 5; 325 MG/1; MG/1
1 TABLET ORAL EVERY 6 HOURS PRN
Qty: 120 TABLET | Refills: 0 | Status: SHIPPED | OUTPATIENT
Start: 2024-12-19 | End: 2025-01-18

## 2024-12-19 RX ORDER — GABAPENTIN 300 MG/1
CAPSULE ORAL
Qty: 90 CAPSULE | Refills: 5 | Status: SHIPPED | OUTPATIENT
Start: 2024-12-19 | End: 2025-06-17

## 2024-12-19 NOTE — TELEPHONE ENCOUNTER
[facility]  report reviewed 12/19/2024    The last fill date for Hydrocodone-Acetamin 5-325 Mg  was 11/22/2024 for a 30 d/s qty 120    Last fill date for Gabapentin 300 Mg Capsule was 9/23/2024 for a 90 d/s qty 90        Last UDS: completed    CSA Last signed: 11/29/2022        PCP: Dixon Templeton MD    Last appt:  7/25/2024  Future Appointments   Date Time Provider Department Center   1/14/2025  9:30 AM I[facility] LAB VISIT [facility] DEP   1/23/2025  1:20 PM Dixon Templeton MD [facility] DEP   2/12/2025  1:45 PM Ac Milan MD [facility] Sched   4/25/2025 11:50 AM [facility] NURSE [facility] Sched       Requested Prescriptions     Pending Prescriptions Disp Refills    gabapentin (NEURONTIN) 300 MG capsule [Pharmacy Med Name: GABAPENTIN 300MG CAPSULES] 90 capsule      Sig: TAKE 1 CAPSULE BY MOUTH EVERY NIGHT. MAX DAILY AMOUNT: 300 MG

## 2025-01-14 ENCOUNTER — HOSPITAL ENCOUNTER (OUTPATIENT)
Facility: HOSPITAL | Age: 80
Setting detail: SPECIMEN
Discharge: HOME | End: 2025-01-17
Payer: MEDICARE

## 2025-01-14 DIAGNOSIS — E78.5 DYSLIPIDEMIA: ICD-10-CM

## 2025-01-14 DIAGNOSIS — I10 PRIMARY HYPERTENSION: ICD-10-CM

## 2025-01-14 DIAGNOSIS — E03.8 SUBCLINICAL HYPOTHYROIDISM: ICD-10-CM

## 2025-01-14 DIAGNOSIS — R73.01 IFG (IMPAIRED FASTING GLUCOSE): ICD-10-CM

## 2025-01-14 LAB
ANION GAP SERPL CALC-SCNC: 5 MMOL/L (ref 3–18)
BASOPHILS # BLD: 0.03 K/UL (ref 0–0.1)
BASOPHILS NFR BLD: 0.4 % (ref 0–2)
BUN SERPL-MCNC: 11 MG/DL (ref 7–18)
BUN/CREAT SERPL: 16 (ref 12–20)
CALCIUM SERPL-MCNC: 9.3 MG/DL (ref 8.5–10.1)
CHLORIDE SERPL-SCNC: 102 MMOL/L (ref 100–111)
CHOLEST SERPL-MCNC: 107 MG/DL
CO2 SERPL-SCNC: 34 MMOL/L (ref 21–32)
CREAT SERPL-MCNC: 0.68 MG/DL (ref 0.6–1.3)
DIFFERENTIAL METHOD BLD: ABNORMAL
EOSINOPHIL # BLD: 0.27 K/UL (ref 0–0.4)
EOSINOPHIL NFR BLD: 3.8 % (ref 0–5)
ERYTHROCYTE [DISTWIDTH] IN BLOOD BY AUTOMATED COUNT: 12.2 % (ref 11.6–14.5)
GLUCOSE SERPL-MCNC: 99 MG/DL (ref 74–99)
HBA1C MFR BLD: 5.2 % (ref 4.2–5.6)
HCT VFR BLD AUTO: 39.6 % (ref 36–48)
HDLC SERPL-MCNC: 53 MG/DL (ref 40–60)
HDLC SERPL: 2 (ref 0–5)
HGB BLD-MCNC: 13.2 G/DL (ref 13–16)
IMM GRANULOCYTES # BLD AUTO: 0.01 K/UL (ref 0–0.04)
IMM GRANULOCYTES NFR BLD AUTO: 0.1 % (ref 0–0.5)
LDLC SERPL CALC-MCNC: 36.8 MG/DL (ref 0–100)
LIPID PANEL: NORMAL
LYMPHOCYTES # BLD: 2.18 K/UL (ref 0.9–3.6)
LYMPHOCYTES NFR BLD: 30.4 % (ref 21–52)
MCH RBC QN AUTO: 34.3 PG (ref 24–34)
MCHC RBC AUTO-ENTMCNC: 33.3 G/DL (ref 31–37)
MCV RBC AUTO: 102.9 FL (ref 78–100)
MONOCYTES # BLD: 0.56 K/UL (ref 0.05–1.2)
MONOCYTES NFR BLD: 7.8 % (ref 3–10)
NEUTS SEG # BLD: 4.12 K/UL (ref 1.8–8)
NEUTS SEG NFR BLD: 57.5 % (ref 40–73)
NRBC # BLD: 0 K/UL (ref 0–0.01)
NRBC BLD-RTO: 0 PER 100 WBC
PLATELET # BLD AUTO: 173 K/UL (ref 135–420)
PMV BLD AUTO: 10.3 FL (ref 9.2–11.8)
POTASSIUM SERPL-SCNC: 3.9 MMOL/L (ref 3.5–5.5)
RBC # BLD AUTO: 3.85 M/UL (ref 4.35–5.65)
SODIUM SERPL-SCNC: 141 MMOL/L (ref 136–145)
T4 FREE SERPL-MCNC: 1.6 NG/DL (ref 0.7–1.5)
TRIGL SERPL-MCNC: 86 MG/DL
TSH SERPL DL<=0.05 MIU/L-ACNC: 0.31 UIU/ML (ref 0.36–3.74)
VLDLC SERPL CALC-MCNC: 17.2 MG/DL
WBC # BLD AUTO: 7.2 K/UL (ref 4.6–13.2)

## 2025-01-14 PROCEDURE — 83036 HEMOGLOBIN GLYCOSYLATED A1C: CPT

## 2025-01-14 PROCEDURE — 80048 BASIC METABOLIC PNL TOTAL CA: CPT

## 2025-01-14 PROCEDURE — 85025 COMPLETE CBC W/AUTO DIFF WBC: CPT

## 2025-01-14 PROCEDURE — 84439 ASSAY OF FREE THYROXINE: CPT

## 2025-01-14 PROCEDURE — 84443 ASSAY THYROID STIM HORMONE: CPT

## 2025-01-14 PROCEDURE — 36415 COLL VENOUS BLD VENIPUNCTURE: CPT

## 2025-01-14 PROCEDURE — 80061 LIPID PANEL: CPT

## 2025-01-20 ENCOUNTER — PATIENT MESSAGE (OUTPATIENT)
Facility: CLINIC | Age: 80
End: 2025-01-20

## 2025-01-20 DIAGNOSIS — F41.9 ANXIETY: Primary | ICD-10-CM

## 2025-01-20 DIAGNOSIS — M54.16 LUMBAR RADICULOPATHY: ICD-10-CM

## 2025-01-20 NOTE — TELEPHONE ENCOUNTER
[facility]  report reviewed    The last fill date for Hydrocodone-acetaminophen was 12/20/2024 for a 30 d/s with qty 120  The last fill date for Lorazepam was 05/10/2024 for a 30 d/s with qty 90    Last UDS: 05/28/2024   CSA Last signed: 05/28/2024     PCP: Dixon Templeton MD    Last appointment: 07/25/2024  Future Appointments   Date Time Provider Department Center   1/23/2025  1:20 PM Dixon Templeton MD [facility] DEP   2/12/2025  1:45 PM Ac Milan MD [facility] Sched   4/25/2025 11:50 AM [facility] NURSE [facility] Sched       Requested Prescriptions     Pending Prescriptions Disp Refills    HYDROcodone-acetaminophen (NORCO) 5-325 MG per tablet 120 tablet 0     Sig: Take 1 tablet by mouth every 6 hours as needed for Pain for up to 30 days. Max Daily Amount: 4 tablets    LORazepam (ATIVAN) 1 MG tablet 90 tablet 0     Sig: Take 1 tablet by mouth every 8 hours as needed for Anxiety for up to 30 days. Max Daily Amount: 3 mg

## 2025-01-21 RX ORDER — LORAZEPAM 1 MG/1
1 TABLET ORAL EVERY 8 HOURS PRN
Qty: 90 TABLET | Refills: 0 | Status: SHIPPED | OUTPATIENT
Start: 2025-01-21 | End: 2025-02-20

## 2025-01-21 RX ORDER — HYDROCODONE BITARTRATE AND ACETAMINOPHEN 5; 325 MG/1; MG/1
1 TABLET ORAL EVERY 6 HOURS PRN
Qty: 120 TABLET | Refills: 0 | Status: SHIPPED | OUTPATIENT
Start: 2025-01-21 | End: 2025-02-20

## 2025-03-11 SDOH — HEALTH STABILITY: PHYSICAL HEALTH: ON AVERAGE, HOW MANY MINUTES DO YOU ENGAGE IN EXERCISE AT THIS LEVEL?: 0 MIN

## 2025-03-11 SDOH — HEALTH STABILITY: PHYSICAL HEALTH: ON AVERAGE, HOW MANY DAYS PER WEEK DO YOU ENGAGE IN MODERATE TO STRENUOUS EXERCISE (LIKE A BRISK WALK)?: 0 DAYS

## 2025-03-11 ASSESSMENT — LIFESTYLE VARIABLES
HOW MANY STANDARD DRINKS CONTAINING ALCOHOL DO YOU HAVE ON A TYPICAL DAY: PATIENT DOES NOT DRINK
HOW OFTEN DO YOU HAVE A DRINK CONTAINING ALCOHOL: NEVER
HOW OFTEN DO YOU HAVE SIX OR MORE DRINKS ON ONE OCCASION: 1
HOW MANY STANDARD DRINKS CONTAINING ALCOHOL DO YOU HAVE ON A TYPICAL DAY: 0
HOW OFTEN DO YOU HAVE A DRINK CONTAINING ALCOHOL: 1

## 2025-03-11 ASSESSMENT — PATIENT HEALTH QUESTIONNAIRE - PHQ9
2. FEELING DOWN, DEPRESSED OR HOPELESS: SEVERAL DAYS
SUM OF ALL RESPONSES TO PHQ QUESTIONS 1-9: 2
1. LITTLE INTEREST OR PLEASURE IN DOING THINGS: SEVERAL DAYS
SUM OF ALL RESPONSES TO PHQ QUESTIONS 1-9: 2

## 2025-03-20 ENCOUNTER — HOSPITAL ENCOUNTER (OUTPATIENT)
Facility: HOSPITAL | Age: 80
Setting detail: SPECIMEN
Discharge: HOME | End: 2025-03-23
Payer: MEDICARE

## 2025-03-20 ENCOUNTER — OFFICE VISIT (OUTPATIENT)
Facility: CLINIC | Age: 80
End: 2025-03-20

## 2025-03-20 VITALS
OXYGEN SATURATION: 98 % | WEIGHT: 210 LBS | BODY MASS INDEX: 28.44 KG/M2 | RESPIRATION RATE: 16 BRPM | TEMPERATURE: 98.2 F | HEIGHT: 72 IN | DIASTOLIC BLOOD PRESSURE: 59 MMHG | SYSTOLIC BLOOD PRESSURE: 115 MMHG | HEART RATE: 73 BPM

## 2025-03-20 DIAGNOSIS — I11.9 HYPERTENSIVE HEART DISEASE WITHOUT HEART FAILURE: ICD-10-CM

## 2025-03-20 DIAGNOSIS — J61 ASBESTOSIS (HCC): ICD-10-CM

## 2025-03-20 DIAGNOSIS — E78.5 DYSLIPIDEMIA: ICD-10-CM

## 2025-03-20 DIAGNOSIS — K22.70 BARRETT'S ESOPHAGUS WITHOUT DYSPLASIA: ICD-10-CM

## 2025-03-20 DIAGNOSIS — K63.5 POLYP OF COLON, UNSPECIFIED PART OF COLON, UNSPECIFIED TYPE: ICD-10-CM

## 2025-03-20 DIAGNOSIS — E05.90 HYPERTHYROIDISM: ICD-10-CM

## 2025-03-20 DIAGNOSIS — R73.01 IMPAIRED FASTING GLUCOSE: ICD-10-CM

## 2025-03-20 DIAGNOSIS — I48.91 ATRIAL FIBRILLATION, UNSPECIFIED TYPE (HCC): ICD-10-CM

## 2025-03-20 DIAGNOSIS — C61 PROSTATE CANCER (HCC): ICD-10-CM

## 2025-03-20 DIAGNOSIS — Z71.89 ADVANCED CARE PLANNING/COUNSELING DISCUSSION: ICD-10-CM

## 2025-03-20 DIAGNOSIS — K51.919 ULCERATIVE COLITIS WITH COMPLICATION, UNSPECIFIED LOCATION (HCC): ICD-10-CM

## 2025-03-20 DIAGNOSIS — Z00.00 MEDICARE ANNUAL WELLNESS VISIT, SUBSEQUENT: Primary | ICD-10-CM

## 2025-03-20 LAB
T4 FREE SERPL-MCNC: 1.4 NG/DL (ref 0.7–1.5)
TSH SERPL DL<=0.05 MIU/L-ACNC: 0.1 UIU/ML (ref 0.36–3.74)

## 2025-03-20 PROCEDURE — 84439 ASSAY OF FREE THYROXINE: CPT

## 2025-03-20 PROCEDURE — 83520 IMMUNOASSAY QUANT NOS NONAB: CPT

## 2025-03-20 PROCEDURE — 84443 ASSAY THYROID STIM HORMONE: CPT

## 2025-03-20 PROCEDURE — 36415 COLL VENOUS BLD VENIPUNCTURE: CPT

## 2025-03-22 LAB — TSH RECEP AB SER-ACNC: <1.1 IU/L (ref 0–1.75)

## 2025-03-23 ENCOUNTER — RESULTS FOLLOW-UP (OUTPATIENT)
Facility: HOSPITAL | Age: 80
End: 2025-03-23

## 2025-03-23 DIAGNOSIS — R79.89 ELEVATED TSH: ICD-10-CM

## 2025-03-23 DIAGNOSIS — R73.01 IMPAIRED FASTING GLUCOSE: Primary | ICD-10-CM

## 2025-03-23 DIAGNOSIS — M48.02 CERVICAL SPINAL STENOSIS: ICD-10-CM

## 2025-03-24 RX ORDER — HYDROCODONE BITARTRATE AND ACETAMINOPHEN 5; 325 MG/1; MG/1
1 TABLET ORAL EVERY 6 HOURS PRN
Qty: 120 TABLET | Refills: 0 | Status: CANCELLED | OUTPATIENT
Start: 2025-03-24 | End: 2025-04-23

## 2025-03-24 RX ORDER — FLUTICASONE PROPIONATE 50 MCG
1 SPRAY, SUSPENSION (ML) NASAL DAILY
Qty: 32 G | Refills: 1 | Status: CANCELLED | OUTPATIENT
Start: 2025-03-24

## 2025-03-24 NOTE — TELEPHONE ENCOUNTER
Patient is aware of lab results.     Patient states he was suppose to have Flonase, Norco and Potassium sent to the pharmacy and none of the medications have been sent as of yet.     Please advise.     [facility]  report reviewed 3/24/2025    The last fill date for Hydrocodone-Acetamin 5-325 Mg  was 2/21/2024 for a 30 d/s qty 120      Last UDS: completed    CSA Last signed: 11/29/2022                
  1. Impaired fasting glucose  Comprehensive Metabolic Panel      2. Elevated TSH  TSH + Free T4 Panel

## 2025-03-25 PROBLEM — Z85.46 HISTORY OF PROSTATE CANCER: Status: RESOLVED | Noted: 2018-01-09 | Resolved: 2025-03-25

## 2025-03-25 PROBLEM — S61.309A: Status: RESOLVED | Noted: 2023-11-07 | Resolved: 2025-03-25

## 2025-03-25 PROBLEM — Q18.1 EAR CYSTS: Status: RESOLVED | Noted: 2024-09-12 | Resolved: 2025-03-25

## 2025-03-26 NOTE — PROGRESS NOTES
Medicare Annual Wellness Visit    Charles Thomas Collette is here for Medicare AWV    Assessment & Plan   Hyperthyroidism  -     TSH + Free T4 Panel; Future  -     Thyrotropin receptor antibody; Future  Asbestosis (HCC)  Atrial fibrillation, unspecified type (HCC)  Ulcerative colitis with complication, unspecified location (HCC)  Hypertensive heart disease without heart failure  Russo's esophagus without dysplasia  Polyp of colon, unspecified part of colon, unspecified type  Impaired fasting glucose  Dyslipidemia  Prostate cancer (HCC)  Medicare annual wellness visit, subsequent       Return in 6 months (on 9/20/2025).     Subjective       Patient's complete Health Risk Assessment and screening values have been reviewed and are found in Flowsheets. The following problems were reviewed today and where indicated follow up appointments were made and/or referrals ordered.    Positive Risk Factor Screenings with Interventions:          Controlled Medication Review:    Today's Pain Level: No data recorded   Opioid Risk: (Low risk score <55) Opioid risk score: 34    Patient is low risk for opioid use disorder or overdose.    Last PDMP Dorian as Reviewed:  Review User Review Instant Review Result                   Inactivity:  On average, how many days per week do you engage in moderate to strenuous exercise (like a brisk walk)?: (Patient-Rptd) 0 days (!) Abnormal  On average, how many minutes do you engage in exercise at this level?: (Patient-Rptd) 0 min  Interventions:  Patient declined any further interventions or treatment        Vision Screen:  Do you have difficulty driving, watching TV, or doing any of your daily activities because of your eyesight?: (Patient-Rptd) No  Have you had an eye exam within the past year?: (!) (Patient-Rptd) No  Interventions:   Patient declines any further evaluation or treatment    Safety:  Do you have any tripping hazards - loose or unsecured carpets or rugs?: (!) (Patient-Rptd) 
Charles Thomas Collette presents today for   Chief Complaint   Patient presents with    Medicare AWV       \"Have you been to the ER, urgent care clinic since your last visit?  Hospitalized since your last visit?\"    YES - When: approximately 2 months ago.  Where and Why: [facility], hypertension.    “Have you seen or consulted any other health care providers outside of [facility] since your last visit?”    YES - When: approximately 3 days ago.  Where and Why: [facility], peripheral arterial disease.             
6/17 showed   gluc 96,   cr 0.74, gfr>60, alt 34,  hba1c 5.7, chol 135, tg 71,   hdl 53, ldl-c 68  From 1/18 showed   gluc 107, cr 0.65, gfr>60, alt 31,  hba1c 5.8, chol 147, tg 105, hdl 46, ldl-c 80  From 5/18 showed   gluc 100, cr 0.73, gfr>60, alt 38,  hba1c 5.9, chol 152, tg 59,   hdl 66, ldl-c 74  From 11/18 showed          hba1c 6.1, chol 146, tg 149, hdl 51, ldl-c 65, wbc 7.9, hb 15.4, plt 164  From 5/19 showed          hba1c 5.5  From 11/19 showed gluc 108, cr 0.77, gfr>60, alt 31,  hba1c 5.5, chol 128, tg 108, hdl 36, ldl-c 70                              psa 0.09  From 5/20 showed          hba1c 5.9, chol 142, tg 133, hdl 52, ldl-c 63, wbc 8.7, hb 14.1, plt 160  From 11/20 showed gluc 99,   cr 0.81, gfr>60, alt 29,  hba1c 5.4  From 5/21 showed   gluc 103, cr 0.71, gfr>60, alt 29,  hba1c 5.5, chol 146, tg 84,   hdl 55, ldl-c 74, wbc 7.3, hb 13.9, plt 141  From 11/21 showed gluc 90,   cr 0.72, gfr>60,      hba1c 5.4,             psa 0.21  From 7/22 showed   gluc 93,   cr 0.79, gfr>60, alt 28,  hba1c <4,          wbc 6.3, hb 14.5, plt 162  From 1/23 showed   gluc 114, cr 0.77, gfr>60,             chol 130, tg 106, hdl 44, ldl-c 65,             tsh 0.39,    ft4 1.20  From 7/23 showed   gluc 108, cr 0.70, gfr>60, alt 24,  hba1c 5.5, chol 125, tg 80,   hdl 48, ldl-c 61, wbc 6.2, hb 14.6, plt 159  From 1/24 showed   gluc 101, cr 0.81, gfr>60,      hba1c 5.7, chol 130, tg 64,   hdl 53, ldl-c 64,         psa 0.83  From 4/24 showed   gluc 168, cr 0.80, gfr>60, alt 20,            chol 153, tg 128, hdl 50, ldl-c 77, wbc 9.8, hb 15.0, plt 150, tsh 0.62  From 7/24 showed   gluc 108, cr 0.73, gfr>60, alt 27,  hba1c 5.4,          wbc 7.0, hb 14.0, plt 160, umar 1.2, psa 1.90, b12 512, fol>20     Results for orders placed or performed during the hospital encounter of 03/20/25 (from the past 2160 hours)   TSH + Free T4 Panel   Result Value Ref Range    TSH, 3rd Generation 0.10 (L) 0.36 - 3.74 uIU/mL    T4 Free 1.4 0.7 - 1.5

## 2025-03-26 NOTE — PATIENT INSTRUCTIONS
Learning About Being Active as an Older Adult  Why is being active important as you get older?     Being active is one of the best things you can do for your health. And it's never too late to start. Being active--or getting active, if you aren't already--has definite benefits. It can:  Give you more energy,  Keep your mind sharp.  Improve balance to reduce your risk of falls.  Help you manage chronic illness with fewer medicines.  No matter how old you are, how fit you are, or what health problems you have, there is a form of activity that will work for you. And the more physical activity you can do, the better your overall health will be.  What kinds of activity can help you stay healthy?  Being more active will make your daily activities easier. Physical activity includes planned exercise and things you do in daily life. There are four types of activity:  Aerobic.  Doing aerobic activity makes your heart and lungs strong.  Includes walking, dancing, and gardening.  Aim for at least 2½ hours spread throughout the week.  It improves your energy and can help you sleep better.  Muscle-strengthening.  This type of activity can help maintain muscle and strengthen bones.  Includes climbing stairs, using resistance bands, and lifting or carrying heavy loads.  Aim for at least twice a week.  It can help protect the knees and other joints.  Stretching.  Stretching gives you better range of motion in joints and muscles.  Includes upper arm stretches, calf stretches, and gentle yoga.  Aim for at least twice a week, preferably after your muscles are warmed up from other activities.  It can help you function better in daily life.  Balancing.  This helps you stay coordinated and have good posture.  Includes heel-to-toe walking, citlali chi, and certain types of yoga.  Aim for at least 3 days a week.  It can reduce your risk of falling.  Even if you have a hard time meeting the recommendations, it's better to be more active

## 2025-03-26 NOTE — ACP (ADVANCE CARE PLANNING)
Advance Care Planning     Advance Care Planning (ACP) Physician/NP/PA Conversation    Date of Conversation: 3/20/2025  Conducted with: Patient with Decision Making Capacity    Healthcare Decision Maker:      Primary Decision Maker: Collette,Teresa  And Surgery Update - Spouse - [phone]    Click here to complete Healthcare Decision Makers including selection of the Healthcare Decision Maker Relationship (ie \"Primary\")  Today we documented Decision Maker(s) consistent with Legal Next of Kin hierarchy.    Care Preferences:    Hospitalization:  \"If your health worsens and it becomes clear that your chance of recovery is unlikely, what would be your preference regarding hospitalization?\"  The patient would prefer hospitalization.    Ventilation:  \"If you were unable to breath on your own and your chance of recovery was unlikely, what would be your preference about the use of a ventilator (breathing machine) if it was available to you?\"  The patient would desire the use of a ventilator.    Resuscitation:  \"In the event your heart stopped as a result of an underlying serious health condition, would you want attempts made to restart your heart, or would you prefer a natural death?\"  Yes, attempt to resuscitate.    ventilation preferences, hospitalization preferences, and resuscitation preferences    Conversation Outcomes / Follow-Up Plan:  ACP in process - information provided, considering goals and options  Reviewed DNR/DNI and patient elects Full Code (Attempt Resuscitation)    Length of Voluntary ACP Conversation in minutes:  16 minutes    GEOFF ALDRICH MD

## 2025-03-31 NOTE — PROGRESS NOTES
Patient: Charles Thomas Collette                MRN: [id number]       SSN: [id number]  YOB: 1945        AGE: 79 y.o.        SEX: male      PCP: Dixon Templeton MD  04/01/25    Chief Complaint   Patient presents with    Knee Pain     Right knee effusion     HISTORY:  Charles Thomas Collette is a 79 y.o. male who is seen for increased right knee pain and swelling. Patient successfully completed a right knee Euflexxa series on 3/29/23 and 4/17/24 but his right knee pains have returned. He denies any recent injury.  He feels pain with standing, walking and stair climbing.  He experiences startup pain after sitting. He relates some of his right knee pain to Zytiga he is taking for prostate cancer. He was diagnosed with prostate cancer in 2009 when he underwent a cryo procedure to remove the cancer. His cancer has since recurred with metastases and he is currently undergoing proton therapy. Wearing an  knee brace years ago helped but it has since worn out.      He was previously seen by KACEY Cervantes and Dr. Harvey at the [facility] for neck pain. He received medial branch blocks by Dr. Harvey.      He was previously seen by Dr. Mendenhall for right thumb, left index finger, and left ring finger pain. He underwent left ring finger nail removal and nailbed repair on 11/7/23 by Dr. Mendenhall.      He was previously seen for increased pain and swelling. His right Baker's cyst ruptured and bled into his calf on 11/18/2022. He denies any inciting injury but states the cyst ruptured soon after he returned from a trip to [place]. [facility] where a right knee CT revealed severe OA and a dissecting right popliteal cyst. He injured his right knee in 1964 playing football.  He underwent total meniscectomy and arthrotomy by Dr. Henao. He completed successful Euflexxa series  on 10/14/19, 7/29/20, 4/14/21, and 05/18/2022.  He takes  Plavix and aspirin.      He

## 2025-04-01 ENCOUNTER — OFFICE VISIT (OUTPATIENT)
Age: 80
End: 2025-04-01
Payer: MEDICARE

## 2025-04-01 VITALS — BODY MASS INDEX: 28.17 KG/M2 | HEIGHT: 72 IN | WEIGHT: 208 LBS

## 2025-04-01 DIAGNOSIS — M25.461 EFFUSION OF RIGHT KNEE: ICD-10-CM

## 2025-04-01 DIAGNOSIS — C61 PROSTATE CANCER METASTATIC TO BONE (HCC): ICD-10-CM

## 2025-04-01 DIAGNOSIS — M25.561 CHRONIC PAIN OF RIGHT KNEE: ICD-10-CM

## 2025-04-01 DIAGNOSIS — C79.51 PROSTATE CANCER METASTATIC TO BONE (HCC): ICD-10-CM

## 2025-04-01 DIAGNOSIS — M17.11 UNILATERAL PRIMARY OSTEOARTHRITIS, RIGHT KNEE: Primary | ICD-10-CM

## 2025-04-01 DIAGNOSIS — G89.29 CHRONIC PAIN OF RIGHT KNEE: ICD-10-CM

## 2025-04-01 PROCEDURE — G8417 CALC BMI ABV UP PARAM F/U: HCPCS | Performed by: SPECIALIST

## 2025-04-01 PROCEDURE — 1160F RVW MEDS BY RX/DR IN RCRD: CPT | Performed by: SPECIALIST

## 2025-04-01 PROCEDURE — 99213 OFFICE O/P EST LOW 20 MIN: CPT | Performed by: SPECIALIST

## 2025-04-01 PROCEDURE — 1036F TOBACCO NON-USER: CPT | Performed by: SPECIALIST

## 2025-04-01 PROCEDURE — G8427 DOCREV CUR MEDS BY ELIG CLIN: HCPCS | Performed by: SPECIALIST

## 2025-04-01 PROCEDURE — 1123F ACP DISCUSS/DSCN MKR DOCD: CPT | Performed by: SPECIALIST

## 2025-04-01 PROCEDURE — 1159F MED LIST DOCD IN RCRD: CPT | Performed by: SPECIALIST

## 2025-04-01 PROCEDURE — 20610 DRAIN/INJ JOINT/BURSA W/O US: CPT | Performed by: SPECIALIST

## 2025-04-01 PROCEDURE — 1125F AMNT PAIN NOTED PAIN PRSNT: CPT | Performed by: SPECIALIST

## 2025-04-01 RX ORDER — BUPIVACAINE HYDROCHLORIDE 5 MG/ML
4 INJECTION, SOLUTION PERINEURAL ONCE
Status: COMPLETED | OUTPATIENT
Start: 2025-04-01 | End: 2025-04-01

## 2025-04-01 RX ORDER — BETAMETHASONE SODIUM PHOSPHATE AND BETAMETHASONE ACETATE 3; 3 MG/ML; MG/ML
3 INJECTION, SUSPENSION INTRA-ARTICULAR; INTRALESIONAL; INTRAMUSCULAR; SOFT TISSUE ONCE
Status: COMPLETED | OUTPATIENT
Start: 2025-04-01 | End: 2025-04-01

## 2025-04-01 RX ADMIN — BETAMETHASONE SODIUM PHOSPHATE AND BETAMETHASONE ACETATE 3 MG: 3; 3 INJECTION, SUSPENSION INTRA-ARTICULAR; INTRALESIONAL; INTRAMUSCULAR; SOFT TISSUE at 09:12

## 2025-04-01 RX ADMIN — BUPIVACAINE HYDROCHLORIDE 20 MG: 5 INJECTION, SOLUTION PERINEURAL at 09:12

## 2025-04-07 ENCOUNTER — PATIENT MESSAGE (OUTPATIENT)
Facility: CLINIC | Age: 80
End: 2025-04-07

## 2025-04-07 DIAGNOSIS — E87.6 HYPOKALEMIA: Primary | ICD-10-CM

## 2025-04-15 LAB
ANION GAP SERPL CALCULATED.3IONS-SCNC: 6 MMOL/L (ref 3–15)
BUN BLDV-MCNC: 12 MG/DL (ref 6–22)
CALCIUM SERPL-MCNC: 9.5 MG/DL (ref 8.4–10.5)
CHLORIDE BLD-SCNC: 98 MMOL/L (ref 98–110)
CO2: 34 MMOL/L (ref 20–32)
CREAT SERPL-MCNC: 0.6 MG/DL (ref 0.8–1.6)
GFR, ESTIMATED: >60 ML/MIN/1.73 SQ.M.
GLUCOSE: 100 MG/DL (ref 70–99)
POTASSIUM SERPL-SCNC: 4.4 MMOL/L (ref 3.5–5.5)
SODIUM BLD-SCNC: 138 MMOL/L (ref 133–145)

## 2025-04-23 ENCOUNTER — PATIENT MESSAGE (OUTPATIENT)
Facility: CLINIC | Age: 80
End: 2025-04-23

## 2025-04-23 DIAGNOSIS — M19.90 OSTEOARTHRITIS, UNSPECIFIED OSTEOARTHRITIS TYPE, UNSPECIFIED SITE: ICD-10-CM

## 2025-04-23 DIAGNOSIS — M54.16 LUMBAR RADICULOPATHY: ICD-10-CM

## 2025-04-23 NOTE — TELEPHONE ENCOUNTER
[facility]  report reviewed    The last fill date for Hydrocodone-acetaminophen was 03/25/2025 for a 30 d/s with qty 120    Last UDS: 05/28/2024   CSA Last signed: 05/28/2024     PCP: Dixon Templeton MD    Last appointment: 03/20/2025  Future Appointments   Date Time Provider Department Center   4/30/2025  3:40 PM [facility] NURSE [facility] Sched   6/4/2025  1:15 PM Ac Milan MD [facility] Sched   9/23/2025  8:00 AM IOC LAB VISIT [facility] DEP   9/30/2025  9:00 AM Dixon Templeton MD [facility] DEP       Requested Prescriptions     Pending Prescriptions Disp Refills    HYDROcodone-acetaminophen (NORCO) 7.5-325 MG per tablet 120 tablet 0     Sig: Take 1 tablet by mouth every 6 hours as needed for Pain for up to 30 days. Intended supply: 30 days Max Daily Amount: 4 tablets

## 2025-04-24 ENCOUNTER — TELEPHONE (OUTPATIENT)
Facility: CLINIC | Age: 80
End: 2025-04-24

## 2025-04-24 RX ORDER — TRIAMTERENE AND HYDROCHLOROTHIAZIDE 37.5; 25 MG/1; MG/1
1 CAPSULE ORAL DAILY
Qty: 90 CAPSULE | Refills: 3 | Status: SHIPPED | OUTPATIENT
Start: 2025-04-24

## 2025-04-24 NOTE — TELEPHONE ENCOUNTER
PCP: Dixon Templeton MD    Refill Request     LAST OFFICE VISIT: 03/20/25      Medication:   triamterene-hydroCHLOROthiazide (DYAZIDE) 37.5-25 MG per capsule     Dispense: 90  Take 1 capsule by mouth daily     Pharmacy [id number] - [place], [place] - [address] [facility] - P [phone] - F [phone]  [facility] [address] [facility] [address]  Phone: [phone]  Fax: [phone]       Future Appointments   Date Time Provider Department Center   4/30/2025  3:40 PM [facility] NURSE [facility] Sched   6/4/2025  1:15 PM Ac Milan MD [facility] Sched   9/23/2025  8:00 AM IOC LAB VISIT [facility] DEP   9/30/2025  9:00 AM Dixon Templeton MD [facility] DEP

## 2025-04-25 ENCOUNTER — PATIENT MESSAGE (OUTPATIENT)
Facility: CLINIC | Age: 80
End: 2025-04-25

## 2025-04-25 RX ORDER — HYDROCODONE BITARTRATE AND ACETAMINOPHEN 7.5; 325 MG/1; MG/1
1 TABLET ORAL EVERY 6 HOURS PRN
Qty: 120 TABLET | Refills: 0 | Status: SHIPPED | OUTPATIENT
Start: 2025-04-25 | End: 2025-05-25

## 2025-04-25 NOTE — TELEPHONE ENCOUNTER
Pt checking on status of   HYDROcodone-acetaminophen (NORCO) 7.5-325 MG per tablet     Pt will be out by Sunday     Pharmacy   [id number] [place] [id number] [place] [phone] - F [phone] [[id number]]

## 2025-05-22 ENCOUNTER — PATIENT MESSAGE (OUTPATIENT)
Facility: CLINIC | Age: 80
End: 2025-05-22

## 2025-05-22 DIAGNOSIS — M54.16 LUMBAR RADICULOPATHY: Primary | ICD-10-CM

## 2025-05-22 NOTE — TELEPHONE ENCOUNTER
[facility]  report reviewed 5/22/2025    The last fill date for Hydrocodone-Acetamin 7.5-325  was 4/25/2025 for a 30 d/s qty 120      Last UDS: completed     CSA Last signed: 11/29/2022        PCP: Dixon Templeton MD    Last appt:  3/20/2025  Next Appt: 9/30/2025

## 2025-05-23 RX ORDER — HYDROCODONE BITARTRATE AND ACETAMINOPHEN 7.5; 325 MG/1; MG/1
1 TABLET ORAL EVERY 6 HOURS PRN
Qty: 120 TABLET | Refills: 0 | Status: SHIPPED | OUTPATIENT
Start: 2025-05-23 | End: 2025-06-22

## 2025-06-11 ENCOUNTER — PATIENT MESSAGE (OUTPATIENT)
Facility: CLINIC | Age: 80
End: 2025-06-11

## 2025-06-11 DIAGNOSIS — M54.16 LUMBAR RADICULOPATHY: ICD-10-CM

## 2025-06-11 RX ORDER — GABAPENTIN 300 MG/1
300 CAPSULE ORAL NIGHTLY
Qty: 90 CAPSULE | Refills: 1 | Status: SHIPPED | OUTPATIENT
Start: 2025-06-11 | End: 2025-12-08

## 2025-06-23 ENCOUNTER — PATIENT MESSAGE (OUTPATIENT)
Facility: CLINIC | Age: 80
End: 2025-06-23

## 2025-06-23 DIAGNOSIS — M54.16 LUMBAR RADICULOPATHY: Primary | ICD-10-CM

## 2025-06-24 NOTE — TELEPHONE ENCOUNTER
[facility]  report reviewed    The last fill date for Hydrocodone-acetaminophen was 05/23/2025 for a 30 d/s with qty 120    Last UDS: 05/28/2024   CSA Last signed: 05/28/2024     PCP: Dixon Templeton MD  Last appointment: 03/20/2025  Future Appointments   Date Time Provider Department Center   9/23/2025  8:00 AM [facility] LAB VISIT [facility] DEP   9/30/2025  9:00 AM Dixon Templeton MD [facility] DEP   10/30/2025  1:20 PM [facility] NURSE [facility] Sched   12/10/2025  1:45 PM Ac Milan MD [facility] Sched       Requested Prescriptions     Pending Prescriptions Disp Refills    HYDROcodone-acetaminophen (NORCO) 7.5-325 MG per tablet 120 tablet 0     Sig: Take 1 tablet by mouth every 6 hours as needed for Pain for up to 30 days. Max Daily Amount: 4 tablets

## 2025-06-26 RX ORDER — HYDROCODONE BITARTRATE AND ACETAMINOPHEN 7.5; 325 MG/1; MG/1
1 TABLET ORAL EVERY 6 HOURS PRN
Qty: 120 TABLET | Refills: 0 | Status: SHIPPED | OUTPATIENT
Start: 2025-06-26 | End: 2025-07-26

## 2025-07-26 ENCOUNTER — PATIENT MESSAGE (OUTPATIENT)
Facility: CLINIC | Age: 80
End: 2025-07-26

## 2025-07-26 DIAGNOSIS — M54.16 LUMBAR RADICULOPATHY: Primary | ICD-10-CM

## 2025-07-28 RX ORDER — HYDROCODONE BITARTRATE AND ACETAMINOPHEN 7.5; 325 MG/1; MG/1
1 TABLET ORAL EVERY 6 HOURS PRN
Qty: 120 TABLET | Refills: 0 | Status: SHIPPED | OUTPATIENT
Start: 2025-07-28 | End: 2025-08-27

## 2025-07-28 NOTE — TELEPHONE ENCOUNTER
[facility]  report reviewed    The last fill date for Hydrocodone-acetaminophen was 06/27/2025 for a 30 d/s with qty 120    Last UDS: 05/28/2024   CSA Last signed: 05/28/2024     PCP: Dixon Templeton MD  Last appointment: 03/20/2025  Future Appointments   Date Time Provider Department Center   9/23/2025  8:00 AM IOC LAB VISIT [facility] DEP   9/30/2025  9:00 AM Dixon Templeton MD [facility] DEP   10/30/2025  1:20 PM [facility] NURSE [facility] Sched   12/10/2025  1:45 PM Ac Milan MD [facility] Sched       Requested Prescriptions     Pending Prescriptions Disp Refills    HYDROcodone-acetaminophen (NORCO) 7.5-325 MG per tablet 120 tablet 0     Sig: Take 1 tablet by mouth every 6 hours as needed for Pain for up to 30 days. Max Daily Amount: 4 tablets

## 2025-09-02 ENCOUNTER — PATIENT MESSAGE (OUTPATIENT)
Facility: CLINIC | Age: 80
End: 2025-09-02

## 2025-09-02 DIAGNOSIS — M54.16 LUMBAR RADICULOPATHY: Primary | ICD-10-CM

## 2025-09-04 RX ORDER — HYDROCODONE BITARTRATE AND ACETAMINOPHEN 7.5; 325 MG/1; MG/1
1 TABLET ORAL EVERY 6 HOURS PRN
Qty: 120 TABLET | Refills: 0 | Status: SHIPPED | OUTPATIENT
Start: 2025-09-04 | End: 2025-10-04

## (undated) DEVICE — TRAY PREP DRY W/ PREM GLV 2 APPL 6 SPNG 2 UNDPD 1 OVERWRAP

## (undated) DEVICE — MEDI-VAC NON-CONDUCTIVE SUCTION TUBING: Brand: CARDINAL HEALTH

## (undated) DEVICE — SUTURE ETHBND EXCEL SZ 0 L18IN NONABSORBABLE GRN L36MM CT-1 CX21D

## (undated) DEVICE — SET FLD ADMIN 3 W STPCOCK FIX FEM L BOR 1IN

## (undated) DEVICE — GUIDEWIRE VASC L180CM DIA0035IN L15CM STR TIP PTFE HEP S STL

## (undated) DEVICE — GLOVE SURG SZ 8 L12IN FNGR THK79MIL GRN LTX FREE

## (undated) DEVICE — STERILE POLYISOPRENE POWDER-FREE SURGICAL GLOVES: Brand: PROTEXIS

## (undated) DEVICE — AIRLIFE™ NASAL OXYGEN CANNULA CURVED, NONFLARED TIP WITH 14 FOOT (4.3 M) CRUSH-RESISTANT TUBING, OVER-THE-EAR STYLE: Brand: AIRLIFE™

## (undated) DEVICE — V2 BLADED TROCAR WITH FIXATION CANNULA: Brand: VERSAPORT

## (undated) DEVICE — SUTURE GOR TX SZ 4-0 L30IN NONABSORBABLE L13MM TTC-13 3/8 5M02A

## (undated) DEVICE — BITE BLOCK ENDOSCP UNIV AD 6 TO 9.4 MM

## (undated) DEVICE — CATHETER SUCT TR FL TIP 14FR W/ O CTRL

## (undated) DEVICE — GLOVE SURG SZ 7 L11.33IN FNGR THK9.8MIL STRW LTX POLYMER

## (undated) DEVICE — 3M™ STERI-DRAPE™ U-DRAPE 1015: Brand: STERI-DRAPE™

## (undated) DEVICE — SOLUTION IV 1000ML 0.9% SOD CHL

## (undated) DEVICE — SUTURE MCRYL SZ 3-0 L27IN ABSRB UD L26MM SH 1/2 CIR Y416H

## (undated) DEVICE — PACK PROCEDURE SURG MAJ W/ BASIN LF

## (undated) DEVICE — KENDALL SCD EXPRESS SLEEVES, KNEE LENGTH, MEDIUM: Brand: KENDALL SCD

## (undated) DEVICE — LINER SUCT CANSTR 3000CC PLAS SFT PRE ASSEMB W/OUT TBNG W/

## (undated) DEVICE — SUTURE MCRYL SZ 4 0 L18IN ABSRB VLT PS 1 L24MM 3 8 CIR REV Y682H

## (undated) DEVICE — BANDAGE COMPR W1INXL5YD WHT COT E TAPE RUB BASE ADH CURAD

## (undated) DEVICE — SOLUTION IRRIG 1000ML H2O STRL BLT

## (undated) DEVICE — INTENDED FOR TISSUE SEPARATION, AND OTHER PROCEDURES THAT REQUIRE A SHARP SURGICAL BLADE TO PUNCTURE OR CUT.: Brand: BARD-PARKER ®  SAFETY SCALPED

## (undated) DEVICE — SUTURE PROL SZ 5-0 L36IN NONABSORBABLE BLU L13MM C-1 3/8 8720H

## (undated) DEVICE — KIT CLN UP BON SECOURS MARYV

## (undated) DEVICE — LAPAROSCOPIC SMOKE FILTRATION SYSTEM: Brand: PALL LAPAROSHIELD® PLUS LAPAROSCOPIC SMOKE FILTRATION SYSTEM

## (undated) DEVICE — SYRINGE MED 25GA 3ML L5/8IN SUBQ PLAS W/ DETACH NDL SFTY

## (undated) DEVICE — FLEX ADVANTAGE 3000CC: Brand: FLEX ADVANTAGE

## (undated) DEVICE — Device

## (undated) DEVICE — SHEAR HARMONIC ACET 5MMX36CM -- ACE PLUS

## (undated) DEVICE — BASIN EMSIS 16OZ GRAPHITE PLAS KID SHP MOLD GRAD FOR ORAL

## (undated) DEVICE — INTENDED FOR TISSUE SEPARATION, AND OTHER PROCEDURES THAT REQUIRE A SHARP SURGICAL BLADE TO PUNCTURE OR CUT.: Brand: BARD-PARKER ® CARBON RIB-BACK BLADES

## (undated) DEVICE — SYR 50ML SLIP TIP NSAF LF STRL --

## (undated) DEVICE — TABLE COVER: Brand: CONVERTORS

## (undated) DEVICE — DRAPE,HAND,STERILE: Brand: MEDLINE

## (undated) DEVICE — COVER US PRB W15XL120CM W/ GEL RUBBERBAND TAPE STRP FLD GEN

## (undated) DEVICE — INTENDED FOR TISSUE SEPARATION, AND OTHER PROCEDURES THAT REQUIRE A SHARP SURGICAL BLADE TO PUNCTURE OR CUT.: Brand: BARD-PARKER SAFETY BLADES SIZE 15, STERILE

## (undated) DEVICE — NEEDLE ANGIO 1 WALL ULT SMOOTH 19GAX7CM MERIT AVANCE

## (undated) DEVICE — SUTURE N ABSRB MONOFILAMENT 5-0 PC5 18 IN 19 MM BLK ETHILON 1895G

## (undated) DEVICE — PERCLOSE PROGLIDE™ SUTURE-MEDIATED CLOSURE SYSTEM: Brand: PERCLOSE PROGLIDE™

## (undated) DEVICE — ABDOMINAL PAD: Brand: DERMACEA

## (undated) DEVICE — STAPLER INT DIA5MM 25 ABSRB STRP FIX DISP FOR HERN MESH

## (undated) DEVICE — GLOVE SURG SZ 7.5 L11.73IN FNGR THK9.8MIL STRW LTX POLYMER

## (undated) DEVICE — REM POLYHESIVE ADULT PATIENT RETURN ELECTRODE: Brand: VALLEYLAB

## (undated) DEVICE — TROCAR: Brand: KII FIOS FIRST ENTRY

## (undated) DEVICE — SYR 20ML LL STRL LF --

## (undated) DEVICE — DRAPE,REIN 53X77,STERILE: Brand: MEDLINE

## (undated) DEVICE — 3M™ STERI-STRIP™ COMPOUND BENZOIN TINCTURE 40 BAGS/CARTON 4 CARTONS/CASE C1544: Brand: 3M™ STERI-STRIP™

## (undated) DEVICE — SUTURE COAT VCRL PC 5 PRECIS COSM CONVENTIONAL CUT PRIM 3 8 J823H

## (undated) DEVICE — THIS PRODUCT IS SINGLE USE AND INTENDED TO BE USED FOR BLUNT DISSECTION OF TISSUE.: Brand: ASPEN® ENDOSCOPIC KITTNER, SINGLE TIP

## (undated) DEVICE — INTRODUCER SHTH 7FR CANN L23CM DIL TIP 35MM ORNG TUNGSTEN

## (undated) DEVICE — UNDERPAD INCONT W23XL36IN STD BLU POLYPR BK FLUF SFT

## (undated) DEVICE — TRNQT CUFF RMFG 1PRT 18X3 RED -- LAWSON OEM ITEM 279116 PK/5

## (undated) DEVICE — ENDOCUT SCISSOR TIP, DISPOSABLE: Brand: RENEW

## (undated) DEVICE — SUTURE MCRYL SZ 4-0 L18IN ABSRB UD L19MM PS-2 3/8 CIR PRIM Y496G

## (undated) DEVICE — SCISSORS LAPSCP L33CM DIA5MM CVD MULTIUSE HANDHELD

## (undated) DEVICE — MEDI-VAC SUCTION HIGH CAPACITY: Brand: CARDINAL HEALTH

## (undated) DEVICE — STRIP SKIN CLSR W0.25XL4IN WHT SPUNBOUND FBR NYL HI ADH

## (undated) DEVICE — BLADED TROCAR WITH FIXATION CANNULA: Brand: VERSAONE

## (undated) DEVICE — DISPOSABLE DISTAL ATTACHMENT: Brand: DISPOSABLE DISTAL ATTACHMENT

## (undated) DEVICE — THREE-QUARTER SHEET: Brand: CONVERTORS

## (undated) DEVICE — GAUZE,SPONGE,4"X4",16PLY,STRL,LF,10/TRAY: Brand: MEDLINE

## (undated) DEVICE — 3M™ BAIR PAWS FLEX™ WARMING GOWN, STANDARD, 20 PER CASE 81003: Brand: BAIR PAWS™

## (undated) DEVICE — KIT MIC INTR PERC 4F 60CM SMTH -- VSI

## (undated) DEVICE — TOWEL,OR,DSP,ST,WHITE,DLX,4/PK,20PK/CS: Brand: MEDLINE

## (undated) DEVICE — SYR 10ML LUER LOK 1/5ML GRAD --

## (undated) DEVICE — COVER LT HNDL FLX

## (undated) DEVICE — BLADE ASSEMB CLP HAIR FINE --

## (undated) DEVICE — (D)PREP SKN CHLRAPRP APPL 26ML -- CONVERT TO ITEM 371833

## (undated) DEVICE — SUTURE PERMA-HAND SZ 3-0 L24IN NONABSORBABLE BLK W/O NDL SA74H

## (undated) DEVICE — OCCLUSIVE GAUZE STRIP,3% BISMUTH TRIBROMOPHENATE IN PETROLATUM BLEND: Brand: XEROFORM

## (undated) DEVICE — PACK PROCEDURE SURG VASC CATH 161 MMC LF

## (undated) DEVICE — GAUZE SPONGES,16 PLY: Brand: CURITY

## (undated) DEVICE — SUTURE VCRL SZ 3-0 L27IN ABSRB UD L26MM SH 1/2 CIR J416H

## (undated) DEVICE — INTRODUCER SHTH 7FR CANN L11CM DIL TIP 35MM ORNG TUNGSTEN

## (undated) DEVICE — FLUFF AND POLYMER UNDERPAD,EXTRA HEAVY: Brand: WINGS

## (undated) DEVICE — (D)STRIP SKN CLSR 0.5X4IN WHT --

## (undated) DEVICE — Z INACTIVE USE 2855128 SPONGE GZ 16 PLY WVN COT 4INX4IN  HHH

## (undated) DEVICE — DRAPE TWL SURG 16X26IN BLU ORB04] ALLCARE INC]

## (undated) DEVICE — DRESSING,GAUZE,XEROFORM,CURAD,1"X8",ST: Brand: CURAD

## (undated) DEVICE — ENDOSCOPY PUMP TUBING/ CAP SET: Brand: ERBE

## (undated) DEVICE — GAUZE SPONGES,8 PLY: Brand: CURITY

## (undated) DEVICE — NEEDLE HYPO 25GA L1.5IN BVL ORIENTED ECLIPSE

## (undated) DEVICE — SOLUTION LACTATED RINGERS INJECTION USP

## (undated) DEVICE — YANKAUER,SMOOTH HANDLE,HIGH CAPACITY: Brand: MEDLINE INDUSTRIES, INC.

## (undated) DEVICE — CORD ES L10FT MPLR LAP

## (undated) DEVICE — RING TRNQT UNIV DGT TOURNI COT

## (undated) DEVICE — INTRODUCER SHTH 4FR CANN L11CM DIL TIP 25MM RED TUNGSTEN

## (undated) DEVICE — APPLIER CLP AUTO MED 9.75 IN TI SURGCLP SUPER INTLOK 20 DISP

## (undated) DEVICE — STRETCH BANDAGE ROLL: Brand: DERMACEA

## (undated) DEVICE — CATHETER ANGIO BER2 038 4 FRX65 CM TEMPO AQUA

## (undated) DEVICE — SUTURE MCRYL SZ 2-0 L36IN ABSRB UD L36MM CT-1 1/2 CIR Y945H

## (undated) DEVICE — GOWN ISOL IMPERV UNIV, DISP, OPEN BACK, BLUE --

## (undated) DEVICE — PREP CHLORAPREP 10.5 ML ORG --

## (undated) DEVICE — GOWN,SIRUS,FABRNF,2XL,18/CS: Brand: MEDLINE

## (undated) DEVICE — BANDAGE COMPR EXSANGUATION SGL LAYERED NO CLSR 9FT LEN 4IN W

## (undated) DEVICE — SUTURE NONABSORBABLE SILK BRAID BLK PSL 2 0 30IN 486H

## (undated) DEVICE — FORCEPS BX L240CM JAW DIA2.4MM ORNG L CAP W/ NDL DISP RAD

## (undated) DEVICE — PTA BALLOON DILATATION CATHETER: Brand: MUSTANG™

## (undated) DEVICE — 3M(TM) TEGADERM(TM) TRANSPARENT FILM DRESSING FRAME STYLE 9505W: Brand: 3M™ TEGADERM™

## (undated) DEVICE — ANGIOGRAPHY KIT CUST VASC

## (undated) DEVICE — SYRINGE MED 50ML LUERSLIP TIP

## (undated) DEVICE — 3M™ WARMING BLANKET, UPPER BODY, 10 PER CASE, 42268: Brand: BAIR HUGGER™

## (undated) DEVICE — INTENDED FOR TISSUE SEPARATION, AND OTHER PROCEDURES THAT REQUIRE A SHARP SURGICAL BLADE TO PUNCTURE OR CUT.: Brand: BARD-PARKER SAFETY BLADES SIZE 11, STERILE

## (undated) DEVICE — CATHETER ANGIO 4FR L65CM 0.035IN VIS OMNI FLUSH-0 SFT TIP

## (undated) DEVICE — GLOVE SURG SZ 8 CRM LTX FREE POLYISOPRENE POLYMER BEAD ANTI

## (undated) DEVICE — CANNULA NSL AD TBNG L14FT STD PVC O2 CRV CONN NONFLARED NSL

## (undated) DEVICE — INFLATION DEVICE: Brand: ENCORE™ 26

## (undated) DEVICE — SUTURE ABSORBABLE BRAIDED 2-0 CT-1 27 IN UD VICRYL J259H

## (undated) DEVICE — INTRODUCER SHTH 6FR CANN L11CM DIL TIP 35MM GRN TUNGSTEN

## (undated) DEVICE — (D)BNDG ADHESIVE FABRIC 3/4X3 -- DISC BY MFR USE ITEM 357960

## (undated) DEVICE — CANNULA ORIG TL CLR W FOAM CUSHIONS AND 14FT SUPL TB 3 CHN

## (undated) DEVICE — APPLICATOR BNDG 1MM ADH PREMIERPRO EXOFIN

## (undated) DEVICE — APPLICATOR MEDICATED 10.5 CC SOLUTION CLR STRL CHLORAPREP

## (undated) DEVICE — BLADE OPHTH GRN ROUNDED TIP 1 SIDE SHRP GRINDLESS MINI-BLDE

## (undated) DEVICE — SUT PROL 6-0 30IN C1 DA BLU --

## (undated) DEVICE — TRAY CATH OD16FR SIL URIN M STATLOK STBL DEV SURSTP